# Patient Record
Sex: FEMALE | Race: WHITE | Employment: FULL TIME | URBAN - METROPOLITAN AREA
[De-identification: names, ages, dates, MRNs, and addresses within clinical notes are randomized per-mention and may not be internally consistent; named-entity substitution may affect disease eponyms.]

---

## 2017-02-24 PROBLEM — G35 MS (MULTIPLE SCLEROSIS) (HCC): Status: ACTIVE | Noted: 2017-02-24

## 2018-03-22 ENCOUNTER — HOSPITAL ENCOUNTER (EMERGENCY)
Age: 48
Discharge: HOME | End: 2018-03-22
Payer: COMMERCIAL

## 2018-03-22 VITALS
SYSTOLIC BLOOD PRESSURE: 123 MMHG | HEART RATE: 91 BPM | TEMPERATURE: 98.9 F | RESPIRATION RATE: 20 BRPM | DIASTOLIC BLOOD PRESSURE: 77 MMHG | OXYGEN SATURATION: 94 %

## 2018-03-22 DIAGNOSIS — J40 BRONCHITIS: Primary | ICD-10-CM

## 2018-03-22 PROCEDURE — 99212 OFFICE O/P EST SF 10 MIN: CPT

## 2018-03-22 RX ORDER — AZITHROMYCIN 250 MG/1
TABLET, FILM COATED ORAL
Qty: 6 TABLET | Refills: 0 | Status: SHIPPED | OUTPATIENT
Start: 2018-03-22 | End: 2018-04-01

## 2018-03-22 RX ORDER — BROMPHENIRAMINE MALEATE, PSEUDOEPHEDRINE HYDROCHLORIDE, AND DEXTROMETHORPHAN HYDROBROMIDE 2; 30; 10 MG/5ML; MG/5ML; MG/5ML
5 SYRUP ORAL 4 TIMES DAILY PRN
Qty: 140 ML | Refills: 0 | Status: SHIPPED | OUTPATIENT
Start: 2018-03-22 | End: 2018-03-29

## 2018-03-22 NOTE — ED PROVIDER NOTES
HPI: Della Asencio is a 50 y.o. female with a past medical history of  has a past medical history of Arthritis; Asthma; H/O medication noncompliance; Hyperlipidemia; Lupus; MS (multiple sclerosis) ([facility] and SVT (supraventricular tachycardia) ([facility] presenting with chest congestion and cough. She said she's been coughing up phlegm for about 4 or 5 days. She's been taking over-the-counter medications and it os  not helping   She denies any sinus congestion or nasal congestion feels this is mostly in her chest.  She states she has a history of MS and she has to get on antibiotics right away or if it gets worse for her. She denies any fever or body aches does not have any neck stiffness or headache. She does not have chest pain or shortness of breath.     Review of Systems:   Pertinent positives and negatives are stated within HPI, all other systems reviewed and are negative.          --------------------------------------------- PAST HISTORY ---------------------------------------------  Past Medical History:  has a past medical history of Arthritis; Asthma; H/O medication noncompliance; Hyperlipidemia; Lupus; MS (multiple sclerosis) ([facility] and SVT (supraventricular tachycardia) ([facility] Past Surgical History:  has a past surgical history that includes Appendectomy; Abdomen surgery; Cholecystectomy; Tubal ligation;  section; Anterior cruciate ligament repair; and Dilation and curettage of uterus. Social History:  reports that she quit smoking about 8 years ago. Her smoking use included Cigarettes. She has a 5.00 pack-year smoking history. She has never used smokeless tobacco. She reports that she does not drink alcohol or use drugs. Family History: family history includes Heart Attack in her mother; Heart Disease in her mother; High Blood Pressure in her father and mother. The patients home medications have been reviewed.     Allergies: Lyrica [pregabalin]; Sulfa antibiotics; Darvocet

## 2019-11-22 ENCOUNTER — TELEPHONE (OUTPATIENT)
Dept: ADMINISTRATIVE | Age: 49
End: 2019-11-22

## 2020-02-13 VITALS
HEIGHT: 67 IN | WEIGHT: 190 LBS | HEART RATE: 69 BPM | SYSTOLIC BLOOD PRESSURE: 128 MMHG | DIASTOLIC BLOOD PRESSURE: 74 MMHG | BODY MASS INDEX: 29.82 KG/M2

## 2020-02-13 RX ORDER — LANOLIN ALCOHOL/MO/W.PET/CERES
5000 CREAM (GRAM) TOPICAL DAILY
COMMUNITY
End: 2020-11-03

## 2020-02-13 RX ORDER — UBIDECARENONE 100 MG
100 CAPSULE ORAL DAILY
COMMUNITY
End: 2020-04-07

## 2020-04-07 ENCOUNTER — VIRTUAL VISIT (OUTPATIENT)
Dept: CARDIOLOGY CLINIC | Age: 50
End: 2020-04-07
Payer: COMMERCIAL

## 2020-04-07 ENCOUNTER — HOSPITAL ENCOUNTER (OUTPATIENT)
Age: 50
Discharge: HOME | End: 2020-04-07
Payer: COMMERCIAL

## 2020-04-07 ENCOUNTER — TELEPHONE (OUTPATIENT)
Dept: ADMINISTRATIVE | Age: 50
End: 2020-04-07

## 2020-04-07 ENCOUNTER — TELEPHONE (OUTPATIENT)
Dept: CARDIOLOGY CLINIC | Age: 50
End: 2020-04-07

## 2020-04-07 VITALS
WEIGHT: 165 LBS | DIASTOLIC BLOOD PRESSURE: 74 MMHG | BODY MASS INDEX: 24.44 KG/M2 | SYSTOLIC BLOOD PRESSURE: 150 MMHG | HEIGHT: 69 IN | HEART RATE: 78 BPM

## 2020-04-07 LAB
ALBUMIN SERPL-MCNC: 4.5 G/DL (ref 3.5–5.2)
ALP BLD-CCNC: 110 U/L (ref 35–104)
ALT SERPL-CCNC: 29 U/L (ref 0–32)
ANION GAP SERPL CALCULATED.3IONS-SCNC: 11 MMOL/L (ref 7–16)
AST SERPL-CCNC: 23 U/L (ref 0–31)
BILIRUB SERPL-MCNC: <0.2 MG/DL (ref 0–1.2)
BUN BLDV-MCNC: 11 MG/DL (ref 6–20)
CALCIUM SERPL-MCNC: 9.4 MG/DL (ref 8.6–10.2)
CHLORIDE BLD-SCNC: 100 MMOL/L (ref 98–107)
CO2: 28 MMOL/L (ref 22–29)
CREAT SERPL-MCNC: 0.7 MG/DL (ref 0.5–1)
GFR AFRICAN AMERICAN: >60
GFR NON-AFRICAN AMERICAN: >60 ML/MIN/1.73
GLUCOSE BLD-MCNC: 127 MG/DL (ref 74–99)
HCT VFR BLD CALC: 46.3 % (ref 34–48)
HEMOGLOBIN: 15.3 G/DL (ref 11.5–15.5)
MCH RBC QN AUTO: 29.7 PG (ref 26–35)
MCHC RBC AUTO-ENTMCNC: 33 % (ref 32–34.5)
MCV RBC AUTO: 89.7 FL (ref 80–99.9)
PDW BLD-RTO: 12.8 FL (ref 11.5–15)
PLATELET # BLD: 376 E9/L (ref 130–450)
PMV BLD AUTO: 9 FL (ref 7–12)
POTASSIUM SERPL-SCNC: 4.1 MMOL/L (ref 3.5–5)
PRO-BNP: 23 PG/ML (ref 0–125)
RBC # BLD: 5.16 E12/L (ref 3.5–5.5)
SODIUM BLD-SCNC: 139 MMOL/L (ref 132–146)
TOTAL PROTEIN: 7.5 G/DL (ref 6.4–8.3)
WBC # BLD: 9.5 E9/L (ref 4.5–11.5)

## 2020-04-07 PROCEDURE — 36415 COLL VENOUS BLD VENIPUNCTURE: CPT

## 2020-04-07 PROCEDURE — 85027 COMPLETE CBC AUTOMATED: CPT

## 2020-04-07 PROCEDURE — 99441 PR PHYS/QHP TELEPHONE EVALUATION 5-10 MIN: CPT | Performed by: INTERNAL MEDICINE

## 2020-04-07 PROCEDURE — 80053 COMPREHEN METABOLIC PANEL: CPT

## 2020-04-07 PROCEDURE — 83880 ASSAY OF NATRIURETIC PEPTIDE: CPT

## 2020-04-07 RX ORDER — POTASSIUM CHLORIDE 750 MG/1
10 TABLET, EXTENDED RELEASE ORAL DAILY
Qty: 90 TABLET | Refills: 1 | Status: SHIPPED
Start: 2020-04-07 | End: 2020-09-28

## 2020-04-07 RX ORDER — FUROSEMIDE 20 MG/1
20 TABLET ORAL 2 TIMES DAILY
Qty: 60 TABLET | Refills: 3 | Status: SHIPPED
Start: 2020-04-07 | End: 2020-07-23 | Stop reason: SDUPTHER

## 2020-04-07 RX ORDER — MULTIVIT WITH MINERALS/LUTEIN
1000 TABLET ORAL DAILY
COMMUNITY
End: 2020-09-22

## 2020-04-07 NOTE — TELEPHONE ENCOUNTER
Patient is asking if you can order some labs on her cmp/cbc - just some general labs.   Hasn't had any for awhile and currently does not have a PCP due to insurance change

## 2020-04-07 NOTE — PROGRESS NOTES
Anaphylaxis 12/18/2011    Darvocet [propoxyphene n-acetaminophen]  10/02/2011    Ultram [tramadol hcl]  10/02/2011       Social History     Socioeconomic History    Marital status: Legally      Spouse name: Not on file    Number of children: Not on file    Years of education: Not on file    Highest education level: Not on file   Occupational History    Not on file   Social Needs    Financial resource strain: Not on file    Food insecurity     Worry: Not on file     Inability: Not on file    Transportation needs     Medical: Not on file     Non-medical: Not on file   Tobacco Use    Smoking status: Current Every Day Smoker     Packs/day: 1.00     Years: 10.00     Pack years: 10.00     Types: Cigarettes     Last attempt to quit: 10/2/2009     Years since quitting: 10.5    Smokeless tobacco: Never Used    Tobacco comment: resumed 2015   Substance and Sexual Activity    Alcohol use: No     Comment: no caffeine    Drug use: No    Sexual activity: Yes     Partners: Male   Lifestyle    Physical activity     Days per week: Not on file     Minutes per session: Not on file    Stress: Not on file   Relationships    Social connections     Talks on phone: Not on file     Gets together: Not on file     Attends Denominational service: Not on file     Active member of club or organization: Not on file     Attends meetings of clubs or organizations: Not on file     Relationship status: Not on file    Intimate partner violence     Fear of current or ex partner: Not on file     Emotionally abused: Not on file     Physically abused: Not on file     Forced sexual activity: Not on file   Other Topics Concern    Not on file   Social History Narrative    Not on file       Family History   Problem Relation Age of Onset    Heart Disease Mother     Heart Attack Mother     High Blood Pressure Mother     High Blood Pressure Father        REVIEW OF SYSTEMS:     CONSTITUTIONAL:  negative for  fevers, chills, sweats, + fatigue  HEENT:  negative for  tinnitus, earaches, nasal congestion and epistaxis  RESPIRATORY:  negative for  dry cough, cough with sputum, wheezing and hemoptysis  GASTROINTESTINAL:  negative for nausea, vomiting, diarrhea, constipation, pruritus and jaundice  HEMATOLOGIC/LYMPHATIC:  negative for easy bruising, bleeding, lymphadenopathy and petechiae  ENDOCRINE:  negative for heat intolerance, cold intolerance, tremor, hair loss and diabetic symptoms including neither polyuria nor polydipsia nor blurred vision  MUSCULOSKELETAL:  negative for  myalgias, arthralgias, joint swelling, stiff joints and decreased range of motion  NEUROLOGICAL:  negative for memory problems, speech problems, visual disturbance, dysphagia, weakness and numbness      PHYSICAL EXAM:   Was not performed since a was phone visit      BP (!) 150/74 (Site: Right Upper Arm, Position: Sitting, Cuff Size: Medium Adult)   Pulse 78   Ht 5' 9\" (1.753 m)   Wt 165 lb (74.8 kg)   BMI 24.37 kg/m²     DATA:   I personally reviewed the visit EKG with the following interpretation:  ECHO: Not performed to date  Stress Test: Not performed to date  Angiography: Not performed to date  Cardiology Labs: BMP:    Lab Results   Component Value Date     08/03/2014    K 3.1 08/03/2014    CL 99 08/03/2014    CO2 32 08/03/2014    BUN 17 08/03/2014    CREATININE 0.9 08/03/2014     CMP:    Lab Results   Component Value Date     08/03/2014    K 3.1 08/03/2014    CL 99 08/03/2014    CO2 32 08/03/2014    BUN 17 08/03/2014    CREATININE 0.9 08/03/2014    PROT 7.3 02/17/2014     CBC:    Lab Results   Component Value Date    WBC 11.9 08/03/2014    RBC 5.14 08/03/2014    HGB 14.9 08/03/2014    HCT 44.0 08/03/2014    MCV 85.6 08/03/2014    RDW 13.5 08/03/2014     08/03/2014     PT/INR:  No results found for: PTINR  PT/INR Warfarin:  No components found for: PTPATWAR, PTINRWAR  PTT:  No results found for: APTT  PTT Heparin:  No components found for:

## 2020-04-14 ENCOUNTER — TELEPHONE (OUTPATIENT)
Dept: ADMINISTRATIVE | Age: 50
End: 2020-04-14

## 2020-04-14 NOTE — TELEPHONE ENCOUNTER
Pt calling in to get results of labs done last week, 4/7 by Dr Jose Manuel Choi.   Please review and call at [phone]

## 2020-05-19 ENCOUNTER — HOSPITAL ENCOUNTER (EMERGENCY)
Age: 50
Discharge: HOME | End: 2020-05-20
Attending: EMERGENCY MEDICINE
Payer: COMMERCIAL

## 2020-05-19 PROCEDURE — 99283 EMERGENCY DEPT VISIT LOW MDM: CPT

## 2020-05-19 RX ORDER — BUPRENORPHINE AND NALOXONE 8; 2 MG/1; MG/1
1 FILM, SOLUBLE BUCCAL; SUBLINGUAL 2 TIMES DAILY
COMMUNITY

## 2020-05-20 ENCOUNTER — APPOINTMENT (OUTPATIENT)
Dept: GENERAL RADIOLOGY | Age: 50
End: 2020-05-20
Payer: COMMERCIAL

## 2020-05-20 VITALS
WEIGHT: 180 LBS | SYSTOLIC BLOOD PRESSURE: 131 MMHG | HEIGHT: 69 IN | HEART RATE: 80 BPM | RESPIRATION RATE: 16 BRPM | OXYGEN SATURATION: 98 % | BODY MASS INDEX: 26.66 KG/M2 | TEMPERATURE: 98.2 F | DIASTOLIC BLOOD PRESSURE: 81 MMHG

## 2020-05-20 LAB
ANION GAP SERPL CALCULATED.3IONS-SCNC: 11 MMOL/L (ref 7–16)
BUN BLDV-MCNC: 10 MG/DL (ref 6–20)
CALCIUM SERPL-MCNC: 9.1 MG/DL (ref 8.6–10.2)
CHLORIDE BLD-SCNC: 103 MMOL/L (ref 98–107)
CO2: 28 MMOL/L (ref 22–29)
CREAT SERPL-MCNC: 0.7 MG/DL (ref 0.5–1)
D DIMER: 237 NG/ML DDU
EKG ATRIAL RATE: 93 BPM
EKG P AXIS: 73 DEGREES
EKG P-R INTERVAL: 158 MS
EKG Q-T INTERVAL: 350 MS
EKG QRS DURATION: 78 MS
EKG QTC CALCULATION (BAZETT): 435 MS
EKG R AXIS: 83 DEGREES
EKG T AXIS: 56 DEGREES
EKG VENTRICULAR RATE: 93 BPM
GFR AFRICAN AMERICAN: >60
GFR NON-AFRICAN AMERICAN: >60 ML/MIN/1.73
GLUCOSE BLD-MCNC: 102 MG/DL (ref 74–99)
HCT VFR BLD CALC: 43.5 % (ref 34–48)
HEMOGLOBIN: 14.3 G/DL (ref 11.5–15.5)
MCH RBC QN AUTO: 29.4 PG (ref 26–35)
MCHC RBC AUTO-ENTMCNC: 32.9 % (ref 32–34.5)
MCV RBC AUTO: 89.5 FL (ref 80–99.9)
PDW BLD-RTO: 12.8 FL (ref 11.5–15)
PLATELET # BLD: 346 E9/L (ref 130–450)
PMV BLD AUTO: 9 FL (ref 7–12)
POTASSIUM SERPL-SCNC: 3.9 MMOL/L (ref 3.5–5)
PRO-BNP: 88 PG/ML (ref 0–125)
RBC # BLD: 4.86 E12/L (ref 3.5–5.5)
SODIUM BLD-SCNC: 142 MMOL/L (ref 132–146)
T4 TOTAL: 6 MCG/DL (ref 4.5–11.7)
TROPONIN: <0.01 NG/ML (ref 0–0.03)
TSH SERPL DL<=0.05 MIU/L-ACNC: 1.09 UIU/ML (ref 0.27–4.2)
WBC # BLD: 11 E9/L (ref 4.5–11.5)

## 2020-05-20 PROCEDURE — 80048 BASIC METABOLIC PNL TOTAL CA: CPT

## 2020-05-20 PROCEDURE — 83880 ASSAY OF NATRIURETIC PEPTIDE: CPT

## 2020-05-20 PROCEDURE — 85027 COMPLETE CBC AUTOMATED: CPT

## 2020-05-20 PROCEDURE — 96374 THER/PROPH/DIAG INJ IV PUSH: CPT

## 2020-05-20 PROCEDURE — 84436 ASSAY OF TOTAL THYROXINE: CPT

## 2020-05-20 PROCEDURE — 84484 ASSAY OF TROPONIN QUANT: CPT

## 2020-05-20 PROCEDURE — 6360000002 HC RX W HCPCS: Performed by: EMERGENCY MEDICINE

## 2020-05-20 PROCEDURE — 84443 ASSAY THYROID STIM HORMONE: CPT

## 2020-05-20 PROCEDURE — 71045 X-RAY EXAM CHEST 1 VIEW: CPT

## 2020-05-20 PROCEDURE — 93005 ELECTROCARDIOGRAM TRACING: CPT | Performed by: EMERGENCY MEDICINE

## 2020-05-20 PROCEDURE — 85378 FIBRIN DEGRADE SEMIQUANT: CPT

## 2020-05-20 RX ORDER — FUROSEMIDE 10 MG/ML
40 INJECTION INTRAMUSCULAR; INTRAVENOUS ONCE
Status: COMPLETED | OUTPATIENT
Start: 2020-05-20 | End: 2020-05-20

## 2020-05-20 RX ADMIN — FUROSEMIDE 40 MG: 10 INJECTION, SOLUTION INTRAMUSCULAR; INTRAVENOUS at 01:15

## 2020-05-20 ASSESSMENT — ENCOUNTER SYMPTOMS
WHEEZING: 0
SHORTNESS OF BREATH: 0
ABDOMINAL DISTENTION: 0
DIARRHEA: 0
EYE DISCHARGE: 0
SORE THROAT: 0
BACK PAIN: 0
EYE PAIN: 0
COUGH: 0
NAUSEA: 0
EYE REDNESS: 0
SINUS PRESSURE: 0
VOMITING: 0

## 2020-05-20 NOTE — ED PROVIDER NOTES
°C) (Infrared)   Resp 16   Ht 5' 9\" (1.753 m)   Wt 180 lb (81.6 kg)   LMP 05/04/2020   SpO2 98%   BMI 26.58 kg/m²   Oxygen Saturation Interpretation: Normal      ------------------------------------------ PROGRESS NOTES ------------------------------------------  I have spoken with the patient and discussed todays results, in addition to providing specific details for the plan of care and counseling regarding the diagnosis and prognosis. Their questions are answered at this time and they are agreeable with the plan. I discussed at length with them reasons for immediate return here for re evaluation. They will followup with primary care by calling their office tomorrow. --------------------------------- ADDITIONAL PROVIDER NOTES ---------------------------------  At this time the patient is without objective evidence of an acute process requiring hospitalization or inpatient management. They have remained hemodynamically stable throughout their entire ED visit and are stable for discharge with outpatient follow-up. The plan has been discussed in detail and they are aware of the specific conditions for emergent return, as well as the importance of follow-up. New Prescriptions    No medications on file       Diagnosis:  1. Peripheral edema        Disposition:  Patient's disposition: Discharge to home  Patient's condition is stable.              Evelio Felipe, DO  05/20/20 [address], DO  05/20/20 [id number]

## 2020-06-05 ENCOUNTER — TELEPHONE (OUTPATIENT)
Dept: CARDIOLOGY CLINIC | Age: 50
End: 2020-06-05

## 2020-06-09 ENCOUNTER — OFFICE VISIT (OUTPATIENT)
Dept: ORTHOPEDIC SURGERY | Age: 50
End: 2020-06-09
Payer: COMMERCIAL

## 2020-06-09 VITALS — BODY MASS INDEX: 28.88 KG/M2 | TEMPERATURE: 98 F | WEIGHT: 195 LBS | HEIGHT: 69 IN

## 2020-06-09 PROCEDURE — 4004F PT TOBACCO SCREEN RCVD TLK: CPT | Performed by: ORTHOPAEDIC SURGERY

## 2020-06-09 PROCEDURE — G8419 CALC BMI OUT NRM PARAM NOF/U: HCPCS | Performed by: ORTHOPAEDIC SURGERY

## 2020-06-09 PROCEDURE — 20610 DRAIN/INJ JOINT/BURSA W/O US: CPT | Performed by: ORTHOPAEDIC SURGERY

## 2020-06-09 PROCEDURE — 3017F COLORECTAL CA SCREEN DOC REV: CPT | Performed by: ORTHOPAEDIC SURGERY

## 2020-06-09 PROCEDURE — G8427 DOCREV CUR MEDS BY ELIG CLIN: HCPCS | Performed by: ORTHOPAEDIC SURGERY

## 2020-06-09 PROCEDURE — 99203 OFFICE O/P NEW LOW 30 MIN: CPT | Performed by: ORTHOPAEDIC SURGERY

## 2020-06-09 RX ORDER — TRIAMCINOLONE ACETONIDE 40 MG/ML
40 INJECTION, SUSPENSION INTRA-ARTICULAR; INTRAMUSCULAR ONCE
Status: COMPLETED | OUTPATIENT
Start: 2020-06-09 | End: 2020-06-09

## 2020-06-09 RX ADMIN — TRIAMCINOLONE ACETONIDE 40 MG: 40 INJECTION, SUSPENSION INTRA-ARTICULAR; INTRAMUSCULAR at 16:16

## 2020-06-09 NOTE — PATIENT INSTRUCTIONS
Patient Education        Hip Bursitis: Exercises  Introduction  Here are some examples of exercises for you to try. The exercises may be suggested for a condition or for rehabilitation. Start each exercise slowly. Ease off the exercises if you start to have pain. You will be told when to start these exercises and which ones will work best for you. How to do the exercises  Hip rotator stretch   1. Lie on your back with both knees bent and your feet flat on the floor. 2. Put the ankle of your affected leg on your opposite thigh near your knee. 3. Use your hand to gently push your knee away from your body until you feel a gentle stretch around your hip. 4. Hold the stretch for 15 to 30 seconds. 5. Repeat 2 to 4 times. 6. Repeat steps 1 through 5, but this time use your hand to gently pull your knee toward your opposite shoulder. Iliotibial band stretch   1. Lean sideways against a wall. If you are not steady on your feet, hold on to a chair or counter. 2. Stand on the leg with the affected hip, with that leg close to the wall. Then cross your other leg in front of it. 3. Let your affected hip drop out to the side of your body and against wall. Then lean away from your affected hip until you feel a stretch. 4. Hold the stretch for 15 to 30 seconds. 5. Repeat 2 to 4 times. Straight-leg raises to the outside   1. Lie on your side, with your affected hip on top. 2. Tighten the front thigh muscles of your top leg to keep your knee straight. 3. Keep your hip and your leg straight in line with the rest of your body, and keep your knee pointing forward. Do not drop your hip back. 4. Lift your top leg straight up toward the ceiling, about 12 inches off the floor. Hold for about 6 seconds, then slowly lower your leg. 5. Repeat 8 to 12 times. Clamshell   1. Lie on your side, with your affected hip on top and your head propped on a pillow. Keep your feet and knees together and your knees bent.   2. Raise your top knee, but keep your feet together. Do not let your hips roll back. Your legs should open up like a clamshell. 3. Hold for 6 seconds. 4. Slowly lower your knee back down. Rest for 10 seconds. 5. Repeat 8 to 12 times. Follow-up care is a key part of your treatment and safety. Be sure to make and go to all appointments, and call your doctor if you are having problems. It's also a good idea to know your test results and keep a list of the medicines you take. Where can you learn more? Go to [email or web address] and sign in to your [employer] account. Enter K803 in the [employer] box to learn more about \"Hip Bursitis: Exercises. \"     If you do not have an account, please click on the \"Sign Up Now\" link. Current as of: March 2, 2020               Content Version: 12.5  © 6497-1621 Healthwise, Incorporated. Care instructions adapted under license by [employer]. If you have questions about a medical condition or this instruction, always ask your healthcare professional. [address] any warranty or liability for your use of this information.

## 2020-06-09 NOTE — PROGRESS NOTES
Chief Complaint   Patient presents with    Hip Pain     new patient left hip pain +1 year. Patient states no hx of injury but pain has progressively gotten worse. Russ Chun  Is a 48 y.o.  female who presents today complaining of left hip pain. She states that the pain began 1  year(s) ago. She did not have a history of injury. Patients states pain is located lateral, over greater trochanter and has tenderness over the  Lateral portion of the hip. Hip pain is described as aching, sharp, shooting and stabbing and  is worse with weight bearing along with lateral discomfort. She states the pain occurs in the  nighttime. Patient states hip pain is relieved by rest, heat, ice, medication: Motrin used and not beneficial. Patient does not have a history of back pain. The patient does not use ambulatory aid. The patients occupation is [email or web address]. Past Medical History:   Diagnosis Date    Arthritis     rheumatoid    Asthma     H/O medication noncompliance     Hyperlipidemia     Lupus (HCC)     MS (multiple sclerosis) (HCC)     Multiple sclerosis (HCC)     Nicotine dependence, cigarettes, uncomplicated     Palpitations     SOB (shortness of breath)     [facility]     Systemic lupus erythematosus, unspecified ([facility]      Past Surgical History:   Procedure Laterality Date    ABDOMEN SURGERY      ANTERIOR CRUCIATE LIGAMENT REPAIR      APPENDECTOMY       SECTION      x5    CHOLECYSTECTOMY      DILATION AND CURETTAGE OF UTERUS      TUBAL LIGATION         Current Outpatient Medications:     buprenorphine-naloxone (SUBOXONE) 8-2 MG FILM SL film, Place 1 Film under the tongue daily. , Disp: , Rfl:     furosemide (LASIX) 20 MG tablet, Take 1 tablet by mouth 2 times daily, Disp: 60 tablet, Rfl: 3    potassium chloride (KLOR-CON M) 10 MEQ extended release tablet, Take 1 tablet by mouth daily, Disp: 90 tablet, Rfl: 1    vitamin B-12

## 2020-06-17 ENCOUNTER — TELEPHONE (OUTPATIENT)
Dept: ADMINISTRATIVE | Age: 50
End: 2020-06-17

## 2020-06-25 ENCOUNTER — HOSPITAL ENCOUNTER (OUTPATIENT)
Dept: NON INVASIVE DIAGNOSTICS | Age: 50
Discharge: HOME | End: 2020-06-25
Payer: COMMERCIAL

## 2020-06-25 LAB
LV EF: 65 %
LVEF MODALITY: NORMAL

## 2020-06-25 PROCEDURE — 93306 TTE W/DOPPLER COMPLETE: CPT

## 2020-06-29 ENCOUNTER — TELEPHONE (OUTPATIENT)
Dept: CARDIOLOGY CLINIC | Age: 50
End: 2020-06-29

## 2020-06-29 NOTE — TELEPHONE ENCOUNTER
Patient called in for echo results. Normal results given. She still has swelling, but not as bad as before.   She is due to come in for 3 mo check

## 2020-06-30 ENCOUNTER — OFFICE VISIT (OUTPATIENT)
Dept: ORTHOPEDIC SURGERY | Age: 50
End: 2020-06-30
Payer: COMMERCIAL

## 2020-06-30 VITALS — TEMPERATURE: 98 F | WEIGHT: 178 LBS | BODY MASS INDEX: 26.36 KG/M2 | HEIGHT: 69 IN

## 2020-06-30 PROCEDURE — 3017F COLORECTAL CA SCREEN DOC REV: CPT | Performed by: ORTHOPAEDIC SURGERY

## 2020-06-30 PROCEDURE — G8427 DOCREV CUR MEDS BY ELIG CLIN: HCPCS | Performed by: ORTHOPAEDIC SURGERY

## 2020-06-30 PROCEDURE — 99213 OFFICE O/P EST LOW 20 MIN: CPT | Performed by: ORTHOPAEDIC SURGERY

## 2020-06-30 PROCEDURE — G8419 CALC BMI OUT NRM PARAM NOF/U: HCPCS | Performed by: ORTHOPAEDIC SURGERY

## 2020-06-30 PROCEDURE — 4004F PT TOBACCO SCREEN RCVD TLK: CPT | Performed by: ORTHOPAEDIC SURGERY

## 2020-06-30 NOTE — PROGRESS NOTES
fremitus. Skin:  Upon inspection: the skin appears warm, dry and intact. There is not a previous scar over the affected area. There is not any cellulitis, lymphedema or cutaneous lesions noted in the lower extremities. Upon palpation there is no induration noted. Neurologic:  Motor exam of the lower extremities show ; quadriceps, hamstrings, foot dorsi and plantar flexors intact R.  5/5 and L. 5/5. Deep tendon reflexes are 2/4 at the knees and 2/4 at the ankles with strong extensor hallicus longus motor strength bilaterally. Sensory to both feet is intact to all sensory roots. Cardiovascular: The vascular exam is normal and is well perfused to distal extremities. Distal pulses DP/PT: R. 2+; L. 2+. There is cap refill noted less than two seconds in all digits. There is not edema of the bilateral lower extremities. There is not varicosities noted in the distal extremities. Lymph:  Upon palpation,  there is no lymphadenopathy noted in bilateral lower extremities. Musculoskeletal:  Gait: antalgic;  Examination of the digits and nails reveal no cyanosis or clubbing    Lumbar exam:  On visual inspection, there is not deformity of the spine. full range of motion, no tenderness, palpable spasm or pain on motion. Special tests: Straight Leg Raise positive, Silverio test negative. Hip exam:  Upon visual inspection there is not a deformity noted. Patient complains of tenderness at the  greater trochanter and buttock region. Exam of the left hip shows none leg length discrepancy. Range of motion of the involved/uninvolved 25 degrees internal rotation and 35 degrees external rotation 25 degrees internal rotation and 35 degrees external rotation, the hip joint is stable to testing. Strength of the lower extremity is normal.The patient does not have ipsilateral knee pain, and is described as  none. Hip exam- Gait: normal; Strength: Hip Flexors 5/5; Hip Abductors 5/5; Hip Adduction 5/5. Cigarettes

## 2020-07-23 RX ORDER — FUROSEMIDE 20 MG/1
20 TABLET ORAL 2 TIMES DAILY
Qty: 60 TABLET | Refills: 3 | Status: SHIPPED
Start: 2020-07-23 | End: 2020-09-03 | Stop reason: SDUPTHER

## 2020-07-23 RX ORDER — FUROSEMIDE 20 MG/1
TABLET ORAL
Qty: 60 TABLET | Refills: 3 | Status: SHIPPED
Start: 2020-07-23 | End: 2020-11-03

## 2020-08-31 ENCOUNTER — OFFICE VISIT (OUTPATIENT)
Dept: ORTHOPEDIC SURGERY | Age: 50
End: 2020-08-31
Payer: COMMERCIAL

## 2020-08-31 VITALS — TEMPERATURE: 98 F | WEIGHT: 180 LBS | HEIGHT: 69 IN | BODY MASS INDEX: 26.66 KG/M2

## 2020-08-31 PROCEDURE — 3017F COLORECTAL CA SCREEN DOC REV: CPT | Performed by: ORTHOPAEDIC SURGERY

## 2020-08-31 PROCEDURE — G8427 DOCREV CUR MEDS BY ELIG CLIN: HCPCS | Performed by: ORTHOPAEDIC SURGERY

## 2020-08-31 PROCEDURE — 4004F PT TOBACCO SCREEN RCVD TLK: CPT | Performed by: ORTHOPAEDIC SURGERY

## 2020-08-31 PROCEDURE — 99213 OFFICE O/P EST LOW 20 MIN: CPT | Performed by: ORTHOPAEDIC SURGERY

## 2020-08-31 PROCEDURE — G8419 CALC BMI OUT NRM PARAM NOF/U: HCPCS | Performed by: ORTHOPAEDIC SURGERY

## 2020-08-31 NOTE — PROGRESS NOTES
Chief Complaint   Patient presents with    Hip Pain     Left Hip F/U, had MRI Done         Don Lauren  Is a 48 y.o.  female who presents today complaining of left hip pain. She states that the pain began 1  year(s) ago. She did not have a history of injury. Patients states pain is located lateral, over greater trochanter and has tenderness over the  Lateral portion of the hip. Hip pain is described as aching, sharp, shooting and stabbing and  is worse with weight bearing along with lateral discomfort. She states the pain occurs in the  nighttime. Patient states hip pain is relieved by rest, heat, ice, medication: Motrin used and not beneficial. Patient does not have a history of back pain. The patient does not use ambulatory aid. The patients occupation is [employer]. She is here today for mri results    Past Medical History:   Diagnosis Date    Arthritis     rheumatoid    Asthma     H/O medication noncompliance     Hyperlipidemia     Lupus (HCC)     MS (multiple sclerosis) (HCC)     Multiple sclerosis (HCC)     Nicotine dependence, cigarettes, uncomplicated     Palpitations     SOB (shortness of breath)     SVT (supraventricular tachycardia) (HCC)     Systemic lupus erythematosus, unspecified ([facility]      Past Surgical History:   Procedure Laterality Date    ABDOMEN SURGERY      ANTERIOR CRUCIATE LIGAMENT REPAIR      APPENDECTOMY       SECTION      x5    CHOLECYSTECTOMY      DILATION AND CURETTAGE OF UTERUS      TUBAL LIGATION         Current Outpatient Medications:     furosemide (LASIX) 20 MG tablet, take 1 tablet by mouth twice a day, Disp: 60 tablet, Rfl: 3    furosemide (LASIX) 20 MG tablet, Take 1 tablet by mouth 2 times daily, Disp: 60 tablet, Rfl: 3    buprenorphine-naloxone (SUBOXONE) 8-2 MG FILM SL film, Place 1 Film under the tongue daily. , Disp: , Rfl:     vitamin E 1000 units capsule, Take 1,000 Units by mouth daily, Disp: , Rfl:     potassium chloride (KLOR-CON M) 10 MEQ extended release tablet, Take 1 tablet by mouth daily, Disp: 90 tablet, Rfl: 1    vitamin B-12 (CYANOCOBALAMIN) 1000 MCG tablet, Take 5,000 mcg by mouth daily 1 by mouth every day , Disp: , Rfl:     albuterol sulfate HFA (VENTOLIN HFA) 108 (90 Base) MCG/ACT inhaler, Inhale 2 puffs into the lungs every 6 hours as needed for Wheezing, Disp: 1 Inhaler, Rfl: 1    aspirin 81 MG tablet, Take 81 mg by mouth daily, Disp: , Rfl:     LORazepam (ATIVAN) 1 MG tablet, Take 0.5 mg by mouth daily.  , Disp: , Rfl:     vitamin D (CHOLECALCIFEROL) 1000 UNIT TABS tablet, Take 50,000 Units by mouth once a week., Disp: , Rfl:   Allergies   Allergen Reactions    Lyrica [Pregabalin] Anaphylaxis    Other Shortness Of Breath    Sulfa Antibiotics Anaphylaxis     Hallucinations and rash    Darvocet [Propoxyphene N-Acetaminophen]     Ultram [Tramadol Hcl]      Social History     Socioeconomic History    Marital status:      Spouse name: Not on file    Number of children: Not on file    Years of education: Not on file    Highest education level: Not on file   Occupational History    Not on file   Social Needs    Financial resource strain: Not on file    Food insecurity     Worry: Not on file     Inability: Not on file    Transportation needs     Medical: Not on file     Non-medical: Not on file   Tobacco Use    Smoking status: Current Every Day Smoker     Packs/day: 1.00     Years: 10.00     Pack years: 10.00     Types: Cigarettes     Last attempt to quit: 10/2/2009     Years since quitting: 10.9    Smokeless tobacco: Never Used    Tobacco comment: resumed 2015   Substance and Sexual Activity    Alcohol use: No     Comment: no caffeine    Drug use: No    Sexual activity: Yes     Partners: Male   Lifestyle    Physical activity     Days per week: Not on file     Minutes per session: Not on file    Stress: Not on file   Relationships    Social connections     Talks on phone: Not on file     Gets together: Not on file     Attends Synagogue service: Not on file     Active member of club or organization: Not on file     Attends meetings of clubs or organizations: Not on file     Relationship status: Not on file    Intimate partner violence     Fear of current or ex partner: Not on file     Emotionally abused: Not on file     Physically abused: Not on file     Forced sexual activity: Not on file   Other Topics Concern    Not on file   Social History Narrative    Not on file     Family History   Problem Relation Age of Onset    Heart Disease Mother     Heart Attack Mother     High Blood Pressure Mother     High Blood Pressure Father          REVIEW OF SYSTEMS:     General/Constitution:  (-)weight loss, (-)fever, (-)chills, (-)weakness. Skin: (-) rash,(-) psoriasis,(-) eczema, (-)skin cancer. Musculoskeletal: (-) fractures,  (-) dislocations,(-) collagen vascular disease, (-) fibromyalgia, (-) multiple sclerosis, (-) muscular dystrophy, (-) RSD,(-) joint pain (-)swelling, (-) joint pain,swelling. Neurologic: (-) epilepsy, (-)seizures,(-) brain tumor,(-) TIA, (-)stroke, (-)headaches, (-)Parkinson disease,(-) memory loss, (-) LOC. Cardiovascular: (-) Chest pain, (-) swelling in legs/feet, (-) SOB, (-) cramping in legs/feet with walking. Respiratory: (-) SOB, (-) Coughing, (-) night sweats. GI: (-) nausea, (-) vomiting, (-) diarrhea, (-) blood in stool, (-) gastric ulcer. Psychiatric: (-) Depression, (-) Anxiety, (-) bipolar disease, (-) Alzheimer's Disease  Allergic/Immunologic: (-) allergies latex, (-) allergies metal, (-) skin sensitivity. Hematlogic: (-) anemia, (-) blood transfusion, (-) DVT/PE, (-) Clotting disorders      Subjective:    Constitution:    Temp 98 °F (36.7 °C)   Ht 5' 9\" (1.753 m)   Wt 180 lb (81.6 kg)   BMI 26.58 kg/m²     Psycihatric:  The patient is alert and oriented x 3, appears to be stated age and in no distress.       Respiratory:  Respiratory effort is normal; Strength: Hip Flexors 5/5; Hip Abductors 5/5; Hip Adduction 5/5. Knee exam :  Upon visual inspection there is not deformity noted. She does not have  pain on motion, there is not tenderness over the  medial, lateral, anterior region. Range of motion of R. Knee is 0 to 120, and L. Knee is 0 to 120. there are not any masses, there is not ligamentous instability, there is not  deformity noted. Xrays:  Normal findings      MRI:  L2-L3 and L4-L5 disc bulge,   Radiographic findings reviewed with patient    Impression:  No diagnosis found. Plan:  I had a lengthy discussion with the patient regarding their diagnosis. I explained treatment options including surgical vs non surgical treatment. I reviewed in detail the risks and benefits and outlined the procedure in detail with expected outcomes and possible complications. I also discussed non surgical treatment such as injections (CSI and visco supplementation), physical therapy, topical creams and NSAID's. They have elected for conservative management at this time.   PMR referral

## 2020-09-03 ENCOUNTER — OFFICE VISIT (OUTPATIENT)
Dept: PHYSICAL MEDICINE AND REHAB | Age: 50
End: 2020-09-03
Payer: COMMERCIAL

## 2020-09-03 VITALS
SYSTOLIC BLOOD PRESSURE: 127 MMHG | BODY MASS INDEX: 30.51 KG/M2 | DIASTOLIC BLOOD PRESSURE: 88 MMHG | HEART RATE: 98 BPM | WEIGHT: 206 LBS | HEIGHT: 69 IN

## 2020-09-03 PROCEDURE — G8417 CALC BMI ABV UP PARAM F/U: HCPCS | Performed by: PHYSICAL MEDICINE & REHABILITATION

## 2020-09-03 PROCEDURE — 3017F COLORECTAL CA SCREEN DOC REV: CPT | Performed by: PHYSICAL MEDICINE & REHABILITATION

## 2020-09-03 PROCEDURE — 4004F PT TOBACCO SCREEN RCVD TLK: CPT | Performed by: PHYSICAL MEDICINE & REHABILITATION

## 2020-09-03 PROCEDURE — 99204 OFFICE O/P NEW MOD 45 MIN: CPT | Performed by: PHYSICAL MEDICINE & REHABILITATION

## 2020-09-03 PROCEDURE — G8427 DOCREV CUR MEDS BY ELIG CLIN: HCPCS | Performed by: PHYSICAL MEDICINE & REHABILITATION

## 2020-09-03 NOTE — PROGRESS NOTES
Jacoblawrencelevi Peck, [id number] [facility] Physical Medicine and Rehabilitation  [address] [facility] [address]  Phone: [phone]  Fax: [phone]    PCP: No primary care provider on file. Date of visit: 9/3/20    Chief Complaint   Patient presents with    Back Pain     new patient       Dear Dr. Brianna Finchd you for referring your patient to be seen. As you know,  Roel Samson is a 48 y.o. female with past medical history as below who presents with left low back and hip pain for many years. There was a gradual onset of pain after no known injury. Now, the pain is intermittent and occurs daily. The pain is rated Pain Score:   6, is described as achy, and is located in the left low back with radiation to the left lateral hip. The symptoms have been unchanged since onset. The pain is better with rest.  The pain is worse with standing and walking. There is no associated numbness/tingling. There is no weakness. There is no bowel/bladder changes. The prior workup has included: Xray, MRI L spine    The prior treatment has included:  PT: none    Chiropractic: yes with no relief. Modalities: ice    OTC Tylenol: yes with no relief   NSAIDS: motrin with some relief   Opioids: on suboxone   Membrane stabilizers: none    Muscle relaxers: none    Previous injections: left greater troch bursa with 1 day relief. Previous surgery at this site: none     Allergies   Allergen Reactions    Lyrica [Pregabalin] Anaphylaxis    Other Shortness Of Breath    Sulfa Antibiotics Anaphylaxis     Hallucinations and rash    Darvocet [Propoxyphene N-Acetaminophen]     Ultram [Tramadol Hcl]        Current Outpatient Medications   Medication Sig Dispense Refill    furosemide (LASIX) 20 MG tablet take 1 tablet by mouth twice a day 60 tablet 3    buprenorphine-naloxone (SUBOXONE) 8-2 MG FILM SL film Place 1 Film under the tongue daily.       potassium chloride (KLOR-CON M) 10 MEQ extended release tablet Take 1 tablet by mouth daily 90 tablet 1    vitamin B-12 (CYANOCOBALAMIN) 1000 MCG tablet Take 5,000 mcg by mouth daily 1 by mouth every day       albuterol sulfate HFA (VENTOLIN HFA) 108 (90 Base) MCG/ACT inhaler Inhale 2 puffs into the lungs every 6 hours as needed for Wheezing 1 Inhaler 1    aspirin 81 MG tablet Take 81 mg by mouth daily      LORazepam (ATIVAN) 1 MG tablet Take 0.5 mg by mouth daily.  vitamin D (CHOLECALCIFEROL) 1000 UNIT TABS tablet Take 50,000 Units by mouth once a week.  vitamin E 1000 units capsule Take 1,000 Units by mouth daily       No current facility-administered medications for this visit.       Facility-Administered Medications Ordered in Other Visits   Medication Dose Route Frequency Provider Last Rate Last Dose    sodium chloride flush 0.9 % injection 10 mL  10 mL Intravenous BID Jethro Love MD        heparin flush 100 UNIT/ML injection 500 Units  500 Units Intercatheter PRN Jethro Love MD        sodium chloride flush 0.9 % injection 10 mL  10 mL Intravenous BID Jethro Love MD        heparin flush 100 UNIT/ML injection 500 Units  500 Units Intercatheter PRN Jethro Love MD        sodium chloride flush 0.9 % injection 10 mL  10 mL Intravenous PRN Jethro Love MD           Past Medical History:   Diagnosis Date    Arthritis     rheumatoid    Asthma     H/O medication noncompliance     Hyperlipidemia     [facility] ([facility]     MS (multiple sclerosis) ([facility]     Multiple sclerosis ([facility]     Nicotine dependence, cigarettes, uncomplicated     Palpitations     SOB (shortness of breath)     [facility]     Systemic lupus erythematosus, unspecified ([facility]        Past Surgical History:   Procedure Laterality Date    ABDOMEN SURGERY      ANTERIOR CRUCIATE LIGAMENT REPAIR      APPENDECTOMY       SECTION      x5    CHOLECYSTECTOMY      DILATION AND CURETTAGE OF UTERUS      TUBAL LIGATION Family History   Problem Relation Age of Onset    Heart Disease Mother     Heart Attack Mother     High Blood Pressure Mother     High Blood Pressure Father        Social History     Tobacco Use    Smoking status: Current Every Day Smoker     Packs/day: 1.00     Years: 10.00     Pack years: 10.00     Types: Cigarettes     Last attempt to quit: 10/2/2009     Years since quitting: 10.9    Smokeless tobacco: Never Used    Tobacco comment: resumed 2015   Substance Use Topics    Alcohol use: No     Comment: no caffeine    Drug use: No          Functional Status: The patient is able to ambulate and perform activities of daily living without the use of an assistive device. Occupation: The patient is currently employed as a medical scribe. ROS: For more complete ROS answered by the patient, please see . Constitutional: Denies fevers, chills, night sweats, unintentional weight loss     Skin: Denies rash or skin changes     Eyes: Denies vision changes    Ears/Nose/Throat: Denies nasal congestion or sore throat     Respiratory: Denies SOB or cough     Cardiovascular: Denies CP, palpitations, edema      Gastrointestinal: Denies abdominal pain,  N/V, constipation, or diarrhea    Genitourinary: Denies urinary symptoms    Neurologic: See HPI.     MSK: See HPI. Psychiatric: Denies sleep disturbance, anxiety, depression    Hematologic/Lymphatic/Immunologic: Denies bruising       Physical Exam:   Blood pressure 127/88, pulse 98, height 5' 9\" (1.753 m), weight 206 lb (93.4 kg), not currently breastfeeding. General: well developed and well nourished in no acute distress. Body habitus is obese  HEENT: No rhinorrhea, sneezing, yawning, or lacrimation. No scleral icterus or conjunctival injection. Resp: symmetrical chest expansion, unlabored breathing, respirations unlabored. CV: Heart rate is regular. Peripheral pulses are palpable  Lymph: No visible regional lymphadenopathy.   Skin: No rashes or ecchymosis. Normal turgor. Psych: Mood is calm. Affect is normal.   Ext: No edema noted     MSK:   Back/Hip Exam:   Inspection: Pelvis was asymmetric. Lumbar lordotic curvature was increased. There was no scoliosis. No ecchymoses or erythema. Palpation: Palpatory exam revealed tenderness along left lumbosacral paraspinals, no ttp midline spine, SI joint sulcus, ttp left greater trochanter. There was no paraspinal spasms. There were no trigger points. ROM: ROM decreased   Special/provocative testing:   Supine SLR negative     Neurological Exam:  Strength:   R  L  Hip Flex  5  5  Knee Ext  5  5  Ankle dorsi  5  5  EHL   5 5  Ankle Plantar  5  5    Sensory:  Intact for light touch in all lower extremity dermatomes except decreased LT right leg diffusely from MS. Reflexes:   R  L  Patellar  (2+) (2+)  Ankle Jerk  (2+) (2+)    Gait is normal.      Imaging: (personally reviewed by me 09/03/20)  MRI L Spine     Impression:   Vimal Goetz is a 48 y.o. female    1. Lumbosacral radiculitis    2. Lumbar disc herniation        Plan:   · Home exercises provided. Unable to get to PT due to work schedule. · Patient would benefit from left L4-5 TFESI. Procedure risks, benefits and alternatives were discussed. Patient would like to proceed.  The patient was educated about the diagnosis, prognosis, indications, risks and benefits of treatment. An opportunity to ask questions was given to the patient and questions were answered. The patient agreed to proceed with the recommended treatment as described above.  Follow up after injection      Thank you for the consultation and for allowing me to participate in the care of this patient.         Sincerely,     Jaime Simpson DO, FAAPMR   Board Certified Physical Medicine and Rehabilitation      The patient was counseled at length about the risks of paloma Covid-19 during their perioperative period and any recovery window from their procedure. The patient was made aware that paloma Covid-19  may worsen their prognosis for recovering from their procedure  and lend to a higher morbidity and/or mortality risk. All material risks, benefits, and reasonable alternatives including postponing the procedure were discussed. The patient does wish to proceed with the procedure at this time.

## 2020-09-03 NOTE — PATIENT INSTRUCTIONS
Patient Education        Hip Bursitis: Exercises  Introduction  Here are some examples of exercises for you to try. The exercises may be suggested for a condition or for rehabilitation. Start each exercise slowly. Ease off the exercises if you start to have pain. You will be told when to start these exercises and which ones will work best for you. How to do the exercises  Hip rotator stretch   1. Lie on your back with both knees bent and your feet flat on the floor. 2. Put the ankle of your affected leg on your opposite thigh near your knee. 3. Use your hand to gently push your knee away from your body until you feel a gentle stretch around your hip. 4. Hold the stretch for 15 to 30 seconds. 5. Repeat 2 to 4 times. 6. Repeat steps 1 through 5, but this time use your hand to gently pull your knee toward your opposite shoulder. Iliotibial band stretch   1. Lean sideways against a wall. If you are not steady on your feet, hold on to a chair or counter. 2. Stand on the leg with the affected hip, with that leg close to the wall. Then cross your other leg in front of it. 3. Let your affected hip drop out to the side of your body and against wall. Then lean away from your affected hip until you feel a stretch. 4. Hold the stretch for 15 to 30 seconds. 5. Repeat 2 to 4 times. Straight-leg raises to the outside   1. Lie on your side, with your affected hip on top. 2. Tighten the front thigh muscles of your top leg to keep your knee straight. 3. Keep your hip and your leg straight in line with the rest of your body, and keep your knee pointing forward. Do not drop your hip back. 4. Lift your top leg straight up toward the ceiling, about 12 inches off the floor. Hold for about 6 seconds, then slowly lower your leg. 5. Repeat 8 to 12 times. Clamshell   1. Lie on your side, with your affected hip on top and your head propped on a pillow. Keep your feet and knees together and your knees bent.   2. Raise this exercise slowly. Try to keep your body straight at all times, and do not let one hip drop lower than the other. 6. Start on the floor, on your hands and knees. 7. Tighten your belly muscles. 8. Raise one leg off the floor, and hold it straight out behind you. Be careful not to let your hip drop down, because that will twist your trunk. 9. Hold for about 6 seconds, then lower your leg and switch to the other leg. 10. Repeat 8 to 12 times on each leg. 11. Over time, work up to holding for 10 to 30 seconds each time. 12. If you feel stable and secure with your leg raised, try raising the opposite arm straight out in front of you at the same time. Knee-to-chest exercise   6. Lie on your back with your knees bent and your feet flat on the floor. 7. Bring one knee to your chest, keeping the other foot flat on the floor (or keeping the other leg straight, whichever feels better on your lower back). 8. Keep your lower back pressed to the floor. Hold for at least 15 to 30 seconds. 9. Relax, and lower the knee to the starting position. 10. Repeat with the other leg. Repeat 2 to 4 times with each leg. 11. To get more stretch, put your other leg flat on the floor while pulling your knee to your chest.    Curl-ups   6. Lie on the floor on your back with your knees bent at a 90-degree angle. Your feet should be flat on the floor, about 12 inches from your buttocks. 7. Cross your arms over your chest. If this bothers your neck, try putting your hands behind your neck (not your head), with your elbows spread apart. 8. Slowly tighten your belly muscles and raise your shoulder blades off the floor. 9. Keep your head in line with your body, and do not press your chin to your chest.  10. Hold this position for 1 or 2 seconds, then slowly lower yourself back down to the floor. 11. Repeat 8 to 12 times. Pelvic tilt exercise   1. Lie on your back with your knees bent. 2. \"Brace\" your stomach.  This means to tighten your muscles by pulling in and imagining your belly button moving toward your spine. You should feel like your back is pressing to the floor and your hips and pelvis are rocking back. 3. Hold for about 6 seconds while you breathe smoothly. 4. Repeat 8 to 12 times. Heel dig bridging   1. Lie on your back with both knees bent and your ankles bent so that only your heels are digging into the floor. Your knees should be bent about 90 degrees. 2. Then push your heels into the floor, squeeze your buttocks, and lift your hips off the floor until your shoulders, hips, and knees are all in a straight line. 3. Hold for about 6 seconds as you continue to breathe normally, and then slowly lower your hips back down to the floor and rest for up to 10 seconds. 4. Do 8 to 12 repetitions. Hamstring stretch in doorway   1. Lie on your back in a doorway, with one leg through the open door. 2. Slide your leg up the wall to straighten your knee. You should feel a gentle stretch down the back of your leg. 3. Hold the stretch for at least 15 to 30 seconds. Do not arch your back, point your toes, or bend either knee. Keep one heel touching the floor and the other heel touching the wall. 4. Repeat with your other leg. 5. Do 2 to 4 times for each leg. Hip flexor stretch   1. Kneel on the floor with one knee bent and one leg behind you. Place your forward knee over your foot. Keep your other knee touching the floor. 2. Slowly push your hips forward until you feel a stretch in the upper thigh of your rear leg. 3. Hold the stretch for at least 15 to 30 seconds. Repeat with your other leg. 4. Do 2 to 4 times on each side. Wall sit   1. Stand with your back 10 to 12 inches away from a wall. 2. Lean into the wall until your back is flat against it. 3. Slowly slide down until your knees are slightly bent, pressing your lower back into the wall. 4. Hold for about 6 seconds, then slide back up the wall.   5. Repeat 8

## 2020-09-03 NOTE — H&P
Teresita Hoover, [address] [facility] Physical Medicine and Rehabilitation  [address] [place] [address]. [facility]  Phone: [phone]  Fax: [phone]    PCP: No primary care provider on file. Date of visit: 9/3/20    Chief Complaint   Patient presents with    Back Pain     new patient       Dear Dr. Evens Hendricksrs you for referring your patient to be seen. As you know,  Shelia Horta is a 48 y.o. female with past medical history as below who presents with left low back and hip pain for many years. There was a gradual onset of pain after no known injury. Now, the pain is intermittent and occurs daily. The pain is rated Pain Score:   6, is described as achy, and is located in the left low back with radiation to the left lateral hip. The symptoms have been unchanged since onset. The pain is better with rest.  The pain is worse with standing and walking. There is no associated numbness/tingling. There is no weakness. There is no bowel/bladder changes. The prior workup has included: Xray, MRI L spine    The prior treatment has included:  PT: none    Chiropractic: yes with no relief. Modalities: ice    OTC Tylenol: yes with no relief   NSAIDS: motrin with some relief   Opioids: on suboxone   Membrane stabilizers: none    Muscle relaxers: none    Previous injections: left greater troch bursa with 1 day relief. Previous surgery at this site: none     Allergies   Allergen Reactions    Lyrica [Pregabalin] Anaphylaxis    Other Shortness Of Breath    Sulfa Antibiotics Anaphylaxis     Hallucinations and rash    Darvocet [Propoxyphene N-Acetaminophen]     Ultram [Tramadol Hcl]        Current Outpatient Medications   Medication Sig Dispense Refill    furosemide (LASIX) 20 MG tablet take 1 tablet by mouth twice a day 60 tablet 3    buprenorphine-naloxone (SUBOXONE) 8-2 MG FILM SL film Place 1 Film under the tongue daily.       potassium chloride (KLOR-CON M) 10 MEQ extended release tablet Take 1 tablet by mouth daily 90 tablet 1    vitamin B-12 (CYANOCOBALAMIN) 1000 MCG tablet Take 5,000 mcg by mouth daily 1 by mouth every day       albuterol sulfate HFA (VENTOLIN HFA) 108 (90 Base) MCG/ACT inhaler Inhale 2 puffs into the lungs every 6 hours as needed for Wheezing 1 Inhaler 1    aspirin 81 MG tablet Take 81 mg by mouth daily      LORazepam (ATIVAN) 1 MG tablet Take 0.5 mg by mouth daily.  vitamin D (CHOLECALCIFEROL) 1000 UNIT TABS tablet Take 50,000 Units by mouth once a week.  vitamin E 1000 units capsule Take 1,000 Units by mouth daily       No current facility-administered medications for this visit.       Facility-Administered Medications Ordered in Other Visits   Medication Dose Route Frequency Provider Last Rate Last Dose    sodium chloride flush 0.9 % injection 10 mL  10 mL Intravenous BID Chance Pardo MD        heparin flush 100 UNIT/ML injection 500 Units  500 Units Intercatheter PRN Chance Pardo MD        sodium chloride flush 0.9 % injection 10 mL  10 mL Intravenous BID Chanec Pardo MD        heparin flush 100 UNIT/ML injection 500 Units  500 Units Intercatheter PRN Chance Pardo MD        sodium chloride flush 0.9 % injection 10 mL  10 mL Intravenous PRN Chance Pardo MD           Past Medical History:   Diagnosis Date    Arthritis     rheumatoid    Asthma     H/O medication noncompliance     Hyperlipidemia     [facility]     MS (multiple sclerosis) ([facility]     Multiple sclerosis ([facility]     Nicotine dependence, cigarettes, uncomplicated     Palpitations     SOB (shortness of breath)     [facility]     Systemic lupus erythematosus, unspecified ([facility]        Past Surgical History:   Procedure Laterality Date    ABDOMEN SURGERY      ANTERIOR CRUCIATE LIGAMENT REPAIR      APPENDECTOMY       SECTION      x5    CHOLECYSTECTOMY      DILATION AND CURETTAGE OF UTERUS      TUBAL LIGATION Family History   Problem Relation Age of Onset    Heart Disease Mother     Heart Attack Mother     High Blood Pressure Mother     High Blood Pressure Father        Social History     Tobacco Use    Smoking status: Current Every Day Smoker     Packs/day: 1.00     Years: 10.00     Pack years: 10.00     Types: Cigarettes     Last attempt to quit: 10/2/2009     Years since quitting: 10.9    Smokeless tobacco: Never Used    Tobacco comment: resumed 2015   Substance Use Topics    Alcohol use: No     Comment: no caffeine    Drug use: No          Functional Status: The patient is able to ambulate and perform activities of daily living without the use of an assistive device. Occupation: The patient is currently employed as a medical scribe. ROS: For more complete ROS answered by the patient, please see . Constitutional: Denies fevers, chills, night sweats, unintentional weight loss     Skin: Denies rash or skin changes     Eyes: Denies vision changes    Ears/Nose/Throat: Denies nasal congestion or sore throat     Respiratory: Denies SOB or cough     Cardiovascular: Denies CP, palpitations, edema      Gastrointestinal: Denies abdominal pain,  N/V, constipation, or diarrhea    Genitourinary: Denies urinary symptoms    Neurologic: See HPI.     MSK: See HPI. Psychiatric: Denies sleep disturbance, anxiety, depression    Hematologic/Lymphatic/Immunologic: Denies bruising       Physical Exam:   Blood pressure 127/88, pulse 98, height 5' 9\" (1.753 m), weight 206 lb (93.4 kg), not currently breastfeeding. General: well developed and well nourished in no acute distress. Body habitus is obese  HEENT: No rhinorrhea, sneezing, yawning, or lacrimation. No scleral icterus or conjunctival injection. Resp: symmetrical chest expansion, unlabored breathing, respirations unlabored. CV: Heart rate is regular. Peripheral pulses are palpable  Lymph: No visible regional lymphadenopathy.   Skin: No rashes or ecchymosis. Normal turgor. Psych: Mood is calm. Affect is normal.   Ext: No edema noted     MSK:   Back/Hip Exam:   Inspection: Pelvis was asymmetric. Lumbar lordotic curvature was increased. There was no scoliosis. No ecchymoses or erythema. Palpation: Palpatory exam revealed tenderness along left lumbosacral paraspinals, no ttp midline spine, SI joint sulcus, ttp left greater trochanter. There was no paraspinal spasms. There were no trigger points. ROM: ROM decreased   Special/provocative testing:   Supine SLR negative     Neurological Exam:  Strength:   R  L  Hip Flex  5  5  Knee Ext  5  5  Ankle dorsi  5  5  EHL   5 5  Ankle Plantar  5  5    Sensory:  Intact for light touch in all lower extremity dermatomes except decreased LT right leg diffusely from MS. Reflexes:   R  L  Patellar  (2+) (2+)  Ankle Jerk  (2+) (2+)    Gait is normal.      Imaging: (personally reviewed by me 09/03/20)  MRI L Spine     Impression:   Sadi Bright is a 48 y.o. female    1. Lumbosacral radiculitis    2. Lumbar disc herniation        Plan:   · Home exercises provided. Unable to get to PT due to work schedule. · Patient would benefit from left L4-5 TFESI. Procedure risks, benefits and alternatives were discussed. Patient would like to proceed.  The patient was educated about the diagnosis, prognosis, indications, risks and benefits of treatment. An opportunity to ask questions was given to the patient and questions were answered. The patient agreed to proceed with the recommended treatment as described above.  Follow up after injection      Thank you for the consultation and for allowing me to participate in the care of this patient.         Sincerely,     Simran Yoo DO, FAAPMR   Board Certified Physical Medicine and Rehabilitation      The patient was counseled at length about the risks of paloma Covid-19 during their perioperative period and any recovery window from their procedure. The patient was made aware that paloma Covid-19  may worsen their prognosis for recovering from their procedure  and lend to a higher morbidity and/or mortality risk. All material risks, benefits, and reasonable alternatives including postponing the procedure were discussed. The patient does wish to proceed with the procedure at this time.

## 2020-09-15 ENCOUNTER — TELEPHONE (OUTPATIENT)
Dept: PHYSICAL MEDICINE AND REHAB | Age: 50
End: 2020-09-15

## 2020-09-16 ENCOUNTER — NURSE ONLY (OUTPATIENT)
Dept: PHYSICAL MEDICINE AND REHAB | Age: 50
End: 2020-09-16
Payer: COMMERCIAL

## 2020-09-16 PROCEDURE — 99211 OFF/OP EST MAY X REQ PHY/QHP: CPT | Performed by: PHYSICAL MEDICINE & REHABILITATION

## 2020-09-16 PROCEDURE — 96372 THER/PROPH/DIAG INJ SC/IM: CPT | Performed by: PHYSICAL MEDICINE & REHABILITATION

## 2020-09-16 RX ORDER — KETOROLAC TROMETHAMINE 15 MG/ML
30 INJECTION, SOLUTION INTRAMUSCULAR; INTRAVENOUS ONCE
Status: COMPLETED | OUTPATIENT
Start: 2020-09-16 | End: 2020-09-16

## 2020-09-16 RX ADMIN — KETOROLAC TROMETHAMINE 30 MG: 15 INJECTION, SOLUTION INTRAMUSCULAR; INTRAVENOUS at 09:57

## 2020-09-16 NOTE — PROGRESS NOTES
Per physician's order, Toradol 30mg/mL IM injection administered into left ventrogluteal muscle. Patient tolerated well. No immediate reaction.     SMITA Quach

## 2020-09-18 ENCOUNTER — TELEPHONE (OUTPATIENT)
Dept: PHYSICAL MEDICINE AND REHAB | Age: 50
End: 2020-09-18

## 2020-09-18 NOTE — TELEPHONE ENCOUNTER
Called patient to schedule patient for her TFESI. Left message with callback number and instructed her to call the office to schedule. Will wait for patient to call back.

## 2020-09-22 ENCOUNTER — TELEPHONE (OUTPATIENT)
Dept: PHYSICAL MEDICINE AND REHAB | Age: 50
End: 2020-09-22

## 2020-09-22 ENCOUNTER — PREP FOR PROCEDURE (OUTPATIENT)
Dept: PHYSICAL MEDICINE AND REHAB | Age: 50
End: 2020-09-22

## 2020-09-24 ENCOUNTER — HOSPITAL ENCOUNTER (OUTPATIENT)
Age: 50
Discharge: HOME | End: 2020-09-26
Payer: COMMERCIAL

## 2020-09-24 PROCEDURE — U0003 INFECTIOUS AGENT DETECTION BY NUCLEIC ACID (DNA OR RNA); SEVERE ACUTE RESPIRATORY SYNDROME CORONAVIRUS 2 (SARS-COV-2) (CORONAVIRUS DISEASE [COVID-19]), AMPLIFIED PROBE TECHNIQUE, MAKING USE OF HIGH THROUGHPUT TECHNOLOGIES AS DESCRIBED BY CMS-2020-01-R: HCPCS

## 2020-09-26 LAB
SARS-COV-2: NOT DETECTED
SOURCE: NORMAL

## 2020-09-28 RX ORDER — POTASSIUM CHLORIDE 750 MG/1
TABLET, EXTENDED RELEASE ORAL
Qty: 90 TABLET | Refills: 1 | Status: SHIPPED
Start: 2020-09-28 | End: 2020-11-03

## 2020-09-30 ENCOUNTER — PREP FOR PROCEDURE (OUTPATIENT)
Dept: PHYSICAL MEDICINE AND REHAB | Age: 50
End: 2020-09-30

## 2020-10-01 ENCOUNTER — HOSPITAL ENCOUNTER (OUTPATIENT)
Dept: OPERATING ROOM | Age: 50
Setting detail: OUTPATIENT SURGERY
Discharge: HOME | End: 2020-10-01
Attending: PHYSICAL MEDICINE & REHABILITATION
Payer: COMMERCIAL

## 2020-10-01 ENCOUNTER — HOSPITAL ENCOUNTER (OUTPATIENT)
Age: 50
Setting detail: OUTPATIENT SURGERY
Discharge: HOME | End: 2020-10-01
Attending: PHYSICAL MEDICINE & REHABILITATION | Admitting: PHYSICAL MEDICINE & REHABILITATION
Payer: COMMERCIAL

## 2020-10-01 VITALS
HEIGHT: 69 IN | WEIGHT: 186 LBS | SYSTOLIC BLOOD PRESSURE: 116 MMHG | OXYGEN SATURATION: 97 % | RESPIRATION RATE: 18 BRPM | DIASTOLIC BLOOD PRESSURE: 69 MMHG | TEMPERATURE: 96.3 F | BODY MASS INDEX: 27.55 KG/M2 | HEART RATE: 81 BPM

## 2020-10-01 PROBLEM — M54.16 LUMBAR RADICULITIS: Status: ACTIVE | Noted: 2020-10-01

## 2020-10-01 PROCEDURE — 3209999900 FLUORO FOR SURGICAL PROCEDURES

## 2020-10-01 PROCEDURE — 64483 NJX AA&/STRD TFRM EPI L/S 1: CPT | Performed by: PHYSICAL MEDICINE & REHABILITATION

## 2020-10-01 PROCEDURE — 2709999900 HC NON-CHARGEABLE SUPPLY: Performed by: PHYSICAL MEDICINE & REHABILITATION

## 2020-10-01 PROCEDURE — 7100000010 HC PHASE II RECOVERY - FIRST 15 MIN: Performed by: PHYSICAL MEDICINE & REHABILITATION

## 2020-10-01 PROCEDURE — 2580000003 HC RX 258: Performed by: PHYSICAL MEDICINE & REHABILITATION

## 2020-10-01 PROCEDURE — 2500000003 HC RX 250 WO HCPCS: Performed by: PHYSICAL MEDICINE & REHABILITATION

## 2020-10-01 PROCEDURE — 3600000005 HC SURGERY LEVEL 5 BASE: Performed by: PHYSICAL MEDICINE & REHABILITATION

## 2020-10-01 PROCEDURE — 6360000002 HC RX W HCPCS: Performed by: PHYSICAL MEDICINE & REHABILITATION

## 2020-10-01 PROCEDURE — 6360000004 HC RX CONTRAST MEDICATION: Performed by: PHYSICAL MEDICINE & REHABILITATION

## 2020-10-01 RX ORDER — LIDOCAINE HYDROCHLORIDE 10 MG/ML
INJECTION, SOLUTION EPIDURAL; INFILTRATION; INTRACAUDAL; PERINEURAL PRN
Status: DISCONTINUED | OUTPATIENT
Start: 2020-10-01 | End: 2020-10-01 | Stop reason: ALTCHOICE

## 2020-10-01 ASSESSMENT — PAIN DESCRIPTION - DESCRIPTORS
DESCRIPTORS: BURNING
DESCRIPTORS: ACHING;BURNING

## 2020-10-01 ASSESSMENT — PAIN - FUNCTIONAL ASSESSMENT
PAIN_FUNCTIONAL_ASSESSMENT: 0-10
PAIN_FUNCTIONAL_ASSESSMENT: PREVENTS OR INTERFERES SOME ACTIVE ACTIVITIES AND ADLS

## 2020-10-01 ASSESSMENT — PAIN DESCRIPTION - PAIN TYPE: TYPE: SURGICAL PAIN

## 2020-10-01 ASSESSMENT — PAIN DESCRIPTION - FREQUENCY: FREQUENCY: CONTINUOUS

## 2020-10-01 ASSESSMENT — PAIN SCALES - GENERAL: PAINLEVEL_OUTOF10: 7

## 2020-10-01 ASSESSMENT — PAIN DESCRIPTION - ORIENTATION: ORIENTATION: LOWER

## 2020-10-01 ASSESSMENT — PAIN DESCRIPTION - LOCATION: LOCATION: BACK

## 2020-10-01 NOTE — H&P
Otto Bartholomew, [address] [facility] Physical Medicine and Rehabilitation  [address]  Phone: [phone]  Fax: [phone]     PCP: No primary care provider on file. Date of visit: 9/3/20          Chief Complaint   Patient presents with    Back Pain       new patient         Dear Dr. Emanuel Tello,      Thank you for referring your patient to be seen.     As you know,  Anne Stallworth is a 48 y.o. female with past medical history as below who presents with left low back and hip pain for many years. There was a gradual onset of pain after no known injury. Now, the pain is intermittent and occurs daily. The pain is rated Pain Score:   6, is described as achy, and is located in the left low back with radiation to the left lateral hip. The symptoms have been unchanged since onset. The pain is better with rest.  The pain is worse with standing and walking. There is no associated numbness/tingling. There is no weakness. There is no bowel/bladder changes.      The prior workup has included: Xray, MRI L spine     The prior treatment has included:  PT: none    Chiropractic: yes with no relief. Modalities: ice    OTC Tylenol: yes with no relief   NSAIDS: motrin with some relief   Opioids: on suboxone   Membrane stabilizers: none    Muscle relaxers: none    Previous injections: left greater troch bursa with 1 day relief.    Previous surgery at this site: none            Allergies   Allergen Reactions    Lyrica [Pregabalin] Anaphylaxis    Other Shortness Of Breath    Sulfa Antibiotics Anaphylaxis       Hallucinations and rash    Darvocet [Propoxyphene N-Acetaminophen]      Ultram [Tramadol Hcl]                  Current Outpatient Medications   Medication Sig Dispense Refill    furosemide (LASIX) 20 MG tablet take 1 tablet by mouth twice a day 60 tablet 3    buprenorphine-naloxone (SUBOXONE) 8-2 MG FILM SL film Place 1 Film under the tongue daily.        potassium chloride (KLOR-CON M) 10 MEQ extended release tablet Take 1 tablet by mouth daily 90 tablet 1    vitamin B-12 (CYANOCOBALAMIN) 1000 MCG tablet Take 5,000 mcg by mouth daily 1 by mouth every day         albuterol sulfate HFA (VENTOLIN HFA) 108 (90 Base) MCG/ACT inhaler Inhale 2 puffs into the lungs every 6 hours as needed for Wheezing 1 Inhaler 1    aspirin 81 MG tablet Take 81 mg by mouth daily        LORazepam (ATIVAN) 1 MG tablet Take 0.5 mg by mouth daily.         vitamin D (CHOLECALCIFEROL) 1000 UNIT TABS tablet Take 50,000 Units by mouth once a week.        vitamin E 1000 units capsule Take 1,000 Units by mouth daily          No current facility-administered medications for this visit.                 Facility-Administered Medications Ordered in Other Visits   Medication Dose Route Frequency Provider Last Rate Last Dose    sodium chloride flush 0.9 % injection 10 mL  10 mL Intravenous BID Kristopher Richardson MD        heparin flush 100 UNIT/ML injection 500 Units  500 Units Intercatheter PRN Kristopher Richardson MD        sodium chloride flush 0.9 % injection 10 mL  10 mL Intravenous BID Kristopher Richardson MD        heparin flush 100 UNIT/ML injection 500 Units  500 Units Intercatheter PRN Kristopher Richardson MD        sodium chloride flush 0.9 % injection 10 mL  10 mL Intravenous PRN Kristopher Richardson MD                  Past Medical History:   Diagnosis Date    Arthritis       rheumatoid    Asthma      H/O medication noncompliance      Hyperlipidemia      Lupus (HCC)      MS (multiple sclerosis) (HCC)      Multiple sclerosis (HCC)      Nicotine dependence, cigarettes, uncomplicated      Palpitations      SOB (shortness of breath)      SV[facility])      Systemic lupus erythematosus, unspecified ([facility] [address]                 Past Surgical History:   Procedure Laterality Date    ABDOMEN SURGERY        ANTERIOR CRUCIATE LIGAMENT REPAIR        APPENDECTOMY         SECTION         x5    CHOLECYSTECTOMY        DILATION AND CURETTAGE OF UTERUS        TUBAL LIGATION                   Family History   Problem Relation Age of Onset    Heart Disease Mother      Heart Attack Mother      High Blood Pressure Mother      High Blood Pressure Father           Social History      Tobacco Use    Smoking status: Current Every Day Smoker       Packs/day: 1.00       Years: 10.00       Pack years: 10.00       Types: Cigarettes       Last attempt to quit: 10/2/2009       Years since quitting: 10.9    Smokeless tobacco: Never Used    Tobacco comment: resumed 2015   Substance Use Topics    Alcohol use: No       Comment: no caffeine    Drug use: No            Functional Status: The patient is able to ambulate and perform activities of daily living without the use of an assistive device.       Occupation: The patient is currently employed as a medical scribe. ROS: For more complete ROS answered by the patient, please see . Constitutional: Denies fevers, chills, night sweats, unintentional weight loss     Skin: Denies rash or skin changes     Eyes: Denies vision changes    Ears/Nose/Throat: Denies nasal congestion or sore throat     Respiratory: Denies SOB or cough     Cardiovascular: Denies CP, palpitations, edema      Gastrointestinal: Denies abdominal pain,  N/V, constipation, or diarrhea    Genitourinary: Denies urinary symptoms    Neurologic: See HPI.     MSK: See HPI. Psychiatric: Denies sleep disturbance, anxiety, depression    Hematologic/Lymphatic/Immunologic: Denies bruising         Physical Exam:   Blood pressure 127/88, pulse 98, height 5' 9\" (1.753 m), weight 206 lb (93.4 kg), not currently breastfeeding. General: well developed and well nourished in no acute distress. Body habitus is obese  HEENT: No rhinorrhea, sneezing, yawning, or lacrimation. No scleral icterus or conjunctival injection.   Resp: symmetrical chest expansion, unlabored breathing, respirations unlabored. CV: Heart rate is regular. Peripheral pulses are palpable  Lymph: No visible regional lymphadenopathy. Skin: No rashes or ecchymosis. Normal turgor. Psych: Mood is calm. Affect is normal.   Ext: No edema noted      MSK:   Back/Hip Exam:   Inspection: Pelvis was asymmetric. Lumbar lordotic curvature was increased. There was no scoliosis. No ecchymoses or erythema. Palpation: Palpatory exam revealed tenderness along left lumbosacral paraspinals, no ttp midline spine, SI joint sulcus, ttp left greater trochanter. There was no paraspinal spasms. There were no trigger points. ROM: ROM decreased   Special/provocative testing:   Supine SLR negative      Neurological Exam:  Strength:                     R          L  Hip Flex                       5          5  Knee Ext                     5          5  Ankle dorsi                  5          5  EHL                             5          5  Ankle Plantar               5          5     Sensory:  Intact for light touch in all lower extremity dermatomes except decreased LT right leg diffusely from MS.      Reflexes:                     R          L  Patellar                        (2+)      (2+)  Ankle Jerk                   (2+)      (2+)     Gait is normal.        Imaging: (personally reviewed by me 09/03/20)  MRI L Spine      Impression:   Alicia Lomeli is a 48 y.o. female     1. Lumbosacral radiculitis    2. Lumbar disc herniation          Plan:   · Home exercises provided. Unable to get to PT due to work schedule. · Patient would benefit from left L4-5 TFESI. Procedure risks, benefits and alternatives were discussed. Patient would like to proceed.      · The patient was educated about the diagnosis, prognosis, indications, risks and benefits of treatment. An opportunity to ask questions was given to the patient and questions were answered. The patient agreed to proceed with the recommended treatment as described above.    · Follow up after injection       Thank you for the consultation and for allowing me to participate in the care of this patient.          Sincerely,      Cintia Donohue DO, [facility]   Board Certified Physical Medicine and Rehabilitation        The patient was counseled at length about the risks of paloma Covid-19 during their perioperative period and any recovery window from their procedure.  The patient was made aware that paloma Covid-19  may worsen their prognosis for recovering from their procedure  and lend to a higher morbidity and/or mortality risk.  All material risks, benefits, and reasonable alternatives including postponing the procedure were discussed.  The patient does wish to proceed with the procedure at this time.

## 2020-10-29 NOTE — PROGRESS NOTES
under the tongue 2 times daily.  albuterol sulfate HFA (VENTOLIN HFA) 108 (90 Base) MCG/ACT inhaler Inhale 2 puffs into the lungs every 6 hours as needed for Wheezing 1 Inhaler 1    aspirin 81 MG tablet Take 81 mg by mouth daily      LORazepam (ATIVAN) 1 MG tablet Take 0.5 mg by mouth daily. No current facility-administered medications for this visit.       Facility-Administered Medications Ordered in Other Visits   Medication Dose Route Frequency Provider Last Rate Last Dose    sodium chloride flush 0.9 % injection 10 mL  10 mL Intravenous BID Inessa Elmore MD        heparin flush 100 UNIT/ML injection 500 Units  500 Units Intercatheter PRN Inessa Elmore MD        sodium chloride flush 0.9 % injection 10 mL  10 mL Intravenous BID Inessa Elmore MD        heparin flush 100 UNIT/ML injection 500 Units  500 Units Intercatheter PRN Inessa Elmore MD        sodium chloride flush 0.9 % injection 10 mL  10 mL Intravenous PRN Inessa Elmore MD             Allergies as of 11/03/2020 - Review Complete 11/03/2020   Allergen Reaction Noted    Lyrica [pregabalin] Anaphylaxis 05/04/2017    Other Shortness Of Breath 09/13/2000    Sulfa antibiotics Anaphylaxis 12/18/2011    Darvocet [propoxyphene n-acetaminophen]  10/02/2011    Ultram [tramadol hcl]  10/02/2011       Social History     Socioeconomic History    Marital status:      Spouse name: Not on file    Number of children: Not on file    Years of education: Not on file    Highest education level: Not on file   Occupational History    Not on file   Social Needs    Financial resource strain: Not on file    Food insecurity     Worry: Not on file     Inability: Not on file    Transportation needs     Medical: Not on file     Non-medical: Not on file   Tobacco Use    Smoking status: Current Every Day Smoker     Packs/day: 1.25     Years: 10.00     Pack years: 12.50     Types: Cigarettes     Last attempt to quit: 10/2/2009 Years since quittin.0    Smokeless tobacco: Never Used    Tobacco comment: resumed    Substance and Sexual Activity    Alcohol use: No     Comment: 4-5 coffee per day    Drug use: No    Sexual activity: Yes     Partners: Male   Lifestyle    Physical activity     Days per week: Not on file     Minutes per session: Not on file    Stress: Not on file   Relationships    Social connections     Talks on phone: Not on file     Gets together: Not on file     Attends [employer] service: Not on file     Active member of club or organization: Not on file     Attends meetings of clubs or organizations: Not on file     Relationship status: Not on file    Intimate partner violence     Fear of current or ex partner: Not on file     Emotionally abused: Not on file     Physically abused: Not on file     Forced sexual activity: Not on file   Other Topics Concern    Not on file   Social History Narrative    Not on file       Family History   Problem Relation Age of Onset    Heart Disease Mother     Heart Attack Mother     High Blood Pressure Mother     High Blood Pressure Father        REVIEW OF SYSTEMS:     CONSTITUTIONAL:  negative for  fevers, chills, sweats, + fatigue  HEENT:  negative for  tinnitus, earaches, nasal congestion and epistaxis  RESPIRATORY:  negative for  dry cough, cough with sputum, wheezing and hemoptysis  GASTROINTESTINAL:  negative for nausea, vomiting, diarrhea, constipation, pruritus and jaundice  HEMATOLOGIC/LYMPHATIC:  negative for easy bruising, bleeding, lymphadenopathy and petechiae  ENDOCRINE:  negative for heat intolerance, cold intolerance, tremor, hair loss and diabetic symptoms including neither polyuria nor polydipsia nor blurred vision  MUSCULOSKELETAL:  negative for  myalgias, arthralgias, joint swelling, stiff joints and decreased range of motion  NEUROLOGICAL:  negative for memory problems, speech problems, visual disturbance, dysphagia, weakness and numbness      PHYSICAL EXAM:   Constitutional:  Awake, alert cooperative, no apparent distress, and appears stated age. HEENT:  Moist and pink mucous membranes, normocephalic, without obvious abnormality, atraumatic, normal ears and nose. Neck:  Supple, symmetrical, trachea midline, no JVD, no adenopathy, thyroid symmetric, not enlarged and no tenderness, good carotid upstroke bilaterally, no carotid bruit, skin normal  Lungs: No increased work of breathing, good air exchange, clear to auscultation bilaterally, no crackles or wheezing. Cardiovascular: Normal apical impulse, regular rate and rhythm, normal S1 and S2, no S3 or S4, no murmur, no pedal edema, good carotid upstroke bilaterally, no carotid bruit, no JVD, no abdominal pulsating masses. Abdomen: Soft, nontender, no hepatomegaly, no splenomegaly, bowel sound positive. CHEST:  Expands symmetrically, nontender to palpation. Musculoskeletal:  No clubbing or cyanosis. No redness, warmth, or swelling of the joints. Neurological: Alert, awake, and oriented X3. /64 (Site: Right Upper Arm, Position: Sitting, Cuff Size: Large Adult)   Pulse 86   Ht 5' 9\" (1.753 m)   Wt 215 lb (97.5 kg)   BMI 31.75 kg/m²     DATA:   I personally reviewed the visit EKG with the following interpretation: Sinus rhythm    EKG 5/20/20 Normal sinus rhythm  Normal ECG  No previous ECGs available    ECHO: 6/25/20 Summary   Normal left ventricular chamber size and wall thickness. Normal left ventricular systolic function, LVEF is 65%. Normal diastolic function. Normal left atrial pressure. Left atrium is of normal size. Interatrial septum not well visualized but appears intact. Normal right ventricle size and function. No mitral valve prolapse. There is trace tricuspid regurgitation, RVSP 23mmHg. Normal aortic root size and ascending aorta. No evidence of pericardial effusion. Pericardium appears normal.   No intra cardiac mass or thrombus. Compared to prior echo from 4/2011 - no significant changes noted. Stress Test: Not performed to date  Angiography: Not performed to date  Cardiology Labs: BMP:    Lab Results   Component Value Date     05/20/2020    K 3.9 05/20/2020     05/20/2020    CO2 28 05/20/2020    BUN 10 05/20/2020    CREATININE 0.7 05/20/2020     CMP:    Lab Results   Component Value Date     05/20/2020    K 3.9 05/20/2020     05/20/2020    CO2 28 05/20/2020    BUN 10 05/20/2020    CREATININE 0.7 05/20/2020    PROT 7.5 04/07/2020     CBC:    Lab Results   Component Value Date    WBC 11.0 05/20/2020    RBC 4.86 05/20/2020    HGB 14.3 05/20/2020    HCT 43.5 05/20/2020    MCV 89.5 05/20/2020    RDW 12.8 05/20/2020     05/20/2020     PT/INR:  No results found for: PTINR  PT/INR Warfarin:  No components found for: PTPATWAR, PTINRWAR  PTT:  No results found for: APTT  PTT Heparin:  No components found for: APTTHEP  Magnesium:    Lab Results   Component Value Date    MG 2.2 04/20/2011     TSH:    Lab Results   Component Value Date    TSH 1.090 05/20/2020     TROPONIN:  No components found for: TROP  BNP:  No results found for: BNP  FASTING LIPID PANEL:    Lab Results   Component Value Date    CHOL 190 02/17/2014    HDL 46 02/17/2014    TRIG 163 02/17/2014     No orders to display     I have personally reviewed the laboratory, cardiac diagnostic and radiographic testing as outlined above:      IMPRESSION:  1. Edema: Etiology?,  Echocardiogram is unremarkable, will increase Lasix to 40 mg twice daily, will check basic metabolic panel and BNP  2. SVT: No recurrence  3. History of autoimmune hepatitis     RECOMMENDATIONS:   1. Furosemide 40 mg by mouth twice daily  2. Potassium 20 mEq by mouth daily with Lasix  3. Basic metabolic panel and BNP  4. After edema resolved, patient is to use Lasix as needed for pedal edema  5. Continue the rest of medications  6.   Follow-up visit in 6 months, sooner if symptomatic for any reason    I have reviewed my findings and recommendations with patient    Electronically signed by Reno Lopez MD on 11/3/2020 at 2:49 PM  NOTE: This report was transcribed using voice recognition software.  Every effort was made to ensure accuracy; however, inadvertent computerized transcription errors may be present

## 2020-11-03 ENCOUNTER — OFFICE VISIT (OUTPATIENT)
Dept: CARDIOLOGY CLINIC | Age: 50
End: 2020-11-03
Payer: COMMERCIAL

## 2020-11-03 VITALS
HEIGHT: 69 IN | HEART RATE: 86 BPM | DIASTOLIC BLOOD PRESSURE: 64 MMHG | WEIGHT: 215 LBS | BODY MASS INDEX: 31.84 KG/M2 | SYSTOLIC BLOOD PRESSURE: 120 MMHG

## 2020-11-03 PROCEDURE — 93000 ELECTROCARDIOGRAM COMPLETE: CPT | Performed by: INTERNAL MEDICINE

## 2020-11-03 PROCEDURE — 99214 OFFICE O/P EST MOD 30 MIN: CPT | Performed by: INTERNAL MEDICINE

## 2020-11-03 RX ORDER — FUROSEMIDE 20 MG/1
40 TABLET ORAL 2 TIMES DAILY
Qty: 60 TABLET | Refills: 3 | Status: SHIPPED
Start: 2020-11-03 | End: 2021-01-11 | Stop reason: SDUPTHER

## 2020-11-03 RX ORDER — POTASSIUM CHLORIDE 750 MG/1
20 TABLET, EXTENDED RELEASE ORAL DAILY
Qty: 90 TABLET | Refills: 1 | Status: SHIPPED
Start: 2020-11-03 | End: 2021-02-01

## 2020-11-03 RX ORDER — BUMETANIDE 0.5 MG/1
TABLET ORAL
COMMUNITY
Start: 2020-10-14 | End: 2020-11-03

## 2020-11-04 ENCOUNTER — OFFICE VISIT (OUTPATIENT)
Dept: PHYSICAL MEDICINE AND REHAB | Age: 50
End: 2020-11-04
Payer: COMMERCIAL

## 2020-11-04 VITALS
DIASTOLIC BLOOD PRESSURE: 70 MMHG | HEART RATE: 88 BPM | WEIGHT: 207 LBS | BODY MASS INDEX: 30.66 KG/M2 | HEIGHT: 69 IN | SYSTOLIC BLOOD PRESSURE: 124 MMHG

## 2020-11-04 PROCEDURE — G8417 CALC BMI ABV UP PARAM F/U: HCPCS | Performed by: PHYSICAL MEDICINE & REHABILITATION

## 2020-11-04 PROCEDURE — 99214 OFFICE O/P EST MOD 30 MIN: CPT | Performed by: PHYSICAL MEDICINE & REHABILITATION

## 2020-11-04 PROCEDURE — 3017F COLORECTAL CA SCREEN DOC REV: CPT | Performed by: PHYSICAL MEDICINE & REHABILITATION

## 2020-11-04 PROCEDURE — G8484 FLU IMMUNIZE NO ADMIN: HCPCS | Performed by: PHYSICAL MEDICINE & REHABILITATION

## 2020-11-04 PROCEDURE — 96372 THER/PROPH/DIAG INJ SC/IM: CPT | Performed by: PHYSICAL MEDICINE & REHABILITATION

## 2020-11-04 PROCEDURE — G8427 DOCREV CUR MEDS BY ELIG CLIN: HCPCS | Performed by: PHYSICAL MEDICINE & REHABILITATION

## 2020-11-04 PROCEDURE — 4004F PT TOBACCO SCREEN RCVD TLK: CPT | Performed by: PHYSICAL MEDICINE & REHABILITATION

## 2020-11-04 RX ORDER — KETOROLAC TROMETHAMINE 30 MG/ML
30 INJECTION, SOLUTION INTRAMUSCULAR; INTRAVENOUS ONCE
Status: COMPLETED | OUTPATIENT
Start: 2020-11-04 | End: 2020-11-04

## 2020-11-04 RX ADMIN — KETOROLAC TROMETHAMINE 30 MG: 30 INJECTION, SOLUTION INTRAMUSCULAR; INTRAVENOUS at 11:38

## 2020-11-04 NOTE — PROGRESS NOTES
Alonzoadan Marc, [id number] [facility]  [address] [facility] [address]  Phone: [phone]  Fax: [phone]    PCP: Rema Ring DO  Date of visit: 11/4/20    Chief Complaint   Patient presents with    Back Pain     follow up after epidural    Hip Pain      left hip pain       Interval:   Patient presents today for follow up visit. She underwent left L4-5 TFESI and reports no relief. She has left sided low back pain and hip pain. The pain is rated Pain Score:   6, is described as achy, and is located in the left low back. The pain is better with rest.  The pain is worse with standing and walking. There is no associated numbness/tingling. There is no weakness. There is no bowel/bladder changes. She got an SI belt. The prior workup has included: Xray, MRI L spine    The prior treatment has included:  PT: none- doesn't have time. Chiropractic: yes with no relief. Modalities: ice    OTC Tylenol: yes with no relief   NSAIDS: motrin with some relief   Opioids: on suboxone   Membrane stabilizers: none    Muscle relaxers: none    Previous injections: left greater troch bursa with 1 day relief. Left L4-5 TFESI- no relief   Previous surgery at this site: none     Allergies   Allergen Reactions    Lyrica [Pregabalin] Anaphylaxis    Other Shortness Of Breath    Sulfa Antibiotics Anaphylaxis     Hallucinations and rash    Darvocet [Propoxyphene N-Acetaminophen]     Ultram [Tramadol Hcl]        Current Outpatient Medications   Medication Sig Dispense Refill    furosemide (LASIX) 20 MG tablet Take 2 tablets by mouth 2 times daily 60 tablet 3    potassium chloride (KLOR-CON M) 10 MEQ extended release tablet Take 2 tablets by mouth daily 90 tablet 1    buprenorphine-naloxone (SUBOXONE) 8-2 MG FILM SL film Place 1 Film under the tongue 2 times daily.        albuterol sulfate HFA (VENTOLIN HFA) 108 (90 Base) MCG/ACT inhaler Inhale 2 puffs into the lungs every 6 hours as needed for Wheezing 1 Inhaler 1    aspirin 81 MG tablet Take 81 mg by mouth daily      LORazepam (ATIVAN) 1 MG tablet Take 0.5 mg by mouth daily.         Current Facility-Administered Medications   Medication Dose Route Frequency Provider Last Rate Last Dose    ketorolac (TORADOL) injection 30 mg  30 mg Intramuscular Once Leonard Reed DO         Facility-Administered Medications Ordered in Other Visits   Medication Dose Route Frequency Provider Last Rate Last Dose    sodium chloride flush 0.9 % injection 10 mL  10 mL Intravenous BID Michael Dawn MD        heparin flush 100 UNIT/ML injection 500 Units  500 Units Intercatheter PRN Michael Dawn MD        sodium chloride flush 0.9 % injection 10 mL  10 mL Intravenous BID Michael Dawn MD        heparin flush 100 UNIT/ML injection 500 Units  500 Units Intercatheter PRN Michael Dawn MD        sodium chloride flush 0.9 % injection 10 mL  10 mL Intravenous PRN Michael Dawn MD           Past Medical History:   Diagnosis Date    Arthritis     rheumatoid    Asthma     H/O medication noncompliance     Hyperlipidemia     [facility]up[address] [address]     MS (multiple sclerosis) (HCC)     Multiple sclerosis (HCC)     Nicotine dependence, cigarettes, uncomplicated     Palpitations     SOB (shortness of breath)     SV[facility]     Systemic lupus erythematosus, unspecified ([facility] [address]        Past Surgical History:   Procedure Laterality Date    ABDOMEN SURGERY      ANTERIOR CRUCIATE LIGAMENT REPAIR      APPENDECTOMY       SECTION      x5    CHOLECYSTECTOMY      DILATION AND CURETTAGE OF UTERUS      NERVE BLOCK Left 10/01/2020    NERVE BLOCK Left 10/1/2020    LEFT L4-5 TRANSFORAMINAL EPIDURAL STEROID INJECTION performed by Leonard Reed DO at [facility]         Family History   Problem Relation Age of Onset    Heart Disease Mother     Heart Attack Mother     High Blood Pressure Mother     High Blood Pressure Father        Social History     Tobacco Use    Smoking status: Current Every Day Smoker     Packs/day: 1.25     Years: 10.00     Pack years: 12.50     Types: Cigarettes     Last attempt to quit: 10/2/2009     Years since quittin.0    Smokeless tobacco: Never Used    Tobacco comment: resumed    Substance Use Topics    Alcohol use: No     Comment: 4-5 coffee per day    Drug use: No          Functional Status: The patient is able to ambulate and perform activities of daily living without the use of an assistive device. Occupation: The patient is currently employed as a medical scribe. ROS:    Constitutional: Denies fevers, chills, night sweats, unintentional weight loss     Skin: Denies rash or skin changes     Eyes: Denies vision changes    Ears/Nose/Throat: Denies nasal congestion or sore throat     Respiratory: Denies SOB or cough     Cardiovascular: Denies CP, palpitations, edema      Gastrointestinal: Denies abdominal pain,  N/V, constipation, or diarrhea    Genitourinary: Denies urinary symptoms    Neurologic: See HPI.     MSK: See HPI. Psychiatric: Denies sleep disturbance, anxiety, depression    Hematologic/Lymphatic/Immunologic: Denies bruising       Physical Exam:   Blood pressure 124/70, pulse 88, height 5' 9\" (1.753 m), weight 207 lb (93.9 kg), not currently breastfeeding. General: well developed and well nourished in no acute distress. Body habitus is obese  HEENT: No rhinorrhea, sneezing, yawning, or lacrimation. No scleral icterus or conjunctival injection. Resp: symmetrical chest expansion, unlabored breathing, respirations unlabored. CV: Heart rate is regular. Peripheral pulses are palpable  Lymph: No visible regional lymphadenopathy. Skin: No rashes or ecchymosis. Normal turgor. Psych: Mood is calm.  Affect is normal.   Ext: No edema noted     MSK:   Back/Hip Exam:   Inspection: Pelvis was asymmetric. Lumbar lordotic curvature was increased. There was no scoliosis. No ecchymoses or erythema. Palpation: Palpatory exam revealed tenderness along left lumbosacral paraspinals, no ttp midline spine, ttp left SI joint sulcus, ttp left greater trochanter. There was no paraspinal spasms. There were no trigger points. ROM: ROM decreased   Special/provocative testing:   Supine SLR negative   Positive SABRINA's on left     Neurological Exam:  Strength:   R  L  Hip Flex  5  5  Knee Ext  5  5  Ankle dorsi  5  5  EHL   5 5  Ankle Plantar  5  5    Sensory:  Intact for light touch in all lower extremity dermatomes except decreased LT right leg diffusely from MS. Reflexes:   R  L  Patellar  (2+) (2+)  Ankle Jerk  (2+) (2+)    Gait is normal.      Imaging: (personally reviewed by me 11/04/20)  MRI L Spine     Impression:   Venessa Alvarado is a 48 y.o. female    1. Sacroiliitis ([address]        Plan:   · Encouraged HEP   · Patient would benefit from left SI joint injection. Procedure risks, benefits and alternatives were discussed. Patient would like to proceed. · toradol 30mg IM in office today        The patient was educated about the diagnosis, prognosis, indications, risks and benefits of treatment. An opportunity to ask questions was given to the patient and questions were answered. The patient agreed to proceed with the recommended treatment as described above.  Follow up after injection          Dipika Acevedo DO, FAAPMR   Board Certified Physical Medicine and Rehabilitation      The patient was counseled at length about the risks of paloma Covid-19 during their perioperative period and any recovery window from their procedure. The patient was made aware that paloma Covid-19  may worsen their prognosis for recovering from their procedure  and lend to a higher morbidity and/or mortality risk.   All material risks, benefits, and reasonable alternatives including postponing the procedure were discussed. The patient does wish to proceed with the procedure at this time.

## 2020-11-04 NOTE — H&P
Albertbella Stinson, [address] [facility]  [address] [facility] [address]  Phone: [phone]  Fax: [phone]    PCP: Geno Fountain DO  Date of visit: 11/4/20    Chief Complaint   Patient presents with    Back Pain     follow up after epidural    Hip Pain      left hip pain       Interval:   Patient presents today for follow up visit. She underwent left L4-5 TFESI and reports no relief. She has left sided low back pain and hip pain. The pain is rated Pain Score:   6, is described as achy, and is located in the left low back. The pain is better with rest.  The pain is worse with standing and walking. There is no associated numbness/tingling. There is no weakness. There is no bowel/bladder changes. She got an SI belt. The prior workup has included: Xray, MRI L spine    The prior treatment has included:  PT: none- doesn't have time. Chiropractic: yes with no relief. Modalities: ice    OTC Tylenol: yes with no relief   NSAIDS: motrin with some relief   Opioids: on suboxone   Membrane stabilizers: none    Muscle relaxers: none    Previous injections: left greater troch bursa with 1 day relief. Left L4-5 TFESI- no relief   Previous surgery at this site: none     Allergies   Allergen Reactions    Lyrica [Pregabalin] Anaphylaxis    Other Shortness Of Breath    Sulfa Antibiotics Anaphylaxis     Hallucinations and rash    Darvocet [Propoxyphene N-Acetaminophen]     Ultram [Tramadol Hcl]        Current Outpatient Medications   Medication Sig Dispense Refill    furosemide (LASIX) 20 MG tablet Take 2 tablets by mouth 2 times daily 60 tablet 3    potassium chloride (KLOR-CON M) 10 MEQ extended release tablet Take 2 tablets by mouth daily 90 tablet 1    buprenorphine-naloxone (SUBOXONE) 8-2 MG FILM SL film Place 1 Film under the tongue 2 times daily.        albuterol sulfate HFA (VENTOLIN HFA) 108 (90 Base) MCG/ACT inhaler Inhale 2 puffs into the lungs every 6 hours as needed for Wheezing 1 Inhaler 1    aspirin 81 MG tablet Take 81 mg by mouth daily      LORazepam (ATIVAN) 1 MG tablet Take 0.5 mg by mouth daily.         Current Facility-Administered Medications   Medication Dose Route Frequency Provider Last Rate Last Dose    ketorolac (TORADOL) injection 30 mg  30 mg Intramuscular Once Meron Marc DO         Facility-Administered Medications Ordered in Other Visits   Medication Dose Route Frequency Provider Last Rate Last Dose    sodium chloride flush 0.9 % injection 10 mL  10 mL Intravenous BID Robert Villalta MD        heparin flush 100 UNIT/ML injection 500 Units  500 Units Intercatheter PRN Robert Villalta MD        sodium chloride flush 0.9 % injection 10 mL  10 mL Intravenous BID Robert Villalta MD        heparin flush 100 UNIT/ML injection 500 Units  500 Units Intercatheter PRN Robert Villalta MD        sodium chloride flush 0.9 % injection 10 mL  10 mL Intravenous PRN Robert Villalta MD           Past Medical History:   Diagnosis Date    Arthritis     rheumatoid    Asthma     H/O medication noncompliance     Hyperlipidemia     [facility] [address]     MS (multiple sclerosis) (HCC)     Multiple sclerosis (HCC)     Nicotine dependence, cigarettes, uncomplicated     Palpitations     SOB (shortness of breath)     SV[facility]     Systemic lupus erythematosus, unspecified ([facility] [address]        Past Surgical History:   Procedure Laterality Date    ABDOMEN SURGERY      ANTERIOR CRUCIATE LIGAMENT REPAIR      APPENDECTOMY       SECTION      x5    CHOLECYSTECTOMY      DILATION AND CURETTAGE OF UTERUS      NERVE BLOCK Left 10/01/2020    NERVE BLOCK Left 10/1/2020    LEFT L4-5 TRANSFORAMINAL EPIDURAL STEROID INJECTION performed by Meron Marc DO at [facility]         Family History   Problem Relation Age of Onset    Heart Disease Mother     Heart Attack Mother     High Blood Pressure Mother     High Blood Pressure Father        Social History     Tobacco Use    Smoking status: Current Every Day Smoker     Packs/day: 1.25     Years: 10.00     Pack years: 12.50     Types: Cigarettes     Last attempt to quit: 10/2/2009     Years since quittin.0    Smokeless tobacco: Never Used    Tobacco comment: resumed    Substance Use Topics    Alcohol use: No     Comment: 4-5 coffee per day    Drug use: No          Functional Status: The patient is able to ambulate and perform activities of daily living without the use of an assistive device. Occupation: The patient is currently employed as a medical scribe. ROS:    Constitutional: Denies fevers, chills, night sweats, unintentional weight loss     Skin: Denies rash or skin changes     Eyes: Denies vision changes    Ears/Nose/Throat: Denies nasal congestion or sore throat     Respiratory: Denies SOB or cough     Cardiovascular: Denies CP, palpitations, edema      Gastrointestinal: Denies abdominal pain,  N/V, constipation, or diarrhea    Genitourinary: Denies urinary symptoms    Neurologic: See HPI.     MSK: See HPI. Psychiatric: Denies sleep disturbance, anxiety, depression    Hematologic/Lymphatic/Immunologic: Denies bruising       Physical Exam:   Blood pressure 124/70, pulse 88, height 5' 9\" (1.753 m), weight 207 lb (93.9 kg), not currently breastfeeding. General: well developed and well nourished in no acute distress. Body habitus is obese  HEENT: No rhinorrhea, sneezing, yawning, or lacrimation. No scleral icterus or conjunctival injection. Resp: symmetrical chest expansion, unlabored breathing, respirations unlabored. CV: Heart rate is regular. Peripheral pulses are palpable  Lymph: No visible regional lymphadenopathy. Skin: No rashes or ecchymosis. Normal turgor. Psych: Mood is calm.  Affect is normal.   Ext: No edema noted     MSK:   Back/Hip Exam:   Inspection: Pelvis was asymmetric. Lumbar lordotic curvature was increased. There was no scoliosis. No ecchymoses or erythema. Palpation: Palpatory exam revealed tenderness along left lumbosacral paraspinals, no ttp midline spine, ttp left SI joint sulcus, ttp left greater trochanter. There was no paraspinal spasms. There were no trigger points. ROM: ROM decreased   Special/provocative testing:   Supine SLR negative   Positive SABRINA's on left     Neurological Exam:  Strength:   R  L  Hip Flex  5  5  Knee Ext  5  5  Ankle dorsi  5  5  EHL   5 5  Ankle Plantar  5  5    Sensory:  Intact for light touch in all lower extremity dermatomes except decreased LT right leg diffusely from MS. Reflexes:   R  L  Patellar  (2+) (2+)  Ankle Jerk  (2+) (2+)    Gait is normal.      Imaging: (personally reviewed by me 11/04/20)  MRI L Spine     Impression:   Mary Cazares is a 48 y.o. female    1. Sacroiliitis ([address]        Plan:   · Encouraged HEP   · Patient would benefit from left SI joint injection. Procedure risks, benefits and alternatives were discussed. Patient would like to proceed.  The patient was educated about the diagnosis, prognosis, indications, risks and benefits of treatment. An opportunity to ask questions was given to the patient and questions were answered. The patient agreed to proceed with the recommended treatment as described above.  Follow up after injection          Megan Perez DO, FAAPMR   Board Certified Physical Medicine and Rehabilitation      The patient was counseled at length about the risks of paloma Covid-19 during their perioperative period and any recovery window from their procedure. The patient was made aware that paloma Covid-19  may worsen their prognosis for recovering from their procedure  and lend to a higher morbidity and/or mortality risk. All material risks, benefits, and reasonable alternatives including postponing the procedure were discussed.  The patient does wish to proceed with the procedure at this time.

## 2020-11-06 ENCOUNTER — HOSPITAL ENCOUNTER (OUTPATIENT)
Age: 50
Discharge: HOME | End: 2020-11-06
Payer: COMMERCIAL

## 2020-11-06 LAB
ANION GAP SERPL CALCULATED.3IONS-SCNC: 11 MMOL/L (ref 7–16)
BUN BLDV-MCNC: 7 MG/DL (ref 6–20)
CALCIUM SERPL-MCNC: 9 MG/DL (ref 8.6–10.2)
CHLORIDE BLD-SCNC: 98 MMOL/L (ref 98–107)
CO2: 27 MMOL/L (ref 22–29)
CREAT SERPL-MCNC: 0.6 MG/DL (ref 0.5–1)
GFR AFRICAN AMERICAN: >60
GFR NON-AFRICAN AMERICAN: >60 ML/MIN/1.73
GLUCOSE BLD-MCNC: 100 MG/DL (ref 74–99)
POTASSIUM SERPL-SCNC: 3.9 MMOL/L (ref 3.5–5)
PRO-BNP: 50 PG/ML (ref 0–125)
SODIUM BLD-SCNC: 136 MMOL/L (ref 132–146)

## 2020-11-06 PROCEDURE — 80048 BASIC METABOLIC PNL TOTAL CA: CPT

## 2020-11-06 PROCEDURE — 36415 COLL VENOUS BLD VENIPUNCTURE: CPT

## 2020-11-06 PROCEDURE — 83880 ASSAY OF NATRIURETIC PEPTIDE: CPT

## 2020-11-13 ENCOUNTER — TELEPHONE (OUTPATIENT)
Dept: PHYSICAL MEDICINE AND REHAB | Age: 50
End: 2020-11-13

## 2020-11-24 ENCOUNTER — TELEPHONE (OUTPATIENT)
Dept: PHYSICAL MEDICINE AND REHAB | Age: 50
End: 2020-11-24

## 2020-11-24 NOTE — TELEPHONE ENCOUNTER
Patient called stating that she is in a lot of pain and wanted to know about getting a Toradol injection. She is getting her epidural on 12-3-2020. Her pain level is an 8. Please advise.

## 2020-11-25 NOTE — TELEPHONE ENCOUNTER
Called and left message on patient voicemail that she can come in this morning for Toradol injection. Will wait for return call from patient.

## 2020-11-27 ENCOUNTER — HOSPITAL ENCOUNTER (OUTPATIENT)
Age: 50
Discharge: HOME | End: 2020-11-29
Payer: COMMERCIAL

## 2020-11-27 PROCEDURE — U0003 INFECTIOUS AGENT DETECTION BY NUCLEIC ACID (DNA OR RNA); SEVERE ACUTE RESPIRATORY SYNDROME CORONAVIRUS 2 (SARS-COV-2) (CORONAVIRUS DISEASE [COVID-19]), AMPLIFIED PROBE TECHNIQUE, MAKING USE OF HIGH THROUGHPUT TECHNOLOGIES AS DESCRIBED BY CMS-2020-01-R: HCPCS

## 2020-11-29 LAB
SARS-COV-2: NOT DETECTED
SOURCE: NORMAL

## 2020-12-03 ENCOUNTER — HOSPITAL ENCOUNTER (OUTPATIENT)
Dept: OPERATING ROOM | Age: 50
Setting detail: OUTPATIENT SURGERY
Discharge: HOME | End: 2020-12-03
Attending: PHYSICAL MEDICINE & REHABILITATION
Payer: COMMERCIAL

## 2020-12-03 ENCOUNTER — HOSPITAL ENCOUNTER (OUTPATIENT)
Age: 50
Setting detail: OUTPATIENT SURGERY
Discharge: HOME | End: 2020-12-03
Attending: PHYSICAL MEDICINE & REHABILITATION | Admitting: PHYSICAL MEDICINE & REHABILITATION
Payer: COMMERCIAL

## 2020-12-03 VITALS
RESPIRATION RATE: 16 BRPM | TEMPERATURE: 97 F | HEIGHT: 70 IN | DIASTOLIC BLOOD PRESSURE: 71 MMHG | OXYGEN SATURATION: 98 % | HEART RATE: 83 BPM | BODY MASS INDEX: 27.77 KG/M2 | SYSTOLIC BLOOD PRESSURE: 119 MMHG | WEIGHT: 194 LBS

## 2020-12-03 PROBLEM — M46.1 SACROILIITIS (HCC): Status: ACTIVE | Noted: 2020-12-03

## 2020-12-03 PROCEDURE — 7100000011 HC PHASE II RECOVERY - ADDTL 15 MIN: Performed by: PHYSICAL MEDICINE & REHABILITATION

## 2020-12-03 PROCEDURE — 2709999900 HC NON-CHARGEABLE SUPPLY: Performed by: PHYSICAL MEDICINE & REHABILITATION

## 2020-12-03 PROCEDURE — 2500000003 HC RX 250 WO HCPCS: Performed by: PHYSICAL MEDICINE & REHABILITATION

## 2020-12-03 PROCEDURE — 3209999900 FLUORO FOR SURGICAL PROCEDURES

## 2020-12-03 PROCEDURE — 6360000004 HC RX CONTRAST MEDICATION: Performed by: PHYSICAL MEDICINE & REHABILITATION

## 2020-12-03 PROCEDURE — 6360000002 HC RX W HCPCS: Performed by: PHYSICAL MEDICINE & REHABILITATION

## 2020-12-03 PROCEDURE — 3600000005 HC SURGERY LEVEL 5 BASE: Performed by: PHYSICAL MEDICINE & REHABILITATION

## 2020-12-03 PROCEDURE — 7100000010 HC PHASE II RECOVERY - FIRST 15 MIN: Performed by: PHYSICAL MEDICINE & REHABILITATION

## 2020-12-03 PROCEDURE — 27096 INJECT SACROILIAC JOINT: CPT | Performed by: PHYSICAL MEDICINE & REHABILITATION

## 2020-12-03 RX ORDER — LIDOCAINE HYDROCHLORIDE 10 MG/ML
INJECTION, SOLUTION EPIDURAL; INFILTRATION; INTRACAUDAL; PERINEURAL PRN
Status: DISCONTINUED | OUTPATIENT
Start: 2020-12-03 | End: 2020-12-03 | Stop reason: ALTCHOICE

## 2020-12-03 ASSESSMENT — PAIN DESCRIPTION - DESCRIPTORS
DESCRIPTORS: ACHING
DESCRIPTORS: NAGGING

## 2020-12-03 ASSESSMENT — PAIN DESCRIPTION - ONSET: ONSET: ON-GOING

## 2020-12-03 ASSESSMENT — PAIN DESCRIPTION - PAIN TYPE: TYPE: CHRONIC PAIN

## 2020-12-03 ASSESSMENT — PAIN - FUNCTIONAL ASSESSMENT: PAIN_FUNCTIONAL_ASSESSMENT: 0-10

## 2020-12-03 ASSESSMENT — PAIN DESCRIPTION - FREQUENCY: FREQUENCY: INTERMITTENT

## 2020-12-03 ASSESSMENT — PAIN SCALES - GENERAL: PAINLEVEL_OUTOF10: 2

## 2020-12-03 ASSESSMENT — PAIN DESCRIPTION - PROGRESSION: CLINICAL_PROGRESSION: GRADUALLY IMPROVING

## 2020-12-03 ASSESSMENT — PAIN DESCRIPTION - LOCATION: LOCATION: BACK

## 2020-12-03 NOTE — H&P
[employer], [address] [facility]  [address] [place] [address]. [address] [facility] [address]  Phone: [phone]  Fax: [phone]     PCP: Jennifer Stephenson DO  Date of visit: 11/4/20          Chief Complaint   Patient presents with    Back Pain       follow up after epidural    Hip Pain        left hip pain         Interval:   Patient presents today for follow up visit. She underwent left L4-5 TFESI and reports no relief. She has left sided low back pain and hip pain. The pain is rated Pain Score:   6, is described as achy, and is located in the left low back. The pain is better with rest.  The pain is worse with standing and walking. There is no associated numbness/tingling. There is no weakness. There is no bowel/bladder changes. She got an SI belt.      The prior workup has included: Xray, MRI L spine     The prior treatment has included:  PT: none- doesn't have time. Chiropractic: yes with no relief. Modalities: ice    OTC Tylenol: yes with no relief   NSAIDS: motrin with some relief   Opioids: on suboxone   Membrane stabilizers: none    Muscle relaxers: none    Previous injections: left greater troch bursa with 1 day relief.  Left L4-5 TFESI- no relief   Previous surgery at this site: none            Allergies   Allergen Reactions    Lyrica [Pregabalin] Anaphylaxis    Other Shortness Of Breath    Sulfa Antibiotics Anaphylaxis       Hallucinations and rash    Darvocet [Propoxyphene N-Acetaminophen]      Ultram [Tramadol Hcl]                  Current Outpatient Medications   Medication Sig Dispense Refill    furosemide (LASIX) 20 MG tablet Take 2 tablets by mouth 2 times daily 60 tablet 3    potassium chloride (KLOR-CON M) 10 MEQ extended release tablet Take 2 tablets by mouth daily 90 tablet 1    buprenorphine-naloxone (SUBOXONE) 8-2 MG FILM SL film Place 1 Film under the tongue 2 times daily.         albuterol sulfate HFA (VENTOLIN HFA) 108 (90 Base) MCG/ACT inhaler Inhale 2 puffs into the lungs every 6 hours as needed for Wheezing 1 Inhaler 1    aspirin 81 MG tablet Take 81 mg by mouth daily        LORazepam (ATIVAN) 1 MG tablet Take 0.5 mg by mouth daily.                     Current Facility-Administered Medications   Medication Dose Route Frequency Provider Last Rate Last Dose    ketorolac (TORADOL) injection 30 mg  30 mg Intramuscular Once Charla Patel DO                    Facility-Administered Medications Ordered in Other Visits   Medication Dose Route Frequency Provider Last Rate Last Dose    sodium chloride flush 0.9 % injection 10 mL  10 mL Intravenous BID Amanda Larkin MD        heparin flush 100 UNIT/ML injection 500 Units  500 Units Intercatheter PRN Amanda Larkin MD        sodium chloride flush 0.9 % injection 10 mL  10 mL Intravenous BID Amanda Larkin MD        heparin flush 100 UNIT/ML injection 500 Units  500 Units Intercatheter PRN Amanda Larkin MD        sodium chloride flush 0.9 % injection 10 mL  10 mL Intravenous PRN Amanda Larkin MD                  Past Medical History:   Diagnosis Date    Arthritis       rheumatoid    Asthma      H/O medication noncompliance      Hyperlipidemia      Lupus (HCC)      MS (multiple sclerosis) (HCC)      Multiple sclerosis (HCC)      Nicotine dependence, cigarettes, uncomplicated      Palpitations      SOB (shortness of breath)      [facility]      Systemic lupus erythematosus, unspecified ([facility] [address]                 Past Surgical History:   Procedure Laterality Date    ABDOMEN SURGERY        ANTERIOR CRUCIATE LIGAMENT REPAIR        APPENDECTOMY         SECTION         x5    CHOLECYSTECTOMY        DILATION AND CURETTAGE OF UTERUS        NERVE BLOCK Left 10/01/2020    NERVE BLOCK Left 10/1/2020     LEFT L4-5 TRANSFORAMINAL EPIDURAL STEROID INJECTION performed by Charla Patel DO at [facility] ecchymosis. Normal turgor. Psych: Mood is calm. Affect is normal.   Ext: No edema noted      MSK:   Back/Hip Exam:   Inspection: Pelvis was asymmetric. Lumbar lordotic curvature was increased. There was no scoliosis. No ecchymoses or erythema. Palpation: Palpatory exam revealed tenderness along left lumbosacral paraspinals, no ttp midline spine, ttp left SI joint sulcus, ttp left greater trochanter. There was no paraspinal spasms. There were no trigger points. ROM: ROM decreased   Special/provocative testing:   Supine SLR negative   Positive SABRINA's on left      Neurological Exam:  Strength:                     R          L  Hip Flex                       5          5  Knee Ext                     5          5  Ankle dorsi                  5          5  EHL                             5          5  Ankle Plantar               5          5     Sensory:  Intact for light touch in all lower extremity dermatomes except decreased LT right leg diffusely from MS.      Reflexes:                     R          L  Patellar                        (2+)      (2+)  Ankle Jerk                   (2+)      (2+)     Gait is normal.        Imaging: (personally reviewed by me 11/04/20)  MRI L Spine      Impression:   Tim Yo is a 48 y.o. female     1. Sacroiliitis ([facility] [address]          Plan:   · Encouraged HEP   · Patient would benefit from left SI joint injection. Procedure risks, benefits and alternatives were discussed. Patient would like to proceed.         · The patient was educated about the diagnosis, prognosis, indications, risks and benefits of treatment. An opportunity to ask questions was given to the patient and questions were answered.   The patient agreed to proceed with the recommended treatment as described above.      · Follow up after injection             Jermaine Jara DO, [facility]   Board Certified Physical Medicine and Rehabilitation        The patient was counseled at length about the risks of paloma Covid-19 during their perioperative period and any recovery window from their procedure.  The patient was made aware that paloma Covid-19  may worsen their prognosis for recovering from their procedure  and lend to a higher morbidity and/or mortality risk.  All material risks, benefits, and reasonable alternatives including postponing the procedure were discussed.  The patient does wish to proceed with the procedure at this time.

## 2020-12-03 NOTE — OP NOTE
SACROILIAC JOINT ARTHROGRAM AND STEROID INJECTION     WITH FLUOROSCOPIC GUIDANCE    Patient: Anjum Barajas                                                    MRN: [id number]  : 1970                                             Date of procedure: 12/3/2020    Physician Performing Procedure: Micheline Ramos DO    Clinical Scenario: As per our office notes. Diagnosis: sacroiliitis     Injectate: A total of 2mL, consisting of 1mL of Kenalog (40mg/mL), the remainder comprised of 2% Lidocaine. Levels Treated: Left Sacroiliac Joint    Approach:  Posterior     Improvement after today's procedure: As per nursing record. Comments: none    Pre-procedural evaluation: The patient was examined today just prior to performing the procedure listed above. The patient's heart rate was normal and rhythm was regular, lungs were clear to auscultation bilaterally, peripheral pulses were intact in the lower limbs, strength and sensation were preserved in the lower limbs, and reflexes were symmetric in the lower limbs. Procedure: The patient was prepped and draped in a sterile fashion in the prone position after informed consent was signed and all patient questions were answered including the risks, benefits, alternative treatment options, and prognosis. The risks include but are not limited to infection, allergic reaction, nerve damage, paralysis, epidural hematoma, syncope, headache, respiratory or cardiac arrest, and scar formation. The fluoroscopic C-arm was positioned for optimal visualization of the sacroiliac (SI) joint. The inferior portion of the above SI joint was localized under fluoroscopic visualization and 3cc of 1% Lidocaine without Epinephrine was utilized for soft tissue local anesthesia. A 22 gauge spinal needle was inserted into the fluoroscopically hyperlucent region within the SI joint.   A 0.5 cc. volume of Isovue was injected into the joint and a partial arthrogram flow pattern

## 2021-01-12 ENCOUNTER — OFFICE VISIT (OUTPATIENT)
Dept: ORTHOPEDIC SURGERY | Age: 51
End: 2021-01-12
Payer: COMMERCIAL

## 2021-01-12 VITALS — HEIGHT: 70 IN | BODY MASS INDEX: 27.92 KG/M2 | WEIGHT: 195 LBS

## 2021-01-12 DIAGNOSIS — M48.061 SPINAL STENOSIS OF LUMBAR REGION, UNSPECIFIED WHETHER NEUROGENIC CLAUDICATION PRESENT: Primary | ICD-10-CM

## 2021-01-12 PROCEDURE — G8427 DOCREV CUR MEDS BY ELIG CLIN: HCPCS | Performed by: ORTHOPAEDIC SURGERY

## 2021-01-12 PROCEDURE — G8484 FLU IMMUNIZE NO ADMIN: HCPCS | Performed by: ORTHOPAEDIC SURGERY

## 2021-01-12 PROCEDURE — G8417 CALC BMI ABV UP PARAM F/U: HCPCS | Performed by: ORTHOPAEDIC SURGERY

## 2021-01-12 PROCEDURE — 3017F COLORECTAL CA SCREEN DOC REV: CPT | Performed by: ORTHOPAEDIC SURGERY

## 2021-01-12 PROCEDURE — 4004F PT TOBACCO SCREEN RCVD TLK: CPT | Performed by: ORTHOPAEDIC SURGERY

## 2021-01-12 PROCEDURE — 99213 OFFICE O/P EST LOW 20 MIN: CPT | Performed by: ORTHOPAEDIC SURGERY

## 2021-01-12 RX ORDER — FUROSEMIDE 40 MG/1
40 TABLET ORAL 2 TIMES DAILY
COMMUNITY

## 2021-01-12 RX ORDER — FUROSEMIDE 20 MG/1
40 TABLET ORAL 2 TIMES DAILY
Qty: 120 TABLET | Refills: 5 | Status: SHIPPED
Start: 2021-01-12 | End: 2021-01-12

## 2021-01-12 NOTE — PROGRESS NOTES
Chief Complaint   Patient presents with    Hip Pain     Left hip pain follow up. Lateral hip pain. Patient did have treatments with Dr Rodolfo tariq but they have not helped her pain in the lateral hip region. Gaylyn Blizzard  Is a 48 y.o. female following up from left hip pain. She has had lumbar epidurals with no relief and si joint injections with minimal relief. She also had csi to greater trochanter with 1 day relief. Past Medical History:   Diagnosis Date    Arthritis     rheumatoid    Asthma     H/O medication noncompliance     Hyperlipidemia     Lupus (HCC)     MS (multiple sclerosis) (HCC)     Multiple sclerosis (HCC)     Nicotine dependence, cigarettes, uncomplicated     Palpitations     SOB (shortness of breath)     [facility]     Systemic lupus erythematosus, unspecified ([facility] [address]      Past Surgical History:   Procedure Laterality Date    ABDOMEN SURGERY      ANTERIOR CRUCIATE LIGAMENT REPAIR      APPENDECTOMY       SECTION      x5    CHOLECYSTECTOMY      DILATION AND CURETTAGE OF UTERUS      HC INJECTION PROCEDURE FOR SACROILIAC JOINT Left 12/3/2020    LEFT SACROILIAC JOINT STEROID INJECTION UNDER X-RAY GUIDANCE performed by Ar [facility]brahim DO at [facility] Left 10/01/2020    NERVE BLOCK Left 10/1/2020    LEFT L4-5 TRANSFORAMINAL EPIDURAL STEROID INJECTION performed by Ar Ibrahim DO at [place] [address] Left 2020    LEFT SACROILIAC STEROID INJECTION UNDER XRAY GUIDANCE    TUBAL LIGATION         Current Outpatient Medications:     furosemide (LASIX) 40 MG tablet, Take 40 mg by mouth 2 times daily, Disp: , Rfl:     potassium chloride (KLOR-CON M) 10 MEQ extended release tablet, Take 2 tablets by mouth daily, Disp: 90 tablet, Rfl: 1    buprenorphine-naloxone (SUBOXONE) 8-2 MG FILM SL film, Place 1 Film under the tongue 2 times daily.  , Disp: , Rfl:    albuterol sulfate HFA (VENTOLIN HFA) 108 (90 Base) MCG/ACT inhaler, Inhale 2 puffs into the lungs every 6 hours as needed for Wheezing, Disp: 1 Inhaler, Rfl: 1    aspirin 81 MG tablet, Take 81 mg by mouth daily Held x 5 days, Disp: , Rfl:     LORazepam (ATIVAN) 1 MG tablet, Take 0.5 mg by mouth daily.  , Disp: , Rfl:   Allergies   Allergen Reactions    Lyrica [Pregabalin] Anaphylaxis    Other Shortness Of Breath    Sulfa Antibiotics Anaphylaxis     Hallucinations and rash    Darvocet [Propoxyphene N-Acetaminophen]     Ultram [Tramadol Hcl]      Social History     Socioeconomic History    Marital status:      Spouse name: Not on file    Number of children: Not on file    Years of education: Not on file    Highest education level: Not on file   Occupational History    Not on file   Social Needs    Financial resource strain: Not on file    Food insecurity     Worry: Not on file     Inability: Not on file    Transportation needs     Medical: Not on file     Non-medical: Not on file   Tobacco Use    Smoking status: Current Every Day Smoker     Packs/day: 1.25     Years: 10.00     Pack years: 12.50     Types: Cigarettes     Last attempt to quit: 10/2/2009     Years since quittin.2    Smokeless tobacco: Never Used    Tobacco comment: resumed    Substance and Sexual Activity    Alcohol use: No     Comment: 4-5 coffee per day    Drug use: No    Sexual activity: Yes     Partners: Male   Lifestyle    Physical activity     Days per week: Not on file     Minutes per session: Not on file    Stress: Not on file   Relationships    Social connections     Talks on phone: Not on file     Gets together: Not on file     Attends Holiness service: Not on file     Active member of club or organization: Not on file     Attends meetings of clubs or organizations: Not on file     Relationship status: Not on file    Intimate partner violence     Fear of current or ex partner: Not on file Emotionally abused: Not on file     Physically abused: Not on file     Forced sexual activity: Not on file   Other Topics Concern    Not on file   Social History Narrative    Not on file     Family History   Problem Relation Age of Onset    Heart Disease Mother     Heart Attack Mother     High Blood Pressure Mother     High Blood Pressure Father          REVIEW OF SYSTEMS:     General/Constitution:  (-)weight loss, (-)fever, (-)chills, (-)weakness. Skin: (-) rash,(-) psoriasis,(-) eczema, (-)skin cancer. Musculoskeletal: (-) fractures,  (-) dislocations,(-) collagen vascular disease, (-) fibromyalgia, (-) multiple sclerosis, (-) muscular dystrophy, (-) RSD,(-) joint pain (-)swelling, (-) joint pain,swelling. Neurologic: (-) epilepsy, (-)seizures,(-) brain tumor,(-) TIA, (-)stroke, (-)headaches, (-)Parkinson disease,(-) memory loss, (-) LOC. Cardiovascular: (-) Chest pain, (-) swelling in legs/feet, (-) SOB, (-) cramping in legs/feet with walking. Respiratory: (-) SOB, (-) Coughing, (-) night sweats. GI: (-) nausea, (-) vomiting, (-) diarrhea, (-) blood in stool, (-) gastric ulcer. Psychiatric: (-) Depression, (-) Anxiety, (-) bipolar disease, (-) Alzheimer's Disease  Allergic/Immunologic: (-) allergies latex, (-) allergies metal, (-) skin sensitivity. Hematlogic: (-) anemia, (-) blood transfusion, (-) DVT/PE, (-) Clotting disorders      Subjective:    Constitution:    Ht 5' 9.5\" (1.765 m)   Wt 195 lb (88.5 kg)   LMP 05/04/2020   BMI 28.38 kg/m²     Psycihatric:  The patient is alert and oriented x 3, appears to be stated age and in no distress. Respiratory:  Respiratory effort is not labored. Patient is not gasping. Palpation of the chest reveals no tactile fremitus. Skin:  Upon inspection: the skin appears warm, dry and intact. There is not a previous scar over the affected area. There is not any cellulitis, lymphedema or cutaneous lesions noted in the lower extremities.    Upon 120.  there are not any masses, there is not ligamentous instability, there is not  deformity noted. Xrays:  Normal findings      MRI:  L2-L3 and L4-L5 disc bulge,   Radiographic findings reviewed with patient    Impression:  Encounter Diagnoses   Name Primary?  Spinal stenosis of lumbar region, unspecified whether neurogenic claudication present Yes       Plan:  I had a lengthy discussion with the patient regarding their diagnosis. I explained treatment options including surgical vs non surgical treatment. I reviewed in detail the risks and benefits and outlined the procedure in detail with expected outcomes and possible complications. I also discussed non surgical treatment such as injections (CSI and visco supplementation), physical therapy, topical creams and NSAID's. They have elected for conservative management at this time. I recommend referral to [facility] after completing MRI of lumbar.

## 2021-01-26 ENCOUNTER — TELEPHONE (OUTPATIENT)
Dept: ORTHOPEDIC SURGERY | Age: 51
End: 2021-01-26

## 2021-01-26 NOTE — TELEPHONE ENCOUNTER
Patient called and would like to have MRI lumbar at [facility]. Margretta Dill will be started and sent to [facility].

## 2021-02-01 RX ORDER — POTASSIUM CHLORIDE 750 MG/1
TABLET, EXTENDED RELEASE ORAL
Qty: 90 TABLET | Refills: 3 | Status: SHIPPED
Start: 2021-02-01 | End: 2021-08-13

## 2021-02-26 ENCOUNTER — INITIAL CONSULT (OUTPATIENT)
Dept: NEUROSURGERY | Age: 51
End: 2021-02-26
Payer: COMMERCIAL

## 2021-02-26 VITALS
HEART RATE: 95 BPM | TEMPERATURE: 96.4 F | WEIGHT: 187 LBS | SYSTOLIC BLOOD PRESSURE: 119 MMHG | DIASTOLIC BLOOD PRESSURE: 69 MMHG | BODY MASS INDEX: 27.7 KG/M2 | HEIGHT: 69 IN

## 2021-02-26 DIAGNOSIS — M54.42 CHRONIC MIDLINE LOW BACK PAIN WITH LEFT-SIDED SCIATICA: Primary | ICD-10-CM

## 2021-02-26 DIAGNOSIS — G89.29 CHRONIC MIDLINE LOW BACK PAIN WITH LEFT-SIDED SCIATICA: Primary | ICD-10-CM

## 2021-02-26 PROCEDURE — 99204 OFFICE O/P NEW MOD 45 MIN: CPT | Performed by: NEUROLOGICAL SURGERY

## 2021-02-26 PROCEDURE — 3017F COLORECTAL CA SCREEN DOC REV: CPT | Performed by: NEUROLOGICAL SURGERY

## 2021-02-26 PROCEDURE — 4004F PT TOBACCO SCREEN RCVD TLK: CPT | Performed by: NEUROLOGICAL SURGERY

## 2021-02-26 PROCEDURE — G8417 CALC BMI ABV UP PARAM F/U: HCPCS | Performed by: NEUROLOGICAL SURGERY

## 2021-02-26 PROCEDURE — G8427 DOCREV CUR MEDS BY ELIG CLIN: HCPCS | Performed by: NEUROLOGICAL SURGERY

## 2021-02-26 PROCEDURE — G8484 FLU IMMUNIZE NO ADMIN: HCPCS | Performed by: NEUROLOGICAL SURGERY

## 2021-02-26 ASSESSMENT — ENCOUNTER SYMPTOMS
RESPIRATORY NEGATIVE: 1
ALLERGIC/IMMUNOLOGIC NEGATIVE: 1
EYES NEGATIVE: 1
BACK PAIN: 1
GASTROINTESTINAL NEGATIVE: 1

## 2021-02-26 NOTE — PROGRESS NOTES
Gaylyn Blizzard (:  1970) is a 46 y.o. female,New patient, here for evaluation of the following chief complaint(s):  Back Pain (Lumber. mri [facility] open) and Leg Pain (left)      ASSESSMENT/PLAN:  1. Chronic midline low back pain with left-sided sciatica  46year old lady who presents with back and leg pain. Her MRI does not show stenosis that is significant enough to cause her symptoms. Continue with conservative therapy. No follow-ups on file. SUBJECTIVE/OBJECTIVE:  Back Pain  This is a chronic problem. The current episode started more than 1 year ago. The problem occurs constantly. The problem has been gradually worsening since onset. The pain is present in the lumbar spine. The pain radiates to the left thigh. The pain is at a severity of 7/10. The pain is moderate. The pain is the same all the time. The symptoms are aggravated by position. Stiffness is present in the morning. Associated symptoms include leg pain. She has tried home exercises for the symptoms. The treatment provided mild relief. Leg Pain         Review of Systems   Constitutional: Negative. HENT: Negative. Eyes: Negative. Respiratory: Negative. Cardiovascular: Negative. Gastrointestinal: Negative. Endocrine: Negative. Genitourinary: Negative. Musculoskeletal: Positive for back pain. Skin: Negative. Allergic/Immunologic: Negative. Neurological: Negative. Hematological: Negative. Psychiatric/Behavioral: Negative. Physical Exam  Vitals signs reviewed. Constitutional:       General: She is not in acute distress. Appearance: Normal appearance. She is normal weight. She is not ill-appearing, toxic-appearing or diaphoretic. HENT:      Head: Normocephalic and atraumatic. Nose: Nose normal.   Eyes:      General: No visual field deficit or scleral icterus. Right eye: No discharge. Left eye: No discharge. Extraocular Movements: Extraocular movements intact. Conjunctiva/sclera: Conjunctivae normal.      Pupils: Pupils are equal, round, and reactive to light. Pulmonary:      Effort: Pulmonary effort is normal. No respiratory distress. Abdominal:      General: Abdomen is flat. There is no distension. Musculoskeletal:         General: No swelling, tenderness, deformity or signs of injury. Right lower leg: No edema. Left lower leg: No edema. Skin:     General: Skin is warm and dry. Capillary Refill: Capillary refill takes less than 2 seconds. Coloration: Skin is not jaundiced or pale. Findings: No bruising, erythema, lesion or rash. Neurological:      General: No focal deficit present. Mental Status: She is alert and oriented to person, place, and time. Mental status is at baseline. GCS: GCS eye subscore is 4. GCS verbal subscore is 5. GCS motor subscore is 6. Cranial Nerves: Cranial nerves are intact. No cranial nerve deficit, dysarthria or facial asymmetry. Sensory: Sensation is intact. No sensory deficit. Motor: Motor function is intact. No weakness, tremor, atrophy, abnormal muscle tone, seizure activity or pronator drift. Coordination: Coordination is intact. Romberg sign negative. Coordination normal. Finger-Nose-Finger Test and Heel to [facility] Test normal. Rapid alternating movements normal.      Gait: Gait normal.      Deep Tendon Reflexes: Reflexes normal. Babinski sign absent on the right side. Babinski sign absent on the left side. Reflex Scores:       Tricep reflexes are 2+ on the right side and 2+ on the left side. Bicep reflexes are 2+ on the right side and 2+ on the left side. Brachioradialis reflexes are 2+ on the right side and 2+ on the left side. Patellar reflexes are 2+ on the right side and 2+ on the left side. Achilles reflexes are 2+ on the right side and 2+ on the left side.   Psychiatric:         Mood and Affect: Mood normal.         Behavior: Behavior normal.         Thought Content: Thought content normal.         Judgment: Judgment normal.           On this date 2/26/2021 I have spent 45 minutes reviewing previous notes, test results and face to face with the patient discussing the diagnosis and importance of compliance with the treatment plan as well as documenting on the day of the visit. An electronic signature was used to authenticate this note.     --Christiano Wise MD

## 2021-04-28 ENCOUNTER — TELEPHONE (OUTPATIENT)
Dept: CARDIOLOGY CLINIC | Age: 51
End: 2021-04-28

## 2021-04-28 NOTE — TELEPHONE ENCOUNTER
I do think she should adjust her diuretics, I think she should just try to get more active, support stocking with her

## 2021-04-28 NOTE — TELEPHONE ENCOUNTER
Patient called in c/o bilat feet and leg swelling and some shortness of breath- this is intermittent and taking lasix 80mg daily    She is still seeing Dr Durbin Dates having laser and chemical ablation on her veins.   She works from home and sits 9-10 hours daily without getting up

## 2021-06-15 NOTE — PROGRESS NOTES
morning (before breakfast)      potassium chloride (KLOR-CON M) 10 MEQ extended release tablet take 2 tablets by mouth once daily 90 tablet 3    furosemide (LASIX) 40 MG tablet Take 40 mg by mouth 2 times daily      buprenorphine-naloxone (SUBOXONE) 8-2 MG FILM SL film Place 1 Film under the tongue 2 times daily.  albuterol sulfate HFA (VENTOLIN HFA) 108 (90 Base) MCG/ACT inhaler Inhale 2 puffs into the lungs every 6 hours as needed for Wheezing 1 Inhaler 1    aspirin 81 MG tablet Take 81 mg by mouth daily Held x 5 days      LORazepam (ATIVAN) 1 MG tablet Take 0.5 mg by mouth daily. No current facility-administered medications for this visit.      Facility-Administered Medications Ordered in Other Visits   Medication Dose Route Frequency Provider Last Rate Last Admin    sodium chloride flush 0.9 % injection 10 mL  10 mL Intravenous BID Leonel Britton MD        heparin flush 100 UNIT/ML injection 500 Units  500 Units Intercatheter PRN Leonel Britton MD        sodium chloride flush 0.9 % injection 10 mL  10 mL Intravenous BID Leonel Britton MD        heparin flush 100 UNIT/ML injection 500 Units  500 Units Intercatheter PRN Leonel Britton MD        sodium chloride flush 0.9 % injection 10 mL  10 mL Intravenous PRN Leonel Britton MD             Allergies as of 06/16/2021 - Fully Reviewed 06/16/2021   Allergen Reaction Noted    Lyrica [pregabalin] Anaphylaxis 05/04/2017    Other Shortness Of Breath 09/13/2000    Sulfa antibiotics Anaphylaxis 12/18/2011    Darvocet [propoxyphene n-acetaminophen]  10/02/2011    Ultram [tramadol hcl]  10/02/2011       Social History     Socioeconomic History    Marital status:      Spouse name: Not on file    Number of children: Not on file    Years of education: Not on file    Highest education level: Not on file   Occupational History    Not on file   Tobacco Use    Smoking status: Current Every Day Smoker     Packs/day: 1.25 Years: 10.00     Pack years: 12.50     Types: Cigarettes     Last attempt to quit: 10/2/2009     Years since quittin.7    Smokeless tobacco: Never Used    Tobacco comment: resumed    Vaping Use    Vaping Use: Never used   Substance and Sexual Activity    Alcohol use: No     Comment: 4-5 coffee per day    Drug use: No    Sexual activity: Yes     Partners: Male   Other Topics Concern    Not on file   Social History Narrative    Not on file     Social Determinants of Health     Financial Resource Strain:     Difficulty of Paying Living Expenses:    Food Insecurity:     Worried About Running Out of Food in the Last Year:     Ran Out of Food in the Last Year:    Transportation Needs:     Lack of Transportation (Medical):      Lack of Transportation (Non-Medical):    Physical Activity:     Days of Exercise per Week:     Minutes of Exercise per Session:    Stress:     Feeling of Stress :    Social Connections:     Frequency of Communication with Friends and Family:     Frequency of Social Gatherings with Friends and Family:     Attends Muslim Services:     Active Member of Clubs or Organizations:     Attends Club or Organization Meetings:     Marital Status:    Intimate Partner Violence:     Fear of Current or Ex-Partner:     Emotionally Abused:     Physically Abused:     Sexually Abused:        Family History   Problem Relation Age of Onset    Heart Disease Mother     Heart Attack Mother     High Blood Pressure Mother     High Blood Pressure Father        REVIEW OF SYSTEMS:     CONSTITUTIONAL:  negative for  fevers, chills, sweats, + fatigue  HEENT:  negative for  tinnitus, earaches, nasal congestion and epistaxis  RESPIRATORY:  negative for  dry cough, cough with sputum, wheezing and hemoptysis  GASTROINTESTINAL:  negative for nausea, vomiting, diarrhea, constipation, pruritus and jaundice  HEMATOLOGIC/LYMPHATIC:  negative for easy bruising, bleeding, lymphadenopathy and petechiae  ENDOCRINE:  negative for heat intolerance, cold intolerance, tremor, hair loss and diabetic symptoms including neither polyuria nor polydipsia nor blurred vision  MUSCULOSKELETAL:  negative for  myalgias, arthralgias, joint swelling, stiff joints and decreased range of motion  NEUROLOGICAL:  negative for memory problems, speech problems, visual disturbance, dysphagia, weakness and numbness      PHYSICAL EXAM:   Constitutional:  Awake, alert cooperative, no apparent distress, and appears stated age. HEENT:  Moist and pink mucous membranes, normocephalic, without obvious abnormality, atraumatic, normal ears and nose. Neck:  Supple, symmetrical, trachea midline, no JVD, no adenopathy, thyroid symmetric, not enlarged and no tenderness, good carotid upstroke bilaterally, no carotid bruit, skin normal  Lungs: No increased work of breathing, good air exchange, clear to auscultation bilaterally, no crackles or wheezing. Cardiovascular: Normal apical impulse, regular rate and rhythm, normal S1 and S2, no S3 or S4, no murmur, no pedal edema, good carotid upstroke bilaterally, no carotid bruit, no JVD, no abdominal pulsating masses. Abdomen: Soft, nontender, no hepatomegaly, no splenomegaly, bowel sound positive. CHEST:  Expands symmetrically, nontender to palpation. Musculoskeletal:  No clubbing or cyanosis. No redness, warmth, or swelling of the joints. Neurological: Alert, awake, and oriented X3. /66   Pulse 70   Resp 18   Ht 5' 9\" (1.753 m)   Wt 171 lb (77.6 kg)   LMP 05/04/2020   SpO2 96%   BMI 25.25 kg/m²     DATA:   I personally reviewed the visit EKG with the following interpretation: Sinus rhythm, nonspecific T wave changes    EKG 11/3/20 sinus rhythm    ECHO: 6/25/20 Summary   Normal left ventricular chamber size and wall thickness. Normal left ventricular systolic function, LVEF is 65%. Normal diastolic function. Normal left atrial pressure.    Left atrium is of normal size.   Interatrial septum not well visualized but appears intact. Normal right ventricle size and function. No mitral valve prolapse. There is trace tricuspid regurgitation, RVSP 23mmHg. Normal aortic root size and ascending aorta. No evidence of pericardial effusion. Pericardium appears normal.   No intra cardiac mass or thrombus. Compared to prior echo from 4/2011 - no significant changes noted. Stress Test: Not performed to date  Angiography: Not performed to date  Cardiology Labs: BMP:    Lab Results   Component Value Date     11/06/2020    K 3.9 11/06/2020    CL 98 11/06/2020    CO2 27 11/06/2020    BUN 7 11/06/2020    CREATININE 0.6 11/06/2020     CMP:    Lab Results   Component Value Date     11/06/2020    K 3.9 11/06/2020    CL 98 11/06/2020    CO2 27 11/06/2020    BUN 7 11/06/2020    CREATININE 0.6 11/06/2020    PROT 7.5 04/07/2020     CBC:    Lab Results   Component Value Date    WBC 11.0 05/20/2020    RBC 4.86 05/20/2020    HGB 14.3 05/20/2020    HCT 43.5 05/20/2020    MCV 89.5 05/20/2020    RDW 12.8 05/20/2020     05/20/2020     PT/INR:  No results found for: PTINR  PT/INR Warfarin:  No components found for: PTPATWAR, PTINRWAR  PTT:  No results found for: APTT  PTT Heparin:  No components found for: APTTHEP  Magnesium:    Lab Results   Component Value Date    MG 2.2 04/20/2011     TSH:    Lab Results   Component Value Date    TSH 1.090 05/20/2020     TROPONIN:  No components found for: TROP  BNP:  No results found for: BNP  FASTING LIPID PANEL:    Lab Results   Component Value Date    CHOL 190 02/17/2014    HDL 46 02/17/2014    TRIG 163 02/17/2014     No orders to display     I have personally reviewed the laboratory, cardiac diagnostic and radiographic testing as outlined above:      IMPRESSION:  1. Edema: Etiology?,  Echocardiogram is unremarkable, will continue Lasix  2. SVT: No recurrence  3. History of autoimmune hepatitis   4.   History of SLE    RECOMMENDATIONS:   1. Will continue current treatment  2. Will obtain recent blood work done at Dr. Britta Corenlius office  3. Continue current treatment  4. Follow-up with Dr. Britta Cornelius as scheduled  5. Follow-up visit in 6 months, sooner if symptomatic for any reason    I have reviewed my findings and recommendations with patient    Electronically signed by Cassia Samuel MD on 6/16/2021 at 6:03 PM  NOTE: This report was transcribed using voice recognition software.  Every effort was made to ensure accuracy; however, inadvertent computerized transcription errors may be present

## 2021-06-16 ENCOUNTER — OFFICE VISIT (OUTPATIENT)
Dept: CARDIOLOGY CLINIC | Age: 51
End: 2021-06-16
Payer: COMMERCIAL

## 2021-06-16 VITALS
OXYGEN SATURATION: 96 % | BODY MASS INDEX: 25.33 KG/M2 | WEIGHT: 171 LBS | SYSTOLIC BLOOD PRESSURE: 102 MMHG | RESPIRATION RATE: 18 BRPM | HEIGHT: 69 IN | DIASTOLIC BLOOD PRESSURE: 66 MMHG | HEART RATE: 70 BPM

## 2021-06-16 DIAGNOSIS — I47.1 SVT (SUPRAVENTRICULAR TACHYCARDIA) (HCC): Primary | ICD-10-CM

## 2021-06-16 PROCEDURE — G8417 CALC BMI ABV UP PARAM F/U: HCPCS | Performed by: INTERNAL MEDICINE

## 2021-06-16 PROCEDURE — 3017F COLORECTAL CA SCREEN DOC REV: CPT | Performed by: INTERNAL MEDICINE

## 2021-06-16 PROCEDURE — 93000 ELECTROCARDIOGRAM COMPLETE: CPT | Performed by: INTERNAL MEDICINE

## 2021-06-16 PROCEDURE — 4004F PT TOBACCO SCREEN RCVD TLK: CPT | Performed by: INTERNAL MEDICINE

## 2021-06-16 PROCEDURE — 99213 OFFICE O/P EST LOW 20 MIN: CPT | Performed by: INTERNAL MEDICINE

## 2021-06-16 PROCEDURE — G8427 DOCREV CUR MEDS BY ELIG CLIN: HCPCS | Performed by: INTERNAL MEDICINE

## 2021-08-13 RX ORDER — POTASSIUM CHLORIDE 750 MG/1
TABLET, EXTENDED RELEASE ORAL
Qty: 90 TABLET | Refills: 3 | Status: SHIPPED | OUTPATIENT
Start: 2021-08-13

## 2021-10-13 ENCOUNTER — TELEPHONE (OUTPATIENT)
Dept: ADMINISTRATIVE | Age: 51
End: 2021-10-13

## 2021-10-13 NOTE — TELEPHONE ENCOUNTER
Gina from the [facility] called and asked if you could please fax the last ov note for Li to [phone]?

## 2022-03-07 NOTE — PROGRESS NOTES
[facility] Cardiology Progress Note  Dr. Enrrique Castellanos      Referring Physician: Ambar Palmer DO  CHIEF COMPLAINT:   Chief Complaint   Patient presents with    Tachycardia     6 month, b/l ankle swelling        HISTORY OF PRESENT ILLNESS:   Patient is 46years old female with history of pedal edema, SVT, lupus erythematosus, multiple sclerosis, autoimmune hepatitis, is here for follow-up appointment  Occasional shortness of breath when he overexerts herself, occasional pedal edema, patient denies any chest pain, no lightheadedness, no dizziness, no palpitations, no PND, no orthopnea, no syncope, no presyncopal episodes.     Past Medical History:   Diagnosis Date    Arthritis     rheumatoid    Asthma     H/O medication noncompliance     Hyperlipidemia     Lupus (HCC)     MS (multiple sclerosis) (HCC)     Multiple sclerosis (HCC)     Nicotine dependence, cigarettes, uncomplicated     Palpitations     SOB (shortness of breath)     SVT (supraventricular tachycardia) (HCC)     Systemic lupus erythematosus, unspecified ([facility] [address]          Past Surgical History:   Procedure Laterality Date    ABDOMINAL SURGERY      ANTERIOR CRUCIATE LIGAMENT REPAIR      APPENDECTOMY       SECTION      x5    CHOLECYSTECTOMY      DILATION AND CURETTAGE OF UTERUS      HC INJECTION PROCEDURE FOR SACROILIAC JOINT Left 12/3/2020    LEFT SACROILIAC JOINT STEROID INJECTION UNDER X-RAY GUIDANCE performed by Aaliyah Mcfadden DO at [facility] Left 10/01/2020    NERVE BLOCK Left 10/1/2020    LEFT L4-5 TRANSFORAMINAL EPIDURAL STEROID INJECTION performed by Aaliyah Mcfadden DO at [place] [address] Left 2020    LEFT SACROILIAC STEROID INJECTION UNDER XRAY GUIDANCE    TUBAL LIGATION           Current Outpatient Medications   Medication Sig Dispense Refill    potassium chloride (KLOR-CON M) 10 MEQ extended release tablet take 2 tablets by mouth once daily 90 tablet 3    linaclotide (LINZESS) 145 MCG capsule Take 145 mcg by mouth every morning (before breakfast)      furosemide (LASIX) 40 MG tablet Take 40 mg by mouth 2 times daily      buprenorphine-naloxone (SUBOXONE) 8-2 MG FILM SL film Place 1 Film under the tongue 2 times daily.  albuterol sulfate HFA (VENTOLIN HFA) 108 (90 Base) MCG/ACT inhaler Inhale 2 puffs into the lungs every 6 hours as needed for Wheezing 1 Inhaler 1    aspirin 81 MG tablet Take 81 mg by mouth daily Held x 5 days      LORazepam (ATIVAN) 1 MG tablet Take 0.5 mg by mouth daily. No current facility-administered medications for this visit.      Facility-Administered Medications Ordered in Other Visits   Medication Dose Route Frequency Provider Last Rate Last Admin    sodium chloride flush 0.9 % injection 10 mL  10 mL IntraVENous BID Alisha Rice MD        heparin flush 100 UNIT/ML injection 500 Units  500 Units Intercatheter PRN Ailsha Rice MD        sodium chloride flush 0.9 % injection 10 mL  10 mL IntraVENous BID Alisha Rice MD        heparin flush 100 UNIT/ML injection 500 Units  500 Units Intercatheter PRN Alisha Rice MD        sodium chloride flush 0.9 % injection 10 mL  10 mL IntraVENous PRN Alisha Rice MD             Allergies as of 03/08/2022 - Fully Reviewed 03/08/2022   Allergen Reaction Noted    Lyrica [pregabalin] Anaphylaxis 05/04/2017    Other Shortness Of Breath 09/13/2000    Sulfa antibiotics Anaphylaxis 12/18/2011    Darvocet [propoxyphene n-acetaminophen]  10/02/2011    Ultram [tramadol hcl]  10/02/2011       Social History     Socioeconomic History    Marital status:      Spouse name: Not on file    Number of children: Not on file    Years of education: Not on file    Highest education level: Not on file   Occupational History    Not on file   Tobacco Use    Smoking status: Current Every Day Smoker     Packs/day: 1.25     Years: 10.00     Pack years: 12.50     Types: Cigarettes     Last attempt to quit: 10/2/2009     Years since quittin.7    Smokeless tobacco: Never Used    Tobacco comment: resumed    Vaping Use    Vaping Use: Never used   Substance and Sexual Activity    Alcohol use: No     Comment: 1 coffee per day     Drug use: No    Sexual activity: Yes     Partners: Male   Other Topics Concern    Not on file   Social History Narrative    Not on file     Social Determinants of Health     Financial Resource Strain:     Difficulty of Paying Living Expenses: Not on file   Food Insecurity:     Worried About Running Out of Food in the Last Year: Not on file    Latisha of Food in the Last Year: Not on file   Transportation Needs:     Lack of Transportation (Medical): Not on file    Lack of Transportation (Non-Medical):  Not on file   Physical Activity:     Days of Exercise per Week: Not on file    Minutes of Exercise per Session: Not on file   Stress:     Feeling of Stress : Not on file   Social Connections:     Frequency of Communication with Friends and Family: Not on file    Frequency of Social Gatherings with Friends and Family: Not on file    Attends Restorationist Services: Not on file    Active Member of [address] [place] [address] [employer] or Organizations: Not on file    Attends Club or Organization Meetings: Not on file    Marital Status: Not on file   Intimate Partner Violence:     Fear of Current or Ex-Partner: Not on file    Emotionally Abused: Not on file    Physically Abused: Not on file    Sexually Abused: Not on file   Housing Stability:     Unable to Pay for Housing in the Last Year: Not on file    Number of [place] in the Last Year: Not on file    Unstable Housing in the Last Year: Not on file       Family History   Problem Relation Age of Onset    Heart Disease Mother     Heart Attack Mother     High Blood Pressure Mother     High Blood Pressure Father        REVIEW OF SYSTEMS:     CONSTITUTIONAL:  negative for  fevers, chills, sweats, + fatigue  HEENT:  negative for  tinnitus, earaches, nasal congestion and epistaxis  RESPIRATORY:  negative for  dry cough, cough with sputum, wheezing and hemoptysis  GASTROINTESTINAL:  negative for nausea, vomiting, diarrhea, constipation, pruritus and jaundice  HEMATOLOGIC/LYMPHATIC:  negative for easy bruising, bleeding, lymphadenopathy and petechiae  ENDOCRINE:  negative for heat intolerance, cold intolerance, tremor, hair loss and diabetic symptoms including neither polyuria nor polydipsia nor blurred vision  MUSCULOSKELETAL:  negative for  myalgias, arthralgias, joint swelling, stiff joints and decreased range of motion  NEUROLOGICAL:  negative for memory problems, speech problems, visual disturbance, dysphagia, weakness and numbness      PHYSICAL EXAM:   Constitutional:  Awake, alert cooperative, no apparent distress, and appears stated age. HEENT:  Moist and pink mucous membranes, normocephalic, without obvious abnormality, atraumatic, normal ears and nose. Neck:  Supple, symmetrical, trachea midline, no JVD, no adenopathy, thyroid symmetric, not enlarged and no tenderness, good carotid upstroke bilaterally, no carotid bruit, skin normal  Lungs: No increased work of breathing, good air exchange, clear to auscultation bilaterally, no crackles or wheezing. Cardiovascular: Normal apical impulse, regular rate and rhythm, normal S1 and S2, no S3 or S4, no murmur, no pedal edema, good carotid upstroke bilaterally, no carotid bruit, no JVD, no abdominal pulsating masses. Abdomen: Soft, nontender, no hepatomegaly, no splenomegaly, bowel sound positive. CHEST:  Expands symmetrically, nontender to palpation. Musculoskeletal:  No clubbing or cyanosis. No redness, warmth, or swelling of the joints. Neurological: Alert, awake, and oriented X3.     /76   Pulse 75   Resp 16   Ht 5' 9\" (1.753 m)   Wt 177 lb (80.3 kg)   LMP 05/04/2020   BMI 26.14 kg/m²     DATA:   I personally reviewed the visit EKG with the following interpretation: Sinus rhythm, nonspecific T wave changes in the precordial leads, normal axis    EKG 6/16/21 Sinus rhythm, nonspecific T wave changes      ECHO: 6/25/20 Summary   Normal left ventricular chamber size and wall thickness. Normal left ventricular systolic function, LVEF is 65%. Normal diastolic function. Normal left atrial pressure. Left atrium is of normal size. Interatrial septum not well visualized but appears intact. Normal right ventricle size and function. No mitral valve prolapse. There is trace tricuspid regurgitation, RVSP 23mmHg. Normal aortic root size and ascending aorta. No evidence of pericardial effusion. Pericardium appears normal.   No intra cardiac mass or thrombus. Compared to prior echo from 4/2011 - no significant changes noted.     Stress Test: Not performed to date  Angiography: Not performed to date  Cardiology Labs: BMP:    Lab Results   Component Value Date     11/06/2020    K 3.9 11/06/2020    CL 98 11/06/2020    CO2 27 11/06/2020    BUN 7 11/06/2020    CREATININE 0.6 11/06/2020     CMP:    Lab Results   Component Value Date     11/06/2020    K 3.9 11/06/2020    CL 98 11/06/2020    CO2 27 11/06/2020    BUN 7 11/06/2020    CREATININE 0.6 11/06/2020    PROT 7.5 04/07/2020     CBC:    Lab Results   Component Value Date    WBC 11.0 05/20/2020    RBC 4.86 05/20/2020    HGB 14.3 05/20/2020    HCT 43.5 05/20/2020    MCV 89.5 05/20/2020    RDW 12.8 05/20/2020     05/20/2020     PT/INR:  No results found for: PTINR  PT/INR Warfarin:  No components found for: PTPATWAR, PTINRWAR  PTT:  No results found for: APTT  PTT Heparin:  No components found for: APTTHEP  Magnesium:    Lab Results   Component Value Date    MG 2.2 04/20/2011     TSH:    Lab Results   Component Value Date    TSH 1.090 05/20/2020     TROPONIN:  No components found for: TROP  BNP:  No results found for: BNP  FASTING LIPID PANEL:    Lab Results   Component Value Date    CHOL 190 02/17/2014    HDL 46 02/17/2014    TRIG 163 02/17/2014     No orders to display     I have personally reviewed the laboratory, cardiac diagnostic and radiographic testing as outlined above:      IMPRESSION:  1. Edema: Etiology?,  Echocardiogram is unremarkable, will continue Lasix  2. SVT: No recurrence  3. History of autoimmune hepatitis   4. History of SLE    RECOMMENDATIONS:   1. Will continue current treatment  2. Will obtain recent blood work done at Dr. Beth Anglin office  3. Continue current treatment  4. Follow-up with Dr. Beth Anglin as scheduled  5. Follow-up visit in 6 months, sooner if symptomatic for any reason    I have reviewed my findings and recommendations with patient    Electronically signed by Peace Linares MD on 6/19/2022 at 2:58 PM  NOTE: This report was transcribed using voice recognition software.  Every effort was made to ensure accuracy; however, inadvertent computerized transcription errors may be present

## 2022-03-08 ENCOUNTER — OFFICE VISIT (OUTPATIENT)
Dept: CARDIOLOGY CLINIC | Age: 52
End: 2022-03-08
Payer: COMMERCIAL

## 2022-03-08 VITALS
HEIGHT: 69 IN | BODY MASS INDEX: 26.22 KG/M2 | RESPIRATION RATE: 16 BRPM | SYSTOLIC BLOOD PRESSURE: 112 MMHG | HEART RATE: 75 BPM | DIASTOLIC BLOOD PRESSURE: 76 MMHG | WEIGHT: 177 LBS

## 2022-03-08 DIAGNOSIS — I47.1 SVT (SUPRAVENTRICULAR TACHYCARDIA) (HCC): Primary | ICD-10-CM

## 2022-03-08 PROCEDURE — 3017F COLORECTAL CA SCREEN DOC REV: CPT | Performed by: INTERNAL MEDICINE

## 2022-03-08 PROCEDURE — 4004F PT TOBACCO SCREEN RCVD TLK: CPT | Performed by: INTERNAL MEDICINE

## 2022-03-08 PROCEDURE — G8427 DOCREV CUR MEDS BY ELIG CLIN: HCPCS | Performed by: INTERNAL MEDICINE

## 2022-03-08 PROCEDURE — 93000 ELECTROCARDIOGRAM COMPLETE: CPT | Performed by: INTERNAL MEDICINE

## 2022-03-08 PROCEDURE — G8484 FLU IMMUNIZE NO ADMIN: HCPCS | Performed by: INTERNAL MEDICINE

## 2022-03-08 PROCEDURE — 99214 OFFICE O/P EST MOD 30 MIN: CPT | Performed by: INTERNAL MEDICINE

## 2022-03-08 PROCEDURE — G8417 CALC BMI ABV UP PARAM F/U: HCPCS | Performed by: INTERNAL MEDICINE

## 2022-08-08 DIAGNOSIS — M25.552 LEFT HIP PAIN: Primary | ICD-10-CM

## 2022-09-19 ENCOUNTER — TELEPHONE (OUTPATIENT)
Dept: ADMINISTRATIVE | Age: 52
End: 2022-09-19

## 2022-09-19 NOTE — TELEPHONE ENCOUNTER
Pt calling req, an apt with Dr. Carrie Stephens - c/o not feeling well; weak and tired with elevated hear rate from  at rest.  Last seen in OV by UB on: 6/19/22. Scheduling advise please.

## 2022-09-23 ENCOUNTER — OFFICE VISIT (OUTPATIENT)
Dept: CARDIOLOGY CLINIC | Age: 52
End: 2022-09-23
Payer: COMMERCIAL

## 2022-09-23 VITALS
BODY MASS INDEX: 33.92 KG/M2 | WEIGHT: 229 LBS | RESPIRATION RATE: 20 BRPM | HEIGHT: 69 IN | DIASTOLIC BLOOD PRESSURE: 78 MMHG | HEART RATE: 103 BPM | SYSTOLIC BLOOD PRESSURE: 122 MMHG

## 2022-09-23 DIAGNOSIS — I47.1 SVT (SUPRAVENTRICULAR TACHYCARDIA) (HCC): Primary | ICD-10-CM

## 2022-09-23 PROCEDURE — 99213 OFFICE O/P EST LOW 20 MIN: CPT | Performed by: INTERNAL MEDICINE

## 2022-09-23 PROCEDURE — 93000 ELECTROCARDIOGRAM COMPLETE: CPT | Performed by: INTERNAL MEDICINE

## 2022-09-23 PROCEDURE — G8417 CALC BMI ABV UP PARAM F/U: HCPCS | Performed by: INTERNAL MEDICINE

## 2022-09-23 PROCEDURE — G8427 DOCREV CUR MEDS BY ELIG CLIN: HCPCS | Performed by: INTERNAL MEDICINE

## 2022-09-23 PROCEDURE — 3017F COLORECTAL CA SCREEN DOC REV: CPT | Performed by: INTERNAL MEDICINE

## 2022-09-23 PROCEDURE — 4004F PT TOBACCO SCREEN RCVD TLK: CPT | Performed by: INTERNAL MEDICINE

## 2022-09-23 NOTE — PROGRESS NOTES
3 day Zio XT Monitor was placed per Dr Jami Garay. Serial number [id number]. Instructions were given & patient verbalized understanding.

## 2022-09-23 NOTE — PROGRESS NOTES
[facility] Cardiology Progress Note  Dr. Claudia Reddy      Referring Physician: Treva Avila DO  CHIEF COMPLAINT:   Chief Complaint   Patient presents with    Tachycardia     Pt not feeling well- sob/fatigue/weak       HISTORY OF PRESENT ILLNESS:   Patient is 46years old female with history of pedal edema, SVT, lupus erythematosus, multiple sclerosis, autoimmune hepatitis, is here for follow-up appointment  Occasional shortness of breath when he overexerts herself, occasional pedal edema, patient denies any chest pain, no lightheadedness, no dizziness, no palpitations, no PND, no orthopnea, no syncope, no presyncopal episodes.     Past Medical History:   Diagnosis Date    Arthritis     rheumatoid    Asthma     H/O medication noncompliance     Hyperlipidemia     Lupus (HCC)     MS (multiple sclerosis) (HCC)     Multiple sclerosis (HCC)     Nicotine dependence, cigarettes, uncomplicated     Palpitations     SOB (shortness of breath)     SVT (supraventricular tachycardia) (HCC)     Systemic lupus erythematosus, unspecified ([facility] [address]          Past Surgical History:   Procedure Laterality Date    ABDOMEN SURGERY      ANTERIOR CRUCIATE LIGAMENT REPAIR      APPENDECTOMY       SECTION      x5    CHOLECYSTECTOMY      DILATION AND CURETTAGE OF UTERUS      HC INJECTION PROCEDURE FOR SACROILIAC JOINT Left 12/3/2020    LEFT SACROILIAC JOINT STEROID INJECTION UNDER X-RAY GUIDANCE performed by Sharon Sorto DO at [facility] Left 10/01/2020    NERVE BLOCK Left 10/1/2020    LEFT L4-5 TRANSFORAMINAL EPIDURAL STEROID INJECTION performed by Sharon Sorto DO at [id number] [place] [address] Left 2020    LEFT SACROILIAC STEROID INJECTION UNDER XRAY GUIDANCE    TUBAL LIGATION           Current Outpatient Medications   Medication Sig Dispense Refill    potassium chloride (KLOR-CON M) 10 MEQ extended release tablet take 2 tablets by mouth once daily 90 tablet 3    furosemide (LASIX) 40 MG tablet Take 40 mg by mouth 2 times daily      buprenorphine-naloxone (SUBOXONE) 8-2 MG FILM SL film Place 1 Film under the tongue 2 times daily. albuterol sulfate HFA (VENTOLIN HFA) 108 (90 Base) MCG/ACT inhaler Inhale 2 puffs into the lungs every 6 hours as needed for Wheezing 1 Inhaler 1    aspirin 81 MG tablet Take 81 mg by mouth daily Held x 5 days      LORazepam (ATIVAN) 1 MG tablet Take 0.5 mg by mouth daily. linaclotide (LINZESS) 145 MCG capsule Take 145 mcg by mouth every morning (before breakfast) (Patient not taking: Reported on 9/23/2022)       No current facility-administered medications for this visit.      Facility-Administered Medications Ordered in Other Visits   Medication Dose Route Frequency Provider Last Rate Last Admin    sodium chloride flush 0.9 % injection 10 mL  10 mL IntraVENous BID Curt Santos MD        heparin flush 100 UNIT/ML injection 500 Units  500 Units Intercatheter PRN Curt Santos MD        sodium chloride flush 0.9 % injection 10 mL  10 mL IntraVENous BID Curt Santos MD        heparin flush 100 UNIT/ML injection 500 Units  500 Units Intercatheter PRN Curt Santos MD        sodium chloride flush 0.9 % injection 10 mL  10 mL IntraVENous PRN Curt Santos MD             Allergies as of 09/23/2022 - Fully Reviewed 09/23/2022   Allergen Reaction Noted    Lyrica [pregabalin] Anaphylaxis 05/04/2017    Other Shortness Of Breath 09/13/2000    Sulfa antibiotics Anaphylaxis 12/18/2011    Darvocet [propoxyphene n-acetaminophen]  10/02/2011    Ultram [tramadol hcl]  10/02/2011       Social History     Socioeconomic History    Marital status:      Spouse name: Not on file    Number of children: Not on file    Years of education: Not on file    Highest education level: Not on file   Occupational History    Not on file   Tobacco Use    Smoking status: Every Day     Packs/day: 2.00     Years: 10.00     Pack years: 20.00     Types: Cigarettes     Last attempt to quit: 10/2/2009     Years since quittin.9    Smokeless tobacco: Never    Tobacco comments:     resumed    Vaping Use    Vaping Use: Never used   Substance and Sexual Activity    Alcohol use: No     Comment: 1 coffee per day     Drug use: No    Sexual activity: Yes     Partners: Male   Other Topics Concern    Not on file   Social History Narrative    Not on file     Social Determinants of Health     Financial Resource Strain: Not on file   Food Insecurity: Not on file   Transportation Needs: Not on file   Physical Activity: Not on file   Stress: Not on file   Social Connections: Not on file   Intimate Partner Violence: Not on file   Housing Stability: Not on file       Family History   Problem Relation Age of Onset    Heart Disease Mother     Heart Attack Mother     High Blood Pressure Mother     High Blood Pressure Father        REVIEW OF SYSTEMS:     CONSTITUTIONAL:  negative for  fevers, chills, sweats, + fatigue  HEENT:  negative for  tinnitus, earaches, nasal congestion and epistaxis  RESPIRATORY:  negative for  dry cough, cough with sputum, wheezing and hemoptysis  GASTROINTESTINAL:  negative for nausea, vomiting, diarrhea, constipation, pruritus and jaundice  HEMATOLOGIC/LYMPHATIC:  negative for easy bruising, bleeding, lymphadenopathy and petechiae  ENDOCRINE:  negative for heat intolerance, cold intolerance, tremor, hair loss and diabetic symptoms including neither polyuria nor polydipsia nor blurred vision  MUSCULOSKELETAL:  negative for  myalgias, arthralgias, joint swelling, stiff joints and decreased range of motion  NEUROLOGICAL:  negative for memory problems, speech problems, visual disturbance, dysphagia, weakness and numbness      PHYSICAL EXAM:   Constitutional:  Awake, alert cooperative, no apparent distress, and appears stated age. HEENT:  Moist and pink mucous membranes, normocephalic, without obvious abnormality, atraumatic, normal ears and nose.   Neck: Supple, symmetrical, trachea midline, no JVD, no adenopathy, thyroid symmetric, not enlarged and no tenderness, good carotid upstroke bilaterally, no carotid bruit, skin normal  Lungs: No increased work of breathing, good air exchange, clear to auscultation bilaterally, no crackles or wheezing. Cardiovascular: Normal apical impulse, regular rate and rhythm, normal S1 and S2, no S3 or S4, no murmur, no pedal edema, good carotid upstroke bilaterally, no carotid bruit, no JVD, no abdominal pulsating masses. Abdomen: Soft, nontender, no hepatomegaly, no splenomegaly, bowel sound positive. CHEST:  Expands symmetrically, nontender to palpation. Musculoskeletal:  No clubbing or cyanosis. No redness, warmth, or swelling of the joints. Neurological: Alert, awake, and oriented X3. /78   Pulse (!) 103   Resp 20   Ht 5' 9\" (1.753 m)   Wt 229 lb (103.9 kg)   LMP 05/04/2020   BMI 33.82 kg/m²     DATA:   I personally reviewed the visit EKG with the following interpretation: Sinus tachycardia, normal axis, no significant change when compared to previous    EKG 3/8/2022, sinus rhythm, nonspecific T wave changes in the precordial leads, normal axis    EKG 6/16/21 Sinus rhythm, nonspecific T wave changes      ECHO: 6/25/20 Summary   Normal left ventricular chamber size and wall thickness. Normal left ventricular systolic function, LVEF is 65%. Normal diastolic function. Normal left atrial pressure. Left atrium is of normal size. Interatrial septum not well visualized but appears intact. Normal right ventricle size and function. No mitral valve prolapse. There is trace tricuspid regurgitation, RVSP 23mmHg. Normal aortic root size and ascending aorta. No evidence of pericardial effusion. Pericardium appears normal.   No intra cardiac mass or thrombus. Compared to prior echo from 4/2011 - no significant changes noted.     Stress Test: Not performed to date  Angiography: Not performed to date  Cardiology Labs: BMP:    Lab Results   Component Value Date/Time     11/06/2020 02:30 PM    K 3.9 11/06/2020 02:30 PM    CL 98 11/06/2020 02:30 PM    CO2 27 11/06/2020 02:30 PM    BUN 7 11/06/2020 02:30 PM    CREATININE 0.6 11/06/2020 02:30 PM     CMP:    Lab Results   Component Value Date/Time     11/06/2020 02:30 PM    K 3.9 11/06/2020 02:30 PM    CL 98 11/06/2020 02:30 PM    CO2 27 11/06/2020 02:30 PM    BUN 7 11/06/2020 02:30 PM    CREATININE 0.6 11/06/2020 02:30 PM    PROT 7.5 04/07/2020 03:54 PM     CBC:    Lab Results   Component Value Date/Time    WBC 11.0 05/20/2020 12:10 AM    RBC 4.86 05/20/2020 12:10 AM    HGB 14.3 05/20/2020 12:10 AM    HCT 43.5 05/20/2020 12:10 AM    MCV 89.5 05/20/2020 12:10 AM    RDW 12.8 05/20/2020 12:10 AM     05/20/2020 12:10 AM     PT/INR:  No results found for: PTINR  PT/INR Warfarin:  No components found for: PTPATWAR, PTINRWAR  PTT:  No results found for: APTT  PTT Heparin:  No components found for: APTTHEP  Magnesium:    Lab Results   Component Value Date/Time    MG 2.2 04/20/2011 07:53 AM     TSH:    Lab Results   Component Value Date/Time    TSH 1.090 05/20/2020 12:10 AM     TROPONIN:  No components found for: TROP  BNP:  No results found for: BNP  FASTING LIPID PANEL:    Lab Results   Component Value Date/Time    CHOL 190 02/17/2014 09:45 AM    HDL 46 02/17/2014 09:45 AM    TRIG 163 02/17/2014 09:45 AM     No orders to display     I have personally reviewed the laboratory, cardiac diagnostic and radiographic testing as outlined above:      IMPRESSION:  1. Edema: Etiology?,  Echocardiogram is unremarkable, will continue Lasix  2. SVT: No recurrence  3. Sinus tachycardia:   4. History of autoimmune hepatitis   5. History of SLE    RECOMMENDATIONS:   1. Will continue current treatment  2. Follow-up with Dr. Radha Lovelace as scheduled  3.   Follow-up visit in 1 year, sooner if symptomatic for any reason    I have reviewed my findings and recommendations with patient    Electronically signed by Phoebe Gonzalez MD on 9/23/2022 at 12:07 PM  NOTE: This report was transcribed using voice recognition software.  Every effort was made to ensure accuracy; however, inadvertent computerized transcription errors may be present

## 2022-11-22 NOTE — PROGRESS NOTES
[address]  [address] [place] [address]   Hematology/Oncology  Consult      Patient Name: Carrol Nur  YOB: 1970  PCP: Jackson Frederick DO   Referring Provider: [address] [facility] [address]     Reason for Consultation:   Chief Complaint   Patient presents with    New Patient    Other     Elevated WBC        History of Present Illness: This patient was referred by Dr. Rukhsana Munoz for evaluation of her elevated WBC count.   Her labs were done by [facility]:  10/11/2022  WBC   14.6      ABSOLUTE NEUTROPHILS: 95143     2022    WBC   16.5      ABSOLUTE NEUTROPHILS:  69475    2022  WBC   17.8      ABSOLUTE NEUTROPHILS: 97143          Diagnostic Data:     Past Medical History:   Diagnosis Date    Arthritis     rheumatoid    Asthma     H/O medication noncompliance     Hyperlipidemia     Lupus (HCC)     MS (multiple sclerosis) (HCC)     Multiple sclerosis (HCC)     Nicotine dependence, cigarettes, uncomplicated     Palpitations     SOB (shortness of breath)     SVT (supraventricular tachycardia) ([facility])     Systemic lupus erythematosus, unspecified ([address]        Patient Active Problem List    Diagnosis Date Noted    Sacroiliitis ([address] 2020    Lumbar radiculitis 10/01/2020    MS (multiple sclerosis) ([address] 2017    SVT (supraventricular tachycardia) ([address] 2014        Past Surgical History:   Procedure Laterality Date    ABDOMEN SURGERY      ANTERIOR CRUCIATE LIGAMENT REPAIR      APPENDECTOMY       SECTION      x5    CHOLECYSTECTOMY      DILATION AND CURETTAGE OF UTERUS      HC INJECTION PROCEDURE FOR SACROILIAC JOINT Left 12/3/2020    LEFT SACROILIAC JOINT STEROID INJECTION UNDER X-RAY GUIDANCE performed by Breezy Jules DO at [address] [facility] [address] Left 10/01/2020    NERVE BLOCK Left 10/1/2020    LEFT L4-5 TRANSFORAMINAL EPIDURAL STEROID INJECTION performed by Breezy Jules DO at [address] [place] [address] [facility] SURGICAL HIST[facility] Left 12/03/2020    LEFT SACROILIAC STEROID INJECTION UNDER XRAY GUIDANCE    TUBAL LIGATION         Family History  Family History   Problem Relation Age of Onset    Heart Disease Mother     Heart Attack Mother     High Blood Pressure Mother     High Blood Pressure Father          Social History  TOBACCO:   reports that she has been smoking cigarettes. She has a 20.00 pack-year smoking history. She has never used smokeless tobacco.  ETOH:   reports no history of alcohol use. Home Medications  Prior to Admission medications    Medication Sig Start Date End Date Taking? Authorizing Provider   potassium chloride (KLOR-CON M) 10 MEQ extended release tablet take 2 tablets by mouth once daily 8/13/21   Yaya Craig MD   linaclotide [facility]) 145 MCG capsule Take 145 mcg by mouth every morning (before breakfast)  Patient not taking: Reported on 9/23/2022    Historical Provider, MD   furosemide (LASIX) 40 MG tablet Take 40 mg by mouth 2 times daily    Historical Provider, MD   buprenorphine-naloxone (SUBOXONE) 8-2 MG FILM SL film Place 1 Film under the tongue 2 times daily. Historical Provider, MD   albuterol sulfate HFA (VENTOLIN HFA) 108 (90 Base) MCG/ACT inhaler Inhale 2 puffs into the lungs every 6 hours as needed for Wheezing 7/3/17   Chilango Talley PA-C   aspirin 81 MG tablet Take 81 mg by mouth daily Held x 5 days    Historical Provider, MD   LORazepam (ATIVAN) 1 MG tablet Take 0.5 mg by mouth daily. Historical Provider, MD       Allergies  Allergies   Allergen Reactions    Lyrica [Pregabalin] Anaphylaxis    Other Shortness Of Breath    Sulfa Antibiotics Anaphylaxis     Hallucinations and rash    Darvocet [Propoxyphene N-Acetaminophen]     Ultram [Tramadol Hcl]        Review of Systems:    Constitutional:  No fever chills or rigors. Eyes: No changes in vision, discharge, or pain  ENT: No Headaches, hearing loss or vertigo. No mouth sores or sore throat. No change in taste or smell.   Cardiovascular: No chest discomfort, dyspnea on exertion, palpitations or loss of consciousness. or phlebitis. Respiratory: Has no cough or wheezing, Has no sputum production. Has no hemoptysis, Has no pleuritic pain, . Gastrointestinal: No abdominal pain, appetite loss, blood in stools. No change in bowel habits. No hematemesis   Genitourinary: Patient acknowledges no dysuria, trouble voiding, or hematuria. No nocturia or increased frequency. Musculoskeletal: No gait disturbance, weakness or joint complaints. Integumentary: No rash or pruritis. Neurological: No headache, diplopia, change in muscle strength, numbness or tingling. No change in gait, balance, coordination, mood, affect, memory, mentation, behavior. Psychiatric: No anxiety, or depression. Endocrine: No temperature intolerance. No excessive thirst, fluid intake, or urination. No tremor. Hematologic/Lymphatic: No abnormal bruising or bleeding, blood clots or swollen lymph nodes. Allergic/Immunologic: No nasal congestion or hives. Objective  LMP 05/04/2020   Physical Exam:  Performance Status  General: AAO to person, place, time, in no acute distress, pleasant. Head and neck  PERRLA, EOMI. Sclera non icteric. Oropharynx: Clear. Neck: no JVD,  no adenopathy, no carotid bruit. BREASTS: No lumps or palpable masses. LYMPHATICS:  Heart: Regular rate and regular rhythm, no murmur. Lungs: Clear to auscultation. Abdomen: Soft, non-tender;no masses, no organomegaly. Extremities: No edema,no cyanosis, no clubbing. Neurologic:Cranial nerves grossly intact. No focal motor or sensory deficits. Skin:  No rash.       Recent Laboratory Data-   Lab Results   Component Value Date    WBC 11.0 05/20/2020    HGB 14.3 05/20/2020    HCT 43.5 05/20/2020    MCV 89.5 05/20/2020     05/20/2020    LYMPHOPCT 43 (H) 02/17/2014    RBC 4.86 05/20/2020    MCH 29.4 05/20/2020    MCHC 32.9 05/20/2020    RDW 12.8 05/20/2020    MONOPCT 7 02/17/2014 BASOPCT 1 02/17/2014    NEUTROABS 3.52 02/17/2014    LYMPHSABS 3.09 02/17/2014    MONOSABS 0.48 02/17/2014    EOSABS 0.15 02/17/2014    BASOSABS 0.04 02/17/2014       Lab Results   Component Value Date     11/06/2020    K 3.9 11/06/2020    CL 98 11/06/2020    CO2 27 11/06/2020    BUN 7 11/06/2020    CREATININE 0.6 11/06/2020    GLUCOSE 100 (H) 11/06/2020    CALCIUM 9.0 11/06/2020    PROT 7.5 04/07/2020    LABALBU 4.5 04/07/2020    BILITOT <0.2 04/07/2020    ALKPHOS 110 (H) 04/07/2020    AST 23 04/07/2020    ALT 29 04/07/2020    LABGLOM >60 11/06/2020    GFRAA >60 11/06/2020       Lab Results   Component Value Date    IRON 81 03/15/2011    FERRITIN 22.4 11/01/2010           Radiology-  No results found. ASSESSMENT/PLAN :    Ayah Rainey was seen today for new patient and other. Diagnoses and all orders for this visit:    Other elevated white blood cell (WBC) count            Krishan Cuevas M.D., F.A.C.P.   Electronically signed 11/22/2022 at 10:19 AM

## 2022-11-23 ENCOUNTER — OFFICE VISIT (OUTPATIENT)
Dept: ONCOLOGY | Age: 52
End: 2022-11-23

## 2022-11-23 DIAGNOSIS — D72.828 OTHER ELEVATED WHITE BLOOD CELL (WBC) COUNT: Primary | ICD-10-CM

## 2022-12-05 ENCOUNTER — OFFICE VISIT (OUTPATIENT)
Dept: ONCOLOGY | Age: 52
End: 2022-12-05
Payer: COMMERCIAL

## 2022-12-05 ENCOUNTER — HOSPITAL ENCOUNTER (OUTPATIENT)
Dept: INFUSION THERAPY | Age: 52
Discharge: HOME | End: 2022-12-05

## 2022-12-05 VITALS
SYSTOLIC BLOOD PRESSURE: 163 MMHG | BODY MASS INDEX: 34.66 KG/M2 | HEIGHT: 69 IN | HEART RATE: 95 BPM | TEMPERATURE: 97.7 F | WEIGHT: 234 LBS | DIASTOLIC BLOOD PRESSURE: 94 MMHG | OXYGEN SATURATION: 96 %

## 2022-12-05 DIAGNOSIS — D72.828 OTHER ELEVATED WHITE BLOOD CELL (WBC) COUNT: Primary | ICD-10-CM

## 2022-12-05 DIAGNOSIS — G35 MS (MULTIPLE SCLEROSIS) (HCC): ICD-10-CM

## 2022-12-05 DIAGNOSIS — D72.828 OTHER ELEVATED WHITE BLOOD CELL (WBC) COUNT: ICD-10-CM

## 2022-12-05 LAB
ALBUMIN SERPL-MCNC: 4.2 G/DL (ref 3.5–5.2)
ALP BLD-CCNC: 94 U/L (ref 35–104)
ALT SERPL-CCNC: 51 U/L (ref 0–32)
ANION GAP SERPL CALCULATED.3IONS-SCNC: 13 MMOL/L (ref 7–16)
AST SERPL-CCNC: 22 U/L (ref 0–31)
BASOPHILS ABSOLUTE: 0.08 E9/L (ref 0–0.2)
BASOPHILS RELATIVE PERCENT: 0.4 % (ref 0–2)
BILIRUB SERPL-MCNC: 0.3 MG/DL (ref 0–1.2)
BUN BLDV-MCNC: 21 MG/DL (ref 6–20)
CALCIUM SERPL-MCNC: 9.9 MG/DL (ref 8.6–10.2)
CHLORIDE BLD-SCNC: 98 MMOL/L (ref 98–107)
CO2: 31 MMOL/L (ref 22–29)
CREAT SERPL-MCNC: 0.8 MG/DL (ref 0.5–1)
EOSINOPHILS ABSOLUTE: 0.05 E9/L (ref 0.05–0.5)
EOSINOPHILS RELATIVE PERCENT: 0.3 % (ref 0–6)
GFR SERPL CREATININE-BSD FRML MDRD: >60 ML/MIN/1.73
GLUCOSE BLD-MCNC: 137 MG/DL (ref 74–99)
HCT VFR BLD CALC: 48.1 % (ref 34–48)
HEMOGLOBIN: 15.6 G/DL (ref 11.5–15.5)
IMMATURE GRANULOCYTES #: 0.32 E9/L
IMMATURE GRANULOCYTES %: 1.8 % (ref 0–5)
LYMPHOCYTES ABSOLUTE: 2.15 E9/L (ref 1.5–4)
LYMPHOCYTES RELATIVE PERCENT: 12 % (ref 20–42)
MCH RBC QN AUTO: 29.6 PG (ref 26–35)
MCHC RBC AUTO-ENTMCNC: 32.4 % (ref 32–34.5)
MCV RBC AUTO: 91.3 FL (ref 80–99.9)
MONOCYTES ABSOLUTE: 1.07 E9/L (ref 0.1–0.95)
MONOCYTES RELATIVE PERCENT: 6 % (ref 2–12)
NEUTROPHILS ABSOLUTE: 14.2 E9/L (ref 1.8–7.3)
NEUTROPHILS RELATIVE PERCENT: 79.5 % (ref 43–80)
PDW BLD-RTO: 13.3 FL (ref 11.5–15)
PLATELET # BLD: 260 E9/L (ref 130–450)
PMV BLD AUTO: 9.2 FL (ref 7–12)
POTASSIUM SERPL-SCNC: 3.3 MMOL/L (ref 3.5–5)
RBC # BLD: 5.27 E12/L (ref 3.5–5.5)
SEDIMENTATION RATE, ERYTHROCYTE: 15 MM/HR (ref 0–20)
SODIUM BLD-SCNC: 142 MMOL/L (ref 132–146)
TOTAL PROTEIN: 6.9 G/DL (ref 6.4–8.3)
WBC # BLD: 17.9 E9/L (ref 4.5–11.5)

## 2022-12-05 PROCEDURE — 85025 COMPLETE CBC W/AUTO DIFF WBC: CPT

## 2022-12-05 PROCEDURE — 85651 RBC SED RATE NONAUTOMATED: CPT

## 2022-12-05 PROCEDURE — 36415 COLL VENOUS BLD VENIPUNCTURE: CPT

## 2022-12-05 PROCEDURE — 81270 JAK2 GENE: CPT

## 2022-12-05 PROCEDURE — 99214 OFFICE O/P EST MOD 30 MIN: CPT

## 2022-12-05 PROCEDURE — 80053 COMPREHEN METABOLIC PANEL: CPT

## 2022-12-05 PROCEDURE — 88374 M/PHMTRC ALYS ISHQUANT/SEMIQ: CPT

## 2022-12-05 NOTE — PROGRESS NOTES
Savannah Patiño  1970 46 y.o. Referring Physician:    PCP: Henry Zepeda,     Vitals:    22 0959   BP: (!) 163/94   Pulse: 95   Temp: 97.7 °F (36.5 °C)   SpO2: 96%        Wt Readings from Last 3 Encounters:   22 234 lb (106.1 kg)   22 229 lb (103.9 kg)   22 177 lb (80.3 kg)        Body mass index is 34.56 kg/m². Chief Complaint:   Chief Complaint   Patient presents with    New Patient     Elevated WBC  Hx of MS         Cancer Staging  No matching staging information was found for the patient. Prior Radiation Therapy? NO    Concurrent Chemo/radiation? NO    Prior Chemotherapy? NO    Prior Hormonal Therapy? NO    Head and Neck Cancer? No, patient does NOT have HN cancer. LMP: 2021    Age at first Menses: 15    : 7    Para: 5          Current Outpatient Medications:     potassium chloride (KLOR-CON M) 10 MEQ extended release tablet, take 2 tablets by mouth once daily, Disp: 90 tablet, Rfl: 3    furosemide (LASIX) 40 MG tablet, Take 40 mg by mouth 2 times daily, Disp: , Rfl:     buprenorphine-naloxone (SUBOXONE) 8-2 MG FILM SL film, Place 1 Film under the tongue 2 times daily. , Disp: , Rfl:     albuterol sulfate HFA (VENTOLIN HFA) 108 (90 Base) MCG/ACT inhaler, Inhale 2 puffs into the lungs every 6 hours as needed for Wheezing, Disp: 1 Inhaler, Rfl: 1    LORazepam (ATIVAN) 1 MG tablet, Take 0.5 mg by mouth in the morning and at bedtime. , Disp: , Rfl:     linaclotide (LINZESS) 145 MCG capsule, Take 145 mcg by mouth every morning (before breakfast) (Patient not taking: Reported on 2022), Disp: , Rfl:     aspirin 81 MG tablet, Take 81 mg by mouth daily Held x 5 days (Patient not taking: Reported on 2022), Disp: , Rfl:        Past Medical History:   Diagnosis Date    Arthritis     rheumatoid    Asthma     H/O medication noncompliance     Hyperlipidemia     Lupus ([facility]     MS (multiple sclerosis) (HCC)     Multiple sclerosis (HCC)     Nicotine dependence, cigarettes, uncomplicated     Palpitations     SOB (shortness of breath)     SVT (supraventricular tachycardia) (HCC)     Systemic lupus erythematosus, unspecified ([facility]        Past Surgical History:   Procedure Laterality Date    ABDOMEN SURGERY      ANTERIOR CRUCIATE LIGAMENT REPAIR      APPENDECTOMY       SECTION      x5    CHOLECYSTECTOMY      DILATION AND CURETTAGE OF UTERUS      HC INJECTION PROCEDURE FOR SACROILIAC JOINT Left 12/3/2020    LEFT SACROILIAC JOINT STEROID INJECTION UNDER X-RAY GUIDANCE performed by Madelaine Hollins DO at [address] [facility] [address] Left 10/01/2020    NERVE BLOCK Left 10/1/2020    LEFT L4-5 TRANSFORAMINAL EPIDURAL STEROID INJECTION performed by Madelaine Hollins DO at [address] [facility] [address] Left 2020    LEFT SACROILIAC STEROID INJECTION UNDER XRAY GUIDANCE    TUBAL LIGATION         Family History   Problem Relation Age of Onset    Heart Disease Mother     Heart Attack Mother     High Blood Pressure Mother     High Blood Pressure Father        Social History     Socioeconomic History    Marital status:      Spouse name: Not on file    Number of children: Not on file    Years of education: Not on file    Highest education level: Not on file   Occupational History    Not on file   Tobacco Use    Smoking status: Every Day     Packs/day: 2.00     Years: 10.00     Pack years: 20.00     Types: Cigarettes     Last attempt to quit: 10/2/2009     Years since quittin.1    Smokeless tobacco: Never    Tobacco comments:     resumed    Vaping Use    Vaping Use: Never used   Substance and Sexual Activity    Alcohol use: No     Comment: 1 coffee per day     Drug use: No    Sexual activity: Yes     Partners: Male   Other Topics Concern    Not on file   Social History Narrative    Not on file     Social Determinants of Health     Financial Resource Strain: Not on file   Food Insecurity: Not on file   Transportation Needs: Not on file   Physical Activity: Not on file   Stress: Not on file   Social Connections: Not on file   Intimate Partner Violence: Not on file   Housing Stability: Not on file           Occupation: certified health record reviewer  Retired:  NO          REVIEW OF SYSTEMS:     Pacemaker/Defibulator/ICD:  No    Mediport: No           FALLS RISK SCREENING ASSESSMENT    Instructions:  Assess the patient and [place] the appropriate indicators that are present for fall risk identification. Total the numbers circled and assign a fall risk score from Table 2.  Reassess patient at a minimum every 12 weeks or with status change. Assessment   Date  12/5/2022     1. Mental Ability: confusion/cognitively impaired No - 0       2. Elimination Issues: incontinence, frequency No - 0       3. Ambulatory: use of assistive devices (walker, cane, off-loading devices), attached to equipment (IV pole, oxygen) No - 0     4. Sensory Limitations: dizziness, vertigo, impaired vision Yes - 3       5. Age Less than 65 years - 0       6. Medication: diuretics, strong analgesics, hypnotics, sedatives, antihypertensive agents   Yes - 3   7. Falls:  recent history of falls within the last 3 months (not to include slipping or tripping)   No - 0   TOTAL 6    If score of 4 or greater was education given? Yes       TABLE 2   Risk Score Risk Level Plan of Care   0-3 Little or  No Risk 1. Provide assistance as indicated for ambulation activities  2. Reorient confused/cognitively impaired patient  3. Call-light/bell within patient's reach  4. Chair/bed in low position, stretcher/bed with siderails up except when performing patient care activities  5. Educate patient/family/caregiver on falls prevention  6.  Reassess in 12 weeks or with any noted change in patient condition which places them at a risk for a fall   4-6 Moderate Risk 1. Provide assistance as indicated for ambulation activities  2.   Reorient confused/cognitively impaired patient  3. Call-light/bell within patient's reach  4. Chair/bed in low position, stretcher/bed with siderails up except when performing patient care activities  5. Educate patient/family/caregiver on falls prevention  6. Falls risk precaution (Yellow sticker Level II) placed on patient chart   7 or   Higher High Risk 1. Place patient in easily observable treatment room  2. Patient attended at all times by family member or staff  3. Provide assistance as indicated for ambulation activities  4. Reorient confused/cognitively impaired patient  5. Call-light/bell within patient's reach  6. Chair/bed in low position, stretcher/bed with siderails up except when performing patient care activities  7. Educate patient/family/caregiver on falls prevention  8. Falls risk precaution (Yellow sticker Level III) placed on patient chart           MALNUTRITION RISK SCREENING ASSESSMENT    Instructions:  Assess the patient and enter the appropriate indicators that are present for nutrition risk identification. Total the numbers entered and assign a risk score. Follow the appropriate action for total score listed below. Assessment   Date  12/5/2022     Have you lost weight without trying? 0- No     Have you been eating poorly because of a decreased appetite? 0- No   3. Do you have a diagnosis of head and neck cancer? 0- No                                                                                    TOTAL 0        Score of 0-1: No action  Score 2 or greater:   For Non-Diabetic Patient: Recommend adding Ensure Enlive 2 x daily and provide patient with Ensure wellness bag with coupons  For Diabetic Patient: Recommend adding Glucerna Shake 2 x daily and provide patient with Glucerna Wellness bag with coupons  Route to the dietitian via Seth Aleman RN

## 2022-12-05 NOTE — PROGRESS NOTES
[facility]  Hematology/Oncology  Consult      Patient Name: Mylene Ching  YOB: 1970  PCP: Janae Green DO   Referring Provider: [address] [place] [phone]     Reason for Consultation:   Chief Complaint   Patient presents with    New Patient     Elevated WBC  Hx of MS        History of Present Illness: This is a pleasant unfortunate 27-year-old woman heavy smoker referred for evaluation of leukocytosis. Her past medical history is relevant for MS, lupus, arthritis, chronic back and joint pain, status post recent left hip surgery for DJD who ambulates with the help of crutches. She has also history of depression and irritable bowel syndrome. On her recent labs she has been noted to have chronic neutrophilic leukocytosis with otherwise normal hemoglobin, MCV with borderline thrombocytosis. On her most recent CBC her WBC count was 14.6 with a hemoglobin of 15.3 and platelet count of 929. Her differential shows ANC of 11.4 with slight borderline monocytosis.     Diagnostic Data:     Past Medical History:   Diagnosis Date    Arthritis     rheumatoid    Asthma     H/O medication noncompliance     Hyperlipidemia     Lupus (HCC)     MS (multiple sclerosis) (HCC)     Multiple sclerosis (HCC)     Nicotine dependence, cigarettes, uncomplicated     Palpitations     SOB (shortness of breath)     SVT (supraventricular tachycardia) (HCC)     Systemic lupus erythematosus, unspecified ([address]        Patient Active Problem List    Diagnosis Date Noted    Sacroiliitis ([address] 2020    Lumbar radiculitis 10/01/2020    MS (multiple sclerosis) ([address] 2017    SVT (supraventricular tachycardia) ([address] 2014        Past Surgical History:   Procedure Laterality Date    ABDOMEN SURGERY      ANTERIOR CRUCIATE LIGAMENT REPAIR      APPENDECTOMY       SECTION      x5    CHOLECYSTECTOMY      DILATION AND CURETTAGE OF UTERUS      HC INJECTION PROCEDURE FOR SACROILIAC JOINT Left 12/3/2020    LEFT SACROILIAC JOINT STEROID INJECTION UNDER X-RAY GUIDANCE performed by Vijay Reza DO at [address] [facility]  Left 10/01/2020    NERVE BLOCK Left 10/1/2020    LEFT L4-5 TRANSFORAMINAL EPIDURAL STEROID INJECTION performed by Vijay Reza DO at [address] Left 12/03/2020    LEFT SACROILIAC STEROID INJECTION UNDER XRAY GUIDANCE    TUBAL LIGATION         Family History  Family History   Problem Relation Age of Onset    Heart Disease Mother     Heart Attack Mother     High Blood Pressure Mother     High Blood Pressure Father        Social History    TOBACCO:   reports that she has been smoking cigarettes. She has a 10.00 pack-year smoking history. She has never used smokeless tobacco.  ETOH:   reports no history of alcohol use. Home Medications  Prior to Admission medications    Medication Sig Start Date End Date Taking? Authorizing Provider   potassium chloride (KLOR-CON M) 10 MEQ extended release tablet take 2 tablets by mouth once daily 8/13/21  Yes Salvador Marino MD   furosemide (LASIX) 40 MG tablet Take 40 mg by mouth 2 times daily   Yes Historical Provider, MD   buprenorphine-naloxone (SUBOXONE) 8-2 MG FILM SL film Place 1 Film under the tongue 2 times daily. Yes Historical Provider, MD   albuterol sulfate HFA (VENTOLIN HFA) 108 (90 Base) MCG/ACT inhaler Inhale 2 puffs into the lungs every 6 hours as needed for Wheezing 7/3/17  Yes Chilango Talley PA-C   LORazepam (ATIVAN) 1 MG tablet Take 0.5 mg by mouth in the morning and at bedtime.    Yes Historical Provider, MD   linaclotide Leslye President) 145 MCG capsule Take 145 mcg by mouth every morning (before breakfast)  Patient not taking: Reported on 9/23/2022    Historical Provider, MD   aspirin 81 MG tablet Take 81 mg by mouth daily Held x 5 days  Patient not taking: Reported on 12/5/2022    Historical Provider, MD       Allergies  Allergies   Allergen Reactions    Lyrica [Pregabalin] Anaphylaxis    Other Shortness Of Breath    Sulfa Antibiotics Anaphylaxis     Hallucinations and rash    Darvocet [Propoxyphene N-Acetaminophen]     Ultram [Tramadol Hcl]        Review of Systems: Relevant for what ever mentioned in the history of present illness          Objective  BP (!) 163/94   Pulse 95   Temp 97.7 °F (36.5 °C)   Ht 5' 9\" (1.753 m)   Wt 234 lb (106.1 kg)   LMP 05/04/2020   SpO2 96%   BMI 34.56 kg/m²     Physical Exam:    General: AAO to person, place, time, in no acute distress,   Head and neck : PERRLA, EOMI . Sclera non icteric. Oropharynx : Clear  Neck: no JVD,  no adenopathy, no carotid bruit  Heart: Regular rate and regular rhythm, no murmur  Lungs: Clear to auscultation   Extremities: No edema,no cyanosis, no clubbing. Abdomen: Soft, non-tender;no masses, no organomegaly  Skin:  No rash. Neurologic:Cranial nerves grossly intact. No focal motor or sensory deficits .     Recent Laboratory Data-   Lab Results   Component Value Date    WBC 11.0 05/20/2020    HGB 14.3 05/20/2020    HCT 43.5 05/20/2020    MCV 89.5 05/20/2020     05/20/2020    LYMPHOPCT 43 (H) 02/17/2014    RBC 4.86 05/20/2020    MCH 29.4 05/20/2020    MCHC 32.9 05/20/2020    RDW 12.8 05/20/2020    MONOPCT 7 02/17/2014    BASOPCT 1 02/17/2014    NEUTROABS 3.52 02/17/2014    LYMPHSABS 3.09 02/17/2014    MONOSABS 0.48 02/17/2014    EOSABS 0.15 02/17/2014    BASOSABS 0.04 02/17/2014       Lab Results   Component Value Date     11/06/2020    K 3.9 11/06/2020    CL 98 11/06/2020    CO2 27 11/06/2020    BUN 7 11/06/2020    CREATININE 0.6 11/06/2020    GLUCOSE 100 (H) 11/06/2020    CALCIUM 9.0 11/06/2020    PROT 7.5 04/07/2020    LABALBU 4.5 04/07/2020    BILITOT <0.2 04/07/2020    ALKPHOS 110 (H) 04/07/2020    AST 23 04/07/2020    ALT 29 04/07/2020    LABGLOM >60 11/06/2020    GFRAA >60 11/06/2020       Lab Results   Component Value Date    IRON 81 03/15/2011    FERRITIN 22.4 11/01/2010 Radiology-    No results found. ASSESSMENT/PLAN : 80-year-old woman    Mild leukocytosis mostly neutrophilic leukocytosis with slight monocytosis with borderline thrombocytosis with normal hemoglobin and MCV. This is related to her smoking history with chronic inflammation of the bronchial tree. She has also had multiple steroid injections for her low back pain and hip arthritis. There is no clinical suggestion of myeloproliferative neoplasm such as CML or ET. Her work-up will be completed to include a peripheral blood smear review, repeat CBC, CMP, sed rate, qualitative JAK2 mutation and BCR-ABL by [facility] and this is all expected to be negative. Smoking cessation was reinforced. She was reassured. Tio Zepeda. Devaughn Monroe M.D., F.A.C.P.   Electronically signed 12/5/2022 at 10:29 AM

## 2022-12-05 NOTE — PROGRESS NOTES
[address] [place] [address]  [address]   Hematology/Oncology  Consult      Patient Name: Aristeo Poon  YOB: 1970  PCP: Ginette Gottron, DO   Referring Provider: [address] [facility] [address]     Reason for Consultation:   Chief Complaint   Patient presents with    New Patient     Elevated WBC  Hx of MS        History of Present Illness: This patient was referred by Dr. Alaina Nation for evaluation of her elevated WBC count.   Her labs were done by [facility]:  10/11/2022  WBC   14.6      ABSOLUTE NEUTROPHILS: 90069     9/27/2022    WBC   16.5      ABSOLUTE NEUTROPHILS:  26967    9/21/2022  WBC   17.8      ABSOLUTE NEUTROPHILS: 50432        Labs 7/02/2022   WBC 3.70 - 11.00 k/uL 15.42 High     RBC 3.90 - 5.20 m/uL 5.43 High     Hemoglobin 11.5 - 15.5 g/dL 16.1 High     Hematocrit 36.0 - 46.0 % 49.1 High     MCV 80.0 - 100.0 fL 90.4    MCH 26.0 - 34.0 pg 29.7    MCHC 30.5 - 36.0 g/dL 32.8    RDW-CV 11.5 - 15.0 % 15.4 High     Platelet Count 925 - 400 k/uL 274    MPV 9.0 - 12.7 fL 8.8 Low     NRBC /100 WBC 0.0    Absolute nRBC <0.01 k/uL <0.01    Neut% % 83.0    Abs Neut (Segs + Bands) 1.45 - 7.50 k/uL 12.80 High     Lymph% % 14.0    Abs Lymph (Normal + Reactive) 1.00 - 4.00 k/uL 2.16    Mono% % 2.0    Abs Mono <0.87 k/uL 0.31    Eosin% % 0.0    Abs Eosin <0.46 k/uL 0.00    Baso% % 0.0    Abs Baso <0.11 k/uL 0.00    Myelo % % 1.0    Left Shift  Present    Platelet Estimate  Adequate    Red Cell Morph  Reviewed: see results of individual morphologies    Anisocytosis  Present    Ovalocytes  Few    Diff Type  Manual    Resulting Agency  [facility]   Narrative  Performed by [address] [place] [address] LAB            Diagnostic Data:     Past Medical History:   Diagnosis Date    Arthritis     rheumatoid    Asthma     H/O medication noncompliance     Hyperlipidemia     Lupus ([facility]     MS (multiple sclerosis) ([facility]     Multiple sclerosis ([facility]     Nicotine dependence, cigarettes, uncomplicated     Palpitations     SOB (shortness of breath)     SVT (supraventricular tachycardia) (HCC)     Systemic lupus erythematosus, unspecified ([facility]        Patient Active Problem List    Diagnosis Date Noted    Sacroiliitis ([facility] 2020    Lumbar radiculitis 10/01/2020    MS (multiple sclerosis) ([facility] 2017    SVT (supraventricular tachycardia) ([facility] 2014        Past Surgical History:   Procedure Laterality Date    ABDOMEN SURGERY      ANTERIOR CRUCIATE LIGAMENT REPAIR      APPENDECTOMY       SECTION      x5    CHOLECYSTECTOMY      DILATION AND CURETTAGE OF UTERUS      HC INJECTION PROCEDURE FOR SACROILIAC JOINT Left 12/3/2020    LEFT SACROILIAC JOINT STEROID INJECTION UNDER X-RAY GUIDANCE performed by [facility] Shayla at [facility] Left 10/01/2020    NERVE BLOCK Left 10/1/2020    LEFT L4-5 TRANSFORAMINAL EPIDURAL STEROID INJECTION [facility]    LEFT SACROILIAC STEROID INJECTION UNDER XRAY GUIDANCE    TUBAL LIGATION         Family History  Family History   Problem Relation Age of Onset    Heart Disease Mother     Heart Attack Mother     High Blood Pressure Mother     High Blood Pressure Father          Social History  TOBACCO:   reports that she has been smoking cigarettes. She has a 20.00 pack-year smoking history. She has never used smokeless tobacco.  ETOH:   reports no history of alcohol use. Home Medications  Prior to Admission medications    Medication Sig Start Date End Date Taking?  Authorizing Provider   potassium chloride (KLOR-CON M) 10 MEQ extended release tablet take 2 tablets by mouth once daily 21   Angel Duncan MD   linaclotide [facility]) 145 MCG capsule Take 145 mcg by mouth every morning (before breakfast)  Patient not taking: Reported on 2022    Historical Provider, MD   furosemide (LASIX) 40 MG tablet Take 40 mg by mouth 2 times daily    Historical Provider, MD   buprenorphine-naloxone (SUBOXONE) 8-2 MG FILM SL film Place 1 Film under the tongue 2 times daily. Historical Provider, MD   albuterol sulfate HFA (VENTOLIN HFA) 108 (90 Base) MCG/ACT inhaler Inhale 2 puffs into the lungs every 6 hours as needed for Wheezing 7/3/17   Chilango Talley PA-C   aspirin 81 MG tablet Take 81 mg by mouth daily Held x 5 days    Historical Provider, MD   LORazepam (ATIVAN) 1 MG tablet Take 0.5 mg by mouth daily. Historical Provider, MD       Allergies  Allergies   Allergen Reactions    Lyrica [Pregabalin] Anaphylaxis    Other Shortness Of Breath    Sulfa Antibiotics Anaphylaxis     Hallucinations and rash    Darvocet [Propoxyphene N-Acetaminophen]     Ultram [Tramadol Hcl]        Review of Systems:    Constitutional:  No fever chills or rigors. Eyes: No changes in vision, discharge, or pain  ENT: No Headaches, hearing loss or vertigo. No mouth sores or sore throat. No change in taste or smell. Cardiovascular: No chest discomfort, dyspnea on exertion, palpitations or loss of consciousness. or phlebitis. Respiratory: Has no cough or wheezing, Has no sputum production. Has no hemoptysis, Has no pleuritic pain, . Gastrointestinal: No abdominal pain, appetite loss, blood in stools. No change in bowel habits. No hematemesis   Genitourinary: Patient acknowledges no dysuria, trouble voiding, or hematuria. No nocturia or increased frequency. Musculoskeletal: No gait disturbance, weakness or joint complaints. Integumentary: No rash or pruritis. Neurological: No headache, diplopia, change in muscle strength, numbness or tingling. No change in gait, balance, coordination, mood, affect, memory, mentation, behavior. Psychiatric: No anxiety, or depression. Endocrine: No temperature intolerance. No excessive thirst, fluid intake, or urination. No tremor.    Hematologic/Lymphatic: No abnormal bruising or bleeding, blood clots or swollen lymph nodes. Allergic/Immunologic: No nasal congestion or hives. Objective  LMP 05/04/2020   Physical Exam:  Performance Status  General: AAO to person, place, time, in no acute distress, pleasant. Head and neck  PERRLA, EOMI. Sclera non icteric. Oropharynx: Clear. Neck: no JVD,  no adenopathy, no carotid bruit. BREASTS: No lumps or palpable masses. LYMPHATICS:  Heart: Regular rate and regular rhythm, no murmur. Lungs: Clear to auscultation. Abdomen: Soft, non-tender;no masses, no organomegaly. Extremities: No edema,no cyanosis, no clubbing. Neurologic:Cranial nerves grossly intact. No focal motor or sensory deficits. Skin:  No rash. Recent Laboratory Data-   Lab Results   Component Value Date    WBC 11.0 05/20/2020    HGB 14.3 05/20/2020    HCT 43.5 05/20/2020    MCV 89.5 05/20/2020     05/20/2020    LYMPHOPCT 43 (H) 02/17/2014    RBC 4.86 05/20/2020    MCH 29.4 05/20/2020    MCHC 32.9 05/20/2020    RDW 12.8 05/20/2020    MONOPCT 7 02/17/2014    BASOPCT 1 02/17/2014    NEUTROABS 3.52 02/17/2014    LYMPHSABS 3.09 02/17/2014    MONOSABS 0.48 02/17/2014    EOSABS 0.15 02/17/2014    BASOSABS 0.04 02/17/2014       Lab Results   Component Value Date     11/06/2020    K 3.9 11/06/2020    CL 98 11/06/2020    CO2 27 11/06/2020    BUN 7 11/06/2020    CREATININE 0.6 11/06/2020    GLUCOSE 100 (H) 11/06/2020    CALCIUM 9.0 11/06/2020    PROT 7.5 04/07/2020    LABALBU 4.5 04/07/2020    BILITOT <0.2 04/07/2020    ALKPHOS 110 (H) 04/07/2020    AST 23 04/07/2020    ALT 29 04/07/2020    LABGLOM >60 11/06/2020    GFRAA >60 11/06/2020       Lab Results   Component Value Date    IRON 81 03/15/2011    FERRITIN 22.4 11/01/2010           Radiology-  No results found. ASSESSMENT/PLAN :    Coreen Peñageri was seen today for new patient.     Diagnoses and all orders for this visit:    Other elevated white blood cell (WBC) count    MS (multiple sclerosis) ([facility]          Judy Cheng. Bennett Trinh M.D., F.A.C.P.   Electronically signed 12/5/2022 at 7:06 AM

## 2022-12-06 LAB — PATHOLOGIST REVIEW: NORMAL

## 2022-12-11 LAB
Lab: NORMAL
REPORT: NORMAL
THIS TEST SENT TO: NORMAL

## 2022-12-12 LAB
JAK2 QUAL MUTATION BY PCR: NOT DETECTED
SOURCE: NORMAL

## 2023-01-25 ENCOUNTER — APPOINTMENT (OUTPATIENT)
Dept: GENERAL RADIOLOGY | Age: 53
End: 2023-01-25
Payer: COMMERCIAL

## 2023-01-25 ENCOUNTER — HOSPITAL ENCOUNTER (EMERGENCY)
Age: 53
Discharge: HOME | End: 2023-01-25
Attending: EMERGENCY MEDICINE
Payer: COMMERCIAL

## 2023-01-25 VITALS
TEMPERATURE: 98 F | WEIGHT: 210 LBS | DIASTOLIC BLOOD PRESSURE: 87 MMHG | HEART RATE: 90 BPM | OXYGEN SATURATION: 94 % | HEIGHT: 69 IN | SYSTOLIC BLOOD PRESSURE: 131 MMHG | BODY MASS INDEX: 31.1 KG/M2 | RESPIRATION RATE: 12 BRPM

## 2023-01-25 DIAGNOSIS — R42 LIGHTHEADEDNESS: Primary | ICD-10-CM

## 2023-01-25 DIAGNOSIS — I10 ASYMPTOMATIC HYPERTENSION: ICD-10-CM

## 2023-01-25 LAB
ALBUMIN SERPL-MCNC: 4.1 G/DL (ref 3.5–5.2)
ALP BLD-CCNC: 110 U/L (ref 35–104)
ALT SERPL-CCNC: 41 U/L (ref 0–32)
ANION GAP SERPL CALCULATED.3IONS-SCNC: 14 MMOL/L (ref 7–16)
AST SERPL-CCNC: 19 U/L (ref 0–31)
BASOPHILS ABSOLUTE: 0.09 E9/L (ref 0–0.2)
BASOPHILS RELATIVE PERCENT: 0.5 % (ref 0–2)
BILIRUB SERPL-MCNC: 0.2 MG/DL (ref 0–1.2)
BILIRUBIN DIRECT: <0.2 MG/DL (ref 0–0.3)
BILIRUBIN, INDIRECT: ABNORMAL MG/DL (ref 0–1)
BUN BLDV-MCNC: 14 MG/DL (ref 6–20)
CALCIUM SERPL-MCNC: 9.4 MG/DL (ref 8.6–10.2)
CHLORIDE BLD-SCNC: 99 MMOL/L (ref 98–107)
CO2: 30 MMOL/L (ref 22–29)
CREAT SERPL-MCNC: 0.8 MG/DL (ref 0.5–1)
D DIMER: <200 NG/ML DDU
EKG ATRIAL RATE: 114 BPM
EKG P AXIS: 67 DEGREES
EKG P-R INTERVAL: 146 MS
EKG Q-T INTERVAL: 332 MS
EKG QRS DURATION: 74 MS
EKG QTC CALCULATION (BAZETT): 457 MS
EKG R AXIS: 70 DEGREES
EKG T AXIS: 48 DEGREES
EKG VENTRICULAR RATE: 114 BPM
EOSINOPHILS ABSOLUTE: 0.05 E9/L (ref 0.05–0.5)
EOSINOPHILS RELATIVE PERCENT: 0.3 % (ref 0–6)
GFR SERPL CREATININE-BSD FRML MDRD: >60 ML/MIN/1.73
GLUCOSE BLD-MCNC: 121 MG/DL (ref 74–99)
HCT VFR BLD CALC: 45.3 % (ref 34–48)
HEMOGLOBIN: 14.9 G/DL (ref 11.5–15.5)
IMMATURE GRANULOCYTES #: 0.32 E9/L
IMMATURE GRANULOCYTES %: 1.8 % (ref 0–5)
INFLUENZA A BY PCR: NOT DETECTED
INFLUENZA B BY PCR: NOT DETECTED
LYMPHOCYTES ABSOLUTE: 2.64 E9/L (ref 1.5–4)
LYMPHOCYTES RELATIVE PERCENT: 14.5 % (ref 20–42)
MCH RBC QN AUTO: 30.8 PG (ref 26–35)
MCHC RBC AUTO-ENTMCNC: 32.9 % (ref 32–34.5)
MCV RBC AUTO: 93.6 FL (ref 80–99.9)
MONOCYTES ABSOLUTE: 1.13 E9/L (ref 0.1–0.95)
MONOCYTES RELATIVE PERCENT: 6.2 % (ref 2–12)
NEUTROPHILS ABSOLUTE: 13.95 E9/L (ref 1.8–7.3)
NEUTROPHILS RELATIVE PERCENT: 76.7 % (ref 43–80)
PDW BLD-RTO: 14.8 FL (ref 11.5–15)
PLATELET # BLD: 331 E9/L (ref 130–450)
PMV BLD AUTO: 9.1 FL (ref 7–12)
POTASSIUM SERPL-SCNC: 3.5 MMOL/L (ref 3.5–5)
PRO-BNP: 61 PG/ML (ref 0–125)
RBC # BLD: 4.84 E12/L (ref 3.5–5.5)
SARS-COV-2, NAAT: NOT DETECTED
SODIUM BLD-SCNC: 143 MMOL/L (ref 132–146)
TOTAL PROTEIN: 7.1 G/DL (ref 6.4–8.3)
TROPONIN, HIGH SENSITIVITY: 22 NG/L (ref 0–9)
TROPONIN, HIGH SENSITIVITY: 24 NG/L (ref 0–9)
WBC # BLD: 18.2 E9/L (ref 4.5–11.5)

## 2023-01-25 PROCEDURE — 93005 ELECTROCARDIOGRAM TRACING: CPT | Performed by: STUDENT IN AN ORGANIZED HEALTH CARE EDUCATION/TRAINING PROGRAM

## 2023-01-25 PROCEDURE — 96361 HYDRATE IV INFUSION ADD-ON: CPT

## 2023-01-25 PROCEDURE — 80048 BASIC METABOLIC PNL TOTAL CA: CPT

## 2023-01-25 PROCEDURE — 87635 SARS-COV-2 COVID-19 AMP PRB: CPT

## 2023-01-25 PROCEDURE — 93010 ELECTROCARDIOGRAM REPORT: CPT | Performed by: INTERNAL MEDICINE

## 2023-01-25 PROCEDURE — 85025 COMPLETE CBC W/AUTO DIFF WBC: CPT

## 2023-01-25 PROCEDURE — 84484 ASSAY OF TROPONIN QUANT: CPT

## 2023-01-25 PROCEDURE — 96360 HYDRATION IV INFUSION INIT: CPT

## 2023-01-25 PROCEDURE — 85378 FIBRIN DEGRADE SEMIQUANT: CPT

## 2023-01-25 PROCEDURE — 71045 X-RAY EXAM CHEST 1 VIEW: CPT

## 2023-01-25 PROCEDURE — 99285 EMERGENCY DEPT VISIT HI MDM: CPT

## 2023-01-25 PROCEDURE — 83880 ASSAY OF NATRIURETIC PEPTIDE: CPT

## 2023-01-25 PROCEDURE — 80076 HEPATIC FUNCTION PANEL: CPT

## 2023-01-25 PROCEDURE — 87502 INFLUENZA DNA AMP PROBE: CPT

## 2023-01-25 PROCEDURE — 36415 COLL VENOUS BLD VENIPUNCTURE: CPT

## 2023-01-25 PROCEDURE — 2580000003 HC RX 258: Performed by: STUDENT IN AN ORGANIZED HEALTH CARE EDUCATION/TRAINING PROGRAM

## 2023-01-25 RX ORDER — METOPROLOL SUCCINATE 100 MG/1
TABLET, EXTENDED RELEASE ORAL DAILY
COMMUNITY
End: 2023-01-25 | Stop reason: SINTOL

## 2023-01-25 RX ORDER — AMLODIPINE BESYLATE 5 MG/1
5 TABLET ORAL DAILY
Qty: 30 TABLET | Refills: 0 | Status: SHIPPED | OUTPATIENT
Start: 2023-01-25

## 2023-01-25 RX ORDER — 0.9 % SODIUM CHLORIDE 0.9 %
500 INTRAVENOUS SOLUTION INTRAVENOUS ONCE
Status: COMPLETED | OUTPATIENT
Start: 2023-01-25 | End: 2023-01-25

## 2023-01-25 RX ADMIN — SODIUM CHLORIDE 500 ML: 9 INJECTION, SOLUTION INTRAVENOUS at 04:56

## 2023-01-25 ASSESSMENT — PAIN - FUNCTIONAL ASSESSMENT: PAIN_FUNCTIONAL_ASSESSMENT: 0-10

## 2023-01-25 ASSESSMENT — PAIN SCALES - GENERAL: PAINLEVEL_OUTOF10: 4

## 2023-01-25 ASSESSMENT — PAIN DESCRIPTION - DESCRIPTORS: DESCRIPTORS: ACHING

## 2023-01-25 NOTE — ED PROVIDER NOTES
[address]        Pt Name: Modesta Wright  MRN: [id number]  [place] 1970  Date of evaluation: 2023  Provider: Pennie Mckeon MD  PCP: Aileen Grant DO  Note Started: 2:45 AM EST 23    CHIEF COMPLAINT       Chief Complaint   Patient presents with    Dizziness     With sob at home. Was started on metoprolol last Friday. B/P 159/98 now. HISTORY OF PRESENT ILLNESS: 1 or more Elements        Limitations to history : None    Modesta Wright is a 46 y.o. female who presents to the emergency room for lightheadedness, shortness of breath, high blood pressure. Patient states she was recently started metoprolol, and several years ago she had issues with metoprolol for this exact reason, but her PCP felt that since it would been such a long time ago that this would likely not recur, and she feels as if her symptoms were occurring. She denies any fevers, chills, chest tightness, chest pain, denies any nausea or vomiting, diarrhea, cough, congestion. Denies any dysuria, hematuria    Nursing Notes were all reviewed and agreed with or any disagreements were addressed in the HPI. REVIEW OF EXTERNAL NOTE :       As below    REVIEW OF SYSTEMS :      Positives and Pertinent negatives as per HPI.      SURGICAL HISTORY     Past Surgical History:   Procedure Laterality Date    ABDOMEN SURGERY      ANTERIOR CRUCIATE LIGAMENT REPAIR      APPENDECTOMY       SECTION      x5    CHOLECYSTECTOMY      DILATION AND CURETTAGE OF UTERUS      HC INJECTION PROCEDURE FOR SACROILIAC JOINT Left 12/3/2020    LEFT SACROILIAC JOINT STEROID INJECTION UNDER X-RAY GUIDANCE performed by Tayler Moreno DO at [address] [place] [address] Dr Left 10/01/2020    NERVE BLOCK Left 10/1/2020    LEFT L4-5 TRANSFORAMINAL EPIDURAL STEROID INJECTION performed by Tayler Moreno DO at [facility] HISTORY Left 2020    LEFT SACROILIAC STEROID INJECTION UNDER XRAY GUIDANCE    TUBAL LIGATION         CURRENTMEDICATIONS       Discharge Medication List as of 2023  6:37 AM        CONTINUE these medications which have NOT CHANGED    Details   metoprolol succinate (TOPROL XL) 100 MG extended release tablet Take by mouth dailyHistorical Med      potassium chloride (KLOR-CON M) 10 MEQ extended release tablet take 2 tablets by mouth once daily, Disp-90 tablet, R-3Normal      linaclotide (LINZESS) 145 MCG capsule Take 145 mcg by mouth every morning (before breakfast)Historical Med      furosemide (LASIX) 40 MG tablet Take 40 mg by mouth 2 times dailyHistorical Med      buprenorphine-naloxone (SUBOXONE) 8-2 MG FILM SL film Place 1 Film under the tongue 2 times daily. Historical Med      albuterol sulfate HFA (VENTOLIN HFA) 108 (90 Base) MCG/ACT inhaler Inhale 2 puffs into the lungs every 6 hours as needed for Wheezing, Disp-1 Inhaler, R-1Print      aspirin 81 MG tablet Take 81 mg by mouth daily Held x 5 daysHistorical Med      LORazepam (ATIVAN) 1 MG tablet Take 0.5 mg by mouth in the morning and at bedtime. Historical Med             ALLERGIES     Lyrica [pregabalin], Other, Sulfa antibiotics, Darvocet [propoxyphene n-acetaminophen], and Ultram [tramadol hcl]    FAMILYHISTORY       Family History   Problem Relation Age of Onset    Heart Disease Mother     Heart Attack Mother     High Blood Pressure Mother     High Blood Pressure Father         SOCIAL HISTORY       Social History     Tobacco Use    Smoking status: Every Day     Packs/day: 1.00     Years: 10.00     Pack years: 10.00     Types: Cigarettes     Last attempt to quit: 10/2/2009     Years since quittin.3    Smokeless tobacco: Never    Tobacco comments:     resumed    Vaping Use    Vaping Use: Never used   Substance Use Topics    Alcohol use: No     Comment: 1 coffee per day     Drug use: No       SCREENINGS        Tameka Coma Scale  Eye Opening: Spontaneous  Best Verbal Response: Oriented  Best Motor Response: Obeys commands  Tameka Coma Scale Score: 15                CIWA Assessment  BP: 131/87  Heart Rate: 90           PHYSICAL EXAM  1 or more Elements     ED Triage Vitals   BP Temp Temp src Heart Rate Resp SpO2 Height Weight   01/25/23 0023 01/25/23 0023 -- 01/25/23 0023 01/25/23 0153 01/25/23 0023 01/25/23 0153 01/25/23 0153   (!) 164/107 98 °F (36.7 °C)  (!) 123 20 100 % 5' 9\" (1.753 m) 210 lb (95.3 kg)         Physical Exam  Vitals and nursing note reviewed. Constitutional:       Appearance: Normal appearance. HENT:      Head: Normocephalic and atraumatic. Right Ear: External ear normal.      Left Ear: External ear normal.      Nose: Nose normal.      Mouth/Throat:      Mouth: Mucous membranes are moist.   Eyes:      Extraocular Movements: Extraocular movements intact. Pupils: Pupils are equal, round, and reactive to light. Cardiovascular:      Rate and Rhythm: Regular rhythm. Tachycardia present. Pulses: Normal pulses. Heart sounds: Normal heart sounds. Pulmonary:      Effort: Pulmonary effort is normal.      Breath sounds: Normal breath sounds. Abdominal:      General: Abdomen is flat. Bowel sounds are normal.      Palpations: Abdomen is soft. Musculoskeletal:         General: Normal range of motion. Cervical back: Normal range of motion and neck supple. Skin:     General: Skin is warm and dry. Neurological:      Mental Status: She is alert.                 DIAGNOSTIC RESULTS   LABS:    Labs Reviewed   CBC WITH AUTO DIFFERENTIAL - Abnormal; Notable for the following components:       Result Value    WBC 18.2 (*)     Lymphocytes % 14.5 (*)     Neutrophils Absolute 13.95 (*)     Monocytes Absolute 1.13 (*)     All other components within normal limits   BASIC METABOLIC PANEL - Abnormal; Notable for the following components:    CO2 30 (*)     Glucose 121 (*)     All other components within normal limits HEPATIC FUNCTION PANEL - Abnormal; Notable for the following components:    Alkaline Phosphatase 110 (*)     ALT 41 (*)     All other components within normal limits   TROPONIN - Abnormal; Notable for the following components:    Troponin, High Sensitivity 22 (*)     All other components within normal limits   TROPONIN - Abnormal; Notable for the following components:    Troponin, High Sensitivity 24 (*)     All other components within normal limits   COVID-19, RAPID   RAPID INFLUENZA A/B ANTIGENS   BRAIN NATRIURETIC PEPTIDE   D-DIMER, QUANTITATIVE       As interpreted by me, the above displayed labs are abnormal. All other labs obtained during this visit were within normal range or not returned as of this dictation. RADIOLOGY:   Non-plain film images such as CT, Ultrasound and MRI are read by the radiologist. Plain radiographic images are visualized and preliminarily interpreted by the ED Provider with the findings documented in the ED Course. Interpretation per the Radiologist below, if available at the time of this note:    XR CHEST PORTABLE   Final Result   No acute disease. RECOMMENDATION:   Careful clinical correlation and follow up recommended. No results found. No results found. PROCEDURES   Unless otherwise noted below, none       CRITICAL CARE TIME (.cct)   None    PAST MEDICAL HISTORY/Chronic Conditions Affecting Care      has a past medical history of Arthritis, Asthma, H/O medication noncompliance, Hyperlipidemia, Lupus ([address] MS (multiple sclerosis) ([address] Multiple sclerosis ([address] Nicotine dependence, cigarettes, uncomplicated, Palpitations, SOB (shortness of breath), SVT (supraventricular tachycardia) ([address] and Systemic lupus erythematosus, unspecified ([address]      EMERGENCY DEPARTMENT COURSE    Vitals:    Vitals:    01/25/23 0302 01/25/23 0401 01/25/23 0402 01/25/23 0804   BP: (!) 153/87 (!) 151/91 131/87    Pulse: (!) 104 (!) 118 (!) 120 90   Resp: 17 12 12    Temp: SpO2: 97% 95% 94%    Weight:       Height:           Patient was given the following medications:  Medications   0.9 % sodium chloride bolus (0 mLs IntraVENous Stopped 1/25/23 0630)         Is this patient to be included in the SEP-1 Core Measure due to severe sepsis or septic shock? No Exclusion criteria - the patient is NOT to be included for SEP-1 Core Measure due to: Infection is not suspected          Medical Decision Making/Differential Diagnosis:    CC/HPI Summary, Social Determinants of health, Records Reviewed, DDx, testing done/not done, ED Course, Reassessment, disposition considerations/shared decision making with patient, consults, disposition:        ED Course as of 01/27/23 1834   Wed Jan 25, 2023   0026 Office visit 1/23/2023 for leukocytosis with heme-onc, where is felt her elevated white blood cell count and shortness of breath related to her smoking as well as recent steroid injections for back pain [JG]   0321 EKG: This EKG is signed by emergency department physician. Rate: 114  Rhythm: Sinus  Interpretation: non-specific EKG  Comparison: stable as compared to patient's most recent EKG from 10/5/2022     [JG]   0343 Leukocytosis of the patient's recent baseline, has seen heme-onc about this [JG]   0430 Initial troponin 22, will check a delta, D-dimer less than 200 PE less likely, COVID and flu test negative, BMP shows normal electrolyte functioning. On my interpretation of patient's chest x-ray no visible infiltrates or pneumothorax. [JG]   [id number] Orthostatics are positive with a 20 point drop between lying and standing, will give fluids [JG]   0745 Troponin, High Sensitivity(!): 24  Repeat troponin stable at 24. Will reevaluate vital signs, symptoms. Patient's last vital signs from 4 AM, several hours ago. [RH]   [id number] Per nursing note:    Pt.  Very upset. Initially refused second troponin. Cannot understand  the hold up. Has to be at work at [address] [place] [address].   Will only have blood drawn if she can be discharged by 0800. Refused to sign AMA. Dr. Akil Oden refused to discharge pt. Blood drawn at this time. IV site apparently discontinued. Krista Acuña RN  01/25/23 7977 [RH]   1868 Attempted to reevaluate patient to reassess vital signs and discussed results of labs and imaging and disposition. Patient not currently in room. Will attempt to locate patient. [RH]   0803 Located patient, standing in the back area with her cane. Patient states she feels well, recheck patient's heart rate and it is approximately 90 bpm, no longer tachycardic. Discussed stopping metoprolol, choice of antihypertensives. Patient will call her cardiologist Dr. Nikki Ely, does not want to start a low-dose of a medication with low risk of side effects, discussed multiple different medications, she is agreeable to start low-dose Norvasc pending recommendations of her PCP and cardiologist. [RH]   2291 Patient feels well and is very anxious to leave because she started a new job, discussed that I can write her a work note, she is still concerned about needing to leave soon. Discussed that I will discharge her at this time and all questions were answered. Gave strict return precautions. [RH]   Fri Jan 27, 2023   252 49-year-old female presenting emerged [facility] for dizziness, shortness of breath, was started on metoprolol last Friday and states this is similar symptoms last time she was started on metoprolol. Patient tachycardic and complaining shortness of breath, concern for PE initially, D-dimer is less than 200. Concerning for ACS given the tachycardia and shortness of breath, EKG not consistent with this, stable compared to previous, troponins not safely elevated, delta troponin was 2. She was given fluids as she appeared somewhat dehydrated on exam.  She felt improved after receiving fluids, and her metoprolol had a chance to metabolize.   Patient's medication was changed from metoprolol to Norvasc, instructed her to follow-up with her cardiologist, return precautions given. [JG]      ED Course User Index  [JG] Christine Calvillo MD  [RH] Ryley Anastasio Murders, DO       Medical Decision Making  Amount and/or Complexity of Data Reviewed  Labs: ordered. Decision-making details documented in ED Course. Radiology: ordered and independent interpretation performed. Decision-making details documented in ED Course. ECG/medicine tests: ordered and independent interpretation performed. Decision-making details documented in ED Course. Risk  Prescription drug management. CONSULTS: (Who and What was discussed)  None        I am the Primary Clinician of Record. FINAL IMPRESSION      1. Lightheadedness Improving   2. Asymptomatic hypertension Improving         DISPOSITION/PLAN     DISPOSITION Decision To Discharge 01/25/2023 08:05:11 AM      PATIENT REFERRED TO:  Pebbles Barrientos[address]  [phone]    Call today  for follow up appointment    Reji Serrano MD  [facility]  [address] [address]  [phone]    Call today  for follow up appointment    DISCHARGE MEDICATIONS:  Discharge Medication List as of 1/25/2023  6:37 AM          DISCONTINUED MEDICATIONS:  Discharge Medication List as of 1/25/2023  6:37 AM                 (Please note that portions of this note were completed with a voice recognition program.  Efforts were made to edit the dictations but occasionally words are mis-transcribed.)    Behzad Esposito MD (electronically signed)            Christine Calvillo MD  Resident  01/27/23 3767

## 2023-01-25 NOTE — Clinical Note
Mara Wynne was seen and treated in our emergency department on 1/25/2023. She may return to work on 01/25/2023. If you have any questions or concerns, please don't hesitate to call.       Zeinab Burrell, DO

## 2023-01-25 NOTE — ED NOTES
Pt.  Very upset. Initially refused second troponin. Cannot understand  the hold up. Has to be at work at [address] [place] [address]. Will only have blood drawn if she can be discharged by 0800. Refused to sign AMA. Dr. Lauryn Magdaleno refused to discharge pt. Blood drawn at this time. IV site apparently discontinued.      Anish Mac RN  01/25/23 6147

## 2023-01-25 NOTE — ED NOTES
Pt ripped iv out and was trying to  [facility] out the back door. Explained the benefits of staying and pt refused to sigh AMA forms. Pt talking to charge nurse. Pt decided to stay.  Zayra MACHADO obtaining labs     Chance Yanez RN  01/25/23 6401

## 2023-01-29 ENCOUNTER — HOSPITAL ENCOUNTER (EMERGENCY)
Age: 53
Discharge: HOME | End: 2023-01-29
Attending: EMERGENCY MEDICINE
Payer: COMMERCIAL

## 2023-01-29 ENCOUNTER — APPOINTMENT (OUTPATIENT)
Dept: CT IMAGING | Age: 53
End: 2023-01-29
Payer: COMMERCIAL

## 2023-01-29 VITALS
RESPIRATION RATE: 18 BRPM | HEART RATE: 76 BPM | TEMPERATURE: 98.1 F | DIASTOLIC BLOOD PRESSURE: 70 MMHG | SYSTOLIC BLOOD PRESSURE: 131 MMHG | OXYGEN SATURATION: 93 %

## 2023-01-29 DIAGNOSIS — R51.9 ACUTE NONINTRACTABLE HEADACHE, UNSPECIFIED HEADACHE TYPE: Primary | ICD-10-CM

## 2023-01-29 PROCEDURE — 96361 HYDRATE IV INFUSION ADD-ON: CPT

## 2023-01-29 PROCEDURE — 99284 EMERGENCY DEPT VISIT MOD MDM: CPT

## 2023-01-29 PROCEDURE — 2580000003 HC RX 258: Performed by: STUDENT IN AN ORGANIZED HEALTH CARE EDUCATION/TRAINING PROGRAM

## 2023-01-29 PROCEDURE — 70450 CT HEAD/BRAIN W/O DYE: CPT

## 2023-01-29 PROCEDURE — 96365 THER/PROPH/DIAG IV INF INIT: CPT

## 2023-01-29 PROCEDURE — 6360000002 HC RX W HCPCS: Performed by: STUDENT IN AN ORGANIZED HEALTH CARE EDUCATION/TRAINING PROGRAM

## 2023-01-29 PROCEDURE — 96366 THER/PROPH/DIAG IV INF ADDON: CPT

## 2023-01-29 PROCEDURE — 96375 TX/PRO/DX INJ NEW DRUG ADDON: CPT

## 2023-01-29 RX ORDER — MAGNESIUM SULFATE 1 G/100ML
1000 INJECTION INTRAVENOUS ONCE
Status: COMPLETED | OUTPATIENT
Start: 2023-01-29 | End: 2023-01-29

## 2023-01-29 RX ORDER — DIPHENHYDRAMINE HYDROCHLORIDE 50 MG/ML
25 INJECTION INTRAMUSCULAR; INTRAVENOUS ONCE
Status: COMPLETED | OUTPATIENT
Start: 2023-01-29 | End: 2023-01-29

## 2023-01-29 RX ORDER — KETOROLAC TROMETHAMINE 15 MG/ML
15 INJECTION, SOLUTION INTRAMUSCULAR; INTRAVENOUS ONCE
Status: COMPLETED | OUTPATIENT
Start: 2023-01-29 | End: 2023-01-29

## 2023-01-29 RX ORDER — PROCHLORPERAZINE EDISYLATE 5 MG/ML
10 INJECTION INTRAMUSCULAR; INTRAVENOUS ONCE
Status: COMPLETED | OUTPATIENT
Start: 2023-01-29 | End: 2023-01-29

## 2023-01-29 RX ORDER — PREDNISONE 1 MG/1
5 TABLET ORAL DAILY
COMMUNITY

## 2023-01-29 RX ORDER — 0.9 % SODIUM CHLORIDE 0.9 %
1000 INTRAVENOUS SOLUTION INTRAVENOUS ONCE
Status: COMPLETED | OUTPATIENT
Start: 2023-01-29 | End: 2023-01-29

## 2023-01-29 RX ADMIN — PROCHLORPERAZINE EDISYLATE 10 MG: 5 INJECTION INTRAMUSCULAR; INTRAVENOUS at 10:09

## 2023-01-29 RX ADMIN — SODIUM CHLORIDE 1000 ML: 9 INJECTION, SOLUTION INTRAVENOUS at 10:08

## 2023-01-29 RX ADMIN — MAGNESIUM SULFATE HEPTAHYDRATE 1000 MG: 1 INJECTION, SOLUTION INTRAVENOUS at 10:14

## 2023-01-29 RX ADMIN — KETOROLAC TROMETHAMINE 15 MG: 15 INJECTION, SOLUTION INTRAMUSCULAR; INTRAVENOUS at 11:49

## 2023-01-29 RX ADMIN — DIPHENHYDRAMINE HYDROCHLORIDE 25 MG: 50 INJECTION, SOLUTION INTRAMUSCULAR; INTRAVENOUS at 10:08

## 2023-01-29 ASSESSMENT — PAIN SCALES - GENERAL: PAINLEVEL_OUTOF10: 4

## 2023-01-29 ASSESSMENT — PAIN DESCRIPTION - DESCRIPTORS: DESCRIPTORS: ACHING

## 2023-01-29 ASSESSMENT — PAIN DESCRIPTION - LOCATION: LOCATION: HEAD

## 2023-01-29 NOTE — ED PROVIDER NOTES
Department of Emergency Medicine   ED Provider Note  Admit Date/RoomTime: 1/29/2023  9:24 AM  ED Room: 01/01          History of Present Illness:  1/29/23, Time: 9:30 AM ARLENE Joseph is a 46 y.o. female with history of multiple sclerosis, hypertension, presenting to the ED for headache. Patient states that she does not normally get headaches, but this morning around 7:30 AM, around 2 hours prior to arrival she woke up with a headache. The headache is a pounding/aching sensation in the bilateral frontal region as well as the bilateral occipital region. No nausea or vomiting. Some phonophobia, mild photophobia. No neck stiffness. Pain is constant, minimally improved with Tylenol she took at home. No other medications taken prior to arrival.  Because of the headache, patient checked her blood pressure and it was markedly elevated greater than 091 systolic and she therefore called EMS. She states she currently is tapering down on prednisone, she has been on long-term prednisone for back pain and multiple sclerosis, currently on 5 mg daily prednisone. She has had issues with hypertension as she has been tapering down, currently on low-dose amlodipine. No visual changes, no numbness or weakness or tingling. No lightheadedness or syncope. No chest pain or shortness of breath. No fevers or chills. Review of Systems:     Pertinent positives and negatives are stated within HPI.      --------------------------------------------- PAST HISTORY ---------------------------------------------  Past Medical History:  has a past medical history of Arthritis, Asthma, H/O medication noncompliance, Hyperlipidemia, Lupus ([facility] [address] MS (multiple sclerosis) ([facility] [address] Multiple sclerosis ([facility] [address] Nicotine dependence, cigarettes, uncomplicated, Palpitations, SOB (shortness of breath), SVT (supraventricular tachycardia) ([facility] [address] and Systemic lupus erythematosus, unspecified ([facility] [address]     Past Surgical History:  has a past surgical history that includes Appendectomy; Abdomen surgery; Cholecystectomy; Tubal ligation;  section; Anterior cruciate ligament repair; Dilation and curettage of uterus; Nerve Block (Left, 10/01/2020); Nerve Block (Left, 10/1/2020); other surgical history (Left, 2020); and Injection Procedure For Sacroiliac Joint (Left, 12/3/2020). Social History:  reports that she has been smoking cigarettes. She has a 10.00 pack-year smoking history. She has never used smokeless tobacco. She reports that she does not drink alcohol and does not use drugs. Family History: family history includes Heart Attack in her mother; Heart Disease in her mother; High Blood Pressure in her father and mother. The patients home medications have been reviewed. Allergies: Lyrica [pregabalin], Other, Sulfa antibiotics, Darvocet [propoxyphene n-acetaminophen], and Ultram [tramadol hcl]      ---------------------------------------------------PHYSICAL EXAM--------------------------------------    Constitutional/General: Awake and alert, NAD, no labored breathing  Head: Normocephalic and atraumatic  Eyes: EOMI, pupils 3 mm bilaterally and reactive to light, conjunctiva normal, sclera non icteric  Ears: Normal external ears  Nose: Normal external nose  Mouth: Moist mucous membranes, uvula midline  Neck: Supple, no stridor, no meningeal signs  Respiratory: Lungs clear to auscultation bilaterally, no wheezes, rales, or rhonchi. Not in respiratory distress  Cardiovascular: Mildly tachycardic. Regular rhythm. No murmurs, no gallops, or rubs. 2+ distal pulses. Equal extremity pulses. Chest: No chest wall tenderness or deformity  GI:  Abdomen Soft, Non tender, Non distended. No rebound, guarding, or rigidity. No pulsatile masses. Musculoskeletal: Moves all extremities x 4. Warm and well perfused, no clubbing, cyanosis, or edema. Capillary refill <3 seconds  Integument: skin warm and dry.    Neurologic: GCS 15, no focal deficits, alert and responsive, symmetric strength 5/5 in the major muscle groups of upper and lower extremities bilaterally. Sensation intact to light touch throughout dermatomes of bilateral upper and lower extremities. Psychiatric: Normal Affect    -------------------------------------------------- RESULTS -------------------------------------------------  I have personally reviewed and interpreted all laboratory and imaging results for this patient. Results are listed below. LABS:  No results found for this visit on 01/29/23. RADIOLOGY:  Imaging Interpreted by Radiologist, initial wet read of imaging interpreted by myself  CT HEAD WO CONTRAST   Final Result   1. There is no acute intracranial abnormality. Specifically, there is no   intracranial hemorrhage. 2. Atrophy and periventricular leukomalacia,   . EKG:    See ED Course for EKG documentation        ------------------------- NURSING NOTES AND VITALS REVIEWED ---------------------------   The nursing notes within the ED encounter and vital signs as below have been reviewed by myself. /70   Pulse 76   Temp 98.1 °F (36.7 °C)   Resp 18   LMP 05/04/2020   SpO2 93%   Oxygen Saturation Interpretation: Normal    The patients available past medical records and past encounters were reviewed, see [facility] for details.         ------------------------------ ED COURSE/MEDICAL DECISION MAKING----------------------  Medications   prochlorperazine (COMPAZINE) injection 10 mg (10 mg IntraVENous Given 1/29/23 1009)   diphenhydrAMINE (BENADRYL) injection 25 mg (25 mg IntraVENous Given 1/29/23 1008)   magnesium sulfate 1000 mg in dextrose 5% 100 mL IVPB (0 mg IntraVENous Stopped 1/29/23 1149)   0.9 % sodium chloride bolus (0 mLs IntraVENous Stopped 1/29/23 1149)   ketorolac (TORADOL) injection 15 mg (15 mg IntraVENous Given 1/29/23 1149)            Medical Decision Making:     ED Course as of 01/29/23 1537   Sun Jan 29, 2023   1028 ATTENDING PROVIDER ATTESTATION:     I have personally performed and/or participated in the history, exam, medical decision making, and procedures and agree with all pertinent clinical information unless otherwise noted. I have also reviewed and agree with the past medical, family and social history unless otherwise noted. I have discussed this patient in detail with the resident and provided the instruction and education regarding the evidence-based evaluation and treatment of headache. Any EKG that may have been performed has been personally reviewed by me and I agree with the documentation as noted by the resident. History: Patient woke several hours ago with bilateral headache. Mild photophobia and phonophobia. My findings: Pb Estes is a 46 y.o. female whom is in no distress. Physical exam reveals she is alert and oriented. Heart is regular, mildly tachycardic. Lungs are clinical bilaterally. Abdomen soft nontender peer extremities are intact without edema. Skin is warm and dry. No focal neurologic deficit. My plan: Symptomatic and supportive care. CT. Electronically signed by Tobias Benz DO on 1/29/23 at 10:28 AM EST       [TG]   1114 Patient reevaluated, resting bed comfortably. Patient and  updated on results of imaging. Patient is feeling well, headache is almost completely gone, still has a mild headache. Patient is still receiving IV fluids and magnesium at this time. Head CT negative. Patient is now normotensive and not tachycardic. [RH]      ED Course User Index  [RH] [address] DO  [TG] Tobias Benz DO     19-year-old female presenting to the emergency department for headache and hypertension. Patient started having a headache when she woke up this morning, does not usually get headaches. The headache was present on awakening, no associated neurologic symptoms or visual changes.   Patient with normal neurologic exam.  Patient initially significantly hypertensive, with blood pressure of 179/106, improving after treatment of headache pain to 131/70. Patient initially mildly tachycardic at 107, improved to 76 after treatment of pain. Given new headache from patient's baseline, concern for possible subarachnoid hemorrhage, CT of the head without contrast was obtained without acute intracranial process. Patient was given 10 mg of IV Compazine, 1000 mg of IV magnesium sulfate, 1000 cc of IV normal saline as well as 25 mg of IV Benadryl. After results of head CT, patient did have significant provement in pain, and after confirmation of no intracranial hemorrhage patient was given 15 mg of IV Toradol with resolution of pain. Given patient's reassuring exam, work-up, proved symptoms and vital signs, patient stable for discharge and outpatient follow-up and all questions were answered. Chronic Conditions affecting care:    has a past medical history of Arthritis, Asthma, H/O medication noncompliance, Hyperlipidemia, Lupus ([address] MS (multiple sclerosis) ([address] Multiple sclerosis ([address] Nicotine dependence, cigarettes, uncomplicated, Palpitations, SOB (shortness of breath), SVT (supraventricular tachycardia) ([address] and Systemic lupus erythematosus, unspecified ([address] Diagnoses considered include (but not limited to): Tension headache, migraine headache, hypertensive emergency, acute intracranial hemorrhage, subarachnoid hemorrhage, intracerebral hemorrhage, subdural hematoma    See ED COURSE for additional MDM. Labs & imaging reviewed and interpreted, see RESULTS above. Re-Evaluations:           See ED Course above. This patient's ED course included: a personal history and physicial examination, re-evaluation prior to disposition, multiple bedside re-evaluations, and IV medications    This patient has remained hemodynamically stable and improved during their ED course. Consultations:  See ED Course    Counseling:    The emergency physician has spoken with the patient and spouse/SO and discussed todays results, in addition to providing specific details for the plan of care and counseling regarding the diagnosis and prognosis. Questions are answered at this time and they are agreeable with the plan.       --------------------------------- IMPRESSION AND DISPOSITION ---------------------------------    IMPRESSION  1. Acute nonintractable headache, unspecified headache type        DISPOSITION  Disposition: Discharge to home  Patient condition is stable    NOTE: This report was transcribed using voice recognition software. Every effort was made to ensure accuracy; however, inadvertent computerized transcription errors may be present. Also please note that patient was seen and examined by attending physician. Plan of care and disposition discussed with attending physician and they were immediately available or present for all procedures performed.        -- Sergio Castle D.O. PGY-3     Emergency Medicine      1/29/2023 3:37 PM         [address] DO  Resident  01/29/23 1534

## 2023-01-30 PROCEDURE — 96374 THER/PROPH/DIAG INJ IV PUSH: CPT

## 2023-01-30 PROCEDURE — 99285 EMERGENCY DEPT VISIT HI MDM: CPT

## 2023-01-31 ENCOUNTER — HOSPITAL ENCOUNTER (EMERGENCY)
Age: 53
LOS: 1 days | Discharge: LEFT BEFORE DISCHARGE | End: 2023-01-31
Attending: EMERGENCY MEDICINE | Admitting: INTERNAL MEDICINE
Payer: COMMERCIAL

## 2023-01-31 ENCOUNTER — APPOINTMENT (OUTPATIENT)
Dept: GENERAL RADIOLOGY | Age: 53
End: 2023-01-31
Payer: COMMERCIAL

## 2023-01-31 ENCOUNTER — TELEPHONE (OUTPATIENT)
Dept: CARDIOLOGY CLINIC | Age: 53
End: 2023-01-31

## 2023-01-31 VITALS
SYSTOLIC BLOOD PRESSURE: 150 MMHG | HEART RATE: 99 BPM | WEIGHT: 188 LBS | TEMPERATURE: 98 F | HEIGHT: 69 IN | RESPIRATION RATE: 18 BRPM | BODY MASS INDEX: 27.85 KG/M2 | OXYGEN SATURATION: 100 % | DIASTOLIC BLOOD PRESSURE: 93 MMHG

## 2023-01-31 DIAGNOSIS — R77.8 ELEVATED TROPONIN: ICD-10-CM

## 2023-01-31 DIAGNOSIS — E87.6 HYPOKALEMIA: ICD-10-CM

## 2023-01-31 DIAGNOSIS — R06.09 EXERTIONAL DYSPNEA: Primary | ICD-10-CM

## 2023-01-31 PROBLEM — Z86.79 PERSONAL HISTORY OF SUPRAVENTRICULAR TACHYCARDIA: Status: ACTIVE | Noted: 2023-01-31

## 2023-01-31 PROBLEM — R00.2 HEART PALPITATIONS: Status: ACTIVE | Noted: 2023-01-31

## 2023-01-31 LAB
ALBUMIN SERPL-MCNC: 4 G/DL (ref 3.5–5.2)
ALP BLD-CCNC: 114 U/L (ref 35–104)
ALT SERPL-CCNC: 32 U/L (ref 0–32)
ANION GAP SERPL CALCULATED.3IONS-SCNC: 15 MMOL/L (ref 7–16)
AST SERPL-CCNC: 18 U/L (ref 0–31)
BASOPHILS ABSOLUTE: 0.07 E9/L (ref 0–0.2)
BASOPHILS RELATIVE PERCENT: 0.5 % (ref 0–2)
BILIRUB SERPL-MCNC: 0.2 MG/DL (ref 0–1.2)
BUN BLDV-MCNC: 9 MG/DL (ref 6–20)
CALCIUM SERPL-MCNC: 9.3 MG/DL (ref 8.6–10.2)
CHLORIDE BLD-SCNC: 97 MMOL/L (ref 98–107)
CO2: 27 MMOL/L (ref 22–29)
CREAT SERPL-MCNC: 0.7 MG/DL (ref 0.5–1)
D DIMER: 212 NG/ML DDU
EKG ATRIAL RATE: 112 BPM
EKG P AXIS: 59 DEGREES
EKG P-R INTERVAL: 152 MS
EKG Q-T INTERVAL: 338 MS
EKG QRS DURATION: 72 MS
EKG QTC CALCULATION (BAZETT): 461 MS
EKG R AXIS: 61 DEGREES
EKG T AXIS: 27 DEGREES
EKG VENTRICULAR RATE: 112 BPM
EOSINOPHILS ABSOLUTE: 0.05 E9/L (ref 0.05–0.5)
EOSINOPHILS RELATIVE PERCENT: 0.4 % (ref 0–6)
GFR SERPL CREATININE-BSD FRML MDRD: >60 ML/MIN/1.73
GLUCOSE BLD-MCNC: 130 MG/DL (ref 74–99)
HCT VFR BLD CALC: 40.2 % (ref 34–48)
HEMOGLOBIN: 14 G/DL (ref 11.5–15.5)
IMMATURE GRANULOCYTES #: 0.21 E9/L
IMMATURE GRANULOCYTES %: 1.5 % (ref 0–5)
LYMPHOCYTES ABSOLUTE: 2.82 E9/L (ref 1.5–4)
LYMPHOCYTES RELATIVE PERCENT: 20 % (ref 20–42)
MAGNESIUM: 2 MG/DL (ref 1.6–2.6)
MCH RBC QN AUTO: 31.6 PG (ref 26–35)
MCHC RBC AUTO-ENTMCNC: 34.8 % (ref 32–34.5)
MCV RBC AUTO: 90.7 FL (ref 80–99.9)
MONOCYTES ABSOLUTE: 0.62 E9/L (ref 0.1–0.95)
MONOCYTES RELATIVE PERCENT: 4.4 % (ref 2–12)
NEUTROPHILS ABSOLUTE: 10.36 E9/L (ref 1.8–7.3)
NEUTROPHILS RELATIVE PERCENT: 73.2 % (ref 43–80)
PDW BLD-RTO: 14.2 FL (ref 11.5–15)
PLATELET # BLD: 341 E9/L (ref 130–450)
PMV BLD AUTO: 9.4 FL (ref 7–12)
POTASSIUM REFLEX MAGNESIUM: 3.2 MMOL/L (ref 3.5–5)
PRO-BNP: 77 PG/ML (ref 0–125)
RBC # BLD: 4.43 E12/L (ref 3.5–5.5)
SODIUM BLD-SCNC: 139 MMOL/L (ref 132–146)
TOTAL PROTEIN: 6.9 G/DL (ref 6.4–8.3)
TROPONIN, HIGH SENSITIVITY: 24 NG/L (ref 0–9)
TROPONIN, HIGH SENSITIVITY: 25 NG/L (ref 0–9)
WBC # BLD: 14.1 E9/L (ref 4.5–11.5)

## 2023-01-31 PROCEDURE — 96374 THER/PROPH/DIAG INJ IV PUSH: CPT

## 2023-01-31 PROCEDURE — 2580000003 HC RX 258: Performed by: STUDENT IN AN ORGANIZED HEALTH CARE EDUCATION/TRAINING PROGRAM

## 2023-01-31 PROCEDURE — G0378 HOSPITAL OBSERVATION PER HR: HCPCS

## 2023-01-31 PROCEDURE — 84484 ASSAY OF TROPONIN QUANT: CPT

## 2023-01-31 PROCEDURE — 85378 FIBRIN DEGRADE SEMIQUANT: CPT

## 2023-01-31 PROCEDURE — 83880 ASSAY OF NATRIURETIC PEPTIDE: CPT

## 2023-01-31 PROCEDURE — 85025 COMPLETE CBC W/AUTO DIFF WBC: CPT

## 2023-01-31 PROCEDURE — 96361 HYDRATE IV INFUSION ADD-ON: CPT

## 2023-01-31 PROCEDURE — 80053 COMPREHEN METABOLIC PANEL: CPT

## 2023-01-31 PROCEDURE — 93010 ELECTROCARDIOGRAM REPORT: CPT | Performed by: INTERNAL MEDICINE

## 2023-01-31 PROCEDURE — 83735 ASSAY OF MAGNESIUM: CPT

## 2023-01-31 PROCEDURE — 93005 ELECTROCARDIOGRAM TRACING: CPT | Performed by: STUDENT IN AN ORGANIZED HEALTH CARE EDUCATION/TRAINING PROGRAM

## 2023-01-31 PROCEDURE — 6360000002 HC RX W HCPCS: Performed by: STUDENT IN AN ORGANIZED HEALTH CARE EDUCATION/TRAINING PROGRAM

## 2023-01-31 PROCEDURE — 6370000000 HC RX 637 (ALT 250 FOR IP): Performed by: STUDENT IN AN ORGANIZED HEALTH CARE EDUCATION/TRAINING PROGRAM

## 2023-01-31 PROCEDURE — 71045 X-RAY EXAM CHEST 1 VIEW: CPT

## 2023-01-31 RX ORDER — KETOROLAC TROMETHAMINE 30 MG/ML
15 INJECTION, SOLUTION INTRAMUSCULAR; INTRAVENOUS ONCE
Status: COMPLETED | OUTPATIENT
Start: 2023-01-31 | End: 2023-01-31

## 2023-01-31 RX ORDER — ONDANSETRON 2 MG/ML
4 INJECTION INTRAMUSCULAR; INTRAVENOUS EVERY 6 HOURS PRN
Status: CANCELLED | OUTPATIENT
Start: 2023-01-31

## 2023-01-31 RX ORDER — SODIUM CHLORIDE 0.9 % (FLUSH) 0.9 %
5-40 SYRINGE (ML) INJECTION PRN
Status: CANCELLED | OUTPATIENT
Start: 2023-01-31

## 2023-01-31 RX ORDER — SODIUM CHLORIDE 0.9 % (FLUSH) 0.9 %
5-40 SYRINGE (ML) INJECTION EVERY 12 HOURS SCHEDULED
Status: CANCELLED | OUTPATIENT
Start: 2023-01-31

## 2023-01-31 RX ORDER — POLYETHYLENE GLYCOL 3350 17 G/17G
17 POWDER, FOR SOLUTION ORAL DAILY PRN
Status: CANCELLED | OUTPATIENT
Start: 2023-01-31

## 2023-01-31 RX ORDER — AMLODIPINE BESYLATE 5 MG/1
5 TABLET ORAL DAILY
Status: CANCELLED | OUTPATIENT
Start: 2023-01-31

## 2023-01-31 RX ORDER — PREDNISONE 1 MG/1
5 TABLET ORAL DAILY
Status: CANCELLED | OUTPATIENT
Start: 2023-01-31

## 2023-01-31 RX ORDER — ENOXAPARIN SODIUM 100 MG/ML
40 INJECTION SUBCUTANEOUS DAILY
Status: CANCELLED | OUTPATIENT
Start: 2023-01-31

## 2023-01-31 RX ORDER — 0.9 % SODIUM CHLORIDE 0.9 %
500 INTRAVENOUS SOLUTION INTRAVENOUS ONCE
Status: COMPLETED | OUTPATIENT
Start: 2023-01-31 | End: 2023-01-31

## 2023-01-31 RX ORDER — 0.9 % SODIUM CHLORIDE 0.9 %
1000 INTRAVENOUS SOLUTION INTRAVENOUS ONCE
Status: COMPLETED | OUTPATIENT
Start: 2023-01-31 | End: 2023-01-31

## 2023-01-31 RX ORDER — ACETAMINOPHEN 650 MG/1
650 SUPPOSITORY RECTAL EVERY 6 HOURS PRN
Status: CANCELLED | OUTPATIENT
Start: 2023-01-31

## 2023-01-31 RX ORDER — PROMETHAZINE HYDROCHLORIDE 12.5 MG/1
12.5 TABLET ORAL EVERY 6 HOURS PRN
Status: CANCELLED | OUTPATIENT
Start: 2023-01-31

## 2023-01-31 RX ORDER — ACETAMINOPHEN 325 MG/1
650 TABLET ORAL EVERY 6 HOURS PRN
Status: CANCELLED | OUTPATIENT
Start: 2023-01-31

## 2023-01-31 RX ORDER — SODIUM CHLORIDE 9 MG/ML
25 INJECTION, SOLUTION INTRAVENOUS PRN
Status: CANCELLED | OUTPATIENT
Start: 2023-01-31

## 2023-01-31 RX ORDER — POTASSIUM CHLORIDE 20 MEQ/1
40 TABLET, EXTENDED RELEASE ORAL ONCE
Status: COMPLETED | OUTPATIENT
Start: 2023-01-31 | End: 2023-01-31

## 2023-01-31 RX ADMIN — SODIUM CHLORIDE 1000 ML: 9 INJECTION, SOLUTION INTRAVENOUS at 00:25

## 2023-01-31 RX ADMIN — KETOROLAC TROMETHAMINE 15 MG: 30 INJECTION, SOLUTION INTRAMUSCULAR at 04:26

## 2023-01-31 RX ADMIN — POTASSIUM CHLORIDE 40 MEQ: 1500 TABLET, EXTENDED RELEASE ORAL at 02:16

## 2023-01-31 RX ADMIN — SODIUM CHLORIDE 500 ML: 9 INJECTION, SOLUTION INTRAVENOUS at 02:16

## 2023-01-31 ASSESSMENT — PAIN DESCRIPTION - LOCATION: LOCATION: HEAD

## 2023-01-31 ASSESSMENT — PAIN DESCRIPTION - DESCRIPTORS: DESCRIPTORS: ACHING

## 2023-01-31 ASSESSMENT — PAIN SCALES - GENERAL: PAINLEVEL_OUTOF10: 8

## 2023-01-31 NOTE — ED NOTES
PIC notified patient will not be coming to floor      Nazia Gregory, [facility]  01/31/23 3299

## 2023-01-31 NOTE — Clinical Note
Discharge Plan[de-identified] Other/[facility])   Telemetry/Cardiac Monitoring Required?: Yes

## 2023-01-31 NOTE — TELEPHONE ENCOUNTER
Patient called went to the ER  1/31/2023 at 5:18am.     Blood pressure was 176/112. Heart Rate was 127. Had a headache because of the high blood pressure. Stated her hands were swelling up and heavy sweating. In the nurses note:  Seems like they want to admit her but she didn't want to stay.      Has a follow up appointment 2/17/2023    Please advise

## 2023-01-31 NOTE — ED NOTES
Patient has now decided, after going back and forth with this RN and Dr's, to Sign out AMA.      Court CARTER Lopez  01/31/23 9211

## 2023-01-31 NOTE — ED PROVIDER NOTES
[address]        Pt Name: Ramona Bennett  MRN: [id number]  [place] 1970  Date of evaluation: 1/30/2023  Provider: Ary Torres DO  PCP: Kirk Goltz, DO  Note Started: 12:35 AM EST 1/31/23    CHIEF COMPLAINT       Chief Complaint   Patient presents with    Palpitations    Shortness of Breath     Pt states \"my heart is racing. \" Pt c/o SOB and headache. Pt denies CP. Pt has been on prednisone since March 2022 with current dose of 10mg down from 20mg. Pt states her dosage was change 1/20. HISTORY OF PRESENT ILLNESS: 1 or more Elements   History From: Patient    Limitations to history : None    Ramona Bennett is a 46 y.o. current smoking female with past medical history of multiple sclerosis, lupus, asthma, hyperlipidemia, obesity who presents to the emergency department for evaluation of palpitations and shortness of breath. Patient states that her symptoms have been ongoing for 2 weeks. She denies any inciting factors for symptoms. He endorses gradual onset with progressive worsening. Patient states that she has been seen at [facility] multiple times for the same issues but has had no clear diagnosis for what is going on. She is on a prednisone taper currently due to her hip pain and was noting that she was worried about \"adrenal crisis\". Patient states that her shortness of breath is worse with exertion. Tonight, patient went downstairs to cook some soup and when she walked up the stairs her symptoms started acutely which is why she came to the ED for further evaluation. Patient states that her vitals have been abnormal as well with tachycardia and hypertension. Patient is unsure of what has been causing the symptoms. She has not fallen or syncopized as a result of her symptoms. Despite her shortness of breath, she is denying any associated chest pain.   Patient does state that she has intermittent headaches as well.  She otherwise denies other symptoms including nausea, vomiting, fever, chills, diaphoresis, cough, congestion, sore throat, myalgias, numbness, tingling, weakness, appetite change, unintentional weight loss, abdominal pain, urinary symptoms, constipation or diarrhea.  On initial assessment, patient is well-appearing in no acute distress.  No signs of acute respiratory distress noted.  Patient denying any recent sick contacts or illnesses.  No PE risk factors noted including hx of VTE, recent surgery/immobilization, leg swelling, hemoptysis, syncope, or hormonal medication use.    Nursing Notes were all reviewed and agreed with or any disagreements were addressed in the HPI.    ROS:   Pertinent positives and negatives are stated within HPI, all other systems reviewed and are negative.    --------------------------------------------- PAST HISTORY ---------------------------------------------  Past Medical History:  has a past medical history of Arthritis, Asthma, H/O medication noncompliance, Hyperlipidemia, Lupus (HCC), MS (multiple sclerosis) (HCC), Multiple sclerosis (HCC), Nicotine dependence, cigarettes, uncomplicated, Palpitations, SOB (shortness of breath), SVT (supraventricular tachycardia) (HCC), and Systemic lupus erythematosus, unspecified (HCC).    Past Surgical History:  has a past surgical history that includes Appendectomy; Abdomen surgery; Cholecystectomy; Tubal ligation;  section; Anterior cruciate ligament repair; Dilation and curettage of uterus; Nerve Block (Left, 10/01/2020); Nerve Block (Left, 10/1/2020); other surgical history (Left, 2020); and Injection Procedure For Sacroiliac Joint (Left, 12/3/2020).    Social History:  reports that she has been smoking cigarettes. She has a 10.00 pack-year smoking history. She has never used smokeless tobacco. She reports that she does not drink alcohol and does not use drugs.    Family History: family history  includes Heart Attack in her mother; Heart Disease in her mother; High Blood Pressure in her father and mother. The patients home medications have been reviewed. Allergies: Lyrica [pregabalin], Other, Sulfa antibiotics, Darvocet [propoxyphene n-acetaminophen], Ultram [tramadol hcl], and Metoprolol    ---------------------------------------------------PHYSICAL EXAM--------------------------------------    Constitutional/General: Alert and oriented x3, well appearing, non toxic in NAD. Obese  Head: Normocephalic and atraumatic  Mouth: Oropharynx clear, handling secretions, no trismus  Neck: Supple, full ROM,  Pulmonary: Lungs clear to auscultation bilaterally, no wheezes, rales, or rhonchi. Not in respiratory distress  Cardiovascular: Tachycardic rate. Regular rhythm. No murmurs  Chest: no chest wall tenderness  Abdomen: Soft. Non tender. Non distended. No rebound, guarding, or rigidity. No pulsatile masses appreciated. Musculoskeletal: Moves all extremities x 4. Warm and well perfused, no clubbing, cyanosis, or edema. Capillary refill <3 seconds  Skin: warm and dry. No rashes. Neurologic: GCS 15, no gross focal neurologic deficits  Psych: Normal Affect    -------------------------------------------------- RESULTS -------------------------------------------------  I have personally reviewed all laboratory and imaging results for this patient. Results are listed below.      LABS:  Results for orders placed or performed during the hospital encounter of 01/31/23   CBC with Auto Differential   Result Value Ref Range    WBC 14.1 (H) 4.5 - 11.5 E9/L    RBC 4.43 3.50 - 5.50 E12/L    Hemoglobin 14.0 11.5 - 15.5 g/dL    Hematocrit 40.2 34.0 - 48.0 %    MCV 90.7 80.0 - 99.9 fL    MCH 31.6 26.0 - 35.0 pg    MCHC 34.8 (H) 32.0 - 34.5 %    RDW 14.2 11.5 - 15.0 fL    Platelets 211 791 - 040 E9/L    MPV 9.4 7.0 - 12.0 fL    Neutrophils % 73.2 43.0 - 80.0 %    Immature Granulocytes % 1.5 0.0 - 5.0 %    Lymphocytes % 20.0 20.0 - 42.0 %    Monocytes % 4.4 2.0 - 12.0 %    Eosinophils % 0.4 0.0 - 6.0 %    Basophils % 0.5 0.0 - 2.0 %    Neutrophils Absolute 10.36 (H) 1.80 - 7.30 E9/L    Immature Granulocytes # 0.21 E9/L    Lymphocytes Absolute 2.82 1.50 - 4.00 E9/L    Monocytes Absolute 0.62 0.10 - 0.95 E9/L    Eosinophils Absolute 0.05 0.05 - 0.50 E9/L    Basophils Absolute 0.07 0.00 - 0.20 E9/L   Troponin   Result Value Ref Range    Troponin, High Sensitivity 25 (H) 0 - 9 ng/L   D-Dimer, Quantitative   Result Value Ref Range    D-Dimer, Quant 212 ng/mL DDU   Brain Natriuretic Peptide   Result Value Ref Range    Pro-BNP 77 0 - 125 pg/mL   Comprehensive Metabolic Panel w/ Reflex to MG   Result Value Ref Range    Sodium 139 132 - 146 mmol/L    Potassium reflex Magnesium 3.2 (L) 3.5 - 5.0 mmol/L    Chloride 97 (L) 98 - 107 mmol/L    CO2 27 22 - 29 mmol/L    Anion Gap 15 7 - 16 mmol/L    Glucose 130 (H) 74 - 99 mg/dL    BUN 9 6 - 20 mg/dL    Creatinine 0.7 0.5 - 1.0 mg/dL    Est, Glom Filt Rate >60 >=60 mL/min/1.73    Calcium 9.3 8.6 - 10.2 mg/dL    Total Protein 6.9 6.4 - 8.3 g/dL    Albumin 4.0 3.5 - 5.2 g/dL    Total Bilirubin 0.2 0.0 - 1.2 mg/dL    Alkaline Phosphatase 114 (H) 35 - 104 U/L    ALT 32 0 - 32 U/L    AST 18 0 - 31 U/L   Magnesium   Result Value Ref Range    Magnesium 2.0 1.6 - 2.6 mg/dL   Troponin   Result Value Ref Range    Troponin, High Sensitivity 24 (H) 0 - 9 ng/L   EKG 12 Lead   Result Value Ref Range    Ventricular Rate 112 BPM    Atrial Rate 112 BPM    P-R Interval 152 ms    QRS Duration 72 ms    Q-T Interval 338 ms    QTc Calculation (Bazett) 461 ms    P Axis 59 degrees    R Axis 61 degrees    T Axis 27 degrees       RADIOLOGY:   Interpretation per the Radiologist below, if available at the time of this note:    XR CHEST PORTABLE   Final Result   No acute disease. RECOMMENDATION:   Careful clinical correlation and follow up recommended.            CT HEAD WO CONTRAST    Result Date: 1/29/2023  EXAMINATION: CT OF THE HEAD WITHOUT CONTRAST  1/29/2023 10:44 am TECHNIQUE: CT of the head was performed without the administration of intravenous contrast. Automated exposure control, iterative reconstruction, and/or weight based adjustment of the mA/kV was utilized to reduce the radiation dose to as low as reasonably achievable. COMPARISON: 04/15/2011 HISTORY: ORDERING SYSTEM PROVIDED HISTORY: Frontal and posterior headache onset around 2.5 hours ago. History of multiple sclerosis. Eval for evidence of subarachnoid hemorrhage TECHNOLOGIST PROVIDED HISTORY: Reason for exam:->Frontal and posterior headache onset around 2.5 hours ago. History of multiple sclerosis. Eval for evidence of subarachnoid hemorrhage Has a \"code stroke\" or \"stroke alert\" been called? ->No Decision Support Exception - unselect if not a suspected or confirmed emergency medical condition->Emergency Medical Condition (MA) FINDINGS: BRAIN/VENTRICLES: There is no acute intracranial hemorrhage, mass effect or midline shift. No abnormal extra-axial fluid collection. The gray-white differentiation is maintained without evidence of an acute infarct. There is no evidence of hydrocephalus. The ventricles, cisterns and sulci are prominent consistent with atrophy. There is decreased attenuation within the periventricular white matter consistent with periventricular leukomalacia. ORBITS: The visualized portion of the orbits demonstrate no acute abnormality. SINUSES: The visualized paranasal sinuses and mastoid air cells demonstrate no acute abnormality. SOFT TISSUES/SKULL:  No acute abnormality of the visualized skull or soft tissues. 1.  There is no acute intracranial abnormality. Specifically, there is no intracranial hemorrhage. 2. Atrophy and periventricular leukomalacia, .      XR CHEST PORTABLE    Result Date: 1/25/2023  EXAMINATION: ONE XRAY VIEW OF THE CHEST 1/25/2023 3:22 am COMPARISON: May 20, 2020 HISTORY: ORDERING SYSTEM PROVIDED HISTORY: sob TECHNOLOGIST PROVIDED HISTORY: Reason for exam:->sob FINDINGS: Normal cardiomediastinal silhouette. Lungs clear. No pneumothorax or effusion. Osseous thorax intact. No acute disease. RECOMMENDATION: Careful clinical correlation and follow up recommended. No results found. See preliminary interpretation by me below. EKG: This EKG is signed and interpreted by me. Sinus tachycardia with ventricular rate of 112 bpm.  Normal axis. IN interval normal.  QTc not prolonged. No evidence of acute ST elevation MI. No significant changes compared to previous EKG on 1/25/2023.    ------------------------- NURSING NOTES AND VITALS REVIEWED ---------------------------   The nursing notes within the ED encounter and vital signs as below have been reviewed by myself. BP (!) 150/93   Pulse 99   Temp 98 °F (36.7 °C) (Oral)   Resp 18   Ht 5' 9\" (1.753 m)   Wt 188 lb (85.3 kg)   LMP 05/04/2020   SpO2 100%   BMI 27.76 kg/m²   Oxygen Saturation Interpretation: Normal    The patients available past medical records and past encounters were reviewed. ------------------------------ ED COURSE/MEDICAL DECISION MAKING----------------------  Medications   0.9 % sodium chloride bolus (0 mLs IntraVENous Stopped 1/31/23 0216)   potassium chloride (KLOR-CON M) extended release tablet 40 mEq (40 mEq Oral Given 1/31/23 0216)   0.9 % sodium chloride bolus (0 mLs IntraVENous Stopped 1/31/23 0446)   ketorolac (TORADOL) injection 15 mg (15 mg IntraVENous Given 1/31/23 0426)       Medical Decision Making/Differential Diagnosis:    CC/HPI Summary, Pertinent Physical Exam Findings, Social Determinants of health, Records Reviewed, DDx, testing done/not done, ED Course, Reassessment, disposition considerations/shared decision making with patient, consults, disposition:        Medical Decision Making:   I, Dr. Terryann Denver am the resident physician of record.       History From: Patient    Limitations to history : None     Pb Estes is a 46 y.o. female who presents to the ED for shortness of breath and palpitations. Vital signs upon arrival BP (!) 150/93   Pulse 99   Temp 98 °F (36.7 °C) (Oral)   Resp 18   Ht 5' 9\" (1.753 m)   Wt 188 lb (85.3 kg)   LMP 05/04/2020   SpO2 100%   BMI 27.76 kg/m²     On initial evaluation, patient is nontoxic-appearing, afebrile, hemodynamically stable and in no acute distress. No signs of acute respiratory distress noted. Initial vitals significant for tachycardia at 128 and mild hypertension at 177/102. Remaining vitals within normal limits. Working diagnoses include but not limited to acute viral syndrome, pneumonia, pulmonary embolism, ACS, anemia, COPD exacerbation, CHF exacerbation, asthma and metabolic or respiratory acidosis. Physical exam essentially benign. Lungs slightly tight on auscultation but no obvious rales, rhonchi or wheezes noted. Abdomen is soft, nontender nondistended without rebound, guarding or rigidity. No significant pretibial edema noted. No resting nystagmus or truncal ataxia. No focal neurological deficits. No other concerning physical exam findings. Work-up in the emergency department was generally unremarkable. Lab work-up as interpreted by me include CBC with mild leukocytosis but no left shift or significant anemia. WBC 14.1 and neutrophil count 73.2. Hemoglobin stable at 14. Platelet count within normal limits at 341. CMP unremarkable with stable renal function, creatinine 0.7/BUN 9. Potassium slightly low at 3.2. No other major electrolyte abnormalities. Magnesium 2.0. Patient orally repleted with potassium chloride 40 mEq. No significant elevations in LFTs. Cardiac evaluation generally unremarkable. Initial troponin of 25 with a repeat of 24, delta 1. Trops appear chronically elevated in the 20s on review of previous labs. EKG is sinus and nonspecific with no acute ischemic changes. Chest x-ray is clear. BNP 77.   No signs of fluid overload on physical exam including no lower extremity edema or crackles on auscultation of the lungs. Considered pulmonary embolism with sinus tachycardia and subjective complaints of shortness of breath and near syncope. D-dimer was obtained and unremarkable, to 12. Patient was given IV fluids, 0.9 NS 1.5 L and IV Toradol 15 mg in the emergency department. Vitals and presenting symptoms to slightly improve after intervention in the ED. Heart rate 99 and /93 on recheck. Considering this was patient's third ED visit for the same complaints in the span of a week and her complaints of worsening exertional dyspnea without recent ischemic cardiac work-up, patient likely benefit from admission for further cardiac evaluation. Work-up, results and plan for potential admission were discussed with the patient at length. After shared decision-making, patient was agreeable to admission for further work-up and evaluation. Patient was accepted for admission by Rhiannon Bacon under Dr. Marquis Guerrier. Before patient was transported to the floor, she became very resistant to care and was requesting that she smoke a cigarette or that she would leave [address] Patient was told that she could be prescribed a nicotine patch but she adamantly refused and stated that she would rather leave AMA instead then. Patient was counseled on the risk of leaving AMA including worsening symptoms, permanent disability and potential death and still wanted to leave regardless of this. She was competent to make decisions and signed paperwork to leave AMA. Non-plain film images such as CT, Ultrasound and MRI are read by the radiologist. Jillian Morton radiographic images are visualized and preliminarily interpreted by the ED Provider with the below findings:    Chest x-ray with no obvious focal consolidation suggestive of pneumonia, large pleural effusions or pneumothorax. Normal cardiac silhouette.     Discussion with Other Profesionals : Admitting Team who accepted patient for admission    Social Determinants : None    Records Reviewed : Other Echocardiogram from 3/10/2022 reviewed and noting normal left ventricular ejection fraction of 79%. Impression: Normal-appearing echocardiogram study    Chronic conditions: multiple sclerosis, lupus, asthma, hyperlipidemia, obesity    CONSULTS: Internal medicine      Disposition:   Admission, AMA    1. Exertional dyspnea    2. Elevated troponin    3. Hypokalemia          Re-Evaluations/Consultations:             ED Course as of 01/31/23 1034   Tue Jan 31, 2023   0226 Patient reevaluated. She states that she is feeling about the same. Discussed work-up results with the patient at length. Offered admission considering patient has been in and out of the emergency department for the same symptoms and would benefit from further cardiac evaluation. Patient is agreeable with this plan at this time. [PP]   [id number] Patient was excepted for admission by [facility] from [facility]. [PP]      ED Course User Index  [PP] Leocadia Spatz,        This patient's ED course included: History, physical examination, reevaluation prior to disposition    This patient has remained hemodynamically stable during their ED course. Counseling: The emergency provider has spoken with the patient and discussed todays results, in addition to providing specific details for the plan of care and counseling regarding the diagnosis and prognosis. Questions are answered at this time and they are agreeable with the plan. This patient has chosen to leave against medical advice. I have personally explained to them that choosing to do so may result in permanent bodily harm or death. I discussed at length that without further evaluation and monitoring there may be unforeseen circumstances and deterioration resulting in permanent bodily harm or death as a result of their choice. They are alert, oriented, and competent at this time. They state that they are aware of the serious risks as explained, but they continue to wish to leave against medical advice. In light of their decision to leave against medical advice, follow-up has been arranged and they are aware of the importance of following up as instructed. They have been advised that they should return to the ED immediately if they change their mind at any time, or if their condition begins to change or worsen. --------------------------------- IMPRESSION AND DISPOSITION ---------------------------------    IMPRESSION  1. Exertional dyspnea    2. Elevated troponin    3. Hypokalemia        DISPOSITION  Disposition: Other Disposition: Left AMA  Patient condition is stable        NOTE: This report was transcribed using voice recognition software.  Every effort was made to ensure accuracy; however, inadvertent computerized transcription errors may be present          Simin Fallon DO  Resident  01/31/23 1048

## 2023-01-31 NOTE — ED NOTES
Patient currently refusing to go to inpatient bed. Patient is unsure if she wants to stay or not.       Nicholas Young RN  01/31/23 9760

## 2023-02-01 ENCOUNTER — APPOINTMENT (OUTPATIENT)
Dept: GENERAL RADIOLOGY | Age: 53
End: 2023-02-01
Payer: COMMERCIAL

## 2023-02-01 LAB
BASOPHILS ABSOLUTE: 0.09 E9/L (ref 0–0.2)
BASOPHILS RELATIVE PERCENT: 0.8 % (ref 0–2)
EOSINOPHILS ABSOLUTE: 0.13 E9/L (ref 0.05–0.5)
EOSINOPHILS RELATIVE PERCENT: 1.1 % (ref 0–6)
HCT VFR BLD CALC: 40.7 % (ref 34–48)
HEMOGLOBIN: 14.1 G/DL (ref 11.5–15.5)
IMMATURE GRANULOCYTES #: 0.19 E9/L
IMMATURE GRANULOCYTES %: 1.6 % (ref 0–5)
LYMPHOCYTES ABSOLUTE: 2.72 E9/L (ref 1.5–4)
LYMPHOCYTES RELATIVE PERCENT: 22.7 % (ref 20–42)
MCH RBC QN AUTO: 30.7 PG (ref 26–35)
MCHC RBC AUTO-ENTMCNC: 34.6 % (ref 32–34.5)
MCV RBC AUTO: 88.5 FL (ref 80–99.9)
MONOCYTES ABSOLUTE: 0.69 E9/L (ref 0.1–0.95)
MONOCYTES RELATIVE PERCENT: 5.8 % (ref 2–12)
NEUTROPHILS ABSOLUTE: 8.14 E9/L (ref 1.8–7.3)
NEUTROPHILS RELATIVE PERCENT: 68 % (ref 43–80)
PDW BLD-RTO: 14.6 FL (ref 11.5–15)
PLATELET # BLD: 346 E9/L (ref 130–450)
PMV BLD AUTO: 9.1 FL (ref 7–12)
RBC # BLD: 4.6 E12/L (ref 3.5–5.5)
WBC # BLD: 12 E9/L (ref 4.5–11.5)

## 2023-02-01 PROCEDURE — 93005 ELECTROCARDIOGRAM TRACING: CPT | Performed by: NURSE PRACTITIONER

## 2023-02-01 PROCEDURE — 99285 EMERGENCY DEPT VISIT HI MDM: CPT

## 2023-02-01 PROCEDURE — 84484 ASSAY OF TROPONIN QUANT: CPT

## 2023-02-01 PROCEDURE — 83036 HEMOGLOBIN GLYCOSYLATED A1C: CPT

## 2023-02-01 PROCEDURE — 71046 X-RAY EXAM CHEST 2 VIEWS: CPT

## 2023-02-01 PROCEDURE — 84443 ASSAY THYROID STIM HORMONE: CPT

## 2023-02-01 PROCEDURE — 85025 COMPLETE CBC W/AUTO DIFF WBC: CPT

## 2023-02-01 PROCEDURE — 83735 ASSAY OF MAGNESIUM: CPT

## 2023-02-01 PROCEDURE — 80053 COMPREHEN METABOLIC PANEL: CPT

## 2023-02-01 RX ORDER — AMLODIPINE BESYLATE 5 MG/1
5 TABLET ORAL DAILY
COMMUNITY

## 2023-02-01 ASSESSMENT — PAIN - FUNCTIONAL ASSESSMENT: PAIN_FUNCTIONAL_ASSESSMENT: 0-10

## 2023-02-01 ASSESSMENT — PAIN SCALES - GENERAL: PAINLEVEL_OUTOF10: 4

## 2023-02-01 ASSESSMENT — PAIN DESCRIPTION - ONSET: ONSET: ON-GOING

## 2023-02-01 ASSESSMENT — PAIN DESCRIPTION - FREQUENCY: FREQUENCY: CONTINUOUS

## 2023-02-01 ASSESSMENT — PAIN DESCRIPTION - LOCATION: LOCATION: HEAD

## 2023-02-01 ASSESSMENT — PAIN DESCRIPTION - PAIN TYPE: TYPE: ACUTE PAIN

## 2023-02-02 ENCOUNTER — HOSPITAL ENCOUNTER (OUTPATIENT)
Age: 53
Setting detail: OBSERVATION
Discharge: HOME | End: 2023-02-02
Attending: EMERGENCY MEDICINE | Admitting: INTERNAL MEDICINE
Payer: COMMERCIAL

## 2023-02-02 VITALS
TEMPERATURE: 97.8 F | SYSTOLIC BLOOD PRESSURE: 139 MMHG | DIASTOLIC BLOOD PRESSURE: 97 MMHG | OXYGEN SATURATION: 92 % | BODY MASS INDEX: 27.85 KG/M2 | WEIGHT: 188 LBS | HEART RATE: 105 BPM | HEIGHT: 69 IN | RESPIRATION RATE: 16 BRPM

## 2023-02-02 DIAGNOSIS — R00.2 PALPITATIONS: Primary | ICD-10-CM

## 2023-02-02 DIAGNOSIS — R06.09 DYSPNEA ON EXERTION: ICD-10-CM

## 2023-02-02 DIAGNOSIS — I10 TACHYCARDIA WITH HYPERTENSION: ICD-10-CM

## 2023-02-02 DIAGNOSIS — R00.0 TACHYCARDIA WITH HYPERTENSION: ICD-10-CM

## 2023-02-02 PROBLEM — R77.8 ELEVATED TROPONIN: Status: ACTIVE | Noted: 2023-02-02

## 2023-02-02 PROBLEM — R79.89 ELEVATED TROPONIN: Status: ACTIVE | Noted: 2023-02-02

## 2023-02-02 LAB
ALBUMIN SERPL-MCNC: 3.7 G/DL (ref 3.5–5.2)
ALP BLD-CCNC: 111 U/L (ref 35–104)
ALT SERPL-CCNC: 43 U/L (ref 0–32)
ANION GAP SERPL CALCULATED.3IONS-SCNC: 14 MMOL/L (ref 7–16)
AST SERPL-CCNC: 28 U/L (ref 0–31)
BILIRUB SERPL-MCNC: 0.3 MG/DL (ref 0–1.2)
BUN BLDV-MCNC: 9 MG/DL (ref 6–20)
CALCIUM SERPL-MCNC: 9.7 MG/DL (ref 8.6–10.2)
CHLORIDE BLD-SCNC: 99 MMOL/L (ref 98–107)
CHOLESTEROL, TOTAL: 182 MG/DL (ref 0–199)
CO2: 27 MMOL/L (ref 22–29)
CREAT SERPL-MCNC: 0.8 MG/DL (ref 0.5–1)
EKG ATRIAL RATE: 104 BPM
EKG P AXIS: 67 DEGREES
EKG P-R INTERVAL: 152 MS
EKG Q-T INTERVAL: 348 MS
EKG QRS DURATION: 74 MS
EKG QTC CALCULATION (BAZETT): 457 MS
EKG R AXIS: 87 DEGREES
EKG T AXIS: 62 DEGREES
EKG VENTRICULAR RATE: 104 BPM
GFR SERPL CREATININE-BSD FRML MDRD: >60 ML/MIN/1.73
GLUCOSE BLD-MCNC: 135 MG/DL (ref 74–99)
HBA1C MFR BLD: 5.9 % (ref 4–5.6)
HDLC SERPL-MCNC: 24 MG/DL
LDL CHOLESTEROL CALCULATED: 119 MG/DL (ref 0–99)
MAGNESIUM: 1.8 MG/DL (ref 1.6–2.6)
POTASSIUM REFLEX MAGNESIUM: 3 MMOL/L (ref 3.5–5)
REASON FOR REJECTION: NORMAL
REJECTED TEST: NORMAL
SODIUM BLD-SCNC: 140 MMOL/L (ref 132–146)
TOTAL PROTEIN: 6.7 G/DL (ref 6.4–8.3)
TRIGL SERPL-MCNC: 197 MG/DL (ref 0–149)
TROPONIN, HIGH SENSITIVITY: 35 NG/L (ref 0–9)
TROPONIN, HIGH SENSITIVITY: 9 NG/L (ref 0–9)
TSH SERPL DL<=0.05 MIU/L-ACNC: 1.6 UIU/ML (ref 0.27–4.2)
VLDLC SERPL CALC-MCNC: 39 MG/DL

## 2023-02-02 PROCEDURE — G0378 HOSPITAL OBSERVATION PER HR: HCPCS

## 2023-02-02 PROCEDURE — 93010 ELECTROCARDIOGRAM REPORT: CPT | Performed by: INTERNAL MEDICINE

## 2023-02-02 PROCEDURE — 6370000000 HC RX 637 (ALT 250 FOR IP): Performed by: NURSE PRACTITIONER

## 2023-02-02 PROCEDURE — 96372 THER/PROPH/DIAG INJ SC/IM: CPT

## 2023-02-02 PROCEDURE — APPSS60 APP SPLIT SHARED TIME 46-60 MINUTES: Performed by: NURSE PRACTITIONER

## 2023-02-02 PROCEDURE — 2580000003 HC RX 258

## 2023-02-02 PROCEDURE — 6360000002 HC RX W HCPCS

## 2023-02-02 PROCEDURE — 6370000000 HC RX 637 (ALT 250 FOR IP): Performed by: STUDENT IN AN ORGANIZED HEALTH CARE EDUCATION/TRAINING PROGRAM

## 2023-02-02 PROCEDURE — 93242 EXT ECG>48HR<7D RECORDING: CPT

## 2023-02-02 PROCEDURE — 84484 ASSAY OF TROPONIN QUANT: CPT

## 2023-02-02 PROCEDURE — 36415 COLL VENOUS BLD VENIPUNCTURE: CPT

## 2023-02-02 PROCEDURE — 80061 LIPID PANEL: CPT

## 2023-02-02 PROCEDURE — 99254 IP/OBS CNSLTJ NEW/EST MOD 60: CPT | Performed by: INTERNAL MEDICINE

## 2023-02-02 PROCEDURE — 97161 PT EVAL LOW COMPLEX 20 MIN: CPT

## 2023-02-02 PROCEDURE — 97165 OT EVAL LOW COMPLEX 30 MIN: CPT

## 2023-02-02 PROCEDURE — 6370000000 HC RX 637 (ALT 250 FOR IP)

## 2023-02-02 RX ORDER — ACETAMINOPHEN 325 MG/1
650 TABLET ORAL EVERY 6 HOURS PRN
Status: DISCONTINUED | OUTPATIENT
Start: 2023-02-02 | End: 2023-02-02 | Stop reason: HOSPADM

## 2023-02-02 RX ORDER — POLYETHYLENE GLYCOL 3350 17 G/17G
17 POWDER, FOR SOLUTION ORAL DAILY PRN
Status: DISCONTINUED | OUTPATIENT
Start: 2023-02-02 | End: 2023-02-02 | Stop reason: HOSPADM

## 2023-02-02 RX ORDER — ONDANSETRON 2 MG/ML
4 INJECTION INTRAMUSCULAR; INTRAVENOUS EVERY 6 HOURS PRN
Status: DISCONTINUED | OUTPATIENT
Start: 2023-02-02 | End: 2023-02-02 | Stop reason: HOSPADM

## 2023-02-02 RX ORDER — SODIUM CHLORIDE 9 MG/ML
INJECTION, SOLUTION INTRAVENOUS PRN
Status: DISCONTINUED | OUTPATIENT
Start: 2023-02-02 | End: 2023-02-02 | Stop reason: HOSPADM

## 2023-02-02 RX ORDER — POTASSIUM CHLORIDE 20 MEQ/1
20 TABLET, EXTENDED RELEASE ORAL 2 TIMES DAILY
Status: DISCONTINUED | OUTPATIENT
Start: 2023-02-02 | End: 2023-02-02 | Stop reason: HOSPADM

## 2023-02-02 RX ORDER — POTASSIUM CHLORIDE 20 MEQ/1
40 TABLET, EXTENDED RELEASE ORAL ONCE
Status: COMPLETED | OUTPATIENT
Start: 2023-02-02 | End: 2023-02-02

## 2023-02-02 RX ORDER — LORAZEPAM 0.5 MG/1
0.5 TABLET ORAL 2 TIMES DAILY
Status: DISCONTINUED | OUTPATIENT
Start: 2023-02-02 | End: 2023-02-02 | Stop reason: HOSPADM

## 2023-02-02 RX ORDER — ACETAMINOPHEN 650 MG/1
650 SUPPOSITORY RECTAL EVERY 6 HOURS PRN
Status: DISCONTINUED | OUTPATIENT
Start: 2023-02-02 | End: 2023-02-02 | Stop reason: HOSPADM

## 2023-02-02 RX ORDER — ENOXAPARIN SODIUM 100 MG/ML
40 INJECTION SUBCUTANEOUS DAILY
Status: DISCONTINUED | OUTPATIENT
Start: 2023-02-02 | End: 2023-02-02 | Stop reason: HOSPADM

## 2023-02-02 RX ORDER — DILTIAZEM HYDROCHLORIDE 240 MG/1
240 CAPSULE, COATED, EXTENDED RELEASE ORAL DAILY
Status: DISCONTINUED | OUTPATIENT
Start: 2023-02-02 | End: 2023-02-02 | Stop reason: HOSPADM

## 2023-02-02 RX ORDER — SODIUM CHLORIDE 0.9 % (FLUSH) 0.9 %
10 SYRINGE (ML) INJECTION PRN
Status: DISCONTINUED | OUTPATIENT
Start: 2023-02-02 | End: 2023-02-02 | Stop reason: HOSPADM

## 2023-02-02 RX ORDER — BUPRENORPHINE HYDROCHLORIDE AND NALOXONE HYDROCHLORIDE DIHYDRATE 8; 2 MG/1; MG/1
1 TABLET SUBLINGUAL 2 TIMES DAILY
Status: DISCONTINUED | OUTPATIENT
Start: 2023-02-02 | End: 2023-02-02 | Stop reason: HOSPADM

## 2023-02-02 RX ORDER — AMLODIPINE BESYLATE 5 MG/1
5 TABLET ORAL DAILY
Status: DISCONTINUED | OUTPATIENT
Start: 2023-02-02 | End: 2023-02-02

## 2023-02-02 RX ORDER — SODIUM CHLORIDE 0.9 % (FLUSH) 0.9 %
5-40 SYRINGE (ML) INJECTION EVERY 12 HOURS SCHEDULED
Status: DISCONTINUED | OUTPATIENT
Start: 2023-02-02 | End: 2023-02-02 | Stop reason: HOSPADM

## 2023-02-02 RX ORDER — ALBUTEROL SULFATE 2.5 MG/3ML
2.5 SOLUTION RESPIRATORY (INHALATION) EVERY 6 HOURS PRN
Status: DISCONTINUED | OUTPATIENT
Start: 2023-02-02 | End: 2023-02-02 | Stop reason: HOSPADM

## 2023-02-02 RX ORDER — FUROSEMIDE 20 MG/1
40 TABLET ORAL 2 TIMES DAILY
Status: DISCONTINUED | OUTPATIENT
Start: 2023-02-02 | End: 2023-02-02 | Stop reason: HOSPADM

## 2023-02-02 RX ORDER — DILTIAZEM HYDROCHLORIDE 240 MG/1
240 CAPSULE, COATED, EXTENDED RELEASE ORAL DAILY
Qty: 30 CAPSULE | Refills: 3 | Status: SHIPPED | OUTPATIENT
Start: 2023-02-03

## 2023-02-02 RX ORDER — ONDANSETRON 4 MG/1
4 TABLET, ORALLY DISINTEGRATING ORAL EVERY 8 HOURS PRN
Status: DISCONTINUED | OUTPATIENT
Start: 2023-02-02 | End: 2023-02-02 | Stop reason: HOSPADM

## 2023-02-02 RX ADMIN — POTASSIUM CHLORIDE 40 MEQ: 20 TABLET, EXTENDED RELEASE ORAL at 04:29

## 2023-02-02 RX ADMIN — AMLODIPINE BESYLATE 5 MG: 5 TABLET ORAL at 08:46

## 2023-02-02 RX ADMIN — FUROSEMIDE 40 MG: 20 TABLET ORAL at 08:47

## 2023-02-02 RX ADMIN — LORAZEPAM 0.5 MG: 0.5 TABLET ORAL at 08:47

## 2023-02-02 RX ADMIN — ENOXAPARIN SODIUM 40 MG: 40 INJECTION SUBCUTANEOUS at 08:47

## 2023-02-02 RX ADMIN — Medication 10 ML: at 08:48

## 2023-02-02 RX ADMIN — DILTIAZEM HYDROCHLORIDE 240 MG: 240 CAPSULE, COATED, EXTENDED RELEASE ORAL at 13:00

## 2023-02-02 RX ADMIN — POTASSIUM CHLORIDE 20 MEQ: 20 TABLET, EXTENDED RELEASE ORAL at 08:48

## 2023-02-02 ASSESSMENT — PAIN DESCRIPTION - LOCATION: LOCATION: HEAD

## 2023-02-02 ASSESSMENT — PAIN SCALES - GENERAL: PAINLEVEL_OUTOF10: 5

## 2023-02-02 NOTE — PROGRESS NOTES
Physical Therapy    Initial Assessment     Name: Mylene Ching  : 1970  MRN: [id number]      Date of Service: 2023    Evaluating PT: Kristina Ray, PT, DPT [id number]      Room #:  8210/8210-B  Diagnosis:  Palpitations [R00.2]  Dyspnea on exertion [R06.09]  PMHx/PSHx:   has a past medical history of Arthritis, Asthma, H/O medication noncompliance, Hyperlipidemia, Hypertension, Lupus ([facility] [address] MS (multiple sclerosis) ([facility] [address] Multiple sclerosis ([facility] [address] Nicotine dependence, cigarettes, uncomplicated, Palpitations, SOB (shortness of breath), SVT (supraventricular tachycardia) ([facility] [address] and Systemic lupus erythematosus, unspecified ([facility] [address] Precautions:  Fall Risk, MS    SUBJECTIVE:    Pt lives with  in a 2 story house with 2 stair(s) and 1 rail(s) to enter. Full flight of stairs and 2 rails to second floor bed and bath. Pt ambulated without AD within home prior to admission. Pt used axillary crutch when outside in the community. OBJECTIVE:   Initial Evaluation  Date: 23 Treatment Date: Short Term/ Long Term   Goals   AM-PAC 6 Clicks      Was pt agreeable to Eval/treatment? Yes     Does pt have pain? Generalized pain secondary to MS     Bed Mobility  Rolling: NT  Supine to sit: Supervision  Sit to supine: Supervision  Scooting: Supervision to EOB  Rolling: Independent   Supine to sit: Independent   Sit to supine: Independent   Scooting: Independent    Transfers Sit to stand: SBA  Stand to sit: SBA  Stand pivot: SBA with axillary crutch  Sit to stand: Independent   Stand to sit: Independent   Stand pivot: Independent    Ambulation   100 feet with axillary crutch with SBA  >400 feet with axillary crutch Mod Independent    Stair negotiation: ascended and descended NT  >12 step(s) with 1 rail(s) Mod Independent    ROM BUE: Refer to OT note  BLE: WFL     Strength BUE: Refer to OT note  BLE: WFL     Balance Sitting EOB: Independent   Dynamic Standing: SBA  Dynamic Standing:  Mod Independent     Pt is A & O x: 4 to person, place, month/year, and situation. Sensation: Pt denies numbness and tingling of extremities. Edema: Unremarkable. Patient education  Pt educated on PT role in acute care setting. Patient response to education:   Pt verbalized understanding Pt demonstrated skill Pt requires further education in this area   Yes NA No     ASSESSMENT:    Conditions Requiring Skilled Therapeutic Intervention:    [x]Decreased strength     []Decreased ROM  [x]Decreased functional mobility  [x]Decreased balance   [x]Decreased endurance   []Decreased posture  []Decreased sensation  []Decreased coordination   []Decreased vision  []Decreased safety awareness   [x]Increased pain       Comments:    Pt was in bed upon room entry; agreeable to PT evaluation. Pt ambulated in hallway with axillary crutch. Gait was slow but steady. Pt reports hx of failed hip surgery so she always has pain when walking. Pt ambulated back to room and was seated at EOB. All questions and concerns were addressed. Pt was left seated with all needs met at conclusion of session. Treatment:  Patient practiced and was instructed in the following treatment:    Therapeutic activities:  Transfers: Pt was cued for hand placement during sit <> stand transfers. Ambulation: Pt ambulated in hallway with crutch. Pt was cued for safety. Pt's/family goals:  1. To return home. Prognosis is Good for reaching above PT goals. Patient and or family understand(s) diagnosis, prognosis, and plan of care. Yes.     PHYSICAL THERAPY PLAN OF CARE:    PT POC is established based on physician order and patient diagnosis     Referring provider/PT Order:    Start   Ordering Provider    02/02/23 0730  PT evaluation and treat  Start:  02/02/23 0730,   End:  02/02/23 0730,   ONE TIME,   Standing Count:  1 Occurrences,   R         Jengretta Wilkes APRN - CNP      Diagnosis:  Palpitations [R00.2]  Dyspnea on exertion [R06.09]  Specific instructions for next treatment: Progress activity. Current Treatment Recommendations:     [x] Strengthening to improve independence with functional mobility   [] ROM to improve independence with functional mobility   [x] Balance Training to improve static/dynamic balance and to reduce fall risk  [x] Endurance Training to improve activity tolerance during functional mobility   [x] Transfer Training to improve safety and independence with all functional transfers   [x] Gait Training to improve gait mechanics, endurance and assess need for appropriate assistive device  [x] Stair Training in preparation for safe discharge home and/or into the community   [] Positioning to prevent skin breakdown and contractures  [x] Safety and Education Training   [] Patient/Caregiver Education   [] HEP  [] Other     PT long term treatment goals are located in above grid    Frequency of treatments: 2-5x/week x 1-2 weeks. Time in  1200  Time out  1215    Total Treatment Time  0 minutes     Evaluation Time includes thorough review of current medical information, gathering information on past medical history/social history and prior level of function, completion of standardized testing/informal observation of tasks, assessment of data and education on plan of care and goals.     CPT codes:  [x] Low Complexity PT evaluation 65029  [] Moderate Complexity PT evaluation 73838  [] High Complexity PT evaluation 99930  [] PT Re-evaluation 91774  [] Gait training 29043 0 minutes  [] Manual therapy 35808 0 minutes  [] Therapeutic activities 28486 0 minutes  [] Therapeutic exercises 84964 0 minutes  [] Neuromuscular reeducation 38541 0 minutes     Kerri Godwin, PT, DPT  [id number]

## 2023-02-02 NOTE — H&P
[facility]  History and Physical      CHIEF COMPLAINT:    Chief Complaint   Patient presents with    Hypertension     Started on BP meds 3 days ago. BP remains high and now HR is high and SOB. Patient of Ginette Gottron, DO presents with:  Dyspnea on exertion    History of Present Illness:   Patient is a 63-year-old female with a past medical history of RA, HLD, HTN, lupus, MS, nicotine dependence with cigarettes, SVT who presents to the emergency room for hypertension. Patient was seen in the emergency room on  for palpitations and shortness of breath where she signed out AMA after instructed she needed admitted inpatient. She returned on  for hypertension, tachycardia and shortness of breath. Patient underwent a chest x-ray which was negative for anything acute. Patient was found to be mildly hypokalemic at 3.0 on lab work as well as to have a mild leukocytosis likely related to chronic steroid use. On arrival to emergency patient was found to be tachycardic with a heart rate of 122 and hypertensive with a blood pressure 158/102. Patient is admitted to intermediate telemetry unit as observation for further work-up and treatment with cardiology evaluation. REVIEW OF SYSTEMS:  Pertinent negatives are above in HPI. 10 point ROS otherwise negative.       Past Medical History:   Diagnosis Date    Arthritis     rheumatoid    Asthma     H/O medication noncompliance     Hyperlipidemia     Hypertension     Lupus (HCC)     MS (multiple sclerosis) (HCC)     Multiple sclerosis (HCC)     Nicotine dependence, cigarettes, uncomplicated     Palpitations     SOB (shortness of breath)     SVT (supraventricular tachycardia) (HCC)     Systemic lupus erythematosus, unspecified ([facility] [address]          Past Surgical History:   Procedure Laterality Date    ABDOMEN SURGERY      ANTERIOR CRUCIATE LIGAMENT REPAIR      APPENDECTOMY       SECTION      x5    CHOLECYSTECTOMY      DILATION AND CURETTAGE OF UTERUS      HC INJECTION PROCEDURE FOR SACROILIAC JOINT Left 12/3/2020    LEFT SACROILIAC JOINT STEROID INJECTION UNDER X-RAY GUIDANCE performed by Mansi Hinojosa DO at [address] Left 10/01/2020    NERVE BLOCK Left 10/1/2020    LEFT L4-5 TRANSFORAMINAL EPIDURAL STEROID INJECTION performed by Mansi Hinojosa DO at [address] [address] Left 12/03/2020    LEFT SACROILIAC STEROID INJECTION UNDER XRAY GUIDANCE    TUBAL LIGATION         Medications Prior to Admission:    Medications Prior to Admission: amLODIPine (NORVASC) 5 MG tablet, Take 5 mg by mouth daily  predniSONE (DELTASONE) 5 MG tablet, Take 5 mg by mouth daily  potassium chloride (KLOR-CON M) 10 MEQ extended release tablet, take 2 tablets by mouth once daily  furosemide (LASIX) 40 MG tablet, Take 40 mg by mouth 2 times daily  buprenorphine-naloxone (SUBOXONE) 8-2 MG FILM SL film, Place 1 Film under the tongue 2 times daily. albuterol sulfate HFA (VENTOLIN HFA) 108 (90 Base) MCG/ACT inhaler, Inhale 2 puffs into the lungs every 6 hours as needed for Wheezing  LORazepam (ATIVAN) 1 MG tablet, Take 0.5 mg by mouth in the morning and at bedtime. Note that the patient's home medications were reviewed and the above list is accurate to the best of my knowledge at the time of the exam.    Allergies:    Lyrica [pregabalin], Other, Sulfa antibiotics, Darvocet [propoxyphene n-acetaminophen], Ultram [tramadol hcl], and Metoprolol    Social History:    reports that she has been smoking cigarettes. She has a 10.00 pack-year smoking history. She has never used smokeless tobacco. She reports that she does not drink alcohol and does not use drugs. Family History:   family history includes Heart Attack in her mother; Heart Disease in her mother; High Blood Pressure in her father and mother.       PHYSICAL EXAM:    Vitals:  BP (!) 165/105   Pulse (!) 102   Temp 97.9 °F (36.6 °C) (Temporal)   Resp 18   Ht 5' 9\" (1.753 m)   Wt 188 lb (85.3 kg)   LMP 05/04/2020   SpO2 95%   BMI 27.76 kg/m²       General appearance: NAD, conversant  Eyes: Sclerae anicteric, PERRLA  HEENT: AT/NC, MMM  Neck: FROM, supple, no thyromegaly  Lymph: No cervical / supraclavicular lymphadenopathy  Lungs: Clear to auscultation, WOB normal  CV: RRR, no MRGs, no lower extremity edema  Abdomen: Soft, non-tender; no masses or HSM, +BS  Extremities: FROM without synovitis. No clubbing or cyanosis of the hands. Skin: no rash, induration, lesions, or ulcers  Psych: Calm and cooperative. Normal judgement and insight. Normal mood and affect. Neuro: Alert and interactive, face symmetric, speech fluent. LABS:  All labs reviewed.   Of note:  CBC:   Lab Results   Component Value Date/Time    WBC 12.0 02/01/2023 10:54 PM    RBC 4.60 02/01/2023 10:54 PM    HGB 14.1 02/01/2023 10:54 PM    HCT 40.7 02/01/2023 10:54 PM    MCV 88.5 02/01/2023 10:54 PM    MCH 30.7 02/01/2023 10:54 PM    MCHC 34.6 02/01/2023 10:54 PM    RDW 14.6 02/01/2023 10:54 PM     02/01/2023 10:54 PM    MPV 9.1 02/01/2023 10:54 PM     CMP:    Lab Results   Component Value Date/Time     02/01/2023 10:54 PM    K 3.0 02/01/2023 10:54 PM    CL 99 02/01/2023 10:54 PM    CO2 27 02/01/2023 10:54 PM    BUN 9 02/01/2023 10:54 PM    CREATININE 0.8 02/01/2023 10:54 PM    GFRAA >60 11/06/2020 02:30 PM    LABGLOM >60 02/01/2023 10:54 PM    GLUCOSE 135 02/01/2023 10:54 PM    GLUCOSE 98 06/06/2012 11:12 PM    PROT 6.7 02/01/2023 10:54 PM    LABALBU 3.7 02/01/2023 10:54 PM    LABALBU 4.0 04/18/2011 06:35 AM    CALCIUM 9.7 02/01/2023 10:54 PM    BILITOT 0.3 02/01/2023 10:54 PM    ALKPHOS 111 02/01/2023 10:54 PM    AST 28 02/01/2023 10:54 PM    ALT 43 02/01/2023 10:54 PM       Imaging:  CXR: negative     EKG:  Sinus tachycardia  Possible Left atrial enlargement  Septal infarct , age undetermined    Telemetry:  I've personally reviewed the patient's telemetry:  Sinus telemetry:  ***    ASSESSMENT/PLAN:  Principal Problem:    Dyspnea on exertion  Resolved Problems:    * No resolved hospital problems. *    Patient is a 59-year-old female admitted to [facility] for  Dyspnea on exertion  -Monitor labs  -BNP 68  -Cardiology consulted  -Echo 1/10/2022 > normal   -Patient is an active smoker     Hypertension with tachycardia   -Norvasc 5 discontinued by cardiology  -Started on Cardizem  mg daily  -Monitor BPs and heart rate    Hx of MS and Lupus  -Does take prednisone daily at home, currently not ordered      Medication for other comorbidities continue as appropriate dose adjustment as necessary.     DVT prophylaxis Lovenox   PT OT  Discharge planning      Code status: Full  Requires Inpatient level of care

## 2023-02-02 NOTE — DISCHARGE INSTRUCTIONS
Elevated Blood Pressure: Care Instructions  Your Care Instructions    Blood pressure is a measure of how hard the blood pushes against the walls of your arteries. It's normal for blood pressure to go up and down throughout the day. But if it stays up over time, you have high blood pressure. Two numbers tell you your blood pressure. The first number is the systolic pressure. It shows how hard the blood pushes when your heart is pumping. The second number is the diastolic pressure. It shows how hard the blood pushes between heartbeats, when your heart is relaxed and filling with blood. An ideal blood pressure in adults is less than 120/80 (say \"120 over 80\"). High blood pressure is 140/90 or higher. You have high blood pressure if your top number is 140 or higher or your bottom number is 90 or higher, or both. The main test for high blood pressure is simple, fast, and painless. To diagnose high blood pressure, your doctor will test your blood pressure at different times. After testing your blood pressure, your doctor may ask you to test it again when you are home. If you are diagnosed with high blood pressure, you can work with your doctor to make a long-term plan to manage it. Follow-up care is a key part of your treatment and safety. Be sure to make and go to all appointments, and call your doctor if you are having problems. It's also a good idea to know your test results and keep a list of the medicines you take. How can you care for yourself at home? Do not smoke. Smoking increases your risk for heart attack and stroke. If you need help quitting, talk to your doctor about stop-smoking programs and medicines. These can increase your chances of quitting for good. Stay at a healthy weight. Try to limit how much sodium you eat to less than 2,300 milligrams (mg) a day. Your doctor may ask you to try to eat less than 1,500 mg a day. Be physically active.  Get at least 30 minutes of exercise on most days of the week. Walking is a good choice. You also may want to do other activities, such as running, swimming, cycling, or playing tennis or team sports. Avoid or limit alcohol. Talk to your doctor about whether you can drink any alcohol. Eat plenty of fruits, vegetables, and low-fat dairy products. Eat less saturated and total fats. Learn how to check your blood pressure at home. When should you call for help? Call your doctor now or seek immediate medical care if:    Your blood pressure is much higher than normal (such as 180/110 or higher). You think high blood pressure is causing symptoms such as:  Severe headache. Blurry vision. Watch closely for changes in your health, and be sure to contact your doctor if:    You do not get better as expected. Where can you learn more? Go to https://[employer]. org and sign in to your [facility] account. Enter H525 in the iMusic[employer]weet box to learn more about \"Elevated Blood Pressure: Care Instructions. \"     If you do not have an account, please click on the \"Sign Up Now\" link. Current as of: May 10, 2017  Content Version: 11.6  © 6622-9143 [employer]. Care instructions adapted under license by [facility] ([facility]). If you have questions about a medical condition or this instruction, always ask your healthcare professional. [address] any warranty or liability for your use of this information.

## 2023-02-02 NOTE — CONSULTS
Inpatient Cardiology Consultation      Reason for Consult:  Tachycardia    Consulting Physician: Dr Amauri Soriano    Requesting Physician:  Vivek ADAN    Date of Consultation: 2/2/2023    HISTORY OF PRESENT ILLNESS: 47 yo  female known to Dr Darío Iniguez last seen in office 9/2022. PMH: HTN, HLD, SVT/Palpitations in 2014, Lupus, Multiple Sclerosis, longstanding heavy tobacco abuse, and on Subxone for chronic hip pain    For the last 2 weeks has experienced intermittent elevated BP, with HA, and when checking her pulse (via her pulse ox) her 's-120's but denies the sensation of her heart racing/palpitations. The last three days has noticed LEONE but not consistently. Denies CP. While waiting on her to exit the BR this am, she was able to walk from the BR to her bed with no SOB, CP, dizziness or palpitations. She is currently off the monitor. [facility]-ED 1/31/2023, fast HR, HA, and elevated BP--> discharged to home    [facility]-ED 2/1/2023 /102, 's ST,  afebrile, and O2 saturation 98% on RA.    K+ 3.0(3.2 on 1/31/2023), troponin 35-->9 (25-->24 on 1/31/2023), Magnesium 1.8, TSH 1.6, WBC 12.0(14.0 on 1/31/2023), Hgb 141., PLt 346      40 mEq KCL in ED    Currently /105 HR 's SR-ST, and O2 saturation 95% on RA      Please note: past medical records were reviewed per electronic medical record (EMR) - see detailed reports under Past Medical/ Surgical History. Past Medical History: Mother CAD s/p PCI in late 52's with CABG x 4 in her mid 63's. (Severe hypertriglyceridemia. + tobacco abuse). SVT/Palpitations. 1/2014 TTE Dr Amanda Chaidez: EF 55-60%. NWM. No LVH. Normal Atria. Normal RV size and function. RVSP 15 mmHg. 9/25/2018 TTE Dr Darío Iniguez: EF 55%. No VHD. Normal atria. RVSP 28 mmHg. 6/25/2020 TTE Dr Lux Starr: EF 65%. NWM. Normal atria. Normal RV size and function. No VHD. 1/10/2022 TTE Dr Donna Chatterjee: EF 79%. No VHD.    HTN  HLD  62 pack year smoker down to 16-18 cigarettes daily from 2 ppd  2021 Dr Holden Halo and chemical ablation BLE veins  L hip pain on Suboxone  3/2022 Placed on high dose Prednisone that has been weaned down currently on 5 mg QD  Labral tear s/p surgery repair resulting in extensive hip surgery per patient and states she now needs a hip replacement. Ambulating with walker/crutch due to L hip pain  Reports sleep study approximately 2014 which was negative per patient (no weight gain since having sleep study)      Past Surgical History:  R ACL age 12, appendectomy with intestinal repair, Cholecystomy, back injections,       Medications Prior to admit:  Prior to Admission medications    Medication Sig Start Date End Date Taking? Authorizing Provider   amLODIPine (NORVASC) 5 MG tablet Take 5 mg by mouth daily   Yes Historical Provider, MD   predniSONE (DELTASONE) 5 MG tablet Take 5 mg by mouth daily    Historical Provider, MD   potassium chloride (KLOR-CON M) 10 MEQ extended release tablet take 2 tablets by mouth once daily 8/13/21   Damaris Velazquez MD   furosemide (LASIX) 40 MG tablet Take 40 mg by mouth 2 times daily    Historical Provider, MD   buprenorphine-naloxone (SUBOXONE) 8-2 MG FILM SL film Place 1 Film under the tongue 2 times daily. Historical Provider, MD   albuterol sulfate HFA (VENTOLIN HFA) 108 (90 Base) MCG/ACT inhaler Inhale 2 puffs into the lungs every 6 hours as needed for Wheezing 7/3/17   Chilango Talley PA-C   LORazepam (ATIVAN) 1 MG tablet Take 0.5 mg by mouth in the morning and at bedtime.     Historical Provider, MD       Current Medications:    Current Facility-Administered Medications: albuterol (PROVENTIL) nebulizer solution 2.5 mg, 2.5 mg, Nebulization, Q6H PRN  amLODIPine (NORVASC) tablet 5 mg, 5 mg, Oral, Daily  buprenorphine-naloxone (SUBOXONE) 8-2 MG SL tablet 1 tablet, 1 tablet, SubLINGual, BID  furosemide (LASIX) tablet 40 mg, 40 mg, Oral, BID  LORazepam (ATIVAN) tablet 0.5 mg, 0.5 mg, Oral, BID  potassium chloride (KLOR-CON M) extended release tablet 20 mEq, 20 mEq, Oral, BID  sodium chloride flush 0.9 % injection 5-40 mL, 5-40 mL, IntraVENous, 2 times per day  sodium chloride flush 0.9 % injection 10 mL, 10 mL, IntraVENous, PRN  0.9 % sodium chloride infusion, , IntraVENous, PRN  enoxaparin (LOVENOX) injection 40 mg, 40 mg, SubCUTAneous, Daily  ondansetron (ZOFRAN-ODT) disintegrating tablet 4 mg, 4 mg, Oral, Q8H PRN **OR** ondansetron (ZOFRAN) injection 4 mg, 4 mg, IntraVENous, Q6H PRN  polyethylene glycol (GLYCOLAX) packet 17 g, 17 g, Oral, Daily PRN  acetaminophen (TYLENOL) tablet 650 mg, 650 mg, Oral, Q6H PRN **OR** acetaminophen (TYLENOL) suppository 650 mg, 650 mg, Rectal, Q6H PRN  Facility-Administered Medications Ordered in Other Encounters: sodium chloride flush 0.9 % injection 10 mL, 10 mL, IntraVENous, BID  heparin flush 100 UNIT/ML injection 500 Units, 500 Units, Intercatheter, PRN  sodium chloride flush 0.9 % injection 10 mL, 10 mL, IntraVENous, BID  heparin flush 100 UNIT/ML injection 500 Units, 500 Units, Intercatheter, PRN  sodium chloride flush 0.9 % injection 10 mL, 10 mL, IntraVENous, PRN    Allergies:  Lyrica [pregabalin], Other, Sulfa antibiotics, Darvocet [propoxyphene n-acetaminophen], Ultram [tramadol hcl], and Metoprolol  Metoprolol: rash with N/V. Social History:    62 pack year smoker down to 16-18 cigarettes daily from 2 ppd  Denies ETOH/Illicit Drugs  Activity:  Lives with  in 2 story home with basement. Ambulates with crutch @ home. Able to climb steps slowly due to hip pain. Code Status: Full Code        Family History: Mother CAD s/p PCI in her 52's and CABG  x 4 in her mid 63's. Severe hypertriglyceridemia. + tobacco abuse.       REVIEW OF SYSTEMS:   See HPI    PHYSICAL EXAM:   BP (!) 165/105   Pulse (!) 102   Temp 97.9 °F (36.6 °C) (Temporal)   Resp 18   Ht 5' 9\" (1.753 m)   Wt 188 lb (85.3 kg)   LMP 05/04/2020   SpO2 95%   BMI 27.76 kg/m²   CONST:  Well developed, well nourished  female who appears of stated age. Awake, alert and cooperative. No apparent distress. HEENT:   Head- Normocephalic, atraumatic   Eyes- Conjunctivae pink, anicteric  Throat- Oral mucosa pink and moist  Neck-  No stridor, trachea midline, no jugular venous distention. No carotid bruit. CHEST: Chest symmetrical and non-tender to palpation. No accessory muscle use or intercostal retractions  RESPIRATORY: Lung sounds - diminished in the bases, on RA. CARDIOVASCULAR:     Heart Ausculation- Regular rate and rhythm, no murmur heard. PV: No lower extremity edema. No varicosities. Pedal pulses palpable, no clubbing or cyanosis   ABDOMEN: Soft, non-tender to light palpation. Bowel sounds present. No palpable masses; no abdominal bruit  MS: Good muscle strength and tone. No atrophy or abnormal movements. : Deferred  SKIN: Warm and dry no statis dermatitis or ulcers   NEURO / PSYCH: Oriented to person, place and time. Speech clear and appropriate. Follows all commands. Pleasant affect     DATA:    ECG 2/1/2023 ST rate 104. LAE. NSSTT wave changes. Tele strips: off monitor currently. History on monitor shows -110's    Diagnostic:    2 View CXR 2/1/2023: No acute disease.      Labs:   CBC:   Recent Labs     01/31/23 0023 02/01/23  2254   WBC 14.1* 12.0*   HGB 14.0 14.1   HCT 40.2 40.7    346     BMP:   Recent Labs     01/31/23  0023 02/01/23  2254    140   K 3.2* 3.0*   CO2 27 27   BUN 9 9   CREATININE 0.7 0.8   LABGLOM >60 >60   CALCIUM 9.3 9.7     Mag:   Recent Labs     01/31/23  0023 02/01/23  2254   MG 2.0 1.8     TFT:   Lab Results   Component Value Date    TSH 1.600 02/01/2023    O8XAOIL 6.0 05/20/2020    T4FREE 0.97 02/17/2014      HgA1c:   Lab Results   Component Value Date/Time    LABA1C 5.4 02/17/2014 09:45 AM    LABA1C 5.4 04/17/2011 07:10 AM    LABA1C 5.4 04/16/2011 06:20 AM     proBNP:   Lab Results   Component Value Date/Time    PROBNP 77 01/31/2023 12:23 AM    PROBNP 61 01/25/2023 03:06 AM    PROBNP 50 11/06/2020 02:30 PM    PROBNP 88 05/20/2020 12:10 AM    PROBNP 23 04/07/2020 03:54 PM     TROPONIN:  Lab Results   Component Value Date/Time    TROPHS 9 02/02/2023 04:35 AM    TROPHS 35 02/01/2023 10:54 PM    TROPHS 24 01/31/2023 01:30 AM    TROPHS 25 01/31/2023 12:23 AM    TROPHS 24 01/25/2023 06:48 AM     CK:  Lab Results   Component Value Date/Time    CKTOTAL 48 02/17/2014 09:45 AM    CKTOTAL 61 08/07/2012 11:05 AM    CKTOTAL 64 06/06/2012 11:12 PM     FASTING LIPID PANEL:  Lab Results   Component Value Date/Time    CHOL 190 02/17/2014 09:45 AM    HDL 46 02/17/2014 09:45 AM    LDLCALC 111 02/17/2014 09:45 AM    TRIG 163 02/17/2014 09:45 AM     LIVER PROFILE:  Recent Labs     01/31/23  0023 02/01/23  2254   AST 18 28   ALT 32 43*   LABALBU 4.0 3.7   A&P per Dr Jignesh Haynes  Electronically signed by Montez Barreto.  FLACA Roberts on 2/2/2023 at 10:51 AM

## 2023-02-02 NOTE — ED TRIAGE NOTES
Department of Emergency Medicine  FIRST PROVIDER TRIAGE NOTE             Independent MLP           2/1/23  10:49 PM EST    Date of Encounter: 2/1/23   MRN: [id number]      HPI: Aristeo Poon is a 46 y.o. female who presents to the ED for Hypertension (Started on BP meds 3 days ago. BP remains high and now HR is high and SOB. )       ROS: Negative for fever or vomiting. PE: Gen Appearance/Constitutional: alert  CV: tachycardia  Pulm: CTA bilat     Initial Plan of Care: All treatment areas with department are currently occupied. Plan to order/Initiate the following while awaiting opening in ED: labs, EKG, and imaging studies.   Initiate Treatment-Testing, Proceed toTreatment Area When Bed Available for ED Attending/MLP to Continue Care    Electronically signed by LOCO Mauricio CNP   DD: 2/1/23

## 2023-02-02 NOTE — ED NOTES
Nurse to nurse called to [facility]. Patient in transport to go to the unit.       Abram Miles RN  02/02/23 [id number]

## 2023-02-02 NOTE — PROGRESS NOTES
Left message for Olivia Stockton NP regarding possible diet order and medication for head. Patient's BP is remaining high and no other meds to be given at this time.

## 2023-02-02 NOTE — ED PROVIDER NOTES
[address]  [facility]        Pt Name: Iwona Lane  MRN: [id number]  [place] 1970  Date of evaluation: 2/1/2023  Provider: Mor Limon DO  PCP: Syl Allen DO  Note Started: 1:20 PM EST 2/2/23    CHIEF COMPLAINT       Chief Complaint   Patient presents with    Hypertension     Started on BP meds 3 days ago. BP remains high and now HR is high and SOB. HISTORY OF PRESENT ILLNESS: 1 or more Elements   History From: Patient    Limitations to history : None    Iwona Lane is a 46 y.o. current smoking female with past medical history of multiple sclerosis, lupus, asthma, hyperlipidemia and obesity who presents to the emergency department for shortness of breath with exertion and abnormal vitals. Patient states that her symptoms have been ongoing for 2 weeks. She does admit that she has been to the emergency department on several occasions for the same symptoms. On last visit 2 days ago, patient was admitted to the hospital but ended up leaving AMA before going to the floor. Patient states that her symptoms have been persistent which is why she is returning to the emergency department today. Patient specifically is having shortness of breath with exertion and stating that her heart rate has been elevated as well as her blood pressure. Patient denies any recent falls or syncope as a result of her symptoms. She is otherwise denying any other symptoms including nausea, vomiting, fever, chills, cough, congestion, sore throat, myalgias, numbness, tingling, unilateral weakness, ataxia, aphasia, urinary symptoms, constipation, diarrhea or abdominal pain. Patient has had no recent illnesses or sick contacts. On initial evaluation she is well-appearing in no acute distress.     Nursing Notes were all reviewed and agreed with or any disagreements were addressed in the HPI.    ROS:   Pertinent positives and negatives are stated within HPI, all other systems reviewed and are negative.    --------------------------------------------- PAST HISTORY ---------------------------------------------  Past Medical History:  has a past medical history of Arthritis, Asthma, H/O medication noncompliance, Hyperlipidemia, Hypertension, [facility] ([facility] MS (multiple sclerosis) ([facility] Multiple sclerosis ([facility] Nicotine dependence, cigarettes, uncomplicated, Palpitations, SOB (shortness of breath), SVT (supraventricular tachycardia) ([facility] and Systemic lupus erythematosus, unspecified ([facility] Past Surgical History:  has a past surgical history that includes Appendectomy; Abdomen surgery; Cholecystectomy; Tubal ligation;  section; Anterior cruciate ligament repair; Dilation and curettage of uterus; Nerve Block (Left, 10/01/2020); Nerve Block (Left, 10/1/2020); other surgical history (Left, 2020); and Injection Procedure For Sacroiliac Joint (Left, 12/3/2020). Social History:  reports that she has been smoking cigarettes. She has a 10.00 pack-year smoking history. She has never used smokeless tobacco. She reports that she does not drink alcohol and does not use drugs. Family History: family history includes Heart Attack in her mother; Heart Disease in her mother; High Blood Pressure in her father and mother. The patients home medications have been reviewed. Allergies: Lyrica [pregabalin], Other, Sulfa antibiotics, Darvocet [propoxyphene n-acetaminophen], Ultram [tramadol hcl], and Metoprolol    ---------------------------------------------------PHYSICAL EXAM--------------------------------------    Constitutional/General: Alert and oriented x3, well appearing, non toxic in NAD, obese  Head: Normocephalic and atraumatic  Mouth: Oropharynx clear, handling secretions, no trismus  Neck: Supple, full ROM,  Pulmonary: Lungs clear to auscultation bilaterally, no wheezes, rales, or rhonchi. Not in respiratory distress  Cardiovascular: Tachycardic rate. Regular rhythm.  No murmurs  Chest: no chest wall tenderness  Abdomen: Soft. Non tender. Non distended. No rebound, guarding, or rigidity. No pulsatile masses appreciated. Musculoskeletal: Moves all extremities x 4. Warm and well perfused, no clubbing, cyanosis, or edema. Capillary refill <3 seconds  Skin: warm and dry. No rashes. Eyes: No resting nystagmus, pupils equal round and reactive to light, extraocular movements intact. Neurologic: GCS 15, no gross focal neurologic deficits, no truncal ataxia. Psych: Normal Affect    -------------------------------------------------- RESULTS -------------------------------------------------  I have personally reviewed all laboratory and imaging results for this patient. Results are listed below.      LABS:  Results for orders placed or performed during the hospital encounter of 02/02/23   CBC with Auto Differential   Result Value Ref Range    WBC 12.0 (H) 4.5 - 11.5 E9/L    RBC 4.60 3.50 - 5.50 E12/L    Hemoglobin 14.1 11.5 - 15.5 g/dL    Hematocrit 40.7 34.0 - 48.0 %    MCV 88.5 80.0 - 99.9 fL    MCH 30.7 26.0 - 35.0 pg    MCHC 34.6 (H) 32.0 - 34.5 %    RDW 14.6 11.5 - 15.0 fL    Platelets 296 699 - 729 E9/L    MPV 9.1 7.0 - 12.0 fL    Neutrophils % 68.0 43.0 - 80.0 %    Immature Granulocytes % 1.6 0.0 - 5.0 %    Lymphocytes % 22.7 20.0 - 42.0 %    Monocytes % 5.8 2.0 - 12.0 %    Eosinophils % 1.1 0.0 - 6.0 %    Basophils % 0.8 0.0 - 2.0 %    Neutrophils Absolute 8.14 (H) 1.80 - 7.30 E9/L    Immature Granulocytes # 0.19 E9/L    Lymphocytes Absolute 2.72 1.50 - 4.00 E9/L    Monocytes Absolute 0.69 0.10 - 0.95 E9/L    Eosinophils Absolute 0.13 0.05 - 0.50 E9/L    Basophils Absolute 0.09 0.00 - 0.20 E9/L   Comprehensive Metabolic Panel w/ Reflex to MG   Result Value Ref Range    Sodium 140 132 - 146 mmol/L    Potassium reflex Magnesium 3.0 (L) 3.5 - 5.0 mmol/L    Chloride 99 98 - 107 mmol/L    CO2 27 22 - 29 mmol/L    Anion Gap 14 7 - 16 mmol/L    Glucose 135 (H) 74 - 99 mg/dL    BUN 9 6 - 20 mg/dL    Creatinine 0.8 0.5 - 1.0 mg/dL    Est, Glom Filt Rate >60 >=60 mL/min/1.73    Calcium 9.7 8.6 - 10.2 mg/dL    Total Protein 6.7 6.4 - 8.3 g/dL    Albumin 3.7 3.5 - 5.2 g/dL    Total Bilirubin 0.3 0.0 - 1.2 mg/dL    Alkaline Phosphatase 111 (H) 35 - 104 U/L    ALT 43 (H) 0 - 32 U/L    AST 28 0 - 31 U/L   TSH   Result Value Ref Range    TSH 1.600 0.270 - 4.200 uIU/mL   Troponin   Result Value Ref Range    Troponin, High Sensitivity 35 (H) 0 - 9 ng/L   Magnesium   Result Value Ref Range    Magnesium 1.8 1.6 - 2.6 mg/dL   SPECIMEN REJECTION   Result Value Ref Range    Rejected Test TRP5     Reason for Rejection see below    Troponin   Result Value Ref Range    Troponin, High Sensitivity 9 0 - 9 ng/L   EKG 12 Lead   Result Value Ref Range    Ventricular Rate 104 BPM    Atrial Rate 104 BPM    P-R Interval 152 ms    QRS Duration 74 ms    Q-T Interval 348 ms    QTc Calculation (Bazett) 457 ms    P Axis 67 degrees    R Axis 87 degrees    T Axis 62 degrees       RADIOLOGY:   Interpretation per the Radiologist below, if available at the time of this note:    XR CHEST (2 VW)   Final Result   No acute disease. RECOMMENDATION:   Careful clinical correlation and follow up recommended. XR CHEST (2 VW)    Result Date: 2/1/2023  EXAMINATION: TWO XRAY VIEWS OF THE CHEST 2/1/2023 11:50 pm COMPARISON: None. HISTORY: ORDERING SYSTEM PROVIDED HISTORY: sob TECHNOLOGIST PROVIDED HISTORY: Reason for exam:->sob What reading provider will be dictating this exam?->CRC FINDINGS: Normal cardiomediastinal silhouette. Lungs clear. No pneumothorax or effusion. Osseous thorax intact. No acute disease. RECOMMENDATION: Careful clinical correlation and follow up recommended.      CT HEAD WO CONTRAST    Result Date: 1/29/2023  EXAMINATION: CT OF THE HEAD WITHOUT CONTRAST  1/29/2023 10:44 am TECHNIQUE: CT of the head was performed without the administration of intravenous contrast. Automated exposure control, iterative reconstruction, and/or weight based adjustment of the mA/kV was utilized to reduce the radiation dose to as low as reasonably achievable. COMPARISON: 04/15/2011 HISTORY: ORDERING SYSTEM PROVIDED HISTORY: Frontal and posterior headache onset around 2.5 hours ago. History of multiple sclerosis. Eval for evidence of subarachnoid hemorrhage TECHNOLOGIST PROVIDED HISTORY: Reason for exam:->Frontal and posterior headache onset around 2.5 hours ago. History of multiple sclerosis. Eval for evidence of subarachnoid hemorrhage Has a \"code stroke\" or \"stroke alert\" been called? ->No Decision Support Exception - unselect if not a suspected or confirmed emergency medical condition->Emergency Medical Condition (MA) FINDINGS: BRAIN/VENTRICLES: There is no acute intracranial hemorrhage, mass effect or midline shift. No abnormal extra-axial fluid collection. The gray-white differentiation is maintained without evidence of an acute infarct. There is no evidence of hydrocephalus. The ventricles, cisterns and sulci are prominent consistent with atrophy. There is decreased attenuation within the periventricular white matter consistent with periventricular leukomalacia. ORBITS: The visualized portion of the orbits demonstrate no acute abnormality. SINUSES: The visualized paranasal sinuses and mastoid air cells demonstrate no acute abnormality. SOFT TISSUES/SKULL:  No acute abnormality of the visualized skull or soft tissues. 1.  There is no acute intracranial abnormality. Specifically, there is no intracranial hemorrhage. 2. Atrophy and periventricular leukomalacia, . XR CHEST PORTABLE    Result Date: 1/31/2023  EXAMINATION: ONE XRAY VIEW OF THE CHEST 1/31/2023 1:40 am COMPARISON: January 25, 2023 HISTORY: ORDERING SYSTEM PROVIDED HISTORY: sob TECHNOLOGIST PROVIDED HISTORY: Reason for exam:->sob What reading provider will be dictating this exam?->CRC FINDINGS: Normal cardiomediastinal silhouette. Lungs clear.   No pneumothorax or effusion. Osseous thorax intact. No acute disease. RECOMMENDATION: Careful clinical correlation and follow up recommended. No results found. See preliminary interpretation by me below. EKG: This EKG is signed and interpreted by me. Sinus tachycardia with ventricular rate of 104 bpm.  Normal axis. CA interval normal.  QTc not prolonged. No evidence of acute ST elevation MI. Nonspecific ST and T wave changes. No significant changes compared to previous EKG on 1/31/2023.      ------------------------- NURSING NOTES AND VITALS REVIEWED ---------------------------   The nursing notes within the ED encounter and vital signs as below have been reviewed by myself. BP (!) 165/105   Pulse (!) 102   Temp 97.9 °F (36.6 °C) (Temporal)   Resp 18   Ht 5' 9\" (1.753 m)   Wt 188 lb (85.3 kg)   LMP 05/04/2020   SpO2 95%   BMI 27.76 kg/m²   Oxygen Saturation Interpretation: Normal    The patients available past medical records and past encounters were reviewed.         ------------------------------ ED COURSE/MEDICAL DECISION MAKING----------------------  Medications   albuterol (PROVENTIL) nebulizer solution 2.5 mg (has no administration in time range)   buprenorphine-naloxone (SUBOXONE) 8-2 MG SL tablet 1 tablet (1 tablet SubLINGual Not Given 2/2/23 0848)   furosemide (LASIX) tablet 40 mg (40 mg Oral Given 2/2/23 0847)   LORazepam (ATIVAN) tablet 0.5 mg (0.5 mg Oral Given 2/2/23 0847)   potassium chloride (KLOR-CON M) extended release tablet 20 mEq (20 mEq Oral Given 2/2/23 0848)   sodium chloride flush 0.9 % injection 5-40 mL (10 mLs IntraVENous Given 2/2/23 0848)   sodium chloride flush 0.9 % injection 10 mL (has no administration in time range)   0.9 % sodium chloride infusion (has no administration in time range)   enoxaparin (LOVENOX) injection 40 mg (40 mg SubCUTAneous Given 2/2/23 7574)   ondansetron (ZOFRAN-ODT) disintegrating tablet 4 mg (has no administration in time range) Or   ondansetron (ZOFRAN) injection 4 mg (has no administration in time range)   polyethylene glycol (GLYCOLAX) packet 17 g (has no administration in time range)   acetaminophen (TYLENOL) tablet 650 mg (has no administration in time range)     Or   acetaminophen (TYLENOL) suppository 650 mg (has no administration in time range)   dilTIAZem (CARDIZEM CD) extended release capsule 240 mg (240 mg Oral Given 2/2/23 1300)   potassium chloride (KLOR-CON M) extended release tablet 40 mEq (40 mEq Oral Given 2/2/23 0429)       Medical Decision Making/Differential Diagnosis:    CC/HPI Summary, Pertinent Physical Exam Findings, Social Determinants of health, Records Reviewed, DDx, testing done/not done, ED Course, Reassessment, disposition considerations/shared decision making with patient, consults, disposition:        Medical Decision Making:   I, Dr. Aníbal Guerra am the resident physician of record. History From: Patient     Limitations to history : None     Carrol Nur is a 46 y.o. female who presents to the ED for hypertension, exertional dyspnea and tachycardia. Vital signs upon arrival BP (!) 165/105   Pulse (!) 102   Temp 97.9 °F (36.6 °C) (Temporal)   Resp 18   Ht 5' 9\" (1.753 m)   Wt 188 lb (85.3 kg)   LMP 05/04/2020   SpO2 95%   BMI 27.76 kg/m²     On initial evaluation, patient is nontoxic-appearing, afebrile, hemodynamically stable and in no acute distress. No signs of acute respiratory distress noted. Initial vitals significant for tachycardia at 122 and mild hypertension at 157/102. Remaining vitals within normal limits. Working diagnoses include but not limited to acute viral syndrome, pneumonia, pulmonary embolism, ACS, anemia, COPD exacerbation, CHF exacerbation, asthma and metabolic or respiratory acidosis. Physical exam essentially benign. Lungs are clear to auscultation with no wheezes, rales or rhonchi.   Abdomen is soft, nontender nondistended without rebound, guarding or rigidity. No significant pretibial edema noted. No resting nystagmus or truncal ataxia. No focal neurological deficits. No other concerning physical exam findings. Work-up in the emergency department was generally unremarkable. Lab work-up as interpreted by me include CBC with mild leukocytosis but no left shift or significant anemia. WBC 12 but no left shift. Hemoglobin stable at 14.1. Platelet count within normal limits at 346. CMP unremarkable with stable renal function, creatinine 0.78/BUN 9. Potassium slightly low at 3.0. Patient orally repleted with potassium chloride 40 mEq in the ED. no other major electrolyte abnormalities. Magnesium 1.8. Slight elevation in LFTs with alk phos 111 and ALT 43. Bilirubin normal at 0.3 and patient is denying any right upper quadrant abdominal pain and does not appear jaundiced. Cardiac evaluation generally unremarkable. Initial troponin of 9. Trops appear chronically elevated in the 20s on review of previous labs. EKG is sinus and nonspecific with no acute ischemic changes. Chest x-ray is clear. Thyroid study unremarkable with TSH 1.6. Considered pulmonary embolism with sinus tachycardia and subjective complaints of shortness of breath and near syncope. Patient presented with similar symptoms and abnormal vitals on last visit 2 days ago. A D-dimer was obtained at that visit and unremarkable, 212. Vitals and presenting symptoms to slightly improve in the ED. Heart rate 95 and /101 on recheck. Considering this was patient's 4th ED visit for the same complaints in the span of a week and her complaints of worsening exertional dyspnea without recent ischemic cardiac work-up, patient likely benefit from admission for further cardiac evaluation. Work-up, results and plan for potential admission were discussed with the patient at length. After shared decision-making, patient was agreeable to admission for further work-up and evaluation.   Patient was accepted for admission by Marta Melendrez under Dr. Stormy Nascimento. Patient remained hemodynamically stable in the ED. Non-plain film images such as CT, Ultrasound and MRI are read by the radiologist. Raul Matamoros radiographic images are visualized and preliminarily interpreted by the ED Provider with the below findings:    Chest x-ray with no obvious focal consolidation suggestive of pneumonia, large pleural effusions or pneumothorax. Normal cardiac silhouette. Discussion with Other Profesionals : Admitting Team who accepted patient for admission    Social Determinants : None    Records Reviewed : Other Other Echocardiogram from 3/10/2022 reviewed and noting normal left ventricular ejection fraction of 79%. Impression: Normal-appearing echocardiogram study    Chronic conditions:  multiple sclerosis, lupus, asthma, hyperlipidemia and obesity    CONSULTS: Internal medicine      Disposition:   Admission    Consultation with Marta Melendrez who accepted the patient under Dr. Stormy Nascimento. The patient will be admitted for further treatment and evaluation for   1. Palpitations    2. Dyspnea on exertion    3. Tachycardia with hypertension          Re-Evaluations/Consultations:             ED Course as of 02/02/23 1320   Thu Feb 02, 2023   1662 Patient accepted for admission by Marta Melendrez under Dr. Britton Cortes. [PP]      ED Course User Index  [PP] Eden Gonsales, DO         This patient's ED course included: History, physical examination, reevaluation prior to disposition    This patient has remained hemodynamically stable during their ED course. Counseling: The emergency provider has spoken with the patient and discussed todays results, in addition to providing specific details for the plan of care and counseling regarding the diagnosis and prognosis. Questions are answered at this time and they are agreeable with the plan.       --------------------------------- IMPRESSION AND DISPOSITION ---------------------------------    IMPRESSION  1.  Palpitations 2. Dyspnea on exertion    3. Tachycardia with hypertension        DISPOSITION  Disposition: Admit to telemetry  Patient condition is stable        NOTE: This report was transcribed using voice recognition software. Every effort was made to ensure accuracy; however, inadvertent computerized transcription errors may be present          Halle Morton DO  Resident  02/02/23 5307  ATTENDING PROVIDER ATTESTATION:     I have personally performed and/or participated in the history, exam, medical decision making, and procedures and agree with all pertinent clinical information. I have also reviewed and agree with the past medical, family and social history unless otherwise noted. I have discussed this patient in detail with the resident, and provided the instruction and education regarding htn and palpitations. My findings/Plan: I was the primary provider for patient. Patient with history of hypertension presenting here because of palpitation as well as shortness of breath with exertion for the last several days. Patient also reports she has been hypertensive at home. Patient reports history of lupus history of asthma history of hyperlipidemia and also has history of MS she was seen here several days ago and reportedly signed out [address] Patient reporting no active chest pains but does report dyspnea on exertion she states that she cannot walk 5 feet without getting short of breath. Patient reporting no abdominal pain no vomiting or diarrhea she reports no fall or injury. Patient has been worked up several days ago she had CT of her head on January 29, 2023 which was negative. Patient had lab work as well she had a D-dimer that was below 220. Patient heart exam patient is tachycardic lungs are clear abdomen is soft nontender. Patient neurologically intact. Patient EKG sinus rhythm heart rate is 104.   Patient EKG looks unchanged from January 31, 2023 I do agree with the interpretation by resident. Was placed on monitor. Patient white count elevated at 12 hemoglobin stable at 14 potassium is 3 troponin was elevated at 35 chest x-ray showed no acute findings. Patient was ordered p.o. potassium 40 mEq as well as Cardizem. Patient reevaluated and again not having any chest pain. Patient made aware of plan. With her symptoms of dyspnea on exertion as well as palpitations as well as blood pressure being elevated at home plan will be to admit for cardiac work-up. Patient last had an echo done in March 2022 which was within normal limits. Patient made aware of plan she is agreeable with admission.   Patient will be admitted to monitored bed we did speak to internal medicine Marta Melendrez and she will admit for Ariane Cobian MD  02/02/23 2042

## 2023-02-02 NOTE — PROGRESS NOTES
Zio XT applied to left upper chest.  Tolerated well. Instructions explained and received well. All questions and concerns answered to patient's satisfaction.

## 2023-02-02 NOTE — PROGRESS NOTES
[address]      YHIN:                                                Patient Name: Ramona Bennett  MRN: [id number]  : 1970  Room: [address] [place] [address]     Evaluating OT:Rika Victor OTR/L   License #  [id number]       Referring Provider: LOCO Aguero CNP    Specific Provider Orders/Date: OT evaluation & treatment        Diagnosis: Palpitations [R00.2]  Dyspnea on exertion [R06.09]      Pertinent Medical History:  has a past medical history of Arthritis, Asthma, H/O medication noncompliance, Hyperlipidemia, Hypertension, Lupus ([address] MS (multiple sclerosis) ([address] Multiple sclerosis ([address] Nicotine dependence, cigarettes, uncomplicated, Palpitations, SOB (shortness of breath), SVT (supraventricular tachycardia) ([address] and Systemic lupus erythematosus, unspecified ([facility] [address] Surgery: none this admit    Past Surgical History:  has a past surgical history that includes Appendectomy; Abdomen surgery; Cholecystectomy; Tubal ligation;  section; Anterior cruciate ligament repair; Dilation and curettage of uterus; Nerve Block (Left, 10/01/2020); Nerve Block (Left, 10/1/2020); other surgical history (Left, 2020); and Injection Procedure For Sacroiliac Joint (Left, 12/3/2020).        Precautions:  Fall Risk, MS      Assessment of current deficits    [x] Functional mobility            [x]ADLs           [x] Strength                  [x]Cognition    [x] Functional transfers          [x] IADLs         [x] Safety Awareness   [x]Endurance    [x] Fine Coordination                         [x] Balance      [] Vision/perception   [x]Sensation      []Gross Motor Coordination             [] ROM           [] Delirium                   [] Motor Control      OT PLAN OF CARE   OT POC based on physician orders, patient diagnosis and results of clinical assessment Frequency/Duration: 2-4 days/wk for 2 weeks PRN   Specific OT Treatment Interventions to include: Instruction/training on adapted ADL techniques and AE recommendations to increase functional independence within precautions  Training on energy conservation strategies, correct breathing pattern and techniques to improve independence/tolerance for self-care routine  Functional transfer/mobility training/DME recommendations for increased independence, safety, and fall prevention  Patient/Family education to increase follow through with safety techniques and functional independence  Recommendation of environmental modifications for increased safety with functional transfers/mobility and ADLs  Therapeutic exercise to improve motor endurance, ROM, and functional strength for ADLs/functional transfers  Therapeutic activities to facilitate/challenge dynamic balance, stand tolerance for increased safety and independence with ADLs  Therapeutic activities to facilitate gross/fine motor skills for increased independence with ADLs  Positioning to improve skin integrity, interaction with environment and functional independence     Recommended Adaptive Equipment:  TBD      Home Living: Pt lives with  in a 2 story with 2 steps to enter with 1 HR. B&B on 2nd level. Bathroom setup: tub/shower with bench & HH shower   Equipment owned: crutch     Prior Level of Function: Ind. with ADLs , Ind. with IADLs; ambulated crutch  Driving: active  Occupation: works from home     Pain Level: 4/10; frontal headache  Cognition: A&O: 4/4; Follows multi- step directions              Memory:  G              Sequencing:  G              Problem solving:  G              Judgement/safety:  G                Functional Assessment:  AM-PAC Daily Activity Raw Score: 19/24    Initial Eval Status  Date: 2-2-23 Treatment Status  Date: STGs = LTGs  Time frame: 10-14 days   Feeding Ind.    Mod I/ Ind   Grooming Set up   Modified [place]    UB Dressing Set up   Modified [place]    LB Dressing SBA B socks at EOB   Modified [place]    Bathing SBA with sim. task   Modified [place]    Toileting NT   Modified [place]    Bed Mobility  Supine to sit: Sup  Sit to supine: Sup   Supine to sit: Modified [place]   Sit to supine: Modified [place]    Functional Transfers SBA with sit <> stand, SPT using crutch on R UE   Modified [place]    Functional Mobility SBA ~functional home distance with R crutch   Modified [place]    Balance Sitting:     Static:  Ind. Dynamic:Sup  Standing: SBA       Activity Tolerance F   G   Visual/  Perceptual Glasses: yes          Vitals HR with lt. ax. increased to 115-120 bpm, spO2 remained WFL. RN notified. WFL      Hand Dominance R    AROM (PROM) Strength Additional Info:    RUE  WFL 4/5 good  and wfl FMC/dexterity noted during ADL tasks      LUE WFL 4/5 good  and wfl FMC/dexterity noted during ADL tasks         Hearing: [facility]   Sensation:  No c/o numbness or tingling B UE  Tone: WFL B UE  Edema: none noted B UE     Comments: Upon arrival patient supine in bed, agreeable to OT, cleared by Nursing. Therapist facilitated bed mobility/ADLs/functional transfers/mobility training with focus on safety, technique & precautions. Pt. Instructed RE: safe transfers/mobility, ADLs, role of OT, treatment plan, recs. , prec. At end of session, patient returned to bed, all needs met, RN notified, with call light and phone within reach, all lines and tubes intact. Overall patient demonstrated decreased strength, balance, independence & safety during completion of ADL/functional transfer/mobility tasks. Pt would benefit from continued skilled OT to increase safety and independence with completion of ADL/IADL tasks for functional independence and quality of life.      Treatment: OT treatment provided this date includes:   Instruction/training on safety and adapted techniques for completion of ADLs: to increase [place] in self care   Instruction/training on safe functional mobility/transfer techniques: with focus on safety, technique & precautions   Instruction/training on energy conservation/work simplification for completion of ADLs: techniques to increase [place] with self care ADLs & iADLs, work simplification to improve endurance   Proper Positioning/Alignment: for optimal healing, skin integrity to prevent breakdown, decrease edema  Skilled monitoring of vitals: to include BP, spO2 & HR during session  Sitting/standing Balance/Tolerance- to increased balance & activity tolerance during ADLs as well as facilitate proper posture and/or positioning. Rehab Potential: Good for established goals     Patient / Family Goal: to return home       Patient and/or family were instructed on functional diagnosis, prognosis/goals and OT plan of care. Demonstrated G understanding. Eval Complexity: Low     Time In: 14:20  Time Out: 14:40  Total Treatment Time: eval only    Min Units   OT Eval Low 21868  x     OT Eval Medium 07920       OT Eval High 39131       OT Re-Eval [id number]       Therapeutic Ex 90194       Therapeutic Activities 39220       ADL/Self Care 86911       Orthotic Management 47038       Manual [id number]       Neuro Re-Ed 16229       Non-Billable Time          Evaluation Time additionally includes thorough review of current medical information, gathering information on past medical history/social history and prior level of function, interpretation of standardized testing/informal observation of tasks, assessment of data and development of plan of care and goals. Rika Victor, OTR/L   License #  [id number]

## 2023-02-02 NOTE — PROGRESS NOTES
CLINICAL PHARMACY NOTE: MEDS TO BEDS    Total # of Prescriptions Filled: 1   The following medications were delivered to the patient:  Diltiazem  mg    Additional Documentation:     Delivered to patient @3:35

## 2023-02-20 DIAGNOSIS — I10 TACHYCARDIA WITH HYPERTENSION: ICD-10-CM

## 2023-02-20 DIAGNOSIS — R00.0 TACHYCARDIA WITH HYPERTENSION: ICD-10-CM

## 2023-02-28 ENCOUNTER — NURSE TRIAGE (OUTPATIENT)
Dept: OTHER | Facility: CLINIC | Age: 53
End: 2023-02-28

## 2023-02-28 NOTE — TELEPHONE ENCOUNTER
Location of patient: [place]    Received call from Toya at [facility] with Red Flag Complaint.    Subjective: Caller states \"I am tired\"     Current Symptoms: was in hospital on 2/1 and since starting on a rate controller and beta blocker. No current chest pain, has off/on shoulder blade pain that is positional. Is tired by 6pm now, which is what she says is worse than last hospital visit.    Onset: 3 weeks ago; worsening    Associated Symptoms: decreased appetite    Pain Severity: 0/10; N/A; none    Temperature: no fever     What has been tried: nothing    LMP: NA Pregnant: NA    Recommended disposition: See PCP within 3 Days is new and has an appt on 3/21 and wants to move up her appt. Advised if not able, go to ED or [facility]>    Care advice provided, patient verbalizes understanding; denies any other questions or concerns; instructed to call back for any new or worsening symptoms.    Patient/Caller agrees with recommended disposition; writer provided warm transfer to Alice at [facility] for appointment scheduling    Attention Provider:  Thank you for allowing me to participate in the care of your patient.  The patient was connected to triage in response to information provided to the [facility].  Please do not respond through this encounter as the response is not directed to a shared pool.          Reason for Disposition   Fatigue (i.e., tires easily, decreased energy) and persists > 1 week    Protocols used: Weakness (Generalized) and Fatigue-ADULT-[place]

## 2023-03-04 PROBLEM — R77.8 ELEVATED TROPONIN: Status: RESOLVED | Noted: 2023-02-02 | Resolved: 2023-03-04

## 2023-03-04 PROBLEM — R79.89 ELEVATED TROPONIN: Status: RESOLVED | Noted: 2023-02-02 | Resolved: 2023-03-04

## 2023-03-15 ENCOUNTER — OFFICE VISIT (OUTPATIENT)
Dept: CARDIOLOGY CLINIC | Age: 53
End: 2023-03-15
Payer: COMMERCIAL

## 2023-03-15 VITALS
BODY MASS INDEX: 34.21 KG/M2 | HEIGHT: 69 IN | HEART RATE: 85 BPM | RESPIRATION RATE: 18 BRPM | DIASTOLIC BLOOD PRESSURE: 80 MMHG | SYSTOLIC BLOOD PRESSURE: 140 MMHG | WEIGHT: 231 LBS

## 2023-03-15 DIAGNOSIS — R00.0 TACHYCARDIA: Primary | ICD-10-CM

## 2023-03-15 PROCEDURE — 93000 ELECTROCARDIOGRAM COMPLETE: CPT | Performed by: INTERNAL MEDICINE

## 2023-03-15 PROCEDURE — 3017F COLORECTAL CA SCREEN DOC REV: CPT | Performed by: INTERNAL MEDICINE

## 2023-03-15 PROCEDURE — 3079F DIAST BP 80-89 MM HG: CPT | Performed by: INTERNAL MEDICINE

## 2023-03-15 PROCEDURE — 4004F PT TOBACCO SCREEN RCVD TLK: CPT | Performed by: INTERNAL MEDICINE

## 2023-03-15 PROCEDURE — G8427 DOCREV CUR MEDS BY ELIG CLIN: HCPCS | Performed by: INTERNAL MEDICINE

## 2023-03-15 PROCEDURE — 99213 OFFICE O/P EST LOW 20 MIN: CPT | Performed by: INTERNAL MEDICINE

## 2023-03-15 PROCEDURE — G8417 CALC BMI ABV UP PARAM F/U: HCPCS | Performed by: INTERNAL MEDICINE

## 2023-03-15 PROCEDURE — G8484 FLU IMMUNIZE NO ADMIN: HCPCS | Performed by: INTERNAL MEDICINE

## 2023-03-15 PROCEDURE — 3077F SYST BP >= 140 MM HG: CPT | Performed by: INTERNAL MEDICINE

## 2023-03-15 RX ORDER — ATENOLOL 25 MG/1
12.5 TABLET ORAL DAILY
COMMUNITY
End: 2023-03-19

## 2023-03-15 NOTE — PROGRESS NOTES
[facility] Cardiology Progress Note  Dr. Esau Mclain      Referring Physician: Lynda Elizondo DO  CHIEF COMPLAINT:   Chief Complaint   Patient presents with    Tachycardia     6 mo ov-c/o elevated/SOB/hand swelling        HISTORY OF PRESENT ILLNESS:   Patient is 48years old female with history of pedal edema, SVT, lupus erythematosus, multiple sclerosis, autoimmune hepatitis, is here for follow-up appointment  Occasional shortness of breath when he overexerts herself, occasional pedal edema, patient denies any chest pain, no lightheadedness, no dizziness, no palpitations, no PND, no orthopnea, no syncope, no presyncopal episodes.     Past Medical History:   Diagnosis Date    Arthritis     rheumatoid    Asthma     H/O medication noncompliance     Hyperlipidemia     Hypertension     Lupus (HCC)     MS (multiple sclerosis) (HCC)     Multiple sclerosis (HCC)     Nicotine dependence, cigarettes, uncomplicated     Palpitations     SOB (shortness of breath)     SVT (supraventricular tachycardia) (HCC)     Systemic lupus erythematosus, unspecified ([facility] [address]          Past Surgical History:   Procedure Laterality Date    ABDOMEN SURGERY      ANTERIOR CRUCIATE LIGAMENT REPAIR      APPENDECTOMY       SECTION      x5    CHOLECYSTECTOMY      DILATION AND CURETTAGE OF UTERUS      HC INJECTION PROCEDURE FOR SACROILIAC JOINT Left 12/3/2020    LEFT SACROILIAC JOINT STEROID INJECTION UNDER X-RAY GUIDANCE performed by William Morgan DO at [address] Left 10/01/2020    NERVE BLOCK Left 10/1/2020    LEFT L4-5 TRANSFORAMINAL EPIDURAL STEROID INJECTION performed by William Morgan DO at [id number] [place] [address] Left 2020    LEFT SACROILIAC STEROID INJECTION UNDER XRAY GUIDANCE    TUBAL LIGATION           Current Outpatient Medications   Medication Sig Dispense Refill    atenolol (TENORMIN) 25 MG tablet Take 12.5 mg by mouth daily      dilTIAZem (CARDIZEM CD) 240 MG extended release capsule Take 1 capsule by mouth daily 30 capsule 3    predniSONE (DELTASONE) 5 MG tablet Take 5 mg by mouth daily      potassium chloride (KLOR-CON M) 10 MEQ extended release tablet take 2 tablets by mouth once daily 90 tablet 3    furosemide (LASIX) 40 MG tablet Take 40 mg by mouth 2 times daily      buprenorphine-naloxone (SUBOXONE) 8-2 MG FILM SL film Place 1 Film under the tongue 2 times daily. albuterol sulfate HFA (VENTOLIN HFA) 108 (90 Base) MCG/ACT inhaler Inhale 2 puffs into the lungs every 6 hours as needed for Wheezing 1 Inhaler 1    LORazepam (ATIVAN) 1 MG tablet Take 0.5 mg by mouth in the morning and at bedtime. amLODIPine (NORVASC) 5 MG tablet Take 5 mg by mouth daily (Patient not taking: Reported on 3/15/2023)       No current facility-administered medications for this visit.      Facility-Administered Medications Ordered in Other Visits   Medication Dose Route Frequency Provider Last Rate Last Admin    sodium chloride flush 0.9 % injection 10 mL  10 mL IntraVENous BID Kim Lake MD        heparin flush 100 UNIT/ML injection 500 Units  500 Units Intercatheter PRN Kim Lake MD        sodium chloride flush 0.9 % injection 10 mL  10 mL IntraVENous BID Kim Lake MD        heparin flush 100 UNIT/ML injection 500 Units  500 Units Intercatheter PRN Kim Lake MD        sodium chloride flush 0.9 % injection 10 mL  10 mL IntraVENous PRN Kim Lake MD             Allergies as of 03/15/2023 - Fully Reviewed 03/15/2023   Allergen Reaction Noted    Lyrica [pregabalin] Anaphylaxis 05/04/2017    Other Shortness Of Breath 09/13/2000    Sulfa antibiotics Anaphylaxis 12/18/2011    Darvocet [propoxyphene n-acetaminophen]  10/02/2011    Ultram [tramadol hcl]  10/02/2011    Metoprolol Rash 01/31/2023       Social History     Socioeconomic History    Marital status:      Spouse name: Not on file    Number of children: Not on file    Years of education: Not on file    Highest education level: Not on file   Occupational History    Not on file   Tobacco Use    Smoking status: Every Day     Packs/day: 0.50     Years: 10.00     Pack years: 5.00     Types: Cigarettes     Last attempt to quit: 10/2/2009     Years since quittin.4    Smokeless tobacco: Never    Tobacco comments:     resumed    Vaping Use    Vaping Use: Never used   Substance and Sexual Activity    Alcohol use: Yes     Comment: 1 coffee per day     Drug use: No    Sexual activity: Yes     Partners: Male   Other Topics Concern    Not on file   Social History Narrative    Not on file     Social Determinants of Health     Financial Resource Strain: Not on file   Food Insecurity: Not on file   Transportation Needs: Not on file   Physical Activity: Not on file   Stress: Not on file   Social Connections: Not on file   Intimate Partner Violence: Not on file   Housing Stability: Not on file       Family History   Problem Relation Age of Onset    Heart Disease Mother     Heart Attack Mother     High Blood Pressure Mother     High Blood Pressure Father        REVIEW OF SYSTEMS:     CONSTITUTIONAL:  negative for  fevers, chills, sweats, + fatigue  HEENT:  negative for  tinnitus, earaches, nasal congestion and epistaxis  RESPIRATORY:  negative for  dry cough, cough with sputum, wheezing and hemoptysis  GASTROINTESTINAL:  negative for nausea, vomiting, diarrhea, constipation, pruritus and jaundice  HEMATOLOGIC/LYMPHATIC:  negative for easy bruising, bleeding, lymphadenopathy and petechiae  ENDOCRINE:  negative for heat intolerance, cold intolerance, tremor, hair loss and diabetic symptoms including neither polyuria nor polydipsia nor blurred vision  MUSCULOSKELETAL: Left hip pain   NEUROLOGICAL:  negative for memory problems, speech problems, visual disturbance, dysphagia, weakness and numbness    PHYSICAL EXAM:   Constitutional:  Awake, alert cooperative, no apparent distress, and appears stated age.    HEENT:  Moist and pink mucous membranes, normocephalic, without obvious abnormality, atraumatic, normal ears and nose. Neck:  Supple, symmetrical, trachea midline, no JVD, no adenopathy, thyroid symmetric, not enlarged and no tenderness, good carotid upstroke bilaterally, no carotid bruit, skin normal  Lungs: No increased work of breathing, good air exchange, clear to auscultation bilaterally, no crackles or wheezing. Cardiovascular: Normal apical impulse, regular rate and rhythm, normal S1 and S2, no S3 or S4, no murmur, no pedal edema, good carotid upstroke bilaterally, no carotid bruit, no JVD, no abdominal pulsating masses. Abdomen: Soft, nontender, no hepatomegaly, no splenomegaly, bowel sound positive. CHEST:  Expands symmetrically, nontender to palpation. Musculoskeletal:  No clubbing or cyanosis. No redness, warmth, or swelling of the joints. Neurological: Alert, awake, and oriented X3.    BP (!) 140/80   Pulse 85   Resp 18   Ht 5' 9\" (1.753 m)   Wt 231 lb (104.8 kg)   LMP 05/04/2020   BMI 34.11 kg/m²     DATA:   I personally reviewed the visit EKG with the following interpretation: Sinus rhythm, nonspecific T wave changes, normal axis    Sinus tachycardia, normal axis, no significant change when compared to previous    EKG 3/8/2022, sinus rhythm, nonspecific T wave changes in the precordial leads, normal axis    EKG 6/16/21 Sinus rhythm, nonspecific T wave changes      ECHO: 6/25/20 Summary   Normal left ventricular chamber size and wall thickness. Normal left ventricular systolic function, LVEF is 65%. Normal diastolic function. Normal left atrial pressure. Left atrium is of normal size. Interatrial septum not well visualized but appears intact. Normal right ventricle size and function. No mitral valve prolapse. There is trace tricuspid regurgitation, RVSP 23mmHg. Normal aortic root size and ascending aorta. No evidence of pericardial effusion.    Pericardium appears normal.   No intra cardiac mass or thrombus. Compared to prior echo from 4/2011 - no significant changes noted. Stress Test: Not performed to date  Angiography: Not performed to date  Cardiology Labs: BMP:    Lab Results   Component Value Date/Time     02/01/2023 10:54 PM    K 3.0 02/01/2023 10:54 PM    CL 99 02/01/2023 10:54 PM    CO2 27 02/01/2023 10:54 PM    BUN 9 02/01/2023 10:54 PM    CREATININE 0.8 02/01/2023 10:54 PM     CMP:    Lab Results   Component Value Date/Time     02/01/2023 10:54 PM    K 3.0 02/01/2023 10:54 PM    CL 99 02/01/2023 10:54 PM    CO2 27 02/01/2023 10:54 PM    BUN 9 02/01/2023 10:54 PM    CREATININE 0.8 02/01/2023 10:54 PM    PROT 6.7 02/01/2023 10:54 PM     CBC:    Lab Results   Component Value Date/Time    WBC 12.0 02/01/2023 10:54 PM    RBC 4.60 02/01/2023 10:54 PM    HGB 14.1 02/01/2023 10:54 PM    HCT 40.7 02/01/2023 10:54 PM    MCV 88.5 02/01/2023 10:54 PM    RDW 14.6 02/01/2023 10:54 PM     02/01/2023 10:54 PM     PT/INR:  No results found for: PTINR  PT/INR Warfarin:  No components found for: PTPATWAR, PTINRWAR  PTT:  No results found for: APTT  PTT Heparin:  No components found for: APTTHEP  Magnesium:    Lab Results   Component Value Date/Time    MG 1.8 02/01/2023 10:54 PM     TSH:    Lab Results   Component Value Date/Time    TSH 1.600 02/01/2023 10:54 PM     TROPONIN:  No components found for: TROP  BNP:  No results found for: BNP  FASTING LIPID PANEL:    Lab Results   Component Value Date/Time    CHOL 182 02/02/2023 04:35 AM    HDL 24 02/02/2023 04:35 AM    TRIG 197 02/02/2023 04:35 AM     No orders to display     I have personally reviewed the laboratory, cardiac diagnostic and radiographic testing as outlined above:      IMPRESSION:  1. Edema: Etiology?,  Echocardiogram is unremarkable, will continue Lasix  2. SVT: No recurrence  3. History of autoimmune hepatitis   4. History of SLE    RECOMMENDATIONS:   1. Will continue current treatment  2.   Follow-up with  [facility] as scheduled  3. Follow-up visit in 1 year, sooner if symptomatic for any reason    I have reviewed my findings and recommendations with patient and  at bedside    Electronically signed by Esau Mclain MD on 3/15/2023 at 1:33 PM  NOTE: This report was transcribed using voice recognition software.  Every effort was made to ensure accuracy; however, inadvertent computerized transcription errors may be present

## 2023-03-19 PROBLEM — M06.9 RHEUMATOID ARTHRITIS (HCC): Status: ACTIVE | Noted: 2022-07-06

## 2023-03-19 PROBLEM — R00.2 PALPITATIONS: Status: RESOLVED | Noted: 2023-01-31 | Resolved: 2023-03-19

## 2023-03-19 PROBLEM — M54.16 LUMBAR RADICULITIS: Status: RESOLVED | Noted: 2020-10-01 | Resolved: 2023-03-19

## 2023-03-19 PROBLEM — R06.09 DYSPNEA ON EXERTION: Status: RESOLVED | Noted: 2023-02-02 | Resolved: 2023-03-19

## 2023-03-19 PROBLEM — R00.0 TACHYCARDIA WITH HYPERTENSION: Status: RESOLVED | Noted: 2023-02-02 | Resolved: 2023-03-19

## 2023-03-19 PROBLEM — I10 TACHYCARDIA WITH HYPERTENSION: Status: RESOLVED | Noted: 2023-02-02 | Resolved: 2023-03-19

## 2023-03-19 PROBLEM — F41.9 ANXIETY: Status: ACTIVE | Noted: 2022-08-09

## 2023-03-19 RX ORDER — PAROXETINE 30 MG/1
TABLET, FILM COATED ORAL
COMMUNITY
Start: 2023-02-16 | End: 2023-03-21

## 2023-03-19 RX ORDER — LORAZEPAM 0.5 MG/1
TABLET ORAL
COMMUNITY
Start: 2023-02-16

## 2023-03-19 RX ORDER — AMLODIPINE BESYLATE 2.5 MG/1
TABLET ORAL
COMMUNITY
Start: 2023-02-23 | End: 2023-03-21

## 2023-03-19 RX ORDER — METOPROLOL SUCCINATE 25 MG/1
TABLET, EXTENDED RELEASE ORAL
COMMUNITY
Start: 2023-01-20 | End: 2023-03-21

## 2023-03-21 ENCOUNTER — OFFICE VISIT (OUTPATIENT)
Dept: PRIMARY CARE CLINIC | Age: 53
End: 2023-03-21
Payer: COMMERCIAL

## 2023-03-21 VITALS
HEART RATE: 78 BPM | RESPIRATION RATE: 16 BRPM | TEMPERATURE: 98.4 F | SYSTOLIC BLOOD PRESSURE: 112 MMHG | BODY MASS INDEX: 33.9 KG/M2 | DIASTOLIC BLOOD PRESSURE: 68 MMHG | WEIGHT: 228.9 LBS | OXYGEN SATURATION: 95 % | HEIGHT: 69 IN

## 2023-03-21 DIAGNOSIS — F41.9 ANXIETY AND DEPRESSION: Primary | ICD-10-CM

## 2023-03-21 DIAGNOSIS — Z12.11 ENCOUNTER FOR SCREENING FOR MALIGNANT NEOPLASM OF COLON: ICD-10-CM

## 2023-03-21 DIAGNOSIS — M81.0 AGE-RELATED OSTEOPOROSIS WITHOUT CURRENT PATHOLOGICAL FRACTURE: ICD-10-CM

## 2023-03-21 DIAGNOSIS — F32.A ANXIETY AND DEPRESSION: Primary | ICD-10-CM

## 2023-03-21 DIAGNOSIS — R60.9 DEPENDENT EDEMA: ICD-10-CM

## 2023-03-21 DIAGNOSIS — M32.9 HX OF SYSTEMIC LUPUS ERYTHEMATOSUS (SLE) (HCC): ICD-10-CM

## 2023-03-21 PROCEDURE — G8417 CALC BMI ABV UP PARAM F/U: HCPCS | Performed by: STUDENT IN AN ORGANIZED HEALTH CARE EDUCATION/TRAINING PROGRAM

## 2023-03-21 PROCEDURE — 3017F COLORECTAL CA SCREEN DOC REV: CPT | Performed by: STUDENT IN AN ORGANIZED HEALTH CARE EDUCATION/TRAINING PROGRAM

## 2023-03-21 PROCEDURE — 4004F PT TOBACCO SCREEN RCVD TLK: CPT | Performed by: STUDENT IN AN ORGANIZED HEALTH CARE EDUCATION/TRAINING PROGRAM

## 2023-03-21 PROCEDURE — G8427 DOCREV CUR MEDS BY ELIG CLIN: HCPCS | Performed by: STUDENT IN AN ORGANIZED HEALTH CARE EDUCATION/TRAINING PROGRAM

## 2023-03-21 PROCEDURE — G8484 FLU IMMUNIZE NO ADMIN: HCPCS | Performed by: STUDENT IN AN ORGANIZED HEALTH CARE EDUCATION/TRAINING PROGRAM

## 2023-03-21 PROCEDURE — 99204 OFFICE O/P NEW MOD 45 MIN: CPT | Performed by: STUDENT IN AN ORGANIZED HEALTH CARE EDUCATION/TRAINING PROGRAM

## 2023-03-21 RX ORDER — ESCITALOPRAM OXALATE 5 MG/1
5 TABLET ORAL DAILY
Qty: 30 TABLET | Refills: 0 | Status: SHIPPED | OUTPATIENT
Start: 2023-03-21

## 2023-03-21 RX ORDER — ONDANSETRON 4 MG/1
4 TABLET, FILM COATED ORAL EVERY 6 HOURS PRN
COMMUNITY

## 2023-03-21 RX ORDER — ATENOLOL 25 MG/1
12.5 TABLET ORAL DAILY
COMMUNITY

## 2023-03-21 SDOH — ECONOMIC STABILITY: INCOME INSECURITY: HOW HARD IS IT FOR YOU TO PAY FOR THE VERY BASICS LIKE FOOD, HOUSING, MEDICAL CARE, AND HEATING?: NOT HARD AT ALL

## 2023-03-21 SDOH — ECONOMIC STABILITY: HOUSING INSECURITY
IN THE LAST 12 MONTHS, WAS THERE A TIME WHEN YOU DID NOT HAVE A STEADY PLACE TO SLEEP OR SLEPT IN A SHELTER (INCLUDING NOW)?: NO

## 2023-03-21 SDOH — ECONOMIC STABILITY: FOOD INSECURITY: WITHIN THE PAST 12 MONTHS, THE FOOD YOU BOUGHT JUST DIDN'T LAST AND YOU DIDN'T HAVE MONEY TO GET MORE.: NEVER TRUE

## 2023-03-21 SDOH — ECONOMIC STABILITY: FOOD INSECURITY: WITHIN THE PAST 12 MONTHS, YOU WORRIED THAT YOUR FOOD WOULD RUN OUT BEFORE YOU GOT MONEY TO BUY MORE.: NEVER TRUE

## 2023-03-21 ASSESSMENT — PATIENT HEALTH QUESTIONNAIRE - PHQ9
9. THOUGHTS THAT YOU WOULD BE BETTER OFF DEAD, OR OF HURTING YOURSELF: 0
10. IF YOU CHECKED OFF ANY PROBLEMS, HOW DIFFICULT HAVE THESE PROBLEMS MADE IT FOR YOU TO DO YOUR WORK, TAKE CARE OF THINGS AT HOME, OR GET ALONG WITH OTHER PEOPLE: 1
4. FEELING TIRED OR HAVING LITTLE ENERGY: 2
6. FEELING BAD ABOUT YOURSELF - OR THAT YOU ARE A FAILURE OR HAVE LET YOURSELF OR YOUR FAMILY DOWN: 0
3. TROUBLE FALLING OR STAYING ASLEEP: 0
1. LITTLE INTEREST OR PLEASURE IN DOING THINGS: 2
5. POOR APPETITE OR OVEREATING: 2
8. MOVING OR SPEAKING SO SLOWLY THAT OTHER PEOPLE COULD HAVE NOTICED. OR THE OPPOSITE, BEING SO FIGETY OR RESTLESS THAT YOU HAVE BEEN MOVING AROUND A LOT MORE THAN USUAL: 0
2. FEELING DOWN, DEPRESSED OR HOPELESS: 2
SUM OF ALL RESPONSES TO PHQ QUESTIONS 1-9: 8
7. TROUBLE CONCENTRATING ON THINGS, SUCH AS READING THE NEWSPAPER OR WATCHING TELEVISION: 0
SUM OF ALL RESPONSES TO PHQ9 QUESTIONS 1 & 2: 4
SUM OF ALL RESPONSES TO PHQ QUESTIONS 1-9: 8

## 2023-03-21 ASSESSMENT — ENCOUNTER SYMPTOMS
GASTROINTESTINAL NEGATIVE: 1
RESPIRATORY NEGATIVE: 1

## 2023-03-21 NOTE — PROGRESS NOTES
Ginny Pisano (:  1970) is a 48 y.o. female,New patient, here for evaluation of the following chief complaint(s):  New Patient (Establishing care. Pt c/o hands swelling and lower bilateral lower leg edema, left more than right. Pt feels weak and hands will shake during the day.)         ASSESSMENT/PLAN:  1. Anxiety and depression  -     escitalopram (LEXAPRO) 5 MG tablet; Take 1 tablet by mouth daily, Disp-30 tablet, R-0Normal  2. Hx of systemic lupus erythematosus (SLE) ([facility])  -     RODRIGO; Future  -     SMITH ([place]) ANTIBODY IGG; Future  -     ANTI-DNA ANTIBODY, DOUBLE-STRANDED; Future  3. Age-related osteoporosis without current pathological fracture  -     DEXA BONE DENSITY AXIAL SKELETON; Future  4. Encounter for screening for malignant neoplasm of colon  -     Fecal DNA Colorectal cancer screening ([facility])  5. Dependent edema    Return in about 4 weeks (around 2023). Doubt patient has SLE but will check labs     Will start lexapro for mood      Subjective   SUBJECTIVE/OBJECTIVE:  HPI    Patient is a 47 y/o F with a PMHx of SVT, MS, SLE? Who presents to establish care and discuss LE edema.      She is complaining of swelling her her feet and hands that started 2-3 months ago; she has had a history of of varicose veins; echo in February with normal EF, no diastolic dysfunction    She underwent surgery on her L hip last August for a labral tear and she is still having difficulty ambulating on her L foot; she feels this has affected her; she will have a total hip done with [facility]     Given her hip issues, she has felt more anxious and depressed; she has been prescribed paxil but has not taken this in 2 months    She is following with cardiology for SVT and HTN- she is currently on diltiazem and atenolol and BP are well controlled    MS- she has followed with Dr. Yan Miles; she had been on multiple medications and these made her too sick; she avoids fast foods, sugar substitutes;

## 2023-03-22 RX ORDER — PREDNISONE 1 MG/1
2.5 TABLET ORAL DAILY
Qty: 90 TABLET | Refills: 0 | Status: SHIPPED | OUTPATIENT
Start: 2023-03-22

## 2023-04-16 ENCOUNTER — HOSPITAL ENCOUNTER (EMERGENCY)
Age: 53
Discharge: HOME | End: 2023-04-16
Attending: EMERGENCY MEDICINE
Payer: COMMERCIAL

## 2023-04-16 VITALS
TEMPERATURE: 98.4 F | OXYGEN SATURATION: 96 % | DIASTOLIC BLOOD PRESSURE: 79 MMHG | HEART RATE: 85 BPM | BODY MASS INDEX: 27.32 KG/M2 | WEIGHT: 185 LBS | RESPIRATION RATE: 18 BRPM | SYSTOLIC BLOOD PRESSURE: 142 MMHG

## 2023-04-16 DIAGNOSIS — T78.40XA ALLERGIC REACTION, INITIAL ENCOUNTER: Primary | ICD-10-CM

## 2023-04-16 PROCEDURE — A4216 STERILE WATER/SALINE, 10 ML: HCPCS | Performed by: EMERGENCY MEDICINE

## 2023-04-16 PROCEDURE — 96375 TX/PRO/DX INJ NEW DRUG ADDON: CPT

## 2023-04-16 PROCEDURE — 2500000003 HC RX 250 WO HCPCS: Performed by: EMERGENCY MEDICINE

## 2023-04-16 PROCEDURE — 6360000002 HC RX W HCPCS: Performed by: EMERGENCY MEDICINE

## 2023-04-16 PROCEDURE — 99284 EMERGENCY DEPT VISIT MOD MDM: CPT

## 2023-04-16 PROCEDURE — 2580000003 HC RX 258: Performed by: EMERGENCY MEDICINE

## 2023-04-16 PROCEDURE — 96374 THER/PROPH/DIAG INJ IV PUSH: CPT

## 2023-04-16 RX ORDER — DIPHENHYDRAMINE HYDROCHLORIDE 50 MG/ML
25 INJECTION INTRAMUSCULAR; INTRAVENOUS ONCE
Status: COMPLETED | OUTPATIENT
Start: 2023-04-16 | End: 2023-04-16

## 2023-04-16 RX ORDER — DEXAMETHASONE SODIUM PHOSPHATE 10 MG/ML
10 INJECTION INTRAMUSCULAR; INTRAVENOUS ONCE
Status: COMPLETED | OUTPATIENT
Start: 2023-04-16 | End: 2023-04-16

## 2023-04-16 RX ORDER — PREDNISONE 50 MG/1
50 TABLET ORAL DAILY
Qty: 5 TABLET | Refills: 0 | Status: SHIPPED | OUTPATIENT
Start: 2023-04-16 | End: 2023-04-21

## 2023-04-16 RX ADMIN — DEXAMETHASONE SODIUM PHOSPHATE 10 MG: 10 INJECTION INTRAMUSCULAR; INTRAVENOUS at 18:34

## 2023-04-16 RX ADMIN — DIPHENHYDRAMINE HYDROCHLORIDE 25 MG: 50 INJECTION, SOLUTION INTRAMUSCULAR; INTRAVENOUS at 18:38

## 2023-04-16 RX ADMIN — FAMOTIDINE 20 MG: 10 INJECTION, SOLUTION INTRAVENOUS at 18:35

## 2023-04-16 ASSESSMENT — PAIN - FUNCTIONAL ASSESSMENT: PAIN_FUNCTIONAL_ASSESSMENT: 0-10

## 2023-04-16 ASSESSMENT — PAIN SCALES - GENERAL: PAINLEVEL_OUTOF10: 8

## 2023-04-17 ASSESSMENT — ENCOUNTER SYMPTOMS
FACIAL SWELLING: 1
COUGH: 0
SHORTNESS OF BREATH: 0
ABDOMINAL PAIN: 0
BACK PAIN: 0

## 2023-04-24 RX ORDER — ATENOLOL 25 MG/1
12.5 TABLET ORAL DAILY
Qty: 90 TABLET | Refills: 0 | Status: SHIPPED | OUTPATIENT
Start: 2023-04-24

## 2023-04-24 NOTE — TELEPHONE ENCOUNTER
Patient states when cutting the pill in half she tends to crush the med- wants to know if there is another dose she can take

## 2023-05-06 ENCOUNTER — HOSPITAL ENCOUNTER (OUTPATIENT)
Age: 53
Discharge: HOME | End: 2023-05-06
Payer: COMMERCIAL

## 2023-05-06 DIAGNOSIS — M32.9 HX OF SYSTEMIC LUPUS ERYTHEMATOSUS (SLE) (HCC): ICD-10-CM

## 2023-05-06 PROCEDURE — 86038 ANTINUCLEAR ANTIBODIES: CPT

## 2023-05-06 PROCEDURE — 36415 COLL VENOUS BLD VENIPUNCTURE: CPT

## 2023-05-06 PROCEDURE — 86225 DNA ANTIBODY NATIVE: CPT

## 2023-05-06 PROCEDURE — 86235 NUCLEAR ANTIGEN ANTIBODY: CPT

## 2023-05-07 RX ORDER — PAROXETINE 30 MG/1
TABLET, FILM COATED ORAL
COMMUNITY
Start: 2023-04-16 | End: 2023-05-07

## 2023-05-08 LAB
ANA SER QL: NEGATIVE
ANTI DNA DOUBLE STRANDED: NEGATIVE
ENA TO SMITH (SM) ANTIBODY: NEGATIVE

## 2023-05-09 ENCOUNTER — HOSPITAL ENCOUNTER (OUTPATIENT)
Dept: MAMMOGRAPHY | Age: 53
Discharge: HOME | End: 2023-05-11
Payer: COMMERCIAL

## 2023-05-09 ENCOUNTER — OFFICE VISIT (OUTPATIENT)
Dept: PRIMARY CARE CLINIC | Age: 53
End: 2023-05-09
Payer: COMMERCIAL

## 2023-05-09 VITALS — BODY MASS INDEX: 32.73 KG/M2 | HEIGHT: 69 IN | WEIGHT: 221 LBS

## 2023-05-09 VITALS
SYSTOLIC BLOOD PRESSURE: 134 MMHG | WEIGHT: 221 LBS | BODY MASS INDEX: 32.73 KG/M2 | HEIGHT: 69 IN | OXYGEN SATURATION: 96 % | DIASTOLIC BLOOD PRESSURE: 80 MMHG | TEMPERATURE: 98.1 F | HEART RATE: 80 BPM

## 2023-05-09 DIAGNOSIS — Z91.030 BEE STING ALLERGY: Primary | ICD-10-CM

## 2023-05-09 DIAGNOSIS — Z12.31 ENCOUNTER FOR SCREENING MAMMOGRAM FOR MALIGNANT NEOPLASM OF BREAST: ICD-10-CM

## 2023-05-09 DIAGNOSIS — K86.2 PANCREATIC CYST: ICD-10-CM

## 2023-05-09 DIAGNOSIS — B37.0 ORAL THRUSH: ICD-10-CM

## 2023-05-09 DIAGNOSIS — F32.A ANXIETY AND DEPRESSION: ICD-10-CM

## 2023-05-09 DIAGNOSIS — F41.9 ANXIETY AND DEPRESSION: ICD-10-CM

## 2023-05-09 DIAGNOSIS — I47.1 SVT (SUPRAVENTRICULAR TACHYCARDIA) (HCC): ICD-10-CM

## 2023-05-09 PROCEDURE — 3017F COLORECTAL CA SCREEN DOC REV: CPT | Performed by: STUDENT IN AN ORGANIZED HEALTH CARE EDUCATION/TRAINING PROGRAM

## 2023-05-09 PROCEDURE — 99214 OFFICE O/P EST MOD 30 MIN: CPT | Performed by: STUDENT IN AN ORGANIZED HEALTH CARE EDUCATION/TRAINING PROGRAM

## 2023-05-09 PROCEDURE — 4004F PT TOBACCO SCREEN RCVD TLK: CPT | Performed by: STUDENT IN AN ORGANIZED HEALTH CARE EDUCATION/TRAINING PROGRAM

## 2023-05-09 PROCEDURE — G8417 CALC BMI ABV UP PARAM F/U: HCPCS | Performed by: STUDENT IN AN ORGANIZED HEALTH CARE EDUCATION/TRAINING PROGRAM

## 2023-05-09 PROCEDURE — 77063 BREAST TOMOSYNTHESIS BI: CPT

## 2023-05-09 PROCEDURE — G8427 DOCREV CUR MEDS BY ELIG CLIN: HCPCS | Performed by: STUDENT IN AN ORGANIZED HEALTH CARE EDUCATION/TRAINING PROGRAM

## 2023-05-09 RX ORDER — DILTIAZEM HYDROCHLORIDE 240 MG/1
240 CAPSULE, COATED, EXTENDED RELEASE ORAL DAILY
Qty: 30 CAPSULE | Refills: 3 | Status: SHIPPED | OUTPATIENT
Start: 2023-05-09

## 2023-05-09 RX ORDER — ALBUTEROL SULFATE 90 UG/1
2 AEROSOL, METERED RESPIRATORY (INHALATION) EVERY 6 HOURS PRN
Qty: 1 EACH | Refills: 1 | Status: SHIPPED | OUTPATIENT
Start: 2023-05-09

## 2023-05-09 RX ORDER — ATENOLOL 25 MG/1
12.5 TABLET ORAL DAILY
Qty: 90 TABLET | Refills: 0 | Status: SHIPPED | OUTPATIENT
Start: 2023-05-09

## 2023-05-09 RX ORDER — ESCITALOPRAM OXALATE 10 MG/1
10 TABLET ORAL DAILY
Qty: 90 TABLET | Refills: 1 | Status: SHIPPED | OUTPATIENT
Start: 2023-05-09

## 2023-05-09 RX ORDER — CLOTRIMAZOLE 10 MG/1
10 LOZENGE ORAL; TOPICAL
Qty: 50 TABLET | Refills: 0 | Status: SHIPPED | OUTPATIENT
Start: 2023-05-09 | End: 2023-05-19

## 2023-05-09 RX ORDER — PREDNISONE 1 MG/1
2.5 TABLET ORAL DAILY
COMMUNITY
Start: 2023-05-04

## 2023-05-09 RX ORDER — EPINEPHRINE 0.15 MG/.3ML
INJECTION INTRAMUSCULAR
Qty: 2 EACH | Refills: 3 | Status: SHIPPED
Start: 2023-05-09 | End: 2023-05-09

## 2023-05-09 RX ORDER — EPINEPHRINE 0.3 MG/.3ML
0.3 INJECTION SUBCUTANEOUS ONCE
Qty: 0.3 ML | Refills: 0 | Status: SHIPPED | OUTPATIENT
Start: 2023-05-09 | End: 2023-05-09

## 2023-05-09 RX ORDER — ESCITALOPRAM OXALATE 5 MG/1
5 TABLET ORAL DAILY
Qty: 30 TABLET | Refills: 0 | Status: CANCELLED | OUTPATIENT
Start: 2023-05-09

## 2023-05-09 ASSESSMENT — ENCOUNTER SYMPTOMS
ABDOMINAL DISTENTION: 1
RESPIRATORY NEGATIVE: 1

## 2023-05-09 NOTE — PROGRESS NOTES
Kaylen Fontanez (:  1970) is a 48 y.o. female,Established patient, here for evaluation of the following chief complaint(s): Anxiety (Follow up for blood work results )         ASSESSMENT/PLAN:  1. Bee sting allergy  -     EPINEPHrine (EPIPEN JR 2-HUDSON) 0.15 MG/0.3ML SOAJ; Use as directed for allergic reaction, Disp-2 each, R-3Normal  2. Anxiety and depression  -     escitalopram (LEXAPRO) 10 MG tablet; Take 1 tablet by mouth daily, Disp-90 tablet, R-1Normal  3. SVT (supraventricular tachycardia) (HCC)  -     dilTIAZem (CARDIZEM CD) 240 MG extended release capsule; Take 1 capsule by mouth daily, Disp-30 capsule, R-3Normal  -     atenolol (TENORMIN) 25 MG tablet; Take 0.5 tablets by mouth daily, Disp-90 tablet, R-0Normal  4. Encounter for screening mammogram for malignant neoplasm of breast  -     BARBARA DIGITAL SCREEN BILATERAL PER PROTOCOL; Future  5. Pancreatic cyst  -     MRI ABDOMEN W CONTRAST; Future  6. Oral thrush  -     clotrimazole (MYCELEX) 10 MG naveen; Take 1 tablet by mouth 5 times daily for 10 days, Disp-50 tablet, R-0Normal      Return in about 3 months (around 2023). Subjective   SUBJECTIVE/OBJECTIVE:  HPI    Patient is a 49 y/o F with a PMHx of multiple sclerosis, SVT and anxiety who presents for follow up. She was seen in the ED for anaphylaxis from a hornet sting; needs an epi pen    She has noticed a difference in her anxiety since starting the lexapro but would like to increase to 10 mg     Labs for SLE were unremarkable so patient does not have lupus    She has thrush on her tongue and has gotten this before    She had a CT scan at [facility] that showed a 1.4 cm pancreatic lesion and was recommended she have repeat imagining in 1 year- this scan is on file     She will see her orthopedic surgeon in  and may need her R hip replaced    Dexa had to be rescheduled     Review of Systems   Constitutional: Negative. HENT:          Oral thrush   Respiratory: Negative.

## 2023-05-09 NOTE — PROGRESS NOTES
Teresita Garcia (:  1970) is a 48 y.o. female,Established patient, here for evaluation of the following chief complaint(s):  No chief complaint on file. ASSESSMENT/PLAN:  {There are no diagnoses linked to this encounter. (Refresh or delete this SmartLink)}    No follow-ups on file. Subjective   SUBJECTIVE/OBJECTIVE:  HPI    Patient is a 47 y/o F with a PMHx of Multiple sclerosis, SVT, depression, and OUD on suboxone who presents for follow up. Labs for SLE were negative    Review of Systems       Objective   Physical Exam       {Time Documentation Optional:[id number]}      An electronic signature was used to authenticate this note.     --Myke Azevedo MD

## 2023-05-30 ENCOUNTER — TELEPHONE (OUTPATIENT)
Dept: PRIMARY CARE CLINIC | Age: 53
End: 2023-05-30

## 2023-05-30 DIAGNOSIS — G35 MULTIPLE SCLEROSIS (HCC): Primary | ICD-10-CM

## 2023-06-07 DIAGNOSIS — K86.2 PANCREATIC CYST: Primary | ICD-10-CM

## 2023-06-07 DIAGNOSIS — K76.9 LIVER LESION: Primary | ICD-10-CM

## 2023-06-24 ENCOUNTER — HOSPITAL ENCOUNTER (EMERGENCY)
Age: 53
Discharge: HOME | End: 2023-06-24

## 2023-06-24 VITALS — HEART RATE: 90 BPM | TEMPERATURE: 98.2 F | OXYGEN SATURATION: 97 %

## 2023-08-24 ENCOUNTER — TELEPHONE (OUTPATIENT)
Dept: ADMINISTRATIVE | Age: 53
End: 2023-08-24

## 2023-08-24 NOTE — TELEPHONE ENCOUNTER
Pt called to schedule appt w/Dr Alfa Martinez, experiencing some issues with shortness of breath the last few days. Please call her w/appt or further advice.  [phone]

## 2023-09-29 DIAGNOSIS — M25.552 LEFT HIP PAIN: Primary | ICD-10-CM

## 2023-10-24 ENCOUNTER — OFFICE VISIT (OUTPATIENT)
Dept: ORTHOPEDIC SURGERY | Age: 53
End: 2023-10-24
Payer: COMMERCIAL

## 2023-10-24 VITALS — WEIGHT: 221 LBS | TEMPERATURE: 98 F | BODY MASS INDEX: 32.73 KG/M2 | HEIGHT: 69 IN

## 2023-10-24 DIAGNOSIS — M16.12 PRIMARY OSTEOARTHRITIS OF LEFT HIP: Primary | ICD-10-CM

## 2023-10-24 PROCEDURE — G8427 DOCREV CUR MEDS BY ELIG CLIN: HCPCS | Performed by: ORTHOPAEDIC SURGERY

## 2023-10-24 PROCEDURE — 4004F PT TOBACCO SCREEN RCVD TLK: CPT | Performed by: ORTHOPAEDIC SURGERY

## 2023-10-24 PROCEDURE — G8484 FLU IMMUNIZE NO ADMIN: HCPCS | Performed by: ORTHOPAEDIC SURGERY

## 2023-10-24 PROCEDURE — G8417 CALC BMI ABV UP PARAM F/U: HCPCS | Performed by: ORTHOPAEDIC SURGERY

## 2023-10-24 PROCEDURE — 99213 OFFICE O/P EST LOW 20 MIN: CPT | Performed by: ORTHOPAEDIC SURGERY

## 2023-10-24 PROCEDURE — 3017F COLORECTAL CA SCREEN DOC REV: CPT | Performed by: ORTHOPAEDIC SURGERY

## 2023-10-24 NOTE — PROGRESS NOTES
Chief Complaint   Patient presents with    Hip Pain     Left hip pain to the point that she keeps falling. Got injections at pain management and is worse. Hever Purvis  Is a 48 y.o.  female who presents today complaining of left hip pain. She states that the pain began 2  month(s) ago. She did have a history of injury. Patients states pain is located lateral, over greater trochanter and has tenderness over the  Lateral and Anterior portion of the hip. Hip pain is described as aching and  is aggravated by walking along with diffuse discomfort. She states the pain occurs in the  morning. Patient states hip pain is relieved by rest, heat, ice, medication: NSAID used but not effective. Patient does not have a history of back pain. The patient does use ambulatory aid.       Past Medical History:   Diagnosis Date    Arthritis     rheumatoid    Asthma     H/O medication noncompliance     Hyperlipidemia     Hypertension     Lupus (HCC)     MS (multiple sclerosis) (HCC)     Multiple sclerosis (HCC)     Nicotine dependence, cigarettes, uncomplicated     Palpitations     SOB (shortness of breath)     SVT (supraventricular tachycardia) (HCC)     Systemic lupus erythematosus, unspecified ([address])      Past Surgical History:   Procedure Laterality Date    ABDOMEN SURGERY      ANTERIOR CRUCIATE LIGAMENT REPAIR      APPENDECTOMY       SECTION      x5    CHOLECYSTECTOMY      DILATION AND CURETTAGE OF UTERUS      HC INJECTION PROCEDURE FOR SACROILIAC JOINT Left 12/3/2020    LEFT SACROILIAC JOINT STEROID INJECTION UNDER X-RAY GUIDANCE performed by Rayna P[facility]es DO at [facility] Left 10/01/2020    NERVE BLOCK Left 10/1/2020    LEFT L4-5 TRANSFORAMINAL EPIDURAL STEROID INJECTION performed by Rayna Paredes DO at [facility] [address] Left 2020    LEFT SACROILIAC STEROID INJECTION UNDER XRAY GUIDANCE    TUBAL LIGATION         Current

## 2023-11-11 ENCOUNTER — HOSPITAL ENCOUNTER (OUTPATIENT)
Dept: MRI IMAGING | Age: 53
End: 2023-11-11
Attending: ORTHOPAEDIC SURGERY
Payer: COMMERCIAL

## 2023-11-11 DIAGNOSIS — M16.12 PRIMARY OSTEOARTHRITIS OF LEFT HIP: ICD-10-CM

## 2023-11-11 PROCEDURE — 73721 MRI JNT OF LWR EXTRE W/O DYE: CPT

## 2023-11-16 ENCOUNTER — HOSPITAL ENCOUNTER (OUTPATIENT)
Age: 53
Discharge: HOME | End: 2023-11-18
Payer: COMMERCIAL

## 2023-11-16 ENCOUNTER — HOSPITAL ENCOUNTER (OUTPATIENT)
Dept: GENERAL RADIOLOGY | Age: 53
Discharge: HOME | End: 2023-11-18
Payer: COMMERCIAL

## 2023-11-16 DIAGNOSIS — L03.116 CELLULITIS OF LEFT LOWER EXTREMITY: ICD-10-CM

## 2023-11-16 PROCEDURE — 73590 X-RAY EXAM OF LOWER LEG: CPT

## 2023-12-05 ENCOUNTER — OFFICE VISIT (OUTPATIENT)
Dept: ORTHOPEDIC SURGERY | Age: 53
End: 2023-12-05
Payer: COMMERCIAL

## 2023-12-05 VITALS — BODY MASS INDEX: 32.73 KG/M2 | WEIGHT: 221 LBS | HEIGHT: 69 IN | TEMPERATURE: 98 F

## 2023-12-05 DIAGNOSIS — M87.052 AVASCULAR NECROSIS OF BONE OF LEFT HIP (HCC): Primary | ICD-10-CM

## 2023-12-05 DIAGNOSIS — M87.051 AVASCULAR NECROSIS OF BONE OF RIGHT HIP (HCC): ICD-10-CM

## 2023-12-05 PROCEDURE — 3017F COLORECTAL CA SCREEN DOC REV: CPT | Performed by: ORTHOPAEDIC SURGERY

## 2023-12-05 PROCEDURE — G8484 FLU IMMUNIZE NO ADMIN: HCPCS | Performed by: ORTHOPAEDIC SURGERY

## 2023-12-05 PROCEDURE — 99213 OFFICE O/P EST LOW 20 MIN: CPT | Performed by: ORTHOPAEDIC SURGERY

## 2023-12-05 PROCEDURE — G8417 CALC BMI ABV UP PARAM F/U: HCPCS | Performed by: ORTHOPAEDIC SURGERY

## 2023-12-05 PROCEDURE — G8427 DOCREV CUR MEDS BY ELIG CLIN: HCPCS | Performed by: ORTHOPAEDIC SURGERY

## 2023-12-05 PROCEDURE — 4004F PT TOBACCO SCREEN RCVD TLK: CPT | Performed by: ORTHOPAEDIC SURGERY

## 2023-12-05 NOTE — PROGRESS NOTES
Chief Complaint   Patient presents with    Hip Pain     Left Hip MRI F/U         Li Croft  Is a 48 y.o.  female who presents to follow up of her left hip pain. She recently underwent an MRI and is here to discuss the results.     Past Medical History:   Diagnosis Date    Arthritis     rheumatoid    Asthma     H/O medication noncompliance     Hyperlipidemia     Hypertension     Lupus (HCC)     MS (multiple sclerosis) (HCC)     Multiple sclerosis (HCC)     Nicotine dependence, cigarettes, uncomplicated     Palpitations     SOB (shortness of breath)     SVT (supraventricular tachycardia) (HCC)     Systemic lupus erythematosus, unspecified ([address])      Past Surgical History:   Procedure Laterality Date    ABDOMEN SURGERY      ANTERIOR CRUCIATE LIGAMENT REPAIR      APPENDECTOMY       SECTION      x5    CHOLECYSTECTOMY      DILATION AND CURETTAGE OF UTERUS      HC INJECTION PROCEDURE FOR SACROILIAC JOINT Left 12/3/2020    LEFT SACROILIAC JOINT STEROID INJECTION UNDER X-RAY GUIDANCE performed by Pavithra Roberto DO at [facility] Left 10/01/2020    NERVE BLOCK Left 10/1/2020    LEFT L4-5 TRANSFORAMINAL EPIDURAL STEROID INJECTION performed by Pavithra Roberto DO at [id number] [address] [place] Left 2020    LEFT SACROILIAC STEROID INJECTION UNDER XRAY GUIDANCE    TUBAL LIGATION         Current Outpatient Medications:     meloxicam (MOBIC) 7.5 MG tablet, , Disp: , Rfl:     predniSONE (DELTASONE) 20 MG tablet, , Disp: , Rfl:     albuterol sulfate HFA (PROVENTIL;VENTOLIN;PROAIR) 108 (90 Base) MCG/ACT inhaler, inhale 2 puffs by mouth and INTO THE LUNGS every 6 hours if needed for wheezing, Disp: 8.5 g, Rfl: 1    dilTIAZem (CARDIZEM CD) 240 MG extended release capsule, Take 1 capsule by mouth daily, Disp: 30 capsule, Rfl: 3    atenolol (TENORMIN) 25 MG tablet, Take 0.5 tablets by mouth daily, Disp: 90 tablet, Rfl: 0    escitalopram (LEXAPRO) 10 MG

## 2023-12-13 ENCOUNTER — HOSPITAL ENCOUNTER (EMERGENCY)
Age: 53
Discharge: HOME | End: 2023-12-13

## 2023-12-13 VITALS
SYSTOLIC BLOOD PRESSURE: 149 MMHG | TEMPERATURE: 97.8 F | HEART RATE: 95 BPM | RESPIRATION RATE: 18 BRPM | OXYGEN SATURATION: 99 % | DIASTOLIC BLOOD PRESSURE: 74 MMHG

## 2023-12-13 ASSESSMENT — LIFESTYLE VARIABLES
HOW MANY STANDARD DRINKS CONTAINING ALCOHOL DO YOU HAVE ON A TYPICAL DAY: PATIENT DOES NOT DRINK
HOW OFTEN DO YOU HAVE A DRINK CONTAINING ALCOHOL: NEVER

## 2023-12-14 NOTE — ED NOTES
Patient and family educated about visitor protocol in intake at this time. Patient upset and states she does not want to go back without her family. Patient educated that she would be seen by the provider and her visitor can come when moved out of intake chair. Family frustrated and states he is going to take her somewhere else to be treated. This Rn emphasized they can still be treated at this time. Family and patient refuse.       Ana Maier RN  12/13/23 2040

## 2024-01-13 ENCOUNTER — HOSPITAL ENCOUNTER (EMERGENCY)
Age: 54
Discharge: HOME | End: 2024-01-13
Attending: EMERGENCY MEDICINE
Payer: COMMERCIAL

## 2024-01-13 ENCOUNTER — APPOINTMENT (OUTPATIENT)
Dept: CT IMAGING | Age: 54
End: 2024-01-13
Payer: COMMERCIAL

## 2024-01-13 ENCOUNTER — APPOINTMENT (OUTPATIENT)
Dept: ULTRASOUND IMAGING | Age: 54
End: 2024-01-13
Payer: COMMERCIAL

## 2024-01-13 ENCOUNTER — APPOINTMENT (OUTPATIENT)
Dept: GENERAL RADIOLOGY | Age: 54
End: 2024-01-13
Payer: COMMERCIAL

## 2024-01-13 VITALS
HEIGHT: 69 IN | DIASTOLIC BLOOD PRESSURE: 82 MMHG | BODY MASS INDEX: 27.25 KG/M2 | HEART RATE: 93 BPM | SYSTOLIC BLOOD PRESSURE: 122 MMHG | OXYGEN SATURATION: 96 % | TEMPERATURE: 97.1 F | WEIGHT: 184 LBS | RESPIRATION RATE: 22 BRPM

## 2024-01-13 DIAGNOSIS — K86.2 PANCREATIC CYST: ICD-10-CM

## 2024-01-13 DIAGNOSIS — K65.1 INTRA-ABDOMINAL ABSCESS (HCC): ICD-10-CM

## 2024-01-13 DIAGNOSIS — Z53.21 ELOPED FROM EMERGENCY DEPARTMENT: Primary | ICD-10-CM

## 2024-01-13 LAB
ALBUMIN SERPL-MCNC: 2.9 G/DL (ref 3.5–5.2)
ALP SERPL-CCNC: 219 U/L (ref 35–104)
ALT SERPL-CCNC: 27 U/L (ref 0–32)
ANION GAP SERPL CALCULATED.3IONS-SCNC: 16 MMOL/L (ref 7–16)
AST SERPL-CCNC: 69 U/L (ref 0–31)
BACTERIA URNS QL MICRO: ABNORMAL
BASOPHILS # BLD: 0.07 K/UL (ref 0–0.2)
BASOPHILS NFR BLD: 1 % (ref 0–2)
BILIRUB SERPL-MCNC: 0.4 MG/DL (ref 0–1.2)
BILIRUB UR QL STRIP: NEGATIVE
BNP SERPL-MCNC: 186 PG/ML (ref 0–450)
BUN SERPL-MCNC: 3 MG/DL (ref 6–20)
CALCIUM SERPL-MCNC: 9.7 MG/DL (ref 8.6–10.2)
CHLORIDE SERPL-SCNC: 96 MMOL/L (ref 98–107)
CLARITY UR: ABNORMAL
CO2 SERPL-SCNC: 26 MMOL/L (ref 22–29)
COLOR UR: YELLOW
CREAT SERPL-MCNC: 0.7 MG/DL (ref 0.5–1)
CRYSTALS URNS MICRO: ABNORMAL /HPF
EKG ATRIAL RATE: 88 BPM
EKG P AXIS: 32 DEGREES
EKG P-R INTERVAL: 168 MS
EKG Q-T INTERVAL: 386 MS
EKG QRS DURATION: 66 MS
EKG QTC CALCULATION (BAZETT): 467 MS
EKG R AXIS: 50 DEGREES
EKG T AXIS: 29 DEGREES
EKG VENTRICULAR RATE: 88 BPM
EOSINOPHIL # BLD: 0.1 K/UL (ref 0.05–0.5)
EOSINOPHILS RELATIVE PERCENT: 1 % (ref 0–6)
EPI CELLS #/AREA URNS HPF: ABNORMAL /HPF
ERYTHROCYTE [DISTWIDTH] IN BLOOD BY AUTOMATED COUNT: 15.1 % (ref 11.5–15)
GFR SERPL CREATININE-BSD FRML MDRD: >60 ML/MIN/1.73M2
GLUCOSE SERPL-MCNC: 163 MG/DL (ref 74–99)
GLUCOSE UR STRIP-MCNC: NEGATIVE MG/DL
HCT VFR BLD AUTO: 39.1 % (ref 34–48)
HGB BLD-MCNC: 12.5 G/DL (ref 11.5–15.5)
HGB UR QL STRIP.AUTO: NEGATIVE
IMM GRANULOCYTES # BLD AUTO: 0.12 K/UL (ref 0–0.58)
IMM GRANULOCYTES NFR BLD: 1 % (ref 0–5)
INFLUENZA A BY PCR: NOT DETECTED
INFLUENZA B BY PCR: NOT DETECTED
KETONES UR STRIP-MCNC: NEGATIVE MG/DL
LACTATE BLDV-SCNC: 2.1 MMOL/L (ref 0.5–2.2)
LEUKOCYTE ESTERASE UR QL STRIP: ABNORMAL
LIPASE SERPL-CCNC: 50 U/L (ref 13–60)
LYMPHOCYTES NFR BLD: 1.41 K/UL (ref 1.5–4)
LYMPHOCYTES RELATIVE PERCENT: 13 % (ref 20–42)
MAGNESIUM SERPL-MCNC: 1.4 MG/DL (ref 1.6–2.6)
MCH RBC QN AUTO: 28.1 PG (ref 26–35)
MCHC RBC AUTO-ENTMCNC: 32 G/DL (ref 32–34.5)
MCV RBC AUTO: 87.9 FL (ref 80–99.9)
MONOCYTES NFR BLD: 0.35 K/UL (ref 0.1–0.95)
MONOCYTES NFR BLD: 3 % (ref 2–12)
NEUTROPHILS NFR BLD: 81 % (ref 43–80)
NEUTS SEG NFR BLD: 8.95 K/UL (ref 1.8–7.3)
NITRITE UR QL STRIP: NEGATIVE
PH UR STRIP: 7 [PH] (ref 5–9)
PLATELET # BLD AUTO: 481 K/UL (ref 130–450)
PMV BLD AUTO: 8.6 FL (ref 7–12)
POTASSIUM SERPL-SCNC: 3.3 MMOL/L (ref 3.5–5)
PROCALCITONIN SERPL-MCNC: 0.11 NG/ML (ref 0–0.08)
PROT SERPL-MCNC: 6.5 G/DL (ref 6.4–8.3)
PROT UR STRIP-MCNC: NEGATIVE MG/DL
RBC # BLD AUTO: 4.45 M/UL (ref 3.5–5.5)
RBC #/AREA URNS HPF: ABNORMAL /HPF
SARS-COV-2 RDRP RESP QL NAA+PROBE: NOT DETECTED
SODIUM SERPL-SCNC: 138 MMOL/L (ref 132–146)
SP GR UR STRIP: 1.01 (ref 1–1.03)
SPECIMEN DESCRIPTION: NORMAL
T4 FREE SERPL-MCNC: 1.1 NG/DL (ref 0.9–1.7)
TROPONIN I SERPL HS-MCNC: 51 NG/L (ref 0–9)
TROPONIN I SERPL HS-MCNC: 60 NG/L (ref 0–9)
TSH SERPL DL<=0.05 MIU/L-ACNC: 0.58 UIU/ML (ref 0.27–4.2)
UROBILINOGEN UR STRIP-ACNC: 0.2 EU/DL (ref 0–1)
WBC #/AREA URNS HPF: ABNORMAL /HPF
WBC OTHER # BLD: 11 K/UL (ref 4.5–11.5)

## 2024-01-13 PROCEDURE — 96376 TX/PRO/DX INJ SAME DRUG ADON: CPT

## 2024-01-13 PROCEDURE — 81001 URINALYSIS AUTO W/SCOPE: CPT

## 2024-01-13 PROCEDURE — 93005 ELECTROCARDIOGRAM TRACING: CPT | Performed by: STUDENT IN AN ORGANIZED HEALTH CARE EDUCATION/TRAINING PROGRAM

## 2024-01-13 PROCEDURE — 71275 CT ANGIOGRAPHY CHEST: CPT

## 2024-01-13 PROCEDURE — 87502 INFLUENZA DNA AMP PROBE: CPT

## 2024-01-13 PROCEDURE — 80053 COMPREHEN METABOLIC PANEL: CPT

## 2024-01-13 PROCEDURE — 85025 COMPLETE CBC W/AUTO DIFF WBC: CPT

## 2024-01-13 PROCEDURE — 87635 SARS-COV-2 COVID-19 AMP PRB: CPT

## 2024-01-13 PROCEDURE — 84443 ASSAY THYROID STIM HORMONE: CPT

## 2024-01-13 PROCEDURE — 2580000003 HC RX 258

## 2024-01-13 PROCEDURE — 6360000002 HC RX W HCPCS: Performed by: EMERGENCY MEDICINE

## 2024-01-13 PROCEDURE — 99285 EMERGENCY DEPT VISIT HI MDM: CPT

## 2024-01-13 PROCEDURE — 71045 X-RAY EXAM CHEST 1 VIEW: CPT

## 2024-01-13 PROCEDURE — 96365 THER/PROPH/DIAG IV INF INIT: CPT

## 2024-01-13 PROCEDURE — 84145 PROCALCITONIN (PCT): CPT

## 2024-01-13 PROCEDURE — 83735 ASSAY OF MAGNESIUM: CPT

## 2024-01-13 PROCEDURE — 84439 ASSAY OF FREE THYROXINE: CPT

## 2024-01-13 PROCEDURE — 74177 CT ABD & PELVIS W/CONTRAST: CPT

## 2024-01-13 PROCEDURE — 6360000004 HC RX CONTRAST MEDICATION: Performed by: RADIOLOGY

## 2024-01-13 PROCEDURE — 96375 TX/PRO/DX INJ NEW DRUG ADDON: CPT

## 2024-01-13 PROCEDURE — 6360000002 HC RX W HCPCS

## 2024-01-13 PROCEDURE — 84484 ASSAY OF TROPONIN QUANT: CPT

## 2024-01-13 PROCEDURE — 83880 ASSAY OF NATRIURETIC PEPTIDE: CPT

## 2024-01-13 PROCEDURE — 93970 EXTREMITY STUDY: CPT

## 2024-01-13 PROCEDURE — 83605 ASSAY OF LACTIC ACID: CPT

## 2024-01-13 PROCEDURE — 83690 ASSAY OF LIPASE: CPT

## 2024-01-13 RX ORDER — METRONIDAZOLE 500 MG/100ML
500 INJECTION, SOLUTION INTRAVENOUS ONCE
Status: COMPLETED | OUTPATIENT
Start: 2024-01-13 | End: 2024-01-13

## 2024-01-13 RX ORDER — FENTANYL CITRATE 0.05 MG/ML
50 INJECTION, SOLUTION INTRAMUSCULAR; INTRAVENOUS ONCE
Status: COMPLETED | OUTPATIENT
Start: 2024-01-13 | End: 2024-01-13

## 2024-01-13 RX ORDER — ONDANSETRON 2 MG/ML
4 INJECTION INTRAMUSCULAR; INTRAVENOUS ONCE
Status: COMPLETED | OUTPATIENT
Start: 2024-01-13 | End: 2024-01-13

## 2024-01-13 RX ADMIN — FENTANYL CITRATE 50 MCG: 0.05 INJECTION, SOLUTION INTRAMUSCULAR; INTRAVENOUS at 06:58

## 2024-01-13 RX ADMIN — CEFTRIAXONE SODIUM 2000 MG: 2 INJECTION, POWDER, FOR SOLUTION INTRAMUSCULAR; INTRAVENOUS at 10:36

## 2024-01-13 RX ADMIN — METRONIDAZOLE 500 MG: 500 INJECTION, SOLUTION INTRAVENOUS at 10:39

## 2024-01-13 RX ADMIN — ONDANSETRON 4 MG: 2 INJECTION INTRAMUSCULAR; INTRAVENOUS at 06:58

## 2024-01-13 RX ADMIN — FENTANYL CITRATE 50 MCG: 0.05 INJECTION, SOLUTION INTRAMUSCULAR; INTRAVENOUS at 12:04

## 2024-01-13 RX ADMIN — FENTANYL CITRATE 50 MCG: 0.05 INJECTION, SOLUTION INTRAMUSCULAR; INTRAVENOUS at 08:39

## 2024-01-13 RX ADMIN — IOPAMIDOL 75 ML: 755 INJECTION, SOLUTION INTRAVENOUS at 09:03

## 2024-01-13 ASSESSMENT — PAIN SCALES - GENERAL
PAINLEVEL_OUTOF10: 9
PAINLEVEL_OUTOF10: 9
PAINLEVEL_OUTOF10: 10
PAINLEVEL_OUTOF10: 7

## 2024-01-13 ASSESSMENT — PAIN DESCRIPTION - LOCATION
LOCATION: LEG;ABDOMEN
LOCATION: LEG
LOCATION: LEG;ABDOMEN

## 2024-01-13 ASSESSMENT — PAIN - FUNCTIONAL ASSESSMENT: PAIN_FUNCTIONAL_ASSESSMENT: 0-10

## 2024-01-13 ASSESSMENT — PAIN DESCRIPTION - ORIENTATION
ORIENTATION: RIGHT;LEFT

## 2024-01-13 NOTE — ED NOTES
Pt stated she would like to leave AMA. Dr Quach and Dr Mantilla aware. Dr Mantilla at the bedside to speak with pt.

## 2024-01-13 NOTE — ED PROVIDER NOTES
(multiple sclerosis) (HCC), Multiple sclerosis (HCC), Nicotine dependence, cigarettes, uncomplicated, Palpitations, SOB (shortness of breath), SVT (supraventricular tachycardia), and Systemic lupus erythematosus, unspecified (HCC).     Surgical History:  Past Surgical History:   Procedure Laterality Date    ABDOMEN SURGERY      ANTERIOR CRUCIATE LIGAMENT REPAIR      APPENDECTOMY       SECTION      x5    CHOLECYSTECTOMY      DILATION AND CURETTAGE OF UTERUS      HC INJECTION PROCEDURE FOR SACROILIAC JOINT Left 12/3/2020    LEFT SACROILIAC JOINT STEROID INJECTION UNDER X-RAY GUIDANCE performed by Fauzia Crowell DO at [facility] OR    NERVE BLOCK Left 10/01/2020    NERVE BLOCK Left 10/1/2020    LEFT L4-5 TRANSFORAMINAL EPIDURAL STEROID INJECTION performed by Fauzia Crowell DO at [facility] OR    OTHER SURGICAL HISTORY Left 2020    LEFT SACROILIAC STEROID INJECTION UNDER XRAY GUIDANCE    TUBAL LIGATION         Social History:  Social History     Tobacco Use    Smoking status: Every Day     Current packs/day: 0.00     Average packs/day: 0.5 packs/day for 10.0 years (5.0 ttl pk-yrs)     Types: Cigarettes     Start date: 10/2/1999     Last attempt to quit: 10/2/2009     Years since quittin.2    Smokeless tobacco: Never    Tobacco comments:     resumed    Vaping Use    Vaping Use: Never used   Substance Use Topics    Alcohol use: Not Currently     Comment: 1 coffee per day     Drug use: No       Family History:  Family History   Problem Relation Age of Onset    Heart Disease Mother     Heart Attack Mother     High Blood Pressure Mother     Cancer Father         lung    High Blood Pressure Father     Cancer Brother         kidney    Cancer Maternal Grandmother     Breast Cancer Paternal Grandmother        Allergies:  Lyrica [pregabalin], Other, Sulfa antibiotics, Darvocet [propoxyphene n-acetaminophen], Ultram [tramadol hcl], and Metoprolol    The patient’s past medical history     WBC, UA 0 TO 5 0 TO 5 /HPF    RBC, UA 0 TO 2 0 TO 2 /HPF    Crystals, UA FEW CALCIUM OXALATE (A) None /HPF    Epithelial Cells UA 51  /HPF    Bacteria, UA 3+ (A) None   TSH   Result Value Ref Range    TSH 0.58 0.27 - 4.20 uIU/mL   T4, Free   Result Value Ref Range    T4 Free 1.1 0.9 - 1.7 ng/dL   Brain Natriuretic Peptide   Result Value Ref Range    Pro- 0 - 450 pg/mL   Magnesium   Result Value Ref Range    Magnesium 1.4 (L) 1.6 - 2.6 mg/dL   Troponin   Result Value Ref Range    Troponin, High Sensitivity 51 (H) 0 - 9 ng/L   EKG 12 Lead   Result Value Ref Range    Ventricular Rate 88 BPM    Atrial Rate 88 BPM    P-R Interval 168 ms    QRS Duration 66 ms    Q-T Interval 386 ms    QTc Calculation (Bazett) 467 ms    P Axis 32 degrees    R Axis 50 degrees    T Axis 29 degrees         Radiology:   Non-plain film images such as CT, Ultrasound and MRI are read by the radiologist. Plain radiographic images are visualized and preliminarily interpreted by the ED Provider in the MDM section.    Interpretation per the Radiologist below, if available at the time of this note:    Vascular duplex lower extremity venous bilateral   Final Result   No evidence of DVT in either lower extremity.         CTA PULMONARY W CONTRAST   Final Result   1. No evidence of pulmonary embolism or other acute cardiopulmonary process.   2. Located in the lower mid abdomen is a peripherally enhancing fluid density   or collection of fluid 3.7 x 3.1 x 4.5 cm which contains at least a few gas   locules seen in the superior portion. This is adjacent to the urinary bladder   dome however appears potentially separate less likely diverticulum and more   favored to represent a intraperitoneal abscess potential in the setting of   recent surgical exploration with adjacent mesenteric edema.  Follow-up to   ensure stability or resolution with interventional consultation considered   for aspiration or drainage.   3. 12 mm cystic lesion in the

## 2024-01-13 NOTE — ED NOTES
This RN entered room and pt was no where to be found. Gown and tele leads found in bed sheets. PD paged due to pt having IV access.

## 2024-01-15 LAB
EKG ATRIAL RATE: 88 BPM
EKG P AXIS: 32 DEGREES
EKG P-R INTERVAL: 168 MS
EKG Q-T INTERVAL: 386 MS
EKG QRS DURATION: 66 MS
EKG QTC CALCULATION (BAZETT): 467 MS
EKG R AXIS: 50 DEGREES
EKG T AXIS: 29 DEGREES
EKG VENTRICULAR RATE: 88 BPM

## 2024-01-15 PROCEDURE — 93010 ELECTROCARDIOGRAM REPORT: CPT | Performed by: INTERNAL MEDICINE

## 2024-02-27 ENCOUNTER — HOSPITAL ENCOUNTER (EMERGENCY)
Age: 54
Discharge: HOME | End: 2024-02-27
Payer: COMMERCIAL

## 2024-02-27 VITALS
SYSTOLIC BLOOD PRESSURE: 135 MMHG | WEIGHT: 184 LBS | RESPIRATION RATE: 20 BRPM | TEMPERATURE: 98.7 F | BODY MASS INDEX: 27.17 KG/M2 | OXYGEN SATURATION: 100 % | DIASTOLIC BLOOD PRESSURE: 80 MMHG | HEART RATE: 65 BPM

## 2024-02-27 DIAGNOSIS — N30.01 ACUTE CYSTITIS WITH HEMATURIA: Primary | ICD-10-CM

## 2024-02-27 LAB
BACTERIA URNS QL MICRO: ABNORMAL
BILIRUB UR QL STRIP: ABNORMAL
CLARITY UR: ABNORMAL
COLOR UR: ABNORMAL
GLUCOSE UR STRIP-MCNC: 250 MG/DL
HGB UR QL STRIP.AUTO: NEGATIVE
KETONES UR STRIP-MCNC: ABNORMAL MG/DL
LEUKOCYTE ESTERASE UR QL STRIP: ABNORMAL
NITRITE UR QL STRIP: POSITIVE
PH UR STRIP: 5.5 [PH] (ref 5–9)
PROT UR STRIP-MCNC: >=300 MG/DL
RBC #/AREA URNS HPF: ABNORMAL /HPF
SP GR UR STRIP: 1.01 (ref 1–1.03)
UROBILINOGEN UR STRIP-ACNC: 4 EU/DL (ref 0–1)
WBC #/AREA URNS HPF: ABNORMAL /HPF

## 2024-02-27 PROCEDURE — 87088 URINE BACTERIA CULTURE: CPT

## 2024-02-27 PROCEDURE — 87086 URINE CULTURE/COLONY COUNT: CPT

## 2024-02-27 PROCEDURE — 99211 OFF/OP EST MAY X REQ PHY/QHP: CPT

## 2024-02-27 PROCEDURE — 81001 URINALYSIS AUTO W/SCOPE: CPT

## 2024-02-27 RX ORDER — ONDANSETRON 4 MG/1
4 TABLET, ORALLY DISINTEGRATING ORAL 3 TIMES DAILY PRN
Qty: 15 TABLET | Refills: 0 | Status: SHIPPED | OUTPATIENT
Start: 2024-02-27

## 2024-02-27 RX ORDER — CEFDINIR 300 MG/1
300 CAPSULE ORAL 2 TIMES DAILY
Qty: 14 CAPSULE | Refills: 0 | Status: SHIPPED | OUTPATIENT
Start: 2024-02-27 | End: 2024-03-05

## 2024-02-27 ASSESSMENT — PAIN - FUNCTIONAL ASSESSMENT: PAIN_FUNCTIONAL_ASSESSMENT: 0-10

## 2024-02-27 ASSESSMENT — PAIN DESCRIPTION - DESCRIPTORS: DESCRIPTORS: ACHING;DISCOMFORT

## 2024-02-27 ASSESSMENT — PAIN DESCRIPTION - ORIENTATION: ORIENTATION: LOWER

## 2024-02-27 ASSESSMENT — PAIN SCALES - GENERAL: PAINLEVEL_OUTOF10: 6

## 2024-02-27 ASSESSMENT — PAIN DESCRIPTION - LOCATION: LOCATION: BACK

## 2024-02-27 NOTE — ED PROVIDER NOTES
[facility] URGENT CARE  EMERGENCY DEPARTMENT ENCOUNTER        NAME: Li Rojas  :  1970  MRN:  [id number]  Date of evaluation: 2024  Provider: Chilango Talley PA-C  PCP: Dm Heller DO  Note Started : 2:00 PM EST 24    Chief Complaint: Urinary Tract Infection (Urine brown and foul smelling) and Back Pain (lower)      This is a 54-year-old female that presents to urgent care complaining of some urinary symptoms for the past couple days.  She does have a history of MS.  She denies any fevers or chills she has a little bit of nausea.  Patient states some discomfort with urination and some decreased urination and some urgency.  On first contact patient she appears to be in no acute distress.        Review of Systems  Pertinent positives and negatives are stated within HPI, all other systems reviewed and are negative.     Allergies: Lyrica [pregabalin], Other, Sulfa antibiotics, Darvocet [propoxyphene n-acetaminophen], Ultram [tramadol hcl], and Metoprolol     --------------------------------------------- PAST HISTORY ---------------------------------------------  Past Medical History:  has a past medical history of Arthritis, Asthma, H/O medication noncompliance, Hyperlipidemia, Hypertension, Lupus (HCC), MS (multiple sclerosis) (HCC), Multiple sclerosis (HCC), Nicotine dependence, cigarettes, uncomplicated, Palpitations, SOB (shortness of breath), SVT (supraventricular tachycardia), and Systemic lupus erythematosus, unspecified (HCC).    Past Surgical History:  has a past surgical history that includes Appendectomy; Abdomen surgery; Cholecystectomy; Tubal ligation;  section; Anterior cruciate ligament repair; Dilation and curettage of uterus; Nerve Block (Left, 10/01/2020); Nerve Block (Left, 10/1/2020); other surgical history (Left, 2020); and Injection Procedure For Sacroiliac Joint (Left, 12/3/2020).    Social History:  reports that she has been smoking

## 2024-03-01 LAB
MICROORGANISM SPEC CULT: ABNORMAL
SPECIMEN DESCRIPTION: ABNORMAL

## 2024-03-18 ENCOUNTER — HOSPITAL ENCOUNTER (EMERGENCY)
Age: 54
Discharge: HOME | End: 2024-03-18
Attending: EMERGENCY MEDICINE
Payer: COMMERCIAL

## 2024-03-18 VITALS
OXYGEN SATURATION: 97 % | HEART RATE: 67 BPM | RESPIRATION RATE: 14 BRPM | DIASTOLIC BLOOD PRESSURE: 99 MMHG | SYSTOLIC BLOOD PRESSURE: 172 MMHG | TEMPERATURE: 97.6 F

## 2024-03-18 DIAGNOSIS — S33.5XXA LUMBAR SPRAIN, INITIAL ENCOUNTER: Primary | ICD-10-CM

## 2024-03-18 LAB
AMORPH SED URNS QL MICRO: PRESENT
BACTERIA URNS QL MICRO: ABNORMAL
BILIRUB UR QL STRIP: NEGATIVE
CLARITY UR: ABNORMAL
COLOR UR: YELLOW
EPI CELLS #/AREA URNS HPF: ABNORMAL /HPF
GLUCOSE UR STRIP-MCNC: NEGATIVE MG/DL
HGB UR QL STRIP.AUTO: NEGATIVE
KETONES UR STRIP-MCNC: NEGATIVE MG/DL
LEUKOCYTE ESTERASE UR QL STRIP: NEGATIVE
NITRITE UR QL STRIP: NEGATIVE
PH UR STRIP: 7.5 [PH] (ref 5–9)
PROT UR STRIP-MCNC: 30 MG/DL
RBC #/AREA URNS HPF: ABNORMAL /HPF
SP GR UR STRIP: 1.02 (ref 1–1.03)
UROBILINOGEN UR STRIP-ACNC: 0.2 EU/DL (ref 0–1)
WBC #/AREA URNS HPF: ABNORMAL /HPF

## 2024-03-18 PROCEDURE — 99283 EMERGENCY DEPT VISIT LOW MDM: CPT

## 2024-03-18 PROCEDURE — 81001 URINALYSIS AUTO W/SCOPE: CPT

## 2024-03-18 RX ORDER — IBUPROFEN 600 MG/1
600 TABLET ORAL EVERY 8 HOURS PRN
Qty: 30 TABLET | Refills: 0 | Status: SHIPPED | OUTPATIENT
Start: 2024-03-18 | End: 2024-03-28

## 2024-03-18 RX ORDER — CYCLOBENZAPRINE HCL 10 MG
10 TABLET ORAL 3 TIMES DAILY PRN
Qty: 15 TABLET | Refills: 0 | Status: SHIPPED | OUTPATIENT
Start: 2024-03-18 | End: 2024-03-23

## 2024-03-18 ASSESSMENT — ENCOUNTER SYMPTOMS
EYE DISCHARGE: 0
ABDOMINAL DISTENTION: 0
EYE REDNESS: 0
COUGH: 0
BACK PAIN: 1
SHORTNESS OF BREATH: 0
DIARRHEA: 0
VOMITING: 0
NAUSEA: 0
EYE PAIN: 0
WHEEZING: 0

## 2024-03-18 ASSESSMENT — PAIN SCALES - GENERAL: PAINLEVEL_OUTOF10: 8

## 2024-03-18 ASSESSMENT — PAIN - FUNCTIONAL ASSESSMENT: PAIN_FUNCTIONAL_ASSESSMENT: 0-10

## 2024-03-18 ASSESSMENT — PAIN DESCRIPTION - LOCATION: LOCATION: BACK

## 2024-03-18 ASSESSMENT — PAIN DESCRIPTION - DESCRIPTORS: DESCRIPTORS: ACHING;SHARP

## 2024-03-18 NOTE — ED PROVIDER NOTES
Back:    Skin:     General: Skin is warm and dry.   Neurological:      Mental Status: She is alert and oriented to person, place, and time.      Cranial Nerves: No cranial nerve deficit.      Coordination: Coordination normal.          Procedures     [facility]          --------------------------------------------- PAST HISTORY ---------------------------------------------  Past Medical History:  has a past medical history of Arthritis, Asthma, H/O medication noncompliance, Hyperlipidemia, Hypertension, Lupus (HCC), MS (multiple sclerosis) (HCC), Multiple sclerosis (HCC), Nicotine dependence, cigarettes, uncomplicated, Palpitations, SOB (shortness of breath), SVT (supraventricular tachycardia), and Systemic lupus erythematosus, unspecified (HCC).    Past Surgical History:  has a past surgical history that includes Appendectomy; Abdomen surgery; Cholecystectomy; Tubal ligation;  section; Anterior cruciate ligament repair; Dilation and curettage of uterus; Nerve Block (Left, 10/01/2020); Nerve Block (Left, 10/1/2020); other surgical history (Left, 2020); and Injection Procedure For Sacroiliac Joint (Left, 12/3/2020).    Social History:  reports that she has been smoking cigarettes. She started smoking about 24 years ago. She has a 5.0 pack-year smoking history. She has never used smokeless tobacco. She reports that she does not currently use alcohol. She reports that she does not use drugs.    Family History: family history includes Breast Cancer in her paternal grandmother; Cancer in her brother, father, and maternal grandmother; Heart Attack in her mother; Heart Disease in her mother; High Blood Pressure in her father and mother.     The patient’s home medications have been reviewed.    Allergies: Lyrica [pregabalin], Other, Sulfa antibiotics, Darvocet [propoxyphene n-acetaminophen], Ultram [tramadol hcl], and Metoprolol    -------------------------------------------------- RESULTS

## 2024-05-06 ENCOUNTER — OFFICE VISIT (OUTPATIENT)
Dept: ORTHOPEDIC SURGERY | Age: 54
End: 2024-05-06
Payer: COMMERCIAL

## 2024-05-06 VITALS — TEMPERATURE: 98 F | HEIGHT: 69 IN | WEIGHT: 184 LBS | BODY MASS INDEX: 27.25 KG/M2

## 2024-05-06 DIAGNOSIS — M87.052 AVASCULAR NECROSIS OF BONE OF LEFT HIP (HCC): Primary | ICD-10-CM

## 2024-05-06 PROCEDURE — G8417 CALC BMI ABV UP PARAM F/U: HCPCS | Performed by: ORTHOPAEDIC SURGERY

## 2024-05-06 PROCEDURE — 99213 OFFICE O/P EST LOW 20 MIN: CPT | Performed by: ORTHOPAEDIC SURGERY

## 2024-05-06 PROCEDURE — 4004F PT TOBACCO SCREEN RCVD TLK: CPT | Performed by: ORTHOPAEDIC SURGERY

## 2024-05-06 PROCEDURE — G8427 DOCREV CUR MEDS BY ELIG CLIN: HCPCS | Performed by: ORTHOPAEDIC SURGERY

## 2024-05-06 PROCEDURE — 3017F COLORECTAL CA SCREEN DOC REV: CPT | Performed by: ORTHOPAEDIC SURGERY

## 2024-05-06 NOTE — PROGRESS NOTES
Chief Complaint   Patient presents with    Hip Pain     Left hip pain wants to discuss surgery.         Li Rojas  Is a 54 y.o.   female who presents to follow up of her left hip pain. She has failed all attempts at conservative treatment.    Past Medical History:   Diagnosis Date    Arthritis     rheumatoid    Asthma     H/O medication noncompliance     Hyperlipidemia     Hypertension     Lupus (HCC)     MS (multiple sclerosis) (HCC)     Multiple sclerosis (HCC)     Nicotine dependence, cigarettes, uncomplicated     Palpitations     SOB (shortness of breath)     SVT (supraventricular tachycardia)     Systemic lupus erythematosus, unspecified (HCC)      Past Surgical History:   Procedure Laterality Date    ABDOMEN SURGERY      ANTERIOR CRUCIATE LIGAMENT REPAIR      APPENDECTOMY       SECTION      x5    CHOLECYSTECTOMY      DILATION AND CURETTAGE OF UTERUS      HC INJECTION PROCEDURE FOR SACROILIAC JOINT Left 12/3/2020    LEFT SACROILIAC JOINT STEROID INJECTION UNDER X-RAY GUIDANCE performed by Fauzia Crowell DO at [facility] OR    NERVE BLOCK Left 10/01/2020    NERVE BLOCK Left 10/1/2020    LEFT L4-5 TRANSFORAMINAL EPIDURAL STEROID INJECTION performed by Fauzia Crowell DO at [facility] OR    OTHER SURGICAL HISTORY Left 2020    LEFT SACROILIAC STEROID INJECTION UNDER XRAY GUIDANCE    TUBAL LIGATION         Current Outpatient Medications:     ondansetron (ZOFRAN-ODT) 4 MG disintegrating tablet, Take 1 tablet by mouth 3 times daily as needed for Nausea or Vomiting, Disp: 15 tablet, Rfl: 0    meloxicam (MOBIC) 7.5 MG tablet, , Disp: , Rfl:     predniSONE (DELTASONE) 20 MG tablet, , Disp: , Rfl:     albuterol sulfate HFA (PROVENTIL;VENTOLIN;PROAIR) 108 (90 Base) MCG/ACT inhaler, inhale 2 puffs by mouth and INTO THE LUNGS every 6 hours if needed for wheezing, Disp: 8.5 g, Rfl: 1    dilTIAZem (CARDIZEM CD) 240 MG extended release capsule, Take 1 capsule by mouth daily,

## 2024-06-14 ENCOUNTER — TELEPHONE (OUTPATIENT)
Dept: ORTHOPEDIC SURGERY | Age: 54
End: 2024-06-14

## 2024-06-14 ENCOUNTER — PREP FOR PROCEDURE (OUTPATIENT)
Dept: ORTHOPEDIC SURGERY | Age: 54
End: 2024-06-14

## 2024-06-14 PROBLEM — M16.12 OSTEOARTHRITIS OF LEFT HIP: Status: ACTIVE | Noted: 2024-06-14

## 2024-06-14 NOTE — TELEPHONE ENCOUNTER
Prior Authorization Form:      DEMOGRAPHICS:                     Patient Name:  Jyoti Luong  Patient :  1970            Insurance:  Payor: [facility] / Plan: [facility] MEDICAID / Product Type: *No Product type* /   Insurance ID Number:    Payer/Plan Subscr  Sex Relation Sub. Ins. ID Effective Group Num   1. [facility] JYOTI LUONG 1970 Female Self [id number] [address] [facility] [address]         DIAGNOSIS & PROCEDURE:                       Procedure/Operation: LEFT HIP TOTAL HIP ARTHROPLASTY           CPT Code: 36551    Diagnosis:  LEFT HIP DJD    ICD10 Code: M16.12    Location:  [facility]    Surgeon:  MAGGI OAKLEY    SCHEDULING INFORMATION:                          Date: 24    Time: 7AM              Anesthesia:  General                                                       Status:  Outpatient        Special Comments:  EDITH PASCUAL       Electronically signed by Bonny Dooley ATC on 2024 at 11:57 AM

## 2024-06-17 DIAGNOSIS — M87.052 AVASCULAR NECROSIS OF BONE OF LEFT HIP (HCC): Primary | ICD-10-CM

## 2024-07-01 ENCOUNTER — TELEPHONE (OUTPATIENT)
Dept: ORTHOPEDIC SURGERY | Age: 54
End: 2024-07-01

## 2025-01-01 ENCOUNTER — APPOINTMENT (OUTPATIENT)
Age: 55
DRG: 720 | End: 2025-01-01
Payer: COMMERCIAL

## 2025-01-01 ENCOUNTER — APPOINTMENT (OUTPATIENT)
Dept: GENERAL RADIOLOGY | Age: 55
DRG: 720 | End: 2025-01-01
Payer: COMMERCIAL

## 2025-01-01 ENCOUNTER — APPOINTMENT (OUTPATIENT)
Dept: CT IMAGING | Age: 55
DRG: 720 | End: 2025-01-01
Payer: COMMERCIAL

## 2025-01-01 ENCOUNTER — HOSPITAL ENCOUNTER (INPATIENT)
Age: 55
LOS: 3 days | Discharge: HOSPICE | DRG: 720 | End: 2025-08-06
Attending: EMERGENCY MEDICINE | Admitting: STUDENT IN AN ORGANIZED HEALTH CARE EDUCATION/TRAINING PROGRAM
Payer: COMMERCIAL

## 2025-01-01 VITALS
SYSTOLIC BLOOD PRESSURE: 90 MMHG | HEIGHT: 67 IN | DIASTOLIC BLOOD PRESSURE: 65 MMHG | OXYGEN SATURATION: 97 % | HEART RATE: 113 BPM | BODY MASS INDEX: 28.44 KG/M2 | RESPIRATION RATE: 18 BRPM | TEMPERATURE: 100 F | WEIGHT: 181.22 LBS

## 2025-01-01 DIAGNOSIS — K85.90 ACUTE PANCREATITIS, UNSPECIFIED COMPLICATION STATUS, UNSPECIFIED PANCREATITIS TYPE: ICD-10-CM

## 2025-01-01 DIAGNOSIS — R79.89 ELEVATED TROPONIN: ICD-10-CM

## 2025-01-01 DIAGNOSIS — R78.81 BACTEREMIA: ICD-10-CM

## 2025-01-01 DIAGNOSIS — I21.4 NSTEMI (NON-ST ELEVATED MYOCARDIAL INFARCTION) (HCC): ICD-10-CM

## 2025-01-01 DIAGNOSIS — J96.01 ACUTE RESPIRATORY FAILURE WITH HYPOXIA (HCC): Primary | ICD-10-CM

## 2025-01-01 DIAGNOSIS — S22.000A COMPRESSION FRACTURE OF BODY OF THORACIC VERTEBRA (HCC): ICD-10-CM

## 2025-01-01 DIAGNOSIS — S12.690A COMPRESSION FRACTURE OF C7 VERTEBRA, INITIAL ENCOUNTER (HCC): ICD-10-CM

## 2025-01-01 LAB
ALBUMIN SERPL-MCNC: 2.1 G/DL (ref 3.5–5.2)
ALBUMIN SERPL-MCNC: 2.5 G/DL (ref 3.5–5.2)
ALBUMIN SERPL-MCNC: 2.9 G/DL (ref 3.5–5.2)
ALBUMIN SERPL-MCNC: 2.9 G/DL (ref 3.5–5.2)
ALP SERPL-CCNC: 349 U/L (ref 35–104)
ALP SERPL-CCNC: 385 U/L (ref 35–104)
ALP SERPL-CCNC: 440 U/L (ref 35–104)
ALP SERPL-CCNC: 451 U/L (ref 35–104)
ALT SERPL-CCNC: 16 U/L (ref 0–35)
ALT SERPL-CCNC: 18 U/L (ref 0–35)
ALT SERPL-CCNC: 21 U/L (ref 0–35)
ALT SERPL-CCNC: 22 U/L (ref 0–35)
ANION GAP SERPL CALCULATED.3IONS-SCNC: 13 MMOL/L (ref 7–16)
ANION GAP SERPL CALCULATED.3IONS-SCNC: 14 MMOL/L (ref 7–16)
ANION GAP SERPL CALCULATED.3IONS-SCNC: 16 MMOL/L (ref 7–16)
ANION GAP SERPL CALCULATED.3IONS-SCNC: 16 MMOL/L (ref 7–16)
AST SERPL-CCNC: 30 U/L (ref 0–35)
AST SERPL-CCNC: 31 U/L (ref 0–35)
AST SERPL-CCNC: 31 U/L (ref 0–35)
AST SERPL-CCNC: 38 U/L (ref 0–35)
B.E.: 10.2 MMOL/L (ref -3–3)
B.E.: 10.7 MMOL/L (ref -3–3)
B.E.: 12.3 MMOL/L (ref -3–3)
B.E.: 6.4 MMOL/L (ref -3–3)
BACTERIA URNS QL MICRO: ABNORMAL
BASOPHILS # BLD: 0.23 K/UL (ref 0–0.2)
BASOPHILS NFR BLD: 2 % (ref 0–2)
BILIRUB DIRECT SERPL-MCNC: 0.1 MG/DL (ref 0–0.2)
BILIRUB DIRECT SERPL-MCNC: 0.1 MG/DL (ref 0–0.2)
BILIRUB DIRECT SERPL-MCNC: 0.2 MG/DL (ref 0–0.2)
BILIRUB INDIRECT SERPL-MCNC: 0.1 MG/DL (ref 0–1)
BILIRUB INDIRECT SERPL-MCNC: 0.2 MG/DL (ref 0–1)
BILIRUB INDIRECT SERPL-MCNC: 0.2 MG/DL (ref 0–1)
BILIRUB SERPL-MCNC: 0.2 MG/DL (ref 0–1.2)
BILIRUB SERPL-MCNC: 0.4 MG/DL (ref 0–1.2)
BILIRUB SERPL-MCNC: 0.4 MG/DL (ref 0–1.2)
BILIRUB SERPL-MCNC: <0.2 MG/DL (ref 0–1.2)
BILIRUB UR QL STRIP: NEGATIVE
BNP SERPL-MCNC: 422 PG/ML (ref 0–125)
BUN SERPL-MCNC: 12 MG/DL (ref 6–20)
BUN SERPL-MCNC: 14 MG/DL (ref 6–20)
BUN SERPL-MCNC: 15 MG/DL (ref 6–20)
BUN SERPL-MCNC: 30 MG/DL (ref 6–20)
CALCIUM SERPL-MCNC: 9.3 MG/DL (ref 8.6–10)
CALCIUM SERPL-MCNC: 9.5 MG/DL (ref 8.6–10)
CALCIUM SERPL-MCNC: 9.5 MG/DL (ref 8.6–10)
CALCIUM SERPL-MCNC: 9.7 MG/DL (ref 8.6–10)
CANCER AG19-9 SERPL IA-ACNC: 610 U/ML (ref 0–35)
CEA SERPL-MCNC: 7.2 NG/ML (ref 0–5.2)
CHLORIDE SERPL-SCNC: 93 MMOL/L (ref 98–107)
CHLORIDE SERPL-SCNC: 95 MMOL/L (ref 98–107)
CHLORIDE SERPL-SCNC: 98 MMOL/L (ref 98–107)
CHLORIDE SERPL-SCNC: 98 MMOL/L (ref 98–107)
CHROMOGRANIN A: 181 NG/ML (ref 0–187)
CLARITY UR: CLEAR
CO2 SERPL-SCNC: 29 MMOL/L (ref 22–29)
CO2 SERPL-SCNC: 33 MMOL/L (ref 22–29)
COHB: 0.3 % (ref 0–1.5)
COHB: 0.4 % (ref 0–1.5)
COHB: 0.6 % (ref 0–1.5)
COHB: 0.9 % (ref 0–1.5)
COLOR UR: YELLOW
CREAT SERPL-MCNC: 0.3 MG/DL (ref 0.5–1)
CREAT SERPL-MCNC: 0.4 MG/DL (ref 0.5–1)
CRITICAL: ABNORMAL
DATE ANALYZED: ABNORMAL
DATE OF COLLECTION: ABNORMAL
ECHO AO SINUS VALSALVA DIAM: 3.2 CM
ECHO AO SINUS VALSALVA INDEX: 1.58 CM/M2
ECHO BSA: 2.07 M2
ECHO EST RA PRESSURE: 8 MMHG
ECHO LV EF PHYSICIAN: 65 %
EKG ATRIAL RATE: 94 BPM
EKG P AXIS: 54 DEGREES
EKG P-R INTERVAL: 156 MS
EKG Q-T INTERVAL: 356 MS
EKG QRS DURATION: 74 MS
EKG QTC CALCULATION (BAZETT): 445 MS
EKG R AXIS: 61 DEGREES
EKG T AXIS: 73 DEGREES
EKG VENTRICULAR RATE: 94 BPM
EOSINOPHIL # BLD: 0.12 K/UL (ref 0.05–0.5)
EOSINOPHILS RELATIVE PERCENT: 1 % (ref 0–6)
EPI CELLS #/AREA URNS HPF: ABNORMAL /HPF
ERYTHROCYTE [DISTWIDTH] IN BLOOD BY AUTOMATED COUNT: 19.6 % (ref 11.5–15)
ERYTHROCYTE [DISTWIDTH] IN BLOOD BY AUTOMATED COUNT: 19.6 % (ref 11.5–15)
ERYTHROCYTE [DISTWIDTH] IN BLOOD BY AUTOMATED COUNT: 19.8 % (ref 11.5–15)
ERYTHROCYTE [DISTWIDTH] IN BLOOD BY AUTOMATED COUNT: 19.9 % (ref 11.5–15)
FIO2: 100 %
FIO2: 100 %
FIO2: 35 %
FIO2: 45 %
GFR, ESTIMATED: >90 ML/MIN/1.73M2
GLUCOSE BLD-MCNC: 131 MG/DL (ref 74–99)
GLUCOSE BLD-MCNC: 138 MG/DL (ref 74–99)
GLUCOSE BLD-MCNC: 157 MG/DL (ref 74–99)
GLUCOSE BLD-MCNC: 162 MG/DL (ref 74–99)
GLUCOSE BLD-MCNC: 179 MG/DL (ref 74–99)
GLUCOSE SERPL-MCNC: 119 MG/DL (ref 74–99)
GLUCOSE SERPL-MCNC: 140 MG/DL (ref 74–99)
GLUCOSE SERPL-MCNC: 152 MG/DL (ref 74–99)
GLUCOSE SERPL-MCNC: 156 MG/DL (ref 74–99)
GLUCOSE UR STRIP-MCNC: NEGATIVE MG/DL
HCO3: 30.7 MMOL/L (ref 22–26)
HCO3: 33.6 MMOL/L (ref 22–26)
HCO3: 35 MMOL/L (ref 22–26)
HCO3: 38.1 MMOL/L (ref 22–26)
HCT VFR BLD AUTO: 28.3 % (ref 34–48)
HCT VFR BLD AUTO: 31.7 % (ref 34–48)
HCT VFR BLD AUTO: 34.5 % (ref 34–48)
HCT VFR BLD AUTO: 36 % (ref 34–48)
HGB BLD-MCNC: 10.7 G/DL (ref 11.5–15.5)
HGB BLD-MCNC: 11.3 G/DL (ref 11.5–15.5)
HGB BLD-MCNC: 8.7 G/DL (ref 11.5–15.5)
HGB BLD-MCNC: 9.6 G/DL (ref 11.5–15.5)
HGB UR QL STRIP.AUTO: ABNORMAL
HHB: 0.3 % (ref 0–5)
HHB: 0.8 % (ref 0–5)
HHB: 2.1 % (ref 0–5)
HHB: 2.6 % (ref 0–5)
KETONES UR STRIP-MCNC: NEGATIVE MG/DL
LAB: ABNORMAL
LACTATE BLDV-SCNC: 1.6 MMOL/L (ref 0.5–2.2)
LACTATE BLDV-SCNC: 2.3 MMOL/L (ref 0.5–2.2)
LACTATE BLDV-SCNC: 2.5 MMOL/L (ref 0.5–2.2)
LACTATE BLDV-SCNC: 3.7 MMOL/L (ref 0.5–2.2)
LEUKOCYTE ESTERASE UR QL STRIP: ABNORMAL
LIPASE SERPL-CCNC: 455 U/L (ref 13–60)
LYMPHOCYTES NFR BLD: 1.39 K/UL (ref 1.5–4)
LYMPHOCYTES RELATIVE PERCENT: 10 % (ref 20–42)
Lab: 145
Lab: 355
Lab: 448
Lab: 455
MAGNESIUM SERPL-MCNC: 1.7 MG/DL (ref 1.6–2.6)
MAGNESIUM SERPL-MCNC: 1.9 MG/DL (ref 1.6–2.6)
MAGNESIUM SERPL-MCNC: 2.5 MG/DL (ref 1.6–2.6)
MCH RBC QN AUTO: 28.3 PG (ref 26–35)
MCH RBC QN AUTO: 28.4 PG (ref 26–35)
MCHC RBC AUTO-ENTMCNC: 30.3 G/DL (ref 32–34.5)
MCHC RBC AUTO-ENTMCNC: 30.7 G/DL (ref 32–34.5)
MCHC RBC AUTO-ENTMCNC: 31 G/DL (ref 32–34.5)
MCHC RBC AUTO-ENTMCNC: 31.4 G/DL (ref 32–34.5)
MCV RBC AUTO: 90.5 FL (ref 80–99.9)
MCV RBC AUTO: 91.5 FL (ref 80–99.9)
MCV RBC AUTO: 92.5 FL (ref 80–99.9)
MCV RBC AUTO: 93.5 FL (ref 80–99.9)
METAMYELOCYTES ABSOLUTE COUNT: 0.35 K/UL (ref 0–0.12)
METAMYELOCYTES: 3 % (ref 0–1)
METHB: 0.3 % (ref 0–1.5)
MICROORGANISM SPEC CULT: ABNORMAL
MICROORGANISM SPEC CULT: NO GROWTH
MICROORGANISM SPEC CULT: NORMAL
MICROORGANISM/AGENT SPEC: ABNORMAL
MODE: AC
MONOCYTES NFR BLD: 0.93 K/UL (ref 0.1–0.95)
MONOCYTES NFR BLD: 7 % (ref 2–12)
MYELOCYTES ABSOLUTE COUNT: 0.58 K/UL
MYELOCYTES: 4 %
NEUTROPHILS NFR BLD: 73 % (ref 43–80)
NEUTS SEG NFR BLD: 9.71 K/UL (ref 1.8–7.3)
NITRITE UR QL STRIP: NEGATIVE
NUCLEATED RED BLOOD CELLS: 2 PER 100 WBC
O2 CONTENT: 13.4 ML/DL
O2 CONTENT: 15.5 ML/DL
O2 CONTENT: 15.8 ML/DL
O2 CONTENT: 18.3 ML/DL
O2 SATURATION: 97.4 % (ref 92–98.5)
O2 SATURATION: 97.9 % (ref 92–98.5)
O2 SATURATION: 99.2 % (ref 92–98.5)
O2 SATURATION: 99.7 % (ref 92–98.5)
O2HB: 96.7 % (ref 94–97)
O2HB: 97 % (ref 94–97)
O2HB: 98 % (ref 94–97)
O2HB: 99.1 % (ref 94–97)
OPERATOR ID: 5133
OPERATOR ID: 5133
OPERATOR ID: ABNORMAL
OPERATOR ID: ABNORMAL
PATIENT TEMP: 37 C
PCO2: 40.3 MMHG (ref 35–45)
PCO2: 42.8 MMHG (ref 35–45)
PCO2: 44.4 MMHG (ref 35–45)
PCO2: 56.8 MMHG (ref 35–45)
PEEP/CPAP: 10 CMH2O
PEEP/CPAP: 10 CMH2O
PEEP/CPAP: 8 CMH2O
PEEP/CPAP: 8 CMH2O
PFO2: 1.04 MMHG/%
PFO2: 2.23 MMHG/%
PFO2: 3.21 MMHG/%
PFO2: 4.36 MMHG/%
PH BLOOD GAS: 7.45 (ref 7.35–7.45)
PH BLOOD GAS: 7.47 (ref 7.35–7.45)
PH BLOOD GAS: 7.51 (ref 7.35–7.45)
PH BLOOD GAS: 7.54 (ref 7.35–7.45)
PH UR STRIP: 7.5 [PH] (ref 5–8)
PHOSPHATE SERPL-MCNC: 2.7 MG/DL (ref 2.5–4.5)
PHOSPHATE SERPL-MCNC: 4 MG/DL (ref 2.5–4.5)
PHOSPHATE SERPL-MCNC: 4.9 MG/DL (ref 2.5–4.5)
PLATELET # BLD AUTO: 587 K/UL (ref 130–450)
PLATELET # BLD AUTO: 600 K/UL (ref 130–450)
PLATELET # BLD AUTO: 607 K/UL (ref 130–450)
PLATELET # BLD AUTO: 676 K/UL (ref 130–450)
PMV BLD AUTO: 9.6 FL (ref 7–12)
PMV BLD AUTO: 9.6 FL (ref 7–12)
PMV BLD AUTO: 9.7 FL (ref 7–12)
PMV BLD AUTO: 9.7 FL (ref 7–12)
PO2: 100.4 MMHG (ref 75–100)
PO2: 103.8 MMHG (ref 75–100)
PO2: 152.7 MMHG (ref 75–100)
PO2: 320.8 MMHG (ref 75–100)
POTASSIUM SERPL-SCNC: 3.4 MMOL/L (ref 3.5–5.1)
POTASSIUM SERPL-SCNC: 3.5 MMOL/L (ref 3.5–5.1)
POTASSIUM SERPL-SCNC: 4 MMOL/L (ref 3.5–5.1)
POTASSIUM SERPL-SCNC: 4 MMOL/L (ref 3.5–5.1)
POTASSIUM SERPL-SCNC: 4.1 MMOL/L (ref 3.5–5.1)
POTASSIUM SERPL-SCNC: 4.8 MMOL/L (ref 3.5–5.1)
PROCALCITONIN SERPL-MCNC: 1.75 NG/ML (ref 0–0.08)
PROT SERPL-MCNC: 5.2 G/DL (ref 6.4–8.3)
PROT SERPL-MCNC: 5.9 G/DL (ref 6.4–8.3)
PROT SERPL-MCNC: 6 G/DL (ref 6.4–8.3)
PROT SERPL-MCNC: 6.4 G/DL (ref 6.4–8.3)
PROT UR STRIP-MCNC: NEGATIVE MG/DL
RBC # BLD AUTO: 3.06 M/UL (ref 3.5–5.5)
RBC # BLD AUTO: 3.39 M/UL (ref 3.5–5.5)
RBC # BLD AUTO: 3.77 M/UL (ref 3.5–5.5)
RBC # BLD AUTO: 3.98 M/UL (ref 3.5–5.5)
RBC # BLD: ABNORMAL 10*6/UL
RBC #/AREA URNS HPF: ABNORMAL /HPF
RI(T): 0.33
RI(T): 1.05
RI(T): 1.71
RI(T): 5.44
RR MECHANICAL: 18 B/MIN
RR MECHANICAL: 18 B/MIN
RR MECHANICAL: 20 B/MIN
RR MECHANICAL: 20 B/MIN
SERVICE CMNT-IMP: NORMAL
SODIUM SERPL-SCNC: 140 MMOL/L (ref 136–145)
SODIUM SERPL-SCNC: 142 MMOL/L (ref 136–145)
SOURCE, BLOOD GAS: ABNORMAL
SP GR UR STRIP: 1.01 (ref 1–1.03)
SPECIMEN DESCRIPTION: ABNORMAL
SPECIMEN DESCRIPTION: NORMAL
SPECIMEN DESCRIPTION: NORMAL
THB: 10.5 G/DL (ref 11.5–15.5)
THB: 11.5 G/DL (ref 11.5–15.5)
THB: 13.3 G/DL (ref 11.5–15.5)
THB: 9.5 G/DL (ref 11.5–15.5)
TIME ANALYZED: 151
TIME ANALYZED: 406
TIME ANALYZED: 451
TIME ANALYZED: 506
TRIGL SERPL-MCNC: 326 MG/DL
TROPONIN I SERPL HS-MCNC: 369 NG/L (ref 0–14)
TROPONIN I SERPL HS-MCNC: 394 NG/L (ref 0–14)
TROPONIN I SERPL HS-MCNC: 445 NG/L (ref 0–14)
UROBILINOGEN UR STRIP-ACNC: 0.2 EU/DL (ref 0–1)
VT MECHANICAL: 370 ML
VT MECHANICAL: 400 ML
WBC #/AREA URNS HPF: ABNORMAL /HPF
WBC OTHER # BLD: 13.3 K/UL (ref 4.5–11.5)
WBC OTHER # BLD: 18.2 K/UL (ref 4.5–11.5)
WBC OTHER # BLD: 20.5 K/UL (ref 4.5–11.5)
WBC OTHER # BLD: 22.8 K/UL (ref 4.5–11.5)

## 2025-01-01 PROCEDURE — 82378 CARCINOEMBRYONIC ANTIGEN: CPT

## 2025-01-01 PROCEDURE — 6370000000 HC RX 637 (ALT 250 FOR IP)

## 2025-01-01 PROCEDURE — 82248 BILIRUBIN DIRECT: CPT

## 2025-01-01 PROCEDURE — 84100 ASSAY OF PHOSPHORUS: CPT

## 2025-01-01 PROCEDURE — 85025 COMPLETE CBC W/AUTO DIFF WBC: CPT

## 2025-01-01 PROCEDURE — 99222 1ST HOSP IP/OBS MODERATE 55: CPT | Performed by: NURSE PRACTITIONER

## 2025-01-01 PROCEDURE — 82962 GLUCOSE BLOOD TEST: CPT

## 2025-01-01 PROCEDURE — 87071 CULTURE AEROBIC QUANT OTHER: CPT

## 2025-01-01 PROCEDURE — 6370000000 HC RX 637 (ALT 250 FOR IP): Performed by: STUDENT IN AN ORGANIZED HEALTH CARE EDUCATION/TRAINING PROGRAM

## 2025-01-01 PROCEDURE — 6360000002 HC RX W HCPCS: Performed by: INTERNAL MEDICINE

## 2025-01-01 PROCEDURE — 81001 URINALYSIS AUTO W/SCOPE: CPT

## 2025-01-01 PROCEDURE — 94640 AIRWAY INHALATION TREATMENT: CPT

## 2025-01-01 PROCEDURE — 94002 VENT MGMT INPAT INIT DAY: CPT

## 2025-01-01 PROCEDURE — 6360000002 HC RX W HCPCS

## 2025-01-01 PROCEDURE — 6360000002 HC RX W HCPCS: Performed by: SPECIALIST

## 2025-01-01 PROCEDURE — 6370000000 HC RX 637 (ALT 250 FOR IP): Performed by: SPECIALIST

## 2025-01-01 PROCEDURE — 84478 ASSAY OF TRIGLYCERIDES: CPT

## 2025-01-01 PROCEDURE — APPSS45 APP SPLIT SHARED TIME 31-45 MINUTES: Performed by: CLINICAL NURSE SPECIALIST

## 2025-01-01 PROCEDURE — APPSS60 APP SPLIT SHARED TIME 46-60 MINUTES

## 2025-01-01 PROCEDURE — 2580000003 HC RX 258

## 2025-01-01 PROCEDURE — 83690 ASSAY OF LIPASE: CPT

## 2025-01-01 PROCEDURE — 51702 INSERT TEMP BLADDER CATH: CPT

## 2025-01-01 PROCEDURE — 87040 BLOOD CULTURE FOR BACTERIA: CPT

## 2025-01-01 PROCEDURE — 84132 ASSAY OF SERUM POTASSIUM: CPT

## 2025-01-01 PROCEDURE — 80053 COMPREHEN METABOLIC PANEL: CPT

## 2025-01-01 PROCEDURE — 74018 RADEX ABDOMEN 1 VIEW: CPT

## 2025-01-01 PROCEDURE — 82805 BLOOD GASES W/O2 SATURATION: CPT

## 2025-01-01 PROCEDURE — 84484 ASSAY OF TROPONIN QUANT: CPT

## 2025-01-01 PROCEDURE — 6370000000 HC RX 637 (ALT 250 FOR IP): Performed by: INTERNAL MEDICINE

## 2025-01-01 PROCEDURE — 2580000003 HC RX 258: Performed by: SPECIALIST

## 2025-01-01 PROCEDURE — 71045 X-RAY EXAM CHEST 1 VIEW: CPT

## 2025-01-01 PROCEDURE — 99222 1ST HOSP IP/OBS MODERATE 55: CPT | Performed by: STUDENT IN AN ORGANIZED HEALTH CARE EDUCATION/TRAINING PROGRAM

## 2025-01-01 PROCEDURE — 94003 VENT MGMT INPAT SUBQ DAY: CPT

## 2025-01-01 PROCEDURE — 85027 COMPLETE CBC AUTOMATED: CPT

## 2025-01-01 PROCEDURE — 83605 ASSAY OF LACTIC ACID: CPT

## 2025-01-01 PROCEDURE — 87077 CULTURE AEROBIC IDENTIFY: CPT

## 2025-01-01 PROCEDURE — 86316 IMMUNOASSAY TUMOR OTHER: CPT

## 2025-01-01 PROCEDURE — 2000000000 HC ICU R&B

## 2025-01-01 PROCEDURE — 93312 ECHO TRANSESOPHAGEAL: CPT

## 2025-01-01 PROCEDURE — 2500000003 HC RX 250 WO HCPCS: Performed by: INTERNAL MEDICINE

## 2025-01-01 PROCEDURE — 86301 IMMUNOASSAY TUMOR CA 19-9: CPT

## 2025-01-01 PROCEDURE — 87070 CULTURE OTHR SPECIMN AEROBIC: CPT

## 2025-01-01 PROCEDURE — 83735 ASSAY OF MAGNESIUM: CPT

## 2025-01-01 PROCEDURE — 2500000003 HC RX 250 WO HCPCS: Performed by: SPECIALIST

## 2025-01-01 PROCEDURE — 99223 1ST HOSP IP/OBS HIGH 75: CPT | Performed by: INTERNAL MEDICINE

## 2025-01-01 PROCEDURE — 94668 MNPJ CHEST WALL SBSQ: CPT

## 2025-01-01 PROCEDURE — 31500 INSERT EMERGENCY AIRWAY: CPT

## 2025-01-01 PROCEDURE — 96375 TX/PRO/DX INJ NEW DRUG ADDON: CPT

## 2025-01-01 PROCEDURE — 36600 WITHDRAWAL OF ARTERIAL BLOOD: CPT

## 2025-01-01 PROCEDURE — 87205 SMEAR GRAM STAIN: CPT

## 2025-01-01 PROCEDURE — 96376 TX/PRO/DX INJ SAME DRUG ADON: CPT

## 2025-01-01 PROCEDURE — 6370000000 HC RX 637 (ALT 250 FOR IP): Performed by: NURSE PRACTITIONER

## 2025-01-01 PROCEDURE — 84145 PROCALCITONIN (PCT): CPT

## 2025-01-01 PROCEDURE — 93010 ELECTROCARDIOGRAM REPORT: CPT | Performed by: INTERNAL MEDICINE

## 2025-01-01 PROCEDURE — 71275 CT ANGIOGRAPHY CHEST: CPT

## 2025-01-01 PROCEDURE — 74177 CT ABD & PELVIS W/CONTRAST: CPT

## 2025-01-01 PROCEDURE — 93005 ELECTROCARDIOGRAM TRACING: CPT

## 2025-01-01 PROCEDURE — 99285 EMERGENCY DEPT VISIT HI MDM: CPT

## 2025-01-01 PROCEDURE — 72125 CT NECK SPINE W/O DYE: CPT

## 2025-01-01 PROCEDURE — 36415 COLL VENOUS BLD VENIPUNCTURE: CPT

## 2025-01-01 PROCEDURE — 5A1945Z RESPIRATORY VENTILATION, 24-96 CONSECUTIVE HOURS: ICD-10-PCS | Performed by: STUDENT IN AN ORGANIZED HEALTH CARE EDUCATION/TRAINING PROGRAM

## 2025-01-01 PROCEDURE — 83880 ASSAY OF NATRIURETIC PEPTIDE: CPT

## 2025-01-01 PROCEDURE — 99238 HOSP IP/OBS DSCHRG MGMT 30/<: CPT | Performed by: STUDENT IN AN ORGANIZED HEALTH CARE EDUCATION/TRAINING PROGRAM

## 2025-01-01 PROCEDURE — 96365 THER/PROPH/DIAG IV INF INIT: CPT

## 2025-01-01 PROCEDURE — 6360000002 HC RX W HCPCS: Performed by: NURSE PRACTITIONER

## 2025-01-01 PROCEDURE — 93325 DOPPLER ECHO COLOR FLOW MAPG: CPT | Performed by: INTERNAL MEDICINE

## 2025-01-01 PROCEDURE — 94667 MNPJ CHEST WALL 1ST: CPT

## 2025-01-01 PROCEDURE — 93320 DOPPLER ECHO COMPLETE: CPT | Performed by: INTERNAL MEDICINE

## 2025-01-01 PROCEDURE — 87081 CULTURE SCREEN ONLY: CPT

## 2025-01-01 PROCEDURE — 99223 1ST HOSP IP/OBS HIGH 75: CPT | Performed by: STUDENT IN AN ORGANIZED HEALTH CARE EDUCATION/TRAINING PROGRAM

## 2025-01-01 PROCEDURE — 96366 THER/PROPH/DIAG IV INF ADDON: CPT

## 2025-01-01 PROCEDURE — 6360000004 HC RX CONTRAST MEDICATION: Performed by: RADIOLOGY

## 2025-01-01 PROCEDURE — APPSS60 APP SPLIT SHARED TIME 46-60 MINUTES: Performed by: CLINICAL NURSE SPECIALIST

## 2025-01-01 PROCEDURE — 93312 ECHO TRANSESOPHAGEAL: CPT | Performed by: INTERNAL MEDICINE

## 2025-01-01 PROCEDURE — 99232 SBSQ HOSP IP/OBS MODERATE 35: CPT | Performed by: STUDENT IN AN ORGANIZED HEALTH CARE EDUCATION/TRAINING PROGRAM

## 2025-01-01 PROCEDURE — 86063 ANTISTREPTOLYSIN O SCREEN: CPT

## 2025-01-01 PROCEDURE — 99232 SBSQ HOSP IP/OBS MODERATE 35: CPT | Performed by: NURSE PRACTITIONER

## 2025-01-01 RX ORDER — DOCUSATE SODIUM 50 MG/5ML
100 LIQUID ORAL 2 TIMES DAILY
Status: DISCONTINUED | OUTPATIENT
Start: 2025-01-01 | End: 2025-01-01 | Stop reason: HOSPADM

## 2025-01-01 RX ORDER — ACETAMINOPHEN 160 MG/5ML
650 LIQUID ORAL EVERY 6 HOURS PRN
Status: DISCONTINUED | OUTPATIENT
Start: 2025-01-01 | End: 2025-01-01 | Stop reason: HOSPADM

## 2025-01-01 RX ORDER — POTASSIUM CHLORIDE 7.45 MG/ML
10 INJECTION INTRAVENOUS
Status: COMPLETED | OUTPATIENT
Start: 2025-01-01 | End: 2025-01-01

## 2025-01-01 RX ORDER — ENOXAPARIN SODIUM 100 MG/ML
40 INJECTION SUBCUTANEOUS DAILY
Status: DISCONTINUED | OUTPATIENT
Start: 2025-01-01 | End: 2025-01-01 | Stop reason: HOSPADM

## 2025-01-01 RX ORDER — METHYLPHENIDATE HYDROCHLORIDE 5 MG/1
5 TABLET ORAL 2 TIMES DAILY
Status: ON HOLD | COMMUNITY
End: 2025-01-01

## 2025-01-01 RX ORDER — ASPIRIN 81 MG/1
81 TABLET, CHEWABLE ORAL DAILY
Status: DISCONTINUED | OUTPATIENT
Start: 2025-01-01 | End: 2025-01-01 | Stop reason: HOSPADM

## 2025-01-01 RX ORDER — FENTANYL CITRATE-0.9 % NACL/PF 10 MCG/ML
25-200 PLASTIC BAG, INJECTION (ML) INTRAVENOUS CONTINUOUS
Refills: 0 | Status: DISCONTINUED | OUTPATIENT
Start: 2025-01-01 | End: 2025-01-01 | Stop reason: HOSPADM

## 2025-01-01 RX ORDER — ACETAMINOPHEN 650 MG/1
650 SUPPOSITORY RECTAL EVERY 4 HOURS PRN
Status: DISCONTINUED | OUTPATIENT
Start: 2025-01-01 | End: 2025-01-01

## 2025-01-01 RX ORDER — FENTANYL CITRATE 50 UG/ML
100 INJECTION, SOLUTION INTRAMUSCULAR; INTRAVENOUS ONCE
Status: DISCONTINUED | OUTPATIENT
Start: 2025-01-01 | End: 2025-01-01

## 2025-01-01 RX ORDER — ACETYLCYSTEINE 100 MG/ML
4 SOLUTION ORAL; RESPIRATORY (INHALATION)
Status: DISCONTINUED | OUTPATIENT
Start: 2025-01-01 | End: 2025-01-01

## 2025-01-01 RX ORDER — SODIUM CHLORIDE 0.9 % (FLUSH) 0.9 %
5-40 SYRINGE (ML) INJECTION 2 TIMES DAILY
Status: DISCONTINUED | OUTPATIENT
Start: 2025-01-01 | End: 2025-01-01 | Stop reason: HOSPADM

## 2025-01-01 RX ORDER — DILTIAZEM HYDROCHLORIDE 30 MG/1
60 TABLET, FILM COATED ORAL EVERY 12 HOURS SCHEDULED
Status: DISCONTINUED | OUTPATIENT
Start: 2025-01-01 | End: 2025-01-01

## 2025-01-01 RX ORDER — IPRATROPIUM BROMIDE AND ALBUTEROL SULFATE 2.5; .5 MG/3ML; MG/3ML
1 SOLUTION RESPIRATORY (INHALATION)
Status: DISCONTINUED | OUTPATIENT
Start: 2025-01-01 | End: 2025-01-01

## 2025-01-01 RX ORDER — AMMONIUM LACTATE 12 G/100G
LOTION TOPICAL PRN
Status: DISCONTINUED | OUTPATIENT
Start: 2025-01-01 | End: 2025-01-01 | Stop reason: HOSPADM

## 2025-01-01 RX ORDER — LANSOPRAZOLE 30 MG/1
30 TABLET, ORALLY DISINTEGRATING, DELAYED RELEASE ORAL
Status: DISCONTINUED | OUTPATIENT
Start: 2025-01-01 | End: 2025-01-01 | Stop reason: HOSPADM

## 2025-01-01 RX ORDER — SODIUM CHLORIDE, SODIUM LACTATE, POTASSIUM CHLORIDE, AND CALCIUM CHLORIDE .6; .31; .03; .02 G/100ML; G/100ML; G/100ML; G/100ML
1000 INJECTION, SOLUTION INTRAVENOUS ONCE
Status: COMPLETED | OUTPATIENT
Start: 2025-01-01 | End: 2025-01-01

## 2025-01-01 RX ORDER — ARFORMOTEROL TARTRATE 15 UG/2ML
15 SOLUTION RESPIRATORY (INHALATION)
Status: DISCONTINUED | OUTPATIENT
Start: 2025-01-01 | End: 2025-01-01

## 2025-01-01 RX ORDER — ASPIRIN 81 MG/1
324 TABLET, CHEWABLE ORAL ONCE
Status: COMPLETED | OUTPATIENT
Start: 2025-01-01 | End: 2025-01-01

## 2025-01-01 RX ORDER — MIDAZOLAM HYDROCHLORIDE 1 MG/ML
1-10 INJECTION, SOLUTION INTRAVENOUS CONTINUOUS
Status: DISCONTINUED | OUTPATIENT
Start: 2025-01-01 | End: 2025-01-01 | Stop reason: HOSPADM

## 2025-01-01 RX ORDER — BUDESONIDE 0.5 MG/2ML
500 INHALANT ORAL
Status: DISCONTINUED | OUTPATIENT
Start: 2025-01-01 | End: 2025-01-01

## 2025-01-01 RX ORDER — ACETAMINOPHEN 160 MG/5ML
650 SUSPENSION ORAL EVERY 6 HOURS PRN
Status: DISCONTINUED | OUTPATIENT
Start: 2025-01-01 | End: 2025-01-01

## 2025-01-01 RX ORDER — ACETYLCYSTEINE 100 MG/ML
4 SOLUTION ORAL; RESPIRATORY (INHALATION) EVERY 6 HOURS
Status: DISCONTINUED | OUTPATIENT
Start: 2025-01-01 | End: 2025-01-01

## 2025-01-01 RX ORDER — DILTIAZEM HYDROCHLORIDE 5 MG/ML
20 INJECTION INTRAVENOUS ONCE
Status: COMPLETED | OUTPATIENT
Start: 2025-01-01 | End: 2025-01-01

## 2025-01-01 RX ORDER — ALBUTEROL SULFATE 0.83 MG/ML
2.5 SOLUTION RESPIRATORY (INHALATION) EVERY 6 HOURS PRN
Status: DISCONTINUED | OUTPATIENT
Start: 2025-01-01 | End: 2025-01-01 | Stop reason: HOSPADM

## 2025-01-01 RX ORDER — MAGNESIUM SULFATE IN WATER 40 MG/ML
4000 INJECTION, SOLUTION INTRAVENOUS ONCE
Status: COMPLETED | OUTPATIENT
Start: 2025-01-01 | End: 2025-01-01

## 2025-01-01 RX ORDER — METOPROLOL TARTRATE 25 MG/1
12.5 TABLET, FILM COATED ORAL 2 TIMES DAILY
Status: DISCONTINUED | OUTPATIENT
Start: 2025-01-01 | End: 2025-01-01

## 2025-01-01 RX ORDER — IOPAMIDOL 755 MG/ML
75 INJECTION, SOLUTION INTRAVASCULAR
Status: COMPLETED | OUTPATIENT
Start: 2025-01-01 | End: 2025-01-01

## 2025-01-01 RX ORDER — POTASSIUM CHLORIDE 29.8 MG/ML
40 INJECTION INTRAVENOUS ONCE
Status: DISCONTINUED | OUTPATIENT
Start: 2025-01-01 | End: 2025-01-01

## 2025-01-01 RX ORDER — ACETAMINOPHEN 650 MG/1
650 SUPPOSITORY RECTAL EVERY 4 HOURS PRN
Status: DISCONTINUED | OUTPATIENT
Start: 2025-01-01 | End: 2025-01-01 | Stop reason: HOSPADM

## 2025-01-01 RX ORDER — CHLORHEXIDINE GLUCONATE ORAL RINSE 1.2 MG/ML
15 SOLUTION DENTAL 2 TIMES DAILY
Status: DISCONTINUED | OUTPATIENT
Start: 2025-01-01 | End: 2025-01-01 | Stop reason: HOSPADM

## 2025-01-01 RX ADMIN — LANSOPRAZOLE 30 MG: 30 TABLET, ORALLY DISINTEGRATING, DELAYED RELEASE ORAL at 09:18

## 2025-01-01 RX ADMIN — SENNOSIDES 10 ML: 8.8 LIQUID ORAL at 08:04

## 2025-01-01 RX ADMIN — ALBUTEROL SULFATE 2.5 MG: 2.5 SOLUTION RESPIRATORY (INHALATION) at 08:01

## 2025-01-01 RX ADMIN — SENNOSIDES 10 ML: 8.8 LIQUID ORAL at 20:09

## 2025-01-01 RX ADMIN — ACETYLCYSTEINE 400 MG: 100 INHALANT RESPIRATORY (INHALATION) at 16:11

## 2025-01-01 RX ADMIN — BUDESONIDE 500 MCG: 0.5 SUSPENSION RESPIRATORY (INHALATION) at 19:52

## 2025-01-01 RX ADMIN — ACETYLCYSTEINE 400 MG: 100 INHALANT RESPIRATORY (INHALATION) at 09:39

## 2025-01-01 RX ADMIN — ACETYLCYSTEINE 400 MG: 100 INHALANT RESPIRATORY (INHALATION) at 03:54

## 2025-01-01 RX ADMIN — ENOXAPARIN SODIUM 40 MG: 100 INJECTION SUBCUTANEOUS at 08:03

## 2025-01-01 RX ADMIN — BUDESONIDE 500 MCG: 0.5 SUSPENSION RESPIRATORY (INHALATION) at 07:43

## 2025-01-01 RX ADMIN — Medication: at 20:28

## 2025-01-01 RX ADMIN — Medication 50 MCG/HR: at 02:28

## 2025-01-01 RX ADMIN — SODIUM CHLORIDE, SODIUM LACTATE, POTASSIUM CHLORIDE, AND CALCIUM CHLORIDE 1000 ML: .6; .31; .03; .02 INJECTION, SOLUTION INTRAVENOUS at 11:49

## 2025-01-01 RX ADMIN — IOPAMIDOL 75 ML: 755 INJECTION, SOLUTION INTRAVENOUS at 08:15

## 2025-01-01 RX ADMIN — Medication 150 MCG/HR: at 11:23

## 2025-01-01 RX ADMIN — CHLORHEXIDINE GLUCONATE 15 ML: 1.2 RINSE ORAL at 08:04

## 2025-01-01 RX ADMIN — SENNOSIDES 10 ML: 8.8 LIQUID ORAL at 20:08

## 2025-01-01 RX ADMIN — DOCUSATE SODIUM LIQUID 100 MG: 100 LIQUID ORAL at 09:12

## 2025-01-01 RX ADMIN — Medication 4 MG/HR: at 02:53

## 2025-01-01 RX ADMIN — ASPIRIN 81 MG: 81 TABLET, CHEWABLE ORAL at 11:27

## 2025-01-01 RX ADMIN — CHLORHEXIDINE GLUCONATE 15 ML: 1.2 RINSE ORAL at 09:12

## 2025-01-01 RX ADMIN — MICONAZOLE NITRATE: 2 OINTMENT TOPICAL at 20:23

## 2025-01-01 RX ADMIN — Medication 2 MG/HR: at 02:28

## 2025-01-01 RX ADMIN — DOCUSATE SODIUM LIQUID 100 MG: 100 LIQUID ORAL at 11:28

## 2025-01-01 RX ADMIN — ASPIRIN 324 MG: 81 TABLET, CHEWABLE ORAL at 12:39

## 2025-01-01 RX ADMIN — ARFORMOTEROL TARTRATE 15 MCG: 15 SOLUTION RESPIRATORY (INHALATION) at 08:01

## 2025-01-01 RX ADMIN — Medication 150 MCG/HR: at 18:07

## 2025-01-01 RX ADMIN — Medication 125 MCG/HR: at 10:55

## 2025-01-01 RX ADMIN — ARFORMOTEROL TARTRATE 15 MCG: 15 SOLUTION RESPIRATORY (INHALATION) at 19:52

## 2025-01-01 RX ADMIN — ACETAMINOPHEN 650 MG: 650 SUPPOSITORY RECTAL at 03:32

## 2025-01-01 RX ADMIN — ARFORMOTEROL TARTRATE 15 MCG: 15 SOLUTION RESPIRATORY (INHALATION) at 19:28

## 2025-01-01 RX ADMIN — MICONAZOLE NITRATE: 2 OINTMENT TOPICAL at 08:03

## 2025-01-01 RX ADMIN — SODIUM CHLORIDE, PRESERVATIVE FREE 10 ML: 5 INJECTION INTRAVENOUS at 09:12

## 2025-01-01 RX ADMIN — Medication 125 MCG/HR: at 08:00

## 2025-01-01 RX ADMIN — CHLORHEXIDINE GLUCONATE 15 ML: 1.2 RINSE ORAL at 20:01

## 2025-01-01 RX ADMIN — Medication 125 MCG/HR: at 04:33

## 2025-01-01 RX ADMIN — Medication 125 MCG/HR: at 12:42

## 2025-01-01 RX ADMIN — MAGNESIUM SULFATE HEPTAHYDRATE 4000 MG: 40 INJECTION, SOLUTION INTRAVENOUS at 09:37

## 2025-01-01 RX ADMIN — CHLORHEXIDINE GLUCONATE 15 ML: 1.2 RINSE ORAL at 20:09

## 2025-01-01 RX ADMIN — ALBUTEROL SULFATE 2.5 MG: 2.5 SOLUTION RESPIRATORY (INHALATION) at 09:39

## 2025-01-01 RX ADMIN — POTASSIUM BICARBONATE 40 MEQ: 782 TABLET, EFFERVESCENT ORAL at 09:18

## 2025-01-01 RX ADMIN — BUDESONIDE 500 MCG: 0.5 SUSPENSION RESPIRATORY (INHALATION) at 19:28

## 2025-01-01 RX ADMIN — ACETYLCYSTEINE 400 MG: 100 INHALANT RESPIRATORY (INHALATION) at 02:58

## 2025-01-01 RX ADMIN — SODIUM CHLORIDE, PRESERVATIVE FREE 10 ML: 5 INJECTION INTRAVENOUS at 08:03

## 2025-01-01 RX ADMIN — METOPROLOL TARTRATE 12.5 MG: 25 TABLET, FILM COATED ORAL at 09:17

## 2025-01-01 RX ADMIN — ASPIRIN 81 MG: 81 TABLET, CHEWABLE ORAL at 09:12

## 2025-01-01 RX ADMIN — POTASSIUM CHLORIDE 10 MEQ: 7.46 INJECTION, SOLUTION INTRAVENOUS at 06:35

## 2025-01-01 RX ADMIN — SODIUM CHLORIDE, PRESERVATIVE FREE 10 ML: 5 INJECTION INTRAVENOUS at 20:07

## 2025-01-01 RX ADMIN — POTASSIUM BICARBONATE 40 MEQ: 782 TABLET, EFFERVESCENT ORAL at 05:58

## 2025-01-01 RX ADMIN — MEROPENEM 1000 MG: 1 INJECTION INTRAVENOUS at 12:18

## 2025-01-01 RX ADMIN — LANSOPRAZOLE 30 MG: 30 TABLET, ORALLY DISINTEGRATING, DELAYED RELEASE ORAL at 05:25

## 2025-01-01 RX ADMIN — Medication 150 MCG/HR: at 14:51

## 2025-01-01 RX ADMIN — ALBUTEROL SULFATE 2.5 MG: 2.5 SOLUTION RESPIRATORY (INHALATION) at 19:52

## 2025-01-01 RX ADMIN — DILTIAZEM HYDROCHLORIDE 20 MG: 5 INJECTION, SOLUTION INTRAVENOUS at 02:28

## 2025-01-01 RX ADMIN — Medication 4 MG/HR: at 04:35

## 2025-01-01 RX ADMIN — DAPTOMYCIN 750 MG: 500 INJECTION, POWDER, LYOPHILIZED, FOR SOLUTION INTRAVENOUS at 14:53

## 2025-01-01 RX ADMIN — ACETAMINOPHEN 650 MG: 650 SUPPOSITORY RECTAL at 20:40

## 2025-01-01 RX ADMIN — Medication 150 MCG/HR: at 00:55

## 2025-01-01 RX ADMIN — MEROPENEM 1000 MG: 1 INJECTION INTRAVENOUS at 04:09

## 2025-01-01 RX ADMIN — DAPTOMYCIN 750 MG: 500 INJECTION, POWDER, LYOPHILIZED, FOR SOLUTION INTRAVENOUS at 12:24

## 2025-01-01 RX ADMIN — SENNOSIDES 10 ML: 8.8 LIQUID ORAL at 09:11

## 2025-01-01 RX ADMIN — ALBUTEROL SULFATE 2.5 MG: 2.5 SOLUTION RESPIRATORY (INHALATION) at 16:10

## 2025-01-01 RX ADMIN — ALBUTEROL SULFATE 2.5 MG: 2.5 SOLUTION RESPIRATORY (INHALATION) at 03:54

## 2025-01-01 RX ADMIN — ENOXAPARIN SODIUM 40 MG: 100 INJECTION SUBCUTANEOUS at 11:27

## 2025-01-01 RX ADMIN — ENOXAPARIN SODIUM 40 MG: 100 INJECTION SUBCUTANEOUS at 09:12

## 2025-01-01 RX ADMIN — BUDESONIDE 500 MCG: 0.5 SUSPENSION RESPIRATORY (INHALATION) at 08:01

## 2025-01-01 RX ADMIN — IOPAMIDOL 75 ML: 755 INJECTION, SOLUTION INTRAVENOUS at 04:07

## 2025-01-01 RX ADMIN — SODIUM CHLORIDE, SODIUM LACTATE, POTASSIUM CHLORIDE, AND CALCIUM CHLORIDE 1000 ML: .6; .31; .03; .02 INJECTION, SOLUTION INTRAVENOUS at 16:07

## 2025-01-01 RX ADMIN — SENNOSIDES 10 ML: 8.8 LIQUID ORAL at 11:28

## 2025-01-01 RX ADMIN — Medication 125 MCG/HR: at 19:59

## 2025-01-01 RX ADMIN — DOCUSATE SODIUM LIQUID 100 MG: 100 LIQUID ORAL at 20:09

## 2025-01-01 RX ADMIN — ACETYLCYSTEINE 400 MG: 100 INHALANT RESPIRATORY (INHALATION) at 19:52

## 2025-01-01 RX ADMIN — Medication 125 MCG/HR: at 18:32

## 2025-01-01 RX ADMIN — DORNASE ALFA 2.5 MG: 1 SOLUTION RESPIRATORY (INHALATION) at 03:03

## 2025-01-01 RX ADMIN — MEROPENEM 1000 MG: 1 INJECTION INTRAVENOUS at 20:04

## 2025-01-01 RX ADMIN — Medication 3 MG/HR: at 15:42

## 2025-01-01 RX ADMIN — ARFORMOTEROL TARTRATE 15 MCG: 15 SOLUTION RESPIRATORY (INHALATION) at 07:43

## 2025-01-01 RX ADMIN — ACETYLCYSTEINE 400 MG: 100 INHALANT RESPIRATORY (INHALATION) at 08:01

## 2025-01-01 RX ADMIN — MEROPENEM 1000 MG: 1 INJECTION INTRAVENOUS at 03:43

## 2025-01-01 RX ADMIN — Medication: at 20:39

## 2025-01-01 RX ADMIN — MEROPENEM 1000 MG: 1 INJECTION INTRAVENOUS at 11:34

## 2025-01-01 RX ADMIN — DOCUSATE SODIUM LIQUID 100 MG: 100 LIQUID ORAL at 20:11

## 2025-01-01 RX ADMIN — PANTOPRAZOLE SODIUM 40 MG: 40 INJECTION, POWDER, FOR SOLUTION INTRAVENOUS at 11:27

## 2025-01-01 RX ADMIN — METOPROLOL TARTRATE 12.5 MG: 25 TABLET, FILM COATED ORAL at 21:03

## 2025-01-01 RX ADMIN — POTASSIUM CHLORIDE 10 MEQ: 7.46 INJECTION, SOLUTION INTRAVENOUS at 08:29

## 2025-01-01 RX ADMIN — SODIUM CHLORIDE, PRESERVATIVE FREE 10 ML: 5 INJECTION INTRAVENOUS at 20:22

## 2025-01-01 RX ADMIN — MEROPENEM 1000 MG: 1 INJECTION INTRAVENOUS at 20:06

## 2025-01-01 RX ADMIN — ASPIRIN 81 MG: 81 TABLET, CHEWABLE ORAL at 08:03

## 2025-01-01 RX ADMIN — DOCUSATE SODIUM LIQUID 100 MG: 100 LIQUID ORAL at 08:04

## 2025-01-01 RX ADMIN — IPRATROPIUM BROMIDE AND ALBUTEROL SULFATE 1 DOSE: .5; 2.5 SOLUTION RESPIRATORY (INHALATION) at 19:28

## 2025-01-01 RX ADMIN — CHLORHEXIDINE GLUCONATE 15 ML: 1.2 RINSE ORAL at 08:30

## 2025-01-01 ASSESSMENT — PULMONARY FUNCTION TESTS
PIF_VALUE: 25
PIF_VALUE: 25
PIF_VALUE: 26
PIF_VALUE: 34
PIF_VALUE: 26
PIF_VALUE: 26
PIF_VALUE: 29
PIF_VALUE: 26
PIF_VALUE: 28
PIF_VALUE: 27
PIF_VALUE: 27
PIF_VALUE: 25
PIF_VALUE: 28
PIF_VALUE: 28
PIF_VALUE: 24
PIF_VALUE: 25
PIF_VALUE: 22
PIF_VALUE: 24
PIF_VALUE: 33
PIF_VALUE: 26
PIF_VALUE: 24
PIF_VALUE: 25
PIF_VALUE: 25
PIF_VALUE: 23
PIF_VALUE: 26
PIF_VALUE: 31
PIF_VALUE: 28
PIF_VALUE: 26
PIF_VALUE: 26
PIF_VALUE: 28
PIF_VALUE: 26
PIF_VALUE: 25
PIF_VALUE: 33
PIF_VALUE: 24
PIF_VALUE: 22
PIF_VALUE: 24
PIF_VALUE: 28
PIF_VALUE: 38
PIF_VALUE: 27
PIF_VALUE: 27
PIF_VALUE: 24
PIF_VALUE: 23
PIF_VALUE: 36
PIF_VALUE: 32
PIF_VALUE: 24
PIF_VALUE: 25
PIF_VALUE: 27
PIF_VALUE: 40
PIF_VALUE: 23
PIF_VALUE: 25
PIF_VALUE: 26
PIF_VALUE: 39
PIF_VALUE: 23
PIF_VALUE: 24
PIF_VALUE: 29
PIF_VALUE: 27
PIF_VALUE: 24
PIF_VALUE: 25
PIF_VALUE: 26
PIF_VALUE: 25
PIF_VALUE: 23
PIF_VALUE: 25
PIF_VALUE: 26
PIF_VALUE: 37
PIF_VALUE: 42
PIF_VALUE: 43
PIF_VALUE: 36
PIF_VALUE: 24
PIF_VALUE: 33
PIF_VALUE: 27
PIF_VALUE: 35
PIF_VALUE: 40
PIF_VALUE: 24
PIF_VALUE: 24
PIF_VALUE: 29
PIF_VALUE: 24
PIF_VALUE: 26
PIF_VALUE: 34
PIF_VALUE: 23
PIF_VALUE: 27
PIF_VALUE: 24
PIF_VALUE: 28
PIF_VALUE: 29
PIF_VALUE: 24
PIF_VALUE: 23
PIF_VALUE: 27
PIF_VALUE: 23
PIF_VALUE: 27
PIF_VALUE: 24
PIF_VALUE: 25
PIF_VALUE: 25
PIF_VALUE: 28
PIF_VALUE: 36
PIF_VALUE: 29
PIF_VALUE: 28
PIF_VALUE: 31
PIF_VALUE: 25
PIF_VALUE: 29
PIF_VALUE: 23
PIF_VALUE: 24
PIF_VALUE: 24
PIF_VALUE: 25
PIF_VALUE: 24
PIF_VALUE: 28
PIF_VALUE: 34
PIF_VALUE: 30
PIF_VALUE: 31
PIF_VALUE: 29
PIF_VALUE: 26
PIF_VALUE: 26
PIF_VALUE: 38
PIF_VALUE: 26
PIF_VALUE: 30
PIF_VALUE: 33
PIF_VALUE: 26
PIF_VALUE: 27
PIF_VALUE: 27
PIF_VALUE: 26
PIF_VALUE: 27
PIF_VALUE: 34
PIF_VALUE: 25
PIF_VALUE: 28
PIF_VALUE: 35

## 2025-01-01 ASSESSMENT — PAIN SCALES - GENERAL
PAINLEVEL_OUTOF10: 0
PAINLEVEL_OUTOF10: 1
PAINLEVEL_OUTOF10: 0

## 2025-01-01 ASSESSMENT — ENCOUNTER SYMPTOMS
GASTROINTESTINAL NEGATIVE: 1
SHORTNESS OF BREATH: 1
EYES NEGATIVE: 1
ALLERGIC/IMMUNOLOGIC NEGATIVE: 1

## 2025-03-26 ENCOUNTER — TELEPHONE (OUTPATIENT)
Dept: FAMILY MEDICINE CLINIC | Age: 55
End: 2025-03-26

## 2025-03-26 NOTE — TELEPHONE ENCOUNTER
----- Message from Mali JUÁREZ sent at 3/26/2025  3:33 PM EDT -----  Regarding: ECC Appointment Request  ECC Appointment Request    Patient needs appointment for ECC Appointment Type: New to Provider.    Patient Requested Dates(s): any day for the month of April 2025  Patient Requested Time:afternoon   Provider Name:Vinny Arroyo MD          Reason for Appointment Request: New Patient - Available appointments did not meet patient need/ patient want to see Vinny Arroyo MD as a new patient to establish .  --------------------------------------------------------------------------------------------------------------------------    Relationship to Patient: Self     Call Back Information: OK to leave message on voicemail  Preferred Call Back Number: Phone [phone] (home)

## 2025-03-26 NOTE — TELEPHONE ENCOUNTER
I returned patient's call and informed her that Dr. Arroyo is not accepting new patients.  I informed her of Dr. Nice, who is scheduling into November.  Patient declined.  I offered to direct her call back to the [facility], but patient declined.

## 2025-04-13 ENCOUNTER — APPOINTMENT (OUTPATIENT)
Dept: GENERAL RADIOLOGY | Age: 55
DRG: 872 | End: 2025-04-13
Payer: COMMERCIAL

## 2025-04-13 ENCOUNTER — HOSPITAL ENCOUNTER (INPATIENT)
Age: 55
LOS: 12 days | Discharge: HOME HEALTH | DRG: 872 | End: 2025-04-25
Attending: EMERGENCY MEDICINE | Admitting: STUDENT IN AN ORGANIZED HEALTH CARE EDUCATION/TRAINING PROGRAM
Payer: COMMERCIAL

## 2025-04-13 ENCOUNTER — APPOINTMENT (OUTPATIENT)
Dept: CT IMAGING | Age: 55
DRG: 872 | End: 2025-04-13
Attending: STUDENT IN AN ORGANIZED HEALTH CARE EDUCATION/TRAINING PROGRAM
Payer: COMMERCIAL

## 2025-04-13 DIAGNOSIS — L02.419 CELLULITIS AND ABSCESS OF LEG: ICD-10-CM

## 2025-04-13 DIAGNOSIS — R06.02 SHORTNESS OF BREATH: ICD-10-CM

## 2025-04-13 DIAGNOSIS — R60.0 LEG EDEMA: Primary | ICD-10-CM

## 2025-04-13 DIAGNOSIS — I47.10 SVT (SUPRAVENTRICULAR TACHYCARDIA): ICD-10-CM

## 2025-04-13 DIAGNOSIS — E87.70 HYPERVOLEMIA, UNSPECIFIED HYPERVOLEMIA TYPE: ICD-10-CM

## 2025-04-13 DIAGNOSIS — L03.119 CELLULITIS AND ABSCESS OF LEG: ICD-10-CM

## 2025-04-13 DIAGNOSIS — D72.829 LEUKOCYTOSIS, UNSPECIFIED TYPE: ICD-10-CM

## 2025-04-13 PROBLEM — L03.116 BILATERAL LOWER LEG CELLULITIS: Status: ACTIVE | Noted: 2025-04-13

## 2025-04-13 PROBLEM — L03.115 BILATERAL LOWER LEG CELLULITIS: Status: ACTIVE | Noted: 2025-04-13

## 2025-04-13 LAB
ALBUMIN SERPL-MCNC: 4 G/DL (ref 3.5–5.2)
ALP SERPL-CCNC: 305 U/L (ref 35–104)
ALT SERPL-CCNC: 117 U/L (ref 0–32)
ANION GAP SERPL CALCULATED.3IONS-SCNC: 18 MMOL/L (ref 7–16)
ASO AB SERPL-ACNC: 22 IU/ML (ref 0–200)
AST SERPL-CCNC: 56 U/L (ref 0–31)
B PARAP IS1001 DNA NPH QL NAA+NON-PROBE: NOT DETECTED
B PERT DNA SPEC QL NAA+PROBE: NOT DETECTED
B-OH-BUTYR SERPL-MCNC: 0.21 MMOL/L (ref 0.02–0.27)
BACTERIA URNS QL MICRO: ABNORMAL
BASOPHILS # BLD: 0 K/UL (ref 0–0.2)
BASOPHILS NFR BLD: 0 % (ref 0–2)
BILIRUB SERPL-MCNC: 0.4 MG/DL (ref 0–1.2)
BILIRUB UR QL STRIP: NEGATIVE
BNP SERPL-MCNC: 729 PG/ML (ref 0–125)
BUN SERPL-MCNC: 37 MG/DL (ref 6–20)
C PNEUM DNA NPH QL NAA+NON-PROBE: NOT DETECTED
CALCIUM SERPL-MCNC: 11.3 MG/DL (ref 8.6–10.2)
CHLORIDE SERPL-SCNC: 97 MMOL/L (ref 98–107)
CLARITY UR: CLEAR
CO2 SERPL-SCNC: 21 MMOL/L (ref 22–29)
COLOR UR: YELLOW
CREAT SERPL-MCNC: 0.8 MG/DL (ref 0.5–1)
CRP SERPL HS-MCNC: 310 MG/L (ref 0–5)
EOSINOPHIL # BLD: 0 K/UL (ref 0.05–0.5)
EOSINOPHILS RELATIVE PERCENT: 0 % (ref 0–6)
ERYTHROCYTE [DISTWIDTH] IN BLOOD BY AUTOMATED COUNT: 15.8 % (ref 11.5–15)
ERYTHROCYTE [SEDIMENTATION RATE] IN BLOOD BY WESTERGREN METHOD: 92 MM/HR (ref 0–20)
FLUAV RNA NPH QL NAA+NON-PROBE: NOT DETECTED
FLUBV RNA NPH QL NAA+NON-PROBE: NOT DETECTED
GFR, ESTIMATED: 88 ML/MIN/1.73M2
GLUCOSE SERPL-MCNC: 256 MG/DL (ref 74–99)
GLUCOSE UR STRIP-MCNC: NEGATIVE MG/DL
HADV DNA NPH QL NAA+NON-PROBE: NOT DETECTED
HCOV 229E RNA NPH QL NAA+NON-PROBE: NOT DETECTED
HCOV HKU1 RNA NPH QL NAA+NON-PROBE: NOT DETECTED
HCOV NL63 RNA NPH QL NAA+NON-PROBE: NOT DETECTED
HCOV OC43 RNA NPH QL NAA+NON-PROBE: NOT DETECTED
HCT VFR BLD AUTO: 49.7 % (ref 34–48)
HGB BLD-MCNC: 16.3 G/DL (ref 11.5–15.5)
HGB UR QL STRIP.AUTO: ABNORMAL
HMPV RNA NPH QL NAA+NON-PROBE: NOT DETECTED
HPIV1 RNA NPH QL NAA+NON-PROBE: NOT DETECTED
HPIV2 RNA NPH QL NAA+NON-PROBE: NOT DETECTED
HPIV3 RNA NPH QL NAA+NON-PROBE: NOT DETECTED
HPIV4 RNA NPH QL NAA+NON-PROBE: NOT DETECTED
KETONES UR STRIP-MCNC: NEGATIVE MG/DL
LACTATE BLDV-SCNC: 2.1 MMOL/L (ref 0.5–2.2)
LEUKOCYTE ESTERASE UR QL STRIP: ABNORMAL
LYMPHOCYTES NFR BLD: 0.97 K/UL (ref 1.5–4)
LYMPHOCYTES RELATIVE PERCENT: 5 % (ref 20–42)
M PNEUMO DNA NPH QL NAA+NON-PROBE: NOT DETECTED
MCH RBC QN AUTO: 30.9 PG (ref 26–35)
MCHC RBC AUTO-ENTMCNC: 32.8 G/DL (ref 32–34.5)
MCV RBC AUTO: 94.3 FL (ref 80–99.9)
METAMYELOCYTES ABSOLUTE COUNT: 0.16 K/UL (ref 0–0.12)
METAMYELOCYTES: 1 % (ref 0–1)
MONOCYTES NFR BLD: 0.81 K/UL (ref 0.1–0.95)
MONOCYTES NFR BLD: 4 % (ref 2–12)
MYELOCYTES ABSOLUTE COUNT: 0.16 K/UL
MYELOCYTES: 1 %
NEUTROPHILS NFR BLD: 89 % (ref 43–80)
NEUTS SEG NFR BLD: 16.91 K/UL (ref 1.8–7.3)
NITRITE UR QL STRIP: NEGATIVE
PH UR STRIP: 6 [PH] (ref 5–8)
PH VENOUS: 7.37 (ref 7.35–7.45)
PLATELET # BLD AUTO: 359 K/UL (ref 130–450)
PMV BLD AUTO: 10 FL (ref 7–12)
POTASSIUM SERPL-SCNC: 4.8 MMOL/L (ref 3.5–5)
PROT SERPL-MCNC: 7.9 G/DL (ref 6.4–8.3)
PROT UR STRIP-MCNC: ABNORMAL MG/DL
RBC # BLD AUTO: 5.27 M/UL (ref 3.5–5.5)
RBC # BLD: ABNORMAL 10*6/UL
RBC # BLD: ABNORMAL 10*6/UL
RBC #/AREA URNS HPF: ABNORMAL /HPF
RSV RNA NPH QL NAA+NON-PROBE: NOT DETECTED
RV+EV RNA NPH QL NAA+NON-PROBE: NOT DETECTED
SARS-COV-2 RNA NPH QL NAA+NON-PROBE: NOT DETECTED
SODIUM SERPL-SCNC: 136 MMOL/L (ref 132–146)
SP GR UR STRIP: 1.02 (ref 1–1.03)
SPECIMEN DESCRIPTION: NORMAL
TROPONIN I SERPL HS-MCNC: 30 NG/L (ref 0–9)
TROPONIN I SERPL HS-MCNC: 33 NG/L (ref 0–9)
UROBILINOGEN UR STRIP-ACNC: 0.2 EU/DL (ref 0–1)
WBC #/AREA URNS HPF: ABNORMAL /HPF
WBC OTHER # BLD: 19 K/UL (ref 4.5–11.5)

## 2025-04-13 PROCEDURE — 87150 DNA/RNA AMPLIFIED PROBE: CPT

## 2025-04-13 PROCEDURE — 2500000003 HC RX 250 WO HCPCS: Performed by: STUDENT IN AN ORGANIZED HEALTH CARE EDUCATION/TRAINING PROGRAM

## 2025-04-13 PROCEDURE — 96376 TX/PRO/DX INJ SAME DRUG ADON: CPT

## 2025-04-13 PROCEDURE — 81001 URINALYSIS AUTO W/SCOPE: CPT

## 2025-04-13 PROCEDURE — 96375 TX/PRO/DX INJ NEW DRUG ADDON: CPT

## 2025-04-13 PROCEDURE — 87077 CULTURE AEROBIC IDENTIFY: CPT

## 2025-04-13 PROCEDURE — 86063 ANTISTREPTOLYSIN O SCREEN: CPT

## 2025-04-13 PROCEDURE — 96372 THER/PROPH/DIAG INJ SC/IM: CPT

## 2025-04-13 PROCEDURE — 87086 URINE CULTURE/COLONY COUNT: CPT

## 2025-04-13 PROCEDURE — 80053 COMPREHEN METABOLIC PANEL: CPT

## 2025-04-13 PROCEDURE — 84484 ASSAY OF TROPONIN QUANT: CPT

## 2025-04-13 PROCEDURE — 6360000004 HC RX CONTRAST MEDICATION: Performed by: RADIOLOGY

## 2025-04-13 PROCEDURE — 0202U NFCT DS 22 TRGT SARS-COV-2: CPT

## 2025-04-13 PROCEDURE — 82800 BLOOD PH: CPT

## 2025-04-13 PROCEDURE — 6360000002 HC RX W HCPCS: Performed by: STUDENT IN AN ORGANIZED HEALTH CARE EDUCATION/TRAINING PROGRAM

## 2025-04-13 PROCEDURE — 82010 KETONE BODYS QUAN: CPT

## 2025-04-13 PROCEDURE — 86140 C-REACTIVE PROTEIN: CPT

## 2025-04-13 PROCEDURE — 85652 RBC SED RATE AUTOMATED: CPT

## 2025-04-13 PROCEDURE — 85025 COMPLETE CBC W/AUTO DIFF WBC: CPT

## 2025-04-13 PROCEDURE — 87185 SC STD ENZYME DETCJ PER NZM: CPT

## 2025-04-13 PROCEDURE — 71045 X-RAY EXAM CHEST 1 VIEW: CPT

## 2025-04-13 PROCEDURE — 93005 ELECTROCARDIOGRAM TRACING: CPT

## 2025-04-13 PROCEDURE — 6370000000 HC RX 637 (ALT 250 FOR IP): Performed by: STUDENT IN AN ORGANIZED HEALTH CARE EDUCATION/TRAINING PROGRAM

## 2025-04-13 PROCEDURE — 87040 BLOOD CULTURE FOR BACTERIA: CPT

## 2025-04-13 PROCEDURE — 51701 INSERT BLADDER CATHETER: CPT

## 2025-04-13 PROCEDURE — 83880 ASSAY OF NATRIURETIC PEPTIDE: CPT

## 2025-04-13 PROCEDURE — 36415 COLL VENOUS BLD VENIPUNCTURE: CPT

## 2025-04-13 PROCEDURE — 99285 EMERGENCY DEPT VISIT HI MDM: CPT

## 2025-04-13 PROCEDURE — 2140000000 HC CCU INTERMEDIATE R&B

## 2025-04-13 PROCEDURE — 83605 ASSAY OF LACTIC ACID: CPT

## 2025-04-13 PROCEDURE — 96374 THER/PROPH/DIAG INJ IV PUSH: CPT

## 2025-04-13 PROCEDURE — 2580000003 HC RX 258: Performed by: STUDENT IN AN ORGANIZED HEALTH CARE EDUCATION/TRAINING PROGRAM

## 2025-04-13 PROCEDURE — 6360000002 HC RX W HCPCS

## 2025-04-13 RX ORDER — POTASSIUM CHLORIDE 1500 MG/1
40 TABLET, EXTENDED RELEASE ORAL PRN
Status: DISCONTINUED | OUTPATIENT
Start: 2025-04-13 | End: 2025-04-25 | Stop reason: HOSPADM

## 2025-04-13 RX ORDER — SODIUM CHLORIDE 9 MG/ML
INJECTION, SOLUTION INTRAVENOUS PRN
Status: DISCONTINUED | OUTPATIENT
Start: 2025-04-13 | End: 2025-04-25 | Stop reason: HOSPADM

## 2025-04-13 RX ORDER — ONDANSETRON 2 MG/ML
4 INJECTION INTRAMUSCULAR; INTRAVENOUS EVERY 6 HOURS PRN
Status: DISCONTINUED | OUTPATIENT
Start: 2025-04-13 | End: 2025-04-25 | Stop reason: HOSPADM

## 2025-04-13 RX ORDER — ACETAMINOPHEN 325 MG/1
650 TABLET ORAL EVERY 6 HOURS PRN
Status: DISCONTINUED | OUTPATIENT
Start: 2025-04-13 | End: 2025-04-25 | Stop reason: HOSPADM

## 2025-04-13 RX ORDER — ENOXAPARIN SODIUM 100 MG/ML
40 INJECTION SUBCUTANEOUS DAILY
Status: DISCONTINUED | OUTPATIENT
Start: 2025-04-13 | End: 2025-04-25 | Stop reason: HOSPADM

## 2025-04-13 RX ORDER — ONDANSETRON 4 MG/1
4 TABLET, ORALLY DISINTEGRATING ORAL EVERY 8 HOURS PRN
Status: DISCONTINUED | OUTPATIENT
Start: 2025-04-13 | End: 2025-04-25 | Stop reason: HOSPADM

## 2025-04-13 RX ORDER — PREDNISONE 5 MG/1
15 TABLET ORAL DAILY
Status: DISCONTINUED | OUTPATIENT
Start: 2025-04-13 | End: 2025-04-25 | Stop reason: HOSPADM

## 2025-04-13 RX ORDER — FUROSEMIDE 10 MG/ML
40 INJECTION INTRAMUSCULAR; INTRAVENOUS ONCE
Status: COMPLETED | OUTPATIENT
Start: 2025-04-13 | End: 2025-04-13

## 2025-04-13 RX ORDER — SODIUM CHLORIDE 0.9 % (FLUSH) 0.9 %
10 SYRINGE (ML) INJECTION EVERY 12 HOURS SCHEDULED
Status: DISCONTINUED | OUTPATIENT
Start: 2025-04-13 | End: 2025-04-25 | Stop reason: HOSPADM

## 2025-04-13 RX ORDER — ESCITALOPRAM OXALATE 10 MG/1
10 TABLET ORAL DAILY
Status: DISCONTINUED | OUTPATIENT
Start: 2025-04-13 | End: 2025-04-13

## 2025-04-13 RX ORDER — ATENOLOL 25 MG/1
25 TABLET ORAL 2 TIMES DAILY
Status: DISCONTINUED | OUTPATIENT
Start: 2025-04-13 | End: 2025-04-25 | Stop reason: HOSPADM

## 2025-04-13 RX ORDER — TRAMADOL HYDROCHLORIDE 50 MG/1
50 TABLET ORAL EVERY 6 HOURS PRN
Status: DISCONTINUED | OUTPATIENT
Start: 2025-04-13 | End: 2025-04-25 | Stop reason: HOSPADM

## 2025-04-13 RX ORDER — KETOROLAC TROMETHAMINE 30 MG/ML
15 INJECTION, SOLUTION INTRAMUSCULAR; INTRAVENOUS EVERY 6 HOURS PRN
Status: DISPENSED | OUTPATIENT
Start: 2025-04-13 | End: 2025-04-15

## 2025-04-13 RX ORDER — FUROSEMIDE 10 MG/ML
40 INJECTION INTRAMUSCULAR; INTRAVENOUS DAILY
Status: DISCONTINUED | OUTPATIENT
Start: 2025-04-13 | End: 2025-04-25 | Stop reason: HOSPADM

## 2025-04-13 RX ORDER — POTASSIUM CHLORIDE 7.45 MG/ML
10 INJECTION INTRAVENOUS PRN
Status: DISCONTINUED | OUTPATIENT
Start: 2025-04-13 | End: 2025-04-25 | Stop reason: HOSPADM

## 2025-04-13 RX ORDER — SODIUM CHLORIDE 0.9 % (FLUSH) 0.9 %
10 SYRINGE (ML) INJECTION PRN
Status: DISCONTINUED | OUTPATIENT
Start: 2025-04-13 | End: 2025-04-25 | Stop reason: HOSPADM

## 2025-04-13 RX ORDER — BUPRENORPHINE HYDROCHLORIDE AND NALOXONE HYDROCHLORIDE DIHYDRATE 8; 2 MG/1; MG/1
1 TABLET SUBLINGUAL 2 TIMES DAILY
Status: DISCONTINUED | OUTPATIENT
Start: 2025-04-13 | End: 2025-04-14

## 2025-04-13 RX ORDER — SENNOSIDES A AND B 8.6 MG/1
1 TABLET, FILM COATED ORAL DAILY PRN
Status: DISCONTINUED | OUTPATIENT
Start: 2025-04-13 | End: 2025-04-25 | Stop reason: HOSPADM

## 2025-04-13 RX ORDER — IOPAMIDOL 755 MG/ML
75 INJECTION, SOLUTION INTRAVASCULAR
Status: DISCONTINUED | OUTPATIENT
Start: 2025-04-13 | End: 2025-04-13

## 2025-04-13 RX ORDER — ACETAMINOPHEN 650 MG/1
650 SUPPOSITORY RECTAL EVERY 6 HOURS PRN
Status: DISCONTINUED | OUTPATIENT
Start: 2025-04-13 | End: 2025-04-25 | Stop reason: HOSPADM

## 2025-04-13 RX ADMIN — FUROSEMIDE 40 MG: 10 INJECTION, SOLUTION INTRAMUSCULAR; INTRAVENOUS at 12:05

## 2025-04-13 RX ADMIN — WATER 2000 MG: 1 INJECTION INTRAMUSCULAR; INTRAVENOUS; SUBCUTANEOUS at 19:38

## 2025-04-13 RX ADMIN — SODIUM CHLORIDE, PRESERVATIVE FREE 10 ML: 5 INJECTION INTRAVENOUS at 12:15

## 2025-04-13 RX ADMIN — WATER 2000 MG: 1 INJECTION INTRAMUSCULAR; INTRAVENOUS; SUBCUTANEOUS at 12:08

## 2025-04-13 RX ADMIN — VANCOMYCIN HYDROCHLORIDE 1000 MG: 1 INJECTION, POWDER, LYOPHILIZED, FOR SOLUTION INTRAVENOUS at 12:22

## 2025-04-13 RX ADMIN — SODIUM CHLORIDE, PRESERVATIVE FREE 10 ML: 5 INJECTION INTRAVENOUS at 19:38

## 2025-04-13 RX ADMIN — PREDNISONE 15 MG: 5 TABLET ORAL at 18:04

## 2025-04-13 RX ADMIN — HYDROMORPHONE HYDROCHLORIDE 0.5 MG: 1 INJECTION, SOLUTION INTRAMUSCULAR; INTRAVENOUS; SUBCUTANEOUS at 15:45

## 2025-04-13 RX ADMIN — FUROSEMIDE 40 MG: 10 INJECTION, SOLUTION INTRAMUSCULAR; INTRAVENOUS at 08:10

## 2025-04-13 RX ADMIN — ATENOLOL 25 MG: 25 TABLET ORAL at 19:38

## 2025-04-13 RX ADMIN — ENOXAPARIN SODIUM 40 MG: 100 INJECTION SUBCUTANEOUS at 12:12

## 2025-04-13 ASSESSMENT — PAIN DESCRIPTION - LOCATION: LOCATION: LEG

## 2025-04-13 ASSESSMENT — PAIN DESCRIPTION - DESCRIPTORS: DESCRIPTORS: ACHING;DISCOMFORT;DULL

## 2025-04-13 ASSESSMENT — PAIN DESCRIPTION - ORIENTATION: ORIENTATION: RIGHT;LEFT

## 2025-04-13 ASSESSMENT — LIFESTYLE VARIABLES
HOW OFTEN DO YOU HAVE A DRINK CONTAINING ALCOHOL: NEVER
HOW MANY STANDARD DRINKS CONTAINING ALCOHOL DO YOU HAVE ON A TYPICAL DAY: PATIENT DOES NOT DRINK
HOW OFTEN DO YOU HAVE A DRINK CONTAINING ALCOHOL: NEVER

## 2025-04-13 ASSESSMENT — PAIN SCALES - GENERAL: PAINLEVEL_OUTOF10: 7

## 2025-04-13 ASSESSMENT — PAIN - FUNCTIONAL ASSESSMENT: PAIN_FUNCTIONAL_ASSESSMENT: 0-10

## 2025-04-13 NOTE — PROGRESS NOTES
Patient refused exam due to pain and not able to lay flat. Please notify CT if patient becomes agreeable to scans.

## 2025-04-13 NOTE — H&P
Internal Medicine History & Physical     Name: Li Rojas  : 1970  Chief Complaint: Leg Swelling (Leg pain and swelling bilateral legs getting worse. )  Primary Care Physician: Melvin Cardoso MD  Admission date: 2025  Date of service: 2025     History of Present Illness  iL is a 55 y.o. year old female who presented with a chief complaint of BL LE swelling for 14 months is what they tell me. She is present with her mother at bedside Megan. Not currently short of breath. Denies fevers chills or sweats. The LLE looks cellulitic there is a blister that has popped on the leg it is red warm and tender. She has long standing MS but is not on any long term drugs other than prednisone she tells me. She is somnolent in bed her mother tries to answer a lot of the questions for her. Patient is on room air breathing comfortably.     Past Medical History:   Diagnosis Date    Arthritis     rheumatoid    Asthma     H/O medication noncompliance     Hyperlipidemia     Hypertension     Lupus     MS (multiple sclerosis) (HCC)     Multiple sclerosis (HCC)     Nicotine dependence, cigarettes, uncomplicated     Palpitations     SOB (shortness of breath)     SVT (supraventricular tachycardia)     Systemic lupus erythematosus, unspecified        Past Surgical History:   Procedure Laterality Date    ABDOMEN SURGERY      ANTERIOR CRUCIATE LIGAMENT REPAIR      APPENDECTOMY       SECTION      x5    CHOLECYSTECTOMY      DILATION AND CURETTAGE OF UTERUS      HC INJECTION PROCEDURE FOR SACROILIAC JOINT Left 12/3/2020    LEFT SACROILIAC JOINT STEROID INJECTION UNDER X-RAY GUIDANCE performed by Fauzia Crowell DO at [facility] OR    NERVE BLOCK Left 10/01/2020    NERVE BLOCK Left 10/1/2020    LEFT L4-5 TRANSFORAMINAL EPIDURAL STEROID INJECTION performed by Fauzia Crowell DO at [facility] OR    OTHER SURGICAL HISTORY Left 2020    LEFT SACROILIAC STEROID INJECTION UNDER XRAY GUIDANCE  Pending)   CT THORACIC SPINE WO CONTRAST    (Results Pending)   CT LUMBAR SPINE WO CONTRAST    (Results Pending)   CT ABDOMEN PELVIS W IV CONTRAST Additional Contrast? None    (Results Pending)       Assessment  Active Hospital Problems    Diagnosis     Bilateral lower leg cellulitis [L03.116, L03.115]        Patient Active Problem List    Diagnosis Date Noted    Personal history of supraventricular tachycardia 01/31/2023     Priority: Medium    Anxiety 08/09/2022     Priority: Medium     Last Assessment & Plan:   Formatting of this note might be different from the original.  Assessment: managed with Ativan      Rheumatoid arthritis ([facility]) 07/06/2022     Priority: Medium     Last Assessment & Plan:   Formatting of this note might be different from the original.  Assessment: .    Last Assessment & Plan:   Formatting of this note might be different from the original.  Assessment: managed with Suboxone per patient      Bilateral lower leg cellulitis 04/13/2025    Osteoarthritis of left hip 06/14/2024    Sacroiliitis 12/03/2020    MS (multiple sclerosis) ([facility]) 02/24/2017     This Diagnosis was added to the Problem List based on transcribed orders from Dr. Deangelo MONAE (supraventricular tachycardia) 02/03/2014       Plan  BL LE edema   Compression   ECHO  Diuresis   Watch renal function     LLE Cellulitis   ID consultation     Acute on chronic back pain   CT c spine t spine L spine     History of intra abdominal abscess   Patient and her mother state this is an ongoing issue that there is still an abscess in her abdomen that [facility] was going to do surgery on back in December apparently she was lost to follow up   CT abdomen pelvis w IV contrast     Suboxone use   Chronic use for her back     MS on chronic prednisone 15 mg daily     Tobacco abuse   1/2 PPD  Discussed cessation   Declines NRT     RA     Discharge plan: TBD pending clinical improvement     Nicolás Duncan MD  Internal medicine

## 2025-04-13 NOTE — ED NOTES
ED TO INPATIENT SBAR HANDOFF    Patient Name: Li Rojas   Preferred Name: Li  : 1970  55 y.o.   Code Status Order: Full Code  Telemetry Order:   C-SSRS: Risk of Suicide: No Risk  Sitter    Restraints:       Situation  Chief Complaint   Patient presents with    Leg Swelling     Leg pain and swelling bilateral legs getting worse.      Brief Description of Patient's Condition: stable  Mental Status: oriented, alert, coherent, and logical    Background  Allergies:   Allergies   Allergen Reactions    Lyrica [Pregabalin] Anaphylaxis    Other Shortness Of Breath    Sulfa Antibiotics Anaphylaxis     Hallucinations and rash    Darvocet [Propoxyphene N-Acetaminophen]     Ultram [Tramadol Hcl]     Metoprolol Rash       Assessment  Vitals/MEWS: MEWS Score: 1  Level of Consciousness: Alert (0)   Vitals:    25 1004 25 1102 25 1205 25 1300   BP: 124/78 129/81 (!) 156/89 125/85   Pulse:    80   Resp:       Temp:    98.5 °F (36.9 °C)   TempSrc:       SpO2: 93% 92%  92%   Weight:       Height:         Cardiac Rhythm: Cardiac Rhythm: Normal sinus rhythm  Deterioration Index (DI): Deterioration Index: 24.73  Deterioration Index (DI) Interventions Performed:    O2 Flow Rate:    O2 Device: O2 Device: None (Room air)    IV Access  Peripheral IV Access        Active Central Lines:                          Active Wounds:    Active Barbosa's:      Recommendation  Patient Belongings:    Additional Comments:   If any further questions, please call Sending RN at [phone]  Opportunity for questions and clarification were provided to RN    Li Rojas should be admitted to [facility].    Electronically signed by: Electronically signed by Ludy Hines RN on 2025 at 2:31 PM

## 2025-04-13 NOTE — CONSULTS
Department of Internal Medicine  Infectious Diseases   Consult Note      Reason for Consult:  Cellulitis       Requesting Physician:  Dr Duncan         HISTORY OF PRESENT ILLNESS:     This is a 55 yrs old female with hx of RA (?) MS , HTN presented to the ER with bilateral leg swelling, redness , pain worsening for past 2 days - she denied fever or chills, denied any injury to the leg .  WBC was 19 K   Ca 11.3  She was given vanco and ancef     Past Medical History:      Past Medical History:   Diagnosis Date    Arthritis     rheumatoid    Asthma     H/O medication noncompliance     Hyperlipidemia     Hypertension     Lupus     MS (multiple sclerosis) (HCC)     Multiple sclerosis (HCC)     Nicotine dependence, cigarettes, uncomplicated     Palpitations     SOB (shortness of breath)     SVT (supraventricular tachycardia)     Systemic lupus erythematosus, unspecified          Past Surgical History:      Past Surgical History:   Procedure Laterality Date    ABDOMEN SURGERY      ANTERIOR CRUCIATE LIGAMENT REPAIR      APPENDECTOMY       SECTION      x5    CHOLECYSTECTOMY      DILATION AND CURETTAGE OF UTERUS      HC INJECTION PROCEDURE FOR SACROILIAC JOINT Left 12/3/2020    LEFT SACROILIAC JOINT STEROID INJECTION UNDER X-RAY GUIDANCE performed by Fauzia Crowell DO at [facility] OR    NERVE BLOCK Left 10/01/2020    NERVE BLOCK Left 10/1/2020    LEFT L4-5 TRANSFORAMINAL EPIDURAL STEROID INJECTION performed by Fauzia Crowell DO at [facility] OR    OTHER SURGICAL HISTORY Left 2020    LEFT SACROILIAC STEROID INJECTION UNDER XRAY GUIDANCE    TUBAL LIGATION           Current Medications:      Current Facility-Administered Medications   Medication Dose Route Frequency Provider Last Rate Last Admin    sodium chloride flush 0.9 % injection 10 mL  10 mL IntraVENous 2 times per day Nicolás Duncan MD   10 mL at 25 1215    sodium chloride flush 0.9 % injection 10 mL  10 mL IntraVENous PRN  AM    WBCUA 0-1 06/25/2011 10:00 PM    RBCUA 0 TO 2 04/13/2025 07:56 AM    RBCUA NONE 09/19/2013 07:00 AM    BACTERIA TRACE 04/13/2025 07:56 AM    CLARITYU CLOUDY 08/18/2017 01:45 PM    LEUKOCYTESUR SMALL 04/13/2025 07:56 AM    UROBILINOGEN 0.2 04/13/2025 07:56 AM    BILIRUBINUR NEGATIVE 04/13/2025 07:56 AM    BLOODU Negative 08/18/2017 01:45 PM    GLUCOSEU NEGATIVE 04/13/2025 07:56 AM    GLUCOSEU NEGATIVE 06/25/2011 10:00 PM    AMORPHOUS PRESENT 03/18/2024 12:55 PM       ABG:  No results found for: \"GZI6CQF\", \"BEART\", \"P2IYVTPP\", \"PHART\", \"THGBART\", \"JGB3JPL\", \"PO2ART\", \"LPA9IGF\"    MICROBIOLOGY:    Blood culture - neg to date       Radiology :    Chest X ray    Borderline cardiomegaly with low lung volumes.  No focal parenchymal  opacification present.             IMPRESSION:     Cellulitis legs   Leukocytosis         RECOMMENDATIONS:      Ancef 2 grams IV q 8 hrs, diuretics  ASO titer, leg elevation     Thank you Dr Duncan for the consult

## 2025-04-13 NOTE — DISCHARGE INSTRUCTIONS
Review of most recent available cardiac results:     Last cardiac stress test:   No results found for this or any previous visit.        Last cardiac catheterization:   No results found for this or any previous visit.      Last cardiac echocardiogram:   No results found for this or any previous visit.

## 2025-04-13 NOTE — PLAN OF CARE
Problem: Discharge Planning  Goal: Discharge to home or other facility with appropriate resources  Outcome: Progressing  Flowsheets (Taken 4/13/2025 4926)  Discharge to home or other facility with appropriate resources:   Identify barriers to discharge with patient and caregiver   Arrange for needed discharge resources and transportation as appropriate   Identify discharge learning needs (meds, wound care, etc)     Problem: Pain  Goal: Verbalizes/displays adequate comfort level or baseline comfort level  Outcome: Progressing     Problem: Skin/Tissue Integrity  Goal: Skin integrity remains intact  Description: 1.  Monitor for areas of redness and/or skin breakdown2.  Assess vascular access sites hourly3.  Every 4-6 hours minimum:  Change oxygen saturation probe site4.  Every 4-6 hours:  If on nasal continuous positive airway pressure, respiratory therapy assess nares and determine need for appliance change or resting period  Outcome: Progressing     Problem: ABCDS Injury Assessment  Goal: Absence of physical injury  Outcome: Progressing     Problem: Safety - Adult  Goal: Free from fall injury  Outcome: Progressing

## 2025-04-13 NOTE — ED PROVIDER NOTES
[facility]  EMERGENCY DEPARTMENT ENCOUNTER        Pt Name: Li Rojas  MRN: [id number]  Birthdate 1970  Date of evaluation: 4/13/2025  Provider: Jose A Mantilla II, DO  PCP: Melvin Cardoso MD  Note Started: 6:45 AM EDT 4/13/25    CHIEF COMPLAINT       Chief Complaint   Patient presents with    Leg Swelling     Leg pain and swelling bilateral legs getting worse.        HISTORY OF PRESENT ILLNESS: 1 or more Elements            Li Rojas is a 55 y.o. female history of CHF, MS, lupus, presents ER for complaint of worsening lower leg edema and diffuse lower leg pain bilaterally.  Patient reports mild shortness of breath otherwise no chest pain or heaviness.  She does state that she takes p.o. Lasix and has been compliant with her medications.  Patient denies any fevers or chills, no cough or congestion, no nausea, vomiting, diarrhea, dysuria.  She noticed that the leg swelling has been worsening and now weeping.    Nursing Notes were all reviewed and agreed with or any disagreements were addressed in the HPI.      REVIEW OF EXTERNAL NOTE :       Records reviewed for medical hx, surgical, hx, and medication lists      Chart Review/External Note Review    Last Echo reviewed by Me:  Lab Results   Component Value Date    LVEF 65 06/25/2020             Controlled Substance Monitoring:    Acute and Chronic Pain Monitoring:   RX Monitoring Attestation Periodic Controlled Substance Monitoring   2/2/2018   8:10 AM The Prescription Monitoring Report for this patient was reviewed today. Possible medication side effects, risk of tolerance and/or dependence, and alternative treatments discussed.;Potential drug abuse or diversion identified, see note documentation.;Random urine drug screen sent today.;Obtaining appropriate analgesic effect of treatment.;No signs of potential drug abuse or diversion identified.           REVIEW OF SYSTEMS :      Positives and Pertinent negatives as per HPI.     SURGICAL

## 2025-04-14 ENCOUNTER — APPOINTMENT (OUTPATIENT)
Age: 55
DRG: 872 | End: 2025-04-14
Attending: STUDENT IN AN ORGANIZED HEALTH CARE EDUCATION/TRAINING PROGRAM
Payer: COMMERCIAL

## 2025-04-14 LAB
ALBUMIN SERPL-MCNC: 3.2 G/DL (ref 3.5–5.2)
ALP SERPL-CCNC: 248 U/L (ref 35–104)
ALT SERPL-CCNC: 60 U/L (ref 0–32)
ANION GAP SERPL CALCULATED.3IONS-SCNC: 17 MMOL/L (ref 7–16)
AST SERPL-CCNC: 32 U/L (ref 0–31)
BASOPHILS # BLD: 0 K/UL (ref 0–0.2)
BASOPHILS NFR BLD: 0 % (ref 0–2)
BILIRUB SERPL-MCNC: 0.4 MG/DL (ref 0–1.2)
BUN SERPL-MCNC: 33 MG/DL (ref 6–20)
CALCIUM SERPL-MCNC: 9.8 MG/DL (ref 8.6–10.2)
CHLORIDE SERPL-SCNC: 98 MMOL/L (ref 98–107)
CO2 SERPL-SCNC: 23 MMOL/L (ref 22–29)
CREAT SERPL-MCNC: 0.6 MG/DL (ref 0.5–1)
ECHO AV CUSP MM: 1.8 CM
ECHO BSA: 2.02 M2
ECHO EST RA PRESSURE: 3 MMHG
ECHO LA VOL A-L A2C: 50 ML (ref 22–52)
ECHO LA VOL A-L A4C: 61 ML (ref 22–52)
ECHO LA VOL MOD A2C: 47 ML (ref 22–52)
ECHO LA VOL MOD A4C: 54 ML (ref 22–52)
ECHO LA VOLUME AREA LENGTH: 60 ML
ECHO LA VOLUME INDEX A-L A2C: 25 ML/M2 (ref 16–34)
ECHO LA VOLUME INDEX A-L A4C: 30 ML/M2 (ref 16–34)
ECHO LA VOLUME INDEX AREA LENGTH: 30 ML/M2 (ref 16–34)
ECHO LA VOLUME INDEX MOD A2C: 23 ML/M2 (ref 16–34)
ECHO LA VOLUME INDEX MOD A4C: 27 ML/M2 (ref 16–34)
ECHO LV EF PHYSICIAN: 60 %
ECHO LV FRACTIONAL SHORTENING: 34 % (ref 28–44)
ECHO LV INTERNAL DIMENSION DIASTOLE INDEX: 1.89 CM/M2
ECHO LV INTERNAL DIMENSION DIASTOLIC: 3.8 CM (ref 3.9–5.3)
ECHO LV INTERNAL DIMENSION SYSTOLIC INDEX: 1.24 CM/M2
ECHO LV INTERNAL DIMENSION SYSTOLIC: 2.5 CM
ECHO LV IVSD: 1.2 CM (ref 0.6–0.9)
ECHO LV IVSS: 1.9 CM
ECHO LV MASS 2D: 143.8 G (ref 67–162)
ECHO LV MASS INDEX 2D: 71.5 G/M2 (ref 43–95)
ECHO LV POSTERIOR WALL DIASTOLIC: 1.1 CM (ref 0.6–0.9)
ECHO LV POSTERIOR WALL SYSTOLIC: 1.6 CM
ECHO LV RELATIVE WALL THICKNESS RATIO: 0.58
ECHO LVOT AREA: 3.5 CM2
ECHO LVOT DIAM: 2.1 CM
ECHO MV "A" WAVE DURATION: 133.8 MSEC
ECHO MV A VELOCITY: 0.71 M/S
ECHO MV E DECELERATION TIME (DT): 198.1 MS
ECHO MV E VELOCITY: 0.79 M/S
ECHO MV E/A RATIO: 1.11
ECHO PVEIN A DURATION: 133.8 MS
ECHO PVEIN A VELOCITY: 0.3 M/S
ECHO PVEIN PEAK D VELOCITY: 0.4 M/S
ECHO PVEIN PEAK S VELOCITY: 0.5 M/S
ECHO PVEIN S/D RATIO: 1.3
ECHO RV INTERNAL DIMENSION: 2.9 CM
EKG ATRIAL RATE: 88 BPM
EKG P AXIS: 15 DEGREES
EKG P-R INTERVAL: 156 MS
EKG Q-T INTERVAL: 350 MS
EKG QRS DURATION: 60 MS
EKG QTC CALCULATION (BAZETT): 423 MS
EKG R AXIS: 30 DEGREES
EKG T AXIS: -55 DEGREES
EKG VENTRICULAR RATE: 88 BPM
EOSINOPHIL # BLD: 0 K/UL (ref 0.05–0.5)
EOSINOPHILS RELATIVE PERCENT: 0 % (ref 0–6)
ERYTHROCYTE [DISTWIDTH] IN BLOOD BY AUTOMATED COUNT: 15.5 % (ref 11.5–15)
GFR, ESTIMATED: >90 ML/MIN/1.73M2
GLUCOSE SERPL-MCNC: 200 MG/DL (ref 74–99)
HCT VFR BLD AUTO: 40.7 % (ref 34–48)
HGB BLD-MCNC: 13.7 G/DL (ref 11.5–15.5)
LYMPHOCYTES NFR BLD: 1.4 K/UL (ref 1.5–4)
LYMPHOCYTES RELATIVE PERCENT: 10 % (ref 20–42)
MAGNESIUM SERPL-MCNC: 2.1 MG/DL (ref 1.6–2.6)
MCH RBC QN AUTO: 31.7 PG (ref 26–35)
MCHC RBC AUTO-ENTMCNC: 33.7 G/DL (ref 32–34.5)
MCV RBC AUTO: 94.2 FL (ref 80–99.9)
MONOCYTES NFR BLD: 0.51 K/UL (ref 0.1–0.95)
MONOCYTES NFR BLD: 4 % (ref 2–12)
MYELOCYTES ABSOLUTE COUNT: 0.38 K/UL
MYELOCYTES: 3 %
NEUTROPHILS NFR BLD: 84 % (ref 43–80)
NEUTS SEG NFR BLD: 12.31 K/UL (ref 1.8–7.3)
PLATELET # BLD AUTO: 333 K/UL (ref 130–450)
PMV BLD AUTO: 10.1 FL (ref 7–12)
POTASSIUM SERPL-SCNC: 3.5 MMOL/L (ref 3.5–5)
PROT SERPL-MCNC: 6.4 G/DL (ref 6.4–8.3)
RBC # BLD AUTO: 4.32 M/UL (ref 3.5–5.5)
RBC # BLD: ABNORMAL 10*6/UL
RBC # BLD: ABNORMAL 10*6/UL
SODIUM SERPL-SCNC: 138 MMOL/L (ref 132–146)
WBC OTHER # BLD: 14.6 K/UL (ref 4.5–11.5)

## 2025-04-14 PROCEDURE — 6370000000 HC RX 637 (ALT 250 FOR IP): Performed by: STUDENT IN AN ORGANIZED HEALTH CARE EDUCATION/TRAINING PROGRAM

## 2025-04-14 PROCEDURE — 36415 COLL VENOUS BLD VENIPUNCTURE: CPT

## 2025-04-14 PROCEDURE — 2500000003 HC RX 250 WO HCPCS: Performed by: STUDENT IN AN ORGANIZED HEALTH CARE EDUCATION/TRAINING PROGRAM

## 2025-04-14 PROCEDURE — 93308 TTE F-UP OR LMTD: CPT | Performed by: INTERNAL MEDICINE

## 2025-04-14 PROCEDURE — 93308 TTE F-UP OR LMTD: CPT

## 2025-04-14 PROCEDURE — 83735 ASSAY OF MAGNESIUM: CPT

## 2025-04-14 PROCEDURE — 93010 ELECTROCARDIOGRAM REPORT: CPT | Performed by: INTERNAL MEDICINE

## 2025-04-14 PROCEDURE — 6360000002 HC RX W HCPCS: Performed by: STUDENT IN AN ORGANIZED HEALTH CARE EDUCATION/TRAINING PROGRAM

## 2025-04-14 PROCEDURE — 85025 COMPLETE CBC W/AUTO DIFF WBC: CPT

## 2025-04-14 PROCEDURE — 80053 COMPREHEN METABOLIC PANEL: CPT

## 2025-04-14 PROCEDURE — 2140000000 HC CCU INTERMEDIATE R&B

## 2025-04-14 RX ORDER — BUPRENORPHINE HYDROCHLORIDE AND NALOXONE HYDROCHLORIDE DIHYDRATE 2; .5 MG/1; MG/1
1 TABLET SUBLINGUAL 2 TIMES DAILY
Status: DISCONTINUED | OUTPATIENT
Start: 2025-04-14 | End: 2025-04-14

## 2025-04-14 RX ORDER — BUPRENORPHINE AND NALOXONE 8; 2 MG/1; MG/1
1 FILM, SOLUBLE BUCCAL; SUBLINGUAL 2 TIMES DAILY
Status: DISCONTINUED | OUTPATIENT
Start: 2025-04-14 | End: 2025-04-25 | Stop reason: HOSPADM

## 2025-04-14 RX ADMIN — KETOROLAC TROMETHAMINE 15 MG: 30 INJECTION, SOLUTION INTRAMUSCULAR at 23:33

## 2025-04-14 RX ADMIN — SODIUM CHLORIDE, PRESERVATIVE FREE 10 ML: 5 INJECTION INTRAVENOUS at 21:21

## 2025-04-14 RX ADMIN — SODIUM CHLORIDE, PRESERVATIVE FREE 10 ML: 5 INJECTION INTRAVENOUS at 10:25

## 2025-04-14 RX ADMIN — ATENOLOL 25 MG: 25 TABLET ORAL at 21:20

## 2025-04-14 RX ADMIN — TRAMADOL HYDROCHLORIDE 50 MG: 50 TABLET, COATED ORAL at 04:48

## 2025-04-14 RX ADMIN — FUROSEMIDE 40 MG: 10 INJECTION, SOLUTION INTRAMUSCULAR; INTRAVENOUS at 10:25

## 2025-04-14 RX ADMIN — WATER 2000 MG: 1 INJECTION INTRAMUSCULAR; INTRAVENOUS; SUBCUTANEOUS at 21:20

## 2025-04-14 RX ADMIN — ENOXAPARIN SODIUM 40 MG: 100 INJECTION SUBCUTANEOUS at 10:25

## 2025-04-14 RX ADMIN — WATER 2000 MG: 1 INJECTION INTRAMUSCULAR; INTRAVENOUS; SUBCUTANEOUS at 04:44

## 2025-04-14 RX ADMIN — PREDNISONE 15 MG: 5 TABLET ORAL at 10:24

## 2025-04-14 RX ADMIN — BUPRENORPHINE AND NALOXONE 1 FILM: 8; 2 FILM, SOLUBLE BUCCAL; SUBLINGUAL at 12:27

## 2025-04-14 RX ADMIN — BUPRENORPHINE AND NALOXONE 1 FILM: 8; 2 FILM, SOLUBLE BUCCAL; SUBLINGUAL at 21:20

## 2025-04-14 RX ADMIN — WATER 2000 MG: 1 INJECTION INTRAMUSCULAR; INTRAVENOUS; SUBCUTANEOUS at 12:27

## 2025-04-14 RX ADMIN — ATENOLOL 25 MG: 25 TABLET ORAL at 10:24

## 2025-04-14 ASSESSMENT — PAIN DESCRIPTION - ONSET: ONSET: ON-GOING

## 2025-04-14 ASSESSMENT — PAIN SCALES - GENERAL
PAINLEVEL_OUTOF10: 7
PAINLEVEL_OUTOF10: 0
PAINLEVEL_OUTOF10: 8
PAINLEVEL_OUTOF10: 7

## 2025-04-14 ASSESSMENT — PAIN DESCRIPTION - ORIENTATION
ORIENTATION: MID
ORIENTATION: MID
ORIENTATION: RIGHT;LEFT

## 2025-04-14 ASSESSMENT — PAIN - FUNCTIONAL ASSESSMENT
PAIN_FUNCTIONAL_ASSESSMENT: PREVENTS OR INTERFERES SOME ACTIVE ACTIVITIES AND ADLS
PAIN_FUNCTIONAL_ASSESSMENT: PREVENTS OR INTERFERES SOME ACTIVE ACTIVITIES AND ADLS

## 2025-04-14 ASSESSMENT — PAIN DESCRIPTION - DESCRIPTORS
DESCRIPTORS: ACHING
DESCRIPTORS: ACHING;DISCOMFORT;CRAMPING
DESCRIPTORS: ACHING;CRAMPING;DISCOMFORT

## 2025-04-14 ASSESSMENT — PAIN DESCRIPTION - PAIN TYPE: TYPE: CHRONIC PAIN

## 2025-04-14 ASSESSMENT — PAIN DESCRIPTION - LOCATION
LOCATION: BACK

## 2025-04-14 ASSESSMENT — PAIN DESCRIPTION - FREQUENCY: FREQUENCY: CONTINUOUS

## 2025-04-14 NOTE — PROGRESS NOTES
Pt provided home supply of suboxone films that she wishes to use inpt. This RN and Nadya Viveros RN counter supply; 49 individual packets of SL films noted. Message sent to Dr. Wallace via [employer] to request order for pt home supply.

## 2025-04-14 NOTE — PLAN OF CARE
Problem: Discharge Planning  Goal: Discharge to home or other facility with appropriate resources  Outcome: Progressing  Flowsheets (Taken 4/14/2025 0758)  Discharge to home or other facility with appropriate resources: Identify barriers to discharge with patient and caregiver     Problem: Pain  Goal: Verbalizes/displays adequate comfort level or baseline comfort level  Outcome: Progressing  Flowsheets (Taken 4/14/2025 0758)  Verbalizes/displays adequate comfort level or baseline comfort level: Encourage patient to monitor pain and request assistance     Problem: Skin/Tissue Integrity  Goal: Skin integrity remains intact  Description: 1.  Monitor for areas of redness and/or skin breakdown2.  Assess vascular access sites hourly3.  Every 4-6 hours minimum:  Change oxygen saturation probe site4.  Every 4-6 hours:  If on nasal continuous positive airway pressure, respiratory therapy assess nares and determine need for appliance change or resting period  Outcome: Progressing  Flowsheets (Taken 4/14/2025 0758)  Skin Integrity Remains Intact: Monitor for areas of redness and/or skin breakdown     Problem: ABCDS Injury Assessment  Goal: Absence of physical injury  Outcome: Progressing     Problem: Safety - Adult  Goal: Free from fall injury  Outcome: Progressing

## 2025-04-14 NOTE — PROGRESS NOTES
Internal Medicine Progress Note    Patient's name: Li Rojas  : 1970  Chief complaints (on day of admission): Leg Swelling (Leg pain and swelling bilateral legs getting worse. )  Admission date: 2025  Date of service: 2025   Room: [address]  Primary care physician: Melvin Cardoso MD  Reason for visit: Follow-up for leg swelling    Subjective  Li was seen and examined at bedside   Still with concerns of bilateral LE celllulitis, on abx  Otherwise no other concerns    Review of Systems  There are no new complaints of chest pain, shortness of breath, abdominal pain, nausea, vomiting, diarrhea, constipation unless otherwise mentioned above.     Hospital Medications  Current Facility-Administered Medications   Medication Dose Route Frequency Provider Last Rate Last Admin    sodium chloride flush 0.9 % injection 10 mL  10 mL IntraVENous 2 times per day Nicolás Duncan MD   10 mL at 25 1938    sodium chloride flush 0.9 % injection 10 mL  10 mL IntraVENous PRN Nicolás Duncan MD        0.9 % sodium chloride infusion   IntraVENous PRN Nicolás Duncan MD        potassium chloride (KLOR-CON M) extended release tablet 40 mEq  40 mEq Oral PRN Nicolás Duncan MD        Or    potassium bicarb-citric acid (EFFER-K) effervescent tablet 40 mEq  40 mEq Oral PRN Nicolás Duncan MD        Or    potassium chloride 10 mEq/100 mL IVPB (Peripheral Line)  10 mEq IntraVENous PRN Nicolás Duncan MD        enoxaparin (LOVENOX) injection 40 mg  40 mg SubCUTAneous Daily Nicolás Duncan MD   40 mg at 25 1212    ondansetron (ZOFRAN-ODT) disintegrating tablet 4 mg  4 mg Oral Q8H PRN Nicolás Duncan MD        Or    ondansetron (ZOFRAN) injection 4 mg  4 mg IntraVENous Q6H PRN Nicolás Duncan MD        senna (SENOKOT) tablet 8.6 mg  1 tablet Oral Daily PRN Nicolás Duncan MD        acetaminophen (TYLENOL) tablet 650 mg  650 mg Oral Q6H PRN Nicolás Duncan MD        Or    acetaminophen (TYLENOL) suppository 650 mg

## 2025-04-14 NOTE — CARE COORDINATION
Patient presented to the ED from home due to worsening bilateral leg swelling, pain and shortness of breath; admitted for bilateral lower leg cellulitis and shortness of breath. Met with patient at bedside for transition of care planning. Patient reports she lives with her mother in a 1st floor apartment, ambulates only short distances with a walker, able to dress herself, requires assist with bathing and laundry; has a wheelchair. Uses [facility] ([place]) and PCP is Dr. Melvin Cardoso. Patient plans to return home, is interested in home care and has no preference on agency. Patient's mother to transport home when discharged. Referral made to [facility]; pending acceptance. Patient on IV Ancef Q8, daily IV Lasix, echo pending and patient unable to lay flat for CT scans; ID following.    Case Management Assessment  Initial Evaluation    Date/Time of Evaluation: 4/14/2025 10:31 AM  Assessment Completed by: TRAM Juan    If patient is discharged prior to next notation, then this note serves as note for discharge by case management.    Patient Name: Li Rojas                   YOB: 1970  Diagnosis: Shortness of breath [R06.02]  Leg edema [R60.0]  Bilateral lower leg cellulitis [L03.116, L03.115]                   Date / Time: 4/13/2025  6:28 AM    Patient Admission Status: Inpatient   Readmission Risk (Low < 19, Mod (19-27), High > 27): Readmission Risk Score: 13.7    Current PCP: Melvin Cardoso MD  PCP verified by ? Yes    Chart Reviewed: Yes      History Provided by: Patient  Patient Orientation: Alert and Oriented    Patient Cognition: Alert    Hospitalization in the last 30 days (Readmission):  No    If yes, Readmission Assessment in  Navigator will be completed.    Advance Directives:      Code Status: Full Code   Patient's Primary Decision Maker is: Legal Next of Kin      Discharge Planning:    Patient lives with: Family Members Type of Home: [place]  [facility]

## 2025-04-14 NOTE — PROGRESS NOTES
Physical Therapy    Date: 2025       Patient Name: Li Rojas  : 1970      MRN: [id number]    Physical therapy order received and chart reviewed. PT evaluation held this date; await imaging of Cervical, Thoracic, and Lumbar spine.   Will follow up as appropriate.     Macey Leyva PT, DPT  [id number]

## 2025-04-14 NOTE — PROGRESS NOTES
Department of Internal Medicine  Infectious Diseases  Progress  Note      C/C :  Cellulitis of legs     Pt denies fever or chills  Reports leg swelling and pain  Afebrile       Current Facility-Administered Medications   Medication Dose Route Frequency Provider Last Rate Last Admin    buprenorphine-naloxone (SUBOXONE) 8-2 MG SL film 1 Film (Patient Supplied)  1 Film SubLINGual BID Robles Wallace MD   1 Film at 04/14/25 1227    sodium chloride flush 0.9 % injection 10 mL  10 mL IntraVENous 2 times per day Nicolás Duncan MD   10 mL at 04/14/25 1025    sodium chloride flush 0.9 % injection 10 mL  10 mL IntraVENous PRN Nicolás Duncan MD        0.9 % sodium chloride infusion   IntraVENous PRN Nicolás Duncan MD        potassium chloride (KLOR-CON M) extended release tablet 40 mEq  40 mEq Oral PRN Nicolás Duncan MD        Or    potassium bicarb-citric acid (EFFER-K) effervescent tablet 40 mEq  40 mEq Oral PRN Nicolás Duncan MD        Or    potassium chloride 10 mEq/100 mL IVPB (Peripheral Line)  10 mEq IntraVENous PRN Nicolás Duncan MD        enoxaparin (LOVENOX) injection 40 mg  40 mg SubCUTAneous Daily Nicolás Duncan MD   40 mg at 04/14/25 1025    ondansetron (ZOFRAN-ODT) disintegrating tablet 4 mg  4 mg Oral Q8H PRN Nicolás Duncan MD        Or    ondansetron (ZOFRAN) injection 4 mg  4 mg IntraVENous Q6H PRN Nicolás Duncan MD        senna (SENOKOT) tablet 8.6 mg  1 tablet Oral Daily PRN Nicolás Duncan MD        acetaminophen (TYLENOL) tablet 650 mg  650 mg Oral Q6H PRN Nicolás Duncan MD        Or    acetaminophen (TYLENOL) suppository 650 mg  650 mg Rectal Q6H PRN Nicolás Duncan MD        atenolol (TENORMIN) tablet 25 mg  25 mg Oral BID Nicolás Duncan MD   25 mg at 04/14/25 1024    predniSONE (DELTASONE) tablet 15 mg  15 mg Oral Daily Nicolás Duncan MD   15 mg at 04/14/25 1024    furosemide (LASIX) injection 40 mg  40 mg IntraVENous Daily Nicolás Duncan MD   40 mg at 04/14/25 1025    ceFAZolin (ANCEF) 2,000 mg in sterile

## 2025-04-14 NOTE — FLOWSHEET NOTE
4 Eyes Skin Assessment     NAME:  Li Rojas  YOB: 1970  MEDICAL RECORD NUMBER:  [id number]    The patient is being assessed for  Shift HandoffDID NOT ADMIT PATIENT    I agree that at least one RN has performed a thorough Head to Toe Skin Assessment on the patient. ALL assessment sites listed below have been assessed.      Areas assessed by both nurses:    Head, Face, Ears, Shoulders, Back, Chest, Arms, Elbows, Hands, Sacrum. Buttock, Coccyx, Ischium, Legs. Feet and Heels, and Under Medical Devices         Does the Patient have a Wound? Yes wound(s) were present on assessment. LDA wound assessment was Initiated and completed by RN       Jad Prevention initiated by RN: Yes  Wound Care Orders initiated by RN: Yes    Pressure Injury (Stage 3,4, Unstageable, DTI, NWPT, and Complex wounds) if present, place Wound referral order by RN under : Yes    New Ostomies, if present place, Ostomy referral order under : No     Nurse 1 eSignature: Electronically signed by Jordyn Ornelas RN on 4/13/25 at 9:26 PM EDT    **SHARE this note so that the co-signing nurse can place an eSignature**    Nurse 2 eSignature: Electronically signed by Maxime Morley RN on 4/14/25 at 4:52 AM EDT

## 2025-04-14 NOTE — PLAN OF CARE
Problem: Pain  Goal: Verbalizes/displays adequate comfort level or baseline comfort level  4/13/2025 1704 by Joan Chacko RN  Outcome: Progressing     Problem: Skin/Tissue Integrity  Goal: Skin integrity remains intact  Description: 1.  Monitor for areas of redness and/or skin breakdown2.  Assess vascular access sites hourly3.  Every 4-6 hours minimum:  Change oxygen saturation probe site4.  Every 4-6 hours:  If on nasal continuous positive airway pressure, respiratory therapy assess nares and determine need for appliance change or resting period  4/14/2025 0055 by Jordyn Ornelas RN  Outcome: Progressing  4/13/2025 1704 by Joan Chacko RN  Outcome: Progressing     Problem: ABCDS Injury Assessment  Goal: Absence of physical injury  4/14/2025 0055 by Jordyn Ornelas RN  Outcome: Progressing  4/13/2025 1704 by Joan Chacko RN  Outcome: Progressing     Problem: Safety - Adult  Goal: Free from fall injury  4/14/2025 0055 by Jordyn Ornelas RN  Outcome: Progressing  4/13/2025 1704 by Joan Chacko RN  Outcome: Progressing

## 2025-04-15 LAB
ALBUMIN SERPL-MCNC: 3.3 G/DL (ref 3.5–5.2)
ALP SERPL-CCNC: 258 U/L (ref 35–104)
ALT SERPL-CCNC: 43 U/L (ref 0–32)
ANION GAP SERPL CALCULATED.3IONS-SCNC: 16 MMOL/L (ref 7–16)
AST SERPL-CCNC: 37 U/L (ref 0–31)
BASOPHILS # BLD: 0 K/UL (ref 0–0.2)
BASOPHILS NFR BLD: 0 % (ref 0–2)
BILIRUB SERPL-MCNC: 0.3 MG/DL (ref 0–1.2)
BUN SERPL-MCNC: 32 MG/DL (ref 6–20)
CALCIUM SERPL-MCNC: 9.5 MG/DL (ref 8.6–10.2)
CHLORIDE SERPL-SCNC: 96 MMOL/L (ref 98–107)
CO2 SERPL-SCNC: 25 MMOL/L (ref 22–29)
CREAT SERPL-MCNC: 0.6 MG/DL (ref 0.5–1)
EOSINOPHIL # BLD: 0 K/UL (ref 0.05–0.5)
EOSINOPHILS RELATIVE PERCENT: 0 % (ref 0–6)
ERYTHROCYTE [DISTWIDTH] IN BLOOD BY AUTOMATED COUNT: 15.4 % (ref 11.5–15)
GFR, ESTIMATED: >90 ML/MIN/1.73M2
GLUCOSE SERPL-MCNC: 203 MG/DL (ref 74–99)
HCT VFR BLD AUTO: 37.3 % (ref 34–48)
HGB BLD-MCNC: 12.3 G/DL (ref 11.5–15.5)
LYMPHOCYTES NFR BLD: 0.89 K/UL (ref 1.5–4)
LYMPHOCYTES RELATIVE PERCENT: 6 % (ref 20–42)
MAGNESIUM SERPL-MCNC: 2 MG/DL (ref 1.6–2.6)
MCH RBC QN AUTO: 31 PG (ref 26–35)
MCHC RBC AUTO-ENTMCNC: 33 G/DL (ref 32–34.5)
MCV RBC AUTO: 94 FL (ref 80–99.9)
METAMYELOCYTES ABSOLUTE COUNT: 0.51 K/UL (ref 0–0.12)
METAMYELOCYTES: 4 % (ref 0–1)
MICROORGANISM SPEC CULT: ABNORMAL
MONOCYTES NFR BLD: 0.77 K/UL (ref 0.1–0.95)
MONOCYTES NFR BLD: 5 % (ref 2–12)
NEUTROPHILS NFR BLD: 85 % (ref 43–80)
NEUTS SEG NFR BLD: 12.63 K/UL (ref 1.8–7.3)
PLATELET # BLD AUTO: 303 K/UL (ref 130–450)
PMV BLD AUTO: 10.1 FL (ref 7–12)
POTASSIUM SERPL-SCNC: 3.3 MMOL/L (ref 3.5–5)
PROT SERPL-MCNC: 6.3 G/DL (ref 6.4–8.3)
RBC # BLD AUTO: 3.97 M/UL (ref 3.5–5.5)
RBC # BLD: ABNORMAL 10*6/UL
RBC # BLD: ABNORMAL 10*6/UL
SERVICE CMNT-IMP: ABNORMAL
SODIUM SERPL-SCNC: 137 MMOL/L (ref 132–146)
SPECIMEN DESCRIPTION: ABNORMAL
WBC OTHER # BLD: 14.8 K/UL (ref 4.5–11.5)

## 2025-04-15 PROCEDURE — 83735 ASSAY OF MAGNESIUM: CPT

## 2025-04-15 PROCEDURE — 6370000000 HC RX 637 (ALT 250 FOR IP): Performed by: NURSE PRACTITIONER

## 2025-04-15 PROCEDURE — 80053 COMPREHEN METABOLIC PANEL: CPT

## 2025-04-15 PROCEDURE — 2140000000 HC CCU INTERMEDIATE R&B

## 2025-04-15 PROCEDURE — 85025 COMPLETE CBC W/AUTO DIFF WBC: CPT

## 2025-04-15 PROCEDURE — 2500000003 HC RX 250 WO HCPCS: Performed by: STUDENT IN AN ORGANIZED HEALTH CARE EDUCATION/TRAINING PROGRAM

## 2025-04-15 PROCEDURE — 97161 PT EVAL LOW COMPLEX 20 MIN: CPT

## 2025-04-15 PROCEDURE — 6370000000 HC RX 637 (ALT 250 FOR IP): Performed by: STUDENT IN AN ORGANIZED HEALTH CARE EDUCATION/TRAINING PROGRAM

## 2025-04-15 PROCEDURE — 6360000002 HC RX W HCPCS: Performed by: STUDENT IN AN ORGANIZED HEALTH CARE EDUCATION/TRAINING PROGRAM

## 2025-04-15 PROCEDURE — 97530 THERAPEUTIC ACTIVITIES: CPT

## 2025-04-15 PROCEDURE — 97166 OT EVAL MOD COMPLEX 45 MIN: CPT

## 2025-04-15 PROCEDURE — 36415 COLL VENOUS BLD VENIPUNCTURE: CPT

## 2025-04-15 PROCEDURE — 97535 SELF CARE MNGMENT TRAINING: CPT

## 2025-04-15 RX ORDER — POTASSIUM CHLORIDE 1500 MG/1
40 TABLET, EXTENDED RELEASE ORAL ONCE
Status: COMPLETED | OUTPATIENT
Start: 2025-04-15 | End: 2025-04-15

## 2025-04-15 RX ORDER — DILTIAZEM HYDROCHLORIDE 240 MG/1
240 CAPSULE, COATED, EXTENDED RELEASE ORAL DAILY
Status: DISCONTINUED | OUTPATIENT
Start: 2025-04-15 | End: 2025-04-25 | Stop reason: HOSPADM

## 2025-04-15 RX ORDER — LORAZEPAM 0.5 MG/1
0.5 TABLET ORAL 2 TIMES DAILY
Status: DISCONTINUED | OUTPATIENT
Start: 2025-04-15 | End: 2025-04-25 | Stop reason: HOSPADM

## 2025-04-15 RX ADMIN — WATER 2000 MG: 1 INJECTION INTRAMUSCULAR; INTRAVENOUS; SUBCUTANEOUS at 21:02

## 2025-04-15 RX ADMIN — WATER 2000 MG: 1 INJECTION INTRAMUSCULAR; INTRAVENOUS; SUBCUTANEOUS at 04:33

## 2025-04-15 RX ADMIN — POTASSIUM CHLORIDE 40 MEQ: 1500 TABLET, EXTENDED RELEASE ORAL at 11:22

## 2025-04-15 RX ADMIN — SODIUM CHLORIDE, PRESERVATIVE FREE 10 ML: 5 INJECTION INTRAVENOUS at 11:30

## 2025-04-15 RX ADMIN — WATER 2000 MG: 1 INJECTION INTRAMUSCULAR; INTRAVENOUS; SUBCUTANEOUS at 11:30

## 2025-04-15 RX ADMIN — SODIUM CHLORIDE, PRESERVATIVE FREE 10 ML: 5 INJECTION INTRAVENOUS at 11:41

## 2025-04-15 RX ADMIN — DILTIAZEM HYDROCHLORIDE 240 MG: 240 CAPSULE, EXTENDED RELEASE ORAL at 16:28

## 2025-04-15 RX ADMIN — ATENOLOL 25 MG: 25 TABLET ORAL at 11:24

## 2025-04-15 RX ADMIN — BUPRENORPHINE AND NALOXONE 1 FILM: 8; 2 FILM, SOLUBLE BUCCAL; SUBLINGUAL at 11:26

## 2025-04-15 RX ADMIN — SODIUM CHLORIDE, PRESERVATIVE FREE 10 ML: 5 INJECTION INTRAVENOUS at 21:02

## 2025-04-15 RX ADMIN — LORAZEPAM 0.5 MG: 0.5 TABLET ORAL at 11:22

## 2025-04-15 RX ADMIN — ENOXAPARIN SODIUM 40 MG: 100 INJECTION SUBCUTANEOUS at 11:28

## 2025-04-15 RX ADMIN — TRAMADOL HYDROCHLORIDE 50 MG: 50 TABLET, COATED ORAL at 05:00

## 2025-04-15 RX ADMIN — ATENOLOL 25 MG: 25 TABLET ORAL at 21:02

## 2025-04-15 RX ADMIN — BUPRENORPHINE AND NALOXONE 1 FILM: 8; 2 FILM, SOLUBLE BUCCAL; SUBLINGUAL at 21:02

## 2025-04-15 RX ADMIN — LORAZEPAM 0.5 MG: 0.5 TABLET ORAL at 21:02

## 2025-04-15 RX ADMIN — FUROSEMIDE 40 MG: 10 INJECTION, SOLUTION INTRAMUSCULAR; INTRAVENOUS at 11:30

## 2025-04-15 ASSESSMENT — PAIN SCALES - GENERAL
PAINLEVEL_OUTOF10: 6
PAINLEVEL_OUTOF10: 5
PAINLEVEL_OUTOF10: 7
PAINLEVEL_OUTOF10: 6
PAINLEVEL_OUTOF10: 7
PAINLEVEL_OUTOF10: 7
PAINLEVEL_OUTOF10: 5
PAINLEVEL_OUTOF10: 6

## 2025-04-15 ASSESSMENT — PAIN DESCRIPTION - DESCRIPTORS
DESCRIPTORS: DISCOMFORT;STABBING;SORE
DESCRIPTORS: ACHING;CRAMPING;DISCOMFORT
DESCRIPTORS: DISCOMFORT;ACHING;CRAMPING

## 2025-04-15 ASSESSMENT — PAIN SCALES - WONG BAKER
WONGBAKER_NUMERICALRESPONSE: NO HURT
WONGBAKER_NUMERICALRESPONSE: NO HURT

## 2025-04-15 ASSESSMENT — PAIN DESCRIPTION - LOCATION
LOCATION: BACK

## 2025-04-15 ASSESSMENT — PAIN DESCRIPTION - ORIENTATION
ORIENTATION: MID
ORIENTATION: MID

## 2025-04-15 ASSESSMENT — PAIN DESCRIPTION - FREQUENCY: FREQUENCY: CONTINUOUS

## 2025-04-15 ASSESSMENT — PAIN DESCRIPTION - PAIN TYPE: TYPE: CHRONIC PAIN

## 2025-04-15 ASSESSMENT — PAIN DESCRIPTION - ONSET: ONSET: ON-GOING

## 2025-04-15 NOTE — PROGRESS NOTES
Messaged Myra Joyner via [employer] about patient not having anything on for anxiety and she normally takes ativan 0.5 mg twice a day at home. She is complaining of a lot of anxiety this morning. Awaiting response/ new orders.    Bonita Nguyen, RN

## 2025-04-15 NOTE — PLAN OF CARE
Problem: Discharge Planning  Goal: Discharge to home or other facility with appropriate resources  Outcome: Progressing     Problem: Pain  Goal: Verbalizes/displays adequate comfort level or baseline comfort level  Outcome: Progressing     Problem: Skin/Tissue Integrity  Goal: Skin integrity remains intact  Description: 1.  Monitor for areas of redness and/or skin breakdown2.  Assess vascular access sites hourly3.  Every 4-6 hours minimum:  Change oxygen saturation probe site4.  Every 4-6 hours:  If on nasal continuous positive airway pressure, respiratory therapy assess nares and determine need for appliance change or resting period  Outcome: Progressing  Flowsheets (Taken 4/15/2025 1822)  Skin Integrity Remains Intact: Monitor for areas of redness and/or skin breakdown     Problem: ABCDS Injury Assessment  Goal: Absence of physical injury  Outcome: Progressing     Problem: Safety - Adult  Goal: Free from fall injury  Outcome: Progressing

## 2025-04-15 NOTE — PLAN OF CARE
Problem: Discharge Planning  Goal: Discharge to home or other facility with appropriate resources  4/14/2025 2240 by Bonita Nguyen RN  Outcome: Progressing  Flowsheets (Taken 4/14/2025 2120)  Discharge to home or other facility with appropriate resources: Identify barriers to discharge with patient and caregiver  4/14/2025 1605 by Katherin Kebede RN  Outcome: Progressing  Flowsheets (Taken 4/14/2025 0758)  Discharge to home or other facility with appropriate resources: Identify barriers to discharge with patient and caregiver     Problem: Pain  Goal: Verbalizes/displays adequate comfort level or baseline comfort level  4/14/2025 2240 by Bonita Nguyen RN  Outcome: Progressing  4/14/2025 1605 by Katherin Kebede RN  Outcome: Progressing  Flowsheets (Taken 4/14/2025 0758)  Verbalizes/displays adequate comfort level or baseline comfort level: Encourage patient to monitor pain and request assistance     Problem: Skin/Tissue Integrity  Goal: Skin integrity remains intact  Description: 1.  Monitor for areas of redness and/or skin breakdown2.  Assess vascular access sites hourly3.  Every 4-6 hours minimum:  Change oxygen saturation probe site4.  Every 4-6 hours:  If on nasal continuous positive airway pressure, respiratory therapy assess nares and determine need for appliance change or resting period  4/14/2025 2240 by Bonita Nguyen RN  Outcome: Progressing  Flowsheets (Taken 4/14/2025 2120)  Skin Integrity Remains Intact: Monitor for areas of redness and/or skin breakdown  4/14/2025 1605 by Katherin Kebede RN  Outcome: Progressing  Flowsheets (Taken 4/14/2025 0758)  Skin Integrity Remains Intact: Monitor for areas of redness and/or skin breakdown     Problem: ABCDS Injury Assessment  Goal: Absence of physical injury  4/14/2025 2240 by Bonita Nguyen RN  Outcome: Progressing  4/14/2025 1605 by Katherin Kebede RN  Outcome: Progressing     Problem: Safety - Adult  Goal: Free from

## 2025-04-15 NOTE — PROGRESS NOTES
Internal Medicine Progress Note    Patient's name: Li Rojas  : 1970  Chief complaints (on day of admission): Leg Swelling (Leg pain and swelling bilateral legs getting worse. )  Admission date: 2025  Date of service: 4/15/2025   Room: [address]  Primary care physician: Melvin Cardoso MD  Reason for visit: Follow-up for leg swelling    Subjective  Li was seen and examined at bedside   Still with concerns of bilateral LE celllulitis, on abx  Otherwise no other concerns    Review of Systems  There are no new complaints of chest pain, shortness of breath, abdominal pain, nausea, vomiting, diarrhea, constipation unless otherwise mentioned above.     Hospital Medications  Current Facility-Administered Medications   Medication Dose Route Frequency Provider Last Rate Last Admin    LORazepam (ATIVAN) tablet 0.5 mg  0.5 mg Oral BID Myra Joyner, APRN - CNP   0.5 mg at 04/15/25 1122    dilTIAZem (CARDIZEM CD) extended release capsule 240 mg  240 mg Oral Daily Robles Wallace MD        buprenorphine-naloxone (SUBOXONE) 8-2 MG SL film 1 Film (Patient Supplied)  1 Film SubLINGual BID Robles Wallace MD   1 Film at 04/15/25 1126    sodium chloride flush 0.9 % injection 10 mL  10 mL IntraVENous 2 times per day Nicolás Duncan MD   10 mL at 04/15/25 1130    sodium chloride flush 0.9 % injection 10 mL  10 mL IntraVENous PRN Nicolás Duncan MD   10 mL at 04/15/25 1141    0.9 % sodium chloride infusion   IntraVENous PRN Nicolás Duncan MD        potassium chloride (KLOR-CON M) extended release tablet 40 mEq  40 mEq Oral PRN Nicolás Duncan MD        Or    potassium bicarb-citric acid (EFFER-K) effervescent tablet 40 mEq  40 mEq Oral PRN Nicolás Duncan MD        Or    potassium chloride 10 mEq/100 mL IVPB (Peripheral Line)  10 mEq IntraVENous PRN Nicolás Duncan MD        enoxaparin (LOVENOX) injection 40 mg  40 mg SubCUTAneous Daily Nicolás Duncan MD   40 mg at 04/15/25 1128    ondansetron (ZOFRAN-ODT)  Diagnosis     Bilateral lower leg cellulitis [L03.116, L03.115]          Plan  BL LE edema   Compression   ECHO  Diuresis   Watch renal function     LLE Cellulitis   ID consultation, Ancef  ASO titer    Acute on chronic back pain   CT c spine t spine L spine     History of intra abdominal abscess   Patient and her mother state this is an ongoing issue that there is still an abscess in her abdomen that [facility] was going to do surgery on back in December apparently she was lost to follow up   CT abdomen pelvis w IV contrast     Suboxone use   Chronic use for her back     MS on chronic prednisone 15 mg daily     Tobacco abuse   1/2 PPD  Discussed cessation   Declines NRT     RA     Discharge plan: TBD pending clinical improvement     Electronically signed by Robles Wallace MD on 4/15/2025 at 4:22 PM    I can be reached through [employer].

## 2025-04-15 NOTE — PROGRESS NOTES
Physical Therapy  Initial Assessment     Name: Li Rojas  : 1970  MRN: [id number]      Date of Service: 4/15/2025    Evaluating PT: Nicholas Burns, PT, DPT [id number]      Room #:  6402/6402-B  Diagnosis:  Shortness of breath [R06.02]  Leg edema [R60.0]  Bilateral lower leg cellulitis [L03.116, L03.115]  PMHx/PSHx:   has a past medical history of Arthritis, Asthma, H/O medication noncompliance, Hyperlipidemia, Hypertension, Lupus, MS (multiple sclerosis) (HCC), Multiple sclerosis (HCC), Nicotine dependence, cigarettes, uncomplicated, Palpitations, SOB (shortness of breath), SVT (supraventricular tachycardia), and Systemic lupus erythematosus, unspecified.  Procedure/Surgery:  None this admission  Precautions:  Fall risk, anxious, PureWick  Equipment Needs:  TBD    SUBJECTIVE:    Pt lives with her mom in a first floor apartment. Pt used manual WC for mobility primarily. Pt uses FWW to ambulate short distances within the home.     OBJECTIVE:   Initial Evaluation  Date: 4/15/25 Treatment Date: Short Term/ Long Term   Goals   AM-PAC 6 Clicks 15/24     Was pt agreeable to Eval/treatment? Yes     Does pt have pain? 7/10 low back pain     Bed Mobility  Rolling: NT  Supine to sit: SBA  Sit to supine: NT  Scooting: SBA  Rolling: Independent   Supine to sit: Independent   Sit to supine: Independent   Scooting: Independent    Transfers Sit to stand:   Min A from bed  Mod A from toilet  Stand to sit:   Min A from bed  Mod A from toilet  Stand pivot: Min A with FWW  Sit to stand: Independent   Stand to sit: Independent   Stand pivot: Modified independent with AAD   Ambulation   10'x2 feet with FWW with Min A  >75 feet with AAD modified independent   Stair negotiation: ascended and descended NT  NA   ROM BUE: Refer to OT note  BLE: WFL     Strength BUE: Refer to OT note  BLE: WFL     Balance Sitting EOB: SBA  Dynamic Standing: Min A with FWW  Sitting EOB: Independent   Dynamic Standing: Modified independent with AAD

## 2025-04-15 NOTE — PROGRESS NOTES
OCCUPATIONAL THERAPY INITIAL EVALUATION    [facility] [address] [place], [place]       Date:4/15/2025                                                  Patient Name: Li Rojas  MRN: [id number]  : 1970  Room: [facility]    Evaluating OT: Alberto Rocha OTR/L [id number]    Referring Provider: Nicolás Duncan MD      Specific Provider Orders/Date: OT evaluation and treatment 25 1115    Diagnosis:  Shortness of breath [R06.02]  Leg edema [R60.0]  Bilateral lower leg cellulitis [L03.116, L03.115]      Pertinent Medical History:  has a past medical history of Arthritis, Asthma, H/O medication noncompliance, Hyperlipidemia, Hypertension, Lupus, MS (multiple sclerosis) (HCC), Multiple sclerosis (HCC), Nicotine dependence, cigarettes, uncomplicated, Palpitations, SOB (shortness of breath), SVT (supraventricular tachycardia), and Systemic lupus erythematosus, unspecified.   Past Surgical History:   Procedure Laterality Date    ABDOMEN SURGERY      ANTERIOR CRUCIATE LIGAMENT REPAIR      APPENDECTOMY       SECTION      x5    CHOLECYSTECTOMY      DILATION AND CURETTAGE OF UTERUS      HC INJECTION PROCEDURE FOR SACROILIAC JOINT Left 12/3/2020    LEFT SACROILIAC JOINT STEROID INJECTION UNDER X-RAY GUIDANCE performed by Fauzia Crowell DO at [facility] OR    NERVE BLOCK Left 10/01/2020    NERVE BLOCK Left 10/1/2020    LEFT L4-5 TRANSFORAMINAL EPIDURAL STEROID INJECTION performed by Fauzia Crowell DO at [facility] OR    OTHER SURGICAL HISTORY Left 2020    LEFT SACROILIAC STEROID INJECTION UNDER XRAY GUIDANCE    TUBAL LIGATION         Pt presented to the hospital on  with BLE edema   Pt with hx of spinal fractures and acute rehab in    Activity order: up with assist     Orders received, chart reviewed, eval complete.     Precautions:  Fall Risk, spinal neutrality, incontinent     Assessment of current  deficits   [x] Functional mobility   [x]ADLs  [x] Strength               []Cognition   [x] Functional transfers   [x] IADLs         [x] Safety Awareness   [x]Endurance   [] Fine Motor Coordination [x] Balance [] Vision/perception   []Sensation    [] Gross Motor Coordination [] ROM  [] Delirium                  [] Motor Control     OT PLAN OF CARE   OT POC based on physician orders, patient diagnosis and results of clinical assessment    Frequency/Duration 1-3 days/wk for 2 weeks PRN   Specific OT Treatment to include:   * Instruction/training on adapted ADL techniques and AE recommendations to increase functional independence within precautions       * Training on energy conservation strategies, correct breathing pattern and techniques to improve independence/tolerance for self-care routine  * Functional transfer/mobility training/DME recommendations for increased independence, safety, and fall prevention  * Patient/Family education to increase follow through with safety techniques and functional independence  * Recommendation of environmental modifications for increased safety with functional transfers/mobility and ADLs  * Therapeutic exercise to improve motor endurance, ROM, and functional strength for ADLs/functional transfers  * Therapeutic activities to facilitate/challenge dynamic balance, stand tolerance for increased safety and independence with ADLs  * Therapeutic activities to facilitate gross/fine motor skills for increased independence with ADLs  * Positioning to improve skin integrity, interaction with environment and functional independence  * Neuro-muscular re-education: facilitation of righting/equilibrium reactions, midline orientation, scapular stability/mobility, normalization of muscle tone, and facilitation of volitional active controled movement    Recommended Adaptive Equipment: TBD  sock aid provided 4/15     Home Living:  Pt lives with family - mother    in a 1 level apt on the 1 floor with

## 2025-04-15 NOTE — CONSULTS
Department of Internal Medicine  Infectious Diseases  Progress  Note      C/C :  Cellulitis of legs     Pt denies fever or chills  Reports leg swelling and pain  Afebrile       Current Facility-Administered Medications   Medication Dose Route Frequency Provider Last Rate Last Admin    LORazepam (ATIVAN) tablet 0.5 mg  0.5 mg Oral BID Myra Joyner APRN - CNP   0.5 mg at 04/15/25 1122    dilTIAZem (CARDIZEM CD) extended release capsule 240 mg  240 mg Oral Daily Robles Wallace MD        buprenorphine-naloxone (SUBOXONE) 8-2 MG SL film 1 Film (Patient Supplied)  1 Film SubLINGual BID Robles Wallace MD   1 Film at 04/15/25 1126    sodium chloride flush 0.9 % injection 10 mL  10 mL IntraVENous 2 times per day Nicolás Duncan MD   10 mL at 04/15/25 1130    sodium chloride flush 0.9 % injection 10 mL  10 mL IntraVENous PRN Nicolás Duncan MD   10 mL at 04/15/25 1141    0.9 % sodium chloride infusion   IntraVENous PRN Nicolás Duncan MD        potassium chloride (KLOR-CON M) extended release tablet 40 mEq  40 mEq Oral PRN Nicolás Duncan MD        Or    potassium bicarb-citric acid (EFFER-K) effervescent tablet 40 mEq  40 mEq Oral PRN Nicolás Duncan MD        Or    potassium chloride 10 mEq/100 mL IVPB (Peripheral Line)  10 mEq IntraVENous PRN Nicolás Duncan MD        enoxaparin (LOVENOX) injection 40 mg  40 mg SubCUTAneous Daily Nicolás Duncan MD   40 mg at 04/15/25 1128    ondansetron (ZOFRAN-ODT) disintegrating tablet 4 mg  4 mg Oral Q8H PRN Nicolás Duncan MD        Or    ondansetron (ZOFRAN) injection 4 mg  4 mg IntraVENous Q6H PRN Nicolás Duncan MD        senna (SENOKOT) tablet 8.6 mg  1 tablet Oral Daily PRN Nicolás Duncan MD        acetaminophen (TYLENOL) tablet 650 mg  650 mg Oral Q6H PRN Nicolás Duncan MD        Or    acetaminophen (TYLENOL) suppository 650 mg  650 mg Rectal Q6H PRN Nicolás Duncan MD        atenolol (TENORMIN) tablet 25 mg  25 mg Oral BID Nicolás Duncan MD   25 mg at 04/15/25 1124    predniSONE    Leukocytosis   GPC bacteremia   Aerococcus bacteriuria         RECOMMENDATIONS:      Ancef 2 grams IV q 8 hrs, diuretics  Await final blood cx

## 2025-04-16 LAB
ALBUMIN SERPL-MCNC: 3.3 G/DL (ref 3.5–5.2)
ALP SERPL-CCNC: 289 U/L (ref 35–104)
ALT SERPL-CCNC: 51 U/L (ref 0–35)
ANION GAP SERPL CALCULATED.3IONS-SCNC: 15 MMOL/L (ref 7–16)
AST SERPL-CCNC: 50 U/L (ref 0–35)
BASOPHILS # BLD: 0 K/UL (ref 0–0.2)
BASOPHILS NFR BLD: 0 % (ref 0–2)
BILIRUB SERPL-MCNC: <0.2 MG/DL (ref 0–1.2)
BUN SERPL-MCNC: 25 MG/DL (ref 6–20)
CALCIUM SERPL-MCNC: 9.9 MG/DL (ref 8.6–10)
CHLORIDE SERPL-SCNC: 95 MMOL/L (ref 98–107)
CO2 SERPL-SCNC: 27 MMOL/L (ref 22–29)
CREAT SERPL-MCNC: 0.6 MG/DL (ref 0.5–1)
EOSINOPHIL # BLD: 0.1 K/UL (ref 0.05–0.5)
EOSINOPHILS RELATIVE PERCENT: 1 % (ref 0–6)
ERYTHROCYTE [DISTWIDTH] IN BLOOD BY AUTOMATED COUNT: 15.6 % (ref 11.5–15)
GFR, ESTIMATED: >90 ML/MIN/1.73M2
GLUCOSE SERPL-MCNC: 220 MG/DL (ref 74–99)
HCT VFR BLD AUTO: 40.2 % (ref 34–48)
HGB BLD-MCNC: 13.1 G/DL (ref 11.5–15.5)
LYMPHOCYTES NFR BLD: 1.12 K/UL (ref 1.5–4)
LYMPHOCYTES RELATIVE PERCENT: 10 % (ref 20–42)
MCH RBC QN AUTO: 30.7 PG (ref 26–35)
MCHC RBC AUTO-ENTMCNC: 32.6 G/DL (ref 32–34.5)
MCV RBC AUTO: 94.1 FL (ref 80–99.9)
METAMYELOCYTES ABSOLUTE COUNT: 0.41 K/UL (ref 0–0.12)
METAMYELOCYTES: 4 % (ref 0–1)
MONOCYTES NFR BLD: 0.41 K/UL (ref 0.1–0.95)
MONOCYTES NFR BLD: 4 % (ref 2–12)
MYELOCYTES ABSOLUTE COUNT: 0.1 K/UL
MYELOCYTES: 1 %
NEUTROPHILS NFR BLD: 82 % (ref 43–80)
NEUTS SEG NFR BLD: 9.46 K/UL (ref 1.8–7.3)
PLATELET # BLD AUTO: 311 K/UL (ref 130–450)
PMV BLD AUTO: 9.6 FL (ref 7–12)
POTASSIUM SERPL-SCNC: 3.7 MMOL/L (ref 3.4–4.5)
PROT SERPL-MCNC: 6.4 G/DL (ref 6.4–8.3)
RBC # BLD AUTO: 4.27 M/UL (ref 3.5–5.5)
RBC # BLD: ABNORMAL 10*6/UL
RBC # BLD: ABNORMAL 10*6/UL
SODIUM SERPL-SCNC: 137 MMOL/L (ref 136–145)
WBC OTHER # BLD: 11.6 K/UL (ref 4.5–11.5)

## 2025-04-16 PROCEDURE — 6370000000 HC RX 637 (ALT 250 FOR IP): Performed by: STUDENT IN AN ORGANIZED HEALTH CARE EDUCATION/TRAINING PROGRAM

## 2025-04-16 PROCEDURE — 2700000000 HC OXYGEN THERAPY PER DAY

## 2025-04-16 PROCEDURE — 36415 COLL VENOUS BLD VENIPUNCTURE: CPT

## 2025-04-16 PROCEDURE — 6360000002 HC RX W HCPCS: Performed by: STUDENT IN AN ORGANIZED HEALTH CARE EDUCATION/TRAINING PROGRAM

## 2025-04-16 PROCEDURE — 2140000000 HC CCU INTERMEDIATE R&B

## 2025-04-16 PROCEDURE — 2500000003 HC RX 250 WO HCPCS: Performed by: STUDENT IN AN ORGANIZED HEALTH CARE EDUCATION/TRAINING PROGRAM

## 2025-04-16 PROCEDURE — 6370000000 HC RX 637 (ALT 250 FOR IP): Performed by: NURSE PRACTITIONER

## 2025-04-16 PROCEDURE — 85025 COMPLETE CBC W/AUTO DIFF WBC: CPT

## 2025-04-16 PROCEDURE — 80053 COMPREHEN METABOLIC PANEL: CPT

## 2025-04-16 RX ADMIN — ENOXAPARIN SODIUM 40 MG: 100 INJECTION SUBCUTANEOUS at 11:41

## 2025-04-16 RX ADMIN — BUPRENORPHINE AND NALOXONE 1 FILM: 8; 2 FILM, SOLUBLE BUCCAL; SUBLINGUAL at 21:41

## 2025-04-16 RX ADMIN — WATER 2000 MG: 1 INJECTION INTRAMUSCULAR; INTRAVENOUS; SUBCUTANEOUS at 11:41

## 2025-04-16 RX ADMIN — DILTIAZEM HYDROCHLORIDE 240 MG: 240 CAPSULE, EXTENDED RELEASE ORAL at 11:42

## 2025-04-16 RX ADMIN — WATER 2000 MG: 1 INJECTION INTRAMUSCULAR; INTRAVENOUS; SUBCUTANEOUS at 04:51

## 2025-04-16 RX ADMIN — SODIUM CHLORIDE, PRESERVATIVE FREE 10 ML: 5 INJECTION INTRAVENOUS at 11:42

## 2025-04-16 RX ADMIN — SODIUM CHLORIDE, PRESERVATIVE FREE 10 ML: 5 INJECTION INTRAVENOUS at 21:41

## 2025-04-16 RX ADMIN — BUPRENORPHINE AND NALOXONE 1 FILM: 8; 2 FILM, SOLUBLE BUCCAL; SUBLINGUAL at 11:42

## 2025-04-16 RX ADMIN — PREDNISONE 15 MG: 5 TABLET ORAL at 11:42

## 2025-04-16 RX ADMIN — ATENOLOL 25 MG: 25 TABLET ORAL at 11:42

## 2025-04-16 RX ADMIN — FUROSEMIDE 40 MG: 10 INJECTION, SOLUTION INTRAMUSCULAR; INTRAVENOUS at 11:41

## 2025-04-16 RX ADMIN — ATENOLOL 25 MG: 25 TABLET ORAL at 21:41

## 2025-04-16 RX ADMIN — LORAZEPAM 0.5 MG: 0.5 TABLET ORAL at 11:42

## 2025-04-16 RX ADMIN — WATER 2000 MG: 1 INJECTION INTRAMUSCULAR; INTRAVENOUS; SUBCUTANEOUS at 21:41

## 2025-04-16 RX ADMIN — LORAZEPAM 0.5 MG: 0.5 TABLET ORAL at 21:41

## 2025-04-16 ASSESSMENT — PAIN DESCRIPTION - ONSET: ONSET: ON-GOING

## 2025-04-16 ASSESSMENT — PAIN SCALES - GENERAL
PAINLEVEL_OUTOF10: 0
PAINLEVEL_OUTOF10: 6
PAINLEVEL_OUTOF10: 5

## 2025-04-16 ASSESSMENT — PAIN DESCRIPTION - PAIN TYPE: TYPE: CHRONIC PAIN

## 2025-04-16 ASSESSMENT — PAIN DESCRIPTION - FREQUENCY: FREQUENCY: CONTINUOUS

## 2025-04-16 ASSESSMENT — PAIN DESCRIPTION - DESCRIPTORS: DESCRIPTORS: DISCOMFORT;ACHING;CRAMPING

## 2025-04-16 ASSESSMENT — PAIN - FUNCTIONAL ASSESSMENT: PAIN_FUNCTIONAL_ASSESSMENT: PREVENTS OR INTERFERES SOME ACTIVE ACTIVITIES AND ADLS

## 2025-04-16 ASSESSMENT — PAIN DESCRIPTION - LOCATION: LOCATION: BACK

## 2025-04-16 ASSESSMENT — PAIN DESCRIPTION - ORIENTATION: ORIENTATION: MID

## 2025-04-16 NOTE — PLAN OF CARE
Problem: Discharge Planning  Goal: Discharge to home or other facility with appropriate resources  Outcome: Progressing  Flowsheets (Taken 4/16/2025 0739)  Discharge to home or other facility with appropriate resources: Identify barriers to discharge with patient and caregiver     Problem: Pain  Goal: Verbalizes/displays adequate comfort level or baseline comfort level  Outcome: Progressing  Flowsheets (Taken 4/16/2025 0739)  Verbalizes/displays adequate comfort level or baseline comfort level: Encourage patient to monitor pain and request assistance     Problem: Skin/Tissue Integrity  Goal: Skin integrity remains intact  Description: 1.  Monitor for areas of redness and/or skin breakdown2.  Assess vascular access sites hourly3.  Every 4-6 hours minimum:  Change oxygen saturation probe site4.  Every 4-6 hours:  If on nasal continuous positive airway pressure, respiratory therapy assess nares and determine need for appliance change or resting period  Outcome: Progressing  Flowsheets (Taken 4/16/2025 0739)  Skin Integrity Remains Intact: Monitor for areas of redness and/or skin breakdown     Problem: ABCDS Injury Assessment  Goal: Absence of physical injury  Outcome: Progressing     Problem: Safety - Adult  Goal: Free from fall injury  Outcome: Progressing     Problem: Neurosensory - Adult  Goal: Achieves maximal functionality and self care  Outcome: Progressing  Flowsheets (Taken 4/16/2025 0739)  Achieves maximal functionality and self care: Monitor swallowing and airway patency with patient fatigue and changes in neurological status     Problem: Respiratory - Adult  Goal: Achieves optimal ventilation and oxygenation  Outcome: Progressing  Flowsheets (Taken 4/16/2025 0739)  Achieves optimal ventilation and oxygenation: Assess for changes in respiratory status     Problem: Cardiovascular - Adult  Goal: Absence of cardiac dysrhythmias or at baseline  Outcome: Progressing  Flowsheets (Taken 4/16/2025 0739)  Absence of

## 2025-04-16 NOTE — PROGRESS NOTES
Internal Medicine Progress Note    Patient's name: Li Rojas  : 1970  Chief complaints (on day of admission): Leg Swelling (Leg pain and swelling bilateral legs getting worse. )  Admission date: 2025  Date of service: 2025   Room: [address]  Primary care physician: Melvin Cardoso MD  Reason for visit: Follow-up for leg swelling    Subjective  Li was seen and examined at bedside   Still with concerns of bilateral LE celllulitis, on abx improving  Otherwise no other concerns    Review of Systems  There are no new complaints of chest pain, shortness of breath, abdominal pain, nausea, vomiting, diarrhea, constipation unless otherwise mentioned above.     Hospital Medications  Current Facility-Administered Medications   Medication Dose Route Frequency Provider Last Rate Last Admin    LORazepam (ATIVAN) tablet 0.5 mg  0.5 mg Oral BID Myra Joyner APRN - CNP   0.5 mg at 25 1142    dilTIAZem (CARDIZEM CD) extended release capsule 240 mg  240 mg Oral Daily Robles Wallace MD   240 mg at 25 1142    buprenorphine-naloxone (SUBOXONE) 8-2 MG SL film 1 Film (Patient Supplied)  1 Film SubLINGual BID Robles Wallace MD   1 Film at 25 1142    sodium chloride flush 0.9 % injection 10 mL  10 mL IntraVENous 2 times per day Nicolás Duncan MD   10 mL at 25 1142    sodium chloride flush 0.9 % injection 10 mL  10 mL IntraVENous PRN Nicolás Duncan MD   10 mL at 04/15/25 1141    0.9 % sodium chloride infusion   IntraVENous PRN Nicolás Duncan MD        potassium chloride (KLOR-CON M) extended release tablet 40 mEq  40 mEq Oral PRN Nicolás Duncan MD        Or    potassium bicarb-citric acid (EFFER-K) effervescent tablet 40 mEq  40 mEq Oral PRN Nicolás Duncan MD        Or    potassium chloride 10 mEq/100 mL IVPB (Peripheral Line)  10 mEq IntraVENous PRN Nicolás Duncan MD        enoxaparin (LOVENOX) injection 40 mg  40 mg SubCUTAneous Daily Nicolás Duncan MD   40 mg at 25 1141

## 2025-04-16 NOTE — CARE COORDINATION
Patient on 2L NC, IV Ancef Q8, daily IV lasix, unable to lay flat for CT scans; ID following. Patient remains independent in the room, plans to return home, [facility] has accepted, orders placed and patient's mother transporting home when discharged.    Electronically signed by TRAM Juan on 4/16/2025 at 2:52 PM

## 2025-04-16 NOTE — FLOWSHEET NOTE
Came into room to take patients vitals and her O2 was 87% so I put her on 2L O2. She recovered to 97%.    04/15/25 2047   Oxygen Therapy   SpO2 97 %   Pulse Oximetry Type Continuous   Pulse Oximeter Device Mode Continuous   Pulse Oximeter Device Location Right;Finger   O2 Device Nasal cannula   O2 Flow Rate (L/min) 2 L/min     Bonita Nguyen, RN

## 2025-04-16 NOTE — PROGRESS NOTES
The  visited the patient. The patient requested that the  come back to visit as she was sleeping. There was no family available during the visit. The  will continue to follow as needed. .d

## 2025-04-16 NOTE — PROGRESS NOTES
Spiritual Health History and Assessment/Progress Note  [facility]    (P) Follow-up, Spiritual/Emotional Needs,  ,  ,      Name: Li Rojas MRN: [id number]    Age: 55 y.o.     Sex: female   Language: English   Islam: [employer]   Bilateral lower leg cellulitis     Date: 4/16/2025                           Spiritual Assessment began in [facility]        Referral/Consult From: (P) Rounding   Encounter Overview/Reason: (P) Follow-up, Spiritual/Emotional Needs  Service Provided For: (P) Patient    [place], Belief, Meaning:   Patient identifies as spiritual, is connected with a meaghan tradition or spiritual practice, and has beliefs or practices that help with coping during difficult times  Family/Friends No family/friends present      Importance and Influence:  Patient has spiritual/personal beliefs that influence decisions regarding their health  Family/Friends have spiritual/personal beliefs that influence decisions regarding the patient's health and No family/friends present    Community:  Patient is connected with a spiritual community and feels well-supported. Support system includes: Parent/s, Children, [place] Community, and Extended family  Family/Friends No family/friends present    Assessment and Plan of Care:     Patient Interventions include: Facilitated expression of thoughts and feelings, Explored spiritual coping/struggle/distress, Engaged in theological reflection, and Affirmed coping skills/support systems  Family/Friends Interventions include: No family/friends present    Patient Plan of Care: Other: The  will follow as needed.  Family/Friends Plan of Care: Other: The  will follow as needed.    Electronically signed by Chaplain Carl on 4/16/2025 at 1:49 PM

## 2025-04-16 NOTE — PLAN OF CARE
Problem: Discharge Planning  Goal: Discharge to home or other facility with appropriate resources  4/15/2025 2118 by Bonita Nguyen RN  Outcome: Progressing  4/15/2025 1826 by Xin Perez RN  Outcome: Progressing     Problem: Pain  Goal: Verbalizes/displays adequate comfort level or baseline comfort level  4/15/2025 2118 by Bonita Nguyen RN  Outcome: Progressing  4/15/2025 1826 by Xin Perez RN  Outcome: Progressing     Problem: Skin/Tissue Integrity  Goal: Skin integrity remains intact  Description: 1.  Monitor for areas of redness and/or skin breakdown2.  Assess vascular access sites hourly3.  Every 4-6 hours minimum:  Change oxygen saturation probe site4.  Every 4-6 hours:  If on nasal continuous positive airway pressure, respiratory therapy assess nares and determine need for appliance change or resting period  4/15/2025 2118 by Bonita Nguyen RN  Outcome: Progressing  Flowsheets (Taken 4/15/2025 2045)  Skin Integrity Remains Intact: Monitor for areas of redness and/or skin breakdown  4/15/2025 1826 by Xin Perez RN  Outcome: Progressing  Flowsheets (Taken 4/15/2025 1822)  Skin Integrity Remains Intact: Monitor for areas of redness and/or skin breakdown     Problem: ABCDS Injury Assessment  Goal: Absence of physical injury  4/15/2025 2118 by Bonita Nguyen RN  Outcome: Progressing  4/15/2025 1826 by Xin Perez RN  Outcome: Progressing     Problem: Safety - Adult  Goal: Free from fall injury  4/15/2025 2118 by Bonita Nguyen RN  Outcome: Progressing  4/15/2025 1826 by Xin Perez RN  Outcome: Progressing

## 2025-04-16 NOTE — PROGRESS NOTES
Spoke with RN to follow up on CT orders from 4/13.  Pt is still not able to lay flat for scans.  Please call CT @ [phone] when she is able to do scans.

## 2025-04-16 NOTE — PROGRESS NOTES
Function Panel:    Lab Results   Component Value Date/Time    ALKPHOS 289 04/16/2025 05:48 AM    ALT 51 04/16/2025 05:48 AM    AST 50 04/16/2025 05:48 AM    BILITOT <0.2 04/16/2025 05:48 AM    BILIDIR <0.2 01/25/2023 03:06 AM    IBILI see below 01/25/2023 03:06 AM       PT/INR:    Lab Results   Component Value Date/Time    PROTIME 13.1 04/16/2011 06:20 AM    INR 1.2 04/16/2011 06:20 AM       TSH:    Lab Results   Component Value Date/Time    TSH 0.58 01/13/2024 05:08 AM       U/A:    Lab Results   Component Value Date/Time    COLORU Yellow 04/13/2025 07:56 AM    PHUR 6.0 04/13/2025 07:56 AM    PHUR 7.5 03/18/2024 12:55 PM    WBCUA 0 TO 5 04/13/2025 07:56 AM    WBCUA 0-1 06/25/2011 10:00 PM    RBCUA 0 TO 2 04/13/2025 07:56 AM    RBCUA NONE 09/19/2013 07:00 AM    BACTERIA TRACE 04/13/2025 07:56 AM    CLARITYU CLOUDY 08/18/2017 01:45 PM    LEUKOCYTESUR SMALL 04/13/2025 07:56 AM    UROBILINOGEN 0.2 04/13/2025 07:56 AM    BILIRUBINUR NEGATIVE 04/13/2025 07:56 AM    BLOODU Negative 08/18/2017 01:45 PM    GLUCOSEU NEGATIVE 04/13/2025 07:56 AM    GLUCOSEU NEGATIVE 06/25/2011 10:00 PM    AMORPHOUS PRESENT 03/18/2024 12:55 PM       ABG:  No results found for: \"DZC9OWT\", \"BEART\", \"I1RLWAIK\", \"PHART\", \"THGBART\", \"DTI1CRO\", \"PO2ART\", \"RWP1YDD\"    MICROBIOLOGY:          Specimen Description .CLEAN CATCH URINE     Special Requests Site: Urine    Culture AEROCOCCUS URINAE 10 to 50,000 CFU/ML Susceptibility testing not routinely done. No further work up. Abnormal    Culture, Blood 1 [[id number]]    Collected: 04/13/25 1145    Updated: 04/15/25 0857    Order Status: Completed    Specimen Source: Blood     Specimen Description .BLOOD    Special Requests         [employer] test comment AEROBIC BLD BOTTLE    Direct Exam DIRECT GRAM STAIN FROM BOTTLE: GRAM POSITIVE COCCI IN CHAINS         ASO  22    Radiology :    Chest X ray    Borderline cardiomegaly with low lung volumes.  No focal parenchymal  opacification present.         Echo -

## 2025-04-17 LAB
ACB COMPLEX DNA BLD POS QL NAA+NON-PROBE: NOT DETECTED
ANION GAP SERPL CALCULATED.3IONS-SCNC: 15 MMOL/L (ref 7–16)
B FRAGILIS DNA BLD POS QL NAA+NON-PROBE: NOT DETECTED
BASOPHILS # BLD: 0 K/UL (ref 0–0.2)
BASOPHILS NFR BLD: 0 % (ref 0–2)
BIOFIRE TEST COMMENT: ABNORMAL
BUN SERPL-MCNC: 19 MG/DL (ref 6–20)
C ALBICANS DNA BLD POS QL NAA+NON-PROBE: NOT DETECTED
C AURIS DNA BLD POS QL NAA+NON-PROBE: NOT DETECTED
C GATTII+NEOFOR DNA BLD POS QL NAA+N-PRB: NOT DETECTED
C GLABRATA DNA BLD POS QL NAA+NON-PROBE: NOT DETECTED
C KRUSEI DNA BLD POS QL NAA+NON-PROBE: NOT DETECTED
C PARAP DNA BLD POS QL NAA+NON-PROBE: NOT DETECTED
C TROPICLS DNA BLD POS QL NAA+NON-PROBE: NOT DETECTED
CALCIUM SERPL-MCNC: 9.9 MG/DL (ref 8.6–10)
CHLORIDE SERPL-SCNC: 95 MMOL/L (ref 98–107)
CO2 SERPL-SCNC: 28 MMOL/L (ref 22–29)
CREAT SERPL-MCNC: 0.5 MG/DL (ref 0.5–1)
E CLOAC COMP DNA BLD POS NAA+NON-PROBE: NOT DETECTED
E COLI DNA BLD POS QL NAA+NON-PROBE: NOT DETECTED
E FAECALIS DNA BLD POS QL NAA+NON-PROBE: NOT DETECTED
E FAECIUM DNA BLD POS QL NAA+NON-PROBE: NOT DETECTED
ENTEROBACTERALES DNA BLD POS NAA+N-PRB: NOT DETECTED
EOSINOPHIL # BLD: 0 K/UL (ref 0.05–0.5)
EOSINOPHILS RELATIVE PERCENT: 0 % (ref 0–6)
ERYTHROCYTE [DISTWIDTH] IN BLOOD BY AUTOMATED COUNT: 15.6 % (ref 11.5–15)
GFR, ESTIMATED: >90 ML/MIN/1.73M2
GLUCOSE SERPL-MCNC: 220 MG/DL (ref 74–99)
GP B STREP DNA BLD POS QL NAA+NON-PROBE: NOT DETECTED
HAEM INFLU DNA BLD POS QL NAA+NON-PROBE: NOT DETECTED
HCT VFR BLD AUTO: 41.8 % (ref 34–48)
HGB BLD-MCNC: 13.6 G/DL (ref 11.5–15.5)
K OXYTOCA DNA BLD POS QL NAA+NON-PROBE: NOT DETECTED
KLEBSIELLA SP DNA BLD POS QL NAA+NON-PRB: NOT DETECTED
KLEBSIELLA SP DNA BLD POS QL NAA+NON-PRB: NOT DETECTED
L MONOCYTOG DNA BLD POS QL NAA+NON-PROBE: NOT DETECTED
LYMPHOCYTES NFR BLD: 1.67 K/UL (ref 1.5–4)
LYMPHOCYTES RELATIVE PERCENT: 11 % (ref 20–42)
MCH RBC QN AUTO: 30.7 PG (ref 26–35)
MCHC RBC AUTO-ENTMCNC: 32.5 G/DL (ref 32–34.5)
MCV RBC AUTO: 94.4 FL (ref 80–99.9)
MICROORGANISM SPEC CULT: ABNORMAL
MICROORGANISM/AGENT SPEC: ABNORMAL
MONOCYTES NFR BLD: 0.7 K/UL (ref 0.1–0.95)
MONOCYTES NFR BLD: 4 % (ref 2–12)
MYELOCYTES ABSOLUTE COUNT: 0.14 K/UL
MYELOCYTES: 1 %
N MEN DNA BLD POS QL NAA+NON-PROBE: NOT DETECTED
NEUTROPHILS NFR BLD: 83 % (ref 43–80)
NEUTS SEG NFR BLD: 13.25 K/UL (ref 1.8–7.3)
P AERUGINOSA DNA BLD POS NAA+NON-PROBE: NOT DETECTED
PLATELET # BLD AUTO: 344 K/UL (ref 130–450)
PMV BLD AUTO: 9.5 FL (ref 7–12)
POTASSIUM SERPL-SCNC: 3.1 MMOL/L (ref 3.5–5.1)
PROMYELOCYTES ABSOLUTE COUNT: 0.14 K/UL
PROMYELOCYTES: 1 %
PROTEUS SP DNA BLD POS QL NAA+NON-PROBE: NOT DETECTED
RBC # BLD AUTO: 4.43 M/UL (ref 3.5–5.5)
RBC # BLD: ABNORMAL 10*6/UL
RBC # BLD: ABNORMAL 10*6/UL
S AUREUS DNA BLD POS QL NAA+NON-PROBE: NOT DETECTED
S AUREUS+CONS DNA BLD POS NAA+NON-PROBE: NOT DETECTED
S EPIDERMIDIS DNA BLD POS QL NAA+NON-PRB: NOT DETECTED
S LUGDUNENSIS DNA BLD POS QL NAA+NON-PRB: NOT DETECTED
S MALTOPHILIA DNA BLD POS QL NAA+NON-PRB: NOT DETECTED
S MARCESCENS DNA BLD POS NAA+NON-PROBE: NOT DETECTED
S PNEUM DNA BLD POS QL NAA+NON-PROBE: NOT DETECTED
S PYO DNA BLD POS QL NAA+NON-PROBE: NOT DETECTED
SALMONELLA DNA BLD POS QL NAA+NON-PROBE: NOT DETECTED
SERVICE CMNT-IMP: ABNORMAL
SODIUM SERPL-SCNC: 138 MMOL/L (ref 136–145)
SPECIMEN DESCRIPTION: ABNORMAL
STREPTOCOCCUS DNA BLD POS NAA+NON-PROBE: NOT DETECTED
WBC OTHER # BLD: 15.9 K/UL (ref 4.5–11.5)

## 2025-04-17 PROCEDURE — 6370000000 HC RX 637 (ALT 250 FOR IP): Performed by: STUDENT IN AN ORGANIZED HEALTH CARE EDUCATION/TRAINING PROGRAM

## 2025-04-17 PROCEDURE — 2580000003 HC RX 258: Performed by: INTERNAL MEDICINE

## 2025-04-17 PROCEDURE — 6360000002 HC RX W HCPCS: Performed by: INTERNAL MEDICINE

## 2025-04-17 PROCEDURE — 2700000000 HC OXYGEN THERAPY PER DAY

## 2025-04-17 PROCEDURE — 2500000003 HC RX 250 WO HCPCS: Performed by: STUDENT IN AN ORGANIZED HEALTH CARE EDUCATION/TRAINING PROGRAM

## 2025-04-17 PROCEDURE — 6370000000 HC RX 637 (ALT 250 FOR IP): Performed by: NURSE PRACTITIONER

## 2025-04-17 PROCEDURE — 85025 COMPLETE CBC W/AUTO DIFF WBC: CPT

## 2025-04-17 PROCEDURE — 80048 BASIC METABOLIC PNL TOTAL CA: CPT

## 2025-04-17 PROCEDURE — 2140000000 HC CCU INTERMEDIATE R&B

## 2025-04-17 PROCEDURE — 36415 COLL VENOUS BLD VENIPUNCTURE: CPT

## 2025-04-17 PROCEDURE — 6360000002 HC RX W HCPCS: Performed by: STUDENT IN AN ORGANIZED HEALTH CARE EDUCATION/TRAINING PROGRAM

## 2025-04-17 RX ORDER — POTASSIUM CHLORIDE 1500 MG/1
40 TABLET, EXTENDED RELEASE ORAL ONCE
Status: COMPLETED | OUTPATIENT
Start: 2025-04-17 | End: 2025-04-17

## 2025-04-17 RX ORDER — LORAZEPAM 2 MG/ML
1 INJECTION INTRAMUSCULAR
Status: COMPLETED | OUTPATIENT
Start: 2025-04-17 | End: 2025-04-18

## 2025-04-17 RX ADMIN — TRAMADOL HYDROCHLORIDE 50 MG: 50 TABLET, COATED ORAL at 09:28

## 2025-04-17 RX ADMIN — TIZANIDINE 4 MG: 4 TABLET ORAL at 12:25

## 2025-04-17 RX ADMIN — WATER 2000 MG: 1 INJECTION INTRAMUSCULAR; INTRAVENOUS; SUBCUTANEOUS at 04:55

## 2025-04-17 RX ADMIN — ATENOLOL 25 MG: 25 TABLET ORAL at 09:27

## 2025-04-17 RX ADMIN — POTASSIUM CHLORIDE 40 MEQ: 1500 TABLET, EXTENDED RELEASE ORAL at 15:46

## 2025-04-17 RX ADMIN — BUPRENORPHINE AND NALOXONE 1 FILM: 8; 2 FILM, SOLUBLE BUCCAL; SUBLINGUAL at 09:33

## 2025-04-17 RX ADMIN — ENOXAPARIN SODIUM 40 MG: 100 INJECTION SUBCUTANEOUS at 09:27

## 2025-04-17 RX ADMIN — ATENOLOL 25 MG: 25 TABLET ORAL at 20:14

## 2025-04-17 RX ADMIN — SODIUM CHLORIDE, PRESERVATIVE FREE 10 ML: 5 INJECTION INTRAVENOUS at 09:30

## 2025-04-17 RX ADMIN — LORAZEPAM 0.5 MG: 0.5 TABLET ORAL at 09:28

## 2025-04-17 RX ADMIN — FUROSEMIDE 40 MG: 10 INJECTION, SOLUTION INTRAMUSCULAR; INTRAVENOUS at 09:30

## 2025-04-17 RX ADMIN — SODIUM CHLORIDE 1000 MG: 9 INJECTION INTRAMUSCULAR; INTRAVENOUS; SUBCUTANEOUS at 12:27

## 2025-04-17 RX ADMIN — BUPRENORPHINE AND NALOXONE 1 FILM: 8; 2 FILM, SOLUBLE BUCCAL; SUBLINGUAL at 20:14

## 2025-04-17 RX ADMIN — DILTIAZEM HYDROCHLORIDE 240 MG: 240 CAPSULE, EXTENDED RELEASE ORAL at 09:28

## 2025-04-17 RX ADMIN — SODIUM CHLORIDE, PRESERVATIVE FREE 10 ML: 5 INJECTION INTRAVENOUS at 21:00

## 2025-04-17 RX ADMIN — PREDNISONE 15 MG: 5 TABLET ORAL at 09:28

## 2025-04-17 ASSESSMENT — PAIN DESCRIPTION - DESCRIPTORS: DESCRIPTORS: STABBING;DISCOMFORT;TENDER

## 2025-04-17 ASSESSMENT — PAIN DESCRIPTION - ORIENTATION: ORIENTATION: LOWER

## 2025-04-17 ASSESSMENT — PAIN SCALES - WONG BAKER: WONGBAKER_NUMERICALRESPONSE: NO HURT

## 2025-04-17 ASSESSMENT — PAIN DESCRIPTION - LOCATION: LOCATION: BACK

## 2025-04-17 ASSESSMENT — PAIN SCALES - GENERAL
PAINLEVEL_OUTOF10: 0
PAINLEVEL_OUTOF10: 7

## 2025-04-17 ASSESSMENT — PAIN - FUNCTIONAL ASSESSMENT: PAIN_FUNCTIONAL_ASSESSMENT: ACTIVITIES ARE NOT PREVENTED

## 2025-04-17 NOTE — PROGRESS NOTES
Internal Medicine Progress Note    Patient's name: Li Rojas  : 1970  Chief complaints (on day of admission): Leg Swelling (Leg pain and swelling bilateral legs getting worse. )  Admission date: 2025  Date of service: 2025   Room: [address]  Primary care physician: Melvin Cardoso MD  Reason for visit: Follow-up for leg swelling    Subjective  Li was seen and examined at bedside   Still with concerns of bilateral LE celllulitis, on abx improving  States continued back pain.  Patient has CT cervical, lumbar, thoracic spine ordered as well as CT abdomen/pelvis with IV contrast.  Patient has concerns that she is not able to lay flat during the procedure.  Patient requesting sedation for procedure.  Will have as needed medication ordered for procedure as well as start trial of Zanaflex.  Patient in agreement with plan    Review of Systems  There are no new complaints of chest pain, shortness of breath, abdominal pain, nausea, vomiting, diarrhea, constipation unless otherwise mentioned above.     Hospital Medications  Current Facility-Administered Medications   Medication Dose Route Frequency Provider Last Rate Last Admin    tiZANidine (ZANAFLEX) tablet 4 mg  4 mg Oral Q6H PRN Robles Wallace MD   4 mg at 25 1225    ertapenem (INVanz) 1,000 mg in sodium chloride (PF) 0.9 % 10 mL IV syringe  1,000 mg IntraVENous Q24H Chris Elliott MD   1,000 mg at 25 1227    LORazepam (ATIVAN) injection 1 mg  1 mg IntraVENous Once PRN Robles Wallace MD        [Held by provider] LORazepam (ATIVAN) tablet 0.5 mg  0.5 mg Oral BID Myra Joyner APRN - CNP   0.5 mg at 25 0928    dilTIAZem (CARDIZEM CD) extended release capsule 240 mg  240 mg Oral Daily Robles Wallace MD   240 mg at 25 0928    buprenorphine-naloxone (SUBOXONE) 8-2 MG SL film 1 Film (Patient Supplied)  1 Film SubLINGual BID Robles Wallace MD   1 Film at 25 0933    sodium chloride flush 0.9 % injection 10 mL  10 mL

## 2025-04-17 NOTE — CARE COORDINATION
Patient on 2L NC, IV ancef stopped, started on IV Invanz Q24, CT of the abdomen/pelvis ordered, awaiting final blood cultures; ID following. Patient remains independent in the room, plans to return home, [facility] has accepted, orders placed and patient's mother transporting home when discharged. Spoke with patient at bedside to confirm if she has home O2. Patient now states she was on oxygen a long time ago, has not worn oxygen for a while and states the [employer] never picked it up. Call made to [place] at [facility] to confirm oxygen orders, no answer; left message regarding the call.    Electronically signed by TRAM Juan on 4/17/2025 at 2:47 PM

## 2025-04-17 NOTE — PROGRESS NOTES
Per Dr. Wallace pt is agreeable to Cts ordered.  Zanaflex added to be administered prior to testing.    1230: transport to room to take pt to CT  Pt refusing testing. Stating she was told by provider she could be \"put out\" for testing.  Dr. Wallace and Dr. Elliott (ordering providers) notified of refusal.  Per Dr. Wallace pt cannot be sedated for testing.   Ativan 1mg added on for testing.    1610 pt asking to go to CT tomorrow with newly added ativan to premedicate.   CT notified. - AM nurse to call CT. Information passed along in shift report by this RN.  Dr. Wallace notified.    Katherin Kebede RN

## 2025-04-17 NOTE — PROGRESS NOTES
Department of Internal Medicine  Infectious Diseases  Progress  Note      C/C :  Cellulitis of legs     Pt denies fever or chills  Reports leg swelling and pain- improving   Afebrile       Current Facility-Administered Medications   Medication Dose Route Frequency Provider Last Rate Last Admin    tiZANidine (ZANAFLEX) tablet 4 mg  4 mg Oral Q6H PRN Robles Wallace MD        LORazepam (ATIVAN) tablet 0.5 mg  0.5 mg Oral BID Myra Joyner APRN - CNP   0.5 mg at 04/17/25 0928    dilTIAZem (CARDIZEM CD) extended release capsule 240 mg  240 mg Oral Daily Robles Wallace MD   240 mg at 04/17/25 0928    buprenorphine-naloxone (SUBOXONE) 8-2 MG SL film 1 Film (Patient Supplied)  1 Film SubLINGual BID Robles Wallace MD   1 Film at 04/17/25 0933    sodium chloride flush 0.9 % injection 10 mL  10 mL IntraVENous 2 times per day Nicolás Duncan MD   10 mL at 04/17/25 0930    sodium chloride flush 0.9 % injection 10 mL  10 mL IntraVENous PRN Nicolás Duncan MD   10 mL at 04/15/25 1141    0.9 % sodium chloride infusion   IntraVENous PRN Nicolás Duncan MD        potassium chloride (KLOR-CON M) extended release tablet 40 mEq  40 mEq Oral PRN Nicolás Duncan MD        Or    potassium bicarb-citric acid (EFFER-K) effervescent tablet 40 mEq  40 mEq Oral PRN Nicolás Duncan MD        Or    potassium chloride 10 mEq/100 mL IVPB (Peripheral Line)  10 mEq IntraVENous PRN Nicolás Duncan MD        enoxaparin (LOVENOX) injection 40 mg  40 mg SubCUTAneous Daily Nicolás Duncan MD   40 mg at 04/17/25 0927    ondansetron (ZOFRAN-ODT) disintegrating tablet 4 mg  4 mg Oral Q8H PRN Nicolás Duncan MD        Or    ondansetron (ZOFRAN) injection 4 mg  4 mg IntraVENous Q6H PRN Nicolás Duncan MD        senna (SENOKOT) tablet 8.6 mg  1 tablet Oral Daily PRN Nicolás Duncan MD        acetaminophen (TYLENOL) tablet 650 mg  650 mg Oral Q6H PRN Nicolás Duncan MD        Or    acetaminophen (TYLENOL) suppository 650 mg  650 mg Rectal Q6H PRN Nicolás Duncan MD         05:48 AM    AST 50 04/16/2025 05:48 AM    ALT 51 04/16/2025 05:48 AM         Hepatic Function Panel:    Lab Results   Component Value Date/Time    ALKPHOS 289 04/16/2025 05:48 AM    ALT 51 04/16/2025 05:48 AM    AST 50 04/16/2025 05:48 AM    BILITOT <0.2 04/16/2025 05:48 AM    BILIDIR <0.2 01/25/2023 03:06 AM    IBILI see below 01/25/2023 03:06 AM       PT/INR:    Lab Results   Component Value Date/Time    PROTIME 13.1 04/16/2011 06:20 AM    INR 1.2 04/16/2011 06:20 AM       TSH:    Lab Results   Component Value Date/Time    TSH 0.58 01/13/2024 05:08 AM       U/A:    Lab Results   Component Value Date/Time    COLORU Yellow 04/13/2025 07:56 AM    PHUR 6.0 04/13/2025 07:56 AM    PHUR 7.5 03/18/2024 12:55 PM    WBCUA 0 TO 5 04/13/2025 07:56 AM    WBCUA 0-1 06/25/2011 10:00 PM    RBCUA 0 TO 2 04/13/2025 07:56 AM    RBCUA NONE 09/19/2013 07:00 AM    BACTERIA TRACE 04/13/2025 07:56 AM    CLARITYU CLOUDY 08/18/2017 01:45 PM    LEUKOCYTESUR SMALL 04/13/2025 07:56 AM    UROBILINOGEN 0.2 04/13/2025 07:56 AM    BILIRUBINUR NEGATIVE 04/13/2025 07:56 AM    BLOODU Negative 08/18/2017 01:45 PM    GLUCOSEU NEGATIVE 04/13/2025 07:56 AM    GLUCOSEU NEGATIVE 06/25/2011 10:00 PM    AMORPHOUS PRESENT 03/18/2024 12:55 PM       ABG:  No results found for: \"WYJ7OLC\", \"BEART\", \"M2YODDWK\", \"PHART\", \"THGBART\", \"UXF4YEV\", \"PO2ART\", \"MQG9MSV\"    MICROBIOLOGY:          Specimen Description .CLEAN CATCH URINE     Special Requests Site: Urine    Culture AEROCOCCUS URINAE 10 to 50,000 CFU/ML Susceptibility testing not routinely done. No further work up. Abnormal      Blood cx -      Specimen Description .BLOOD     Special Requests         [employer] test comment AEROBIC BLD BOTTLE    Direct Exam DIRECT GRAM STAIN FROM BOTTLE: GRAM POSITIVE COCCI IN CHAINS    Comment: Direct Gram Stain from bottle result called to and read back by: ADELE RAIRN ON  4/15/25 AT 0801       Culture ANAEROBIC GRAM POSITIVE COCCI BETA LACTAMASE         ASO

## 2025-04-17 NOTE — PLAN OF CARE
Problem: Discharge Planning  Goal: Discharge to home or other facility with appropriate resources  4/17/2025 1102 by Katherin Kebede RN  Outcome: Progressing  Flowsheets (Taken 4/17/2025 0800)  Discharge to home or other facility with appropriate resources: Identify barriers to discharge with patient and caregiver  4/16/2025 2151 by Bonita Nguyen RN  Outcome: Progressing     Problem: Pain  Goal: Verbalizes/displays adequate comfort level or baseline comfort level  4/17/2025 1102 by Katherin Kebede RN  Outcome: Progressing  Flowsheets (Taken 4/17/2025 0800)  Verbalizes/displays adequate comfort level or baseline comfort level: Encourage patient to monitor pain and request assistance  4/16/2025 2151 by Bonita Nguyen RN  Outcome: Progressing     Problem: Skin/Tissue Integrity  Goal: Skin integrity remains intact  Description: 1.  Monitor for areas of redness and/or skin breakdown2.  Assess vascular access sites hourly3.  Every 4-6 hours minimum:  Change oxygen saturation probe site4.  Every 4-6 hours:  If on nasal continuous positive airway pressure, respiratory therapy assess nares and determine need for appliance change or resting period  4/17/2025 1102 by Katherin Kebede RN  Outcome: Progressing  Flowsheets (Taken 4/17/2025 0800)  Skin Integrity Remains Intact: Monitor for areas of redness and/or skin breakdown  4/16/2025 2151 by Bonita Nguyen RN  Outcome: Progressing  Flowsheets (Taken 4/16/2025 2141)  Skin Integrity Remains Intact: Monitor for areas of redness and/or skin breakdown     Problem: ABCDS Injury Assessment  Goal: Absence of physical injury  4/17/2025 1102 by Katherin Kebede RN  Outcome: Progressing  4/16/2025 2151 by Bonita Nguyen RN  Outcome: Progressing     Problem: Safety - Adult  Goal: Free from fall injury  4/17/2025 1102 by Katherin Kebede RN  Outcome: Progressing  4/16/2025 2151 by Bonita Nguyen RN  Outcome: Progressing     Problem:  Neurosensory - Adult  Goal: Achieves maximal functionality and self care  4/17/2025 1102 by Katherin Kebede RN  Outcome: Progressing  Flowsheets (Taken 4/17/2025 0800)  Achieves maximal functionality and self care: Monitor swallowing and airway patency with patient fatigue and changes in neurological status  4/16/2025 2151 by Bonita Nguyen RN  Outcome: Progressing     Problem: Respiratory - Adult  Goal: Achieves optimal ventilation and oxygenation  4/17/2025 1102 by Katherin Kebede RN  Outcome: Progressing  Flowsheets (Taken 4/17/2025 0800)  Achieves optimal ventilation and oxygenation: Assess for changes in respiratory status  4/16/2025 2151 by Bonita Nguyen RN  Outcome: Progressing     Problem: Cardiovascular - Adult  Goal: Absence of cardiac dysrhythmias or at baseline  4/17/2025 1102 by Katherin Kebede RN  Outcome: Progressing  Flowsheets (Taken 4/17/2025 0800)  Absence of cardiac dysrhythmias or at baseline: Monitor cardiac rate and rhythm  4/16/2025 2151 by Bonita Nguyen RN  Outcome: Progressing  Flowsheets (Taken 4/16/2025 2141)  Absence of cardiac dysrhythmias or at baseline: Monitor cardiac rate and rhythm     Problem: Skin/Tissue Integrity - Adult  Goal: Incisions, wounds, or drain sites healing without S/S of infection  4/17/2025 1102 by Katherin Kebede RN  Outcome: Progressing  Flowsheets (Taken 4/17/2025 0800)  Incisions, Wounds, or Drain Sites Healing Without Sign and Symptoms of Infection: ADMISSION and DAILY: Assess and document risk factors for pressure ulcer development  4/16/2025 2151 by Bonita Nguyen RN  Outcome: Progressing  Flowsheets (Taken 4/16/2025 2141)  Incisions, Wounds, or Drain Sites Healing Without Sign and Symptoms of Infection: ADMISSION and DAILY: Assess and document risk factors for pressure ulcer development     Problem: Musculoskeletal - Adult  Goal: Return mobility to safest level of function  4/17/2025 1102 by Katherin Kebede

## 2025-04-17 NOTE — PROGRESS NOTES
Internal Medicine Progress Note    Patient's name: Li Rojas  : 1970  Chief complaints (on day of admission): Leg Swelling (Leg pain and swelling bilateral legs getting worse. )  Admission date: 2025  Date of service: 2025   Room: [address]  Primary care physician: Melvin Cardoso MD  Reason for visit: Follow-up for leg swelling    Subjective  Li was seen and examined at bedside   Still with concerns of bilateral LE celllulitis, on abx improving  States continued back pain.  Patient has CT cervical, lumbar, thoracic spine ordered as well as CT abdomen/pelvis with IV contrast.  Patient has concerns that she is not able to lay flat during the procedure.  Patient requesting sedation for procedure.  Will have as needed medication ordered for procedure as well as start trial of Zanaflex.  Patient in agreement with plan    Review of Systems  There are no new complaints of chest pain, shortness of breath, abdominal pain, nausea, vomiting, diarrhea, constipation unless otherwise mentioned above.     Hospital Medications  Current Facility-Administered Medications   Medication Dose Route Frequency Provider Last Rate Last Admin    tiZANidine (ZANAFLEX) tablet 4 mg  4 mg Oral Q6H PRN Robles Wallace MD   4 mg at 25 1225    ertapenem (INVanz) 1,000 mg in sodium chloride (PF) 0.9 % 10 mL IV syringe  1,000 mg IntraVENous Q24H Chris Elliott MD   1,000 mg at 25 1227    LORazepam (ATIVAN) injection 1 mg  1 mg IntraVENous Once PRN Robles Wallace MD        [Held by provider] LORazepam (ATIVAN) tablet 0.5 mg  0.5 mg Oral BID Myra Joyner APRN - CNP   0.5 mg at 25 0928    dilTIAZem (CARDIZEM CD) extended release capsule 240 mg  240 mg Oral Daily Robles Wallace MD   240 mg at 25 0928    buprenorphine-naloxone (SUBOXONE) 8-2 MG SL film 1 Film (Patient Supplied)  1 Film SubLINGual BID Robles Wallace MD   1 Film at 25 0933    sodium chloride flush 0.9 % injection 10 mL  10 mL  Anahy Bright MD        heparin flush 100 UNIT/ML injection 500 Units  500 Units Intercatheter PRN Anahy Bright MD        sodium chloride flush 0.9 % injection 10 mL  10 mL IntraVENous PRN Anahy Bright MD           PRN Medications  tiZANidine, LORazepam, sodium chloride flush, sodium chloride, potassium chloride **OR** potassium alternative oral replacement **OR** potassium chloride, ondansetron **OR** ondansetron, senna, acetaminophen **OR** acetaminophen, traMADol    Objective  Most Recent Recorded Vitals  BP (!) 126/94   Pulse 87   Temp 97.7 °F (36.5 °C) (Oral)   Resp 17   Ht 1.753 m (5' 9\")   Wt 98.1 kg (216 lb 4.3 oz)   LMP 05/04/2020   SpO2 94%   BMI 31.94 kg/m²   I/O last 3 completed shifts:  In: 240 [P.O.:240]  Out: 1000 [Urine:1000]  I/O this shift:  In: 220 [P.O.:220]  Out: -     Physical Exam:  General: AAO to person/place/time/purpose, NAD, no labored breathing  Eyes: conjunctivae/corneas clear, sclera non icteric  Skin: color/texture/turgor normal, no rashes or lesions  Lungs: CTAB, no retractions/use of accessory muscles, no vocal fremitus, no rhonchi, no crackle, no rales  Heart: regular rate, regular rhythm, no murmur  Abdomen: soft, NT, bowel sounds normal  Extremities: bilateral LE swelling, erythema. LLE dressing in place. Swelling improving  Neurologic: cranial nerves 2-12 grossly intact, no slurred speech    Most Recent Labs  Lab Results   Component Value Date    WBC 15.9 (H) 04/17/2025    HGB 13.6 04/17/2025    HCT 41.8 04/17/2025     04/17/2025     04/17/2025    K 3.1 (L) 04/17/2025    CL 95 (L) 04/17/2025    CREATININE 0.5 04/17/2025    BUN 19 04/17/2025    CO2 28 04/17/2025    GLUCOSE 220 (H) 04/17/2025    ALT 51 (H) 04/16/2025    AST 50 (H) 04/16/2025    INR 1.2 04/16/2011    TSH 0.58 01/13/2024    LABA1C 5.9 (H) 02/01/2023       XR CHEST PORTABLE   Final Result   Borderline cardiomegaly with low lung volumes.  No focal parenchymal   opacification

## 2025-04-17 NOTE — PLAN OF CARE
Problem: Discharge Planning  Goal: Discharge to home or other facility with appropriate resources  4/16/2025 2151 by Bonita Nguyen RN  Outcome: Progressing  4/16/2025 1824 by Katherin Kebede RN  Outcome: Progressing  Flowsheets (Taken 4/16/2025 0739)  Discharge to home or other facility with appropriate resources: Identify barriers to discharge with patient and caregiver     Problem: Pain  Goal: Verbalizes/displays adequate comfort level or baseline comfort level  4/16/2025 2151 by Bonita Nguyen RN  Outcome: Progressing  4/16/2025 1824 by Katherin Kebede RN  Outcome: Progressing  Flowsheets (Taken 4/16/2025 0739)  Verbalizes/displays adequate comfort level or baseline comfort level: Encourage patient to monitor pain and request assistance     Problem: Skin/Tissue Integrity  Goal: Skin integrity remains intact  Description: 1.  Monitor for areas of redness and/or skin breakdown2.  Assess vascular access sites hourly3.  Every 4-6 hours minimum:  Change oxygen saturation probe site4.  Every 4-6 hours:  If on nasal continuous positive airway pressure, respiratory therapy assess nares and determine need for appliance change or resting period  4/16/2025 2151 by Bonita Nguyen RN  Outcome: Progressing  Flowsheets (Taken 4/16/2025 2141)  Skin Integrity Remains Intact: Monitor for areas of redness and/or skin breakdown  4/16/2025 1824 by Katherin Kebede RN  Outcome: Progressing  Flowsheets (Taken 4/16/2025 0739)  Skin Integrity Remains Intact: Monitor for areas of redness and/or skin breakdown     Problem: ABCDS Injury Assessment  Goal: Absence of physical injury  4/16/2025 2151 by Bonita Nguyen RN  Outcome: Progressing  4/16/2025 1824 by Katherin Kebede RN  Outcome: Progressing     Problem: Safety - Adult  Goal: Free from fall injury  4/16/2025 2151 by Bonita Nguyen RN  Outcome: Progressing  4/16/2025 1824 by Katherin Kebede RN  Outcome: Progressing     Problem:

## 2025-04-18 ENCOUNTER — APPOINTMENT (OUTPATIENT)
Dept: CT IMAGING | Age: 55
DRG: 872 | End: 2025-04-18
Payer: COMMERCIAL

## 2025-04-18 PROBLEM — M54.6 ACUTE BILATERAL THORACIC BACK PAIN: Status: ACTIVE | Noted: 2025-04-18

## 2025-04-18 LAB
ANION GAP SERPL CALCULATED.3IONS-SCNC: 16 MMOL/L (ref 7–16)
BASOPHILS # BLD: 0 K/UL (ref 0–0.2)
BASOPHILS NFR BLD: 0 % (ref 0–2)
BUN SERPL-MCNC: 18 MG/DL (ref 6–20)
CALCIUM SERPL-MCNC: 9.9 MG/DL (ref 8.6–10)
CHLORIDE SERPL-SCNC: 95 MMOL/L (ref 98–107)
CO2 SERPL-SCNC: 27 MMOL/L (ref 22–29)
CREAT SERPL-MCNC: 0.5 MG/DL (ref 0.5–1)
EOSINOPHIL # BLD: 0 K/UL (ref 0.05–0.5)
EOSINOPHILS RELATIVE PERCENT: 0 % (ref 0–6)
ERYTHROCYTE [DISTWIDTH] IN BLOOD BY AUTOMATED COUNT: 15.6 % (ref 11.5–15)
GFR, ESTIMATED: >90 ML/MIN/1.73M2
GLUCOSE SERPL-MCNC: 107 MG/DL (ref 74–99)
HCT VFR BLD AUTO: 40.1 % (ref 34–48)
HGB BLD-MCNC: 12.7 G/DL (ref 11.5–15.5)
LYMPHOCYTES NFR BLD: 2.41 K/UL (ref 1.5–4)
LYMPHOCYTES RELATIVE PERCENT: 18 % (ref 20–42)
MCH RBC QN AUTO: 30.7 PG (ref 26–35)
MCHC RBC AUTO-ENTMCNC: 31.7 G/DL (ref 32–34.5)
MCV RBC AUTO: 96.9 FL (ref 80–99.9)
METAMYELOCYTES ABSOLUTE COUNT: 0.27 K/UL (ref 0–0.12)
METAMYELOCYTES: 2 % (ref 0–1)
MICROORGANISM SPEC CULT: NORMAL
MONOCYTES NFR BLD: 1.21 K/UL (ref 0.1–0.95)
MONOCYTES NFR BLD: 9 % (ref 2–12)
NEUTROPHILS NFR BLD: 71 % (ref 43–80)
NEUTS SEG NFR BLD: 9.51 K/UL (ref 1.8–7.3)
PLATELET # BLD AUTO: 333 K/UL (ref 130–450)
PMV BLD AUTO: 9.6 FL (ref 7–12)
POTASSIUM SERPL-SCNC: 3.9 MMOL/L (ref 3.4–4.5)
RBC # BLD AUTO: 4.14 M/UL (ref 3.5–5.5)
RBC # BLD: ABNORMAL 10*6/UL
RBC # BLD: ABNORMAL 10*6/UL
SERVICE CMNT-IMP: NORMAL
SODIUM SERPL-SCNC: 138 MMOL/L (ref 136–145)
SPECIMEN DESCRIPTION: NORMAL
WBC OTHER # BLD: 13.4 K/UL (ref 4.5–11.5)

## 2025-04-18 PROCEDURE — 2500000003 HC RX 250 WO HCPCS: Performed by: STUDENT IN AN ORGANIZED HEALTH CARE EDUCATION/TRAINING PROGRAM

## 2025-04-18 PROCEDURE — 6360000002 HC RX W HCPCS: Performed by: INTERNAL MEDICINE

## 2025-04-18 PROCEDURE — 99222 1ST HOSP IP/OBS MODERATE 55: CPT | Performed by: NEUROLOGICAL SURGERY

## 2025-04-18 PROCEDURE — 6360000002 HC RX W HCPCS: Performed by: STUDENT IN AN ORGANIZED HEALTH CARE EDUCATION/TRAINING PROGRAM

## 2025-04-18 PROCEDURE — 85025 COMPLETE CBC W/AUTO DIFF WBC: CPT

## 2025-04-18 PROCEDURE — 6370000000 HC RX 637 (ALT 250 FOR IP): Performed by: STUDENT IN AN ORGANIZED HEALTH CARE EDUCATION/TRAINING PROGRAM

## 2025-04-18 PROCEDURE — 2700000000 HC OXYGEN THERAPY PER DAY

## 2025-04-18 PROCEDURE — 2580000003 HC RX 258: Performed by: INTERNAL MEDICINE

## 2025-04-18 PROCEDURE — 2140000000 HC CCU INTERMEDIATE R&B

## 2025-04-18 PROCEDURE — 36415 COLL VENOUS BLD VENIPUNCTURE: CPT

## 2025-04-18 PROCEDURE — 6370000000 HC RX 637 (ALT 250 FOR IP): Performed by: NURSE PRACTITIONER

## 2025-04-18 PROCEDURE — 80048 BASIC METABOLIC PNL TOTAL CA: CPT

## 2025-04-18 RX ORDER — IOPAMIDOL 755 MG/ML
75 INJECTION, SOLUTION INTRAVASCULAR
Status: DISCONTINUED | OUTPATIENT
Start: 2025-04-18 | End: 2025-04-25 | Stop reason: HOSPADM

## 2025-04-18 RX ORDER — KETOROLAC TROMETHAMINE 30 MG/ML
30 INJECTION, SOLUTION INTRAMUSCULAR; INTRAVENOUS
Status: ACTIVE | OUTPATIENT
Start: 2025-04-18 | End: 2025-04-19

## 2025-04-18 RX ORDER — LORAZEPAM 2 MG/ML
2 INJECTION INTRAMUSCULAR
Status: DISCONTINUED | OUTPATIENT
Start: 2025-04-18 | End: 2025-04-25 | Stop reason: HOSPADM

## 2025-04-18 RX ORDER — SODIUM CHLORIDE 0.9 % (FLUSH) 0.9 %
10 SYRINGE (ML) INJECTION
Status: DISCONTINUED | OUTPATIENT
Start: 2025-04-18 | End: 2025-04-25 | Stop reason: HOSPADM

## 2025-04-18 RX ADMIN — DILTIAZEM HYDROCHLORIDE 240 MG: 240 CAPSULE, EXTENDED RELEASE ORAL at 08:07

## 2025-04-18 RX ADMIN — BUPRENORPHINE AND NALOXONE 1 FILM: 8; 2 FILM, SOLUBLE BUCCAL; SUBLINGUAL at 10:43

## 2025-04-18 RX ADMIN — LORAZEPAM 1 MG: 2 INJECTION INTRAMUSCULAR; INTRAVENOUS at 08:08

## 2025-04-18 RX ADMIN — FUROSEMIDE 40 MG: 10 INJECTION, SOLUTION INTRAMUSCULAR; INTRAVENOUS at 08:08

## 2025-04-18 RX ADMIN — LORAZEPAM 0.5 MG: 0.5 TABLET ORAL at 20:23

## 2025-04-18 RX ADMIN — SODIUM CHLORIDE, PRESERVATIVE FREE 10 ML: 5 INJECTION INTRAVENOUS at 20:28

## 2025-04-18 RX ADMIN — SODIUM CHLORIDE, PRESERVATIVE FREE 10 ML: 5 INJECTION INTRAVENOUS at 08:38

## 2025-04-18 RX ADMIN — ATENOLOL 25 MG: 25 TABLET ORAL at 20:23

## 2025-04-18 RX ADMIN — PREDNISONE 15 MG: 5 TABLET ORAL at 08:08

## 2025-04-18 RX ADMIN — BUPRENORPHINE AND NALOXONE 1 FILM: 8; 2 FILM, SOLUBLE BUCCAL; SUBLINGUAL at 20:23

## 2025-04-18 RX ADMIN — SODIUM CHLORIDE 1000 MG: 9 INJECTION INTRAMUSCULAR; INTRAVENOUS; SUBCUTANEOUS at 13:12

## 2025-04-18 RX ADMIN — ATENOLOL 25 MG: 25 TABLET ORAL at 10:44

## 2025-04-18 RX ADMIN — ENOXAPARIN SODIUM 40 MG: 100 INJECTION SUBCUTANEOUS at 08:07

## 2025-04-18 ASSESSMENT — PAIN SCALES - GENERAL: PAINLEVEL_OUTOF10: 0

## 2025-04-18 NOTE — PROGRESS NOTES
Comprehensive Nutrition Assessment    Type and Reason for Visit:  Initial, LOS    Nutrition Recommendations/Plan:   Continue current diet   Start ONS to promote oral intake and aid in wound healing  Will monitor     Malnutrition Assessment:  Malnutrition Status:  Insufficient data (04/18/25 1255)    Context:  Acute Illness     Findings of the 6 clinical characteristics of malnutrition:  Energy Intake:  Mild decrease in energy intake  Weight Loss:  Unable to assess (MINE d/t wt flux per EMR likely r/t fluid shifts/CHF hx)     Body Fat Loss:  Unable to assess     Muscle Mass Loss:  Unable to assess    Fluid Accumulation:  No fluid accumulation     Strength:  Not Performed    Nutrition Assessment:    pt adm d/t leg swelling/cellulitis; PMhx of Lupus HTN, MS,RA,CHF,Intra-abd abscess; pt w/ poor oral intake this adm, will start ONS to promote oral intake and aid in wound healing ; will monitor.    Nutrition Related Findings:    A&Ox4; edentulous; active BS; +1/weeping edema; -I/O Wound Type: Multiple, Open Wounds, Wound Consult Pending (blisters noted)       Current Nutrition Intake & Therapies:    Average Meal Intake: 26-50%  Average Supplements Intake: None Ordered (ONS ordered as snacks will not be delivered via kitchen/diet office - this RD will modify)  [unfilled]    Anthropometric Measures:  Height: 175.3 cm (5' 9.02\")  Ideal Body Weight (IBW): 145 lbs (66 kg)    Admission Body Weight: 84.9 kg (187 lb 2.7 oz) (4/14-BS)  Current Body Weight: 84.9 kg (187 lb 2.7 oz) (4/14-BS/dry wt, CBW appears elevated likely r/t fluid shifts), 129.1 % IBW.    Current BMI (kg/m2): 27.6  Usual Body Weight:  (MINE d/t wt flux per EMR wt hx likely r/t fluid shifts/CHF hx)        Weight Adjustment For: No Adjustment                 BMI Categories: Overweight (BMI 25.0-29.9)    Estimated Daily Nutrient Needs:  Energy Requirements Based On: Formula  Weight Used for Energy Requirements: Admission  Energy (kcal/day): MSJ x 1.2

## 2025-04-18 NOTE — CARE COORDINATION
Patient on 2L NC, IV Invanz Q24, refused CT scan, awaiting final blood cultures; ID following. Received call from Cody at [facility], he confirmed patient has oxygen with them; orders are for 2L continuous and 5L concentrator. Patient remains independent in the room, plans to return home, [facility] care has accepted, orders placed and patient's mother transporting home when discharged.     Electronically signed by TRAM Juan on 4/18/2025 at 9:39 AM

## 2025-04-18 NOTE — PROGRESS NOTES
Internal Medicine Progress Note    Patient's name: Li Rojas  : 1970  Chief complaints (on day of admission): Leg Swelling (Leg pain and swelling bilateral legs getting worse. )  Admission date: 2025  Date of service: 2025   Room: [address]  Primary care physician: Melvin Cardoso MD  Reason for visit: Follow-up for leg swelling    Subjective  Li was seen and examined at bedside   Still with concerns of bilateral LE celllulitis, on abx improving  Attempted to go down to CT scan today but patient states continues to much pain and anxiety and unable to obtain imaging despite as needed medications on board  Had lengthy discussion but patient for needs of CT imaging and patient in understanding and in agreement and wishes for it to be done but she states she is in too much pain.  Discussed with patient that we will plan to administer as needed pain control right before imaging to hopefully help her with any discomfort we will get the imaging done.  All questions addressed    Review of Systems  There are no new complaints of chest pain, shortness of breath, abdominal pain, nausea, vomiting, diarrhea, constipation unless otherwise mentioned above.     Hospital Medications  Current Facility-Administered Medications   Medication Dose Route Frequency Provider Last Rate Last Admin    sodium chloride flush 0.9 % injection 10 mL  10 mL IntraVENous Once PRN Amber Garcia MD        iopamidol (ISOVUE-370) 76 % injection 75 mL  75 mL IntraVENous ONCE PRN Amber Garcia MD        ketorolac (TORADOL) injection 30 mg  30 mg IntraVENous Once PRN Robles Wallace MD        LORazepam (ATIVAN) injection 2 mg  2 mg IntraVENous Once PRN Robles Wallace MD        tiZANidine (ZANAFLEX) tablet 4 mg  4 mg Oral Q6H PRN Robles Wallace MD   4 mg at 25 1225    ertapenem (INVanz) 1,000 mg in sodium chloride (PF) 0.9 % 10 mL IV syringe  1,000 mg IntraVENous Q24H Limbu, Chris MONACO MD   1,000 mg at 25

## 2025-04-18 NOTE — PROGRESS NOTES
Department of Internal Medicine  Infectious Diseases  Progress  Note      C/C :  Cellulitis of legs , bacteremia     Pt denies fever or chills  Reports leg swelling and pain- improving   Denies abdomen pain or dysuria   Afebrile       Current Facility-Administered Medications   Medication Dose Route Frequency Provider Last Rate Last Admin    sodium chloride flush 0.9 % injection 10 mL  10 mL IntraVENous Once PRN Amber Garcia MD        iopamidol (ISOVUE-370) 76 % injection 75 mL  75 mL IntraVENous ONCE PRN Amber Garcia MD        ketorolac (TORADOL) injection 30 mg  30 mg IntraVENous Once PRN Robles Wallace MD        LORazepam (ATIVAN) injection 2 mg  2 mg IntraVENous Once PRN Robles Wallace MD        tiZANidine (ZANAFLEX) tablet 4 mg  4 mg Oral Q6H PRN Robles Wallace MD   4 mg at 04/17/25 1225    ertapenem (INVanz) 1,000 mg in sodium chloride (PF) 0.9 % 10 mL IV syringe  1,000 mg IntraVENous Q24H Chris Elliott MD   1,000 mg at 04/18/25 1312    LORazepam (ATIVAN) tablet 0.5 mg  0.5 mg Oral BID Myra Joyner APRN - CNP   0.5 mg at 04/17/25 0928    dilTIAZem (CARDIZEM CD) extended release capsule 240 mg  240 mg Oral Daily Robles Wallace MD   240 mg at 04/18/25 0807    buprenorphine-naloxone (SUBOXONE) 8-2 MG SL film 1 Film (Patient Supplied)  1 Film SubLINGual BID Robles Wallace MD   1 Film at 04/18/25 1043    sodium chloride flush 0.9 % injection 10 mL  10 mL IntraVENous 2 times per day Nicolás Duncan MD   10 mL at 04/18/25 0838    sodium chloride flush 0.9 % injection 10 mL  10 mL IntraVENous PRN Nicolás Duncan MD   10 mL at 04/15/25 1141    0.9 % sodium chloride infusion   IntraVENous PRN Nicloás Duncan MD        potassium chloride (KLOR-CON M) extended release tablet 40 mEq  40 mEq Oral PRN Nicolás Duncan MD        Or    potassium bicarb-citric acid (EFFER-K) effervescent tablet 40 mEq  40 mEq Oral PRN Nicolás Duncan MD        Or    potassium chloride 10 mEq/100 mL IVPB (Peripheral Line)  10 mEq

## 2025-04-18 NOTE — PROGRESS NOTES
Patient refusing CT again. Patient states she is unable to breathe once on CT table. Multiple pillows used to prop patient up as much as possible. Patient sent back to room at this time.

## 2025-04-19 LAB
ANION GAP SERPL CALCULATED.3IONS-SCNC: 13 MMOL/L (ref 7–16)
BASOPHILS # BLD: 0.25 K/UL (ref 0–0.2)
BASOPHILS NFR BLD: 2 % (ref 0–2)
BUN SERPL-MCNC: 15 MG/DL (ref 6–20)
CALCIUM SERPL-MCNC: 9.5 MG/DL (ref 8.6–10)
CHLORIDE SERPL-SCNC: 96 MMOL/L (ref 98–107)
CO2 SERPL-SCNC: 30 MMOL/L (ref 22–29)
CREAT SERPL-MCNC: 0.5 MG/DL (ref 0.5–1)
EOSINOPHIL # BLD: 0 K/UL (ref 0.05–0.5)
EOSINOPHILS RELATIVE PERCENT: 0 % (ref 0–6)
ERYTHROCYTE [DISTWIDTH] IN BLOOD BY AUTOMATED COUNT: 15.9 % (ref 11.5–15)
GFR, ESTIMATED: >90 ML/MIN/1.73M2
GLUCOSE SERPL-MCNC: 83 MG/DL (ref 74–99)
HCT VFR BLD AUTO: 37.3 % (ref 34–48)
HGB BLD-MCNC: 12 G/DL (ref 11.5–15.5)
LYMPHOCYTES NFR BLD: 2.12 K/UL (ref 1.5–4)
LYMPHOCYTES RELATIVE PERCENT: 15 % (ref 20–42)
MCH RBC QN AUTO: 30.9 PG (ref 26–35)
MCHC RBC AUTO-ENTMCNC: 32.2 G/DL (ref 32–34.5)
MCV RBC AUTO: 96.1 FL (ref 80–99.9)
METAMYELOCYTES ABSOLUTE COUNT: 0.12 K/UL (ref 0–0.12)
METAMYELOCYTES: 1 % (ref 0–1)
MONOCYTES NFR BLD: 0.37 K/UL (ref 0.1–0.95)
MONOCYTES NFR BLD: 3 % (ref 2–12)
MYELOCYTES ABSOLUTE COUNT: 0.37 K/UL
MYELOCYTES: 3 %
NEUTROPHILS NFR BLD: 76 % (ref 43–80)
NEUTS SEG NFR BLD: 10.84 K/UL (ref 1.8–7.3)
PLATELET # BLD AUTO: 331 K/UL (ref 130–450)
PMV BLD AUTO: 9.6 FL (ref 7–12)
POTASSIUM SERPL-SCNC: 3.2 MMOL/L (ref 3.4–4.5)
PROMYELOCYTES ABSOLUTE COUNT: 0.12 K/UL
PROMYELOCYTES: 1 %
RBC # BLD AUTO: 3.88 M/UL (ref 3.5–5.5)
RBC # BLD: ABNORMAL 10*6/UL
SODIUM SERPL-SCNC: 139 MMOL/L (ref 136–145)
WBC OTHER # BLD: 14.2 K/UL (ref 4.5–11.5)

## 2025-04-19 PROCEDURE — 2500000003 HC RX 250 WO HCPCS: Performed by: STUDENT IN AN ORGANIZED HEALTH CARE EDUCATION/TRAINING PROGRAM

## 2025-04-19 PROCEDURE — 2580000003 HC RX 258: Performed by: INTERNAL MEDICINE

## 2025-04-19 PROCEDURE — 6370000000 HC RX 637 (ALT 250 FOR IP): Performed by: NURSE PRACTITIONER

## 2025-04-19 PROCEDURE — 2700000000 HC OXYGEN THERAPY PER DAY

## 2025-04-19 PROCEDURE — 6360000002 HC RX W HCPCS: Performed by: STUDENT IN AN ORGANIZED HEALTH CARE EDUCATION/TRAINING PROGRAM

## 2025-04-19 PROCEDURE — 6360000002 HC RX W HCPCS: Performed by: INTERNAL MEDICINE

## 2025-04-19 PROCEDURE — 85025 COMPLETE CBC W/AUTO DIFF WBC: CPT

## 2025-04-19 PROCEDURE — 1200000000 HC SEMI PRIVATE

## 2025-04-19 PROCEDURE — 6370000000 HC RX 637 (ALT 250 FOR IP): Performed by: STUDENT IN AN ORGANIZED HEALTH CARE EDUCATION/TRAINING PROGRAM

## 2025-04-19 PROCEDURE — 36415 COLL VENOUS BLD VENIPUNCTURE: CPT

## 2025-04-19 PROCEDURE — 80048 BASIC METABOLIC PNL TOTAL CA: CPT

## 2025-04-19 RX ORDER — POTASSIUM CHLORIDE 1500 MG/1
40 TABLET, EXTENDED RELEASE ORAL ONCE
Status: COMPLETED | OUTPATIENT
Start: 2025-04-19 | End: 2025-04-19

## 2025-04-19 RX ADMIN — FUROSEMIDE 40 MG: 10 INJECTION, SOLUTION INTRAMUSCULAR; INTRAVENOUS at 09:40

## 2025-04-19 RX ADMIN — SODIUM CHLORIDE, PRESERVATIVE FREE 10 ML: 5 INJECTION INTRAVENOUS at 21:24

## 2025-04-19 RX ADMIN — PREDNISONE 15 MG: 5 TABLET ORAL at 09:39

## 2025-04-19 RX ADMIN — TRAMADOL HYDROCHLORIDE 50 MG: 50 TABLET, COATED ORAL at 21:33

## 2025-04-19 RX ADMIN — ATENOLOL 25 MG: 25 TABLET ORAL at 09:40

## 2025-04-19 RX ADMIN — ATENOLOL 25 MG: 25 TABLET ORAL at 21:22

## 2025-04-19 RX ADMIN — BUPRENORPHINE AND NALOXONE 1 FILM: 8; 2 FILM, SOLUBLE BUCCAL; SUBLINGUAL at 09:39

## 2025-04-19 RX ADMIN — LORAZEPAM 0.5 MG: 0.5 TABLET ORAL at 09:39

## 2025-04-19 RX ADMIN — SODIUM CHLORIDE, PRESERVATIVE FREE 10 ML: 5 INJECTION INTRAVENOUS at 09:40

## 2025-04-19 RX ADMIN — POTASSIUM CHLORIDE 40 MEQ: 1500 TABLET, EXTENDED RELEASE ORAL at 09:39

## 2025-04-19 RX ADMIN — DILTIAZEM HYDROCHLORIDE 240 MG: 240 CAPSULE, EXTENDED RELEASE ORAL at 09:39

## 2025-04-19 RX ADMIN — SODIUM CHLORIDE, PRESERVATIVE FREE 10 ML: 5 INJECTION INTRAVENOUS at 21:38

## 2025-04-19 RX ADMIN — ENOXAPARIN SODIUM 40 MG: 100 INJECTION SUBCUTANEOUS at 09:39

## 2025-04-19 RX ADMIN — LORAZEPAM 0.5 MG: 0.5 TABLET ORAL at 21:22

## 2025-04-19 RX ADMIN — BUPRENORPHINE AND NALOXONE 1 FILM: 8; 2 FILM, SOLUBLE BUCCAL; SUBLINGUAL at 21:36

## 2025-04-19 RX ADMIN — SODIUM CHLORIDE 1000 MG: 9 INJECTION INTRAMUSCULAR; INTRAVENOUS; SUBCUTANEOUS at 12:58

## 2025-04-19 ASSESSMENT — PAIN DESCRIPTION - DESCRIPTORS: DESCRIPTORS: STABBING;THROBBING;DISCOMFORT

## 2025-04-19 ASSESSMENT — PAIN DESCRIPTION - LOCATION: LOCATION: BACK

## 2025-04-19 ASSESSMENT — PAIN DESCRIPTION - ORIENTATION: ORIENTATION: MID

## 2025-04-19 ASSESSMENT — PAIN SCALES - GENERAL: PAINLEVEL_OUTOF10: 7

## 2025-04-19 NOTE — PROGRESS NOTES
Patient downgraded to med surg without tele. Tele monitor removed, cleaned and placed in correct spot in drawer.    Xin Crowe RN

## 2025-04-19 NOTE — PLAN OF CARE
Problem: Discharge Planning  Goal: Discharge to home or other facility with appropriate resources  Outcome: Progressing  Flowsheets (Taken 4/18/2025 1945)  Discharge to home or other facility with appropriate resources: Identify barriers to discharge with patient and caregiver     Problem: Pain  Goal: Verbalizes/displays adequate comfort level or baseline comfort level  Outcome: Progressing     Problem: Skin/Tissue Integrity  Goal: Skin integrity remains intact  Description: 1.  Monitor for areas of redness and/or skin breakdown2.  Assess vascular access sites hourly3.  Every 4-6 hours minimum:  Change oxygen saturation probe site4.  Every 4-6 hours:  If on nasal continuous positive airway pressure, respiratory therapy assess nares and determine need for appliance change or resting period  Outcome: Progressing  Flowsheets (Taken 4/18/2025 1945)  Skin Integrity Remains Intact: Monitor for areas of redness and/or skin breakdown     Problem: ABCDS Injury Assessment  Goal: Absence of physical injury  Outcome: Progressing     Problem: Safety - Adult  Goal: Free from fall injury  Outcome: Progressing     Problem: Neurosensory - Adult  Goal: Achieves maximal functionality and self care  Outcome: Progressing  Flowsheets (Taken 4/18/2025 1945)  Achieves maximal functionality and self care: Monitor swallowing and airway patency with patient fatigue and changes in neurological status     Problem: Respiratory - Adult  Goal: Achieves optimal ventilation and oxygenation  Outcome: Progressing  Flowsheets (Taken 4/18/2025 1945)  Achieves optimal ventilation and oxygenation: Assess for changes in respiratory status     Problem: Cardiovascular - Adult  Goal: Absence of cardiac dysrhythmias or at baseline  Outcome: Progressing  Flowsheets (Taken 4/18/2025 1945)  Absence of cardiac dysrhythmias or at baseline: Monitor cardiac rate and rhythm     Problem: Skin/Tissue Integrity - Adult  Goal: Incisions, wounds, or drain sites healing

## 2025-04-19 NOTE — PROGRESS NOTES
Internal Medicine Progress Note    Patient's name: Li Rojas  : 1970  Chief complaints (on day of admission): Leg Swelling (Leg pain and swelling bilateral legs getting worse. )  Admission date: 2025  Date of service: 2025   Room: [address]  Primary care physician: Melvin Cardoso MD  Reason for visit: Follow-up for leg swelling    Subjective  Li was seen and examined at bedside   Patient still unable to lie flat for imaging  Discussed with patient on importance of need to lie flat, she states she is understands but states that she is in too much pain despite as needed pain control prior to imaging    Review of Systems  There are no new complaints of chest pain, shortness of breath, abdominal pain, nausea, vomiting, diarrhea, constipation unless otherwise mentioned above.     Hospital Medications  Current Facility-Administered Medications   Medication Dose Route Frequency Provider Last Rate Last Admin    sodium chloride flush 0.9 % injection 10 mL  10 mL IntraVENous Once PRN Amber Garcia MD        iopamidol (ISOVUE-370) 76 % injection 75 mL  75 mL IntraVENous ONCE PRN Amber Garcia MD        ketorolac (TORADOL) injection 30 mg  30 mg IntraVENous Once PRN Robles Wallace MD        LORazepam (ATIVAN) injection 2 mg  2 mg IntraVENous Once PRN Robles Wallace MD        tiZANidine (ZANAFLEX) tablet 4 mg  4 mg Oral Q6H PRN Robles Wallace MD   4 mg at 25 1225    ertapenem (INVanz) 1,000 mg in sodium chloride (PF) 0.9 % 10 mL IV syringe  1,000 mg IntraVENous Q24H Chris Elliott MD   1,000 mg at 25 1258    LORazepam (ATIVAN) tablet 0.5 mg  0.5 mg Oral BID Myra Joyner APRN - CNP   0.5 mg at 25 0939    dilTIAZem (CARDIZEM CD) extended release capsule 240 mg  240 mg Oral Daily Robles Wallace MD   240 mg at 25 0951    buprenorphine-naloxone (SUBOXONE) 8-2 MG SL film 1 Film (Patient Supplied)  1 Film SubLINGual BID Robles Wallace MD   1 Film at 25 0974  MD Madison on 4/19/2025 at 2:09 PM    I can be reached through [employer].

## 2025-04-19 NOTE — PROGRESS NOTES
[facility]  [facility]  Progress Note    SUBJECTIVE:  Chief Complaint   Patient presents with    Leg Swelling     Leg pain and swelling bilateral legs getting worse.      C/C :  Cellulitis of legs , bacteremia   Patient resting in bed. Denies fever or chills. No nausea, vomiting, rash or diarrhea.  Complains of ongoing back pain in the mid back.      Review of systems:  As stated above in the chief complaint, otherwise negative.    Medications:  Scheduled Meds:   ertapenem (INVanz) IV  1,000 mg IntraVENous Q24H    LORazepam  0.5 mg Oral BID    dilTIAZem  240 mg Oral Daily    buprenorphine-naloxone  1 Film SubLINGual BID    sodium chloride flush  10 mL IntraVENous 2 times per day    enoxaparin  40 mg SubCUTAneous Daily    atenolol  25 mg Oral BID    predniSONE  15 mg Oral Daily    furosemide  40 mg IntraVENous Daily     Continuous Infusions:   sodium chloride       PRN Meds:sodium chloride flush, iopamidol, ketorolac, LORazepam, tiZANidine, sodium chloride flush, sodium chloride, potassium chloride **OR** potassium alternative oral replacement **OR** potassium chloride, ondansetron **OR** ondansetron, senna, acetaminophen **OR** acetaminophen, traMADol    OBJECTIVE:  /87   Pulse 84   Temp 98.2 °F (36.8 °C) (Temporal)   Resp 20   Ht 1.753 m (5' 9.02\")   Wt 97.3 kg (214 lb 8.1 oz)   LMP 2020   SpO2 95%   BMI 31.66 kg/m²   Temp  Av.8 °F (36.6 °C)  Min: 97.4 °F (36.3 °C)  Max: 98.6 °F (37 °C)  Constitutional: No acute distress  Skin: Warm and dry. No rashes were noted.   Neuro:Drowsy and oriented  HEENT: Round and reactive pupils.  Moist mucous membranes.  No ulcerations or thrush.  Chest: .Respirations unlabored. Breath sounds clear.   Cardiovascular:  RRR  Abdomen: Soft.  Distended.  bowel sounds present.   Extremities: Lower extremities wrapped    Lines: PIV      Laboratory and Tests:  Lab Results   Component Value Date    WBC 14.2 (H) 2025    WBC 13.4 (H)

## 2025-04-19 NOTE — PROGRESS NOTES
4 Eyes Skin Assessment     NAME:  Li Rojas  YOB: 1970  MEDICAL RECORD NUMBER:  [id number]    The patient is being assessed for  Transfer to New Unit    I agree that at least one RN has performed a thorough Head to Toe Skin Assessment on the patient. ALL assessment sites listed below have been assessed.      Areas assessed by both nurses:    Head, Face, Ears, Shoulders, Back, Chest, Arms, Elbows, Hands, Sacrum. Buttock, Coccyx, Ischium, Legs. Feet and Heels, and Under Medical Devices   Right shin wound  Right inner thigh wound  Left inner thigh wound  Left calf wound  Left ankle wound  Abdominal and breast folds excoriated      Does the Patient have a Wound? Yes wound(s) were present on assessment. LDA wound assessment was Initiated and completed by CARTER Street Prevention initiated by RN: No  Wound Care Orders initiated by RN: No    Pressure Injury (Stage 3,4, Unstageable, DTI, NWPT, and Complex wounds) if present, place Wound referral order by RN under : No    New Ostomies, if present place, Ostomy referral order under : No     Nurse 1 eSignature: Electronically signed by Erika Morales RN on 4/19/25 at 6:10 PM EDT    **SHARE this note so that the co-signing nurse can place an eSignature**    Nurse 2 eSignature: Electronically signed by Dari Villagran RN on 4/19/25 at 6:17 PM EDT

## 2025-04-19 NOTE — PROGRESS NOTES
Per pt's nurse, pt is unable to lay flat for nuclear bone scan (15-20 mins). Will attempt when pt's condition improves.

## 2025-04-19 NOTE — CONSULTS
[facility]              [address] [place] [address]                              CONSULTATION      PATIENT NAME: JYOTI LUONG            : 1970  MED REC NO: [id number]                        ROOM: [id number]  ACCOUNT NO: [id number]                       ADMIT DATE: 2025  PROVIDER: Miller Doe MD    NEUROSURGERY CONSULT    CONSULT DATE: 2025      REASON FOR CONSULT:  Thoracic back pain.    HISTORY OF PRESENT ILLNESS:  The patient is a 55-year-old lady who presented to the hospital with complaints of cellulitis in her legs.  She also complained of thoracic back pain.  She states that she has had this thoracic back pain since early last year.  She did have a kyphoplasty late last year with some improvement in her pain, but she has persistent thoracic back pain that she rates as about a 6 to 7 out of 10.  It is made worse with activity and better at rest.  It does not radiate into her legs.  She denies any new numbness, tingling, or weakness or loss of control of bowel or bladder function.    PAST MEDICAL HISTORY:  Positive for arthritis, asthma, hyperlipidemia, hypertension, lupus, multiple sclerosis, SVT.    PAST SURGICAL HISTORY:  Positive for abdominal surgery, ACL repair, appendectomy, , cholecystectomy, D and C, kyphoplasty.    FAMILY HISTORY:  Positive for cancer in her father.    SOCIAL HISTORY:  Positive for tobacco use.    ALLERGIES:  INCLUDE LYRICA, SULFA, DARVOCET, ULTRAM, METOPROLOL.      HOME MEDICATIONS:  Include Tenormin and Cardizem.    REVIEW OF SYSTEMS:  HEENT:  Negative for headache, double vision, or blurred vision.  CARDIOVASCULAR:  Negative for chest pain, but positive for irregular heart rate.  RESPIRATORY:  Positive for shortness of breath.  GASTROINTESTINAL:  Negative for nausea, vomiting, diarrhea, or constipation.  GENITOURINARY:  Negative for hematuria or dysuria.  HEMATOLOGIC:  Positive for easy

## 2025-04-19 NOTE — PLAN OF CARE
Problem: Discharge Planning  Goal: Discharge to home or other facility with appropriate resources  Outcome: Progressing     Problem: Pain  Goal: Verbalizes/displays adequate comfort level or baseline comfort level  Outcome: Progressing     Problem: Skin/Tissue Integrity  Goal: Skin integrity remains intact  Description: 1.  Monitor for areas of redness and/or skin breakdown2.  Assess vascular access sites hourly3.  Every 4-6 hours minimum:  Change oxygen saturation probe site4.  Every 4-6 hours:  If on nasal continuous positive airway pressure, respiratory therapy assess nares and determine need for appliance change or resting period  Outcome: Progressing     Problem: ABCDS Injury Assessment  Goal: Absence of physical injury  Outcome: Progressing     Problem: Safety - Adult  Goal: Free from fall injury  Outcome: Progressing     Problem: Neurosensory - Adult  Goal: Achieves maximal functionality and self care  Outcome: Progressing     Problem: Respiratory - Adult  Goal: Achieves optimal ventilation and oxygenation  Outcome: Progressing     Problem: Cardiovascular - Adult  Goal: Absence of cardiac dysrhythmias or at baseline  Outcome: Progressing     Problem: Skin/Tissue Integrity - Adult  Goal: Incisions, wounds, or drain sites healing without S/S of infection  Outcome: Progressing     Problem: Musculoskeletal - Adult  Goal: Return mobility to safest level of function  Outcome: Progressing  Goal: Return ADL status to a safe level of function  Outcome: Progressing     Problem: Gastrointestinal - Adult  Goal: Minimal or absence of nausea and vomiting  Outcome: Progressing  Goal: Maintains or returns to baseline bowel function  Outcome: Progressing     Problem: Genitourinary - Adult  Goal: Absence of urinary retention  Outcome: Progressing     Problem: Infection - Adult  Goal: Absence of infection at discharge  Outcome: Progressing     Problem: Nutrition Deficit:  Goal: Optimize nutritional status  Outcome:  Progressing

## 2025-04-20 ENCOUNTER — APPOINTMENT (OUTPATIENT)
Dept: GENERAL RADIOLOGY | Age: 55
DRG: 872 | End: 2025-04-20
Payer: COMMERCIAL

## 2025-04-20 LAB
ANION GAP SERPL CALCULATED.3IONS-SCNC: 12 MMOL/L (ref 7–16)
BASOPHILS # BLD: 0 K/UL (ref 0–0.2)
BASOPHILS NFR BLD: 0 % (ref 0–2)
BUN SERPL-MCNC: 14 MG/DL (ref 6–20)
CALCIUM SERPL-MCNC: 9.8 MG/DL (ref 8.6–10)
CHLORIDE SERPL-SCNC: 95 MMOL/L (ref 98–107)
CO2 SERPL-SCNC: 30 MMOL/L (ref 22–29)
CREAT SERPL-MCNC: 0.5 MG/DL (ref 0.5–1)
EOSINOPHIL # BLD: 0.14 K/UL (ref 0.05–0.5)
EOSINOPHILS RELATIVE PERCENT: 1 % (ref 0–6)
ERYTHROCYTE [DISTWIDTH] IN BLOOD BY AUTOMATED COUNT: 15.7 % (ref 11.5–15)
GFR, ESTIMATED: >90 ML/MIN/1.73M2
GLUCOSE SERPL-MCNC: 108 MG/DL (ref 74–99)
HCT VFR BLD AUTO: 38.7 % (ref 34–48)
HGB BLD-MCNC: 12.4 G/DL (ref 11.5–15.5)
LYMPHOCYTES NFR BLD: 2.59 K/UL (ref 1.5–4)
LYMPHOCYTES RELATIVE PERCENT: 16 % (ref 20–42)
MCH RBC QN AUTO: 30.8 PG (ref 26–35)
MCHC RBC AUTO-ENTMCNC: 32 G/DL (ref 32–34.5)
MCV RBC AUTO: 96.3 FL (ref 80–99.9)
METAMYELOCYTES ABSOLUTE COUNT: 0.29 K/UL (ref 0–0.12)
METAMYELOCYTES: 2 % (ref 0–1)
MONOCYTES NFR BLD: 0.72 K/UL (ref 0.1–0.95)
MONOCYTES NFR BLD: 4 % (ref 2–12)
MYELOCYTES ABSOLUTE COUNT: 0.72 K/UL
MYELOCYTES: 4 %
NEUTROPHILS NFR BLD: 73 % (ref 43–80)
NEUTS SEG NFR BLD: 11.94 K/UL (ref 1.8–7.3)
PLATELET # BLD AUTO: 345 K/UL (ref 130–450)
PMV BLD AUTO: 9.3 FL (ref 7–12)
POTASSIUM SERPL-SCNC: 3.9 MMOL/L (ref 3.4–4.5)
RBC # BLD AUTO: 4.02 M/UL (ref 3.5–5.5)
RBC # BLD: ABNORMAL 10*6/UL
RBC # BLD: ABNORMAL 10*6/UL
SODIUM SERPL-SCNC: 138 MMOL/L (ref 136–145)
WBC OTHER # BLD: 16.4 K/UL (ref 4.5–11.5)

## 2025-04-20 PROCEDURE — 6370000000 HC RX 637 (ALT 250 FOR IP): Performed by: STUDENT IN AN ORGANIZED HEALTH CARE EDUCATION/TRAINING PROGRAM

## 2025-04-20 PROCEDURE — 2700000000 HC OXYGEN THERAPY PER DAY

## 2025-04-20 PROCEDURE — 2580000003 HC RX 258: Performed by: INTERNAL MEDICINE

## 2025-04-20 PROCEDURE — 85025 COMPLETE CBC W/AUTO DIFF WBC: CPT

## 2025-04-20 PROCEDURE — 36415 COLL VENOUS BLD VENIPUNCTURE: CPT

## 2025-04-20 PROCEDURE — 74018 RADEX ABDOMEN 1 VIEW: CPT

## 2025-04-20 PROCEDURE — 6360000002 HC RX W HCPCS: Performed by: INTERNAL MEDICINE

## 2025-04-20 PROCEDURE — 2500000003 HC RX 250 WO HCPCS: Performed by: STUDENT IN AN ORGANIZED HEALTH CARE EDUCATION/TRAINING PROGRAM

## 2025-04-20 PROCEDURE — 1200000000 HC SEMI PRIVATE

## 2025-04-20 PROCEDURE — 6370000000 HC RX 637 (ALT 250 FOR IP): Performed by: NURSE PRACTITIONER

## 2025-04-20 PROCEDURE — 80048 BASIC METABOLIC PNL TOTAL CA: CPT

## 2025-04-20 PROCEDURE — 6360000002 HC RX W HCPCS: Performed by: STUDENT IN AN ORGANIZED HEALTH CARE EDUCATION/TRAINING PROGRAM

## 2025-04-20 RX ADMIN — LORAZEPAM 0.5 MG: 0.5 TABLET ORAL at 20:41

## 2025-04-20 RX ADMIN — SODIUM CHLORIDE 1000 MG: 9 INJECTION INTRAMUSCULAR; INTRAVENOUS; SUBCUTANEOUS at 11:31

## 2025-04-20 RX ADMIN — SODIUM CHLORIDE, PRESERVATIVE FREE 10 ML: 5 INJECTION INTRAVENOUS at 20:41

## 2025-04-20 RX ADMIN — FUROSEMIDE 40 MG: 10 INJECTION, SOLUTION INTRAMUSCULAR; INTRAVENOUS at 07:52

## 2025-04-20 RX ADMIN — ENOXAPARIN SODIUM 40 MG: 100 INJECTION SUBCUTANEOUS at 07:55

## 2025-04-20 RX ADMIN — BUPRENORPHINE AND NALOXONE 1 FILM: 8; 2 FILM, SOLUBLE BUCCAL; SUBLINGUAL at 21:55

## 2025-04-20 RX ADMIN — PREDNISONE 15 MG: 5 TABLET ORAL at 07:57

## 2025-04-20 RX ADMIN — ATENOLOL 25 MG: 25 TABLET ORAL at 07:56

## 2025-04-20 RX ADMIN — ATENOLOL 25 MG: 25 TABLET ORAL at 20:41

## 2025-04-20 RX ADMIN — SODIUM CHLORIDE, PRESERVATIVE FREE 10 ML: 5 INJECTION INTRAVENOUS at 07:58

## 2025-04-20 RX ADMIN — SODIUM CHLORIDE, PRESERVATIVE FREE 10 ML: 5 INJECTION INTRAVENOUS at 11:35

## 2025-04-20 RX ADMIN — TRAMADOL HYDROCHLORIDE 50 MG: 50 TABLET, COATED ORAL at 20:03

## 2025-04-20 RX ADMIN — DILTIAZEM HYDROCHLORIDE 240 MG: 240 CAPSULE, EXTENDED RELEASE ORAL at 07:57

## 2025-04-20 RX ADMIN — BUPRENORPHINE AND NALOXONE 1 FILM: 8; 2 FILM, SOLUBLE BUCCAL; SUBLINGUAL at 07:51

## 2025-04-20 RX ADMIN — LORAZEPAM 0.5 MG: 0.5 TABLET ORAL at 07:56

## 2025-04-20 ASSESSMENT — PAIN SCALES - WONG BAKER
WONGBAKER_NUMERICALRESPONSE: NO HURT

## 2025-04-20 ASSESSMENT — PAIN DESCRIPTION - LOCATION
LOCATION: BACK

## 2025-04-20 ASSESSMENT — PAIN - FUNCTIONAL ASSESSMENT
PAIN_FUNCTIONAL_ASSESSMENT: ACTIVITIES ARE NOT PREVENTED
PAIN_FUNCTIONAL_ASSESSMENT: ACTIVITIES ARE NOT PREVENTED

## 2025-04-20 ASSESSMENT — PAIN DESCRIPTION - FREQUENCY
FREQUENCY: CONTINUOUS
FREQUENCY: CONTINUOUS

## 2025-04-20 ASSESSMENT — PAIN DESCRIPTION - ORIENTATION
ORIENTATION: LOWER

## 2025-04-20 ASSESSMENT — PAIN DESCRIPTION - PAIN TYPE
TYPE: CHRONIC PAIN
TYPE: ACUTE PAIN

## 2025-04-20 ASSESSMENT — PAIN DESCRIPTION - DESCRIPTORS
DESCRIPTORS: ACHING;DISCOMFORT;DULL
DESCRIPTORS: ACHING;THROBBING;TEARING
DESCRIPTORS: ACHING;DISCOMFORT;HEAVINESS;THROBBING

## 2025-04-20 ASSESSMENT — PAIN DESCRIPTION - ONSET
ONSET: ON-GOING
ONSET: ON-GOING

## 2025-04-20 ASSESSMENT — PAIN SCALES - GENERAL
PAINLEVEL_OUTOF10: 6
PAINLEVEL_OUTOF10: 3
PAINLEVEL_OUTOF10: 3
PAINLEVEL_OUTOF10: 8

## 2025-04-20 NOTE — PROGRESS NOTES
Internal Medicine Progress Note    Patient's name: Li Rojas  : 1970  Chief complaints (on day of admission): Leg Swelling (Leg pain and swelling bilateral legs getting worse. )  Admission date: 2025  Date of service: 2025   Room: [address]  Primary care physician: Melvin Cardoso MD  Reason for visit: Follow-up for leg swelling    Subjective  Li was seen and examined at bedside   Patient still unable to lie flat for imaging  Discussed with patient on importance of need to lie flat, she states she is understands but states that she is in too much pain despite as needed pain control prior to imaging  Discussed with patient that if she cannot sit for imaging then there is not much more we can do for her while inpatient.  She states she understands    Review of Systems  There are no new complaints of chest pain, shortness of breath, abdominal pain, nausea, vomiting, diarrhea, constipation unless otherwise mentioned above.     Hospital Medications  Current Facility-Administered Medications   Medication Dose Route Frequency Provider Last Rate Last Admin    sodium chloride flush 0.9 % injection 10 mL  10 mL IntraVENous Once PRN Amber Garcia MD        iopamidol (ISOVUE-370) 76 % injection 75 mL  75 mL IntraVENous ONCE PRN Amber Garcia MD        LORazepam (ATIVAN) injection 2 mg  2 mg IntraVENous Once PRN Robles Wallace MD        tiZANidine (ZANAFLEX) tablet 4 mg  4 mg Oral Q6H PRN Robles Wallace MD   4 mg at 25 1225    ertapenem (INVanz) 1,000 mg in sodium chloride (PF) 0.9 % 10 mL IV syringe  1,000 mg IntraVENous Q24H Chris Elliott MD   1,000 mg at 25 1258    LORazepam (ATIVAN) tablet 0.5 mg  0.5 mg Oral BID Myra Joyner APRN - CNP   0.5 mg at 25 0756    dilTIAZem (CARDIZEM CD) extended release capsule 240 mg  240 mg Oral Daily Robles Wallace MD   240 mg at 25 0757    buprenorphine-naloxone (SUBOXONE) 8-2 MG SL film 1 Film (Patient Supplied)  1  Intercatheter PRN Anahy Bright MD        sodium chloride flush 0.9 % injection 10 mL  10 mL IntraVENous BID Anahy Bright MD        heparin flush 100 UNIT/ML injection 500 Units  500 Units Intercatheter PRN Anahy Bright MD        sodium chloride flush 0.9 % injection 10 mL  10 mL IntraVENous PRN Anahy Bright MD           PRN Medications  sodium chloride flush, iopamidol, LORazepam, tiZANidine, sodium chloride flush, sodium chloride, potassium chloride **OR** potassium alternative oral replacement **OR** potassium chloride, ondansetron **OR** ondansetron, senna, acetaminophen **OR** acetaminophen, traMADol    Objective  Most Recent Recorded Vitals  /75   Pulse 79   Temp 97.4 °F (36.3 °C) (Temporal)   Resp 16   Ht 1.753 m (5' 9.02\")   Wt 97.3 kg (214 lb 8.1 oz)   LMP 05/04/2020   SpO2 92%   BMI 31.66 kg/m²   I/O last 3 completed shifts:  In: 260 [P.O.:240; I.V.:20]  Out: 500 [Urine:500]  No intake/output data recorded.    Physical Exam:  General: AAO to person/place/time/purpose, NAD, no labored breathing  Eyes: conjunctivae/corneas clear, sclera non icteric  Skin: color/texture/turgor normal, no rashes or lesions  Lungs: CTAB, no retractions/use of accessory muscles, no vocal fremitus, no rhonchi, no crackle, no rales  Heart: regular rate, regular rhythm, no murmur  Abdomen: soft, NT, bowel sounds normal  Extremities: bilateral LE swelling, erythema. LLE dressing in place.  Swelling and erythema improved on RLE  Neurologic: cranial nerves 2-12 grossly intact, no slurred speech    Most Recent Labs  Lab Results   Component Value Date    WBC 16.4 (H) 04/20/2025    HGB 12.4 04/20/2025    HCT 38.7 04/20/2025     04/20/2025     04/20/2025    K 3.9 04/20/2025    CL 95 (L) 04/20/2025    CREATININE 0.5 04/20/2025    BUN 14 04/20/2025    CO2 30 (H) 04/20/2025    GLUCOSE 108 (H) 04/20/2025    ALT 51 (H) 04/16/2025    AST 50 (H) 04/16/2025    INR 1.2 04/16/2011    TSH 0.58

## 2025-04-20 NOTE — PLAN OF CARE
Problem: Discharge Planning  Goal: Discharge to home or other facility with appropriate resources  4/20/2025 1152 by Erika Morales RN  Outcome: Progressing  4/20/2025 0241 by Megan Zheng RN  Outcome: Progressing     Problem: Pain  Goal: Verbalizes/displays adequate comfort level or baseline comfort level  4/20/2025 1152 by Erika Morales RN  Outcome: Progressing  4/20/2025 0241 by Megan Zheng RN  Outcome: Progressing     Problem: Skin/Tissue Integrity  Goal: Skin integrity remains intact  Description: 1.  Monitor for areas of redness and/or skin breakdown2.  Assess vascular access sites hourly3.  Every 4-6 hours minimum:  Change oxygen saturation probe site4.  Every 4-6 hours:  If on nasal continuous positive airway pressure, respiratory therapy assess nares and determine need for appliance change or resting period  4/20/2025 1152 by Erika Morales RN  Outcome: Progressing  4/20/2025 0241 by Megan Zheng RN  Outcome: Progressing     Problem: ABCDS Injury Assessment  Goal: Absence of physical injury  4/20/2025 1152 by Erika Morales RN  Outcome: Progressing  4/20/2025 0241 by Megan Zheng RN  Outcome: Progressing     Problem: Safety - Adult  Goal: Free from fall injury  4/20/2025 1152 by Erika Morales RN  Outcome: Progressing  4/20/2025 0241 by Megan Zheng RN  Outcome: Progressing     Problem: Neurosensory - Adult  Goal: Achieves maximal functionality and self care  4/20/2025 1152 by Erika Morales RN  Outcome: Progressing  4/20/2025 0241 by Megan Zheng RN  Outcome: Progressing     Problem: Respiratory - Adult  Goal: Achieves optimal ventilation and oxygenation  4/20/2025 1152 by Erika Morales RN  Outcome: Progressing  4/20/2025 0241 by eMgan Zheng RN  Outcome: Progressing     Problem: Cardiovascular - Adult  Goal: Absence of cardiac dysrhythmias or at baseline  4/20/2025 1152 by Erika Morales RN  Outcome:

## 2025-04-21 ENCOUNTER — APPOINTMENT (OUTPATIENT)
Dept: GENERAL RADIOLOGY | Age: 55
DRG: 872 | End: 2025-04-21
Payer: COMMERCIAL

## 2025-04-21 LAB
ANION GAP SERPL CALCULATED.3IONS-SCNC: 13 MMOL/L (ref 7–16)
BASOPHILS # BLD: 0.17 K/UL (ref 0–0.2)
BASOPHILS NFR BLD: 1 % (ref 0–2)
BUN SERPL-MCNC: 15 MG/DL (ref 6–20)
CALCIUM SERPL-MCNC: 9.5 MG/DL (ref 8.6–10)
CHLORIDE SERPL-SCNC: 94 MMOL/L (ref 98–107)
CO2 SERPL-SCNC: 32 MMOL/L (ref 22–29)
CREAT SERPL-MCNC: 0.5 MG/DL (ref 0.5–1)
CRP SERPL HS-MCNC: 22.3 MG/L (ref 0–5)
EOSINOPHIL # BLD: 0 K/UL (ref 0.05–0.5)
EOSINOPHILS RELATIVE PERCENT: 0 % (ref 0–6)
ERYTHROCYTE [DISTWIDTH] IN BLOOD BY AUTOMATED COUNT: 15.6 % (ref 11.5–15)
ERYTHROCYTE [SEDIMENTATION RATE] IN BLOOD BY WESTERGREN METHOD: 77 MM/HR (ref 0–20)
GFR, ESTIMATED: >90 ML/MIN/1.73M2
GLUCOSE SERPL-MCNC: 133 MG/DL (ref 74–99)
HCT VFR BLD AUTO: 38.4 % (ref 34–48)
HGB BLD-MCNC: 12.3 G/DL (ref 11.5–15.5)
LYMPHOCYTES NFR BLD: 1.17 K/UL (ref 1.5–4)
LYMPHOCYTES RELATIVE PERCENT: 7 % (ref 20–42)
MCH RBC QN AUTO: 30.7 PG (ref 26–35)
MCHC RBC AUTO-ENTMCNC: 32 G/DL (ref 32–34.5)
MCV RBC AUTO: 95.8 FL (ref 80–99.9)
MONOCYTES NFR BLD: 0.84 K/UL (ref 0.1–0.95)
MONOCYTES NFR BLD: 5 % (ref 2–12)
MYELOCYTES ABSOLUTE COUNT: 0.17 K/UL
MYELOCYTES: 1 %
NEUTROPHILS NFR BLD: 86 % (ref 43–80)
NEUTS SEG NFR BLD: 14.36 K/UL (ref 1.8–7.3)
PLATELET # BLD AUTO: 336 K/UL (ref 130–450)
PMV BLD AUTO: 9.3 FL (ref 7–12)
POTASSIUM SERPL-SCNC: 3.8 MMOL/L (ref 3.4–4.5)
PROCALCITONIN SERPL-MCNC: 0.08 NG/ML (ref 0–0.08)
RBC # BLD AUTO: 4.01 M/UL (ref 3.5–5.5)
RBC # BLD: ABNORMAL 10*6/UL
SODIUM SERPL-SCNC: 138 MMOL/L (ref 136–145)
WBC OTHER # BLD: 16.7 K/UL (ref 4.5–11.5)

## 2025-04-21 PROCEDURE — 6370000000 HC RX 637 (ALT 250 FOR IP): Performed by: STUDENT IN AN ORGANIZED HEALTH CARE EDUCATION/TRAINING PROGRAM

## 2025-04-21 PROCEDURE — 36415 COLL VENOUS BLD VENIPUNCTURE: CPT

## 2025-04-21 PROCEDURE — 85025 COMPLETE CBC W/AUTO DIFF WBC: CPT

## 2025-04-21 PROCEDURE — 2500000003 HC RX 250 WO HCPCS: Performed by: STUDENT IN AN ORGANIZED HEALTH CARE EDUCATION/TRAINING PROGRAM

## 2025-04-21 PROCEDURE — 6370000000 HC RX 637 (ALT 250 FOR IP): Performed by: NURSE PRACTITIONER

## 2025-04-21 PROCEDURE — 80048 BASIC METABOLIC PNL TOTAL CA: CPT

## 2025-04-21 PROCEDURE — 1200000000 HC SEMI PRIVATE

## 2025-04-21 PROCEDURE — 2700000000 HC OXYGEN THERAPY PER DAY

## 2025-04-21 PROCEDURE — 6360000002 HC RX W HCPCS: Performed by: INTERNAL MEDICINE

## 2025-04-21 PROCEDURE — 84145 PROCALCITONIN (PCT): CPT

## 2025-04-21 PROCEDURE — 2580000003 HC RX 258: Performed by: INTERNAL MEDICINE

## 2025-04-21 PROCEDURE — 85652 RBC SED RATE AUTOMATED: CPT

## 2025-04-21 PROCEDURE — 6360000002 HC RX W HCPCS: Performed by: STUDENT IN AN ORGANIZED HEALTH CARE EDUCATION/TRAINING PROGRAM

## 2025-04-21 PROCEDURE — 86140 C-REACTIVE PROTEIN: CPT

## 2025-04-21 PROCEDURE — 72081 X-RAY EXAM ENTIRE SPI 1 VW: CPT

## 2025-04-21 RX ADMIN — FUROSEMIDE 40 MG: 10 INJECTION, SOLUTION INTRAMUSCULAR; INTRAVENOUS at 08:53

## 2025-04-21 RX ADMIN — ATENOLOL 25 MG: 25 TABLET ORAL at 20:42

## 2025-04-21 RX ADMIN — SODIUM CHLORIDE, PRESERVATIVE FREE 10 ML: 5 INJECTION INTRAVENOUS at 08:53

## 2025-04-21 RX ADMIN — LORAZEPAM 0.5 MG: 0.5 TABLET ORAL at 08:53

## 2025-04-21 RX ADMIN — LORAZEPAM 0.5 MG: 0.5 TABLET ORAL at 20:42

## 2025-04-21 RX ADMIN — BUPRENORPHINE AND NALOXONE 1 FILM: 8; 2 FILM, SOLUBLE BUCCAL; SUBLINGUAL at 20:43

## 2025-04-21 RX ADMIN — TRAMADOL HYDROCHLORIDE 50 MG: 50 TABLET, COATED ORAL at 20:41

## 2025-04-21 RX ADMIN — PREDNISONE 15 MG: 5 TABLET ORAL at 08:54

## 2025-04-21 RX ADMIN — DILTIAZEM HYDROCHLORIDE 240 MG: 240 CAPSULE, EXTENDED RELEASE ORAL at 08:54

## 2025-04-21 RX ADMIN — SODIUM CHLORIDE, PRESERVATIVE FREE 10 ML: 5 INJECTION INTRAVENOUS at 20:42

## 2025-04-21 RX ADMIN — ENOXAPARIN SODIUM 40 MG: 100 INJECTION SUBCUTANEOUS at 08:52

## 2025-04-21 RX ADMIN — BUPRENORPHINE AND NALOXONE 1 FILM: 8; 2 FILM, SOLUBLE BUCCAL; SUBLINGUAL at 08:52

## 2025-04-21 RX ADMIN — SODIUM CHLORIDE 1000 MG: 9 INJECTION INTRAMUSCULAR; INTRAVENOUS; SUBCUTANEOUS at 11:40

## 2025-04-21 RX ADMIN — ATENOLOL 25 MG: 25 TABLET ORAL at 08:54

## 2025-04-21 ASSESSMENT — PAIN DESCRIPTION - PAIN TYPE
TYPE: CHRONIC PAIN

## 2025-04-21 ASSESSMENT — PAIN DESCRIPTION - FREQUENCY
FREQUENCY: CONTINUOUS

## 2025-04-21 ASSESSMENT — PAIN SCALES - GENERAL
PAINLEVEL_OUTOF10: 6
PAINLEVEL_OUTOF10: 8
PAINLEVEL_OUTOF10: 8
PAINLEVEL_OUTOF10: 6
PAINLEVEL_OUTOF10: 7

## 2025-04-21 ASSESSMENT — PAIN DESCRIPTION - ONSET
ONSET: ON-GOING

## 2025-04-21 ASSESSMENT — PAIN DESCRIPTION - DESCRIPTORS
DESCRIPTORS: ACHING;THROBBING;TEARING
DESCRIPTORS: ACHING;BURNING;DISCOMFORT
DESCRIPTORS: ACHING;THROBBING;TEARING
DESCRIPTORS: ACHING;THROBBING
DESCRIPTORS: ACHING;THROBBING

## 2025-04-21 ASSESSMENT — PAIN DESCRIPTION - LOCATION
LOCATION: BACK

## 2025-04-21 ASSESSMENT — PAIN DESCRIPTION - ORIENTATION
ORIENTATION: MID
ORIENTATION: LOWER;MID
ORIENTATION: MID;LOWER

## 2025-04-21 ASSESSMENT — PAIN SCALES - WONG BAKER
WONGBAKER_NUMERICALRESPONSE: HURTS A LITTLE BIT

## 2025-04-21 NOTE — CARE COORDINATION
4/21/2025social work transition of care planning  Sw followed up with pt to discuss transition of care planning. Pt is reluctantly agreeable to snf. Pt referred sw to call her mother. Sw left list with pt. Sw spoke with pt's mother requesting jh choices. Sw will follow.  Electronically signed by CATALINO Momin on 4/21/2025 at 3:47 PM

## 2025-04-21 NOTE — PROGRESS NOTES
Department of Internal Medicine  Infectious Diseases  Progress  Note      C/C :  Cellulitis of legs , bacteremia     Pt denies fever or chills  Reports leg swelling and pain- improving   Denies abdomen pain   Afebrile       Current Facility-Administered Medications   Medication Dose Route Frequency Provider Last Rate Last Admin    sodium chloride flush 0.9 % injection 10 mL  10 mL IntraVENous Once PRN Amber Garcia MD        iopamidol (ISOVUE-370) 76 % injection 75 mL  75 mL IntraVENous ONCE PRN Amber Garcia MD        LORazepam (ATIVAN) injection 2 mg  2 mg IntraVENous Once PRN Robles Wallace MD        tiZANidine (ZANAFLEX) tablet 4 mg  4 mg Oral Q6H PRN Robles Wallace MD   4 mg at 04/17/25 1225    ertapenem (INVanz) 1,000 mg in sodium chloride (PF) 0.9 % 10 mL IV syringe  1,000 mg IntraVENous Q24H Chris Elliott MD   1,000 mg at 04/21/25 1140    LORazepam (ATIVAN) tablet 0.5 mg  0.5 mg Oral BID Myra Joyner APRN - CNP   0.5 mg at 04/21/25 0853    dilTIAZem (CARDIZEM CD) extended release capsule 240 mg  240 mg Oral Daily Robles Wallace MD   240 mg at 04/21/25 0854    buprenorphine-naloxone (SUBOXONE) 8-2 MG SL film 1 Film (Patient Supplied)  1 Film SubLINGual BID Robles Wallace MD   1 Film at 04/21/25 0852    sodium chloride flush 0.9 % injection 10 mL  10 mL IntraVENous 2 times per day Nicolás Duncan MD   10 mL at 04/21/25 0853    sodium chloride flush 0.9 % injection 10 mL  10 mL IntraVENous PRN Nicolás Duncan MD   10 mL at 04/20/25 1135    0.9 % sodium chloride infusion   IntraVENous PRN Nicolás Duncan MD        potassium chloride (KLOR-CON M) extended release tablet 40 mEq  40 mEq Oral PRN Nicolás Duncan MD        Or    potassium bicarb-citric acid (EFFER-K) effervescent tablet 40 mEq  40 mEq Oral PRN Nicolás Duncan MD        Or    potassium chloride 10 mEq/100 mL IVPB (Peripheral Line)  10 mEq IntraVENous PRN Nicolás Duncan MD        enoxaparin (LOVENOX) injection 40 mg  40 mg SubCUTAneous Daily

## 2025-04-21 NOTE — PROGRESS NOTES
Internal Medicine Progress Note    Patient's name: Li Rojas  : 1970  Chief complaints (on day of admission): Leg Swelling (Leg pain and swelling bilateral legs getting worse. )  Admission date: 2025  Date of service: 2025   Room: [address]  Primary care physician: Melvin Cardoso MD  Reason for visit: Follow-up for leg swelling    Subjective  Li was seen and examined at bedside     Not comfortable   Offered to stop the Suboxone and try IV opiate pain control on a very limited basis in order to obtain the CT scans of her spine and abdomen that have been ordered since the  but she does not want to do this. Spoke with Dr Doe without the bone scan will be unable to tell the age of these compression fractures. Explained this to the patient as well to ensure she understands we are pretty limited without these CT scans at this time she expresses understanding of this.     Review of Systems  There are no new complaints of chest pain, shortness of breath, abdominal pain, nausea, vomiting, diarrhea, constipation unless otherwise mentioned above.     Hospital Medications  Current Facility-Administered Medications   Medication Dose Route Frequency Provider Last Rate Last Admin    sodium chloride flush 0.9 % injection 10 mL  10 mL IntraVENous Once PRN Amber Garcia MD        iopamidol (ISOVUE-370) 76 % injection 75 mL  75 mL IntraVENous ONCE PRN Amber Garcia MD        LORazepam (ATIVAN) injection 2 mg  2 mg IntraVENous Once PRN Robles Wallace MD        tiZANidine (ZANAFLEX) tablet 4 mg  4 mg Oral Q6H PRN Robles Wallace MD   4 mg at 25 1225    ertapenem (INVanz) 1,000 mg in sodium chloride (PF) 0.9 % 10 mL IV syringe  1,000 mg IntraVENous Q24H Chris Elliott MD   1,000 mg at 25 1131    LORazepam (ATIVAN) tablet 0.5 mg  0.5 mg Oral BID Myra Joyner APRN - CNP   0.5 mg at 25    dilTIAZem (CARDIZEM CD) extended release capsule 240 mg  240 mg Oral

## 2025-04-21 NOTE — PLAN OF CARE
Problem: Discharge Planning  Goal: Discharge to home or other facility with appropriate resources  4/20/2025 2355 by Brandon Friedman RN  Outcome: Progressing  Flowsheets (Taken 4/20/2025 2030)  Discharge to home or other facility with appropriate resources: Identify barriers to discharge with patient and caregiver  4/20/2025 1152 by Erika Morales RN  Outcome: Progressing     Problem: Pain  Goal: Verbalizes/displays adequate comfort level or baseline comfort level  4/20/2025 2355 by Brandon Friedman RN  Outcome: Progressing  Flowsheets (Taken 4/20/2025 2030)  Verbalizes/displays adequate comfort level or baseline comfort level: Encourage patient to monitor pain and request assistance  4/20/2025 1152 by Erika Morales RN  Outcome: Progressing     Problem: Skin/Tissue Integrity  Goal: Skin integrity remains intact  Description: 1.  Monitor for areas of redness and/or skin breakdown2.  Assess vascular access sites hourly3.  Every 4-6 hours minimum:  Change oxygen saturation probe site4.  Every 4-6 hours:  If on nasal continuous positive airway pressure, respiratory therapy assess nares and determine need for appliance change or resting period  4/20/2025 2355 by Brandon Friedman RN  Outcome: Progressing  Flowsheets  Taken 4/20/2025 2030 by Brandon Friemdan RN  Skin Integrity Remains Intact: Monitor for areas of redness and/or skin breakdown  Taken 4/20/2025 1710 by Pauly Farmer RN  Skin Integrity Remains Intact: Monitor for areas of redness and/or skin breakdown  4/20/2025 1152 by Erika Morales RN  Outcome: Progressing     Problem: ABCDS Injury Assessment  Goal: Absence of physical injury  4/20/2025 2355 by Brandon Friedman RN  Outcome: Progressing  Flowsheets (Taken 4/20/2025 2030)  Absence of Physical Injury: Implement safety measures based on patient assessment  4/20/2025 1152 by Erika Morales RN  Outcome: Progressing     Problem: Safety - Adult  Goal: Free from fall injury  4/20/2025 2355 by Elder  CARTER Taylor  Outcome: Progressing  Flowsheets (Taken 4/20/2025 2030)  Absence of infection at discharge: Assess and monitor for signs and symptoms of infection  4/20/2025 1152 by Erika Morales RN  Outcome: Progressing     Problem: Nutrition Deficit:  Goal: Optimize nutritional status  4/20/2025 2355 by Brandon Friedman, RN  Outcome: Progressing  4/20/2025 1152 by Erika Morales RN  Outcome: Progressing

## 2025-04-21 NOTE — PROGRESS NOTES
Patient refuses to turn or reposition off back. Importance of turning explained. Buttocks with blisters.

## 2025-04-22 LAB
ANION GAP SERPL CALCULATED.3IONS-SCNC: 12 MMOL/L (ref 7–16)
BASOPHILS # BLD: 0 K/UL (ref 0–0.2)
BASOPHILS NFR BLD: 0 % (ref 0–2)
BUN SERPL-MCNC: 15 MG/DL (ref 6–20)
CALCIUM SERPL-MCNC: 9.5 MG/DL (ref 8.6–10)
CHLORIDE SERPL-SCNC: 94 MMOL/L (ref 98–107)
CO2 SERPL-SCNC: 34 MMOL/L (ref 22–29)
CREAT SERPL-MCNC: 0.5 MG/DL (ref 0.5–1)
EOSINOPHIL # BLD: 0 K/UL (ref 0.05–0.5)
EOSINOPHILS RELATIVE PERCENT: 0 % (ref 0–6)
ERYTHROCYTE [DISTWIDTH] IN BLOOD BY AUTOMATED COUNT: 15.9 % (ref 11.5–15)
GFR, ESTIMATED: >90 ML/MIN/1.73M2
GLUCOSE SERPL-MCNC: 80 MG/DL (ref 74–99)
HCT VFR BLD AUTO: 38.7 % (ref 34–48)
HGB BLD-MCNC: 12.4 G/DL (ref 11.5–15.5)
LYMPHOCYTES NFR BLD: 1.45 K/UL (ref 1.5–4)
LYMPHOCYTES RELATIVE PERCENT: 10 % (ref 20–42)
MCH RBC QN AUTO: 31.1 PG (ref 26–35)
MCHC RBC AUTO-ENTMCNC: 32 G/DL (ref 32–34.5)
MCV RBC AUTO: 97 FL (ref 80–99.9)
METAMYELOCYTES ABSOLUTE COUNT: 0.36 K/UL (ref 0–0.12)
METAMYELOCYTES: 3 % (ref 0–1)
MONOCYTES NFR BLD: 0.36 K/UL (ref 0.1–0.95)
MONOCYTES NFR BLD: 3 % (ref 2–12)
MYELOCYTES ABSOLUTE COUNT: 0.12 K/UL
MYELOCYTES: 1 %
NEUTROPHILS NFR BLD: 83 % (ref 43–80)
NEUTS SEG NFR BLD: 11.48 K/UL (ref 1.8–7.3)
NUCLEATED RED BLOOD CELLS: 1 PER 100 WBC
PLATELET # BLD AUTO: 329 K/UL (ref 130–450)
PMV BLD AUTO: 9.6 FL (ref 7–12)
POTASSIUM SERPL-SCNC: 3.5 MMOL/L (ref 3.4–4.5)
PROMYELOCYTES ABSOLUTE COUNT: 0.12 K/UL
PROMYELOCYTES: 1 %
RBC # BLD AUTO: 3.99 M/UL (ref 3.5–5.5)
RBC # BLD: ABNORMAL 10*6/UL
SODIUM SERPL-SCNC: 139 MMOL/L (ref 136–145)
WBC OTHER # BLD: 13.9 K/UL (ref 4.5–11.5)

## 2025-04-22 PROCEDURE — 2500000003 HC RX 250 WO HCPCS: Performed by: STUDENT IN AN ORGANIZED HEALTH CARE EDUCATION/TRAINING PROGRAM

## 2025-04-22 PROCEDURE — 6370000000 HC RX 637 (ALT 250 FOR IP): Performed by: INTERNAL MEDICINE

## 2025-04-22 PROCEDURE — 6360000002 HC RX W HCPCS: Performed by: STUDENT IN AN ORGANIZED HEALTH CARE EDUCATION/TRAINING PROGRAM

## 2025-04-22 PROCEDURE — 36415 COLL VENOUS BLD VENIPUNCTURE: CPT

## 2025-04-22 PROCEDURE — 85025 COMPLETE CBC W/AUTO DIFF WBC: CPT

## 2025-04-22 PROCEDURE — 1200000000 HC SEMI PRIVATE

## 2025-04-22 PROCEDURE — 2700000000 HC OXYGEN THERAPY PER DAY

## 2025-04-22 PROCEDURE — 97530 THERAPEUTIC ACTIVITIES: CPT

## 2025-04-22 PROCEDURE — 2580000003 HC RX 258: Performed by: INTERNAL MEDICINE

## 2025-04-22 PROCEDURE — 6370000000 HC RX 637 (ALT 250 FOR IP): Performed by: NURSE PRACTITIONER

## 2025-04-22 PROCEDURE — 80048 BASIC METABOLIC PNL TOTAL CA: CPT

## 2025-04-22 PROCEDURE — 6370000000 HC RX 637 (ALT 250 FOR IP): Performed by: STUDENT IN AN ORGANIZED HEALTH CARE EDUCATION/TRAINING PROGRAM

## 2025-04-22 PROCEDURE — 6360000002 HC RX W HCPCS: Performed by: INTERNAL MEDICINE

## 2025-04-22 RX ORDER — ATENOLOL 25 MG/1
25 TABLET ORAL 2 TIMES DAILY
DISCHARGE
Start: 2025-04-22

## 2025-04-22 RX ADMIN — TIZANIDINE 4 MG: 4 TABLET ORAL at 08:25

## 2025-04-22 RX ADMIN — TIZANIDINE 4 MG: 4 TABLET ORAL at 14:11

## 2025-04-22 RX ADMIN — ENOXAPARIN SODIUM 40 MG: 100 INJECTION SUBCUTANEOUS at 08:28

## 2025-04-22 RX ADMIN — AMOXICILLIN AND CLAVULANATE POTASSIUM 1 TABLET: 875; 125 TABLET, FILM COATED ORAL at 21:40

## 2025-04-22 RX ADMIN — LORAZEPAM 0.5 MG: 0.5 TABLET ORAL at 21:40

## 2025-04-22 RX ADMIN — PREDNISONE 15 MG: 5 TABLET ORAL at 08:26

## 2025-04-22 RX ADMIN — TRAMADOL HYDROCHLORIDE 50 MG: 50 TABLET, COATED ORAL at 08:25

## 2025-04-22 RX ADMIN — SODIUM CHLORIDE 1000 MG: 9 INJECTION INTRAMUSCULAR; INTRAVENOUS; SUBCUTANEOUS at 14:10

## 2025-04-22 RX ADMIN — LORAZEPAM 0.5 MG: 0.5 TABLET ORAL at 08:25

## 2025-04-22 RX ADMIN — ATENOLOL 25 MG: 25 TABLET ORAL at 08:25

## 2025-04-22 RX ADMIN — ATENOLOL 25 MG: 25 TABLET ORAL at 21:40

## 2025-04-22 RX ADMIN — DILTIAZEM HYDROCHLORIDE 240 MG: 240 CAPSULE, EXTENDED RELEASE ORAL at 08:27

## 2025-04-22 RX ADMIN — BUPRENORPHINE AND NALOXONE 1 FILM: 8; 2 FILM, SOLUBLE BUCCAL; SUBLINGUAL at 21:52

## 2025-04-22 RX ADMIN — TIZANIDINE 4 MG: 4 TABLET ORAL at 21:40

## 2025-04-22 RX ADMIN — BUPRENORPHINE AND NALOXONE 1 FILM: 8; 2 FILM, SOLUBLE BUCCAL; SUBLINGUAL at 08:32

## 2025-04-22 RX ADMIN — SODIUM CHLORIDE, PRESERVATIVE FREE 10 ML: 5 INJECTION INTRAVENOUS at 21:52

## 2025-04-22 RX ADMIN — FUROSEMIDE 40 MG: 10 INJECTION, SOLUTION INTRAMUSCULAR; INTRAVENOUS at 08:27

## 2025-04-22 RX ADMIN — TRAMADOL HYDROCHLORIDE 50 MG: 50 TABLET, COATED ORAL at 14:11

## 2025-04-22 ASSESSMENT — PAIN SCALES - WONG BAKER
WONGBAKER_NUMERICALRESPONSE: NO HURT
WONGBAKER_NUMERICALRESPONSE: NO HURT

## 2025-04-22 ASSESSMENT — PAIN SCALES - GENERAL
PAINLEVEL_OUTOF10: 9
PAINLEVEL_OUTOF10: 8
PAINLEVEL_OUTOF10: 0

## 2025-04-22 ASSESSMENT — PAIN DESCRIPTION - ORIENTATION
ORIENTATION: LOWER
ORIENTATION: LOWER;MID
ORIENTATION: LOWER;MID

## 2025-04-22 ASSESSMENT — PAIN - FUNCTIONAL ASSESSMENT
PAIN_FUNCTIONAL_ASSESSMENT: PREVENTS OR INTERFERES SOME ACTIVE ACTIVITIES AND ADLS

## 2025-04-22 ASSESSMENT — PAIN DESCRIPTION - LOCATION
LOCATION: BACK

## 2025-04-22 ASSESSMENT — PAIN DESCRIPTION - DESCRIPTORS
DESCRIPTORS: SHARP;SHOOTING;SORE
DESCRIPTORS: SHARP;SHOOTING;SORE
DESCRIPTORS: CRAMPING;DISCOMFORT;ACHING

## 2025-04-22 ASSESSMENT — PAIN DESCRIPTION - PAIN TYPE: TYPE: CHRONIC PAIN

## 2025-04-22 ASSESSMENT — PAIN DESCRIPTION - FREQUENCY: FREQUENCY: CONTINUOUS

## 2025-04-22 ASSESSMENT — PAIN DESCRIPTION - ONSET: ONSET: ON-GOING

## 2025-04-22 NOTE — CARE COORDINATION
4/22/2025social work transition of care planning  Sw received jh choice:1) [facility]-does not do suboxone, 2)[facility], [place] -does not do suboxone, and 4) [place]-referrals made. Sw will follow.  Electronically signed by CATALINO Momin on 4/22/2025 at 11:06 AM

## 2025-04-22 NOTE — PROGRESS NOTES
Physical Therapy  Treatment Note    Name: Li Rojas  : 1970  MRN: [id number]      Date of Service: 2025    Evaluating PT: Nicholas Burns, PT, DPT [id number]      Room #:  5415/5415-B  Diagnosis:  Shortness of breath [R06.02]  Leg edema [R60.0]  Bilateral lower leg cellulitis [L03.116, L03.115]  PMHx/PSHx:   has a past medical history of Arthritis, Asthma, H/O medication noncompliance, Hyperlipidemia, Hypertension, Lupus, MS (multiple sclerosis) (HCC), Multiple sclerosis (HCC), Nicotine dependence, cigarettes, uncomplicated, Palpitations, SOB (shortness of breath), SVT (supraventricular tachycardia), and Systemic lupus erythematosus, unspecified.  Procedure/Surgery:  None this admission  Precautions:  Fall risk,  PureWick, O2  Equipment Needs:  TBD    SUBJECTIVE:    Pt lives with her mom in a first floor apartment. Pt used manual WC for mobility primarily. Pt uses FWW to ambulate short distances within the home.     OBJECTIVE:   Initial Evaluation  Date: 4/15/25 Treatment Date: 25 Short Term/ Long Term   Goals   AM-PAC 6 Clicks 15/24 13/24    Was pt agreeable to Eval/treatment? Yes yes    Does pt have pain? 7/10 low back pain Moderate low back pain    Bed Mobility  Rolling: NT  Supine to sit: SBA  Sit to supine: NT  Scooting: SBA Rolling: NT  Supine to sit: ModA  Sit to supine: ModA  Scooting: SBA Rolling: Independent   Supine to sit: Independent   Sit to supine: Independent   Scooting: Independent    Transfers Sit to stand:   Min A from bed  Mod A from toilet  Stand to sit:   Min A from bed  Mod A from toilet  Stand pivot: Min A with FWW Sit to stand:   Robert  Stand to sit:   Robert  Stand pivot: Robert WW Sit to stand: Independent   Stand to sit: Independent   Stand pivot: Modified independent with AAD   Ambulation   10'x2 feet with FWW with Min A 8', 8' Robert WW  Chair pickup due to weakness and pain >75 feet with AAD modified independent   Stair negotiation: ascended and descended NT  NA   ROM  evaluation 56678  [] Moderate Complexity PT evaluation 79940  [] High Complexity PT evaluation 66307  [] PT Re-evaluation 24891  [] Gait training 70039 0 minutes  [] Manual therapy 35742 0 minutes  [x] Therapeutic activities 86183 25 minutes  [] Therapeutic exercises 23934 0 minutes  [] Neuromuscular reeducation 74571 0 minutes     Joseph De Leon, PT, DPT  [id number]

## 2025-04-22 NOTE — PROGRESS NOTES
Occupational Therapy  OT BEDSIDE TREATMENT NOTE   [facility]  [address], [place]        Date:2025  Patient Name: Li Rojas  MRN: [id number]  : 1970  Room: 5415/5415-     Per OT Eval:    Evaluating OT: Alberto Rocha OTR/L [id number]     Referring Provider: Nicolás Duncan MD                                   Specific Provider Orders/Date: OT evaluation and treatment 25 1115     Diagnosis:  Shortness of breath [R06.02]  Leg edema [R60.0]  Bilateral lower leg cellulitis [L03.116, L03.115]       Pertinent Medical History:  has a past medical history of Arthritis, Asthma, H/O medication noncompliance, Hyperlipidemia, Hypertension, Lupus, MS (multiple sclerosis) (HCC), Multiple sclerosis (HCC), Nicotine dependence, cigarettes, uncomplicated, Palpitations, SOB (shortness of breath), SVT (supraventricular tachycardia), and Systemic lupus erythematosus, unspecified.   Past Surgical History         Past Surgical History:   Procedure Laterality Date    ABDOMEN SURGERY        ANTERIOR CRUCIATE LIGAMENT REPAIR        APPENDECTOMY         SECTION         x5    CHOLECYSTECTOMY        DILATION AND CURETTAGE OF UTERUS        HC INJECTION PROCEDURE FOR SACROILIAC JOINT Left 12/3/2020     LEFT SACROILIAC JOINT STEROID INJECTION UNDER X-RAY GUIDANCE performed by Fauzia Crowell DO at [facility] [place] OR    NERVE BLOCK Left 10/01/2020    NERVE BLOCK Left 10/1/2020     LEFT L4-5 TRANSFORAMINAL EPIDURAL STEROID INJECTION performed by Fauzia Crowell DO at [facility] [place] OR    OTHER SURGICAL HISTORY Left 2020     LEFT SACROILIAC STEROID INJECTION UNDER XRAY GUIDANCE    TUBAL LIGATION                Pt presented to the hospital on  with BLE edema   Pt with hx of spinal fractures and acute rehab in    Activity order: up with assist      Orders received, chart reviewed, eval complete.      Precautions:  Fall  being on 2L O2, maintaining above 90% with movement and at rest, however cueing on deep breathing and energy conservation techniques. At end of session, pt seated upright in bed, all lines and tubes intact, call light within reach.     Pt has made fair progress towards set goals.   Continue with current plan of care      Treatment Time In: 10:26am           Treatment Time Out: 10:40am                Treatment Charges: Mins Units   Ther Ex  85017     Manual Therapy 94200     Thera Activities 80416 14 1   ADL/Home Mgt 97431     Neuro Re-ed 79983     Group Therapy      Orthotic manage/training  49302     Non-Billable Time     Total Timed Treatment 14 1        Sarita Sewell YODER/L [id number]

## 2025-04-22 NOTE — DISCHARGE INSTR - COC
Continuity of Care Form    Patient Name: Li Rojas   :  1970  MRN:  [id number]    Admit date:  2025  Discharge date:  25    Code Status Order: Full Code   Advance Directives:     Admitting Physician:  Nicolás Duncan MD  PCP: Melvin Cardoso MD    Discharging Nurse: ***  Discharging Hospital Unit/Room#: 5415/5415-B  Discharging Unit Phone Number: [phone]    Emergency Contact:   Extended Emergency Contact Information  Primary Emergency Contact: gaston calderon  Mobile Phone: [phone]  Relation: Parent   needed? No    Past Surgical History:  Past Surgical History:   Procedure Laterality Date    ABDOMEN SURGERY      ANTERIOR CRUCIATE LIGAMENT REPAIR      APPENDECTOMY       SECTION      x5    CHOLECYSTECTOMY      DILATION AND CURETTAGE OF UTERUS      HC INJECTION PROCEDURE FOR SACROILIAC JOINT Left 12/3/2020    LEFT SACROILIAC JOINT STEROID INJECTION UNDER X-RAY GUIDANCE performed by Fauzia Crowell DO at [facility] OR    NERVE BLOCK Left 10/01/2020    NERVE BLOCK Left 10/1/2020    LEFT L4-5 TRANSFORAMINAL EPIDURAL STEROID INJECTION performed by Fauzia Crowell DO at [facility] OR    OTHER SURGICAL HISTORY Left 2020    LEFT SACROILIAC STEROID INJECTION UNDER XRAY GUIDANCE    TUBAL LIGATION         Immunization History:   Immunization History   Administered Date(s) Administered    COVID-19, PFIZER PURPLE top, DILUTE for use, (age 12 y+), 30mcg/0.3mL 2021, 2021       Active Problems:  Patient Active Problem List   Diagnosis Code    SVT (supraventricular tachycardia) I47.10    MS (multiple sclerosis) ([facility]) G35    Sacroiliitis M46.1    Personal history of supraventricular tachycardia Z86.79    Anxiety F41.9    Rheumatoid arthritis ([facility]) M06.9    Osteoarthritis of left hip M16.12    Bilateral lower leg cellulitis L03.116, L03.115    Acute bilateral thoracic back pain M54.6       Isolation/Infection:   Isolation            No Isolation  Modified Barium Swallow with Video (Video Swallowing Test): done on ***/***    Treatments at the Time of Hospital Discharge:   Respiratory Treatments: ***  Oxygen Therapy:  is not on home oxygen therapy.  Ventilator:    - No ventilator support    Rehab Therapies: Physical Therapy and Occupational Therapy  Weight Bearing Status/Restrictions: No weight bearing restrictions  Other Medical Equipment (for information only, NOT a DME order):  hospital bed  Other Treatments: ***    Patient's personal belongings (please select all that are sent with patient):  glasses    RN SIGNATURE:  Electronically signed by Meaghan Mendenhall RN on 4/25/25 at 10:21 AM EDT    CASE MANAGEMENT/SOCIAL WORK SECTION    Inpatient Status Date: ***    Readmission Risk Assessment Score:  [facility] RISK OF UNPLANNED READMISSION 2.0             12.3 Total Score        Discharging to Facility/ Agency   Name: [facility]  Address:  Phone:  Fax:    Dialysis Facility (if applicable)   Name:  Address:  Dialysis Schedule:  Phone:  Fax:    / signature: Electronically signed by CATALINO Momin on 4/23/25 at 8:31 AM EDT    PHYSICIAN SECTION    Prognosis: Fair    Condition at Discharge: Stable    Rehab Potential (if transferring to Rehab): Fair    Recommended Labs or Other Treatments After Discharge: cbc cmp    Physician Certification: I certify the above information and transfer of Li Rojas  is necessary for the continuing treatment of the diagnosis listed and that she requires Skilled Nursing Facility for less 30 days.     Update Admission H&P: No change in H&P    PHYSICIAN SIGNATURE:  Electronically signed by Nicolás Duncan MD on 4/22/25 at 9:42 AM EDT

## 2025-04-22 NOTE — PROGRESS NOTES
Internal Medicine Progress Note    Patient's name: Li Rojas  : 1970  Chief complaints (on day of admission): Leg Swelling (Leg pain and swelling bilateral legs getting worse. )  Admission date: 2025  Date of service: 2025   Room: [address]  Primary care physician: Melvin Cardoso MD  Reason for visit: Follow-up for leg swelling    Subjective  Li was seen and examined at bedside       Doing ok   Emotional about her pain and her chronic suboxone use   Mother at bedside   Plan is for PT OT GEORGE and then attempt CT scans as an op, will need to follow up with pain management as well       Not comfortable   Offered to stop the Suboxone and try IV opiate pain control on a very limited basis in order to obtain the CT scans of her spine and abdomen that have been ordered since the  but she does not want to do this. Spoke with Dr Doe without the bone scan will be unable to tell the age of these compression fractures. Explained this to the patient as well to ensure she understands we are pretty limited without these CT scans at this time she expresses understanding of this.     Review of Systems  There are no new complaints of chest pain, shortness of breath, abdominal pain, nausea, vomiting, diarrhea, constipation unless otherwise mentioned above.     Hospital Medications  Current Facility-Administered Medications   Medication Dose Route Frequency Provider Last Rate Last Admin    sodium chloride flush 0.9 % injection 10 mL  10 mL IntraVENous Once PRN Amber Garcia MD        iopamidol (ISOVUE-370) 76 % injection 75 mL  75 mL IntraVENous ONCE PRN Amber Garcia MD        LORazepam (ATIVAN) injection 2 mg  2 mg IntraVENous Once PRN Robles Wallace MD        tiZANidine (ZANAFLEX) tablet 4 mg  4 mg Oral Q6H PRN Robles Wallace MD   4 mg at 25 1411    ertapenem (INVanz) 1,000 mg in sodium chloride (PF) 0.9 % 10 mL IV syringe  1,000 mg IntraVENous Q24H Chris Elliott MD

## 2025-04-22 NOTE — PROGRESS NOTES
Physician Progress Note      PATIENT:               JYOTI LUONG  CSN #:                  [id number]  :                       1970  ADMIT DATE:       2025 6:28 AM  DISCH DATE:  RESPONDING  PROVIDER #:        Nicolás Duncan MD          QUERY TEXT:    Cellulitis is documented in the medical record in the H&P and subsequent   progress notes.  Please clarify any associated diagnoses:    The clinical indicators include:  -ED Leg edema, Cellulitis and abscess of leg  -H&P presented with a chief complaint of BL LE swelling for 14 months is what   they tell me. The LLE looks cellulitic there is a blister that has popped on   the leg it is red warm and tender  -ID Consult cellulitis legs, leukocytosis Ancef 2 grams IV q 8 hrs  -ID progress note  GPC ( in chain )  bacteremia, Aerococcus bacteriuria-   stop ancef, IV Invanz  -VS Findings  98.5, 96, 17, 178/113,  97.3, 97, 22, 133/79  -Lab Findings  WBC 19.0  14.6  .0 Lactic 2.1  Options provided:  -- Sepsis, present on admission  -- Sepsis, present on admission, now resolved  -- Cellulitis without sepsis  -- Other - I will add my own diagnosis  -- Disagree - Not applicable / Not valid  -- Disagree - Clinically unable to determine / Unknown  -- Refer to Clinical Documentation Reviewer    PROVIDER RESPONSE TEXT:    This patient has sepsis which was present on admission.    Query created by: Jennifer Mcgregor on 2025 10:09 AM      Electronically signed by:  Nicolás Duncan MD 2025 6:27 PM

## 2025-04-22 NOTE — PROGRESS NOTES
Department of Internal Medicine  Infectious Diseases  Progress  Note      C/C :  Cellulitis of legs , bacteremia     Pt denies fever or chills  Reports leg swelling and pain- improving   Denies abdomen pain   Afebrile       Current Facility-Administered Medications   Medication Dose Route Frequency Provider Last Rate Last Admin    sodium chloride flush 0.9 % injection 10 mL  10 mL IntraVENous Once PRN Amber Garcia MD        iopamidol (ISOVUE-370) 76 % injection 75 mL  75 mL IntraVENous ONCE PRN Amber Garcia MD        LORazepam (ATIVAN) injection 2 mg  2 mg IntraVENous Once PRN Robles Wallace MD        tiZANidine (ZANAFLEX) tablet 4 mg  4 mg Oral Q6H PRN Robles Wallace MD   4 mg at 04/22/25 1411    ertapenem (INVanz) 1,000 mg in sodium chloride (PF) 0.9 % 10 mL IV syringe  1,000 mg IntraVENous Q24H Chris Elliott MD   1,000 mg at 04/22/25 1410    LORazepam (ATIVAN) tablet 0.5 mg  0.5 mg Oral BID Myra Joyner APRN - CNP   0.5 mg at 04/22/25 0825    dilTIAZem (CARDIZEM CD) extended release capsule 240 mg  240 mg Oral Daily Robles Wallace MD   240 mg at 04/22/25 0827    buprenorphine-naloxone (SUBOXONE) 8-2 MG SL film 1 Film (Patient Supplied)  1 Film SubLINGual BID Robles Wallace MD   1 Film at 04/22/25 0832    sodium chloride flush 0.9 % injection 10 mL  10 mL IntraVENous 2 times per day Nicolás Duncan MD   10 mL at 04/21/25 2042    sodium chloride flush 0.9 % injection 10 mL  10 mL IntraVENous PRN Nicolás Duncan MD   10 mL at 04/20/25 1135    0.9 % sodium chloride infusion   IntraVENous PRN Nicolás Duncan MD        potassium chloride (KLOR-CON M) extended release tablet 40 mEq  40 mEq Oral PRN Nicolás Duncan MD        Or    potassium bicarb-citric acid (EFFER-K) effervescent tablet 40 mEq  40 mEq Oral PRN Nicolsá Duncan MD        Or    potassium chloride 10 mEq/100 mL IVPB (Peripheral Line)  10 mEq IntraVENous PRN Nicolás Duncan MD        enoxaparin (LOVENOX) injection 40 mg  40 mg SubCUTAneous Daily

## 2025-04-23 LAB
ANION GAP SERPL CALCULATED.3IONS-SCNC: 12 MMOL/L (ref 7–16)
BASOPHILS # BLD: 0.11 K/UL (ref 0–0.2)
BASOPHILS NFR BLD: 1 % (ref 0–2)
BUN SERPL-MCNC: 15 MG/DL (ref 6–20)
CALCIUM SERPL-MCNC: 9.5 MG/DL (ref 8.6–10)
CHLORIDE SERPL-SCNC: 94 MMOL/L (ref 98–107)
CO2 SERPL-SCNC: 32 MMOL/L (ref 22–29)
CREAT SERPL-MCNC: 0.5 MG/DL (ref 0.5–1)
EOSINOPHIL # BLD: 0.11 K/UL (ref 0.05–0.5)
EOSINOPHILS RELATIVE PERCENT: 1 % (ref 0–6)
ERYTHROCYTE [DISTWIDTH] IN BLOOD BY AUTOMATED COUNT: 15.9 % (ref 11.5–15)
GFR, ESTIMATED: >90 ML/MIN/1.73M2
GLUCOSE SERPL-MCNC: 86 MG/DL (ref 74–99)
HCT VFR BLD AUTO: 38.4 % (ref 34–48)
HGB BLD-MCNC: 12.1 G/DL (ref 11.5–15.5)
LYMPHOCYTES NFR BLD: 0.55 K/UL (ref 1.5–4)
LYMPHOCYTES RELATIVE PERCENT: 5 % (ref 20–42)
MCH RBC QN AUTO: 30.9 PG (ref 26–35)
MCHC RBC AUTO-ENTMCNC: 31.5 G/DL (ref 32–34.5)
MCV RBC AUTO: 98.2 FL (ref 80–99.9)
METAMYELOCYTES ABSOLUTE COUNT: 0.22 K/UL (ref 0–0.12)
METAMYELOCYTES: 2 % (ref 0–1)
MONOCYTES NFR BLD: 0.11 K/UL (ref 0.1–0.95)
MONOCYTES NFR BLD: 1 % (ref 2–12)
MYELOCYTES ABSOLUTE COUNT: 0.33 K/UL
MYELOCYTES: 3 %
NEUTROPHILS NFR BLD: 88 % (ref 43–80)
NEUTS SEG NFR BLD: 10.47 K/UL (ref 1.8–7.3)
PLATELET # BLD AUTO: 315 K/UL (ref 130–450)
PMV BLD AUTO: 9.3 FL (ref 7–12)
POTASSIUM SERPL-SCNC: 3.5 MMOL/L (ref 3.4–4.5)
RBC # BLD AUTO: 3.91 M/UL (ref 3.5–5.5)
RBC # BLD: ABNORMAL 10*6/UL
RBC # BLD: ABNORMAL 10*6/UL
SODIUM SERPL-SCNC: 139 MMOL/L (ref 136–145)
WBC OTHER # BLD: 11.9 K/UL (ref 4.5–11.5)

## 2025-04-23 PROCEDURE — 6360000002 HC RX W HCPCS: Performed by: STUDENT IN AN ORGANIZED HEALTH CARE EDUCATION/TRAINING PROGRAM

## 2025-04-23 PROCEDURE — 6370000000 HC RX 637 (ALT 250 FOR IP): Performed by: STUDENT IN AN ORGANIZED HEALTH CARE EDUCATION/TRAINING PROGRAM

## 2025-04-23 PROCEDURE — 2700000000 HC OXYGEN THERAPY PER DAY

## 2025-04-23 PROCEDURE — 6370000000 HC RX 637 (ALT 250 FOR IP): Performed by: NURSE PRACTITIONER

## 2025-04-23 PROCEDURE — 2500000003 HC RX 250 WO HCPCS: Performed by: STUDENT IN AN ORGANIZED HEALTH CARE EDUCATION/TRAINING PROGRAM

## 2025-04-23 PROCEDURE — 1200000000 HC SEMI PRIVATE

## 2025-04-23 PROCEDURE — 80048 BASIC METABOLIC PNL TOTAL CA: CPT

## 2025-04-23 PROCEDURE — 6370000000 HC RX 637 (ALT 250 FOR IP): Performed by: INTERNAL MEDICINE

## 2025-04-23 PROCEDURE — 85025 COMPLETE CBC W/AUTO DIFF WBC: CPT

## 2025-04-23 PROCEDURE — 36415 COLL VENOUS BLD VENIPUNCTURE: CPT

## 2025-04-23 RX ADMIN — SODIUM CHLORIDE, PRESERVATIVE FREE 10 ML: 5 INJECTION INTRAVENOUS at 08:45

## 2025-04-23 RX ADMIN — ATENOLOL 25 MG: 25 TABLET ORAL at 21:49

## 2025-04-23 RX ADMIN — LORAZEPAM 0.5 MG: 0.5 TABLET ORAL at 08:43

## 2025-04-23 RX ADMIN — PREDNISONE 15 MG: 5 TABLET ORAL at 08:45

## 2025-04-23 RX ADMIN — BUPRENORPHINE AND NALOXONE 1 FILM: 8; 2 FILM, SOLUBLE BUCCAL; SUBLINGUAL at 08:58

## 2025-04-23 RX ADMIN — ENOXAPARIN SODIUM 40 MG: 100 INJECTION SUBCUTANEOUS at 08:43

## 2025-04-23 RX ADMIN — ATENOLOL 25 MG: 25 TABLET ORAL at 08:44

## 2025-04-23 RX ADMIN — BUPRENORPHINE AND NALOXONE 1 FILM: 8; 2 FILM, SOLUBLE BUCCAL; SUBLINGUAL at 21:50

## 2025-04-23 RX ADMIN — AMOXICILLIN AND CLAVULANATE POTASSIUM 1 TABLET: 875; 125 TABLET, FILM COATED ORAL at 21:49

## 2025-04-23 RX ADMIN — AMOXICILLIN AND CLAVULANATE POTASSIUM 1 TABLET: 875; 125 TABLET, FILM COATED ORAL at 08:44

## 2025-04-23 RX ADMIN — DILTIAZEM HYDROCHLORIDE 240 MG: 240 CAPSULE, EXTENDED RELEASE ORAL at 08:44

## 2025-04-23 RX ADMIN — FUROSEMIDE 40 MG: 10 INJECTION, SOLUTION INTRAMUSCULAR; INTRAVENOUS at 08:44

## 2025-04-23 RX ADMIN — SODIUM CHLORIDE, PRESERVATIVE FREE 10 ML: 5 INJECTION INTRAVENOUS at 21:51

## 2025-04-23 RX ADMIN — LORAZEPAM 0.5 MG: 0.5 TABLET ORAL at 21:49

## 2025-04-23 ASSESSMENT — PAIN SCALES - GENERAL
PAINLEVEL_OUTOF10: 0
PAINLEVEL_OUTOF10: 0

## 2025-04-23 ASSESSMENT — PAIN DESCRIPTION - PAIN TYPE: TYPE: CHRONIC PAIN

## 2025-04-23 ASSESSMENT — PAIN DESCRIPTION - FREQUENCY: FREQUENCY: CONTINUOUS

## 2025-04-23 ASSESSMENT — PAIN DESCRIPTION - DESCRIPTORS: DESCRIPTORS: ACHING

## 2025-04-23 ASSESSMENT — PAIN DESCRIPTION - ORIENTATION: ORIENTATION: LOWER;MID

## 2025-04-23 ASSESSMENT — PAIN SCALES - WONG BAKER: WONGBAKER_NUMERICALRESPONSE: NO HURT

## 2025-04-23 ASSESSMENT — PAIN - FUNCTIONAL ASSESSMENT: PAIN_FUNCTIONAL_ASSESSMENT: PREVENTS OR INTERFERES SOME ACTIVE ACTIVITIES AND ADLS

## 2025-04-23 ASSESSMENT — PAIN DESCRIPTION - LOCATION: LOCATION: BACK

## 2025-04-23 NOTE — PLAN OF CARE
Problem: Discharge Planning  Goal: Discharge to home or other facility with appropriate resources  Outcome: Progressing     Problem: Pain  Goal: Verbalizes/displays adequate comfort level or baseline comfort level  Outcome: Progressing  Flowsheets (Taken 4/22/2025 1915)  Verbalizes/displays adequate comfort level or baseline comfort level: Encourage patient to monitor pain and request assistance     Problem: Skin/Tissue Integrity  Goal: Skin integrity remains intact  Description: 1.  Monitor for areas of redness and/or skin breakdown2.  Assess vascular access sites hourly3.  Every 4-6 hours minimum:  Change oxygen saturation probe site4.  Every 4-6 hours:  If on nasal continuous positive airway pressure, respiratory therapy assess nares and determine need for appliance change or resting period  Outcome: Progressing  Flowsheets (Taken 4/22/2025 1915)  Skin Integrity Remains Intact: Monitor for areas of redness and/or skin breakdown     Problem: ABCDS Injury Assessment  Goal: Absence of physical injury  Outcome: Progressing     Problem: Safety - Adult  Goal: Free from fall injury  Outcome: Progressing     Problem: Respiratory - Adult  Goal: Achieves optimal ventilation and oxygenation  Outcome: Progressing     Problem: Cardiovascular - Adult  Goal: Absence of cardiac dysrhythmias or at baseline  Outcome: Progressing     Problem: Skin/Tissue Integrity - Adult  Goal: Incisions, wounds, or drain sites healing without S/S of infection  Outcome: Progressing  Flowsheets (Taken 4/22/2025 1915)  Incisions, Wounds, or Drain Sites Healing Without Sign and Symptoms of Infection: ADMISSION and DAILY: Assess and document risk factors for pressure ulcer development     Problem: Musculoskeletal - Adult  Goal: Return mobility to safest level of function  Outcome: Progressing  Goal: Return ADL status to a safe level of function  Outcome: Progressing     Problem: Gastrointestinal - Adult  Goal: Minimal or absence of nausea and

## 2025-04-23 NOTE — PROGRESS NOTES
Internal Medicine Progress Note    Patient's name: Li Rojas  : 1970  Chief complaints (on day of admission): Leg Swelling (Leg pain and swelling bilateral legs getting worse. )  Admission date: 2025  Date of service: 2025   Room: [address]  Primary care physician: Melvin Cardoso MD  Reason for visit: Follow-up for leg swelling    Subjective  Li was seen and examined at bedside        Doing ok   Emotional stable   No new issues other than continued back pain   DC planning       Doing ok   Emotional about her pain and her chronic suboxone use   Mother at bedside   Plan is for PT OT GEORGE and then attempt CT scans as an op, will need to follow up with pain management as well       Not comfortable   Offered to stop the Suboxone and try IV opiate pain control on a very limited basis in order to obtain the CT scans of her spine and abdomen that have been ordered since the  but she does not want to do this. Spoke with Dr Doe without the bone scan will be unable to tell the age of these compression fractures. Explained this to the patient as well to ensure she understands we are pretty limited without these CT scans at this time she expresses understanding of this.     Review of Systems  There are no new complaints of chest pain, shortness of breath, abdominal pain, nausea, vomiting, diarrhea, constipation unless otherwise mentioned above.     Hospital Medications  Current Facility-Administered Medications   Medication Dose Route Frequency Provider Last Rate Last Admin    amoxicillin-clavulanate (AUGMENTIN) 875-125 MG per tablet 1 tablet  1 tablet Oral 2 times per day Chris Elliott MD   1 tablet at 25 0822    sodium chloride flush 0.9 % injection 10 mL  10 mL IntraVENous Once PRN Amber Garcia MD        iopamidol (ISOVUE-370) 76 % injection 75 mL  75 mL IntraVENous ONCE PRN Amber Garcia MD        LORazepam (ATIVAN) injection 2 mg  2 mg IntraVENous  cellulitis [L03.116, L03.115]          Plan  BL LE edema   Compression   ECHO WNL    LLE Cellulitis   ID on board     Bacteremia   ID on board     Acute on chronic back pain   CT c spine t spine L spine ordered since 4/13   Patient been refusing due to pain this has been attempted to be treated multiple times   Neurosurgery consulted  NM bone scan ordered per NSG    History of intra abdominal abscess   Patient and her mother state this is an ongoing issue that there is still an abscess in her abdomen that [facility] was going to do surgery on back in December apparently she was lost to follow up   CT abdomen pelvis w IV contrast has been ordered since 4/13/25    Suboxone use   Chronic use for her back   Obtain records from pain mgmt     MS on chronic prednisone 15 mg daily     Tobacco abuse   1/2 PPD  Discussed cessation   Declines NRT     RA     Discharge plan: placement for PT OT and op fu with her pain management doctors     Electronically signed by Nicolás Duncan MD on 4/23/2025 at 3:50 PM    I can be reached through [employer].

## 2025-04-23 NOTE — CARE COORDINATION
4/23/2025social work transition of care planning  Chele notified that pt accepted at [place]. Sw requested precert be started today. Sw completed 7000 and envelope. Sw updated pt at bedside and pt's mother via phone call.  Electronically signed by CATALINO Momin on 4/23/2025 at 9:00 AM    Addendum: Chele spoke with pt and mom. Both adamantly refuse [facility] stating its too far. [facility] can accept,pt's mother ok with that as it is closer. [facility] has accepted and will initiate auth.Chele updated 7000 and envelope.  Electronically signed by CATALINO Momin on 4/23/2025 at 9:30 AM

## 2025-04-23 NOTE — PROGRESS NOTES
Department of Internal Medicine  Infectious Diseases  Progress  Note      C/C :  Cellulitis of legs ,anaerobic  bacteremia     Pt denies fever or chills  Reports leg swelling and pain- improving   Denies abdomen pain   Afebrile       Current Facility-Administered Medications   Medication Dose Route Frequency Provider Last Rate Last Admin    amoxicillin-clavulanate (AUGMENTIN) 875-125 MG per tablet 1 tablet  1 tablet Oral 2 times per day Chris Elliott MD   1 tablet at 04/23/25 0844    sodium chloride flush 0.9 % injection 10 mL  10 mL IntraVENous Once PRN Amber Garcia MD        iopamidol (ISOVUE-370) 76 % injection 75 mL  75 mL IntraVENous ONCE PRN Amber Garcia MD        LORazepam (ATIVAN) injection 2 mg  2 mg IntraVENous Once PRN Robles Wallace MD        tiZANidine (ZANAFLEX) tablet 4 mg  4 mg Oral Q6H PRN Robles Wallace MD   4 mg at 04/22/25 2140    LORazepam (ATIVAN) tablet 0.5 mg  0.5 mg Oral BID Myra Joyner APRN - CNP   0.5 mg at 04/23/25 0843    dilTIAZem (CARDIZEM CD) extended release capsule 240 mg  240 mg Oral Daily Robles Wallace MD   240 mg at 04/23/25 0844    buprenorphine-naloxone (SUBOXONE) 8-2 MG SL film 1 Film (Patient Supplied)  1 Film SubLINGual BID Robles Wallace MD   1 Film at 04/23/25 0858    sodium chloride flush 0.9 % injection 10 mL  10 mL IntraVENous 2 times per day Nicolás Duncan MD   10 mL at 04/23/25 0845    sodium chloride flush 0.9 % injection 10 mL  10 mL IntraVENous PRN Nicolás Duncan MD   10 mL at 04/20/25 1135    0.9 % sodium chloride infusion   IntraVENous PRN Nicolás Duncan MD        potassium chloride (KLOR-CON M) extended release tablet 40 mEq  40 mEq Oral PRN Nicolás Duncan MD        Or    potassium bicarb-citric acid (EFFER-K) effervescent tablet 40 mEq  40 mEq Oral PRN Nicolás Duncan MD        Or    potassium chloride 10 mEq/100 mL IVPB (Peripheral Line)  10 mEq IntraVENous PRN Nicolás Duncan MD        enoxaparin (LOVENOX) injection 40 mg  40 mg SubCUTAneous

## 2025-04-24 LAB
ANION GAP SERPL CALCULATED.3IONS-SCNC: 13 MMOL/L (ref 7–16)
BASOPHILS # BLD: 0.12 K/UL (ref 0–0.2)
BASOPHILS NFR BLD: 1 % (ref 0–2)
BUN SERPL-MCNC: 19 MG/DL (ref 6–20)
CALCIUM SERPL-MCNC: 9.6 MG/DL (ref 8.6–10)
CHLORIDE SERPL-SCNC: 93 MMOL/L (ref 98–107)
CO2 SERPL-SCNC: 31 MMOL/L (ref 22–29)
CREAT SERPL-MCNC: 0.5 MG/DL (ref 0.5–1)
EOSINOPHIL # BLD: 0.03 K/UL (ref 0.05–0.5)
EOSINOPHILS RELATIVE PERCENT: 0 % (ref 0–6)
ERYTHROCYTE [DISTWIDTH] IN BLOOD BY AUTOMATED COUNT: 15.8 % (ref 11.5–15)
GFR, ESTIMATED: >90 ML/MIN/1.73M2
GLUCOSE BLD-MCNC: 169 MG/DL (ref 74–99)
GLUCOSE BLD-MCNC: 189 MG/DL (ref 74–99)
GLUCOSE BLD-MCNC: 211 MG/DL (ref 74–99)
GLUCOSE SERPL-MCNC: 113 MG/DL (ref 74–99)
HCT VFR BLD AUTO: 36.4 % (ref 34–48)
HGB BLD-MCNC: 11.7 G/DL (ref 11.5–15.5)
IMM GRANULOCYTES # BLD AUTO: 0.7 K/UL (ref 0–0.58)
IMM GRANULOCYTES NFR BLD: 5 % (ref 0–5)
LYMPHOCYTES NFR BLD: 2.07 K/UL (ref 1.5–4)
LYMPHOCYTES RELATIVE PERCENT: 16 % (ref 20–42)
MCH RBC QN AUTO: 31 PG (ref 26–35)
MCHC RBC AUTO-ENTMCNC: 32.1 G/DL (ref 32–34.5)
MCV RBC AUTO: 96.3 FL (ref 80–99.9)
MONOCYTES NFR BLD: 0.77 K/UL (ref 0.1–0.95)
MONOCYTES NFR BLD: 6 % (ref 2–12)
NEUTROPHILS NFR BLD: 72 % (ref 43–80)
NEUTS SEG NFR BLD: 9.31 K/UL (ref 1.8–7.3)
PLATELET # BLD AUTO: 359 K/UL (ref 130–450)
PMV BLD AUTO: 9.3 FL (ref 7–12)
POTASSIUM SERPL-SCNC: 3.6 MMOL/L (ref 3.5–5.1)
RBC # BLD AUTO: 3.78 M/UL (ref 3.5–5.5)
RBC # BLD: ABNORMAL 10*6/UL
SODIUM SERPL-SCNC: 137 MMOL/L (ref 136–145)
WBC OTHER # BLD: 13 K/UL (ref 4.5–11.5)

## 2025-04-24 PROCEDURE — 36415 COLL VENOUS BLD VENIPUNCTURE: CPT

## 2025-04-24 PROCEDURE — 80048 BASIC METABOLIC PNL TOTAL CA: CPT

## 2025-04-24 PROCEDURE — 6370000000 HC RX 637 (ALT 250 FOR IP): Performed by: INTERNAL MEDICINE

## 2025-04-24 PROCEDURE — 82962 GLUCOSE BLOOD TEST: CPT

## 2025-04-24 PROCEDURE — 6370000000 HC RX 637 (ALT 250 FOR IP): Performed by: STUDENT IN AN ORGANIZED HEALTH CARE EDUCATION/TRAINING PROGRAM

## 2025-04-24 PROCEDURE — 1200000000 HC SEMI PRIVATE

## 2025-04-24 PROCEDURE — 6360000002 HC RX W HCPCS: Performed by: STUDENT IN AN ORGANIZED HEALTH CARE EDUCATION/TRAINING PROGRAM

## 2025-04-24 PROCEDURE — 6370000000 HC RX 637 (ALT 250 FOR IP): Performed by: NURSE PRACTITIONER

## 2025-04-24 PROCEDURE — 2500000003 HC RX 250 WO HCPCS: Performed by: STUDENT IN AN ORGANIZED HEALTH CARE EDUCATION/TRAINING PROGRAM

## 2025-04-24 PROCEDURE — 85025 COMPLETE CBC W/AUTO DIFF WBC: CPT

## 2025-04-24 RX ORDER — INSULIN LISPRO 100 [IU]/ML
0-8 INJECTION, SOLUTION INTRAVENOUS; SUBCUTANEOUS
Status: DISCONTINUED | OUTPATIENT
Start: 2025-04-24 | End: 2025-04-25 | Stop reason: HOSPADM

## 2025-04-24 RX ORDER — DEXTROSE MONOHYDRATE 100 MG/ML
INJECTION, SOLUTION INTRAVENOUS CONTINUOUS PRN
Status: DISCONTINUED | OUTPATIENT
Start: 2025-04-24 | End: 2025-04-25 | Stop reason: HOSPADM

## 2025-04-24 RX ORDER — GLUCAGON 1 MG/ML
1 KIT INJECTION PRN
Status: DISCONTINUED | OUTPATIENT
Start: 2025-04-24 | End: 2025-04-25 | Stop reason: HOSPADM

## 2025-04-24 RX ADMIN — BUPRENORPHINE AND NALOXONE 1 FILM: 8; 2 FILM, SOLUBLE BUCCAL; SUBLINGUAL at 08:13

## 2025-04-24 RX ADMIN — ENOXAPARIN SODIUM 40 MG: 100 INJECTION SUBCUTANEOUS at 08:13

## 2025-04-24 RX ADMIN — DILTIAZEM HYDROCHLORIDE 240 MG: 240 CAPSULE, EXTENDED RELEASE ORAL at 08:13

## 2025-04-24 RX ADMIN — ATENOLOL 25 MG: 25 TABLET ORAL at 08:13

## 2025-04-24 RX ADMIN — BUPRENORPHINE AND NALOXONE 1 FILM: 8; 2 FILM, SOLUBLE BUCCAL; SUBLINGUAL at 20:39

## 2025-04-24 RX ADMIN — PREDNISONE 15 MG: 5 TABLET ORAL at 08:13

## 2025-04-24 RX ADMIN — INSULIN LISPRO 2 UNITS: 100 INJECTION, SOLUTION INTRAVENOUS; SUBCUTANEOUS at 16:23

## 2025-04-24 RX ADMIN — INSULIN LISPRO 2 UNITS: 100 INJECTION, SOLUTION INTRAVENOUS; SUBCUTANEOUS at 11:16

## 2025-04-24 RX ADMIN — LORAZEPAM 0.5 MG: 0.5 TABLET ORAL at 20:34

## 2025-04-24 RX ADMIN — FUROSEMIDE 40 MG: 10 INJECTION, SOLUTION INTRAMUSCULAR; INTRAVENOUS at 08:12

## 2025-04-24 RX ADMIN — SODIUM CHLORIDE, PRESERVATIVE FREE 10 ML: 5 INJECTION INTRAVENOUS at 20:35

## 2025-04-24 RX ADMIN — AMOXICILLIN AND CLAVULANATE POTASSIUM 1 TABLET: 875; 125 TABLET, FILM COATED ORAL at 08:12

## 2025-04-24 RX ADMIN — ATENOLOL 25 MG: 25 TABLET ORAL at 20:34

## 2025-04-24 RX ADMIN — SODIUM CHLORIDE, PRESERVATIVE FREE 10 ML: 5 INJECTION INTRAVENOUS at 08:14

## 2025-04-24 RX ADMIN — LORAZEPAM 0.5 MG: 0.5 TABLET ORAL at 08:12

## 2025-04-24 RX ADMIN — AMOXICILLIN AND CLAVULANATE POTASSIUM 1 TABLET: 875; 125 TABLET, FILM COATED ORAL at 20:34

## 2025-04-24 ASSESSMENT — PAIN SCALES - GENERAL: PAINLEVEL_OUTOF10: 0

## 2025-04-24 NOTE — FLOWSHEET NOTE
Inpatient Wound Care(prevalence skin assessment) 5415b    Admit Date: 4/13/2025  6:28 AM    Reason for consult:  buttocks    Findings:     04/24/25 1430   Wound 04/24/25 Buttocks Right   Date First Assessed/Time First Assessed: 04/24/25 1430   Present on Original Admission: Yes  Location: Buttocks  Wound Location Orientation: Right   Wound Image    Dressing/Treatment Protective barrier   Wound Length (cm) 5 cm   Wound Width (cm) 3 cm   Wound Depth (cm) 0.1 cm   Wound Surface Area (cm^2) 15 cm^2   Wound Volume (cm^3) 1.5 cm^3   Wound Assessment   (ruptured blister)   Drainage Amount Scant (moist but unmeasurable)   Drainage Description Serosanguinous   Odor None   Mallory-wound Assessment Maceration  (dark discolored edges)   Wound Thickness Description not for Pressure Injury Partial thickness   Wound 04/24/25 Buttocks Left   Date First Assessed/Time First Assessed: 04/24/25 1430   Present on Original Admission: Yes  Location: Buttocks  Wound Location Orientation: Left   Wound Image    Dressing/Treatment Protective barrier   Wound Length (cm) 7 cm   Wound Width (cm) 3 cm   Wound Depth (cm) 0.1 cm   Wound Surface Area (cm^2) 21 cm^2   Wound Volume (cm^3) 2.1 cm^3   Wound Assessment   (ruptured blister)   Drainage Amount Scant (moist but unmeasurable)   Drainage Description Serosanguinous   Odor None   Mallory-wound Assessment Maceration       **Informed Consent**    The patient has given verbal consent to have photos taken of buttocks and inserted into their chart as part of their permanent medical record for purposes of documentation, treatment management and/or medical review.   All Images taken on 4/24/25 of patient name: Li Rojas were transmitted and stored on secured Epic  Site located within Media Folder Tab by a registered [employer] Mobile Application Device.      Impression:  partial thickness wounds- ruptured blisters- friction & shear    Plan:  Zinc protective barriers  Will need continued  preventative care  Dietary consult    Keara Beckman RN 4/24/2025 6:04 PM

## 2025-04-24 NOTE — PROGRESS NOTES
Internal Medicine Progress Note    Patient's name: Li Rojas  : 1970  Chief complaints (on day of admission): Leg Swelling (Leg pain and swelling bilateral legs getting worse. )  Admission date: 2025  Date of service: 2025   Room: [address]  Primary care physician: Melvin Cardoso MD  Reason for visit: Follow-up for leg swelling    Subjective  Li was seen and examined at bedside       Stable   No new issues   DC planning still on going        Doing ok   Emotional stable   No new issues other than continued back pain   DC planning       Doing ok   Emotional about her pain and her chronic suboxone use   Mother at bedside   Plan is for PT OT GEORGE and then attempt CT scans as an op, will need to follow up with pain management as well       Not comfortable   Offered to stop the Suboxone and try IV opiate pain control on a very limited basis in order to obtain the CT scans of her spine and abdomen that have been ordered since the  but she does not want to do this. Spoke with Dr Doe without the bone scan will be unable to tell the age of these compression fractures. Explained this to the patient as well to ensure she understands we are pretty limited without these CT scans at this time she expresses understanding of this.     Review of Systems  There are no new complaints of chest pain, shortness of breath, abdominal pain, nausea, vomiting, diarrhea, constipation unless otherwise mentioned above.     Hospital Medications  Current Facility-Administered Medications   Medication Dose Route Frequency Provider Last Rate Last Admin    insulin lispro (HUMALOG,ADMELOG) injection vial 0-8 Units  0-8 Units SubCUTAneous 4x Daily AC & HS Nicolás Duncan MD   2 Units at 25 1116    glucose chewable tablet 16 g  4 tablet Oral PRN Nicolás Duncan MD        dextrose bolus 10% 125 mL  125 mL IntraVENous PRN Nicolás Duncan MD        Or    dextrose bolus 10% 250 mL  250 mL  kg/m²   I/O last 3 completed shifts:  In: 2230 [P.O.:2200; I.V.:30]  Out: -   I/O this shift:  In: 10 [I.V.:10]  Out: -     Physical Exam:  General: AAO to person/place/time/purpose, NAD, no labored breathing  Eyes: conjunctivae/corneas clear, sclera non icteric  Skin: color/texture/turgor normal, no rashes or lesions  Lungs: CTAB, no retractions/use of accessory muscles, no vocal fremitus, no rhonchi, no crackle, no rales  Heart: regular rate, regular rhythm, no murmur  Abdomen: soft, NT, bowel sounds normal  Extremities: bilateral LE swelling, erythema. LLE dressing in place.  Swelling and erythema improved on RLE  Neurologic: cranial nerves 2-12 grossly intact, no slurred speech    Most Recent Labs  Lab Results   Component Value Date    WBC 13.0 (H) 04/24/2025    HGB 11.7 04/24/2025    HCT 36.4 04/24/2025     04/24/2025     04/24/2025    K 3.6 04/24/2025    CL 93 (L) 04/24/2025    CREATININE 0.5 04/24/2025    BUN 19 04/24/2025    CO2 31 (H) 04/24/2025    GLUCOSE 113 (H) 04/24/2025    ALT 51 (H) 04/16/2025    AST 50 (H) 04/16/2025    INR 1.2 04/16/2011    TSH 0.58 01/13/2024    LABA1C 5.9 (H) 02/01/2023       XR SPINE ENTIRE 1 VW   Final Result   1. Kyphoplasty of L3.   2. Anterior wedging and compression of T7, T10, T11, T12, and L1 with chronic   in appearance however new when compared to a prior CTA dated 01/13/2024..   3. Osteopenia.         XR ABDOMEN (KUB) (SINGLE AP VIEW)   Final Result   1. No sign of bowel obstruction or ileus.   2. Relatively little fecal material in the colon, with nothing seen to   suggest constipation.   3. New mild linear atelectasis at the lung bases.         CT ABDOMEN PELVIS W WO CONTRAST Additional Contrast? None         XR CHEST PORTABLE   Final Result   Borderline cardiomegaly with low lung volumes.  No focal parenchymal   opacification present.         CT CERVICAL SPINE WO CONTRAST    (Results Pending)   CT THORACIC SPINE WO CONTRAST    (Results Pending)   CT

## 2025-04-24 NOTE — PROGRESS NOTES
Comprehensive Nutrition Assessment    Type and Reason for Visit:  Reassess    Nutrition Recommendations/Plan:   Continue current diet regimen  Will continue to monitor     Malnutrition Assessment:  Malnutrition Status:  Insufficient data (04/18/25 1255)    Context:  Acute Illness     Findings of the 6 clinical characteristics of malnutrition:  Energy Intake:  Mild decrease in energy intake  Weight Loss:  Unable to assess (MINE d/t wt flux per EMR likely r/t fluid shifts/CHF hx)     Body Fat Loss:  Unable to assess     Muscle Mass Loss:  Unable to assess    Fluid Accumulation:  No fluid accumulation     Strength:  Not Performed    Nutrition Assessment:    pt adm d/t leg swelling/cellulitis; PMhx of Lupus HTN, MS,RA,CHF,Intra-abd abscess; sepsis noted this adm; intake of meals appears improved since last RD assessment, continue current diet regimen; will continue to monitor.    Nutrition Related Findings:    A&Ox4; edentulous; active BS; +1/+2/trace edema; -I/O Wound Type: Multiple, Open Wounds, Wound Consult Pending (blisters noted)       Current Nutrition Intake & Therapies:    Average Meal Intake: % (most)  Average Supplements Intake: %  ADULT DIET; Regular; Low Fat/Low Chol/High Fiber/2 gm Na  ADULT ORAL NUTRITION SUPPLEMENT; Breakfast, Lunch; Low Calorie/High Protein Oral Supplement    Anthropometric Measures:  Height: 175.3 cm (5' 9.02\")  Ideal Body Weight (IBW): 145 lbs (66 kg)    Admission Body Weight: 84.9 kg (187 lb 2.7 oz) (4/14-BS)  Current Body Weight: 84.9 kg (187 lb 2.7 oz) (4/14-BS/dry wt, CBW appears elevated likely r/t fluid shifts), 129.1 % IBW.    Current BMI (kg/m2): 27.6  Usual Body Weight:  (MINE d/t wt flux per EMR wt hx likely r/t fluid shifts/CHF hx)        Weight Adjustment For: No Adjustment                 BMI Categories: Overweight (BMI 25.0-29.9)    Estimated Daily Nutrient Needs:  Energy Requirements Based On: Formula  Weight Used for Energy Requirements: Admission  Energy

## 2025-04-24 NOTE — PLAN OF CARE
Problem: Genitourinary - Adult  Goal: Absence of urinary retention  Outcome: Not Progressing     Problem: Genitourinary - Adult  Goal: Absence of urinary retention  Outcome: Not Progressing     Problem: Skin/Tissue Integrity  Goal: Skin integrity remains intact  Description: 1.  Monitor for areas of redness and/or skin breakdown2.  Assess vascular access sites hourly3.  Every 4-6 hours minimum:  Change oxygen saturation probe site4.  Every 4-6 hours:  If on nasal continuous positive airway pressure, respiratory therapy assess nares and determine need for appliance change or resting period  Outcome: Progressing  Flowsheets (Taken 4/23/2025 1920)  Skin Integrity Remains Intact: Monitor for areas of redness and/or skin breakdown     Problem: Neurosensory - Adult  Goal: Achieves maximal functionality and self care  Outcome: Progressing     Problem: Respiratory - Adult  Goal: Achieves optimal ventilation and oxygenation  Outcome: Progressing     Problem: Musculoskeletal - Adult  Goal: Return mobility to safest level of function  Outcome: Progressing  Goal: Return ADL status to a safe level of function  Outcome: Progressing     Problem: Infection - Adult  Goal: Absence of infection at discharge  Outcome: Progressing     Problem: Nutrition Deficit:  Goal: Optimize nutritional status  Outcome: Progressing     Problem: Discharge Planning  Goal: Discharge to home or other facility with appropriate resources  Outcome: Adequate for Discharge     Problem: Pain  Goal: Verbalizes/displays adequate comfort level or baseline comfort level  Outcome: Adequate for Discharge     Problem: ABCDS Injury Assessment  Goal: Absence of physical injury  Outcome: Adequate for Discharge     Problem: Safety - Adult  Goal: Free from fall injury  Outcome: Adequate for Discharge     Problem: Cardiovascular - Adult  Goal: Absence of cardiac dysrhythmias or at baseline  Outcome: Adequate for Discharge     Problem: Skin/Tissue Integrity - Adult  Goal:  Incisions, wounds, or drain sites healing without S/S of infection  Outcome: Adequate for Discharge  Flowsheets (Taken 4/23/2025 1920)  Incisions, Wounds, or Drain Sites Healing Without Sign and Symptoms of Infection: TWICE DAILY: Assess and document skin integrity     Problem: Gastrointestinal - Adult  Goal: Minimal or absence of nausea and vomiting  Outcome: Adequate for Discharge  Goal: Maintains or returns to baseline bowel function  Outcome: Adequate for Discharge

## 2025-04-24 NOTE — CARE COORDINATION
4/24/2025social work transition of care planning  Pt plan is to [facility],once auth obtained. Hens paperwork completed. Auth pending.  Electronically signed by CATALINO Momin on 4/24/2025 at 9:14 AM    Addendum:Sw set up will call alli with pas [phone].  Electronically signed by CATALINO Momin on 4/24/2025 at 1:53 PM

## 2025-04-24 NOTE — PROGRESS NOTES
Department of Internal Medicine  Infectious Diseases  Progress  Note      C/C :  Cellulitis of legs ,anaerobic  bacteremia     Pt denies fever or chills  Reports leg swelling and pain- improving   Denies abdomen pain   Afebrile       Current Facility-Administered Medications   Medication Dose Route Frequency Provider Last Rate Last Admin    amoxicillin-clavulanate (AUGMENTIN) 875-125 MG per tablet 1 tablet  1 tablet Oral 2 times per day Chris Elliott MD   1 tablet at 04/24/25 0812    sodium chloride flush 0.9 % injection 10 mL  10 mL IntraVENous Once PRN Amber Garcia MD        iopamidol (ISOVUE-370) 76 % injection 75 mL  75 mL IntraVENous ONCE PRN Amber Garcia MD        LORazepam (ATIVAN) injection 2 mg  2 mg IntraVENous Once PRN Robles Wallace MD        tiZANidine (ZANAFLEX) tablet 4 mg  4 mg Oral Q6H PRN Robles Wallace MD   4 mg at 04/22/25 2140    LORazepam (ATIVAN) tablet 0.5 mg  0.5 mg Oral BID Myra Joyner APRN - CNP   0.5 mg at 04/24/25 0812    dilTIAZem (CARDIZEM CD) extended release capsule 240 mg  240 mg Oral Daily Robles Wallace MD   240 mg at 04/24/25 0813    buprenorphine-naloxone (SUBOXONE) 8-2 MG SL film 1 Film (Patient Supplied)  1 Film SubLINGual BID Robles Wallace MD   1 Film at 04/24/25 0813    sodium chloride flush 0.9 % injection 10 mL  10 mL IntraVENous 2 times per day Nicolás Duncan MD   10 mL at 04/24/25 0814    sodium chloride flush 0.9 % injection 10 mL  10 mL IntraVENous PRN Nicolás Duncan MD   10 mL at 04/20/25 1135    0.9 % sodium chloride infusion   IntraVENous PRN Nicolás Duncan MD        potassium chloride (KLOR-CON M) extended release tablet 40 mEq  40 mEq Oral PRN Nicolás Duncan MD        Or    potassium bicarb-citric acid (EFFER-K) effervescent tablet 40 mEq  40 mEq Oral PRN Nicolás Duncan MD        Or    potassium chloride 10 mEq/100 mL IVPB (Peripheral Line)  10 mEq IntraVENous PRN Nicolás Duncan MD        enoxaparin (LOVENOX) injection 40 mg  40 mg SubCUTAneous  04:37 AM    K 3.0 02/01/2023 10:54 PM    CL 93 04/24/2025 04:37 AM    CO2 31 04/24/2025 04:37 AM    BUN 19 04/24/2025 04:37 AM    CREATININE 0.5 04/24/2025 04:37 AM    GFRAA >60 11/06/2020 02:30 PM    LABGLOM >90 04/24/2025 04:37 AM    LABGLOM >60 01/13/2024 05:08 AM    GLUCOSE 113 04/24/2025 04:37 AM    GLUCOSE 98 06/06/2012 11:12 PM    CALCIUM 9.6 04/24/2025 04:37 AM    BILITOT <0.2 04/16/2025 05:48 AM    ALKPHOS 289 04/16/2025 05:48 AM    AST 50 04/16/2025 05:48 AM    ALT 51 04/16/2025 05:48 AM         Hepatic Function Panel:    Lab Results   Component Value Date/Time    ALKPHOS 289 04/16/2025 05:48 AM    ALT 51 04/16/2025 05:48 AM    AST 50 04/16/2025 05:48 AM    BILITOT <0.2 04/16/2025 05:48 AM    BILIDIR <0.2 01/25/2023 03:06 AM    IBILI see below 01/25/2023 03:06 AM       PT/INR:    Lab Results   Component Value Date/Time    PROTIME 13.1 04/16/2011 06:20 AM    INR 1.2 04/16/2011 06:20 AM       TSH:    Lab Results   Component Value Date/Time    TSH 0.58 01/13/2024 05:08 AM       U/A:    Lab Results   Component Value Date/Time    COLORU Yellow 04/13/2025 07:56 AM    PHUR 6.0 04/13/2025 07:56 AM    PHUR 7.5 03/18/2024 12:55 PM    WBCUA 0 TO 5 04/13/2025 07:56 AM    WBCUA 0-1 06/25/2011 10:00 PM    RBCUA 0 TO 2 04/13/2025 07:56 AM    RBCUA NONE 09/19/2013 07:00 AM    BACTERIA TRACE 04/13/2025 07:56 AM    CLARITYU CLOUDY 08/18/2017 01:45 PM    LEUKOCYTESUR SMALL 04/13/2025 07:56 AM    UROBILINOGEN 0.2 04/13/2025 07:56 AM    BILIRUBINUR NEGATIVE 04/13/2025 07:56 AM    BLOODU Negative 08/18/2017 01:45 PM    GLUCOSEU NEGATIVE 04/13/2025 07:56 AM    GLUCOSEU NEGATIVE 06/25/2011 10:00 PM    AMORPHOUS PRESENT 03/18/2024 12:55 PM       ABG:  No results found for: \"FKX0JRY\", \"BEART\", \"S5BMTTVY\", \"PHART\", \"THGBART\", \"CIR6QGZ\", \"PO2ART\", \"ZFD0ZLA\"    MICROBIOLOGY:          Specimen Description .CLEAN CATCH URINE     Special Requests Site: Urine    Culture AEROCOCCUS URINAE 10 to 50,000 CFU/ML Susceptibility testing not

## 2025-04-25 VITALS
HEART RATE: 70 BPM | BODY MASS INDEX: 31.77 KG/M2 | TEMPERATURE: 97.1 F | OXYGEN SATURATION: 93 % | DIASTOLIC BLOOD PRESSURE: 62 MMHG | RESPIRATION RATE: 19 BRPM | WEIGHT: 214.51 LBS | SYSTOLIC BLOOD PRESSURE: 107 MMHG | HEIGHT: 69 IN

## 2025-04-25 LAB
ANION GAP SERPL CALCULATED.3IONS-SCNC: 11 MMOL/L (ref 7–16)
BASOPHILS # BLD: 0 K/UL (ref 0–0.2)
BASOPHILS NFR BLD: 0 % (ref 0–2)
BUN SERPL-MCNC: 18 MG/DL (ref 6–20)
CALCIUM SERPL-MCNC: 9.5 MG/DL (ref 8.6–10)
CHLORIDE SERPL-SCNC: 95 MMOL/L (ref 98–107)
CO2 SERPL-SCNC: 34 MMOL/L (ref 22–29)
CREAT SERPL-MCNC: 0.5 MG/DL (ref 0.5–1)
EOSINOPHIL # BLD: 0 K/UL (ref 0.05–0.5)
EOSINOPHILS RELATIVE PERCENT: 0 % (ref 0–6)
ERYTHROCYTE [DISTWIDTH] IN BLOOD BY AUTOMATED COUNT: 15.6 % (ref 11.5–15)
GFR, ESTIMATED: >90 ML/MIN/1.73M2
GLUCOSE BLD-MCNC: 114 MG/DL (ref 74–99)
GLUCOSE BLD-MCNC: 187 MG/DL (ref 74–99)
GLUCOSE BLD-MCNC: 191 MG/DL (ref 74–99)
GLUCOSE SERPL-MCNC: 110 MG/DL (ref 74–99)
HBA1C MFR BLD: 7 % (ref 4–5.6)
HCT VFR BLD AUTO: 34.7 % (ref 34–48)
HGB BLD-MCNC: 11.2 G/DL (ref 11.5–15.5)
LYMPHOCYTES NFR BLD: 2.46 K/UL (ref 1.5–4)
LYMPHOCYTES RELATIVE PERCENT: 21 % (ref 20–42)
MCH RBC QN AUTO: 30.7 PG (ref 26–35)
MCHC RBC AUTO-ENTMCNC: 32.3 G/DL (ref 32–34.5)
MCV RBC AUTO: 95.1 FL (ref 80–99.9)
MONOCYTES NFR BLD: 0.41 K/UL (ref 0.1–0.95)
MONOCYTES NFR BLD: 4 % (ref 2–12)
MYELOCYTES ABSOLUTE COUNT: 0.41 K/UL
MYELOCYTES: 4 %
NEUTROPHILS NFR BLD: 72 % (ref 43–80)
NEUTS SEG NFR BLD: 8.42 K/UL (ref 1.8–7.3)
NUCLEATED RED BLOOD CELLS: 1 PER 100 WBC
PLATELET # BLD AUTO: 340 K/UL (ref 130–450)
PMV BLD AUTO: 9.3 FL (ref 7–12)
POTASSIUM SERPL-SCNC: 3.2 MMOL/L (ref 3.5–5.1)
RBC # BLD AUTO: 3.65 M/UL (ref 3.5–5.5)
RBC # BLD: ABNORMAL 10*6/UL
SODIUM SERPL-SCNC: 139 MMOL/L (ref 136–145)
WBC OTHER # BLD: 11.7 K/UL (ref 4.5–11.5)

## 2025-04-25 PROCEDURE — 83036 HEMOGLOBIN GLYCOSYLATED A1C: CPT

## 2025-04-25 PROCEDURE — 80048 BASIC METABOLIC PNL TOTAL CA: CPT

## 2025-04-25 PROCEDURE — 6370000000 HC RX 637 (ALT 250 FOR IP): Performed by: STUDENT IN AN ORGANIZED HEALTH CARE EDUCATION/TRAINING PROGRAM

## 2025-04-25 PROCEDURE — 82962 GLUCOSE BLOOD TEST: CPT

## 2025-04-25 PROCEDURE — 6360000002 HC RX W HCPCS: Performed by: STUDENT IN AN ORGANIZED HEALTH CARE EDUCATION/TRAINING PROGRAM

## 2025-04-25 PROCEDURE — 2500000003 HC RX 250 WO HCPCS: Performed by: STUDENT IN AN ORGANIZED HEALTH CARE EDUCATION/TRAINING PROGRAM

## 2025-04-25 PROCEDURE — 6370000000 HC RX 637 (ALT 250 FOR IP): Performed by: INTERNAL MEDICINE

## 2025-04-25 PROCEDURE — 97535 SELF CARE MNGMENT TRAINING: CPT

## 2025-04-25 PROCEDURE — 36415 COLL VENOUS BLD VENIPUNCTURE: CPT

## 2025-04-25 PROCEDURE — 97530 THERAPEUTIC ACTIVITIES: CPT

## 2025-04-25 PROCEDURE — 85025 COMPLETE CBC W/AUTO DIFF WBC: CPT

## 2025-04-25 PROCEDURE — 6370000000 HC RX 637 (ALT 250 FOR IP): Performed by: NURSE PRACTITIONER

## 2025-04-25 RX ADMIN — FUROSEMIDE 40 MG: 10 INJECTION, SOLUTION INTRAMUSCULAR; INTRAVENOUS at 09:01

## 2025-04-25 RX ADMIN — INSULIN LISPRO 2 UNITS: 100 INJECTION, SOLUTION INTRAVENOUS; SUBCUTANEOUS at 16:27

## 2025-04-25 RX ADMIN — ENOXAPARIN SODIUM 40 MG: 100 INJECTION SUBCUTANEOUS at 09:00

## 2025-04-25 RX ADMIN — DILTIAZEM HYDROCHLORIDE 240 MG: 240 CAPSULE, EXTENDED RELEASE ORAL at 09:02

## 2025-04-25 RX ADMIN — INSULIN LISPRO 2 UNITS: 100 INJECTION, SOLUTION INTRAVENOUS; SUBCUTANEOUS at 11:34

## 2025-04-25 RX ADMIN — PREDNISONE 15 MG: 5 TABLET ORAL at 09:01

## 2025-04-25 RX ADMIN — SODIUM CHLORIDE, PRESERVATIVE FREE 10 ML: 5 INJECTION INTRAVENOUS at 09:03

## 2025-04-25 RX ADMIN — LORAZEPAM 0.5 MG: 0.5 TABLET ORAL at 09:01

## 2025-04-25 RX ADMIN — ATENOLOL 25 MG: 25 TABLET ORAL at 09:02

## 2025-04-25 RX ADMIN — AMOXICILLIN AND CLAVULANATE POTASSIUM 1 TABLET: 875; 125 TABLET, FILM COATED ORAL at 09:01

## 2025-04-25 RX ADMIN — BUPRENORPHINE AND NALOXONE 1 FILM: 8; 2 FILM, SOLUBLE BUCCAL; SUBLINGUAL at 09:01

## 2025-04-25 NOTE — PROGRESS NOTES
Physical Therapy  Treatment Note    Name: Li Rojas  : 1970  MRN: [id number]      Date of Service: 2025    Evaluating PT: Nicholas Burns, PT, DPT [id number]      Room #:  5415/5415-B  Diagnosis:  Shortness of breath [R06.02]  Leg edema [R60.0]  Bilateral lower leg cellulitis [L03.116, L03.115]  PMHx/PSHx:   has a past medical history of Arthritis, Asthma, H/O medication noncompliance, Hyperlipidemia, Hypertension, Lupus, MS (multiple sclerosis) (HCC), Multiple sclerosis (HCC), Nicotine dependence, cigarettes, uncomplicated, Palpitations, SOB (shortness of breath), SVT (supraventricular tachycardia), and Systemic lupus erythematosus, unspecified (HCC).  Procedure/Surgery:  None this admission  Precautions:  Fall risk, No brace per Dr. Doe  Equipment Needs:    22\" manual w/c with elevating leg rests, full length arm rests, roho cushion.    SUBJECTIVE:    Pt lives with her mom in a first floor apartment. Pt used manual WC for mobility primarily. Pt uses FWW to ambulate short distances within the home.     OBJECTIVE:   Initial Evaluation  Date: 4/15/25 Treatment Date: 25 Short Term/ Long Term   Goals   AM-PAC 6 Clicks 15/24 16/24    Was pt agreeable to Eval/treatment? Yes yes    Does pt have pain? 7/10 low back pain Moderate low back pain    Bed Mobility  Rolling: NT  Supine to sit: SBA  Sit to supine: NT  Scooting: SBA Rolling: SBA  Supine to sit: SBA  Sit to supine: Robert  Scooting: SBA Rolling: Independent   Supine to sit: Independent   Sit to supine: Independent   Scooting: Independent    Transfers Sit to stand:   Min A from bed  Mod A from toilet  Stand to sit:   Min A from bed  Mod A from toilet  Stand pivot: Min A with FWW Sit to stand:   SBA  Stand to sit:   SBA  Stand pivot: SBA WW Sit to stand: Independent   Stand to sit: Independent   Stand pivot: Modified independent with AAD   Ambulation   10'x2 feet with FWW with Min A 15, 10 feet SBA WW >75 feet with AAD modified independent

## 2025-04-25 NOTE — CARE COORDINATION
4/25/2025social work transition of care planning  Sw notified that auth obtained for [facility]. Sw set up pas ambulette for 11 am. Rn and facility rep notified. Sw attempted to call pt's mother,vm full. Rn to notify pt at bedside.  Electronically signed by CATALINO Momin on 4/25/2025 at 8:37 AM    Addendum:Sw notified by rn that pt is stating that she is going home and her mother is picking her up today. Sw spoke with pt's mother,she is not picking pt up and agrees pt needs to go to snf. Sw will update pt and rn.  Electronically signed by CATALINO Momin on 4/25/2025 at 9:36 AM    Addendum:Sw followed up with pt, she is adamant that her mother or brother will pick her up to take her home later today. Sw placed pt on will call with pas ambulette. [facility] soc Saturday 4/26.  Electronically signed by CATALINO Momin on 4/25/2025 at 11:02 AM    Addendum: DME delivered to pt's room.  Electronically signed by CATALINO Momin on 4/25/2025 at 3:49 PM

## 2025-04-25 NOTE — PROGRESS NOTES
Occupational Therapy  OT BEDSIDE TREATMENT NOTE   [facility]  [address], [place]        Date:2025  Patient Name: Li Rojas  MRN: [id number]  : 1970  Room: 5415/5415-     Per OT Eval:    Evaluating OT: Alberto Rocha OTR/L [id number]     Referring Provider: Nicolás Duncan MD                                   Specific Provider Orders/Date: OT evaluation and treatment 25 1115     Diagnosis:  Shortness of breath [R06.02]  Leg edema [R60.0]  Bilateral lower leg cellulitis [L03.116, L03.115]       Pertinent Medical History:  has a past medical history of Arthritis, Asthma, H/O medication noncompliance, Hyperlipidemia, Hypertension, Lupus, MS (multiple sclerosis) (HCC), Multiple sclerosis (HCC), Nicotine dependence, cigarettes, uncomplicated, Palpitations, SOB (shortness of breath), SVT (supraventricular tachycardia), and Systemic lupus erythematosus, unspecified.   Past Surgical History         Past Surgical History:   Procedure Laterality Date    ABDOMEN SURGERY        ANTERIOR CRUCIATE LIGAMENT REPAIR        APPENDECTOMY         SECTION         x5    CHOLECYSTECTOMY        DILATION AND CURETTAGE OF UTERUS        HC INJECTION PROCEDURE FOR SACROILIAC JOINT Left 12/3/2020     LEFT SACROILIAC JOINT STEROID INJECTION UNDER X-RAY GUIDANCE performed by Fauzia Crowell DO at [facility]    NERVE BLOCK Left 10/01/2020    NERVE BLOCK Left 10/1/2020     LEFT L4-5 TRANSFORAMINAL EPIDURAL STEROID INJECTION performed by Fauzia Crowell DO at [facility]    OTHER SURGICAL HISTORY Left 2020     LEFT SACROILIAC STEROID INJECTION UNDER XRAY GUIDANCE    TUBAL LIGATION                Pt presented to the hospital on  with BLE edema   Pt with hx of spinal fractures and acute rehab in    Activity order: up with assist      Orders received, chart reviewed, eval complete.      Precautions:  Fall

## 2025-04-25 NOTE — PROGRESS NOTES
Physician Progress Note      PATIENT:               JYOTI LUONG  CSN #:                  [id number]  :                       1970  ADMIT DATE:       2025 6:28 AM  DISCH DATE:  RESPONDING  PROVIDER #:        Nicolás Duncan MD          QUERY TEXT:    Congestive Heart Failure is documented in the medical record in the ED note.    Please document the type and acuity:    The clinical indicators include:  -ED  female history of CHF, She does state that she takes p.o. Lasix and has   been compliant with her medications.  -Echo  Normal left ventricular systolic function. EF by visual   approximation is > 60%  -CXR  Borderline cardiomegaly with low lung volumes.  -BNP  729  -IV Lasix 40mg daily  Options provided:  -- Acute on Chronic Systolic CHF/HFrEF  -- Acute on Chronic Systolic and Diastolic CHF  -- Acute Systolic CHF/HFrEF  -- Acute Systolic and Diastolic CHF  -- Chronic Systolic CHF/HFrEF  -- Chronic Diastolic CHF/HFpEF  -- Chronic Systolic and Diastolic CHF  -- Acute on Chronic Diastolic CHF/HFpEF  -- Acute Diastolic CHF/HFpEF  -- Other - I will add my own diagnosis  -- Disagree - Not applicable / Not valid  -- Disagree - Clinically unable to determine / Unknown  -- Refer to Clinical Documentation Reviewer    PROVIDER RESPONSE TEXT:    This patient has chronic diastolic CHF/HFpEF.    Query created by: Jennifer Mcgregor on 2025 8:35 AM      Electronically signed by:  Nicolás Duncan MD 2025 8:46 AM

## 2025-04-25 NOTE — PROGRESS NOTES
Department of Internal Medicine  Infectious Diseases  Progress  Note      C/C :  Cellulitis of legs ,anaerobic  bacteremia     Pt denies fever or chills  Reports leg swelling and pain- improving   Denies abdomen pain   Afebrile       Current Facility-Administered Medications   Medication Dose Route Frequency Provider Last Rate Last Admin    insulin lispro (HUMALOG,ADMELOG) injection vial 0-8 Units  0-8 Units SubCUTAneous 4x Daily AC & HS Nicolás Duncan MD   2 Units at 04/24/25 1623    glucose chewable tablet 16 g  4 tablet Oral PRN Nicolás Duncan MD        dextrose bolus 10% 125 mL  125 mL IntraVENous PRN Nicolás Duncan MD        Or    dextrose bolus 10% 250 mL  250 mL IntraVENous PRN Nicolás Duncan MD        glucagon injection 1 mg  1 mg SubCUTAneous PRN Nicolás Duncan MD        dextrose 10 % infusion   IntraVENous Continuous PRN Nicolás Duncan MD        amoxicillin-clavulanate (AUGMENTIN) 875-125 MG per tablet 1 tablet  1 tablet Oral 2 times per day Chris Elliott MD   1 tablet at 04/25/25 0901    sodium chloride flush 0.9 % injection 10 mL  10 mL IntraVENous Once PRN Amber Garcia MD        iopamidol (ISOVUE-370) 76 % injection 75 mL  75 mL IntraVENous ONCE PRN Amber Garcia MD        LORazepam (ATIVAN) injection 2 mg  2 mg IntraVENous Once PRN Robles Wallace MD        tiZANidine (ZANAFLEX) tablet 4 mg  4 mg Oral Q6H PRN Robles Wallace MD   4 mg at 04/22/25 2140    LORazepam (ATIVAN) tablet 0.5 mg  0.5 mg Oral BID Myra Joyner APRN - CNP   0.5 mg at 04/25/25 0901    dilTIAZem (CARDIZEM CD) extended release capsule 240 mg  240 mg Oral Daily Robles Wallace MD   240 mg at 04/25/25 0902    buprenorphine-naloxone (SUBOXONE) 8-2 MG SL film 1 Film (Patient Supplied)  1 Film SubLINGual BID Robles Wallace MD   1 Film at 04/25/25 0901    sodium chloride flush 0.9 % injection 10 mL  10 mL IntraVENous 2 times per day Nicolás Duncan MD   10 mL at 04/25/25 0903    sodium chloride flush 0.9 % injection 10 mL  10

## 2025-04-25 NOTE — CARE COORDINATION
A wheelchair is necessary because it is the least level of equipment needed for patient to be mobile in the home to accomplish ADL's. Mobility limitations can not be sufficiently resolved with a cane or walker.    Electronically signed by Nicolás Duncan MD on 4/25/2025 at 1:59 PM    I can be reached through [employer].

## 2025-04-25 NOTE — PROGRESS NOTES
Patient given her home meds(SUBOXONE 8- 2 MG FILM), after the count was confirmed and cosigned by two Rns.PT is Discharged home with [facility]

## 2025-04-25 NOTE — DISCHARGE SUMMARY
Internal Medicine Discharge Summary    NAME: Li Rojas :  1970  MRN:  [id number] PCP:Melvin Cardoso MD    ADMITTED: 2025   DISCHARGED: No discharge date for patient encounter.    ADMITTING PHYSICIAN: Nicolás Duncan MD    PCP: Melvin Cardoso MD    CONSULTANT(S):   IP CONSULT TO HOSPITALIST  IP CONSULT TO INFECTIOUS DISEASES  IP CONSULT TO HOME CARE NEEDS  IP CONSULT TO PHARMACY  IP CONSULT TO NEUROSURGERY     ADMITTING DIAGNOSIS:   Shortness of breath [R06.02]  Leg edema [R60.0]  Bilateral lower leg cellulitis [L03.116, L03.115]     Please see H&P for further details    DISCHARGE DIAGNOSES:   Active Hospital Problems    Diagnosis     Acute bilateral thoracic back pain [M54.6]     Bilateral lower leg cellulitis [L03.116, L03.115]        BRIEF HISTORY OF PRESENT ILLNESS: Li Rojas is a 55 y.o. female patient of Melvin Cardoso MD who  has a past medical history of Arthritis, Asthma, H/O medication noncompliance, Hyperlipidemia, Hypertension, Lupus, MS (multiple sclerosis) (HCC), Multiple sclerosis (HCC), Nicotine dependence, cigarettes, uncomplicated, Palpitations, SOB (shortness of breath), SVT (supraventricular tachycardia), and Systemic lupus erythematosus, unspecified (HCC). who originally had concerns including Leg Swelling (Leg pain and swelling bilateral legs getting worse. ). at presentation on 2025, and was found to have Shortness of breath [R06.02]  Leg edema [R60.0]  Bilateral lower leg cellulitis [L03.116, L03.115] after workup.    Please see H&P for further details.    HOSPITAL COURSE:   The patient presented to the hospital with the chief complaint of Leg Swelling (Leg pain and swelling bilateral legs getting worse. )  . The patient was admitted to the hospital.     BL LE edema   Compression   ECHO WNL     LLE Cellulitis   ID on board      Bacteremia   ID on board      Acute on chronic back pain   CT c spine t spine L spine ordered since    Patient been refusing due to

## 2025-04-25 NOTE — PLAN OF CARE
Problem: Discharge Planning  Goal: Discharge to home or other facility with appropriate resources  4/24/2025 2113 by Mahnaz Nichole RN  Outcome: Progressing     Problem: Pain  Goal: Verbalizes/displays adequate comfort level or baseline comfort level  4/24/2025 2113 by Mahnaz Nichole RN  Outcome: Progressing     Problem: Skin/Tissue Integrity  Goal: Skin integrity remains intact  Description: 1.  Monitor for areas of redness and/or skin breakdown2.  Assess vascular access sites hourly3.  Every 4-6 hours minimum:  Change oxygen saturation probe site4.  Every 4-6 hours:  If on nasal continuous positive airway pressure, respiratory therapy assess nares and determine need for appliance change or resting period  4/24/2025 2113 by Mahnaz Nichole RN  Outcome: Progressing     Problem: ABCDS Injury Assessment  Goal: Absence of physical injury  4/24/2025 2113 by Mahnaz Nichole RN  Outcome: Progressing     Problem: Safety - Adult  Goal: Free from fall injury  4/24/2025 2113 by Mahnaz Nichole RN  Outcome: Progressing

## 2025-05-19 ENCOUNTER — APPOINTMENT (OUTPATIENT)
Dept: GENERAL RADIOLOGY | Age: 55
DRG: 720 | End: 2025-05-19
Payer: COMMERCIAL

## 2025-05-19 ENCOUNTER — HOSPITAL ENCOUNTER (INPATIENT)
Age: 55
LOS: 9 days | Discharge: NURSING, REHAB OR LONG-TERM CARE | DRG: 720 | End: 2025-05-29
Attending: STUDENT IN AN ORGANIZED HEALTH CARE EDUCATION/TRAINING PROGRAM | Admitting: INTERNAL MEDICINE
Payer: COMMERCIAL

## 2025-05-19 DIAGNOSIS — L03.119 CELLULITIS OF LOWER EXTREMITY, UNSPECIFIED LATERALITY: Primary | ICD-10-CM

## 2025-05-19 PROBLEM — R60.9 EDEMA: Status: ACTIVE | Noted: 2025-05-19

## 2025-05-19 LAB
ALBUMIN SERPL-MCNC: 4.1 G/DL (ref 3.5–5.2)
ALP SERPL-CCNC: 429 U/L (ref 35–104)
ALT SERPL-CCNC: 203 U/L (ref 0–32)
ANION GAP SERPL CALCULATED.3IONS-SCNC: 15 MMOL/L (ref 7–16)
AST SERPL-CCNC: 75 U/L (ref 0–31)
BASOPHILS # BLD: 0 K/UL (ref 0–0.2)
BASOPHILS NFR BLD: 0 % (ref 0–2)
BILIRUB SERPL-MCNC: 0.4 MG/DL (ref 0–1.2)
BUN SERPL-MCNC: 39 MG/DL (ref 6–20)
CALCIUM SERPL-MCNC: 10.6 MG/DL (ref 8.6–10.2)
CHLORIDE SERPL-SCNC: 92 MMOL/L (ref 98–107)
CO2 SERPL-SCNC: 24 MMOL/L (ref 22–29)
CREAT SERPL-MCNC: 0.5 MG/DL (ref 0.5–1)
CRP SERPL HS-MCNC: 37.7 MG/L (ref 0–5)
EOSINOPHIL # BLD: 0 K/UL (ref 0.05–0.5)
EOSINOPHILS RELATIVE PERCENT: 0 % (ref 0–6)
ERYTHROCYTE [DISTWIDTH] IN BLOOD BY AUTOMATED COUNT: 15.8 % (ref 11.5–15)
ERYTHROCYTE [SEDIMENTATION RATE] IN BLOOD BY WESTERGREN METHOD: 45 MM/HR (ref 0–20)
GFR, ESTIMATED: >90 ML/MIN/1.73M2
GLUCOSE SERPL-MCNC: 355 MG/DL (ref 74–99)
HCT VFR BLD AUTO: 47.7 % (ref 34–48)
HGB BLD-MCNC: 15.5 G/DL (ref 11.5–15.5)
LYMPHOCYTES NFR BLD: 0.31 K/UL (ref 1.5–4)
LYMPHOCYTES RELATIVE PERCENT: 2 % (ref 20–42)
MCH RBC QN AUTO: 30.6 PG (ref 26–35)
MCHC RBC AUTO-ENTMCNC: 32.5 G/DL (ref 32–34.5)
MCV RBC AUTO: 94.1 FL (ref 80–99.9)
METAMYELOCYTES ABSOLUTE COUNT: 0.47 K/UL (ref 0–0.12)
METAMYELOCYTES: 3 % (ref 0–1)
MONOCYTES NFR BLD: 0.62 K/UL (ref 0.1–0.95)
MONOCYTES NFR BLD: 4 % (ref 2–12)
MYELOCYTES ABSOLUTE COUNT: 0.78 K/UL
MYELOCYTES: 4 %
NEUTROPHILS NFR BLD: 88 % (ref 43–80)
NEUTS SEG NFR BLD: 15.61 K/UL (ref 1.8–7.3)
NUCLEATED RED BLOOD CELLS: 1 PER 100 WBC
PLATELET # BLD AUTO: 325 K/UL (ref 130–450)
PMV BLD AUTO: 10.7 FL (ref 7–12)
POTASSIUM SERPL-SCNC: 5.2 MMOL/L (ref 3.5–5)
PROT SERPL-MCNC: 7.5 G/DL (ref 6.4–8.3)
RBC # BLD AUTO: 5.07 M/UL (ref 3.5–5.5)
RBC # BLD: ABNORMAL 10*6/UL
SODIUM SERPL-SCNC: 131 MMOL/L (ref 132–146)
WBC OTHER # BLD: 17.8 K/UL (ref 4.5–11.5)

## 2025-05-19 PROCEDURE — 86140 C-REACTIVE PROTEIN: CPT

## 2025-05-19 PROCEDURE — 96375 TX/PRO/DX INJ NEW DRUG ADDON: CPT

## 2025-05-19 PROCEDURE — 87040 BLOOD CULTURE FOR BACTERIA: CPT

## 2025-05-19 PROCEDURE — 6360000002 HC RX W HCPCS: Performed by: STUDENT IN AN ORGANIZED HEALTH CARE EDUCATION/TRAINING PROGRAM

## 2025-05-19 PROCEDURE — 85652 RBC SED RATE AUTOMATED: CPT

## 2025-05-19 PROCEDURE — G0378 HOSPITAL OBSERVATION PER HR: HCPCS

## 2025-05-19 PROCEDURE — 96365 THER/PROPH/DIAG IV INF INIT: CPT

## 2025-05-19 PROCEDURE — 87185 SC STD ENZYME DETCJ PER NZM: CPT

## 2025-05-19 PROCEDURE — 99285 EMERGENCY DEPT VISIT HI MDM: CPT

## 2025-05-19 PROCEDURE — 80053 COMPREHEN METABOLIC PANEL: CPT

## 2025-05-19 PROCEDURE — 2580000003 HC RX 258: Performed by: STUDENT IN AN ORGANIZED HEALTH CARE EDUCATION/TRAINING PROGRAM

## 2025-05-19 PROCEDURE — 87150 DNA/RNA AMPLIFIED PROBE: CPT

## 2025-05-19 PROCEDURE — 85025 COMPLETE CBC W/AUTO DIFF WBC: CPT

## 2025-05-19 PROCEDURE — 73590 X-RAY EXAM OF LOWER LEG: CPT

## 2025-05-19 PROCEDURE — 99222 1ST HOSP IP/OBS MODERATE 55: CPT | Performed by: INTERNAL MEDICINE

## 2025-05-19 PROCEDURE — 87077 CULTURE AEROBIC IDENTIFY: CPT

## 2025-05-19 RX ORDER — ACETAMINOPHEN 650 MG/1
650 SUPPOSITORY RECTAL EVERY 6 HOURS PRN
Status: DISCONTINUED | OUTPATIENT
Start: 2025-05-19 | End: 2025-05-29 | Stop reason: HOSPADM

## 2025-05-19 RX ORDER — ACETAMINOPHEN 325 MG/1
650 TABLET ORAL EVERY 6 HOURS PRN
Status: DISCONTINUED | OUTPATIENT
Start: 2025-05-19 | End: 2025-05-29 | Stop reason: HOSPADM

## 2025-05-19 RX ORDER — SODIUM CHLORIDE 0.9 % (FLUSH) 0.9 %
10 SYRINGE (ML) INJECTION PRN
Status: DISCONTINUED | OUTPATIENT
Start: 2025-05-19 | End: 2025-05-29 | Stop reason: HOSPADM

## 2025-05-19 RX ORDER — ENOXAPARIN SODIUM 100 MG/ML
40 INJECTION SUBCUTANEOUS DAILY
Status: DISCONTINUED | OUTPATIENT
Start: 2025-05-19 | End: 2025-05-29 | Stop reason: HOSPADM

## 2025-05-19 RX ORDER — PROMETHAZINE HYDROCHLORIDE 25 MG/1
12.5 TABLET ORAL EVERY 6 HOURS PRN
Status: DISCONTINUED | OUTPATIENT
Start: 2025-05-19 | End: 2025-05-29 | Stop reason: HOSPADM

## 2025-05-19 RX ORDER — SODIUM CHLORIDE 0.9 % (FLUSH) 0.9 %
10 SYRINGE (ML) INJECTION EVERY 12 HOURS SCHEDULED
Status: DISCONTINUED | OUTPATIENT
Start: 2025-05-19 | End: 2025-05-29 | Stop reason: HOSPADM

## 2025-05-19 RX ORDER — POLYETHYLENE GLYCOL 3350 17 G/17G
17 POWDER, FOR SOLUTION ORAL DAILY PRN
Status: DISCONTINUED | OUTPATIENT
Start: 2025-05-19 | End: 2025-05-29 | Stop reason: HOSPADM

## 2025-05-19 RX ORDER — KETOROLAC TROMETHAMINE 30 MG/ML
15 INJECTION, SOLUTION INTRAMUSCULAR; INTRAVENOUS ONCE
Status: COMPLETED | OUTPATIENT
Start: 2025-05-19 | End: 2025-05-19

## 2025-05-19 RX ORDER — MORPHINE SULFATE 2 MG/ML
2 INJECTION, SOLUTION INTRAMUSCULAR; INTRAVENOUS ONCE
Refills: 0 | Status: DISCONTINUED | OUTPATIENT
Start: 2025-05-19 | End: 2025-05-19

## 2025-05-19 RX ORDER — SODIUM CHLORIDE 9 MG/ML
INJECTION, SOLUTION INTRAVENOUS PRN
Status: DISCONTINUED | OUTPATIENT
Start: 2025-05-19 | End: 2025-05-29 | Stop reason: HOSPADM

## 2025-05-19 RX ORDER — ONDANSETRON 2 MG/ML
4 INJECTION INTRAMUSCULAR; INTRAVENOUS EVERY 6 HOURS PRN
Status: DISCONTINUED | OUTPATIENT
Start: 2025-05-19 | End: 2025-05-29 | Stop reason: HOSPADM

## 2025-05-19 RX ADMIN — PIPERACILLIN AND TAZOBACTAM 4500 MG: 4; .5 INJECTION, POWDER, LYOPHILIZED, FOR SOLUTION INTRAVENOUS at 18:12

## 2025-05-19 RX ADMIN — KETOROLAC TROMETHAMINE 15 MG: 30 INJECTION, SOLUTION INTRAMUSCULAR at 17:03

## 2025-05-19 ASSESSMENT — LIFESTYLE VARIABLES
HOW OFTEN DO YOU HAVE A DRINK CONTAINING ALCOHOL: NEVER
HOW MANY STANDARD DRINKS CONTAINING ALCOHOL DO YOU HAVE ON A TYPICAL DAY: PATIENT DOES NOT DRINK

## 2025-05-19 NOTE — ED PROVIDER NOTES
heart regular rate and rhythm, lungs auscultation bilaterally, abdomen soft nontender no rebound or guarding.  There is 2+ bilateral lower extremity edema with moderate warmth and erythema there is some mild purulent drainage from wounds there is no significant crepitus.  Differential diagnose includes venous stasis dermatitis, cellulitis, abscess, necrotizing soft tissue infection.  Laboratory work obtained CBC demonstrate leukocytosis 17.8, CMP demonstrated chronically elevated LFTs, sed rate 45 CRP 37.7 blood cultures obtained and pending left tib-fib and right tib-fib x-rays obtained demonstrated soft tissue edema no free air.  Patient was recently admitted to [facility] I did review records she was positive for anaerobic bacteremia.  Restart patient on Vanco and Zosyn.  Patient also given 15 mg of IV Toradol for pain.  Decision made to admit patient Case discussed with hospitalist agreed to admit patient.  Plan of care discussed with patient clued admission all questions answered patient agreement plan of care admitted to the hospital stable condition.  FINAL IMPRESSION      1. Cellulitis of lower extremity, unspecified laterality          DISPOSITION/PLAN     DISPOSITION Admitted 05/19/2025 06:40:55 PM    PATIENT REFERRED TO:  No follow-up provider specified.    DISCHARGE MEDICATIONS:  New Prescriptions    No medications on file       DISCONTINUED MEDICATIONS:  Discontinued Medications    No medications on file            (Please note that portions of this note were completed with a voice recognition program.  Efforts were made to edit the dictations but occasionally words are mis-transcribed.)    Pablo Flaherty DO (electronically signed)       Pablo Flaherty DO  05/21/25 9987

## 2025-05-20 PROBLEM — E11.9 TYPE 2 DIABETES MELLITUS WITHOUT COMPLICATION, WITHOUT LONG-TERM CURRENT USE OF INSULIN (HCC): Status: ACTIVE | Noted: 2025-05-20

## 2025-05-20 PROBLEM — L03.90 CELLULITIS: Status: ACTIVE | Noted: 2025-05-20

## 2025-05-20 LAB
BASOPHILS # BLD: 0 K/UL (ref 0–0.2)
BASOPHILS NFR BLD: 0 % (ref 0–2)
BNP SERPL-MCNC: 298 PG/ML (ref 0–450)
EOSINOPHIL # BLD: 0.13 K/UL (ref 0.05–0.5)
EOSINOPHILS RELATIVE PERCENT: 1 % (ref 0–6)
ERYTHROCYTE [DISTWIDTH] IN BLOOD BY AUTOMATED COUNT: 15.7 % (ref 11.5–15)
GLUCOSE BLD-MCNC: 156 MG/DL (ref 74–99)
GLUCOSE BLD-MCNC: 200 MG/DL (ref 74–99)
GLUCOSE BLD-MCNC: 205 MG/DL (ref 74–99)
GLUCOSE BLD-MCNC: 238 MG/DL (ref 74–99)
GLUCOSE BLD-MCNC: 267 MG/DL (ref 74–99)
HCT VFR BLD AUTO: 43.2 % (ref 34–48)
HGB BLD-MCNC: 13.9 G/DL (ref 11.5–15.5)
LYMPHOCYTES NFR BLD: 1.18 K/UL (ref 1.5–4)
LYMPHOCYTES RELATIVE PERCENT: 8 % (ref 20–42)
MCH RBC QN AUTO: 30.4 PG (ref 26–35)
MCHC RBC AUTO-ENTMCNC: 32.2 G/DL (ref 32–34.5)
MCV RBC AUTO: 94.5 FL (ref 80–99.9)
METAMYELOCYTES ABSOLUTE COUNT: 0.13 K/UL (ref 0–0.12)
METAMYELOCYTES: 1 % (ref 0–1)
MONOCYTES NFR BLD: 0.65 K/UL (ref 0.1–0.95)
MONOCYTES NFR BLD: 4 % (ref 2–12)
MYELOCYTES ABSOLUTE COUNT: 0.78 K/UL
MYELOCYTES: 5 %
NEUTROPHILS NFR BLD: 81 % (ref 43–80)
NEUTS SEG NFR BLD: 12.42 K/UL (ref 1.8–7.3)
PLATELET # BLD AUTO: 281 K/UL (ref 130–450)
PMV BLD AUTO: 10.3 FL (ref 7–12)
RBC # BLD AUTO: 4.57 M/UL (ref 3.5–5.5)
RBC # BLD: NORMAL 10*6/UL
WBC OTHER # BLD: 15.3 K/UL (ref 4.5–11.5)

## 2025-05-20 PROCEDURE — 1200000000 HC SEMI PRIVATE

## 2025-05-20 PROCEDURE — 6370000000 HC RX 637 (ALT 250 FOR IP): Performed by: INTERNAL MEDICINE

## 2025-05-20 PROCEDURE — 94640 AIRWAY INHALATION TREATMENT: CPT

## 2025-05-20 PROCEDURE — G0378 HOSPITAL OBSERVATION PER HR: HCPCS

## 2025-05-20 PROCEDURE — 82962 GLUCOSE BLOOD TEST: CPT

## 2025-05-20 PROCEDURE — 6360000002 HC RX W HCPCS: Performed by: INTERNAL MEDICINE

## 2025-05-20 PROCEDURE — 2500000003 HC RX 250 WO HCPCS: Performed by: INTERNAL MEDICINE

## 2025-05-20 PROCEDURE — 2580000003 HC RX 258: Performed by: INTERNAL MEDICINE

## 2025-05-20 PROCEDURE — 6360000002 HC RX W HCPCS: Performed by: STUDENT IN AN ORGANIZED HEALTH CARE EDUCATION/TRAINING PROGRAM

## 2025-05-20 PROCEDURE — 2580000003 HC RX 258: Performed by: STUDENT IN AN ORGANIZED HEALTH CARE EDUCATION/TRAINING PROGRAM

## 2025-05-20 PROCEDURE — 36415 COLL VENOUS BLD VENIPUNCTURE: CPT

## 2025-05-20 PROCEDURE — 83880 ASSAY OF NATRIURETIC PEPTIDE: CPT

## 2025-05-20 PROCEDURE — 85025 COMPLETE CBC W/AUTO DIFF WBC: CPT

## 2025-05-20 PROCEDURE — 99232 SBSQ HOSP IP/OBS MODERATE 35: CPT | Performed by: INTERNAL MEDICINE

## 2025-05-20 RX ORDER — BISACODYL 5 MG/1
5 TABLET, DELAYED RELEASE ORAL 2 TIMES DAILY
Status: DISCONTINUED | OUTPATIENT
Start: 2025-05-20 | End: 2025-05-29 | Stop reason: HOSPADM

## 2025-05-20 RX ORDER — ATENOLOL 25 MG/1
25 TABLET ORAL 2 TIMES DAILY
Status: DISCONTINUED | OUTPATIENT
Start: 2025-05-20 | End: 2025-05-29 | Stop reason: HOSPADM

## 2025-05-20 RX ORDER — TRAMADOL HYDROCHLORIDE 50 MG/1
50 TABLET ORAL EVERY 6 HOURS PRN
Status: DISCONTINUED | OUTPATIENT
Start: 2025-05-20 | End: 2025-05-29 | Stop reason: HOSPADM

## 2025-05-20 RX ORDER — PANTOPRAZOLE SODIUM 40 MG/1
40 TABLET, DELAYED RELEASE ORAL
Status: DISCONTINUED | OUTPATIENT
Start: 2025-05-21 | End: 2025-05-29 | Stop reason: HOSPADM

## 2025-05-20 RX ORDER — DILTIAZEM HYDROCHLORIDE 240 MG/1
240 CAPSULE, COATED, EXTENDED RELEASE ORAL DAILY
Status: DISCONTINUED | OUTPATIENT
Start: 2025-05-20 | End: 2025-05-29 | Stop reason: HOSPADM

## 2025-05-20 RX ORDER — FUROSEMIDE 40 MG/1
40 TABLET ORAL
Status: DISCONTINUED | OUTPATIENT
Start: 2025-05-20 | End: 2025-05-29 | Stop reason: HOSPADM

## 2025-05-20 RX ORDER — BUPRENORPHINE AND NALOXONE 8; 2 MG/1; MG/1
1 FILM, SOLUBLE BUCCAL; SUBLINGUAL 2 TIMES DAILY
Status: DISCONTINUED | OUTPATIENT
Start: 2025-05-20 | End: 2025-05-29 | Stop reason: HOSPADM

## 2025-05-20 RX ORDER — ALBUTEROL SULFATE 0.83 MG/ML
2.5 SOLUTION RESPIRATORY (INHALATION)
Status: DISCONTINUED | OUTPATIENT
Start: 2025-05-20 | End: 2025-05-20

## 2025-05-20 RX ORDER — BUPRENORPHINE HYDROCHLORIDE AND NALOXONE HYDROCHLORIDE DIHYDRATE 8; 2 MG/1; MG/1
1 TABLET SUBLINGUAL 2 TIMES DAILY
Status: DISCONTINUED | OUTPATIENT
Start: 2025-05-20 | End: 2025-05-20

## 2025-05-20 RX ORDER — ALBUTEROL SULFATE 90 UG/1
2 INHALANT RESPIRATORY (INHALATION) EVERY 4 HOURS
Status: DISCONTINUED | OUTPATIENT
Start: 2025-05-20 | End: 2025-05-20 | Stop reason: CLARIF

## 2025-05-20 RX ORDER — LORAZEPAM 0.5 MG/1
0.5 TABLET ORAL 2 TIMES DAILY
Status: DISCONTINUED | OUTPATIENT
Start: 2025-05-20 | End: 2025-05-29 | Stop reason: HOSPADM

## 2025-05-20 RX ORDER — GLUCAGON 1 MG/ML
1 KIT INJECTION PRN
Status: DISCONTINUED | OUTPATIENT
Start: 2025-05-20 | End: 2025-05-29 | Stop reason: HOSPADM

## 2025-05-20 RX ORDER — INSULIN LISPRO 100 [IU]/ML
0-4 INJECTION, SOLUTION INTRAVENOUS; SUBCUTANEOUS
Status: DISCONTINUED | OUTPATIENT
Start: 2025-05-20 | End: 2025-05-25

## 2025-05-20 RX ORDER — ALBUTEROL SULFATE 0.83 MG/ML
2.5 SOLUTION RESPIRATORY (INHALATION) EVERY 4 HOURS PRN
Status: DISCONTINUED | OUTPATIENT
Start: 2025-05-20 | End: 2025-05-29 | Stop reason: HOSPADM

## 2025-05-20 RX ORDER — FUROSEMIDE 10 MG/ML
20 INJECTION INTRAMUSCULAR; INTRAVENOUS 2 TIMES DAILY
Status: DISCONTINUED | OUTPATIENT
Start: 2025-05-20 | End: 2025-05-26

## 2025-05-20 RX ORDER — PREDNISONE 5 MG/1
5 TABLET ORAL 3 TIMES DAILY
Status: DISCONTINUED | OUTPATIENT
Start: 2025-05-20 | End: 2025-05-29 | Stop reason: HOSPADM

## 2025-05-20 RX ORDER — DEXTROSE MONOHYDRATE 100 MG/ML
INJECTION, SOLUTION INTRAVENOUS CONTINUOUS PRN
Status: DISCONTINUED | OUTPATIENT
Start: 2025-05-20 | End: 2025-05-29 | Stop reason: HOSPADM

## 2025-05-20 RX ORDER — POLYETHYLENE GLYCOL 3350 17 G/17G
17 POWDER, FOR SOLUTION ORAL DAILY
Status: DISCONTINUED | OUTPATIENT
Start: 2025-05-20 | End: 2025-05-29 | Stop reason: HOSPADM

## 2025-05-20 RX ORDER — BUPRENORPHINE HYDROCHLORIDE AND NALOXONE HYDROCHLORIDE DIHYDRATE 8; 2 MG/1; MG/1
1 TABLET SUBLINGUAL 2 TIMES DAILY
Status: DISCONTINUED | OUTPATIENT
Start: 2025-05-20 | End: 2025-05-20 | Stop reason: CLARIF

## 2025-05-20 RX ADMIN — DILTIAZEM HYDROCHLORIDE 240 MG: 240 CAPSULE, COATED, EXTENDED RELEASE ORAL at 08:14

## 2025-05-20 RX ADMIN — PIPERACILLIN AND TAZOBACTAM 3375 MG: 3; .375 INJECTION, POWDER, LYOPHILIZED, FOR SOLUTION INTRAVENOUS at 14:09

## 2025-05-20 RX ADMIN — ATENOLOL 25 MG: 25 TABLET ORAL at 21:12

## 2025-05-20 RX ADMIN — PIPERACILLIN AND TAZOBACTAM 3375 MG: 3; .375 INJECTION, POWDER, LYOPHILIZED, FOR SOLUTION INTRAVENOUS at 21:36

## 2025-05-20 RX ADMIN — INSULIN LISPRO 1 UNITS: 100 INJECTION, SOLUTION INTRAVENOUS; SUBCUTANEOUS at 17:28

## 2025-05-20 RX ADMIN — TIZANIDINE 4 MG: 4 TABLET ORAL at 02:15

## 2025-05-20 RX ADMIN — ALBUTEROL SULFATE 2.5 MG: 2.5 SOLUTION RESPIRATORY (INHALATION) at 05:12

## 2025-05-20 RX ADMIN — BISACODYL 5 MG: 5 TABLET, COATED ORAL at 13:43

## 2025-05-20 RX ADMIN — TIZANIDINE 4 MG: 4 TABLET ORAL at 21:12

## 2025-05-20 RX ADMIN — BISACODYL 5 MG: 5 TABLET, COATED ORAL at 21:12

## 2025-05-20 RX ADMIN — POLYETHYLENE GLYCOL 3350 17 G: 17 POWDER, FOR SOLUTION ORAL at 00:39

## 2025-05-20 RX ADMIN — LORAZEPAM 0.5 MG: 0.5 TABLET ORAL at 08:14

## 2025-05-20 RX ADMIN — ENOXAPARIN SODIUM 40 MG: 100 INJECTION SUBCUTANEOUS at 08:15

## 2025-05-20 RX ADMIN — INSULIN LISPRO 2 UNITS: 100 INJECTION, SOLUTION INTRAVENOUS; SUBCUTANEOUS at 11:36

## 2025-05-20 RX ADMIN — INSULIN LISPRO 1 UNITS: 100 INJECTION, SOLUTION INTRAVENOUS; SUBCUTANEOUS at 02:13

## 2025-05-20 RX ADMIN — ATENOLOL 25 MG: 25 TABLET ORAL at 08:14

## 2025-05-20 RX ADMIN — Medication 10 ML: at 21:37

## 2025-05-20 RX ADMIN — POLYETHYLENE GLYCOL 3350 17 G: 17 POWDER, FOR SOLUTION ORAL at 13:43

## 2025-05-20 RX ADMIN — TRAMADOL HYDROCHLORIDE 50 MG: 50 TABLET, COATED ORAL at 13:44

## 2025-05-20 RX ADMIN — PREDNISONE 5 MG: 5 TABLET ORAL at 21:12

## 2025-05-20 RX ADMIN — PIPERACILLIN AND TAZOBACTAM 3375 MG: 3; .375 INJECTION, POWDER, LYOPHILIZED, FOR SOLUTION INTRAVENOUS at 06:00

## 2025-05-20 RX ADMIN — ALBUTEROL SULFATE 2.5 MG: 2.5 SOLUTION RESPIRATORY (INHALATION) at 01:32

## 2025-05-20 RX ADMIN — BUPRENORPHINE AND NALOXONE 1 FILM: 8; 2 FILM, SOLUBLE BUCCAL; SUBLINGUAL at 21:25

## 2025-05-20 RX ADMIN — INSULIN LISPRO 1 UNITS: 100 INJECTION, SOLUTION INTRAVENOUS; SUBCUTANEOUS at 21:17

## 2025-05-20 RX ADMIN — FUROSEMIDE 20 MG: 10 INJECTION, SOLUTION INTRAMUSCULAR; INTRAVENOUS at 11:28

## 2025-05-20 RX ADMIN — TIZANIDINE 4 MG: 4 TABLET ORAL at 14:12

## 2025-05-20 RX ADMIN — TIZANIDINE 4 MG: 4 TABLET ORAL at 08:14

## 2025-05-20 RX ADMIN — LORAZEPAM 0.5 MG: 0.5 TABLET ORAL at 21:12

## 2025-05-20 RX ADMIN — Medication 10 ML: at 08:17

## 2025-05-20 RX ADMIN — VANCOMYCIN HYDROCHLORIDE 2000 MG: 10 INJECTION, POWDER, LYOPHILIZED, FOR SOLUTION INTRAVENOUS at 00:34

## 2025-05-20 RX ADMIN — FUROSEMIDE 20 MG: 10 INJECTION, SOLUTION INTRAMUSCULAR; INTRAVENOUS at 18:38

## 2025-05-20 RX ADMIN — VANCOMYCIN HYDROCHLORIDE 1250 MG: 10 INJECTION, POWDER, LYOPHILIZED, FOR SOLUTION INTRAVENOUS at 11:31

## 2025-05-20 ASSESSMENT — PAIN SCALES - GENERAL: PAINLEVEL_OUTOF10: 9

## 2025-05-20 NOTE — RT PROTOCOL NOTE
RT Nebulizer Bronchodilator Protocol Note    There is a bronchodilator order in the chart from a provider indicating to follow the RT Bronchodilator Protocol and there is an “Initiate RT Bronchodilator Protocol” order as well (see protocol at bottom of note).    CXR Findings:  No results found.    The findings from the last RT Protocol Assessment were as follows:  Smoking: Chronic pulmonary disease  Respiratory Pattern: Regular pattern and RR 12-20 bpm  Breath Sounds: Clear breath sounds  Cough: Strong, spontaneous, non-productive  Indication for Bronchodilator Therapy: On home bronchodilators  Bronchodilator Assessment Score: 2    Aerosolized bronchodilator medication orders have been revised according to the RT Nebulizer Bronchodilator Protocol below.    Respiratory Therapist to perform RT Therapy Protocol Assessment initially then follow the protocol.  Repeat RT Therapy Protocol Assessment PRN for score 0-3 or on second treatment, BID, and PRN for scores above 3.    No Indications - adjust the frequency to every 6 hours PRN wheezing or bronchospasm, if no treatments needed after 48 hours then discontinue using Per Protocol order mode.     If indication present, adjust the RT bronchodilator orders based on the Bronchodilator Assessment Score as indicated below.  If a patient is on this medication at home then do not decrease Frequency below that used at home.    0-3 - enter or revise RT bronchodilator order(s) to equivalent RT Bronchodilator order with Frequency of every 4 hours PRN for wheezing or increased work of breathing using Per Protocol order mode.       4-6 - enter or revise RT Bronchodilator order(s) to two equivalent RT bronchodilator orders with one order with BID Frequency and one order with Frequency of every 4 hours PRN wheezing or increased work of breathing using Per Protocol order mode.         7-10 - enter or revise RT Bronchodilator order(s) to two equivalent RT bronchodilator orders with one

## 2025-05-20 NOTE — PLAN OF CARE
Problem: Safety - Adult  Goal: Free from fall injury  5/20/2025 1727 by Mitul Dewey, RN  Outcome: Progressing

## 2025-05-20 NOTE — H&P
[facility] Hospitalist Group   HISTORY AND PHYSICAL EXAM      AUTHOR: Yonathan Montero MD PATIENT NAME: Li Rojas   PCP: Melvin Cardoso MD  MRN: [id number], : 1970       CHIEF COMPLAINT / REASON FOR ADMISSION: Leg swelling, pain, difficulty in walking  HPI:   This is a 55 y.o. female  has a past medical history of Arthritis, Asthma, H/O medication noncompliance, Hyperlipidemia, Hypertension, Lupus, MS (multiple sclerosis) (HCC), Multiple sclerosis (HCC), Nicotine dependence, cigarettes, uncomplicated, Palpitations, SOB (shortness of breath), SVT (supraventricular tachycardia), and Systemic lupus erythematosus, unspecified (HCC). presented with Leg swelling, pain, difficulty in walking  for last few weeks prior to arrival to the hospital.  She has bilateral lower extremity cellulitis and gram-negative bacteremia, treated with antibiotics, and discharged on Augmentin. She now reports worsening leg pain and swelling, making it difficult to walk. The patient was seen and examined at bedside, appears alert and awake with no acute distress and is able to answer simple  questions. On direct questioning, patient denied any  resting ongoing chest pain, resting SOB, hemoptysis, productive cough, fever, ongoing palpitation, active abdominal pain, hematemesis, rectal bleeding, zabrina, hematuria, any other  and GI complaints, and any new focal neuro deficits.    ROS:  Pertinent positives and negatives are noted in the HPI, all other systems are reviewed and negative    PMH:  Past Medical History:   Diagnosis Date    Arthritis     rheumatoid    Asthma     H/O medication noncompliance     Hyperlipidemia     Hypertension     Lupus     MS (multiple sclerosis) (HCC)     Multiple sclerosis (HCC)     Nicotine dependence, cigarettes, uncomplicated     Palpitations     SOB (shortness of breath)     SVT (supraventricular tachycardia)     Systemic lupus erythematosus, unspecified (HCC)        Surgical

## 2025-05-20 NOTE — CARE COORDINATION
Advance Care Planning       the patient has appointed the following active healthcare agents:    Primary Decision Maker: gaston calderon - Parent - [phone]    The Patient has the following current code status:    Code Status: Full Code

## 2025-05-20 NOTE — CARE COORDINATION
Case Management Assessment  Initial Evaluation    Date/Time of Evaluation: 5/20/2025 12:08 PM  Assessment Completed by: CATALINO Nava    If patient is discharged prior to next notation, then this note serves as note for discharge by case management.    Patient Name: Li Rojas                   YOB: 1970  Diagnosis: Edema [R60.9]  Cellulitis of lower extremity, unspecified laterality [L03.119]                   Date / Time: 5/19/2025  2:11 PM    Patient Admission Status: Observation   Readmission Risk (Low < 19, Mod (19-27), High > 27): Readmission Risk Score: 11.6    Current PCP: Melvin Cardoso MD  PCP verified by CM? Yes    Chart Reviewed:     yes  History Provided by: Patient  Patient Orientation: Alert and Oriented, Person, Place, Situation    Patient Cognition: Alert    Hospitalization in the last 30 days (Readmission):    If yes, Readmission Assessment in  Navigator will be completed.    Advance Directives:      Code Status: Full Code   Patient's Primary Decision Maker is: Legal Next of Kin    Primary Decision Maker: gaston calderon - Parent - [phone]    Discharge Planning:    Patient lives with: Parent Type of Home: one story    Primary Care Giver: Family  Patient Support Systems include: Parent mother  Current Financial resources:    Current community resources:    Current services prior to admission: None            Current DME:  WC            Type of Home Care services:  active with [facility] for PT/OT/SN will need resume order if going home.     ADLS  Prior functional level: Assistance with the following:, Mobility, Other (see comment) (primarily in wc per pt.)  Current functional level: Other (see comment) (therapy ordered.)    PT AM-PAC:   /24  OT AM-PAC:   /24    Family can provide assistance at DC: Yes    Would you like Case Management to discuss the discharge plan with any other family members/significant others, and if so, who? No  Plans to Return to Present

## 2025-05-20 NOTE — ACP (ADVANCE CARE PLANNING)
Advance Care Planning       The patient has appointed the following active healthcare agents:    Primary Decision Maker: gaston calderon - [facility] - [phone]

## 2025-05-21 LAB
ANION GAP SERPL CALCULATED.3IONS-SCNC: 18 MMOL/L (ref 7–16)
BASOPHILS # BLD: 0 K/UL (ref 0–0.2)
BASOPHILS NFR BLD: 0 % (ref 0–2)
BUN SERPL-MCNC: 23 MG/DL (ref 6–20)
CALCIUM SERPL-MCNC: 10 MG/DL (ref 8.6–10.2)
CHLORIDE SERPL-SCNC: 96 MMOL/L (ref 98–107)
CO2 SERPL-SCNC: 23 MMOL/L (ref 22–29)
CREAT SERPL-MCNC: 0.5 MG/DL (ref 0.5–1)
CRP SERPL HS-MCNC: 39.5 MG/L (ref 0–5)
DATE LAST DOSE: NORMAL
EOSINOPHIL # BLD: 0 K/UL (ref 0.05–0.5)
EOSINOPHILS RELATIVE PERCENT: 0 % (ref 0–6)
ERYTHROCYTE [DISTWIDTH] IN BLOOD BY AUTOMATED COUNT: 15.9 % (ref 11.5–15)
GFR, ESTIMATED: >90 ML/MIN/1.73M2
GLUCOSE BLD-MCNC: 204 MG/DL (ref 74–99)
GLUCOSE BLD-MCNC: 219 MG/DL (ref 74–99)
GLUCOSE BLD-MCNC: 268 MG/DL (ref 74–99)
GLUCOSE BLD-MCNC: 326 MG/DL (ref 74–99)
GLUCOSE SERPL-MCNC: 182 MG/DL (ref 74–99)
HCT VFR BLD AUTO: 43.7 % (ref 34–48)
HGB BLD-MCNC: 13.8 G/DL (ref 11.5–15.5)
LYMPHOCYTES NFR BLD: 1.28 K/UL (ref 1.5–4)
LYMPHOCYTES RELATIVE PERCENT: 9 % (ref 20–42)
MCH RBC QN AUTO: 29.9 PG (ref 26–35)
MCHC RBC AUTO-ENTMCNC: 31.6 G/DL (ref 32–34.5)
MCV RBC AUTO: 94.8 FL (ref 80–99.9)
METAMYELOCYTES ABSOLUTE COUNT: 0.28 K/UL (ref 0–0.12)
METAMYELOCYTES: 2 % (ref 0–1)
MONOCYTES NFR BLD: 0.14 K/UL (ref 0.1–0.95)
MONOCYTES NFR BLD: 1 % (ref 2–12)
NEUTROPHILS NFR BLD: 88 % (ref 43–80)
NEUTS SEG NFR BLD: 12.5 K/UL (ref 1.8–7.3)
PLATELET CONFIRMATION: NORMAL
PLATELET, FLUORESCENCE: 293 K/UL (ref 130–450)
PMV BLD AUTO: 10.4 FL (ref 7–12)
POTASSIUM SERPL-SCNC: 4.5 MMOL/L (ref 3.5–5)
PROCALCITONIN SERPL-MCNC: 0.15 NG/ML (ref 0–0.08)
RBC # BLD AUTO: 4.61 M/UL (ref 3.5–5.5)
RBC # BLD: ABNORMAL 10*6/UL
SODIUM SERPL-SCNC: 137 MMOL/L (ref 132–146)
TME LAST DOSE: NORMAL H
TSH SERPL DL<=0.05 MIU/L-ACNC: 0.87 UIU/ML (ref 0.27–4.2)
VANCOMYCIN DOSE: NORMAL MG
VANCOMYCIN TROUGH SERPL-MCNC: 13.8 UG/ML (ref 5–16)
WBC OTHER # BLD: 14.2 K/UL (ref 4.5–11.5)

## 2025-05-21 PROCEDURE — 6370000000 HC RX 637 (ALT 250 FOR IP): Performed by: INTERNAL MEDICINE

## 2025-05-21 PROCEDURE — 80048 BASIC METABOLIC PNL TOTAL CA: CPT

## 2025-05-21 PROCEDURE — 82962 GLUCOSE BLOOD TEST: CPT

## 2025-05-21 PROCEDURE — 2500000003 HC RX 250 WO HCPCS: Performed by: INTERNAL MEDICINE

## 2025-05-21 PROCEDURE — 99232 SBSQ HOSP IP/OBS MODERATE 35: CPT | Performed by: INTERNAL MEDICINE

## 2025-05-21 PROCEDURE — 1200000000 HC SEMI PRIVATE

## 2025-05-21 PROCEDURE — 97530 THERAPEUTIC ACTIVITIES: CPT | Performed by: PHYSICAL THERAPIST

## 2025-05-21 PROCEDURE — 97165 OT EVAL LOW COMPLEX 30 MIN: CPT

## 2025-05-21 PROCEDURE — 84145 PROCALCITONIN (PCT): CPT

## 2025-05-21 PROCEDURE — 86140 C-REACTIVE PROTEIN: CPT

## 2025-05-21 PROCEDURE — 84443 ASSAY THYROID STIM HORMONE: CPT

## 2025-05-21 PROCEDURE — 97161 PT EVAL LOW COMPLEX 20 MIN: CPT | Performed by: PHYSICAL THERAPIST

## 2025-05-21 PROCEDURE — 80202 ASSAY OF VANCOMYCIN: CPT

## 2025-05-21 PROCEDURE — 6360000002 HC RX W HCPCS: Performed by: INTERNAL MEDICINE

## 2025-05-21 PROCEDURE — 2580000003 HC RX 258: Performed by: INTERNAL MEDICINE

## 2025-05-21 PROCEDURE — 97535 SELF CARE MNGMENT TRAINING: CPT

## 2025-05-21 PROCEDURE — 85025 COMPLETE CBC W/AUTO DIFF WBC: CPT

## 2025-05-21 PROCEDURE — 36415 COLL VENOUS BLD VENIPUNCTURE: CPT

## 2025-05-21 RX ADMIN — PREDNISONE 5 MG: 5 TABLET ORAL at 09:20

## 2025-05-21 RX ADMIN — INSULIN LISPRO 3 UNITS: 100 INJECTION, SOLUTION INTRAVENOUS; SUBCUTANEOUS at 12:11

## 2025-05-21 RX ADMIN — PREDNISONE 5 MG: 5 TABLET ORAL at 22:13

## 2025-05-21 RX ADMIN — BUPRENORPHINE AND NALOXONE 1 FILM: 8; 2 FILM, SOLUBLE BUCCAL; SUBLINGUAL at 09:33

## 2025-05-21 RX ADMIN — FUROSEMIDE 20 MG: 10 INJECTION, SOLUTION INTRAMUSCULAR; INTRAVENOUS at 19:12

## 2025-05-21 RX ADMIN — VANCOMYCIN HYDROCHLORIDE 1250 MG: 10 INJECTION, POWDER, LYOPHILIZED, FOR SOLUTION INTRAVENOUS at 01:14

## 2025-05-21 RX ADMIN — TRAMADOL HYDROCHLORIDE 50 MG: 50 TABLET, COATED ORAL at 09:19

## 2025-05-21 RX ADMIN — TRAMADOL HYDROCHLORIDE 50 MG: 50 TABLET, COATED ORAL at 17:09

## 2025-05-21 RX ADMIN — VANCOMYCIN HYDROCHLORIDE 1250 MG: 10 INJECTION, POWDER, LYOPHILIZED, FOR SOLUTION INTRAVENOUS at 13:34

## 2025-05-21 RX ADMIN — BISACODYL 5 MG: 5 TABLET, COATED ORAL at 09:20

## 2025-05-21 RX ADMIN — TIZANIDINE 4 MG: 4 TABLET ORAL at 09:19

## 2025-05-21 RX ADMIN — POLYETHYLENE GLYCOL 3350 17 G: 17 POWDER, FOR SOLUTION ORAL at 09:19

## 2025-05-21 RX ADMIN — INSULIN LISPRO 1 UNITS: 100 INJECTION, SOLUTION INTRAVENOUS; SUBCUTANEOUS at 22:17

## 2025-05-21 RX ADMIN — TIZANIDINE 4 MG: 4 TABLET ORAL at 13:30

## 2025-05-21 RX ADMIN — BISACODYL 5 MG: 5 TABLET, COATED ORAL at 22:13

## 2025-05-21 RX ADMIN — ATENOLOL 25 MG: 25 TABLET ORAL at 22:13

## 2025-05-21 RX ADMIN — PANTOPRAZOLE SODIUM 40 MG: 40 TABLET, DELAYED RELEASE ORAL at 06:19

## 2025-05-21 RX ADMIN — FUROSEMIDE 20 MG: 10 INJECTION, SOLUTION INTRAMUSCULAR; INTRAVENOUS at 09:19

## 2025-05-21 RX ADMIN — ATENOLOL 25 MG: 25 TABLET ORAL at 09:20

## 2025-05-21 RX ADMIN — Medication 10 ML: at 09:21

## 2025-05-21 RX ADMIN — PIPERACILLIN AND TAZOBACTAM 3375 MG: 3; .375 INJECTION, POWDER, LYOPHILIZED, FOR SOLUTION INTRAVENOUS at 22:45

## 2025-05-21 RX ADMIN — LORAZEPAM 0.5 MG: 0.5 TABLET ORAL at 09:20

## 2025-05-21 RX ADMIN — LORAZEPAM 0.5 MG: 0.5 TABLET ORAL at 22:13

## 2025-05-21 RX ADMIN — INSULIN LISPRO 2 UNITS: 100 INJECTION, SOLUTION INTRAVENOUS; SUBCUTANEOUS at 17:11

## 2025-05-21 RX ADMIN — ENOXAPARIN SODIUM 40 MG: 100 INJECTION SUBCUTANEOUS at 09:20

## 2025-05-21 RX ADMIN — PREDNISONE 5 MG: 5 TABLET ORAL at 13:30

## 2025-05-21 RX ADMIN — TIZANIDINE 4 MG: 4 TABLET ORAL at 22:12

## 2025-05-21 RX ADMIN — PIPERACILLIN AND TAZOBACTAM 3375 MG: 3; .375 INJECTION, POWDER, LYOPHILIZED, FOR SOLUTION INTRAVENOUS at 13:41

## 2025-05-21 RX ADMIN — INSULIN LISPRO 1 UNITS: 100 INJECTION, SOLUTION INTRAVENOUS; SUBCUTANEOUS at 09:25

## 2025-05-21 RX ADMIN — DILTIAZEM HYDROCHLORIDE 240 MG: 240 CAPSULE, COATED, EXTENDED RELEASE ORAL at 09:19

## 2025-05-21 RX ADMIN — PIPERACILLIN AND TAZOBACTAM 3375 MG: 3; .375 INJECTION, POWDER, LYOPHILIZED, FOR SOLUTION INTRAVENOUS at 06:17

## 2025-05-21 RX ADMIN — BUPRENORPHINE AND NALOXONE 1 FILM: 8; 2 FILM, SOLUBLE BUCCAL; SUBLINGUAL at 22:51

## 2025-05-21 ASSESSMENT — PAIN SCALES - GENERAL
PAINLEVEL_OUTOF10: 8
PAINLEVEL_OUTOF10: 8
PAINLEVEL_OUTOF10: 9
PAINLEVEL_OUTOF10: 8

## 2025-05-21 ASSESSMENT — PAIN DESCRIPTION - ORIENTATION
ORIENTATION: LEFT;MID;RIGHT
ORIENTATION: MID;LOWER
ORIENTATION: RIGHT;LEFT

## 2025-05-21 ASSESSMENT — PAIN DESCRIPTION - PAIN TYPE: TYPE: CHRONIC PAIN

## 2025-05-21 ASSESSMENT — PAIN DESCRIPTION - LOCATION
LOCATION: BACK
LOCATION: LEG;BACK
LOCATION: BACK

## 2025-05-21 ASSESSMENT — PAIN DESCRIPTION - DESCRIPTORS
DESCRIPTORS: ACHING
DESCRIPTORS: ACHING
DESCRIPTORS: THROBBING

## 2025-05-21 NOTE — PLAN OF CARE
Problem: Skin/Tissue Integrity  Goal: Skin integrity remains intact  5/21/2025 0014 by Lily Pltaa, RN  Outcome: Progressing  5/20/2025 1727 by Mitul Dewey, RN  Outcome: Progressing

## 2025-05-22 LAB
ANION GAP SERPL CALCULATED.3IONS-SCNC: 11 MMOL/L (ref 7–16)
BASOPHILS # BLD: 0 K/UL (ref 0–0.2)
BASOPHILS NFR BLD: 0 % (ref 0–2)
BUN SERPL-MCNC: 20 MG/DL (ref 6–20)
CALCIUM SERPL-MCNC: 9.5 MG/DL (ref 8.6–10.2)
CHLORIDE SERPL-SCNC: 94 MMOL/L (ref 98–107)
CO2 SERPL-SCNC: 28 MMOL/L (ref 22–29)
CREAT SERPL-MCNC: 0.6 MG/DL (ref 0.5–1)
EOSINOPHIL # BLD: 0 K/UL (ref 0.05–0.5)
EOSINOPHILS RELATIVE PERCENT: 0 % (ref 0–6)
ERYTHROCYTE [DISTWIDTH] IN BLOOD BY AUTOMATED COUNT: 15.6 % (ref 11.5–15)
GFR, ESTIMATED: >90 ML/MIN/1.73M2
GLUCOSE BLD-MCNC: 156 MG/DL (ref 74–99)
GLUCOSE BLD-MCNC: 223 MG/DL (ref 74–99)
GLUCOSE BLD-MCNC: 228 MG/DL (ref 74–99)
GLUCOSE BLD-MCNC: 286 MG/DL (ref 74–99)
GLUCOSE SERPL-MCNC: 158 MG/DL (ref 74–99)
HCT VFR BLD AUTO: 40.5 % (ref 34–48)
HGB BLD-MCNC: 13 G/DL (ref 11.5–15.5)
LYMPHOCYTES NFR BLD: 1.05 K/UL (ref 1.5–4)
LYMPHOCYTES RELATIVE PERCENT: 10 % (ref 20–42)
MCH RBC QN AUTO: 30.2 PG (ref 26–35)
MCHC RBC AUTO-ENTMCNC: 32.1 G/DL (ref 32–34.5)
MCV RBC AUTO: 94.2 FL (ref 80–99.9)
METAMYELOCYTES ABSOLUTE COUNT: 0.1 K/UL (ref 0–0.12)
METAMYELOCYTES: 1 % (ref 0–1)
MONOCYTES NFR BLD: 0.38 K/UL (ref 0.1–0.95)
MONOCYTES NFR BLD: 3 % (ref 2–12)
MYELOCYTES ABSOLUTE COUNT: 0.29 K/UL
MYELOCYTES: 3 %
NEUTROPHILS NFR BLD: 84 % (ref 43–80)
NEUTS SEG NFR BLD: 9.28 K/UL (ref 1.8–7.3)
PLATELET # BLD AUTO: 266 K/UL (ref 130–450)
PMV BLD AUTO: 10.2 FL (ref 7–12)
POTASSIUM SERPL-SCNC: 2.9 MMOL/L (ref 3.5–5)
RBC # BLD AUTO: 4.3 M/UL (ref 3.5–5.5)
RBC # BLD: NORMAL 10*6/UL
SODIUM SERPL-SCNC: 133 MMOL/L (ref 132–146)
WBC OTHER # BLD: 11.1 K/UL (ref 4.5–11.5)

## 2025-05-22 PROCEDURE — 82962 GLUCOSE BLOOD TEST: CPT

## 2025-05-22 PROCEDURE — 6370000000 HC RX 637 (ALT 250 FOR IP): Performed by: INTERNAL MEDICINE

## 2025-05-22 PROCEDURE — 6360000002 HC RX W HCPCS: Performed by: INTERNAL MEDICINE

## 2025-05-22 PROCEDURE — 87040 BLOOD CULTURE FOR BACTERIA: CPT

## 2025-05-22 PROCEDURE — 1200000000 HC SEMI PRIVATE

## 2025-05-22 PROCEDURE — 80048 BASIC METABOLIC PNL TOTAL CA: CPT

## 2025-05-22 PROCEDURE — 2500000003 HC RX 250 WO HCPCS: Performed by: INTERNAL MEDICINE

## 2025-05-22 PROCEDURE — 87070 CULTURE OTHR SPECIMN AEROBIC: CPT

## 2025-05-22 PROCEDURE — 2580000003 HC RX 258: Performed by: INTERNAL MEDICINE

## 2025-05-22 PROCEDURE — 97110 THERAPEUTIC EXERCISES: CPT

## 2025-05-22 PROCEDURE — 36415 COLL VENOUS BLD VENIPUNCTURE: CPT

## 2025-05-22 PROCEDURE — 99233 SBSQ HOSP IP/OBS HIGH 50: CPT | Performed by: INTERNAL MEDICINE

## 2025-05-22 PROCEDURE — 87205 SMEAR GRAM STAIN: CPT

## 2025-05-22 PROCEDURE — 85025 COMPLETE CBC W/AUTO DIFF WBC: CPT

## 2025-05-22 PROCEDURE — 87077 CULTURE AEROBIC IDENTIFY: CPT

## 2025-05-22 RX ORDER — SODIUM CHLORIDE AND POTASSIUM CHLORIDE 300; 900 MG/100ML; MG/100ML
INJECTION, SOLUTION INTRAVENOUS CONTINUOUS
Status: DISPENSED | OUTPATIENT
Start: 2025-05-22 | End: 2025-05-23

## 2025-05-22 RX ORDER — POTASSIUM CHLORIDE 1500 MG/1
40 TABLET, EXTENDED RELEASE ORAL ONCE
Status: COMPLETED | OUTPATIENT
Start: 2025-05-22 | End: 2025-05-22

## 2025-05-22 RX ORDER — POTASSIUM CHLORIDE 7.45 MG/ML
10 INJECTION INTRAVENOUS
Status: DISCONTINUED | OUTPATIENT
Start: 2025-05-22 | End: 2025-05-22

## 2025-05-22 RX ADMIN — ENOXAPARIN SODIUM 40 MG: 100 INJECTION SUBCUTANEOUS at 09:04

## 2025-05-22 RX ADMIN — POLYETHYLENE GLYCOL 3350 17 G: 17 POWDER, FOR SOLUTION ORAL at 09:05

## 2025-05-22 RX ADMIN — POTASSIUM CHLORIDE 10 MEQ: 7.46 INJECTION, SOLUTION INTRAVENOUS at 12:16

## 2025-05-22 RX ADMIN — TRAMADOL HYDROCHLORIDE 50 MG: 50 TABLET, COATED ORAL at 09:13

## 2025-05-22 RX ADMIN — DILTIAZEM HYDROCHLORIDE 240 MG: 240 CAPSULE, COATED, EXTENDED RELEASE ORAL at 09:05

## 2025-05-22 RX ADMIN — INSULIN LISPRO 2 UNITS: 100 INJECTION, SOLUTION INTRAVENOUS; SUBCUTANEOUS at 12:08

## 2025-05-22 RX ADMIN — ATENOLOL 25 MG: 25 TABLET ORAL at 09:06

## 2025-05-22 RX ADMIN — VANCOMYCIN HYDROCHLORIDE 1250 MG: 10 INJECTION, POWDER, LYOPHILIZED, FOR SOLUTION INTRAVENOUS at 13:56

## 2025-05-22 RX ADMIN — FUROSEMIDE 20 MG: 10 INJECTION, SOLUTION INTRAMUSCULAR; INTRAVENOUS at 09:04

## 2025-05-22 RX ADMIN — BISACODYL 5 MG: 5 TABLET, COATED ORAL at 21:55

## 2025-05-22 RX ADMIN — PREDNISONE 5 MG: 5 TABLET ORAL at 13:52

## 2025-05-22 RX ADMIN — PREDNISONE 5 MG: 5 TABLET ORAL at 21:56

## 2025-05-22 RX ADMIN — ATENOLOL 25 MG: 25 TABLET ORAL at 21:55

## 2025-05-22 RX ADMIN — PANTOPRAZOLE SODIUM 40 MG: 40 TABLET, DELAYED RELEASE ORAL at 06:40

## 2025-05-22 RX ADMIN — POTASSIUM CHLORIDE AND SODIUM CHLORIDE: 900; 300 INJECTION, SOLUTION INTRAVENOUS at 15:46

## 2025-05-22 RX ADMIN — TRAMADOL HYDROCHLORIDE 50 MG: 50 TABLET, COATED ORAL at 22:02

## 2025-05-22 RX ADMIN — POTASSIUM CHLORIDE 40 MEQ: 1500 TABLET, EXTENDED RELEASE ORAL at 12:12

## 2025-05-22 RX ADMIN — PIPERACILLIN AND TAZOBACTAM 3375 MG: 3; .375 INJECTION, POWDER, LYOPHILIZED, FOR SOLUTION INTRAVENOUS at 06:40

## 2025-05-22 RX ADMIN — LORAZEPAM 0.5 MG: 0.5 TABLET ORAL at 21:56

## 2025-05-22 RX ADMIN — BISACODYL 5 MG: 5 TABLET, COATED ORAL at 09:04

## 2025-05-22 RX ADMIN — BUPRENORPHINE AND NALOXONE 1 FILM: 8; 2 FILM, SOLUBLE BUCCAL; SUBLINGUAL at 09:07

## 2025-05-22 RX ADMIN — PREDNISONE 5 MG: 5 TABLET ORAL at 09:05

## 2025-05-22 RX ADMIN — TIZANIDINE 4 MG: 4 TABLET ORAL at 02:02

## 2025-05-22 RX ADMIN — TIZANIDINE 4 MG: 4 TABLET ORAL at 09:05

## 2025-05-22 RX ADMIN — Medication 10 ML: at 09:06

## 2025-05-22 RX ADMIN — POTASSIUM CHLORIDE 40 MEQ: 1500 TABLET, EXTENDED RELEASE ORAL at 14:53

## 2025-05-22 RX ADMIN — Medication 10 ML: at 21:58

## 2025-05-22 RX ADMIN — FUROSEMIDE 20 MG: 10 INJECTION, SOLUTION INTRAMUSCULAR; INTRAVENOUS at 18:17

## 2025-05-22 RX ADMIN — TIZANIDINE 4 MG: 4 TABLET ORAL at 13:52

## 2025-05-22 RX ADMIN — INSULIN LISPRO 1 UNITS: 100 INJECTION, SOLUTION INTRAVENOUS; SUBCUTANEOUS at 17:07

## 2025-05-22 RX ADMIN — TIZANIDINE 4 MG: 4 TABLET ORAL at 21:56

## 2025-05-22 RX ADMIN — VANCOMYCIN HYDROCHLORIDE 1250 MG: 10 INJECTION, POWDER, LYOPHILIZED, FOR SOLUTION INTRAVENOUS at 00:04

## 2025-05-22 RX ADMIN — TRAMADOL HYDROCHLORIDE 50 MG: 50 TABLET, COATED ORAL at 14:55

## 2025-05-22 RX ADMIN — INSULIN LISPRO 1 UNITS: 100 INJECTION, SOLUTION INTRAVENOUS; SUBCUTANEOUS at 21:51

## 2025-05-22 RX ADMIN — LORAZEPAM 0.5 MG: 0.5 TABLET ORAL at 09:06

## 2025-05-22 RX ADMIN — BUPRENORPHINE AND NALOXONE 1 FILM: 8; 2 FILM, SOLUBLE BUCCAL; SUBLINGUAL at 21:53

## 2025-05-22 ASSESSMENT — PAIN SCALES - GENERAL
PAINLEVEL_OUTOF10: 9
PAINLEVEL_OUTOF10: 8
PAINLEVEL_OUTOF10: 6
PAINLEVEL_OUTOF10: 8

## 2025-05-22 ASSESSMENT — PAIN DESCRIPTION - LOCATION
LOCATION: LEG;BACK
LOCATION: BACK

## 2025-05-22 ASSESSMENT — PAIN DESCRIPTION - ORIENTATION
ORIENTATION: MID
ORIENTATION: RIGHT;LEFT
ORIENTATION: MID

## 2025-05-22 ASSESSMENT — PAIN DESCRIPTION - DESCRIPTORS
DESCRIPTORS: ACHING;DISCOMFORT;THROBBING
DESCRIPTORS: ACHING;DISCOMFORT;THROBBING
DESCRIPTORS: CRAMPING;SPASM

## 2025-05-22 NOTE — FLOWSHEET NOTE
Inpatient Wound Care (initial consult 437-1)    Admit Date: 5/19/2025  2:11 PM    Reason for consult:  wounds    Significant history:  Per H&P  HPI:   This is a 55 y.o. female  has a past medical history of Arthritis, Asthma, H/O medication noncompliance, Hyperlipidemia, Hypertension, Lupus, MS (multiple sclerosis) ([facility]), Multiple sclerosis (HCC), Nicotine dependence, cigarettes, uncomplicated, Palpitations, SOB (shortness of breath), SVT (supraventricular tachycardia), and Systemic lupus erythematosus, unspecified ([facility]). presented with Leg swelling, pain, difficulty in walking  for last few weeks prior to arrival to the hospital.  She has bilateral lower extremity cellulitis and gram-negative bacteremia, treated with antibiotics, and discharged on Augmentin. She now reports worsening leg pain and swelling, making it difficult to walk. The patient was seen and examined at bedside, appears alert and awake with no acute distress and is able to answer simple  questions. On direct questioning, patient denied any  resting ongoing chest pain, resting SOB, hemoptysis, productive cough, fever, ongoing palpitation, active abdominal pain, hematemesis, rectal bleeding, zabrina, hematuria, any other  and GI complaints, and any new focal neuro deficits.       Findings:     05/22/25 1355   Skin Integumentary    Skin Integrity Redness   Location bilateral buttocks, bilateral elbows   Skin Integumentary (WDL) X   Skin Integrity Site 2   Skin Integrity Location 2 Excoriation;Redness   Location 2 groin/julienne area   Skin Integrity Site 3   Skin Integrity Location 3 Ecchymosis    Location 3 BUE, abd   Wound 04/24/25 Buttocks Right   Date First Assessed/Time First Assessed: 04/24/25 1430   Present on Original Admission: Yes  Location: Buttocks  Wound Location Orientation: Right   Wound Image    Wound Etiology Skin Tear   Dressing Status New dressing applied   Wound Cleansed Cleansed with saline   Dressing/Treatment Xeroform;Silicone 
No

## 2025-05-22 NOTE — CONSULTS
the patient and the key elements of the encounter have been performed by me. I agree with the assessment, plan and orders as documented.      Treatment plan as per my recommendation     Leandro Tello MD, FACP  KALLI  5/23/2025  1:06 PM

## 2025-05-22 NOTE — CARE COORDINATION
Ss note: 5/22/2025 2:31 PM Per IDR ID consulted today. PTA pt resides with her mother in one story home. Pt with hx of MS. Follow up visit to room to continue to discuss transition of care plan. Pt relays if she needs IV abx family cannot accommodate at home. SW has been discussing and providing possible SNF options and sw informed pt that barrier to placement is suboxone and pts caresource [place] medicaid. SW had provided POSSIBLE SNF options yesterday and pt is very concerned with star ratings of facilities. Yesterday sw  offered [facility] as possible SNF option however liaison relays no beds available and none coming open anytime soon. SW offered [facility], [facility], [facility] or possible [facility] today. Pt wishes to discuss with her family and will let sw know decision in AM. Will need updated therapy notes, PRECERT, Hens and signed CARLA  IF SNF is the plan.PTA  Pt is Active with [facility] for PT/OT/SN. Sw will follow. CATALINO Solomon

## 2025-05-23 ENCOUNTER — APPOINTMENT (OUTPATIENT)
Age: 55
DRG: 720 | End: 2025-05-23
Payer: COMMERCIAL

## 2025-05-23 LAB
ACB COMPLEX DNA BLD POS QL NAA+NON-PROBE: NOT DETECTED
ANION GAP SERPL CALCULATED.3IONS-SCNC: 13 MMOL/L (ref 7–16)
ASO AB SERPL-ACNC: <20 IU/ML (ref 0–200)
B FRAGILIS DNA BLD POS QL NAA+NON-PROBE: NOT DETECTED
BASOPHILS # BLD: 0 K/UL (ref 0–0.2)
BASOPHILS NFR BLD: 0 % (ref 0–2)
BIOFIRE TEST COMMENT: ABNORMAL
BUN SERPL-MCNC: 22 MG/DL (ref 6–20)
C ALBICANS DNA BLD POS QL NAA+NON-PROBE: NOT DETECTED
C AURIS DNA BLD POS QL NAA+NON-PROBE: NOT DETECTED
C GATTII+NEOFOR DNA BLD POS QL NAA+N-PRB: NOT DETECTED
C GLABRATA DNA BLD POS QL NAA+NON-PROBE: NOT DETECTED
C KRUSEI DNA BLD POS QL NAA+NON-PROBE: NOT DETECTED
C PARAP DNA BLD POS QL NAA+NON-PROBE: NOT DETECTED
C TROPICLS DNA BLD POS QL NAA+NON-PROBE: NOT DETECTED
CALCIUM SERPL-MCNC: 9.6 MG/DL (ref 8.6–10.2)
CHLORIDE SERPL-SCNC: 98 MMOL/L (ref 98–107)
CO2 SERPL-SCNC: 26 MMOL/L (ref 22–29)
CREAT SERPL-MCNC: 0.4 MG/DL (ref 0.5–1)
E CLOAC COMP DNA BLD POS NAA+NON-PROBE: NOT DETECTED
E COLI DNA BLD POS QL NAA+NON-PROBE: NOT DETECTED
E FAECALIS DNA BLD POS QL NAA+NON-PROBE: NOT DETECTED
E FAECIUM DNA BLD POS QL NAA+NON-PROBE: NOT DETECTED
ECHO AO ASC DIAM: 2.8 CM
ECHO AO ASCENDING AORTA INDEX: 1.33 CM/M2
ECHO AV AREA PEAK VELOCITY: 3.3 CM2
ECHO AV AREA VTI: 3.6 CM2
ECHO AV AREA/BSA PEAK VELOCITY: 1.6 CM2/M2
ECHO AV AREA/BSA VTI: 1.7 CM2/M2
ECHO AV CUSP MM: 2 CM
ECHO AV MEAN GRADIENT: 3 MMHG
ECHO AV MEAN VELOCITY: 0.8 M/S
ECHO AV PEAK GRADIENT: 7 MMHG
ECHO AV PEAK VELOCITY: 1.3 M/S
ECHO AV VELOCITY RATIO: 0.85
ECHO AV VTI: 24.8 CM
ECHO BSA: 2.15 M2
ECHO EST RA PRESSURE: 3 MMHG
ECHO LA DIAMETER INDEX: 1.52 CM/M2
ECHO LA DIAMETER: 3.2 CM
ECHO LA VOL A-L A2C: 53 ML (ref 22–52)
ECHO LA VOL A-L A4C: 52 ML (ref 22–52)
ECHO LA VOL BP: 46 ML (ref 22–52)
ECHO LA VOL MOD A2C: 48 ML (ref 22–52)
ECHO LA VOL MOD A4C: 44 ML (ref 22–52)
ECHO LA VOL/BSA BIPLANE: 22 ML/M2 (ref 16–34)
ECHO LA VOLUME AREA LENGTH: 52 ML
ECHO LA VOLUME INDEX A-L A2C: 25 ML/M2 (ref 16–34)
ECHO LA VOLUME INDEX A-L A4C: 25 ML/M2 (ref 16–34)
ECHO LA VOLUME INDEX AREA LENGTH: 25 ML/M2 (ref 16–34)
ECHO LA VOLUME INDEX MOD A2C: 23 ML/M2 (ref 16–34)
ECHO LA VOLUME INDEX MOD A4C: 21 ML/M2 (ref 16–34)
ECHO LV EDV A2C: 68 ML
ECHO LV EDV A4C: 96 ML
ECHO LV EDV BP: 88 ML (ref 56–104)
ECHO LV EDV INDEX A4C: 46 ML/M2
ECHO LV EDV INDEX BP: 42 ML/M2
ECHO LV EDV NDEX A2C: 32 ML/M2
ECHO LV EF PHYSICIAN: 55 %
ECHO LV EJECTION FRACTION A2C: 55 %
ECHO LV EJECTION FRACTION A4C: 68 %
ECHO LV EJECTION FRACTION BIPLANE: 64 % (ref 55–100)
ECHO LV ESV A2C: 31 ML
ECHO LV ESV A4C: 31 ML
ECHO LV ESV BP: 32 ML (ref 19–49)
ECHO LV ESV INDEX A2C: 15 ML/M2
ECHO LV ESV INDEX A4C: 15 ML/M2
ECHO LV ESV INDEX BP: 15 ML/M2
ECHO LV FRACTIONAL SHORTENING: 30 % (ref 28–44)
ECHO LV INTERNAL DIMENSION DIASTOLE INDEX: 1.76 CM/M2
ECHO LV INTERNAL DIMENSION DIASTOLIC: 3.7 CM (ref 3.9–5.3)
ECHO LV INTERNAL DIMENSION SYSTOLIC INDEX: 1.24 CM/M2
ECHO LV INTERNAL DIMENSION SYSTOLIC: 2.6 CM
ECHO LV ISOVOLUMETRIC RELAXATION TIME (IVRT): 72.3 MS
ECHO LV IVSD: 1.1 CM (ref 0.6–0.9)
ECHO LV IVSS: 1.6 CM
ECHO LV MASS 2D: 129.3 G (ref 67–162)
ECHO LV MASS INDEX 2D: 61.6 G/M2 (ref 43–95)
ECHO LV POSTERIOR WALL DIASTOLIC: 1.1 CM (ref 0.6–0.9)
ECHO LV POSTERIOR WALL SYSTOLIC: 1.6 CM
ECHO LV RELATIVE WALL THICKNESS RATIO: 0.59
ECHO LVOT AREA: 3.8 CM2
ECHO LVOT AV VTI INDEX: 0.96
ECHO LVOT DIAM: 2.2 CM
ECHO LVOT MEAN GRADIENT: 2 MMHG
ECHO LVOT PEAK GRADIENT: 5 MMHG
ECHO LVOT PEAK VELOCITY: 1.1 M/S
ECHO LVOT STROKE VOLUME INDEX: 43.2 ML/M2
ECHO LVOT SV: 90.8 ML
ECHO LVOT VTI: 23.9 CM
ECHO MV "A" WAVE DURATION: 152.2 MSEC
ECHO MV A VELOCITY: 0.59 M/S
ECHO MV AREA PHT: 5.4 CM2
ECHO MV AREA VTI: 3.1 CM2
ECHO MV E DECELERATION TIME (DT): 218.5 MS
ECHO MV E VELOCITY: 0.94 M/S
ECHO MV E/A RATIO: 1.59
ECHO MV LVOT VTI INDEX: 1.24
ECHO MV MAX VELOCITY: 1 M/S
ECHO MV MEAN GRADIENT: 1 MMHG
ECHO MV MEAN VELOCITY: 0.5 M/S
ECHO MV PEAK GRADIENT: 4 MMHG
ECHO MV PRESSURE HALF TIME (PHT): 40.5 MS
ECHO MV VTI: 29.7 CM
ECHO PV MAX VELOCITY: 1 M/S
ECHO PV MEAN GRADIENT: 2 MMHG
ECHO PV MEAN VELOCITY: 0.6 M/S
ECHO PV PEAK GRADIENT: 4 MMHG
ECHO PV VTI: 19.5 CM
ECHO RIGHT VENTRICULAR SYSTOLIC PRESSURE (RVSP): 8 MMHG
ECHO RV INTERNAL DIMENSION: 2.9 CM
ECHO RV LONGITUDINAL DIMENSION: 4.4 CM
ECHO RV MID DIMENSION: 1.6 CM
ECHO RVOT MEAN GRADIENT: 1 MMHG
ECHO RVOT PEAK GRADIENT: 1 MMHG
ECHO RVOT PEAK VELOCITY: 0.6 M/S
ECHO RVOT VTI: 10 CM
ECHO TV REGURGITANT MAX VELOCITY: 1.12 M/S
ECHO TV REGURGITANT PEAK GRADIENT: 5 MMHG
ENTEROBACTERALES DNA BLD POS NAA+N-PRB: NOT DETECTED
EOSINOPHIL # BLD: 0 K/UL (ref 0.05–0.5)
EOSINOPHILS RELATIVE PERCENT: 0 % (ref 0–6)
ERYTHROCYTE [DISTWIDTH] IN BLOOD BY AUTOMATED COUNT: 15.4 % (ref 11.5–15)
GFR, ESTIMATED: >90 ML/MIN/1.73M2
GLUCOSE BLD-MCNC: 178 MG/DL (ref 74–99)
GLUCOSE BLD-MCNC: 258 MG/DL (ref 74–99)
GLUCOSE BLD-MCNC: 281 MG/DL (ref 74–99)
GLUCOSE BLD-MCNC: 283 MG/DL (ref 74–99)
GLUCOSE SERPL-MCNC: 179 MG/DL (ref 74–99)
GP B STREP DNA BLD POS QL NAA+NON-PROBE: NOT DETECTED
HAEM INFLU DNA BLD POS QL NAA+NON-PROBE: NOT DETECTED
HCT VFR BLD AUTO: 39.8 % (ref 34–48)
HGB BLD-MCNC: 12.5 G/DL (ref 11.5–15.5)
K OXYTOCA DNA BLD POS QL NAA+NON-PROBE: NOT DETECTED
KLEBSIELLA SP DNA BLD POS QL NAA+NON-PRB: NOT DETECTED
KLEBSIELLA SP DNA BLD POS QL NAA+NON-PRB: NOT DETECTED
L MONOCYTOG DNA BLD POS QL NAA+NON-PROBE: NOT DETECTED
LYMPHOCYTES NFR BLD: 1.43 K/UL (ref 1.5–4)
LYMPHOCYTES RELATIVE PERCENT: 13 % (ref 20–42)
MCH RBC QN AUTO: 29.8 PG (ref 26–35)
MCHC RBC AUTO-ENTMCNC: 31.4 G/DL (ref 32–34.5)
MCV RBC AUTO: 94.8 FL (ref 80–99.9)
MICROORGANISM SPEC CULT: ABNORMAL
MICROORGANISM/AGENT SPEC: ABNORMAL
MONOCYTES NFR BLD: 0.1 K/UL (ref 0.1–0.95)
MONOCYTES NFR BLD: 1 % (ref 2–12)
N MEN DNA BLD POS QL NAA+NON-PROBE: NOT DETECTED
NEUTROPHILS NFR BLD: 86 % (ref 43–80)
NEUTS SEG NFR BLD: 9.47 K/UL (ref 1.8–7.3)
P AERUGINOSA DNA BLD POS NAA+NON-PROBE: NOT DETECTED
PLATELET # BLD AUTO: 265 K/UL (ref 130–450)
PMV BLD AUTO: 10.3 FL (ref 7–12)
POTASSIUM SERPL-SCNC: 4.2 MMOL/L (ref 3.5–5)
PROTEUS SP DNA BLD POS QL NAA+NON-PROBE: NOT DETECTED
RBC # BLD AUTO: 4.2 M/UL (ref 3.5–5.5)
RBC # BLD: ABNORMAL 10*6/UL
S AUREUS DNA BLD POS QL NAA+NON-PROBE: NOT DETECTED
S AUREUS+CONS DNA BLD POS NAA+NON-PROBE: DETECTED
S EPIDERMIDIS DNA BLD POS QL NAA+NON-PRB: NOT DETECTED
S LUGDUNENSIS DNA BLD POS QL NAA+NON-PRB: NOT DETECTED
S MALTOPHILIA DNA BLD POS QL NAA+NON-PRB: NOT DETECTED
S MARCESCENS DNA BLD POS NAA+NON-PROBE: NOT DETECTED
S PNEUM DNA BLD POS QL NAA+NON-PROBE: NOT DETECTED
S PYO DNA BLD POS QL NAA+NON-PROBE: NOT DETECTED
SALMONELLA DNA BLD POS QL NAA+NON-PROBE: NOT DETECTED
SERVICE CMNT-IMP: ABNORMAL
SODIUM SERPL-SCNC: 137 MMOL/L (ref 132–146)
SPECIMEN DESCRIPTION: ABNORMAL
STREPTOCOCCUS DNA BLD POS NAA+NON-PROBE: NOT DETECTED
WBC OTHER # BLD: 11 K/UL (ref 4.5–11.5)

## 2025-05-23 PROCEDURE — 80048 BASIC METABOLIC PNL TOTAL CA: CPT

## 2025-05-23 PROCEDURE — 36415 COLL VENOUS BLD VENIPUNCTURE: CPT

## 2025-05-23 PROCEDURE — 97535 SELF CARE MNGMENT TRAINING: CPT

## 2025-05-23 PROCEDURE — 1200000000 HC SEMI PRIVATE

## 2025-05-23 PROCEDURE — 2580000003 HC RX 258: Performed by: CLINICAL NURSE SPECIALIST

## 2025-05-23 PROCEDURE — 97110 THERAPEUTIC EXERCISES: CPT

## 2025-05-23 PROCEDURE — 2580000003 HC RX 258: Performed by: INTERNAL MEDICINE

## 2025-05-23 PROCEDURE — 97530 THERAPEUTIC ACTIVITIES: CPT

## 2025-05-23 PROCEDURE — 6360000002 HC RX W HCPCS: Performed by: INTERNAL MEDICINE

## 2025-05-23 PROCEDURE — 82962 GLUCOSE BLOOD TEST: CPT

## 2025-05-23 PROCEDURE — 87081 CULTURE SCREEN ONLY: CPT

## 2025-05-23 PROCEDURE — 2500000003 HC RX 250 WO HCPCS: Performed by: INTERNAL MEDICINE

## 2025-05-23 PROCEDURE — 6370000000 HC RX 637 (ALT 250 FOR IP): Performed by: INTERNAL MEDICINE

## 2025-05-23 PROCEDURE — 99232 SBSQ HOSP IP/OBS MODERATE 35: CPT | Performed by: INTERNAL MEDICINE

## 2025-05-23 PROCEDURE — 85025 COMPLETE CBC W/AUTO DIFF WBC: CPT

## 2025-05-23 PROCEDURE — 93306 TTE W/DOPPLER COMPLETE: CPT

## 2025-05-23 PROCEDURE — 93306 TTE W/DOPPLER COMPLETE: CPT | Performed by: INTERNAL MEDICINE

## 2025-05-23 PROCEDURE — 6360000002 HC RX W HCPCS: Performed by: CLINICAL NURSE SPECIALIST

## 2025-05-23 PROCEDURE — 86063 ANTISTREPTOLYSIN O SCREEN: CPT

## 2025-05-23 RX ADMIN — BISACODYL 5 MG: 5 TABLET, COATED ORAL at 10:31

## 2025-05-23 RX ADMIN — ENOXAPARIN SODIUM 40 MG: 100 INJECTION SUBCUTANEOUS at 10:30

## 2025-05-23 RX ADMIN — ATENOLOL 25 MG: 25 TABLET ORAL at 20:42

## 2025-05-23 RX ADMIN — TIZANIDINE 4 MG: 4 TABLET ORAL at 14:11

## 2025-05-23 RX ADMIN — LORAZEPAM 0.5 MG: 0.5 TABLET ORAL at 20:42

## 2025-05-23 RX ADMIN — LORAZEPAM 0.5 MG: 0.5 TABLET ORAL at 10:30

## 2025-05-23 RX ADMIN — ATENOLOL 25 MG: 25 TABLET ORAL at 10:31

## 2025-05-23 RX ADMIN — PREDNISONE 5 MG: 5 TABLET ORAL at 20:42

## 2025-05-23 RX ADMIN — INSULIN LISPRO 2 UNITS: 100 INJECTION, SOLUTION INTRAVENOUS; SUBCUTANEOUS at 20:49

## 2025-05-23 RX ADMIN — POLYETHYLENE GLYCOL 3350 17 G: 17 POWDER, FOR SOLUTION ORAL at 10:31

## 2025-05-23 RX ADMIN — BUPRENORPHINE AND NALOXONE 1 FILM: 8; 2 FILM, SOLUBLE BUCCAL; SUBLINGUAL at 20:41

## 2025-05-23 RX ADMIN — PREDNISONE 5 MG: 5 TABLET ORAL at 14:11

## 2025-05-23 RX ADMIN — FUROSEMIDE 20 MG: 10 INJECTION, SOLUTION INTRAMUSCULAR; INTRAVENOUS at 18:41

## 2025-05-23 RX ADMIN — BUPRENORPHINE AND NALOXONE 1 FILM: 8; 2 FILM, SOLUBLE BUCCAL; SUBLINGUAL at 10:32

## 2025-05-23 RX ADMIN — TIZANIDINE 4 MG: 4 TABLET ORAL at 20:42

## 2025-05-23 RX ADMIN — CEFEPIME 2000 MG: 2 INJECTION, POWDER, FOR SOLUTION INTRAVENOUS at 20:59

## 2025-05-23 RX ADMIN — VANCOMYCIN HYDROCHLORIDE 1250 MG: 10 INJECTION, POWDER, LYOPHILIZED, FOR SOLUTION INTRAVENOUS at 00:36

## 2025-05-23 RX ADMIN — CEFEPIME 2000 MG: 2 INJECTION, POWDER, FOR SOLUTION INTRAVENOUS at 14:17

## 2025-05-23 RX ADMIN — TRAMADOL HYDROCHLORIDE 50 MG: 50 TABLET, COATED ORAL at 16:51

## 2025-05-23 RX ADMIN — TIZANIDINE 4 MG: 4 TABLET ORAL at 10:31

## 2025-05-23 RX ADMIN — TRAMADOL HYDROCHLORIDE 50 MG: 50 TABLET, COATED ORAL at 23:32

## 2025-05-23 RX ADMIN — TIZANIDINE 4 MG: 4 TABLET ORAL at 02:13

## 2025-05-23 RX ADMIN — Medication 10 ML: at 20:42

## 2025-05-23 RX ADMIN — PANTOPRAZOLE SODIUM 40 MG: 40 TABLET, DELAYED RELEASE ORAL at 05:23

## 2025-05-23 RX ADMIN — FUROSEMIDE 20 MG: 10 INJECTION, SOLUTION INTRAMUSCULAR; INTRAVENOUS at 10:31

## 2025-05-23 RX ADMIN — PREDNISONE 5 MG: 5 TABLET ORAL at 10:30

## 2025-05-23 RX ADMIN — BISACODYL 5 MG: 5 TABLET, COATED ORAL at 20:42

## 2025-05-23 RX ADMIN — INSULIN LISPRO 2 UNITS: 100 INJECTION, SOLUTION INTRAVENOUS; SUBCUTANEOUS at 16:50

## 2025-05-23 RX ADMIN — Medication 10 ML: at 10:32

## 2025-05-23 RX ADMIN — INSULIN LISPRO 2 UNITS: 100 INJECTION, SOLUTION INTRAVENOUS; SUBCUTANEOUS at 12:24

## 2025-05-23 RX ADMIN — TRAMADOL HYDROCHLORIDE 50 MG: 50 TABLET, COATED ORAL at 10:30

## 2025-05-23 RX ADMIN — DILTIAZEM HYDROCHLORIDE 240 MG: 240 CAPSULE, COATED, EXTENDED RELEASE ORAL at 10:31

## 2025-05-23 ASSESSMENT — PAIN SCALES - GENERAL
PAINLEVEL_OUTOF10: 9
PAINLEVEL_OUTOF10: 8
PAINLEVEL_OUTOF10: 7
PAINLEVEL_OUTOF10: 8
PAINLEVEL_OUTOF10: 8
PAINLEVEL_OUTOF10: 9

## 2025-05-23 ASSESSMENT — PAIN DESCRIPTION - DESCRIPTORS
DESCRIPTORS: ACHING;DISCOMFORT;THROBBING
DESCRIPTORS: ACHING;STABBING

## 2025-05-23 ASSESSMENT — PAIN DESCRIPTION - LOCATION
LOCATION: BACK
LOCATION: LEG;BACK
LOCATION: BACK

## 2025-05-23 ASSESSMENT — PAIN DESCRIPTION - ORIENTATION
ORIENTATION: MID
ORIENTATION: MID

## 2025-05-23 NOTE — DISCHARGE INSTR - COC
Continuity of Care Form    Patient Name: Li Rojas   :  1970  MRN:  [id number]    Admit date:  2025  Discharge date:  25    Code Status Order: Full Code   Advance Directives:     Admitting Physician:  Cayetano Paris MD  PCP: Melvin Cardoso MD    Discharging Nurse: ***  Discharging Hospital Unit/Room#: 0437/0437-01  Discharging Unit Phone Number: ***    Emergency Contact:   Extended Emergency Contact Information  Primary Emergency Contact: gaston calderon  Mobile Phone: [phone]  Relation: Parent   needed? No    Past Surgical History:  Past Surgical History:   Procedure Laterality Date    ABDOMEN SURGERY      ANTERIOR CRUCIATE LIGAMENT REPAIR      APPENDECTOMY       SECTION      x5    CHOLECYSTECTOMY      DILATION AND CURETTAGE OF UTERUS      HC INJECTION PROCEDURE FOR SACROILIAC JOINT Left 12/3/2020    LEFT SACROILIAC JOINT STEROID INJECTION UNDER X-RAY GUIDANCE performed by Fauzia Crowell DO at [facility] OR    NERVE BLOCK Left 10/01/2020    NERVE BLOCK Left 10/1/2020    LEFT L4-5 TRANSFORAMINAL EPIDURAL STEROID INJECTION performed by Fauzia Crowell DO at [facility] OR    OTHER SURGICAL HISTORY Left 2020    LEFT SACROILIAC STEROID INJECTION UNDER XRAY GUIDANCE    TUBAL LIGATION         Immunization History:   Immunization History   Administered Date(s) Administered    COVID-19, PFIZER PURPLE top, DILUTE for use, (age 12 y+), 30mcg/0.3mL 2021, 2021       Active Problems:  Patient Active Problem List   Diagnosis Code    SVT (supraventricular tachycardia) I47.10    MS (multiple sclerosis) ([facility]) G35    Sacroiliitis M46.1    Personal history of supraventricular tachycardia Z86.79    Anxiety F41.9    Rheumatoid arthritis ([facility]) M06.9    Osteoarthritis of left hip M16.12    Bilateral lower leg cellulitis L03.116, L03.115    Acute bilateral thoracic back pain M54.6    Edema R60.9    Asthma J45.909    Hypertension I10    Systemic

## 2025-05-23 NOTE — CARE COORDINATION
Ss note:5/23/2025 4:26 PM PRECERT has been obtained for [facility] rehab, valid till 5/31/25. Hens completed. Pts suboxone will need sent with pt to the SNF, pt obtains suboxone from [facility] in [place]. Awaiting final cultures for final abx needs. Will need signed CARLA. CATALINO Solomon

## 2025-05-23 NOTE — CARE COORDINATION
Ss note: 5/23/202512:27 PM Follow up visit to room, pt sitting in chair. ID is on and relays pt will need IV abx at discharge, will need line and final abx orders. Pt is agreeable to [facility] for skilled rehab, per liaison Dixon, pt has been accepted. Sw paged OT for updated therapy note, sw to complete Hens and facility will submit for PRECERT. Pt obtains Suboxone from [facility] [address] in [facility]. PTA resides with her mother. Will need PRECERT and signed CARLA. CATALINO Solomon   yes

## 2025-05-23 NOTE — PLAN OF CARE
Problem: Skin/Tissue Integrity  Goal: Skin integrity remains intact  Description: 1.  Monitor for areas of redness and/or skin breakdown2.  Assess vascular access sites hourly3.  Every 4-6 hours minimum:  Change oxygen saturation probe site4.  Every 4-6 hours:  If on nasal continuous positive airway pressure, respiratory therapy assess nares and determine need for appliance change or resting period  Outcome: Progressing     Problem: Safety - Adult  Goal: Free from fall injury  Outcome: Progressing     Problem: Nutrition Deficit:  Goal: Optimize nutritional status  Outcome: Progressing     Problem: Pain  Goal: Verbalizes/displays adequate comfort level or baseline comfort level  Outcome: Progressing     Problem: Chronic Conditions and Co-morbidities  Goal: Patient's chronic conditions and co-morbidity symptoms are monitored and maintained or improved  Outcome: Progressing

## 2025-05-23 NOTE — CARE COORDINATION
05/23/25 1300   Patient Choice Bypass   Responder Name kandis george   Patient Choice 1   Name [facility]   Address [address]   CMS ID [id number]   LOC Nursing Home/SNF   Choice 1 Provider Phone Number [phone]      note: 5/23/202512:27 PM Follow up visit to room, pt sitting in chair. ID is on and relays pt will need IV abx at discharge, will need line and final abx orders. Pt is agreeable to [facility] for skilled rehab, per liaison Dixon, pt has been accepted. Sw paged OT for updated therapy note, sw to complete Hens and facility will submit for PRECERT. Pt obtains Suboxone from [facility] [address] in [facility]. PTA resides with her mother. Will need PRECERT and signed CARLA. CATALINO Solomon

## 2025-05-24 PROBLEM — F11.21 OPIOID USE DISORDER, MODERATE, IN SUSTAINED REMISSION (HCC): Status: ACTIVE | Noted: 2025-05-24

## 2025-05-24 PROBLEM — R78.81 POSITIVE BLOOD CULTURES: Status: ACTIVE | Noted: 2025-05-24

## 2025-05-24 LAB
GLUCOSE BLD-MCNC: 169 MG/DL (ref 74–99)
GLUCOSE BLD-MCNC: 173 MG/DL (ref 74–99)
GLUCOSE BLD-MCNC: 232 MG/DL (ref 74–99)
GLUCOSE BLD-MCNC: 282 MG/DL (ref 74–99)
MICROORGANISM SPEC CULT: ABNORMAL
MICROORGANISM/AGENT SPEC: ABNORMAL
SERVICE CMNT-IMP: ABNORMAL
SPECIMEN DESCRIPTION: ABNORMAL
SPECIMEN DESCRIPTION: ABNORMAL

## 2025-05-24 PROCEDURE — 97110 THERAPEUTIC EXERCISES: CPT

## 2025-05-24 PROCEDURE — 2500000003 HC RX 250 WO HCPCS: Performed by: INTERNAL MEDICINE

## 2025-05-24 PROCEDURE — 6360000002 HC RX W HCPCS: Performed by: INTERNAL MEDICINE

## 2025-05-24 PROCEDURE — 99232 SBSQ HOSP IP/OBS MODERATE 35: CPT | Performed by: HOSPITALIST

## 2025-05-24 PROCEDURE — 82962 GLUCOSE BLOOD TEST: CPT

## 2025-05-24 PROCEDURE — 6370000000 HC RX 637 (ALT 250 FOR IP): Performed by: INTERNAL MEDICINE

## 2025-05-24 PROCEDURE — 2580000003 HC RX 258: Performed by: CLINICAL NURSE SPECIALIST

## 2025-05-24 PROCEDURE — 1200000000 HC SEMI PRIVATE

## 2025-05-24 PROCEDURE — 6360000002 HC RX W HCPCS: Performed by: CLINICAL NURSE SPECIALIST

## 2025-05-24 RX ADMIN — PREDNISONE 5 MG: 5 TABLET ORAL at 21:53

## 2025-05-24 RX ADMIN — CEFEPIME 2000 MG: 2 INJECTION, POWDER, FOR SOLUTION INTRAVENOUS at 22:06

## 2025-05-24 RX ADMIN — TIZANIDINE 4 MG: 4 TABLET ORAL at 14:30

## 2025-05-24 RX ADMIN — Medication 10 ML: at 22:07

## 2025-05-24 RX ADMIN — Medication 10 ML: at 10:08

## 2025-05-24 RX ADMIN — LORAZEPAM 0.5 MG: 0.5 TABLET ORAL at 21:52

## 2025-05-24 RX ADMIN — TRAMADOL HYDROCHLORIDE 50 MG: 50 TABLET, COATED ORAL at 18:31

## 2025-05-24 RX ADMIN — TIZANIDINE 4 MG: 4 TABLET ORAL at 21:52

## 2025-05-24 RX ADMIN — TIZANIDINE 4 MG: 4 TABLET ORAL at 05:42

## 2025-05-24 RX ADMIN — INSULIN LISPRO 1 UNITS: 100 INJECTION, SOLUTION INTRAVENOUS; SUBCUTANEOUS at 22:02

## 2025-05-24 RX ADMIN — MICONAZOLE NITRATE: 20 POWDER TOPICAL at 10:11

## 2025-05-24 RX ADMIN — LORAZEPAM 0.5 MG: 0.5 TABLET ORAL at 10:09

## 2025-05-24 RX ADMIN — FUROSEMIDE 20 MG: 10 INJECTION, SOLUTION INTRAMUSCULAR; INTRAVENOUS at 18:31

## 2025-05-24 RX ADMIN — TIZANIDINE 4 MG: 4 TABLET ORAL at 10:09

## 2025-05-24 RX ADMIN — POLYETHYLENE GLYCOL 3350 17 G: 17 POWDER, FOR SOLUTION ORAL at 10:08

## 2025-05-24 RX ADMIN — ATENOLOL 25 MG: 25 TABLET ORAL at 10:09

## 2025-05-24 RX ADMIN — DILTIAZEM HYDROCHLORIDE 240 MG: 240 CAPSULE, COATED, EXTENDED RELEASE ORAL at 10:09

## 2025-05-24 RX ADMIN — BISACODYL 5 MG: 5 TABLET, COATED ORAL at 21:52

## 2025-05-24 RX ADMIN — CEFEPIME 2000 MG: 2 INJECTION, POWDER, FOR SOLUTION INTRAVENOUS at 05:49

## 2025-05-24 RX ADMIN — CEFEPIME 2000 MG: 2 INJECTION, POWDER, FOR SOLUTION INTRAVENOUS at 13:05

## 2025-05-24 RX ADMIN — BUPRENORPHINE AND NALOXONE 1 FILM: 8; 2 FILM, SOLUBLE BUCCAL; SUBLINGUAL at 10:09

## 2025-05-24 RX ADMIN — PREDNISONE 5 MG: 5 TABLET ORAL at 10:09

## 2025-05-24 RX ADMIN — BUPRENORPHINE AND NALOXONE 1 FILM: 8; 2 FILM, SOLUBLE BUCCAL; SUBLINGUAL at 22:01

## 2025-05-24 RX ADMIN — PREDNISONE 5 MG: 5 TABLET ORAL at 14:30

## 2025-05-24 RX ADMIN — TRAMADOL HYDROCHLORIDE 50 MG: 50 TABLET, COATED ORAL at 11:51

## 2025-05-24 RX ADMIN — BISACODYL 5 MG: 5 TABLET, COATED ORAL at 10:10

## 2025-05-24 RX ADMIN — INSULIN LISPRO 2 UNITS: 100 INJECTION, SOLUTION INTRAVENOUS; SUBCUTANEOUS at 16:52

## 2025-05-24 RX ADMIN — ENOXAPARIN SODIUM 40 MG: 100 INJECTION SUBCUTANEOUS at 10:08

## 2025-05-24 RX ADMIN — PANTOPRAZOLE SODIUM 40 MG: 40 TABLET, DELAYED RELEASE ORAL at 05:42

## 2025-05-24 RX ADMIN — TRAMADOL HYDROCHLORIDE 50 MG: 50 TABLET, COATED ORAL at 05:42

## 2025-05-24 RX ADMIN — FUROSEMIDE 20 MG: 10 INJECTION, SOLUTION INTRAMUSCULAR; INTRAVENOUS at 10:10

## 2025-05-24 RX ADMIN — ATENOLOL 25 MG: 25 TABLET ORAL at 21:52

## 2025-05-24 ASSESSMENT — PAIN SCALES - GENERAL
PAINLEVEL_OUTOF10: 7
PAINLEVEL_OUTOF10: 8
PAINLEVEL_OUTOF10: 6
PAINLEVEL_OUTOF10: 8
PAINLEVEL_OUTOF10: 8
PAINLEVEL_OUTOF10: 3

## 2025-05-24 ASSESSMENT — PAIN DESCRIPTION - LOCATION
LOCATION: BACK;LEG
LOCATION: LEG;BACK
LOCATION: BACK;LEG
LOCATION: BACK;LEG
LOCATION: LEG

## 2025-05-24 ASSESSMENT — PAIN DESCRIPTION - ORIENTATION
ORIENTATION: RIGHT;LEFT

## 2025-05-24 ASSESSMENT — PAIN DESCRIPTION - DESCRIPTORS
DESCRIPTORS: ACHING
DESCRIPTORS: ACHING
DESCRIPTORS: ACHING;TENDER;DISCOMFORT
DESCRIPTORS: ACHING;SQUEEZING
DESCRIPTORS: ACHING;TENDER;DISCOMFORT

## 2025-05-24 ASSESSMENT — PAIN - FUNCTIONAL ASSESSMENT: PAIN_FUNCTIONAL_ASSESSMENT: PREVENTS OR INTERFERES SOME ACTIVE ACTIVITIES AND ADLS

## 2025-05-24 NOTE — PLAN OF CARE
Problem: Skin/Tissue Integrity  Goal: Skin integrity remains intact  Description: 1.  Monitor for areas of redness and/or skin breakdown2.  Assess vascular access sites hourly3.  Every 4-6 hours minimum:  Change oxygen saturation probe site4.  Every 4-6 hours:  If on nasal continuous positive airway pressure, respiratory therapy assess nares and determine need for appliance change or resting period  5/24/2025 1651 by Mitul Dewey, RN  Outcome: Progressing     Problem: Safety - Adult  Goal: Free from fall injury  5/24/2025 1651 by Mitul Dewey, RN  Outcome: Progressing     Problem: Nutrition Deficit:  Goal: Optimize nutritional status  5/24/2025 1651 by Mitul Dewey, RN  Outcome: Progressing

## 2025-05-25 LAB
GLUCOSE BLD-MCNC: 199 MG/DL (ref 74–99)
GLUCOSE BLD-MCNC: 260 MG/DL (ref 74–99)
GLUCOSE BLD-MCNC: 281 MG/DL (ref 74–99)
GLUCOSE BLD-MCNC: 306 MG/DL (ref 74–99)
GLUCOSE BLD-MCNC: 464 MG/DL (ref 74–99)
MICROORGANISM SPEC CULT: NORMAL
SPECIMEN DESCRIPTION: NORMAL

## 2025-05-25 PROCEDURE — 6360000002 HC RX W HCPCS: Performed by: CLINICAL NURSE SPECIALIST

## 2025-05-25 PROCEDURE — 82962 GLUCOSE BLOOD TEST: CPT

## 2025-05-25 PROCEDURE — 6370000000 HC RX 637 (ALT 250 FOR IP): Performed by: FAMILY MEDICINE

## 2025-05-25 PROCEDURE — 6370000000 HC RX 637 (ALT 250 FOR IP): Performed by: INTERNAL MEDICINE

## 2025-05-25 PROCEDURE — 2580000003 HC RX 258: Performed by: CLINICAL NURSE SPECIALIST

## 2025-05-25 PROCEDURE — 2500000003 HC RX 250 WO HCPCS: Performed by: INTERNAL MEDICINE

## 2025-05-25 PROCEDURE — 6360000002 HC RX W HCPCS: Performed by: INTERNAL MEDICINE

## 2025-05-25 PROCEDURE — 99233 SBSQ HOSP IP/OBS HIGH 50: CPT | Performed by: HOSPITALIST

## 2025-05-25 PROCEDURE — 1200000000 HC SEMI PRIVATE

## 2025-05-25 RX ORDER — INSULIN LISPRO 100 [IU]/ML
0-16 INJECTION, SOLUTION INTRAVENOUS; SUBCUTANEOUS
Status: DISCONTINUED | OUTPATIENT
Start: 2025-05-25 | End: 2025-05-29 | Stop reason: HOSPADM

## 2025-05-25 RX ORDER — INSULIN GLARGINE 100 [IU]/ML
10 INJECTION, SOLUTION SUBCUTANEOUS NIGHTLY
Status: DISCONTINUED | OUTPATIENT
Start: 2025-05-25 | End: 2025-05-26

## 2025-05-25 RX ADMIN — INSULIN GLARGINE 10 UNITS: 100 INJECTION, SOLUTION SUBCUTANEOUS at 22:02

## 2025-05-25 RX ADMIN — BISACODYL 5 MG: 5 TABLET, COATED ORAL at 21:19

## 2025-05-25 RX ADMIN — ENOXAPARIN SODIUM 40 MG: 100 INJECTION SUBCUTANEOUS at 09:01

## 2025-05-25 RX ADMIN — Medication 10 ML: at 09:22

## 2025-05-25 RX ADMIN — TRAMADOL HYDROCHLORIDE 50 MG: 50 TABLET, COATED ORAL at 10:22

## 2025-05-25 RX ADMIN — TRAMADOL HYDROCHLORIDE 50 MG: 50 TABLET, COATED ORAL at 21:52

## 2025-05-25 RX ADMIN — PREDNISONE 5 MG: 5 TABLET ORAL at 09:00

## 2025-05-25 RX ADMIN — PREDNISONE 5 MG: 5 TABLET ORAL at 14:32

## 2025-05-25 RX ADMIN — LORAZEPAM 0.5 MG: 0.5 TABLET ORAL at 09:00

## 2025-05-25 RX ADMIN — PIPERACILLIN AND TAZOBACTAM 4500 MG: 4; .5 INJECTION, POWDER, LYOPHILIZED, FOR SOLUTION INTRAVENOUS at 13:25

## 2025-05-25 RX ADMIN — DILTIAZEM HYDROCHLORIDE 240 MG: 240 CAPSULE, COATED, EXTENDED RELEASE ORAL at 09:00

## 2025-05-25 RX ADMIN — TIZANIDINE 4 MG: 4 TABLET ORAL at 02:09

## 2025-05-25 RX ADMIN — PIPERACILLIN AND TAZOBACTAM 4500 MG: 4; .5 INJECTION, POWDER, LYOPHILIZED, FOR SOLUTION INTRAVENOUS at 21:47

## 2025-05-25 RX ADMIN — BUPRENORPHINE AND NALOXONE 1 FILM: 8; 2 FILM, SOLUBLE BUCCAL; SUBLINGUAL at 09:11

## 2025-05-25 RX ADMIN — INSULIN LISPRO 4 UNITS: 100 INJECTION, SOLUTION INTRAVENOUS; SUBCUTANEOUS at 21:40

## 2025-05-25 RX ADMIN — FUROSEMIDE 20 MG: 10 INJECTION, SOLUTION INTRAMUSCULAR; INTRAVENOUS at 09:00

## 2025-05-25 RX ADMIN — LORAZEPAM 0.5 MG: 0.5 TABLET ORAL at 21:19

## 2025-05-25 RX ADMIN — INSULIN LISPRO 2 UNITS: 100 INJECTION, SOLUTION INTRAVENOUS; SUBCUTANEOUS at 13:27

## 2025-05-25 RX ADMIN — CEFEPIME 2000 MG: 2 INJECTION, POWDER, FOR SOLUTION INTRAVENOUS at 05:05

## 2025-05-25 RX ADMIN — BUPRENORPHINE AND NALOXONE 1 FILM: 8; 2 FILM, SOLUBLE BUCCAL; SUBLINGUAL at 21:17

## 2025-05-25 RX ADMIN — Medication 10 ML: at 23:30

## 2025-05-25 RX ADMIN — POLYETHYLENE GLYCOL 3350 17 G: 17 POWDER, FOR SOLUTION ORAL at 09:01

## 2025-05-25 RX ADMIN — PREDNISONE 5 MG: 5 TABLET ORAL at 21:19

## 2025-05-25 RX ADMIN — ATENOLOL 25 MG: 25 TABLET ORAL at 09:00

## 2025-05-25 RX ADMIN — ATENOLOL 25 MG: 25 TABLET ORAL at 21:19

## 2025-05-25 RX ADMIN — TRAMADOL HYDROCHLORIDE 50 MG: 50 TABLET, COATED ORAL at 16:47

## 2025-05-25 RX ADMIN — TIZANIDINE 4 MG: 4 TABLET ORAL at 14:32

## 2025-05-25 RX ADMIN — INSULIN LISPRO 1 UNITS: 100 INJECTION, SOLUTION INTRAVENOUS; SUBCUTANEOUS at 09:21

## 2025-05-25 RX ADMIN — TIZANIDINE 4 MG: 4 TABLET ORAL at 09:01

## 2025-05-25 RX ADMIN — MICONAZOLE NITRATE: 20 POWDER TOPICAL at 21:52

## 2025-05-25 RX ADMIN — FUROSEMIDE 20 MG: 10 INJECTION, SOLUTION INTRAMUSCULAR; INTRAVENOUS at 18:29

## 2025-05-25 RX ADMIN — BISACODYL 5 MG: 5 TABLET, COATED ORAL at 09:00

## 2025-05-25 RX ADMIN — TRAMADOL HYDROCHLORIDE 50 MG: 50 TABLET, COATED ORAL at 02:09

## 2025-05-25 RX ADMIN — MICONAZOLE NITRATE: 20 POWDER TOPICAL at 09:22

## 2025-05-25 RX ADMIN — INSULIN LISPRO 3 UNITS: 100 INJECTION, SOLUTION INTRAVENOUS; SUBCUTANEOUS at 16:49

## 2025-05-25 RX ADMIN — TIZANIDINE 4 MG: 4 TABLET ORAL at 21:19

## 2025-05-25 RX ADMIN — PANTOPRAZOLE SODIUM 40 MG: 40 TABLET, DELAYED RELEASE ORAL at 05:06

## 2025-05-25 ASSESSMENT — PAIN SCALES - GENERAL
PAINLEVEL_OUTOF10: 8

## 2025-05-25 ASSESSMENT — PAIN DESCRIPTION - ORIENTATION
ORIENTATION: MID;LOWER
ORIENTATION: LEFT;RIGHT
ORIENTATION: RIGHT;LEFT

## 2025-05-25 ASSESSMENT — PAIN DESCRIPTION - DESCRIPTORS
DESCRIPTORS: ACHING;SHOOTING;STABBING
DESCRIPTORS: THROBBING;STABBING;DISCOMFORT
DESCRIPTORS: CRAMPING;DISCOMFORT

## 2025-05-25 ASSESSMENT — PAIN DESCRIPTION - LOCATION
LOCATION: BACK
LOCATION: BACK
LOCATION: BACK;LEG

## 2025-05-25 NOTE — PLAN OF CARE
Problem: Skin/Tissue Integrity  Goal: Skin integrity remains intact  Description: 1.  Monitor for areas of redness and/or skin breakdown2.  Assess vascular access sites hourly3.  Every 4-6 hours minimum:  Change oxygen saturation probe site4.  Every 4-6 hours:  If on nasal continuous positive airway pressure, respiratory therapy assess nares and determine need for appliance change or resting period  5/25/2025 0004 by Mey West RN  Outcome: Progressing     Problem: Safety - Adult  Goal: Free from fall injury  5/25/2025 0004 by Mey West RN  Outcome: Progressing     Problem: Nutrition Deficit:  Goal: Optimize nutritional status  5/25/2025 0004 by Mey West RN  Outcome: Progressing     Problem: Pain  Goal: Verbalizes/displays adequate comfort level or baseline comfort level  5/25/2025 0004 by Mey West RN  Outcome: Progressing     Problem: Chronic Conditions and Co-morbidities  Goal: Patient's chronic conditions and co-morbidity symptoms are monitored and maintained or improved  5/25/2025 0004 by Mey West RN  Outcome: Progressing

## 2025-05-26 LAB
ALBUMIN SERPL-MCNC: 3.7 G/DL (ref 3.5–5.2)
ALP SERPL-CCNC: 437 U/L (ref 35–104)
ALT SERPL-CCNC: 132 U/L (ref 0–32)
ANION GAP SERPL CALCULATED.3IONS-SCNC: 17 MMOL/L (ref 7–16)
AST SERPL-CCNC: 60 U/L (ref 0–31)
BASOPHILS # BLD: 0 K/UL (ref 0–0.2)
BASOPHILS NFR BLD: 0 % (ref 0–2)
BILIRUB SERPL-MCNC: 0.4 MG/DL (ref 0–1.2)
BUN SERPL-MCNC: 27 MG/DL (ref 6–20)
CALCIUM SERPL-MCNC: 10.4 MG/DL (ref 8.6–10.2)
CHLORIDE SERPL-SCNC: 95 MMOL/L (ref 98–107)
CHOLEST SERPL-MCNC: 326 MG/DL
CO2 SERPL-SCNC: 25 MMOL/L (ref 22–29)
CREAT SERPL-MCNC: 0.6 MG/DL (ref 0.5–1)
EOSINOPHIL # BLD: 0 K/UL (ref 0.05–0.5)
EOSINOPHILS RELATIVE PERCENT: 0 % (ref 0–6)
ERYTHROCYTE [DISTWIDTH] IN BLOOD BY AUTOMATED COUNT: 15 % (ref 11.5–15)
GFR, ESTIMATED: >90 ML/MIN/1.73M2
GLUCOSE BLD-MCNC: 205 MG/DL (ref 74–99)
GLUCOSE BLD-MCNC: 254 MG/DL (ref 74–99)
GLUCOSE BLD-MCNC: 260 MG/DL (ref 74–99)
GLUCOSE BLD-MCNC: 272 MG/DL (ref 74–99)
GLUCOSE BLD-MCNC: 335 MG/DL (ref 74–99)
GLUCOSE SERPL-MCNC: 206 MG/DL (ref 74–99)
HCT VFR BLD AUTO: 46.1 % (ref 34–48)
HDLC SERPL-MCNC: 55 MG/DL
HGB BLD-MCNC: 14.9 G/DL (ref 11.5–15.5)
LDLC SERPL CALC-MCNC: 201 MG/DL
LYMPHOCYTES NFR BLD: 2.5 K/UL (ref 1.5–4)
LYMPHOCYTES RELATIVE PERCENT: 18 % (ref 20–42)
MCH RBC QN AUTO: 29.7 PG (ref 26–35)
MCHC RBC AUTO-ENTMCNC: 32.3 G/DL (ref 32–34.5)
MCV RBC AUTO: 91.8 FL (ref 80–99.9)
METAMYELOCYTES ABSOLUTE COUNT: 0.11 K/UL (ref 0–0.12)
METAMYELOCYTES: 1 % (ref 0–1)
MONOCYTES NFR BLD: 0 % (ref 2–12)
MONOCYTES NFR BLD: 0 K/UL (ref 0.1–0.95)
MYELOCYTES ABSOLUTE COUNT: 0.34 K/UL
MYELOCYTES: 2 %
NEUTROPHILS NFR BLD: 79 % (ref 43–80)
NEUTS SEG NFR BLD: 11.25 K/UL (ref 1.8–7.3)
PLATELET # BLD AUTO: 295 K/UL (ref 130–450)
PMV BLD AUTO: 9.9 FL (ref 7–12)
POTASSIUM SERPL-SCNC: 3.3 MMOL/L (ref 3.5–5)
PROT SERPL-MCNC: 6.9 G/DL (ref 6.4–8.3)
RBC # BLD AUTO: 5.02 M/UL (ref 3.5–5.5)
SODIUM SERPL-SCNC: 137 MMOL/L (ref 132–146)
TRIGL SERPL-MCNC: 348 MG/DL
VLDLC SERPL CALC-MCNC: 70 MG/DL
WBC OTHER # BLD: 14.2 K/UL (ref 4.5–11.5)

## 2025-05-26 PROCEDURE — 6370000000 HC RX 637 (ALT 250 FOR IP): Performed by: INTERNAL MEDICINE

## 2025-05-26 PROCEDURE — 85025 COMPLETE CBC W/AUTO DIFF WBC: CPT

## 2025-05-26 PROCEDURE — 6360000002 HC RX W HCPCS: Performed by: CLINICAL NURSE SPECIALIST

## 2025-05-26 PROCEDURE — 6360000002 HC RX W HCPCS: Performed by: INTERNAL MEDICINE

## 2025-05-26 PROCEDURE — 97530 THERAPEUTIC ACTIVITIES: CPT | Performed by: OCCUPATIONAL THERAPIST

## 2025-05-26 PROCEDURE — 99232 SBSQ HOSP IP/OBS MODERATE 35: CPT | Performed by: INTERNAL MEDICINE

## 2025-05-26 PROCEDURE — 6370000000 HC RX 637 (ALT 250 FOR IP): Performed by: FAMILY MEDICINE

## 2025-05-26 PROCEDURE — 2500000003 HC RX 250 WO HCPCS: Performed by: INTERNAL MEDICINE

## 2025-05-26 PROCEDURE — 80061 LIPID PANEL: CPT

## 2025-05-26 PROCEDURE — 2580000003 HC RX 258: Performed by: CLINICAL NURSE SPECIALIST

## 2025-05-26 PROCEDURE — 82962 GLUCOSE BLOOD TEST: CPT

## 2025-05-26 PROCEDURE — 80053 COMPREHEN METABOLIC PANEL: CPT

## 2025-05-26 PROCEDURE — 97535 SELF CARE MNGMENT TRAINING: CPT | Performed by: OCCUPATIONAL THERAPIST

## 2025-05-26 PROCEDURE — 97165 OT EVAL LOW COMPLEX 30 MIN: CPT | Performed by: OCCUPATIONAL THERAPIST

## 2025-05-26 PROCEDURE — 1200000000 HC SEMI PRIVATE

## 2025-05-26 PROCEDURE — 36415 COLL VENOUS BLD VENIPUNCTURE: CPT

## 2025-05-26 RX ORDER — INSULIN GLARGINE 100 [IU]/ML
15 INJECTION, SOLUTION SUBCUTANEOUS NIGHTLY
Status: DISCONTINUED | OUTPATIENT
Start: 2025-05-26 | End: 2025-05-29 | Stop reason: HOSPADM

## 2025-05-26 RX ORDER — POTASSIUM CHLORIDE 1500 MG/1
40 TABLET, EXTENDED RELEASE ORAL ONCE
Status: COMPLETED | OUTPATIENT
Start: 2025-05-26 | End: 2025-05-26

## 2025-05-26 RX ADMIN — FUROSEMIDE 20 MG: 10 INJECTION, SOLUTION INTRAMUSCULAR; INTRAVENOUS at 17:13

## 2025-05-26 RX ADMIN — TRAMADOL HYDROCHLORIDE 50 MG: 50 TABLET, COATED ORAL at 23:19

## 2025-05-26 RX ADMIN — DILTIAZEM HYDROCHLORIDE 240 MG: 240 CAPSULE, COATED, EXTENDED RELEASE ORAL at 09:03

## 2025-05-26 RX ADMIN — BUPRENORPHINE AND NALOXONE 1 FILM: 8; 2 FILM, SOLUBLE BUCCAL; SUBLINGUAL at 20:58

## 2025-05-26 RX ADMIN — TIZANIDINE 4 MG: 4 TABLET ORAL at 20:57

## 2025-05-26 RX ADMIN — PREDNISONE 5 MG: 5 TABLET ORAL at 20:55

## 2025-05-26 RX ADMIN — ATENOLOL 25 MG: 25 TABLET ORAL at 20:55

## 2025-05-26 RX ADMIN — PIPERACILLIN AND TAZOBACTAM 4500 MG: 4; .5 INJECTION, POWDER, LYOPHILIZED, FOR SOLUTION INTRAVENOUS at 04:19

## 2025-05-26 RX ADMIN — INSULIN LISPRO 8 UNITS: 100 INJECTION, SOLUTION INTRAVENOUS; SUBCUTANEOUS at 20:58

## 2025-05-26 RX ADMIN — INSULIN LISPRO 12 UNITS: 100 INJECTION, SOLUTION INTRAVENOUS; SUBCUTANEOUS at 12:57

## 2025-05-26 RX ADMIN — TRAMADOL HYDROCHLORIDE 50 MG: 50 TABLET, COATED ORAL at 09:01

## 2025-05-26 RX ADMIN — INSULIN GLARGINE 15 UNITS: 100 INJECTION, SOLUTION SUBCUTANEOUS at 20:58

## 2025-05-26 RX ADMIN — TIZANIDINE 4 MG: 4 TABLET ORAL at 02:15

## 2025-05-26 RX ADMIN — Medication 10 ML: at 21:42

## 2025-05-26 RX ADMIN — ATENOLOL 25 MG: 25 TABLET ORAL at 09:01

## 2025-05-26 RX ADMIN — MICONAZOLE NITRATE: 20 POWDER TOPICAL at 20:58

## 2025-05-26 RX ADMIN — TIZANIDINE 4 MG: 4 TABLET ORAL at 09:02

## 2025-05-26 RX ADMIN — TRAMADOL HYDROCHLORIDE 50 MG: 50 TABLET, COATED ORAL at 17:17

## 2025-05-26 RX ADMIN — LORAZEPAM 0.5 MG: 0.5 TABLET ORAL at 11:29

## 2025-05-26 RX ADMIN — FUROSEMIDE 20 MG: 10 INJECTION, SOLUTION INTRAMUSCULAR; INTRAVENOUS at 09:01

## 2025-05-26 RX ADMIN — BUPRENORPHINE AND NALOXONE 1 FILM: 8; 2 FILM, SOLUBLE BUCCAL; SUBLINGUAL at 09:12

## 2025-05-26 RX ADMIN — LORAZEPAM 0.5 MG: 0.5 TABLET ORAL at 20:55

## 2025-05-26 RX ADMIN — TIZANIDINE 4 MG: 4 TABLET ORAL at 14:25

## 2025-05-26 RX ADMIN — PREDNISONE 5 MG: 5 TABLET ORAL at 14:25

## 2025-05-26 RX ADMIN — ONDANSETRON 4 MG: 2 INJECTION, SOLUTION INTRAMUSCULAR; INTRAVENOUS at 09:13

## 2025-05-26 RX ADMIN — Medication 10 ML: at 09:02

## 2025-05-26 RX ADMIN — POTASSIUM CHLORIDE 40 MEQ: 1500 TABLET, EXTENDED RELEASE ORAL at 12:57

## 2025-05-26 RX ADMIN — INSULIN LISPRO 4 UNITS: 100 INJECTION, SOLUTION INTRAVENOUS; SUBCUTANEOUS at 09:12

## 2025-05-26 RX ADMIN — INSULIN LISPRO 8 UNITS: 100 INJECTION, SOLUTION INTRAVENOUS; SUBCUTANEOUS at 17:14

## 2025-05-26 RX ADMIN — ENOXAPARIN SODIUM 40 MG: 100 INJECTION SUBCUTANEOUS at 09:01

## 2025-05-26 RX ADMIN — BISACODYL 5 MG: 5 TABLET, COATED ORAL at 20:55

## 2025-05-26 RX ADMIN — PREDNISONE 5 MG: 5 TABLET ORAL at 09:01

## 2025-05-26 RX ADMIN — POLYETHYLENE GLYCOL 3350 17 G: 17 POWDER, FOR SOLUTION ORAL at 09:01

## 2025-05-26 RX ADMIN — PANTOPRAZOLE SODIUM 40 MG: 40 TABLET, DELAYED RELEASE ORAL at 05:43

## 2025-05-26 RX ADMIN — PIPERACILLIN AND TAZOBACTAM 4500 MG: 4; .5 INJECTION, POWDER, LYOPHILIZED, FOR SOLUTION INTRAVENOUS at 20:49

## 2025-05-26 RX ADMIN — BISACODYL 5 MG: 5 TABLET, COATED ORAL at 09:01

## 2025-05-26 RX ADMIN — PIPERACILLIN AND TAZOBACTAM 4500 MG: 4; .5 INJECTION, POWDER, LYOPHILIZED, FOR SOLUTION INTRAVENOUS at 13:02

## 2025-05-26 ASSESSMENT — PAIN DESCRIPTION - DESCRIPTORS: DESCRIPTORS: ACHING;CRAMPING;DISCOMFORT

## 2025-05-26 ASSESSMENT — PAIN DESCRIPTION - LOCATION: LOCATION: BACK

## 2025-05-26 ASSESSMENT — PAIN SCALES - GENERAL: PAINLEVEL_OUTOF10: 8

## 2025-05-26 NOTE — DISCHARGE INSTRUCTIONS
trough is < 5 or >20.  If the trough is <20 continue dosing as ordered.  If the trough is >20 call the office for further orders.  Do not hold the dose while waiting for the trough result.  Amingoglycosides (e.g. Gentamicin, Tobramycin and Amikacin) peaks and troughs should be drawn twice weekly (preferably on Mondays and Thursdays) or every third dose.  Aminoglycoside peaks are not to be drawn if patient on Once-Daily Dosing (ODD).  Call physician or office if the trough is:  >1 for gentamicin,   >2 for tobramycin, or   >5 for amikacin  When clinically indicated obtain:  Urine culture. If the patient has a fever with purulent drainage from Barbosa or suprapubic catheter, or foul smelling urine. Do not irrigate a clogged Barbosa catheter. Replace it.  Blood cultures and Wound Gram stain with culture & sensitivity. If the patient has a fever or increasing drainage or foul odor from a wound. Notify the treating physician in a timely manner  Stool specimen. If diarrhea occurs while on antibiotics, send stools for C. difficile and WBCs.  When a drug is discontinued due to a low white blood cell count (WBCs) draw two consecutive CBC with differential and CMP.         ALLERGIC OR ADVERSE REACTIONS TO MEDICATIONS  Mild reaction: (itching, with or without rash):  Administer Benadryl 50mg po x 1, then 25mg po q6h prn.   Notify office or physician in a timely matter.  Moderate reaction (itching with or without rash and/or wheezing, dyspnea, itchy throat):  Administer Benadryl 50 mg IV push x 1.   Notify office or physician in a timely manner.  Severe reaction i.e. Anaphylaxis (wheezing or stidor, sudden rash, lightheadedness, hypotension):  Administer epinephrine subcutaneous 0.3mg (1:1000) x 1 dose.   May repeat twice every 5 minutes if needed.  Call EMS and notify office or physician immediately.  For all above reactions: administer Solu-Cortef 100mg slow IV push x 1.    VASCULAR ACCESS DRESSING CHANGE PROTOCOL  Cleanse

## 2025-05-27 LAB
ALBUMIN SERPL-MCNC: 3.8 G/DL (ref 3.5–5.2)
ALP SERPL-CCNC: 475 U/L (ref 35–104)
ALT SERPL-CCNC: 145 U/L (ref 0–32)
ANION GAP SERPL CALCULATED.3IONS-SCNC: 17 MMOL/L (ref 7–16)
AST SERPL-CCNC: 85 U/L (ref 0–31)
BASOPHILS # BLD: 0.06 K/UL (ref 0–0.2)
BASOPHILS NFR BLD: 0 % (ref 0–2)
BILIRUB SERPL-MCNC: 0.4 MG/DL (ref 0–1.2)
BUN SERPL-MCNC: 27 MG/DL (ref 6–20)
CALCIUM SERPL-MCNC: 10.4 MG/DL (ref 8.6–10.2)
CHLORIDE SERPL-SCNC: 90 MMOL/L (ref 98–107)
CO2 SERPL-SCNC: 27 MMOL/L (ref 22–29)
CREAT SERPL-MCNC: 0.6 MG/DL (ref 0.5–1)
EOSINOPHIL # BLD: 0.13 K/UL (ref 0.05–0.5)
EOSINOPHILS RELATIVE PERCENT: 1 % (ref 0–6)
ERYTHROCYTE [DISTWIDTH] IN BLOOD BY AUTOMATED COUNT: 15 % (ref 11.5–15)
GFR, ESTIMATED: >90 ML/MIN/1.73M2
GLUCOSE BLD-MCNC: 169 MG/DL (ref 74–99)
GLUCOSE BLD-MCNC: 196 MG/DL (ref 74–99)
GLUCOSE BLD-MCNC: 261 MG/DL (ref 74–99)
GLUCOSE BLD-MCNC: 333 MG/DL (ref 74–99)
GLUCOSE SERPL-MCNC: 163 MG/DL (ref 74–99)
HCT VFR BLD AUTO: 47 % (ref 34–48)
HGB BLD-MCNC: 15.3 G/DL (ref 11.5–15.5)
IMM GRANULOCYTES # BLD AUTO: 1.26 K/UL (ref 0–0.58)
IMM GRANULOCYTES NFR BLD: 8 % (ref 0–5)
LYMPHOCYTES NFR BLD: 2.8 K/UL (ref 1.5–4)
LYMPHOCYTES RELATIVE PERCENT: 17 % (ref 20–42)
MAGNESIUM SERPL-MCNC: 2.2 MG/DL (ref 1.6–2.6)
MCH RBC QN AUTO: 29.9 PG (ref 26–35)
MCHC RBC AUTO-ENTMCNC: 32.6 G/DL (ref 32–34.5)
MCV RBC AUTO: 92 FL (ref 80–99.9)
MICROORGANISM SPEC CULT: NORMAL
MICROORGANISM SPEC CULT: NORMAL
MONOCYTES NFR BLD: 1.06 K/UL (ref 0.1–0.95)
MONOCYTES NFR BLD: 6 % (ref 2–12)
NEUTROPHILS NFR BLD: 68 % (ref 43–80)
NEUTS SEG NFR BLD: 11.19 K/UL (ref 1.8–7.3)
PLATELET # BLD AUTO: 290 K/UL (ref 130–450)
PMV BLD AUTO: 10.3 FL (ref 7–12)
POTASSIUM SERPL-SCNC: 3.9 MMOL/L (ref 3.5–5)
PROT SERPL-MCNC: 7.1 G/DL (ref 6.4–8.3)
RBC # BLD AUTO: 5.11 M/UL (ref 3.5–5.5)
SERVICE CMNT-IMP: NORMAL
SERVICE CMNT-IMP: NORMAL
SODIUM SERPL-SCNC: 134 MMOL/L (ref 132–146)
SPECIMEN DESCRIPTION: NORMAL
SPECIMEN DESCRIPTION: NORMAL
WBC OTHER # BLD: 16.5 K/UL (ref 4.5–11.5)

## 2025-05-27 PROCEDURE — 6370000000 HC RX 637 (ALT 250 FOR IP): Performed by: INTERNAL MEDICINE

## 2025-05-27 PROCEDURE — 6360000002 HC RX W HCPCS: Performed by: CLINICAL NURSE SPECIALIST

## 2025-05-27 PROCEDURE — 2500000003 HC RX 250 WO HCPCS: Performed by: INTERNAL MEDICINE

## 2025-05-27 PROCEDURE — 2580000003 HC RX 258: Performed by: CLINICAL NURSE SPECIALIST

## 2025-05-27 PROCEDURE — 6370000000 HC RX 637 (ALT 250 FOR IP): Performed by: FAMILY MEDICINE

## 2025-05-27 PROCEDURE — 6360000002 HC RX W HCPCS: Performed by: INTERNAL MEDICINE

## 2025-05-27 PROCEDURE — 80053 COMPREHEN METABOLIC PANEL: CPT

## 2025-05-27 PROCEDURE — 36415 COLL VENOUS BLD VENIPUNCTURE: CPT

## 2025-05-27 PROCEDURE — 1200000000 HC SEMI PRIVATE

## 2025-05-27 PROCEDURE — 85025 COMPLETE CBC W/AUTO DIFF WBC: CPT

## 2025-05-27 PROCEDURE — 99232 SBSQ HOSP IP/OBS MODERATE 35: CPT | Performed by: INTERNAL MEDICINE

## 2025-05-27 PROCEDURE — 82962 GLUCOSE BLOOD TEST: CPT

## 2025-05-27 PROCEDURE — 83735 ASSAY OF MAGNESIUM: CPT

## 2025-05-27 RX ORDER — MIDAZOLAM HYDROCHLORIDE 1 MG/ML
2 INJECTION, SOLUTION INTRAMUSCULAR; INTRAVENOUS ONCE
Status: COMPLETED | OUTPATIENT
Start: 2025-05-27 | End: 2025-05-27

## 2025-05-27 RX ADMIN — LORAZEPAM 0.5 MG: 0.5 TABLET ORAL at 10:06

## 2025-05-27 RX ADMIN — PREDNISONE 5 MG: 5 TABLET ORAL at 10:06

## 2025-05-27 RX ADMIN — Medication 10 ML: at 22:03

## 2025-05-27 RX ADMIN — MIDAZOLAM 2 MG: 1 INJECTION INTRAMUSCULAR; INTRAVENOUS at 16:02

## 2025-05-27 RX ADMIN — FUROSEMIDE 40 MG: 40 TABLET ORAL at 12:48

## 2025-05-27 RX ADMIN — ATENOLOL 25 MG: 25 TABLET ORAL at 21:04

## 2025-05-27 RX ADMIN — TIZANIDINE 4 MG: 4 TABLET ORAL at 02:18

## 2025-05-27 RX ADMIN — ATENOLOL 25 MG: 25 TABLET ORAL at 10:06

## 2025-05-27 RX ADMIN — INSULIN GLARGINE 15 UNITS: 100 INJECTION, SOLUTION SUBCUTANEOUS at 21:11

## 2025-05-27 RX ADMIN — INSULIN LISPRO 12 UNITS: 100 INJECTION, SOLUTION INTRAVENOUS; SUBCUTANEOUS at 16:45

## 2025-05-27 RX ADMIN — INSULIN LISPRO 8 UNITS: 100 INJECTION, SOLUTION INTRAVENOUS; SUBCUTANEOUS at 21:09

## 2025-05-27 RX ADMIN — POLYETHYLENE GLYCOL 3350 17 G: 17 POWDER, FOR SOLUTION ORAL at 10:06

## 2025-05-27 RX ADMIN — PREDNISONE 5 MG: 5 TABLET ORAL at 21:04

## 2025-05-27 RX ADMIN — INSULIN LISPRO 4 UNITS: 100 INJECTION, SOLUTION INTRAVENOUS; SUBCUTANEOUS at 10:10

## 2025-05-27 RX ADMIN — PREDNISONE 5 MG: 5 TABLET ORAL at 13:44

## 2025-05-27 RX ADMIN — DILTIAZEM HYDROCHLORIDE 240 MG: 240 CAPSULE, COATED, EXTENDED RELEASE ORAL at 11:19

## 2025-05-27 RX ADMIN — BUPRENORPHINE AND NALOXONE 1 FILM: 8; 2 FILM, SOLUBLE BUCCAL; SUBLINGUAL at 22:46

## 2025-05-27 RX ADMIN — Medication 10 ML: at 10:07

## 2025-05-27 RX ADMIN — MICONAZOLE NITRATE: 20 POWDER TOPICAL at 10:26

## 2025-05-27 RX ADMIN — PIPERACILLIN AND TAZOBACTAM 4500 MG: 4; .5 INJECTION, POWDER, LYOPHILIZED, FOR SOLUTION INTRAVENOUS at 12:52

## 2025-05-27 RX ADMIN — TRAMADOL HYDROCHLORIDE 50 MG: 50 TABLET, COATED ORAL at 21:22

## 2025-05-27 RX ADMIN — TRAMADOL HYDROCHLORIDE 50 MG: 50 TABLET, COATED ORAL at 10:07

## 2025-05-27 RX ADMIN — TIZANIDINE 4 MG: 4 TABLET ORAL at 13:44

## 2025-05-27 RX ADMIN — TIZANIDINE 4 MG: 4 TABLET ORAL at 10:06

## 2025-05-27 RX ADMIN — MICONAZOLE NITRATE: 20 POWDER TOPICAL at 23:03

## 2025-05-27 RX ADMIN — PIPERACILLIN AND TAZOBACTAM 4500 MG: 4; .5 INJECTION, POWDER, LYOPHILIZED, FOR SOLUTION INTRAVENOUS at 03:59

## 2025-05-27 RX ADMIN — TIZANIDINE 4 MG: 4 TABLET ORAL at 21:04

## 2025-05-27 RX ADMIN — PIPERACILLIN AND TAZOBACTAM 4500 MG: 4; .5 INJECTION, POWDER, LYOPHILIZED, FOR SOLUTION INTRAVENOUS at 21:34

## 2025-05-27 RX ADMIN — PANTOPRAZOLE SODIUM 40 MG: 40 TABLET, DELAYED RELEASE ORAL at 06:28

## 2025-05-27 RX ADMIN — BISACODYL 5 MG: 5 TABLET, COATED ORAL at 10:06

## 2025-05-27 RX ADMIN — BISACODYL 5 MG: 5 TABLET, COATED ORAL at 21:04

## 2025-05-27 RX ADMIN — LORAZEPAM 0.5 MG: 0.5 TABLET ORAL at 22:46

## 2025-05-27 RX ADMIN — BUPRENORPHINE AND NALOXONE 1 FILM: 8; 2 FILM, SOLUBLE BUCCAL; SUBLINGUAL at 10:08

## 2025-05-27 RX ADMIN — ENOXAPARIN SODIUM 40 MG: 100 INJECTION SUBCUTANEOUS at 10:05

## 2025-05-27 ASSESSMENT — PAIN DESCRIPTION - ORIENTATION: ORIENTATION: LEFT;RIGHT

## 2025-05-27 ASSESSMENT — PAIN - FUNCTIONAL ASSESSMENT: PAIN_FUNCTIONAL_ASSESSMENT: PREVENTS OR INTERFERES WITH ALL ACTIVE AND SOME PASSIVE ACTIVITIES

## 2025-05-27 ASSESSMENT — PAIN SCALES - GENERAL
PAINLEVEL_OUTOF10: 0
PAINLEVEL_OUTOF10: 8
PAINLEVEL_OUTOF10: 7

## 2025-05-27 ASSESSMENT — PAIN DESCRIPTION - LOCATION
LOCATION: BACK;LEG
LOCATION: BACK;LEG

## 2025-05-27 ASSESSMENT — PAIN DESCRIPTION - FREQUENCY: FREQUENCY: CONTINUOUS

## 2025-05-27 ASSESSMENT — PAIN DESCRIPTION - ONSET: ONSET: ON-GOING

## 2025-05-27 ASSESSMENT — PAIN DESCRIPTION - DESCRIPTORS: DESCRIPTORS: ACHING;CRAMPING;THROBBING

## 2025-05-27 ASSESSMENT — PAIN DESCRIPTION - PAIN TYPE: TYPE: CHRONIC PAIN

## 2025-05-27 NOTE — PROCEDURES
PROCEDURE NOTE  Date: 5/27/2025   Name: Li Rojas  YOB: 1970    In pts room up for PICC line placement, after much hesitation from the pt.  (RN's and  at bedside)She finally agreed to have PICC line with antianxiety medication, medicated per RN  Upon placing HOB to 20 degrees pt states she is unable to lay back, holding head up, unable to relax,pt states she can't lay back any farther due to broken back.  RN aware unable to do procedure, due to inability to have HOB less that 30 degree's.

## 2025-05-27 NOTE — CARE COORDINATION
5/27 CM Note: PRECERT obtained for [facility]. HENs already done. Patient's SUBOXONE NEEDS to be sent with patient. PICC team with patient and mother. Patient unsure if she will allow PICC to be placed. Brandi Liaison to accept today if discharged. Nurse to nurse: [phone]. Electronically signed by Gonzales Miller on 5/27/2025 at 3:55 PM

## 2025-05-28 ENCOUNTER — APPOINTMENT (OUTPATIENT)
Dept: INTERVENTIONAL RADIOLOGY/VASCULAR | Age: 55
DRG: 720 | End: 2025-05-28
Payer: COMMERCIAL

## 2025-05-28 LAB
ALBUMIN SERPL-MCNC: 3.9 G/DL (ref 3.5–5.2)
ALP SERPL-CCNC: 437 U/L (ref 35–104)
ALT SERPL-CCNC: 132 U/L (ref 0–32)
ANION GAP SERPL CALCULATED.3IONS-SCNC: 14 MMOL/L (ref 7–16)
AST SERPL-CCNC: 57 U/L (ref 0–31)
BASOPHILS # BLD: 0 K/UL (ref 0–0.2)
BASOPHILS NFR BLD: 0 % (ref 0–2)
BILIRUB SERPL-MCNC: 0.3 MG/DL (ref 0–1.2)
BUN SERPL-MCNC: 30 MG/DL (ref 6–20)
CALCIUM SERPL-MCNC: 10.1 MG/DL (ref 8.6–10.2)
CHLORIDE SERPL-SCNC: 91 MMOL/L (ref 98–107)
CO2 SERPL-SCNC: 31 MMOL/L (ref 22–29)
CREAT SERPL-MCNC: 0.6 MG/DL (ref 0.5–1)
EOSINOPHIL # BLD: 0.13 K/UL (ref 0.05–0.5)
EOSINOPHILS RELATIVE PERCENT: 1 % (ref 0–6)
ERYTHROCYTE [DISTWIDTH] IN BLOOD BY AUTOMATED COUNT: 14.9 % (ref 11.5–15)
GFR, ESTIMATED: >90 ML/MIN/1.73M2
GLUCOSE BLD-MCNC: 149 MG/DL (ref 74–99)
GLUCOSE BLD-MCNC: 251 MG/DL (ref 74–99)
GLUCOSE BLD-MCNC: 252 MG/DL (ref 74–99)
GLUCOSE BLD-MCNC: 290 MG/DL (ref 74–99)
GLUCOSE SERPL-MCNC: 218 MG/DL (ref 74–99)
HCT VFR BLD AUTO: 45.6 % (ref 34–48)
HGB BLD-MCNC: 14.8 G/DL (ref 11.5–15.5)
INR PPP: 0.9
LYMPHOCYTES NFR BLD: 2.31 K/UL (ref 1.5–4)
LYMPHOCYTES RELATIVE PERCENT: 16 % (ref 20–42)
MCH RBC QN AUTO: 29.8 PG (ref 26–35)
MCHC RBC AUTO-ENTMCNC: 32.5 G/DL (ref 32–34.5)
MCV RBC AUTO: 91.9 FL (ref 80–99.9)
METAMYELOCYTES ABSOLUTE COUNT: 0.26 K/UL (ref 0–0.12)
METAMYELOCYTES: 2 % (ref 0–1)
MONOCYTES NFR BLD: 0.77 K/UL (ref 0.1–0.95)
MONOCYTES NFR BLD: 5 % (ref 2–12)
MYELOCYTES ABSOLUTE COUNT: 0.26 K/UL
MYELOCYTES: 2 %
NEUTROPHILS NFR BLD: 75 % (ref 43–80)
NEUTS SEG NFR BLD: 10.89 K/UL (ref 1.8–7.3)
PLATELET # BLD AUTO: 312 K/UL (ref 130–450)
PMV BLD AUTO: 9.8 FL (ref 7–12)
POTASSIUM SERPL-SCNC: 3.2 MMOL/L (ref 3.5–5)
PROT SERPL-MCNC: 6.7 G/DL (ref 6.4–8.3)
PROTHROMBIN TIME: 9.8 SEC (ref 9.3–12.4)
RBC # BLD AUTO: 4.96 M/UL (ref 3.5–5.5)
RBC # BLD: NORMAL 10*6/UL
SODIUM SERPL-SCNC: 136 MMOL/L (ref 132–146)
WBC OTHER # BLD: 14.6 K/UL (ref 4.5–11.5)

## 2025-05-28 PROCEDURE — 85610 PROTHROMBIN TIME: CPT

## 2025-05-28 PROCEDURE — 99232 SBSQ HOSP IP/OBS MODERATE 35: CPT | Performed by: INTERNAL MEDICINE

## 2025-05-28 PROCEDURE — 6370000000 HC RX 637 (ALT 250 FOR IP): Performed by: INTERNAL MEDICINE

## 2025-05-28 PROCEDURE — 76937 US GUIDE VASCULAR ACCESS: CPT

## 2025-05-28 PROCEDURE — 36410 VNPNXR 3YR/> PHY/QHP DX/THER: CPT

## 2025-05-28 PROCEDURE — 2500000003 HC RX 250 WO HCPCS: Performed by: INTERNAL MEDICINE

## 2025-05-28 PROCEDURE — 6360000002 HC RX W HCPCS: Performed by: CLINICAL NURSE SPECIALIST

## 2025-05-28 PROCEDURE — 6360000002 HC RX W HCPCS: Performed by: INTERNAL MEDICINE

## 2025-05-28 PROCEDURE — 05H933Z INSERTION OF INFUSION DEVICE INTO RIGHT BRACHIAL VEIN, PERCUTANEOUS APPROACH: ICD-10-PCS | Performed by: INTERNAL MEDICINE

## 2025-05-28 PROCEDURE — 1200000000 HC SEMI PRIVATE

## 2025-05-28 PROCEDURE — 80053 COMPREHEN METABOLIC PANEL: CPT

## 2025-05-28 PROCEDURE — C1751 CATH, INF, PER/CENT/MIDLINE: HCPCS

## 2025-05-28 PROCEDURE — 36415 COLL VENOUS BLD VENIPUNCTURE: CPT

## 2025-05-28 PROCEDURE — 85025 COMPLETE CBC W/AUTO DIFF WBC: CPT

## 2025-05-28 PROCEDURE — 6370000000 HC RX 637 (ALT 250 FOR IP): Performed by: FAMILY MEDICINE

## 2025-05-28 PROCEDURE — 2580000003 HC RX 258: Performed by: CLINICAL NURSE SPECIALIST

## 2025-05-28 PROCEDURE — 82962 GLUCOSE BLOOD TEST: CPT

## 2025-05-28 RX ORDER — MIDAZOLAM HYDROCHLORIDE 1 MG/ML
2 INJECTION, SOLUTION INTRAMUSCULAR; INTRAVENOUS ONCE
Status: COMPLETED | OUTPATIENT
Start: 2025-05-28 | End: 2025-05-28

## 2025-05-28 RX ADMIN — POTASSIUM BICARBONATE 50 MEQ: 978 TABLET, EFFERVESCENT ORAL at 15:43

## 2025-05-28 RX ADMIN — INSULIN GLARGINE 15 UNITS: 100 INJECTION, SOLUTION SUBCUTANEOUS at 21:13

## 2025-05-28 RX ADMIN — LORAZEPAM 0.5 MG: 0.5 TABLET ORAL at 21:45

## 2025-05-28 RX ADMIN — TIZANIDINE 4 MG: 4 TABLET ORAL at 15:49

## 2025-05-28 RX ADMIN — BUPRENORPHINE AND NALOXONE 1 FILM: 8; 2 FILM, SOLUBLE BUCCAL; SUBLINGUAL at 21:47

## 2025-05-28 RX ADMIN — BISACODYL 5 MG: 5 TABLET, COATED ORAL at 15:43

## 2025-05-28 RX ADMIN — TIZANIDINE 4 MG: 4 TABLET ORAL at 01:43

## 2025-05-28 RX ADMIN — PIPERACILLIN AND TAZOBACTAM 4500 MG: 4; .5 INJECTION, POWDER, LYOPHILIZED, FOR SOLUTION INTRAVENOUS at 12:02

## 2025-05-28 RX ADMIN — ATENOLOL 25 MG: 25 TABLET ORAL at 21:45

## 2025-05-28 RX ADMIN — MICONAZOLE NITRATE: 20 POWDER TOPICAL at 11:51

## 2025-05-28 RX ADMIN — MIDAZOLAM 2 MG: 1 INJECTION INTRAMUSCULAR; INTRAVENOUS at 13:10

## 2025-05-28 RX ADMIN — PIPERACILLIN AND TAZOBACTAM 4500 MG: 4; .5 INJECTION, POWDER, LYOPHILIZED, FOR SOLUTION INTRAVENOUS at 21:44

## 2025-05-28 RX ADMIN — ATENOLOL 25 MG: 25 TABLET ORAL at 15:43

## 2025-05-28 RX ADMIN — INSULIN LISPRO 8 UNITS: 100 INJECTION, SOLUTION INTRAVENOUS; SUBCUTANEOUS at 17:19

## 2025-05-28 RX ADMIN — Medication 10 ML: at 21:14

## 2025-05-28 RX ADMIN — DILTIAZEM HYDROCHLORIDE 240 MG: 240 CAPSULE, COATED, EXTENDED RELEASE ORAL at 15:54

## 2025-05-28 RX ADMIN — PREDNISONE 5 MG: 5 TABLET ORAL at 22:46

## 2025-05-28 RX ADMIN — PREDNISONE 5 MG: 5 TABLET ORAL at 15:43

## 2025-05-28 RX ADMIN — MICONAZOLE NITRATE: 20 POWDER TOPICAL at 21:47

## 2025-05-28 RX ADMIN — BUPRENORPHINE AND NALOXONE 1 FILM: 8; 2 FILM, SOLUBLE BUCCAL; SUBLINGUAL at 13:04

## 2025-05-28 RX ADMIN — TIZANIDINE 4 MG: 4 TABLET ORAL at 21:47

## 2025-05-28 RX ADMIN — PIPERACILLIN AND TAZOBACTAM 4500 MG: 4; .5 INJECTION, POWDER, LYOPHILIZED, FOR SOLUTION INTRAVENOUS at 05:11

## 2025-05-28 RX ADMIN — BISACODYL 5 MG: 5 TABLET, COATED ORAL at 21:45

## 2025-05-28 RX ADMIN — Medication 10 ML: at 12:17

## 2025-05-28 RX ADMIN — INSULIN LISPRO 8 UNITS: 100 INJECTION, SOLUTION INTRAVENOUS; SUBCUTANEOUS at 21:11

## 2025-05-28 ASSESSMENT — PAIN SCALES - GENERAL: PAINLEVEL_OUTOF10: 0

## 2025-05-28 NOTE — CARE COORDINATION
Ss note:5/28/2025.11:04 AM Discharge order noted. Pt awaiting PICC line placement. Plan is [facility] for skilled rehab, PRECERT obtained. Hens completed. Pt son Rojas present today [phone] and aware of plan. Pts SUBOXONE will need sent with pt to the SNF. Arranged PAS wheelchair transport to SNF today at 6:00 pm. Facility liaison, nursing, pt and son aware. CATALINO Solomon

## 2025-05-28 NOTE — PLAN OF CARE
Problem: Safety - Adult  Goal: Free from fall injury  Outcome: Progressing     Problem: Nutrition Deficit:  Goal: Optimize nutritional status  5/28/2025 0358 by Kimber Sims RN  Outcome: Progressing  5/27/2025 1520 by Portia Colon RD, LD  Flowsheets (Taken 5/27/2025 1515)  Nutrient intake appropriate for improving, restoring, or maintaining nutritional needs:   Assess nutritional status and recommend course of action   Monitor oral intake, labs, and treatment plans   Recommend appropriate diets, oral nutritional supplements, and vitamin/mineral supplements   Order, calculate, and assess calorie counts as needed     Problem: Pain  Goal: Verbalizes/displays adequate comfort level or baseline comfort level  Outcome: Progressing     Problem: Chronic Conditions and Co-morbidities  Goal: Patient's chronic conditions and co-morbidity symptoms are monitored and maintained or improved  Outcome: Progressing

## 2025-05-29 VITALS
WEIGHT: 193.6 LBS | TEMPERATURE: 98.4 F | SYSTOLIC BLOOD PRESSURE: 148 MMHG | DIASTOLIC BLOOD PRESSURE: 99 MMHG | OXYGEN SATURATION: 94 % | RESPIRATION RATE: 18 BRPM | HEART RATE: 74 BPM | BODY MASS INDEX: 29.34 KG/M2 | HEIGHT: 68 IN

## 2025-05-29 LAB
ALBUMIN SERPL-MCNC: 3.9 G/DL (ref 3.5–5.2)
ALP SERPL-CCNC: 477 U/L (ref 35–104)
ALT SERPL-CCNC: 137 U/L (ref 0–32)
ANION GAP SERPL CALCULATED.3IONS-SCNC: 25 MMOL/L (ref 7–16)
AST SERPL-CCNC: 81 U/L (ref 0–31)
BASOPHILS # BLD: 0 K/UL (ref 0–0.2)
BASOPHILS NFR BLD: 0 % (ref 0–2)
BILIRUB SERPL-MCNC: 0.4 MG/DL (ref 0–1.2)
BUN SERPL-MCNC: 26 MG/DL (ref 6–20)
CALCIUM SERPL-MCNC: 10.3 MG/DL (ref 8.6–10.2)
CHLORIDE SERPL-SCNC: 91 MMOL/L (ref 98–107)
CO2 SERPL-SCNC: 21 MMOL/L (ref 22–29)
CREAT SERPL-MCNC: 0.6 MG/DL (ref 0.5–1)
EOSINOPHIL # BLD: 0 K/UL (ref 0.05–0.5)
EOSINOPHILS RELATIVE PERCENT: 0 % (ref 0–6)
ERYTHROCYTE [DISTWIDTH] IN BLOOD BY AUTOMATED COUNT: 15.1 % (ref 11.5–15)
GFR, ESTIMATED: >90 ML/MIN/1.73M2
GLUCOSE BLD-MCNC: 169 MG/DL (ref 74–99)
GLUCOSE SERPL-MCNC: 215 MG/DL (ref 74–99)
HCT VFR BLD AUTO: 45.2 % (ref 34–48)
HGB BLD-MCNC: 14.9 G/DL (ref 11.5–15.5)
LYMPHOCYTES NFR BLD: 2.7 K/UL (ref 1.5–4)
LYMPHOCYTES RELATIVE PERCENT: 19 % (ref 20–42)
MCH RBC QN AUTO: 30.2 PG (ref 26–35)
MCHC RBC AUTO-ENTMCNC: 33 G/DL (ref 32–34.5)
MCV RBC AUTO: 91.5 FL (ref 80–99.9)
METAMYELOCYTES ABSOLUTE COUNT: 0.28 K/UL (ref 0–0.12)
METAMYELOCYTES: 2 % (ref 0–1)
MONOCYTES NFR BLD: 0.43 K/UL (ref 0.1–0.95)
MONOCYTES NFR BLD: 3 % (ref 2–12)
MYELOCYTES ABSOLUTE COUNT: 0.71 K/UL
MYELOCYTES: 5 %
NEUTROPHILS NFR BLD: 71 % (ref 43–80)
NEUTS SEG NFR BLD: 10.08 K/UL (ref 1.8–7.3)
PLATELET # BLD AUTO: 288 K/UL (ref 130–450)
PMV BLD AUTO: 10.1 FL (ref 7–12)
POTASSIUM SERPL-SCNC: 3.3 MMOL/L (ref 3.5–5)
PROT SERPL-MCNC: 6.8 G/DL (ref 6.4–8.3)
RBC # BLD AUTO: 4.94 M/UL (ref 3.5–5.5)
SODIUM SERPL-SCNC: 137 MMOL/L (ref 132–146)
WBC OTHER # BLD: 14.2 K/UL (ref 4.5–11.5)

## 2025-05-29 PROCEDURE — 6370000000 HC RX 637 (ALT 250 FOR IP): Performed by: INTERNAL MEDICINE

## 2025-05-29 PROCEDURE — 6360000002 HC RX W HCPCS: Performed by: CLINICAL NURSE SPECIALIST

## 2025-05-29 PROCEDURE — 82962 GLUCOSE BLOOD TEST: CPT

## 2025-05-29 PROCEDURE — 99239 HOSP IP/OBS DSCHRG MGMT >30: CPT | Performed by: INTERNAL MEDICINE

## 2025-05-29 PROCEDURE — 80053 COMPREHEN METABOLIC PANEL: CPT

## 2025-05-29 PROCEDURE — 6360000002 HC RX W HCPCS: Performed by: INTERNAL MEDICINE

## 2025-05-29 PROCEDURE — 2500000003 HC RX 250 WO HCPCS: Performed by: INTERNAL MEDICINE

## 2025-05-29 PROCEDURE — 85025 COMPLETE CBC W/AUTO DIFF WBC: CPT

## 2025-05-29 PROCEDURE — 36415 COLL VENOUS BLD VENIPUNCTURE: CPT

## 2025-05-29 PROCEDURE — 2580000003 HC RX 258: Performed by: CLINICAL NURSE SPECIALIST

## 2025-05-29 RX ORDER — POLYETHYLENE GLYCOL 3350 17 G/17G
17 POWDER, FOR SOLUTION ORAL DAILY PRN
DISCHARGE
Start: 2025-05-29 | End: 2025-06-28

## 2025-05-29 RX ORDER — POLYETHYLENE GLYCOL 3350 17 G/17G
17 POWDER, FOR SOLUTION ORAL DAILY
DISCHARGE
Start: 2025-05-30 | End: 2025-06-29

## 2025-05-29 RX ORDER — INSULIN GLARGINE 100 [IU]/ML
15 INJECTION, SOLUTION SUBCUTANEOUS NIGHTLY
DISCHARGE
Start: 2025-05-29

## 2025-05-29 RX ORDER — INSULIN LISPRO 100 [IU]/ML
0-16 INJECTION, SOLUTION INTRAVENOUS; SUBCUTANEOUS
DISCHARGE
Start: 2025-05-29

## 2025-05-29 RX ORDER — ENOXAPARIN SODIUM 100 MG/ML
40 INJECTION SUBCUTANEOUS DAILY
DISCHARGE
Start: 2025-05-30

## 2025-05-29 RX ORDER — BISACODYL 5 MG/1
5 TABLET, DELAYED RELEASE ORAL 2 TIMES DAILY
DISCHARGE
Start: 2025-05-29

## 2025-05-29 RX ORDER — PANTOPRAZOLE SODIUM 40 MG/1
40 TABLET, DELAYED RELEASE ORAL
DISCHARGE
Start: 2025-05-30

## 2025-05-29 RX ORDER — POTASSIUM CHLORIDE 1500 MG/1
40 TABLET, EXTENDED RELEASE ORAL ONCE
Status: COMPLETED | OUTPATIENT
Start: 2025-05-29 | End: 2025-05-29

## 2025-05-29 RX ADMIN — POLYETHYLENE GLYCOL 3350 17 G: 17 POWDER, FOR SOLUTION ORAL at 08:34

## 2025-05-29 RX ADMIN — TIZANIDINE 4 MG: 4 TABLET ORAL at 02:51

## 2025-05-29 RX ADMIN — PANTOPRAZOLE SODIUM 40 MG: 40 TABLET, DELAYED RELEASE ORAL at 05:26

## 2025-05-29 RX ADMIN — ENOXAPARIN SODIUM 40 MG: 100 INJECTION SUBCUTANEOUS at 08:34

## 2025-05-29 RX ADMIN — POTASSIUM CHLORIDE 40 MEQ: 1500 TABLET, EXTENDED RELEASE ORAL at 09:49

## 2025-05-29 RX ADMIN — TIZANIDINE 4 MG: 4 TABLET ORAL at 08:34

## 2025-05-29 RX ADMIN — ATENOLOL 25 MG: 25 TABLET ORAL at 08:34

## 2025-05-29 RX ADMIN — Medication 10 ML: at 08:46

## 2025-05-29 RX ADMIN — LORAZEPAM 0.5 MG: 0.5 TABLET ORAL at 08:34

## 2025-05-29 RX ADMIN — PREDNISONE 5 MG: 5 TABLET ORAL at 08:34

## 2025-05-29 RX ADMIN — TRAMADOL HYDROCHLORIDE 50 MG: 50 TABLET, COATED ORAL at 05:48

## 2025-05-29 RX ADMIN — MICONAZOLE NITRATE: 20 POWDER TOPICAL at 08:40

## 2025-05-29 RX ADMIN — ONDANSETRON 4 MG: 2 INJECTION, SOLUTION INTRAMUSCULAR; INTRAVENOUS at 08:48

## 2025-05-29 RX ADMIN — BUPRENORPHINE AND NALOXONE 1 FILM: 8; 2 FILM, SOLUBLE BUCCAL; SUBLINGUAL at 08:37

## 2025-05-29 RX ADMIN — PIPERACILLIN AND TAZOBACTAM 4500 MG: 4; .5 INJECTION, POWDER, LYOPHILIZED, FOR SOLUTION INTRAVENOUS at 05:38

## 2025-05-29 RX ADMIN — DILTIAZEM HYDROCHLORIDE 240 MG: 240 CAPSULE, COATED, EXTENDED RELEASE ORAL at 08:36

## 2025-05-29 RX ADMIN — BISACODYL 5 MG: 5 TABLET, COATED ORAL at 08:34

## 2025-05-29 ASSESSMENT — PAIN - FUNCTIONAL ASSESSMENT: PAIN_FUNCTIONAL_ASSESSMENT: PREVENTS OR INTERFERES WITH ALL ACTIVE AND SOME PASSIVE ACTIVITIES

## 2025-05-29 ASSESSMENT — PAIN DESCRIPTION - PAIN TYPE: TYPE: CHRONIC PAIN

## 2025-05-29 ASSESSMENT — PAIN SCALES - GENERAL: PAINLEVEL_OUTOF10: 7

## 2025-05-29 ASSESSMENT — PAIN DESCRIPTION - ORIENTATION: ORIENTATION: LEFT;RIGHT

## 2025-05-29 ASSESSMENT — PAIN DESCRIPTION - DESCRIPTORS: DESCRIPTORS: ACHING;CRAMPING;STABBING

## 2025-05-29 ASSESSMENT — PAIN DESCRIPTION - LOCATION: LOCATION: BACK;LEG

## 2025-05-29 NOTE — DISCHARGE SUMMARY
[facility] Hospitalist       Hospitalist Physician Discharge Summary       [facility]  [address] [address]  [phone]  Go on 5/29/2025  Rehabilitation    Melvin Cardoso MD  [address]  [address]  [facility] [address]  [phone]    Call  Post hospital follow up    Cristina Swartz MD  [address]  [address]  [facility] [address]  [phone]    Call  Post hospital follow up      No future appointments.    Activity level: As tolerated     Diet: ADULT DIET; Regular; 4 carb choices (60 gm/meal); Low Sodium (2 gm)  ADULT ORAL NUTRITION SUPPLEMENT; Breakfast; Standard High Calorie/High Protein Oral Supplement    Labs: As per ID to monitor antibiotics.    Condition at discharge: Stable     Dispo:[facility]    Patient ID:  Li Rojas  [id number]  55 y.o.  1970    Admit date: 5/19/2025    Discharge date and time:  5/29/2025  9:53 AM    Admission Diagnoses: Principal Problem:    Edema  Active Problems:    Asthma    Hypertension    Systemic lupus erythematosus, unspecified (HCC)    Cellulitis    Type 2 diabetes mellitus without complication, without long-term current use of insulin (HCC)    Positive blood cultures    Opioid use disorder, moderate, in sustained remission (HCC)  Resolved Problems:    * No resolved hospital problems. *      Discharge Diagnoses: Principal Problem:    Edema  Active Problems:    Asthma    Hypertension    Systemic lupus erythematosus, unspecified (HCC)    Cellulitis    Type 2 diabetes mellitus without complication, without long-term current use of insulin (HCC)    Positive blood cultures    Opioid use disorder, moderate, in sustained remission (HCC)  Resolved Problems:    * No resolved hospital problems. *      Consults:  IP CONSULT TO SOCIAL WORK  IP CONSULT TO INFECTIOUS DISEASES  IP CONSULT TO VASCULAR ACCESS TEAM    Procedures:  Midline 5/28    HPI:   This is a 55 y.o. female  has a past medical history of

## 2025-05-29 NOTE — PLAN OF CARE
Problem: Skin/Tissue Integrity  Goal: Skin integrity remains intact  Description: 1.  Monitor for areas of redness and/or skin breakdown2.  Assess vascular access sites hourly3.  Every 4-6 hours minimum:  Change oxygen saturation probe site4.  Every 4-6 hours:  If on nasal continuous positive airway pressure, respiratory therapy assess nares and determine need for appliance change or resting period  Outcome: Adequate for Discharge     Problem: Safety - Adult  Goal: Free from fall injury  5/29/2025 0952 by Ginger Calloway RN  Outcome: Adequate for Discharge     Problem: Nutrition Deficit:  Goal: Optimize nutritional status  5/29/2025 0952 by Ginger Calloway RN  Outcome: Adequate for Discharge     Problem: Pain  Goal: Verbalizes/displays adequate comfort level or baseline comfort level  5/29/2025 0952 by Ginger Calloway RN  Outcome: Adequate for Discharge     Problem: Chronic Conditions and Co-morbidities  Goal: Patient's chronic conditions and co-morbidity symptoms are monitored and maintained or improved  5/29/2025 0952 by Ginger Calloway RN  Outcome: Adequate for Discharge     Problem: ABCDS Injury Assessment  Goal: Absence of physical injury  Outcome: Adequate for Discharge

## 2025-05-29 NOTE — PLAN OF CARE
Problem: Safety - Adult  Goal: Free from fall injury  Outcome: Progressing     Problem: Nutrition Deficit:  Goal: Optimize nutritional status  Outcome: Progressing     Problem: Pain  Goal: Verbalizes/displays adequate comfort level or baseline comfort level  Outcome: Progressing     Problem: Chronic Conditions and Co-morbidities  Goal: Patient's chronic conditions and co-morbidity symptoms are monitored and maintained or improved  Outcome: Progressing

## 2025-05-29 NOTE — PROGRESS NOTES
[facility]  [facility]  Progress Note    Chief complain: Bilateral leg cellulitis    SUBJECTIVE:    Patient is awake, alert , tolerating antibiotics well , up in the chair    ROS:  Constitutional:  denies fever , chills   Cardiovascular: denies chest pain or palpitation   Gastrointestinal: . Denies abdominal pain, diarrhea, no nausea or vomiting  Genitourinary:      Denies  dysuria or burning micturition  Musculoskeletal: no aches or pain   Allergic/Immunologic:  No rash or itching     OBJECTIVE:    Vitals:    05/26/25 2030 05/27/25 0744 05/27/25 1006 05/27/25 1515   BP: (!) 152/98 (!) 148/76 138/76    Pulse: 69 70 70    Resp: 19 18     Temp: 98.4 °F (36.9 °C) 97.5 °F (36.4 °C)     TempSrc: Oral Oral     SpO2: 99% 95%     Weight:       Height:    1.727 m (5' 7.99\")       HEENT :         unremarkable   Heart:             RRR,  No murmurs, no gallops  Lungs:            clear to auscultation, no wheezing , no rales  Abdomen:       soft, non tender, bowel sounds present  Extremites:     Dressing present on both lower extremity  Skin:                Normal turgor,normal texture  Lines :             peripheral              Barbosa catheter :   absent  Wound : none present                        Current Facility-Administered Medications   Medication Dose Route Frequency Provider Last Rate Last Admin    insulin glargine (LANTUS) injection vial 15 Units  15 Units SubCUTAneous Nightly Good Shaw MD   15 Units at 05/26/25 2058    piperacillin-tazobactam (ZOSYN) 4,500 mg in sodium chloride 0.9 % 100 mL extended IVPB (addEASE)  4,500 mg IntraVENous Q8H Jason Yates, LOCO - CNS 25 mL/hr at 05/27/25 1252 4,500 mg at 05/27/25 1252    insulin lispro (HUMALOG,ADMELOG) injection vial 0-16 Units  0-16 Units SubCUTAneous 4x Daily AC & HS Danelle Poon, DO   12 Units at 05/27/25 1645    miconazole (MICOTIN) 2 % powder   Topical BID Good Shaw MD   Given at 05/27/25 1026 
       [facility] Hospitalist   Progress Note    Admitting Date and Time: 5/19/2025  2:11 PM  Admit Dx: Edema [R60.9]  Cellulitis of lower extremity, unspecified laterality [L03.119]  Cellulitis [L03.90]    Subjective:    5/25: patient sitting up in chair. She is agreeable to GEORGE placement and choices given to SW today.   Per RN,  worked with PT/OT today.    5/26: Patient sitting up in bed. She was made aware that ID is planning PICC line for IV antibiotics at discharge.    Per RN:  K 3.3 today. Order received for replacement 40 mEq x one.       potassium chloride  40 mEq Oral Once    insulin glargine  15 Units SubCUTAneous Nightly    piperacillin-tazobactam (ZOSYN) 4,500 mg in sodium chloride 0.9 % 100 mL extended IVPB (addEASE)  4,500 mg IntraVENous Q8H    insulin lispro  0-16 Units SubCUTAneous 4x Daily AC & HS    miconazole   Topical BID    [Held by provider] furosemide  40 mg Oral Lunch    LORazepam  0.5 mg Oral BID    dilTIAZem  240 mg Oral Daily    tiZANidine  4 mg Oral Q6H    atenolol  25 mg Oral BID    furosemide  20 mg IntraVENous BID    bisacodyl  5 mg Oral BID    polyethylene glycol  17 g Oral Daily    predniSONE  5 mg Oral TID    pantoprazole  40 mg Oral QAM AC    buprenorphine-naloxone  1 Film SubLINGual BID    sodium chloride flush  10 mL IntraVENous 2 times per day    enoxaparin  40 mg SubCUTAneous Daily     glucose, 4 tablet, PRN  dextrose bolus, 125 mL, PRN   Or  dextrose bolus, 250 mL, PRN  glucagon (rDNA), 1 mg, PRN  dextrose, , Continuous PRN  traMADol, 50 mg, Q6H PRN  albuterol, 2.5 mg, Q4H PRN  sodium chloride flush, 10 mL, PRN  sodium chloride, , PRN  promethazine, 12.5 mg, Q6H PRN   Or  ondansetron, 4 mg, Q6H PRN  polyethylene glycol, 17 g, Daily PRN  acetaminophen, 650 mg, Q6H PRN   Or  acetaminophen, 650 mg, Q6H PRN         Objective:    BP (!) 146/90   Pulse 64   Temp 98.4 °F (36.9 °C) (Oral)   Resp 18   Ht 1.727 m (5' 8\")   Wt 88.8 kg (195 lb 12.8 oz)   LMP 05/04/2020   
       [facility] Hospitalist   Progress Note    Admitting Date and Time: 5/19/2025  2:11 PM  Admit Dx: Edema [R60.9]  Cellulitis of lower extremity, unspecified laterality [L03.119]  Cellulitis [L03.90]    Subjective:    Pt feels tired. She was sitting up in chair.    Per RN: Did not tolerate IV K bolus but did not tolerate the oral potassium       potassium chloride  40 mEq Oral Once    potassium chloride  10 mEq IntraVENous Q1H    [Held by provider] furosemide  40 mg Oral Lunch    LORazepam  0.5 mg Oral BID    dilTIAZem  240 mg Oral Daily    tiZANidine  4 mg Oral Q6H    atenolol  25 mg Oral BID    insulin lispro  0-4 Units SubCUTAneous 4x Daily AC & HS    piperacillin-tazobactam  3,375 mg IntraVENous Q8H    vancomycin  1,250 mg IntraVENous Q12H    furosemide  20 mg IntraVENous BID    bisacodyl  5 mg Oral BID    polyethylene glycol  17 g Oral Daily    predniSONE  5 mg Oral TID    pantoprazole  40 mg Oral QAM AC    buprenorphine-naloxone  1 Film SubLINGual BID    sodium chloride flush  10 mL IntraVENous 2 times per day    enoxaparin  40 mg SubCUTAneous Daily     glucose, 4 tablet, PRN  dextrose bolus, 125 mL, PRN   Or  dextrose bolus, 250 mL, PRN  glucagon (rDNA), 1 mg, PRN  dextrose, , Continuous PRN  traMADol, 50 mg, Q6H PRN  albuterol, 2.5 mg, Q4H PRN  sodium chloride flush, 10 mL, PRN  sodium chloride, , PRN  promethazine, 12.5 mg, Q6H PRN   Or  ondansetron, 4 mg, Q6H PRN  polyethylene glycol, 17 g, Daily PRN  acetaminophen, 650 mg, Q6H PRN   Or  acetaminophen, 650 mg, Q6H PRN         Objective:    /72   Pulse 76   Temp 98.6 °F (37 °C) (Oral)   Resp 16   Ht 1.727 m (5' 7.99\")   Wt 94.9 kg (209 lb 3.2 oz)   LMP 05/04/2020   SpO2 91%   BMI 31.82 kg/m²   General Appearance: alert and oriented to person, place and time and in no acute distress  Skin: warm and dry  Head: normocephalic and atraumatic  Eyes: pupils equal, round, and reactive to light, extraocular eye movements intact, 
       [facility] Hospitalist   Progress Note    Admitting Date and Time: 5/19/2025  2:11 PM  Admit Dx: Edema [R60.9]  Cellulitis of lower extremity, unspecified laterality [L03.119]  Cellulitis [L03.90]    Subjective:    Patient was seen sitting up in the chair, no acute or new complains, she continues to have some low back pain and leg pain, she is working with physical therapy, no fever, chills, cough, SOB, chest discomfort, no NV/abd pain.       piperacillin-tazobactam (ZOSYN) 4,500 mg in sodium chloride 0.9 % 100 mL extended IVPB (addEASE)  4,500 mg IntraVENous Q8H    miconazole   Topical BID    [Held by provider] furosemide  40 mg Oral Lunch    LORazepam  0.5 mg Oral BID    dilTIAZem  240 mg Oral Daily    tiZANidine  4 mg Oral Q6H    atenolol  25 mg Oral BID    insulin lispro  0-4 Units SubCUTAneous 4x Daily AC & HS    furosemide  20 mg IntraVENous BID    bisacodyl  5 mg Oral BID    polyethylene glycol  17 g Oral Daily    predniSONE  5 mg Oral TID    pantoprazole  40 mg Oral QAM AC    buprenorphine-naloxone  1 Film SubLINGual BID    sodium chloride flush  10 mL IntraVENous 2 times per day    enoxaparin  40 mg SubCUTAneous Daily     glucose, 4 tablet, PRN  dextrose bolus, 125 mL, PRN   Or  dextrose bolus, 250 mL, PRN  glucagon (rDNA), 1 mg, PRN  dextrose, , Continuous PRN  traMADol, 50 mg, Q6H PRN  albuterol, 2.5 mg, Q4H PRN  sodium chloride flush, 10 mL, PRN  sodium chloride, , PRN  promethazine, 12.5 mg, Q6H PRN   Or  ondansetron, 4 mg, Q6H PRN  polyethylene glycol, 17 g, Daily PRN  acetaminophen, 650 mg, Q6H PRN   Or  acetaminophen, 650 mg, Q6H PRN         Objective:    BP (!) 157/91   Pulse 59   Temp 98.4 °F (36.9 °C) (Oral)   Resp 18   Ht 1.727 m (5' 8\")   Wt 88.8 kg (195 lb 12.8 oz)   LMP 05/04/2020   SpO2 93%   BMI 29.77 kg/m²   General Appearance: alert   Skin: warm and dry  Head: normocephalic and atraumatic  Eyes: pupils equal, round, and reactive to light, extraocular eye movements 
       [facility] Hospitalist   Progress Note    Admitting Date and Time: 5/19/2025  2:11 PM  Admit Dx: Edema [R60.9]  Cellulitis of lower extremity, unspecified laterality [L03.119]  Cellulitis [L03.90]    Subjective:    Patient sitting up in chair. She is agreeable to GEORGE placement and choices given to SW today.     Per RN:  worked with PT/OT today.       cefepime  2,000 mg IntraVENous q8h    miconazole   Topical BID    [Held by provider] furosemide  40 mg Oral Lunch    LORazepam  0.5 mg Oral BID    dilTIAZem  240 mg Oral Daily    tiZANidine  4 mg Oral Q6H    atenolol  25 mg Oral BID    insulin lispro  0-4 Units SubCUTAneous 4x Daily AC & HS    furosemide  20 mg IntraVENous BID    bisacodyl  5 mg Oral BID    polyethylene glycol  17 g Oral Daily    predniSONE  5 mg Oral TID    pantoprazole  40 mg Oral QAM AC    buprenorphine-naloxone  1 Film SubLINGual BID    sodium chloride flush  10 mL IntraVENous 2 times per day    enoxaparin  40 mg SubCUTAneous Daily     glucose, 4 tablet, PRN  dextrose bolus, 125 mL, PRN   Or  dextrose bolus, 250 mL, PRN  glucagon (rDNA), 1 mg, PRN  dextrose, , Continuous PRN  traMADol, 50 mg, Q6H PRN  albuterol, 2.5 mg, Q4H PRN  sodium chloride flush, 10 mL, PRN  sodium chloride, , PRN  promethazine, 12.5 mg, Q6H PRN   Or  ondansetron, 4 mg, Q6H PRN  polyethylene glycol, 17 g, Daily PRN  acetaminophen, 650 mg, Q6H PRN   Or  acetaminophen, 650 mg, Q6H PRN         Objective:    BP (!) 149/79   Pulse 66   Temp 98.2 °F (36.8 °C) (Oral)   Resp 18   Ht 1.727 m (5' 8\")   Wt 96.2 kg (212 lb)   LMP 05/04/2020   SpO2 95%   BMI 32.23 kg/m²   General Appearance: alert  and in no acute distress  Skin: warm and dry  Head: normocephalic and atraumatic  Eyes: pupils equal, round, and reactive to light, extraocular eye movements intact, conjunctivae normal  Neck: neck supple and non tender without mass   Pulmonary/Chest: clear to auscultation bilaterally- no wheezes, rales or rhonchi, 
       [facility] Hospitalist   Progress Note    Admitting Date and Time: 5/19/2025  2:11 PM  Admit Dx: Edema [R60.9]  Cellulitis of lower extremity, unspecified laterality [L03.119]  Cellulitis [L03.90]    Subjective:    Patient was seen sitting up in the chair, compress low back pain and leg pain, she is working with physical therapy       cefepime  2,000 mg IntraVENous q8h    miconazole   Topical BID    [Held by provider] furosemide  40 mg Oral Lunch    LORazepam  0.5 mg Oral BID    dilTIAZem  240 mg Oral Daily    tiZANidine  4 mg Oral Q6H    atenolol  25 mg Oral BID    insulin lispro  0-4 Units SubCUTAneous 4x Daily AC & HS    furosemide  20 mg IntraVENous BID    bisacodyl  5 mg Oral BID    polyethylene glycol  17 g Oral Daily    predniSONE  5 mg Oral TID    pantoprazole  40 mg Oral QAM AC    buprenorphine-naloxone  1 Film SubLINGual BID    sodium chloride flush  10 mL IntraVENous 2 times per day    enoxaparin  40 mg SubCUTAneous Daily     glucose, 4 tablet, PRN  dextrose bolus, 125 mL, PRN   Or  dextrose bolus, 250 mL, PRN  glucagon (rDNA), 1 mg, PRN  dextrose, , Continuous PRN  traMADol, 50 mg, Q6H PRN  albuterol, 2.5 mg, Q4H PRN  sodium chloride flush, 10 mL, PRN  sodium chloride, , PRN  promethazine, 12.5 mg, Q6H PRN   Or  ondansetron, 4 mg, Q6H PRN  polyethylene glycol, 17 g, Daily PRN  acetaminophen, 650 mg, Q6H PRN   Or  acetaminophen, 650 mg, Q6H PRN         Objective:    /81   Pulse 67   Temp 98.4 °F (36.9 °C) (Oral)   Resp 18   Ht 1.727 m (5' 8\")   Wt 96.2 kg (212 lb)   LMP 05/04/2020   SpO2 96%   BMI 32.23 kg/m²   General Appearance: alert  and in no acute distress  Skin: warm and dry  Head: normocephalic and atraumatic  Eyes: pupils equal, round, and reactive to light, extraocular eye movements intact, conjunctivae normal  Neck: neck supple and non tender without mass   Pulmonary/Chest: clear to auscultation bilaterally- no wheezes, rales or rhonchi, normal air movement, no 
       [facility] Hospitalist   Progress Note    Admitting Date and Time: 5/19/2025  2:11 PM  Admit Dx: Edema [R60.9]  Cellulitis of lower extremity, unspecified laterality [L03.119]  Cellulitis [L03.90]    Subjective:    5/25: patient sitting up in chair. She is agreeable to GEORGE placement and choices given to SW today.   Per RN,  worked with PT/OT today.    5/26: Patient sitting up in bed. She was made aware that ID is planning PICC line for IV antibiotics at discharge.  Per RN,  K 3.3 today. Order received for replacement 40 mEq x one.    5/27: Patient initially declined to do PICC line as she wanted to speak to the physician who ordered it. I explained to her that ID ordered this line as she will need 4 weeks of IV antibiotics and she was counseled about this yesterday by ID NP and myself. She states she had to discuss this with her mother. Explained this has pushed back time line could get done and would likely delay discharge.   Per RN, PICC team will be back once consent is signed. Patient would not sign when they came to room to place line.    5/28: Patient just came back from special procedures.  They were unable to place a PICC line and instead put a midline.  Patient's states it was very uncomfortable and she is in a lot of pain.  She does not feel she can be transferred tonight to SNF and wants to defer until the morning.    Per RN: Patient needed sedation prior to going down to special procedures.     insulin glargine  15 Units SubCUTAneous Nightly    piperacillin-tazobactam (ZOSYN) 4,500 mg in sodium chloride 0.9 % 100 mL extended IVPB (addEASE)  4,500 mg IntraVENous Q8H    insulin lispro  0-16 Units SubCUTAneous 4x Daily AC & HS    miconazole   Topical BID    furosemide  40 mg Oral Lunch    LORazepam  0.5 mg Oral BID    dilTIAZem  240 mg Oral Daily    tiZANidine  4 mg Oral Q6H    atenolol  25 mg Oral BID    bisacodyl  5 mg Oral BID    polyethylene glycol  17 g Oral Daily    predniSONE  5 mg 
       [facility] Hospitalist   Progress Note    Admitting Date and Time: 5/19/2025  2:11 PM  Admit Dx: Edema [R60.9]  Cellulitis of lower extremity, unspecified laterality [L03.119]  Cellulitis [L03.90]    Subjective:    5/25: patient sitting up in chair. She is agreeable to GEORGE placement and choices given to SW today.   Per RN,  worked with PT/OT today.    5/26: Patient sitting up in bed. She was made aware that ID is planning PICC line for IV antibiotics at discharge.  Per RN,  K 3.3 today. Order received for replacement 40 mEq x one.    5/27: Patient initially declined to do PICC line as she wanted to speak to the physician who ordered it. I explained to her that ID ordered this line as she will need 4 weeks of IV antibiotics and she was counseled about this yesterday by ID NP and myself. She states she had to discuss this with her mother. Explained this has pushed back time line could get done and would likely delay discharge.     Per RN: PICC team will be back once consent is signed. Patient would not sign when they came to room to place line.     insulin glargine  15 Units SubCUTAneous Nightly    piperacillin-tazobactam (ZOSYN) 4,500 mg in sodium chloride 0.9 % 100 mL extended IVPB (addEASE)  4,500 mg IntraVENous Q8H    insulin lispro  0-16 Units SubCUTAneous 4x Daily AC & HS    miconazole   Topical BID    furosemide  40 mg Oral Lunch    LORazepam  0.5 mg Oral BID    dilTIAZem  240 mg Oral Daily    tiZANidine  4 mg Oral Q6H    atenolol  25 mg Oral BID    bisacodyl  5 mg Oral BID    polyethylene glycol  17 g Oral Daily    predniSONE  5 mg Oral TID    pantoprazole  40 mg Oral QAM AC    buprenorphine-naloxone  1 Film SubLINGual BID    sodium chloride flush  10 mL IntraVENous 2 times per day    enoxaparin  40 mg SubCUTAneous Daily     glucose, 4 tablet, PRN  dextrose bolus, 125 mL, PRN   Or  dextrose bolus, 250 mL, PRN  glucagon (rDNA), 1 mg, PRN  dextrose, , Continuous PRN  traMADol, 50 mg, Q6H 
     Nutrition Note    RD contacted by ambassador that pt requested Dominic be discontinued as she does not like and is not drinking. Will discontinue.     Electronically signed by Portia Colon, MARIA G, LD on 5/27/25 at 8:49 AM EDT    Contact: x [phone]    
    Hospitalist Progress Note      Synopsis:  Leg Pain (Bilat leg pain. Seeping wounds. Poss cellulitis. )   Patient admitted on 5/19/2025 55 y.o. female  has a past medical history of Arthritis, Asthma, H/O medication noncompliance, Hyperlipidemia, Hypertension, Lupus, MS (multiple sclerosis) ([facility]), Multiple sclerosis ([facility]), Nicotine dependence, cigarettes, uncomplicated, Palpitations, SOB (shortness of breath), SVT (supraventricular tachycardia), and Systemic lupus erythematosus, unspecified ([facility]). presented with Leg swelling, pain, difficulty in walking  for last few weeks .  Patient was admitted from 4/13-4/25 at [facility] for bilateral lower extremity cellulitis, edema, anaerobic bacteremia and discharged on Augmentin. She now reports worsening leg pain and swelling, making it difficult to walk. The patient was seen and examined at bedside, appears alert and awake with no acute distress and is able to answer simple  questions. On direct questioning, patient denied any  resting ongoing chest pain, resting SOB, hemoptysis, productive cough, fever, ongoing palpitation, active abdominal pain, hematemesis, rectal bleeding, zabrina, hematuria, any other  and GI complaints, and any new focal neuro deficits.     ASSESSMENT:    Principal Problem:    Edema  Active Problems:    Asthma    Hypertension    Systemic lupus erythematosus, unspecified ([facility])    Cellulitis    Type 2 diabetes mellitus without complication, without long-term current use of insulin ([facility])  Resolved Problems:    * No resolved hospital problems. *       PLAN:    Bilateral leg swelling, redness - cellulitis              H/o Gram neg bacteremia              IV Vanc + Zosyn plan to transition to Zyvox oral with discharge              Wound care              PT/OT              PRN pain meds  Procalcitonin 0.15    H/o SVT              Currently rate controlled with po meds              Continue home medications as in chart              
    Pharmacist Review and Automatic Dose Adjustment of Extended Antibiotic Infusions    The reviewing pharmacist has made an adjustment to the ordered Zosyn dose per the approved [facility] protocol and table as identified below.      Berlin Fay MD,    Li Rojas is a 55 y.o. female.     Renal Function Assessment:    Date Body Weight IBW  Adjusted BW SCr  CrCl Dialysis status   5/25/2025 88.8 kg (195 lb 12.8 oz)  Ideal body weight: 63.9 kg (140 lb 14 oz)  Adjusted ideal body weight: 73.9 kg (162 lb 13.5 oz) Serum creatinine: 0.4 mg/dL (L) 05/23/25 0457  Estimated creatinine clearance: 185 mL/min (A) N/a     Estimated Creatinine Clearance: 185 mL/min (A) (based on SCr of 0.4 mg/dL (L)).    Recent Labs     05/23/25 0457   CREATININE 0.4*       Height:   Ht Readings from Last 1 Encounters:   05/23/25 1.727 m (5' 8\")     Weight:  Wt Readings from Last 1 Encounters:   05/25/25 88.8 kg (195 lb 12.8 oz)           Plan: Based upon the patient's weight and renal function, the ordered Zosyn dose of 3375 mg every 8 hours has been changed/converted to 4500 mg every 8 hours.      Thank you,  Sagar Hatfield, [facility]  5/25/2025   11:40 AM       
    Pharmacist Review and Automatic Dose Adjustment of Extended Antibiotic Infusions    The reviewing pharmacist has made an adjustment to the ordered Zosyn dose per the approved [facility] protocol and table as identified below.      Berlin Fay MD,    Li Rojas is a 55 y.o. female.     Renal Function Assessment:    Date Body Weight IBW  Adjusted BW SCr  CrCl Dialysis status   5/25/2025 88.8 kg (195 lb 12.8 oz)  Ideal body weight: 63.9 kg (140 lb 14 oz)  Adjusted ideal body weight: 73.9 kg (162 lb 13.5 oz) Serum creatinine: 0.4 mg/dL (L) 05/23/25 0457  Estimated creatinine clearance: 185 mL/min (A) N/a     Estimated Creatinine Clearance: 185 mL/min (A) (based on SCr of 0.4 mg/dL (L)).    Recent Labs     05/23/25 0457   CREATININE 0.4*       Height:   Ht Readings from Last 1 Encounters:   05/23/25 1.727 m (5' 8\")     Weight:  Wt Readings from Last 1 Encounters:   05/25/25 88.8 kg (195 lb 12.8 oz)           Plan: Based upon the patient's weight and renal function, the ordered Zosyn dose of 3375 mg every 8 hours has been changed/converted to 4500 mg every 8 hours.      Thank you,  Sagar Hatfield, [facility]  5/25/2025   11:40 AM       
  Physician Progress Note      PATIENT:               JYOTI LUONG  CSN #:                  [id number]  :                       1970  ADMIT DATE:       2025 2:11 PM  DISCH DATE:  RESPONDING  PROVIDER #:        Good Shaw MD          QUERY TEXT:    Bilateral lower extremity cellulitis is documented in the medical record per   PCP. WBC 17.8, Procal 0.15, CRP 39.5, Vitals 98.4 80 18 147/91. Please clarify   any associated diagnoses:    The clinical indicators include:  Pt admitted with leg swelling, pain, difficulty in walking for last few weeks   with bilateral lower extremity cellulitis and gram-negative bacteremia.  - presented with Leg swelling, pain, difficulty in walking  for last few weeks   prior to arrival to the hospital.  She has bilateral lower extremity   cellulitis and gram-negative bacteremia, treated with antibiotics, and   discharged on Augmentin (H/P)  - Bilateral leg swelling, redness - cellulitis H/o Gram neg bacteremia IV Vanc   + Zosyn (PCP )  -Treated with IV ATB  Options provided:  -- Sepsis, present on admission  -- Sepsis was ruled out  -- Other - I will add my own diagnosis  -- Disagree - Not applicable / Not valid  -- Disagree - Clinically unable to determine / Unknown  -- Refer to Clinical Documentation Reviewer    PROVIDER RESPONSE TEXT:    This patient has sepsis which was present on admission.    Query created by: Camila Harper on 2025 3:50 PM      Electronically signed by:  Good Shaw MD 2025 6:36 AM          
4 Eyes Skin Assessment     NAME:  Li Rojas  YOB: 1970  MEDICAL RECORD NUMBER:  [id number]    The patient is being assessed for  Admission    I agree that at least one RN has performed a thorough Head to Toe Skin Assessment on the patient. ALL assessment sites listed below have been assessed.      Areas assessed by both nurses:    Head, Face, Ears, Shoulders, Back, Chest, Arms, Elbows, Hands, Sacrum. Buttock, Coccyx, Ischium, Legs. Feet and Heels, and Under Medical Devices         Does the Patient have a Wound? Yes wound(s) were present on assessment. LDA wound assessment was Initiated and completed by RN       Jad Prevention initiated by RN: Yes  Wound Care Orders initiated by RN: Yes    Pressure Injury (Stage 3,4, Unstageable, DTI, NWPT, and Complex wounds) if present, place Wound referral order by RN under : Yes    New Ostomies, if present place, Ostomy referral order under : No     Nurse 1 eSignature: Electronically signed by Lily Plata RN on 5/19/25 at 11:53 PM EDT    **SHARE this note so that the co-signing nurse can place an eSignature**    Nurse 2 eSignature: Electronically signed by Keara Gonzalez RN on 5/20/25 at 12:00 AM EDT  
Antibiotic Extended Infusion Policy     This patient is on medication that requires renal, weight, and/or indication dose adjustment.      Date Body Weight IBW  Adjusted BW SCr  CrCl Dialysis status BMI   5/23/2025 96.2 kg (212 lb) Ideal body weight: 63.9 kg (140 lb 14 oz)  Adjusted ideal body weight: 76.8 kg (169 lb 5.2 oz) Serum creatinine: 0.4 mg/dL (L) 05/23/25 0457  Estimated creatinine clearance: 193 mL/min (A) N/a Body mass index is 32.23 kg/m².       Pharmacy has dose-adjusted the following medication(s):    Ordered Medication: Cefepime 2000mg q12h     Order Changed/converted to: Cefepime 2000mg q8h (dose adjusted for Bloodstream infection indication and extended infusion dosing per protocol)    These changes were made per protocol according to the [facility]   Automatic Extended Infusion Dose Adjustment Policy.     *Please note this dose may need readjusted if patient's condition changes.    Please contact pharmacy with any questions regarding these changes.    Sagar Mcmahon RPH  5/23/2025  12:19 PM    
[facility]  [facility]  Progress Note  CC: bilateral leg cellulitis, bacteremia  Face to face encounter   SUBJECTIVE:  5/26/2025  Sitting up in chair. Updated patient on plans.  On room air.   Has been afebrile.     5/25/2025-  Patient is sitting up in bed- reports some abdominal pain.   Has been afebrile. Legs still with pain.   No nausea, vomiting, diarrhea.    Medications:  Scheduled Meds:   piperacillin-tazobactam (ZOSYN) 4,500 mg in sodium chloride 0.9 % 100 mL extended IVPB (addEASE)  4,500 mg IntraVENous Q8H    insulin glargine  10 Units SubCUTAneous Nightly    insulin lispro  0-16 Units SubCUTAneous 4x Daily AC & HS    miconazole   Topical BID    [Held by provider] furosemide  40 mg Oral Lunch    LORazepam  0.5 mg Oral BID    dilTIAZem  240 mg Oral Daily    tiZANidine  4 mg Oral Q6H    atenolol  25 mg Oral BID    furosemide  20 mg IntraVENous BID    bisacodyl  5 mg Oral BID    polyethylene glycol  17 g Oral Daily    predniSONE  5 mg Oral TID    pantoprazole  40 mg Oral QAM AC    buprenorphine-naloxone  1 Film SubLINGual BID    sodium chloride flush  10 mL IntraVENous 2 times per day    enoxaparin  40 mg SubCUTAneous Daily     Continuous Infusions:   dextrose      sodium chloride       PRN Meds:glucose, dextrose bolus **OR** dextrose bolus, glucagon (rDNA), dextrose, traMADol, albuterol, sodium chloride flush, sodium chloride, promethazine **OR** ondansetron, polyethylene glycol, acetaminophen **OR** acetaminophen  OBJECTIVE:  Patient Vitals for the past 24 hrs:   BP Temp Temp src Pulse Resp SpO2   05/26/25 0831 (!) 146/90 98.4 °F (36.9 °C) Oral 64 18 97 %   05/25/25 2147 -- -- -- -- 18 --   05/25/25 2119 (!) 125/94 98.2 °F (36.8 °C) Oral 72 19 99 %     General Appearance: alert, oriented x3, sitting up in chair.   Eyes: PERRLA, EOMI  HENT: mucous membranes moist, normocephalic atraumatic  Neck: supple, no masses  Cardiovascular: normal heart sounds, regular rate, regular 
Called Dr Tello office regarding patient was not able to get PICC and got MID line in SP. Left message with front office for  to call to give him update.  
Comprehensive Nutrition Assessment    Type and Reason for Visit:  Initial, Wound, Positive nutrition screen    Nutrition Recommendations/Plan:   Continue Current Diet (Regular)  Begin ONS (wound) BID  Begin ONS (low rosmery/high protein) BID  Consult RD as needed      Malnutrition Assessment:  Malnutrition Status:  At risk for malnutrition (05/20/25 1136)    Context:  Chronic Illness     Findings of the 6 clinical characteristics of malnutrition:  Energy Intake:  No decrease in energy intake  Weight Loss:  No weight loss     Body Fat Loss:  No body fat loss     Muscle Mass Loss:  No muscle mass loss    Fluid Accumulation:  Severe Extremities   Strength:  Not Performed    Nutrition Assessment:    Pt admit w/ BLLE cellulitis w/ swelling, Sacral PI stage 4, Buttocks and R,L pre-tibial wounds. PMHx: Arthritis, Lupus, Ashtma, MS, SVT, Intra-abd abscess. Pt hospitalized 3wks ago at [facility] for cellulitis and sepsis. Pt reports fair appetite eating 50-75% of all meals. Will provide Dominic BID, ONS BID, continue inpatient monitoring, f/up per policy.    Nutrition Related Findings:    A/O x4, I/O not recorded, abd wdl, +BS x4, +2 weeping edema, e'lytes wnl Wound Type: Multiple, Pressure Injury, Unstageable, Open Wounds       Current Nutrition Intake & Therapies:    Average Meal Intake: 51-75%  Average Supplements Intake: None Ordered  ADULT DIET; Regular  ADULT ORAL NUTRITION SUPPLEMENT; Lunch, Dinner; Wound Healing Oral Supplement  ADULT ORAL NUTRITION SUPPLEMENT; Dinner, Breakfast; Low Calorie/High Protein Oral Supplement    Anthropometric Measures:  Height: 172.7 cm (5' 7.99\")  Ideal Body Weight (IBW): 140 lbs (64 kg)    Admission Body Weight: 97.5 kg (214 lb 15.2 oz) (05/19/25 stated)  Current Body Weight: 97.5 kg (214 lb 15.2 oz), 153.5 % IBW. Weight Source: Stated (05/19/25)  Current BMI (kg/m2): 32.7  Usual Body Weight: 83.5 kg (184 lb 1.4 oz) (02/27/24 stated)     % Weight Change (Calculated): 16.8  Weight Adjustment 
Comprehensive Nutrition Assessment    Type and Reason for Visit:  Reassess    Nutrition Recommendations/Plan:   Continue Current Diet (Regular 4 CHO)  Continue ONS (low rosmery/high protein) BID  Consult RD as needed      Malnutrition Assessment:  Malnutrition Status:  At risk for malnutrition (05/20/25 1136)    Context:  Chronic Illness     Findings of the 6 clinical characteristics of malnutrition:  Energy Intake:  No decrease in energy intake  Weight Loss:  No weight loss     Body Fat Loss:  No body fat loss     Muscle Mass Loss:  No muscle mass loss    Fluid Accumulation:  Severe Extremities   Strength:  Not Performed    Nutrition Assessment:    Pt admit w/ BLLE cellulitis w/ swelling, Sacral PI stage 4, Buttocks and R,L pre-tibial wounds. PMHx: Arthritis, Lupus, Ashtma, MS, SVT, Intra-abd abscess. Pt hospitalized 3wks ago at [facility] for cellulitis and sepsis. Pt reports fair appetite eating 50-75% of all meals. Will provide Dominic BID, ONS BID, continue inpatient monitoring, f/up per policy. 05/27/25: F/up: Pt w/ PICC line for IV abx at discharge, planning underway. Pt does not care for Dominic will discontinue. Pt eating % of all meals and ONS. Will continue inpatient monitoring, f/up per policy.    Nutrition Related Findings:    A/O x 1, abd wdl, +BS x 4, I/O -4418 since admit, labs noted Wound Type: Multiple, Pressure Injury, Open Wounds, Unstageable       Current Nutrition Intake & Therapies:    Average Meal Intake: %  Average Supplements Intake: 1-25%  ADULT ORAL NUTRITION SUPPLEMENT; Dinner, Breakfast; Low Calorie/High Protein Oral Supplement  ADULT DIET; Regular; 4 carb choices (60 gm/meal); Low Sodium (2 gm)    Anthropometric Measures:  Height: 172.7 cm (5' 7.99\")  Ideal Body Weight (IBW): 140 lbs (64 kg)    Admission Body Weight: 97.5 kg (214 lb 15.2 oz) (05/19/25 stated)  Current Body Weight: 88.8 kg (195 lb 12.3 oz), 139.8 % IBW. Weight Source: Standing scale (05/25/25)  Current BMI (kg/m2): 
Lock box on small side suboxe was placed on admission . 28 films counted in front of pt with charge Vera.  
[facility]  [facility]  Progress Note  CC: bilateral leg cellulitis, bacteremia  Face to face encounter   SUBJECTIVE:  5/23/2025  Patient is sitting up at bedside. Reports legs are sore. Ace wrap in place.  Has been afebrile.   Tolerating antibiotics.   Patient is tolerating medications. No reported adverse drug reactions.  No nausea, vomiting, diarrhea.    Medications:  Scheduled Meds:   [Held by provider] furosemide  40 mg Oral Lunch    LORazepam  0.5 mg Oral BID    dilTIAZem  240 mg Oral Daily    tiZANidine  4 mg Oral Q6H    atenolol  25 mg Oral BID    insulin lispro  0-4 Units SubCUTAneous 4x Daily AC & HS    vancomycin  1,250 mg IntraVENous Q12H    furosemide  20 mg IntraVENous BID    bisacodyl  5 mg Oral BID    polyethylene glycol  17 g Oral Daily    predniSONE  5 mg Oral TID    pantoprazole  40 mg Oral QAM AC    buprenorphine-naloxone  1 Film SubLINGual BID    sodium chloride flush  10 mL IntraVENous 2 times per day    enoxaparin  40 mg SubCUTAneous Daily     Continuous Infusions:   dextrose      sodium chloride       PRN Meds:glucose, dextrose bolus **OR** dextrose bolus, glucagon (rDNA), dextrose, traMADol, albuterol, sodium chloride flush, sodium chloride, promethazine **OR** ondansetron, polyethylene glycol, acetaminophen **OR** acetaminophen  OBJECTIVE:  Patient Vitals for the past 24 hrs:   BP Temp Temp src Pulse Resp SpO2 Height Weight   05/23/25 1015 (!) 145/91 97.7 °F (36.5 °C) Oral 68 17 97 % -- --   05/23/25 0912 139/86 -- -- 80 -- -- 1.727 m (5' 8\") 96.2 kg (212 lb)   05/23/25 0653 -- -- -- -- -- -- -- 96.6 kg (212 lb 14.4 oz)   05/23/25 0515 -- -- -- -- -- -- -- 96.6 kg (212 lb 14.4 oz)   05/22/25 2232 -- -- -- -- 16 -- -- --   05/22/25 2223 -- -- -- -- 16 -- -- --   05/22/25 2202 -- -- -- -- 16 -- -- --   05/22/25 2155 139/86 -- -- 80 -- -- -- --   05/22/25 2030 139/86 97.9 °F (36.6 °C) Oral 80 18 95 % -- --     Body Habitus: normal/healthy weight   General 
OCCUPATIONAL THERAPY INITIAL EVALUATION    [facility]  [address] [place]. [place]        Date:2025                                                  Patient Name: Li Rojas    MRN: [id number]    : 1970    Room: [facility]0437-      Evaluating OT: Chelsy Trujillo OTR/L;  [id number]     Referring Provider and Specific Provider Orders/Date:      25 07  OT eval and treat  Start:  25 07,   End:  25 07,   ONE TIME,   Standing Count:  1 Occurrences,   R         Yonathan Montero MD      Placement Recommendation: Acute versus subcaute rehab       Diagnosis:   1. Cellulitis of lower extremity, unspecified laterality         Surgery: none      Pertinent Medical History:       Past Medical History:   Diagnosis Date    Arthritis     rheumatoid    Asthma     H/O medication noncompliance     Hyperlipidemia     Hypertension     Lupus     MS (multiple sclerosis) (HCC)     Multiple sclerosis (HCC)     Nicotine dependence, cigarettes, uncomplicated     Palpitations     SOB (shortness of breath)     SVT (supraventricular tachycardia)     Systemic lupus erythematosus, unspecified (HCC)     Type 2 diabetes mellitus without complication, without long-term current use of insulin ([facility]) 2025         Past Surgical History:   Procedure Laterality Date    ABDOMEN SURGERY      ANTERIOR CRUCIATE LIGAMENT REPAIR      APPENDECTOMY       SECTION      x5    CHOLECYSTECTOMY      DILATION AND CURETTAGE OF UTERUS      HC INJECTION PROCEDURE FOR SACROILIAC JOINT Left 12/3/2020    LEFT SACROILIAC JOINT STEROID INJECTION UNDER X-RAY GUIDANCE performed by Fauzia Crowell DO at [facility] OR    NERVE BLOCK Left 10/01/2020    NERVE BLOCK Left 10/1/2020    LEFT L4-5 TRANSFORAMINAL EPIDURAL STEROID INJECTION performed by Fauzia Crowell DO at [facility] OR    OTHER SURGICAL HISTORY Left 2020    LEFT SACROILIAC STEROID INJECTION 
OCCUPATIONAL THERAPY Treatment Note  [facility]  [address]  [place]. [place]        Date:2025                                                  Patient Name: Li Rojas    MRN: [id number]    : 1970    Room: [facility]0437-01      Evaluating OT: Chelsy Trujillo OTR/L;  [id number]     Referring Provider and Specific Provider Orders/Date:      25 07  OT eval and treat  Start:  25 07,   End:  25 07,   ONE TIME,   Standing Count:  1 Occurrences,   R         Yonathan Montero MD      Placement Recommendation: Acute versus subcaute rehab       Diagnosis:   1. Cellulitis of lower extremity, unspecified laterality         Surgery: none      Pertinent Medical History:       Past Medical History:   Diagnosis Date    Arthritis     rheumatoid    Asthma     H/O medication noncompliance     Hyperlipidemia     Hypertension     Lupus     MS (multiple sclerosis) (HCC)     Multiple sclerosis (HCC)     Nicotine dependence, cigarettes, uncomplicated     Palpitations     SOB (shortness of breath)     SVT (supraventricular tachycardia)     Systemic lupus erythematosus, unspecified (HCC)     Type 2 diabetes mellitus without complication, without long-term current use of insulin ([facility]) 2025         Past Surgical History:   Procedure Laterality Date    ABDOMEN SURGERY      ANTERIOR CRUCIATE LIGAMENT REPAIR      APPENDECTOMY       SECTION      x5    CHOLECYSTECTOMY      DILATION AND CURETTAGE OF UTERUS      HC INJECTION PROCEDURE FOR SACROILIAC JOINT Left 12/3/2020    LEFT SACROILIAC JOINT STEROID INJECTION UNDER X-RAY GUIDANCE performed by Fauzia Crowlel DO at [facility] OR    NERVE BLOCK Left 10/01/2020    NERVE BLOCK Left 10/1/2020    LEFT L4-5 TRANSFORAMINAL EPIDURAL STEROID INJECTION performed by Fauzia Crowell DO at [facility] OR    OTHER SURGICAL HISTORY Left 2020    LEFT SACROILIAC STEROID INJECTION UNDER 
OCCUPATIONAL THERAPY Treatment note  [facility]  [address]    Date:2025                                                   Patient Name: Li Rojas     MRN: [id number]     : 1970     Room: 0436/0436-02     EVAL  Chelsy Tuckerara OTR/L;  [id number]      Referring Provider and Specific Provider Orders/Date:      25 07   OT eval and treat  Start:  25 0700,   End:  25 0700,   ONE TIME,   Standing Count:  1 Occurrences,   R         Yonathan Montero MD       Placement Recommendation: Acute versus subcaute rehab        Diagnosis:   1. Cellulitis of lower extremity, unspecified laterality         Surgery: none       Pertinent Medical History:       Past Medical History        Past Medical History:   Diagnosis Date    Arthritis       rheumatoid    Asthma      H/O medication noncompliance      Hyperlipidemia      Hypertension      Lupus      MS (multiple sclerosis) (HCC)      Multiple sclerosis (HCC)      Nicotine dependence, cigarettes, uncomplicated      Palpitations      SOB (shortness of breath)      SVT (supraventricular tachycardia)      Systemic lupus erythematosus, unspecified (HCC)      Type 2 diabetes mellitus without complication, without long-term current use of insulin ([facility]) 2025            Past Surgical History         Past Surgical History:   Procedure Laterality Date    ABDOMEN SURGERY        ANTERIOR CRUCIATE LIGAMENT REPAIR        APPENDECTOMY         SECTION         x5    CHOLECYSTECTOMY        DILATION AND CURETTAGE OF UTERUS        HC INJECTION PROCEDURE FOR SACROILIAC JOINT Left 12/3/2020     LEFT SACROILIAC JOINT STEROID INJECTION UNDER X-RAY GUIDANCE performed by Fauzia Crowell DO at [facility] OR    NERVE BLOCK Left 10/01/2020    NERVE BLOCK Left 10/1/2020     LEFT L4-5 TRANSFORAMINAL EPIDURAL STEROID INJECTION performed by Fauzia Crowell DO at [facility] OR    
Patient came to specials lab for PICC insertion. Patient reported that she cannot lie flat and refused PICC line because she was not able to lie flat. He reported that she was lied to by \"upstairs\" because they told her that she would be \"put out\". Dr. Davis at bedside and procedure changed to midline instead of PICC. Patient remains in tears because she wanted to something for pain. One unsuccessful attempt. Second attempt successul, 25cm at brachial. Report called to floor, spoke to Bozena MACHADO.   
Patient does not feel comfortable being discharged tonight. Dr. Shaw aware and cancelled discharge. Patient was scheduled to be picked up by [facility] at 1800 tonight. I called and cancelled  and rescheduled patient for  tomorrow at 10AM.   
Patient on low air loss bed  
Patient refused suboxone tablet it was opened so it was wasted in med room in the pill destroyer, witnessed by noel benavides lpn, went to waste in [facility] but did not ask for a witness  
Patient refusing Q2H turns to off load pressure from buttocks d/t wounds. Refusing to lay in hospital bed, pt prefers to sleep in chair. 1:1 education provided to pt multiple times, pt still refuses.   
Pharmacy Consultation Note  (Antibiotic Dosing and Monitoring)    Initial consult date: 05/20/2025  Consulting physician/provider: Kylah  Drug: Vancomycin  Indication: SSTI    Age/  Gender Height Weight IBW  Allergy Information   55 y.o./female 172.7 cm (5' 8\") 97.5 kg (215 lb)     Ideal body weight: 63.9 kg (140 lb 13.3 oz)  Adjusted ideal body weight: 76.3 kg (168 lb 2.9 oz)   Lyrica [pregabalin], Other/food, Sulfa antibiotics, Darvocet [propoxyphene n-acetaminophen], Ultram [tramadol hcl], and Metoprolol      Renal Function:  Recent Labs     05/19/25  1521 05/21/25  0641   BUN 39* 23*   CREATININE 0.5 0.5     No intake or output data in the 24 hours ending 05/21/25 1348    Vancomycin Monitoring:  Trough:    Recent Labs     05/21/25  1211   VANCOTROUGH 13.8     Random:  No results for input(s): \"VANCORANDOM\" in the last 72 hours.    Vancomycin Administration Times:  Recent vancomycin administrations                     vancomycin (VANCOCIN) 2000 mg in 0.9% sodium chloride 500 mL IVPB (mg) 2,000 mg New Bag 05/20/25 0034                    Assessment:  Patient is a 55 y.o. female who has been initiated on vancomycin  Estimated Creatinine Clearance: 153 mL/min (based on SCr of 0.5 mg/dL).  To dose vancomycin, pharmacy will be utilizing [employer] calculation software for goal AUC/ASHLEY 400-600 mg/L-hr   5/21: Trough = 13.8 mcg/mL. AUC/ASHLEY 584 mg/L.hr    Plan:  Will continue vancomycin 1250 mg IV every 12 hours  Will continue to monitor renal function   Pharmacy to follow    Emiliana Patel, PharmD, BCPS 5/21/2025 1:51 PM   Ext: [phone]    [facility]: [phone]   
Pharmacy Consultation Note  (Antibiotic Dosing and Monitoring)    Initial consult date: 05/20/2025  Consulting physician/provider: Kylah  Drug: Vancomycin  Indication: SSTI    Age/  Gender Height Weight IBW  Allergy Information   55 y.o./female 172.7 cm (5' 8\") 97.5 kg (215 lb)     Ideal body weight: 63.9 kg (140 lb 13.3 oz)  Adjusted ideal body weight: 76.3 kg (168 lb 2.9 oz)   Lyrica [pregabalin], Other/food, Sulfa antibiotics, Darvocet [propoxyphene n-acetaminophen], Ultram [tramadol hcl], and Metoprolol      Renal Function:  Recent Labs     05/19/25  1521 05/21/25  0641 05/22/25  0506   BUN 39* 23* 20   CREATININE 0.5 0.5 0.6       Intake/Output Summary (Last 24 hours) at 5/22/2025 0903  Last data filed at 5/22/2025 0828  Gross per 24 hour   Intake 370 ml   Output 250 ml   Net 120 ml       Vancomycin Monitoring:  Trough:    Recent Labs     05/21/25  1211   VANCOTROUGH 13.8     Random:  No results for input(s): \"VANCORANDOM\" in the last 72 hours.    Recent vancomycin administrations                     vancomycin (VANCOCIN) 1,250 mg in sodium chloride 0.9 % 250 mL IVPB (mg) 1,250 mg New Bag 05/22/25 0004     1,250 mg New Bag 05/21/25 1334     1,250 mg New Bag  0114     1,250 mg New Bag 05/20/25 1131    vancomycin (VANCOCIN) 2000 mg in 0.9% sodium chloride 500 mL IVPB (mg) 2,000 mg New Bag 05/20/25 0034             Assessment:  Patient is a 55 y.o. female who has been initiated on vancomycin  Estimated Creatinine Clearance: 128 mL/min (based on SCr of 0.6 mg/dL).  To dose vancomycin, pharmacy will be utilizing [employer] calculation software for goal AUC/ASHLEY 400-600 mg/L-hr   5/21: Trough = 13.8 mcg/mL. AUC/ASHLEY 584 mg/L.hr    Plan:  Will continue vancomycin 1250 mg IV every 12 hours  Will continue to monitor renal function   Pharmacy to follow    Emiliana Patel PharmD, BCPS 5/22/2025 9:03 AM   Ext: [phone]    [facility]: [phone]   
Pharmacy Consultation Note  (Antibiotic Dosing and Monitoring)    Initial consult date: 05/20/2025  Consulting physician/provider: Kylah  Drug: Vancomycin  Indication: SSTI    Age/  Gender Height Weight IBW  Allergy Information   55 y.o./female 172.7 cm (5' 8\") 97.5 kg (215 lb)     Ideal body weight: 63.9 kg (140 lb 14 oz)  Adjusted ideal body weight: 77.3 kg (170 lb 8.4 oz)   Lyrica [pregabalin], Other/food, Sulfa antibiotics, Darvocet [propoxyphene n-acetaminophen], Ultram [tramadol hcl], and Metoprolol      Renal Function:  Recent Labs     05/19/25  1521   BUN 39*   CREATININE 0.5     No intake or output data in the 24 hours ending 05/20/25 0537    Vancomycin Monitoring:  Trough:  No results for input(s): \"VANCOTROUGH\" in the last 72 hours.  Random:  No results for input(s): \"VANCORANDOM\" in the last 72 hours.    Vancomycin Administration Times:  Recent vancomycin administrations                     vancomycin (VANCOCIN) 2000 mg in 0.9% sodium chloride 500 mL IVPB (mg) 2,000 mg New Bag 05/20/25 0034                    Assessment:  Patient is a 55 y.o. female who has been initiated on vancomycin  Estimated Creatinine Clearance: 155 mL/min (based on SCr of 0.5 mg/dL).  To dose vancomycin, pharmacy will be utilizing [employer] calculation software for goal AUC/ASHLEY 400-600 mg/L-hr (predicted AUC/ASHLEY = 442, Tr =9.3 mcg/mL)    Plan:  Will continue vancomycin 1250 mg IV every 12 hours  Will check vancomycin levels when appropriate  Will continue to monitor renal function   Pharmacy to follow      [unfilled]    DAVID: [id number]  SEY: [phone]  [facility]: [phone]   
Physical Therapy  Physical Therapy    Room #:   0436/0436-02    Date: 2025       Patient Name: Li Rojas  : 1970      MRN: [id number]     Patient unavailable for physical therapy treatment due to  patient is scheduled for a PICC line today, checked on this am, she is stressed out about her procedure and did not get any anxiety meds; so she did not want to do any therapy . Will attempt PT treatment tomorrow. Thank you.      Korey Cherry  [facility]  LIC # [id number]        
Physical Therapy  Physical Therapy Treatment Note/Plan of Care    Room #:  0437/0437-01  Patient Name: Li Rojas  YOB: 1970  MRN: [id number]    Date of Service: 5/24/2025     Tentative placement recommendation: Home with Home Health Physical Therapy versus subacute if goals not met  Equipment recommendation: Patient has needed equipment       Evaluating Physical Therapist: Toby Olmstead, PT, DPT #[id number]      Specific Provider Orders/Date/Referring Provider :     05/20/25 0700    PT eval and treat  Start:  05/20/25 0700,   End:  05/20/25 0700,   ONE TIME,   Standing Count:  1 Occurrences,   R       Yonathan Montero MD Acknowledge New      Admitting Diagnosis:   Edema [R60.9]  Cellulitis of lower extremity, unspecified laterality [L03.119]  Cellulitis [L03.90]      Surgery: none      Patient Active Problem List   Diagnosis    SVT (supraventricular tachycardia)    MS (multiple sclerosis) ([facility])    Sacroiliitis    Personal history of supraventricular tachycardia    Anxiety    Rheumatoid arthritis ([facility])    Osteoarthritis of left hip    Bilateral lower leg cellulitis    Acute bilateral thoracic back pain    Edema    Asthma    Hypertension    Systemic lupus erythematosus, unspecified ([facility])    Cellulitis    Type 2 diabetes mellitus without complication, without long-term current use of insulin ([facility])    Positive blood cultures    Opioid use disorder, moderate, in sustained remission ([facility])        ASSESSMENT of Current Deficits Patient exhibits decreased strength, balance, and endurance impairing functional mobility, transfers, gait , gait distance, and tolerance to activity. Pt agreeable to do seated exercises only due to not wanting to try and stand up and walk due to her c/o pain.  Pt able to perform all exercises with AROM and rest periods as needed.      PHYSICAL THERAPY  PLAN OF CARE       Physical therapy plan of care is established based on physician order,  patient diagnosis and clinical 
Physical Therapy  Physical Therapy Treatment Note/Plan of Care    Room #:  0437/0437-01  Patient Name: Li Rojas  YOB: 1970  MRN: [id number]    Date of Service: 5/23/2025     Tentative placement recommendation: Home with Home Health Physical Therapy versus subacute if goals not met  Equipment recommendation: Patient has needed equipment       Evaluating Physical Therapist: Toby Olmstead, PT, DPT #[id number]      Specific Provider Orders/Date/Referring Provider :     05/20/25 0700    PT eval and treat  Start:  05/20/25 0700,   End:  05/20/25 0700,   ONE TIME,   Standing Count:  1 Occurrences,   R       Yonathan Montero MD Acknowledge New      Admitting Diagnosis:   Edema [R60.9]  Cellulitis of lower extremity, unspecified laterality [L03.119]  Cellulitis [L03.90]      Surgery: none      Patient Active Problem List   Diagnosis    SVT (supraventricular tachycardia)    MS (multiple sclerosis) (HCC)    Sacroiliitis    Personal history of supraventricular tachycardia    Anxiety    Rheumatoid arthritis (HCC)    Osteoarthritis of left hip    Bilateral lower leg cellulitis    Acute bilateral thoracic back pain    Edema    Asthma    Hypertension    Systemic lupus erythematosus, unspecified (HCC)    Cellulitis    Type 2 diabetes mellitus without complication, without long-term current use of insulin (HCC)        ASSESSMENT of Current Deficits Patient exhibits decreased strength, balance, and endurance impairing functional mobility, transfers, gait , gait distance, and tolerance to activity. Pt needing minimal assist for all functional mobility and getting on the transport cart for testing.      PHYSICAL THERAPY  PLAN OF CARE       Physical therapy plan of care is established based on physician order,  patient diagnosis and clinical assessment    Current Treatment Recommendations:    -Bed Mobility: Lower and upper extremity exercises, and trunk control activities  -Sitting Balance: Incorporate reaching 
Physical Therapy  Physical Therapy Treatment Note/Plan of Care    Room #:  0437/0437-01  Patient Name: Li Rojas  YOB: 1970  MRN: [id number]    Date of Service: 5/22/2025     Tentative placement recommendation: Home with Home Health Physical Therapy versus subacute if goals not met  Equipment recommendation: Patient has needed equipment       Evaluating Physical Therapist: Toby Olmstead PT, DPT #[id number]      Specific Provider Orders/Date/Referring Provider :     05/20/25 0700    PT eval and treat  Start:  05/20/25 0700,   End:  05/20/25 0700,   ONE TIME,   Standing Count:  1 Occurrences,   R       Yonathan Montero MD Acknowledge New      Admitting Diagnosis:   Edema [R60.9]  Cellulitis of lower extremity, unspecified laterality [L03.119]  Cellulitis [L03.90]      Surgery: none      Patient Active Problem List   Diagnosis    SVT (supraventricular tachycardia)    MS (multiple sclerosis) (HCC)    Sacroiliitis    Personal history of supraventricular tachycardia    Anxiety    Rheumatoid arthritis (HCC)    Osteoarthritis of left hip    Bilateral lower leg cellulitis    Acute bilateral thoracic back pain    Edema    Asthma    Hypertension    Systemic lupus erythematosus, unspecified (HCC)    Cellulitis    Type 2 diabetes mellitus without complication, without long-term current use of insulin ([facility])        ASSESSMENT of Current Deficits Patient exhibits decreased strength, balance, and endurance impairing functional mobility, transfers, gait , gait distance, and tolerance to activity. Pt was to tired from standing up with the wound care nurse and transferring to the BSC today. Pt able to perform all exercises with AROM and rest periods as needed.      PHYSICAL THERAPY  PLAN OF CARE       Physical therapy plan of care is established based on physician order,  patient diagnosis and clinical assessment    Current Treatment Recommendations:    -Bed Mobility: Lower and upper extremity exercises, and trunk 
Pts order is to change dressings on her sacrum daily. Patient is refusing dressing on this site. Pt stated \"they changed it last night and it does not need changed.\" Pt allowed me to change her dressings on both lower extremities. All needs met at this time.  
Spiritual Health History and Assessment/Progress Note  [facility]    (P) Follow-up,  ,  ,      Name: Li Rojas MRN: [id number]    Age: 55 y.o.     Sex: female   Language: English   Rastafarian: [employer]   Edema     Date: 5/23/2025                           Spiritual Assessment continued in [facility] MED SURG TELE        Referral/Consult From: (P) Rounding   Encounter Overview/Reason: (P) Follow-up  Service Provided For: (P) Patient    Meaghan, Belief, Meaning:   Patient has beliefs or practices that help with coping during difficult times  Family/Friends No family/friends present      Importance and Influence:  Patient has spiritual/personal beliefs that influence decisions regarding their health  Family/Friends No family/friends present    Community:  Patient feels well-supported. Support system includes: Parent/s and Extended family  Family/Friends No family/friends present    Assessment and Plan of Care:     Patient Interventions include: Facilitated expression of thoughts and feelings  Family/Friends Interventions include: No family/friends present    Patient Plan of Care: Spiritual Care available upon further referral  Family/Friends Plan of Care: No family/friends present    Electronically signed by Chaplain Brei on 5/23/2025 at 1:33 PM   
Spiritual Health History and Assessment/Progress Note  [facility]    (P) Spiritual/Emotional Needs,  ,  ,      Name: Li Rojas MRN: [id number]    Age: 55 y.o.     Sex: female   Language: English   Mandaeism: [employer]   Edema     Date: 5/21/2025                           Spiritual Assessment began in [facility] MED SURG TELE        Referral/Consult From: (P) Rounding   Encounter Overview/Reason: (P) Spiritual/Emotional Needs  Service Provided For: (P) Patient    Meaghan, Belief, Meaning:   Patient identifies as spiritual  Family/Friends No family/friends present      Importance and Influence:  Patient has spiritual/personal beliefs that influence decisions regarding their health  Family/Friends No family/friends present    Community:  Patient is connected with a spiritual community  Family/Friends No family/friends present    Assessment and Plan of Care:     Patient Interventions include: Facilitated expression of thoughts and feelings, Explored spiritual coping/struggle/distress, and Engaged in theological reflection  Family/Friends Interventions include: No family/friends present    Patient Plan of Care: Spiritual Care available upon further referral  Family/Friends Plan of Care: No family/friends present    Electronically signed by Chaplain Claire Do on 5/21/2025 at 4:23 PM   
Ten suboxone strips returned to patient from lock box.  
Vancomycin discontinued, pharmacy will sign off. Please re-consult if needed.    Sagar Mcmahon [facility] 5/23/2025 12:19 PM   
[facility]  [facility]  Progress Note  CC: bilateral leg cellulitis, bacteremia  Face to face encounter   SUBJECTIVE:  5/25/2025  Patient is sitting up in bed- reports some abdominal pain.   Has been afebrile. Legs still with pain.   No nausea, vomiting, diarrhea.    Medications:  Scheduled Meds:   cefepime  2,000 mg IntraVENous q8h    miconazole   Topical BID    [Held by provider] furosemide  40 mg Oral Lunch    LORazepam  0.5 mg Oral BID    dilTIAZem  240 mg Oral Daily    tiZANidine  4 mg Oral Q6H    atenolol  25 mg Oral BID    insulin lispro  0-4 Units SubCUTAneous 4x Daily AC & HS    furosemide  20 mg IntraVENous BID    bisacodyl  5 mg Oral BID    polyethylene glycol  17 g Oral Daily    predniSONE  5 mg Oral TID    pantoprazole  40 mg Oral QAM AC    buprenorphine-naloxone  1 Film SubLINGual BID    sodium chloride flush  10 mL IntraVENous 2 times per day    enoxaparin  40 mg SubCUTAneous Daily     Continuous Infusions:   dextrose      sodium chloride       PRN Meds:glucose, dextrose bolus **OR** dextrose bolus, glucagon (rDNA), dextrose, traMADol, albuterol, sodium chloride flush, sodium chloride, promethazine **OR** ondansetron, polyethylene glycol, acetaminophen **OR** acetaminophen  OBJECTIVE:  Patient Vitals for the past 24 hrs:   BP Temp Temp src Pulse Resp SpO2 Weight   05/25/25 0712 (!) 157/91 98.4 °F (36.9 °C) Oral 59 18 93 % --   05/25/25 0135 -- -- -- -- -- -- 88.8 kg (195 lb 12.8 oz)   05/24/25 2145 (!) 148/82 97.3 °F (36.3 °C) Infrared 66 16 -- --   05/24/25 1831 -- -- -- -- 16 -- --   05/24/25 1630 111/78 97.3 °F (36.3 °C) Infrared 63 18 95 % --   05/24/25 1221 -- -- -- -- 18 -- --   05/24/25 1151 -- -- -- -- 18 -- --     General Appearance: alert, oriented x3, sitting up in chair.   Eyes: PERRLA, EOMI  HENT: mucous membranes moist, normocephalic atraumatic  Neck: supple, no masses  Cardiovascular: normal heart sounds, regular rate, regular rhythm  Respiratory: normal 
mg Nebulization Q4H RT    piperacillin-tazobactam  3,375 mg IntraVENous Q8H    vancomycin  1,250 mg IntraVENous Q12H    sodium chloride flush  10 mL IntraVENous 2 times per day    enoxaparin  40 mg SubCUTAneous Daily     PRN Meds: glucose, dextrose bolus **OR** dextrose bolus, glucagon (rDNA), dextrose, sodium chloride flush, sodium chloride, promethazine **OR** ondansetron, polyethylene glycol, acetaminophen **OR** acetaminophen    I/O  No intake or output data in the 24 hours ending 05/20/25 1049    Labs:   Recent Labs     05/19/25  1521 05/20/25  0620   WBC 17.8* 15.3*   HGB 15.5 13.9   HCT 47.7 43.2    281       Recent Labs     05/19/25  1521   *   K 5.2*   CL 92*   CO2 24   BUN 39*   CREATININE 0.5   CALCIUM 10.6*       Recent Labs     05/19/25  1521   ALKPHOS 429*   *   AST 75*   BILITOT 0.4       No results for input(s): \"INR\" in the last 72 hours.    No results for input(s): \"CKTOTAL\", \"TROPONINI\" in the last 72 hours.    Chronic labs:  Lab Results   Component Value Date    CHOL 182 02/02/2023    TRIG 197 (H) 02/02/2023    HDL 24 02/02/2023    TSH 0.58 01/13/2024    INR 1.2 04/16/2011    LABA1C 7.0 (H) 04/25/2025       Radiology:  Imaging studies reviewed today.        +++++++++++++++++++++++++++++++++++++++++++++++++  Cayetano Paris MD   [facility].  +++++++++++++++++++++++++++++++++++++++++++++++++  NOTE: This report was transcribed using voice recognition software. Every effort was made to ensure accuracy; however, inadvertent computerized transcription errors may be present.      
room?: Total  How much help is needed climbing 3-5 steps with a railing?: Total  AM-PAC Inpatient Mobility Raw Score : 14  AM-PAC Inpatient T-Scale Score : 38.1  Mobility Inpatient CMS 0-100% Score: 61.29  Mobility Inpatient CMS G-Code Modifier : CL    Nursing cleared patient for PT evaluation. The admitting diagnosis and active problem list as listed above have been reviewed prior to the initiation of this evaluation.    OBJECTIVE:   Initial Evaluation  Date: 5/21/2025 Treatment Date:     Short Term/ Long Term   Goals   Was pt agreeable to Eval/treatment? Yes  To be met in 3 days   Pain level   8-9/10  Low back and BLE     Bed Mobility  Using rails and head of bed elevated:     Rolling: Supervision     Supine to sit: Supervision     Sit to supine: Supervision     Scooting: Supervision     Rolling: Independent    Supine to sit: Independent    Sit to supine: Independent    Scooting: Independent     Transfers Sit to stand: Minimal assist of 1 from elevated bed  Sit to stand: Supervision     Ambulation     5 feet using  wheeled walker with Minimal assist of 1   for balance, upright, weight shift, and safety    > 15 feet using  wheeled walker with Supervision     ROM Within functional limits    Increase range of motion 10% of affected joints    Strength BUE:  refer to OT eval  RLE:  3+/5  LLE:   3+/5  Increase strength in affected mm groups by 1/3 grade   Balance Sitting EOB:  fair+  Dynamic Standing:  fair with WW  Sitting EOB:  good-  Dynamic Standing: fair+     Patient is Alert & Oriented x person, place, time, and situation and follows directions    Sensation:  Patient  denies numbness/tingling   Edema:  no   Endurance: fair -    Vitals: room air   Blood Pressure at rest  Blood Pressure during session    Heart Rate at rest  Heart Rate during session    SPO2 at rest %  SPO2 during session %     Patient education  Patient educated on role of Physical Therapy, risks of immobility, safety and plan of care, energy 
    [employer] test comment AEROBIC BLD BOTTLE     Direct Exam DIRECT GRAM STAIN FROM BOTTLE: GRAM POSITIVE COCCI IN CLUSTERS     Comment: Direct Gram Stain from bottle and Polymerase Chain Reaction (PCR) results called to and read   back by: MAI PENNY RN 5/21/2025 0952           Culture STAPHYLOCOCCUS WARNERI     Candida auris Not Detected     Candida albicans Not Detected     Candida glabrata Not Detected     Candida krusei Not Detected     Candida parapsilosis Not Detected     Candida tropicalis Not Detected     Cryptococcus neoformans/gattii Not Detected     Acinetobacter calcoaceticus-baumannii complex Not Detected     Escherichia Coli Not Detected     Enterobacter Cloacae Complex Not Detected     Enterobacterales Not Detected     Klebsiella oxytoca Not Detected     Klebsiella aerogenes Not Detected     Klebsiella pneumoniae group Not Detected     Pseudomonas aeruginosa Not Detected     Proteus spp Not Detected     Salmonella species Not Detected     SERRATIA MARCESCENS Not Detected     Stenotrophomonas maltophilia Not Detected     Haemophilus influenzae Not Detected     Listeria monocytogenes Not Detected     Bacteroides fragilis Not Detected     Neisseria Meningitidis, NMNI Not Detected     Staphylococcus spp DETECTED     Staphylococcus Aureus Not Detected     Staphylococcus epidermidis Not Detected     Staphylococcus lugdunensis Not Detected     Streptococcus spp Not Detected     Enterococcus faecalis Not Detected     Enterococcus faecium Not Detected     Streptococcus agalactiae (Group B) Not Detected     Streptococcus pneumoniae Not Detected     Streptococcus pyogenes Not Detected    Susceptibility        Staphylococcus warneri      BACTERIAL SUSCEPTIBILITY PANEL ASHLEY      clindamycin 0.5  Resistant      doxycycline 2  Sensitive      erythromycin >=8  Resistant      gentamicin <=0.5  Sensitive  [1]       Induced Clind Resist POSITIVE  Resistant  [2]       oxacillin 0.5  Resistant      rifampin <=0.5  
includes:      Oxidase Positive Gram Negative Josue      GRAM NEGATIVE RODS      CORYNEBACTERIUM STRIATUM Identification by MALDI-TOF No further workup    Blood Culture 1 [[id number]]  (Abnormal)  (Susceptibility) Collected: 05/19/25 9702    Order Status: Completed Specimen: Blood Updated: 05/23/25 0624     Specimen Description .BLOOD     Special Requests          [employer] test comment AEROBIC BLD BOTTLE     Direct Exam DIRECT GRAM STAIN FROM BOTTLE: GRAM POSITIVE COCCI IN CLUSTERS     Comment: Direct Gram Stain from bottle and Polymerase Chain Reaction (PCR) results called to and read   back by: MAI PENNY RN 5/21/2025 0952           Culture STAPHYLOCOCCUS WARNERI     Candida auris Not Detected     Candida albicans Not Detected     Candida glabrata Not Detected     Candida krusei Not Detected     Candida parapsilosis Not Detected     Candida tropicalis Not Detected     Cryptococcus neoformans/gattii Not Detected     Acinetobacter calcoaceticus-baumannii complex Not Detected     Escherichia Coli Not Detected     Enterobacter Cloacae Complex Not Detected     Enterobacterales Not Detected     Klebsiella oxytoca Not Detected     Klebsiella aerogenes Not Detected     Klebsiella pneumoniae group Not Detected     Pseudomonas aeruginosa Not Detected     Proteus spp Not Detected     Salmonella species Not Detected     SERRATIA MARCESCENS Not Detected     Stenotrophomonas maltophilia Not Detected     Haemophilus influenzae Not Detected     Listeria monocytogenes Not Detected     Bacteroides fragilis Not Detected     Neisseria Meningitidis, NMNI Not Detected     Staphylococcus spp DETECTED     Staphylococcus Aureus Not Detected     Staphylococcus epidermidis Not Detected     Staphylococcus lugdunensis Not Detected     Streptococcus spp Not Detected     Enterococcus faecalis Not Detected     Enterococcus faecium Not Detected     Streptococcus agalactiae (Group B) Not Detected     Streptococcus pneumoniae Not Detected     
unit(s)  Therapeutic exercises (29966)   8 minutes  1 unit(s)    Korey Cherry  [facility]  LIC # [id number]

## 2025-06-04 ENCOUNTER — OUTSIDE SERVICES (OUTPATIENT)
Dept: PRIMARY CARE CLINIC | Age: 55
End: 2025-06-04
Payer: COMMERCIAL

## 2025-06-04 DIAGNOSIS — F11.21 OPIOID DEPENDENCE IN REMISSION (HCC): ICD-10-CM

## 2025-06-04 DIAGNOSIS — F41.9 ANXIETY: ICD-10-CM

## 2025-06-04 DIAGNOSIS — E11.9 TYPE 2 DIABETES MELLITUS WITHOUT COMPLICATION, WITHOUT LONG-TERM CURRENT USE OF INSULIN (HCC): ICD-10-CM

## 2025-06-04 DIAGNOSIS — L03.90 CELLULITIS, UNSPECIFIED CELLULITIS SITE: Primary | ICD-10-CM

## 2025-06-04 DIAGNOSIS — J45.51 SEVERE PERSISTENT ASTHMA WITH ACUTE EXACERBATION (HCC): ICD-10-CM

## 2025-06-04 DIAGNOSIS — M32.9 SYSTEMIC LUPUS ERYTHEMATOSUS, UNSPECIFIED SLE TYPE, UNSPECIFIED ORGAN INVOLVEMENT STATUS (HCC): ICD-10-CM

## 2025-06-04 DIAGNOSIS — R60.1 GENERALIZED EDEMA: ICD-10-CM

## 2025-06-04 DIAGNOSIS — M62.81 MUSCLE WEAKNESS (GENERALIZED): ICD-10-CM

## 2025-06-04 DIAGNOSIS — M16.12 UNILATERAL PRIMARY OSTEOARTHRITIS, LEFT HIP: ICD-10-CM

## 2025-06-04 DIAGNOSIS — I10 PRIMARY HYPERTENSION: ICD-10-CM

## 2025-06-04 PROCEDURE — 99306 1ST NF CARE HIGH MDM 50: CPT | Performed by: INTERNAL MEDICINE

## 2025-06-05 NOTE — PROGRESS NOTES
Cardiovascular:      Rate and Rhythm: Normal rate and regular rhythm.      Pulses: Normal pulses.      Heart sounds: No murmur heard.     No gallop.   Pulmonary:      Effort: Pulmonary effort is normal. No respiratory distress.      Breath sounds: Wheezing present. No rhonchi or rales.   Chest:      Chest wall: No tenderness.   Abdominal:      General: Bowel sounds are normal.      Palpations: Abdomen is soft. There is no mass.      Tenderness: There is no abdominal tenderness. There is no guarding.      Hernia: No hernia is present.   Musculoskeletal:         General: No swelling, tenderness, deformity or signs of injury.      Cervical back: Normal range of motion and neck supple. No rigidity or tenderness.      Right lower leg: Edema present.      Left lower leg: Edema present.   Lymphadenopathy:      Cervical: No cervical adenopathy.   Skin:     General: Skin is warm and dry.      Capillary Refill: Capillary refill takes 2 to 3 seconds.      Findings: No bruising, lesion or rash.   Neurological:      General: No focal deficit present.      Mental Status: She is alert and oriented to person, place, and time.      Sensory: No sensory deficit.      Motor: Weakness present.      Gait: Gait abnormal.   Psychiatric:         Behavior: Behavior normal.         Thought Content: Thought content normal.         Judgment: Judgment normal.         Assessment & Plan:  1. Cellulitis, unspecified cellulitis site  2. Muscle weakness (generalized)  3. Systemic lupus erythematosus, unspecified SLE type, unspecified organ involvement status ([facility])  4. Type 2 diabetes mellitus without complication, without long-term current use of insulin ([facility])  5. Primary hypertension  6. Severe persistent asthma with acute exacerbation ([facility])  7. Generalized edema  8. Unilateral primary osteoarthritis, left hip  9. Anxiety  10. Opioid dependence in remission ([facility])     Hospital chart reviewed  Hospital labs independently reviewed showing  Lab

## 2025-06-10 NOTE — PROGRESS NOTES
Visit Date: 6/1125  Li Rojas  1970  female 55 y.o.      Subjective:    CC: Patient presents with Cellulitis BLE. Patient presents for follow up of HTN,  DM2, Edema, and anxiety.    HPI:  Edema  This is a new problem. The current episode started 1 to 4 weeks ago. The problem occurs constantly (d/t cellulitis BLE). The problem has been gradually improving. Associated symptoms include fatigue, joint swelling, myalgias, nausea and weakness. Pertinent negatives include no abdominal pain, arthralgias, chest pain, congestion, coughing, fever, headaches, neck pain, rash, sore throat or vomiting.   Hypertension  This is a chronic problem. The current episode started more than 1 year ago. The problem is unchanged. The problem is uncontrolled. Associated symptoms include anxiety, malaise/fatigue, peripheral edema and shortness of breath. Pertinent negatives include no chest pain, headaches, neck pain or palpitations.   Diabetes  She presents for her follow-up diabetic visit. She has type 2 diabetes mellitus. Her disease course has been fluctuating. Hypoglycemia symptoms include nervousness/anxiousness. Pertinent negatives for hypoglycemia include no dizziness, headaches or tremors. Associated symptoms include fatigue and weakness. Pertinent negatives for diabetes include no chest pain, no polydipsia and no polyuria. Symptoms are worsening.   Other  This is a chronic problem. The current episode started more than 1 year ago. The problem occurs intermittently (SYSTEMIC LUPUS ERYTHEMATOSUS). The problem has been waxing and waning. Associated symptoms include fatigue, joint swelling, myalgias, nausea and weakness. Pertinent negatives include no abdominal pain, arthralgias, chest pain, congestion, coughing, fever, headaches, neck pain, rash, sore throat or vomiting.       ROS:  Review of Systems   Constitutional:  Positive for fatigue and malaise/fatigue. Negative for activity change, appetite change, fever and

## 2025-06-11 ENCOUNTER — OUTSIDE SERVICES (OUTPATIENT)
Dept: PRIMARY CARE CLINIC | Age: 55
End: 2025-06-11

## 2025-06-11 DIAGNOSIS — F11.21 OPIOID DEPENDENCE IN REMISSION (HCC): ICD-10-CM

## 2025-06-11 DIAGNOSIS — R60.1 GENERALIZED EDEMA: ICD-10-CM

## 2025-06-11 DIAGNOSIS — L03.90 CELLULITIS, UNSPECIFIED CELLULITIS SITE: Primary | ICD-10-CM

## 2025-06-11 DIAGNOSIS — M32.9 SYSTEMIC LUPUS ERYTHEMATOSUS, UNSPECIFIED SLE TYPE, UNSPECIFIED ORGAN INVOLVEMENT STATUS (HCC): ICD-10-CM

## 2025-06-11 DIAGNOSIS — F41.9 ANXIETY: ICD-10-CM

## 2025-06-11 DIAGNOSIS — M62.81 MUSCLE WEAKNESS (GENERALIZED): ICD-10-CM

## 2025-06-11 DIAGNOSIS — M16.12 UNILATERAL PRIMARY OSTEOARTHRITIS, LEFT HIP: ICD-10-CM

## 2025-06-11 DIAGNOSIS — J45.51 SEVERE PERSISTENT ASTHMA WITH ACUTE EXACERBATION (HCC): ICD-10-CM

## 2025-06-11 DIAGNOSIS — E11.9 TYPE 2 DIABETES MELLITUS WITHOUT COMPLICATION, WITHOUT LONG-TERM CURRENT USE OF INSULIN (HCC): ICD-10-CM

## 2025-06-11 DIAGNOSIS — I10 PRIMARY HYPERTENSION: ICD-10-CM

## 2025-06-16 ENCOUNTER — HOSPITAL ENCOUNTER (INPATIENT)
Age: 55
LOS: 9 days | Discharge: NURSING, REHAB OR LONG-TERM CARE | DRG: 469 | End: 2025-06-25
Attending: EMERGENCY MEDICINE | Admitting: STUDENT IN AN ORGANIZED HEALTH CARE EDUCATION/TRAINING PROGRAM
Payer: COMMERCIAL

## 2025-06-16 ENCOUNTER — APPOINTMENT (OUTPATIENT)
Dept: GENERAL RADIOLOGY | Age: 55
DRG: 469 | End: 2025-06-16
Payer: COMMERCIAL

## 2025-06-16 DIAGNOSIS — E87.5 HYPERKALEMIA: ICD-10-CM

## 2025-06-16 DIAGNOSIS — N17.9 AKI (ACUTE KIDNEY INJURY): Primary | ICD-10-CM

## 2025-06-16 DIAGNOSIS — E11.9 TYPE 2 DIABETES MELLITUS WITHOUT COMPLICATION, WITHOUT LONG-TERM CURRENT USE OF INSULIN (HCC): ICD-10-CM

## 2025-06-16 DIAGNOSIS — E83.52 HYPERCALCEMIA: ICD-10-CM

## 2025-06-16 DIAGNOSIS — L03.116 BILATERAL LOWER LEG CELLULITIS: ICD-10-CM

## 2025-06-16 DIAGNOSIS — L03.115 BILATERAL LOWER LEG CELLULITIS: ICD-10-CM

## 2025-06-16 DIAGNOSIS — Z79.2 RECEIVING INTRAVENOUS ANTIBIOTIC TREATMENT AS OUTPATIENT: ICD-10-CM

## 2025-06-16 LAB
ALBUMIN SERPL-MCNC: 3.5 G/DL (ref 3.5–5.2)
ALP SERPL-CCNC: 627 U/L (ref 35–104)
ALT SERPL-CCNC: 88 U/L (ref 0–35)
ANION GAP SERPL CALCULATED.3IONS-SCNC: 16 MMOL/L (ref 7–16)
AST SERPL-CCNC: 67 U/L (ref 0–35)
B PARAP IS1001 DNA NPH QL NAA+NON-PROBE: NOT DETECTED
B PERT DNA SPEC QL NAA+PROBE: NOT DETECTED
B.E.: -6.2 MMOL/L (ref -3–3)
BASOPHILS # BLD: 0 K/UL (ref 0–0.2)
BASOPHILS NFR BLD: 0 % (ref 0–2)
BILIRUB SERPL-MCNC: 0.3 MG/DL (ref 0–1.2)
BNP SERPL-MCNC: 946 PG/ML (ref 0–125)
BUN SERPL-MCNC: 71 MG/DL (ref 6–20)
C PNEUM DNA NPH QL NAA+NON-PROBE: NOT DETECTED
CALCIUM SERPL-MCNC: 10.1 MG/DL (ref 8.6–10)
CHLORIDE SERPL-SCNC: 101 MMOL/L (ref 98–107)
CO2 SERPL-SCNC: 19 MMOL/L (ref 22–29)
COHB: 0.9 % (ref 0–1.5)
COMMENT: ABNORMAL
CREAT SERPL-MCNC: 1.4 MG/DL (ref 0.5–1)
CRITICAL: ABNORMAL
DATE ANALYZED: ABNORMAL
DATE OF COLLECTION: ABNORMAL
EOSINOPHIL # BLD: 0 K/UL (ref 0.05–0.5)
EOSINOPHILS RELATIVE PERCENT: 0 % (ref 0–6)
ERYTHROCYTE [DISTWIDTH] IN BLOOD BY AUTOMATED COUNT: 15.1 % (ref 11.5–15)
FLUAV RNA NPH QL NAA+NON-PROBE: NOT DETECTED
FLUBV RNA NPH QL NAA+NON-PROBE: NOT DETECTED
GFR, ESTIMATED: 44 ML/MIN/1.73M2
GLUCOSE BLD-MCNC: 138 MG/DL (ref 74–99)
GLUCOSE SERPL-MCNC: 203 MG/DL (ref 74–99)
HADV DNA NPH QL NAA+NON-PROBE: NOT DETECTED
HCO3: 18.3 MMOL/L (ref 22–26)
HCOV 229E RNA NPH QL NAA+NON-PROBE: NOT DETECTED
HCOV HKU1 RNA NPH QL NAA+NON-PROBE: NOT DETECTED
HCOV NL63 RNA NPH QL NAA+NON-PROBE: NOT DETECTED
HCOV OC43 RNA NPH QL NAA+NON-PROBE: NOT DETECTED
HCT VFR BLD AUTO: 45.9 % (ref 34–48)
HGB BLD-MCNC: 15.3 G/DL (ref 11.5–15.5)
HHB: 3.8 % (ref 0–5)
HMPV RNA NPH QL NAA+NON-PROBE: NOT DETECTED
HPIV1 RNA NPH QL NAA+NON-PROBE: NOT DETECTED
HPIV2 RNA NPH QL NAA+NON-PROBE: NOT DETECTED
HPIV3 RNA NPH QL NAA+NON-PROBE: NOT DETECTED
HPIV4 RNA NPH QL NAA+NON-PROBE: NOT DETECTED
LAB: ABNORMAL
LACTATE BLDV-SCNC: 1.3 MMOL/L (ref 0.5–1.9)
LYMPHOCYTES NFR BLD: 0.57 K/UL (ref 1.5–4)
LYMPHOCYTES RELATIVE PERCENT: 3 % (ref 20–42)
Lab: 1416
M PNEUMO DNA NPH QL NAA+NON-PROBE: NOT DETECTED
MAGNESIUM SERPL-MCNC: 2.7 MG/DL (ref 1.6–2.6)
MCH RBC QN AUTO: 29.5 PG (ref 26–35)
MCHC RBC AUTO-ENTMCNC: 33.3 G/DL (ref 32–34.5)
MCV RBC AUTO: 88.6 FL (ref 80–99.9)
METAMYELOCYTES ABSOLUTE COUNT: 1.32 K/UL (ref 0–0.12)
METAMYELOCYTES: 6 % (ref 0–1)
METHB: 0 % (ref 0–1.5)
MODE: ABNORMAL
MONOCYTES NFR BLD: 0.57 K/UL (ref 0.1–0.95)
MONOCYTES NFR BLD: 3 % (ref 2–12)
MYELOCYTES ABSOLUTE COUNT: 0.38 K/UL
MYELOCYTES: 2 %
NEUTROPHILS NFR BLD: 88 % (ref 43–80)
NEUTS SEG NFR BLD: 19.78 K/UL (ref 1.8–7.3)
O2 SATURATION: 96.2 % (ref 92–98.5)
O2HB: 95.3 % (ref 94–97)
OPERATOR ID: 2067
PATIENT TEMP: 37 C
PCO2: 33.9 MMHG (ref 35–45)
PH BLOOD GAS: 7.35 (ref 7.35–7.45)
PLATELET # BLD AUTO: 377 K/UL (ref 130–450)
PMV BLD AUTO: 9.8 FL (ref 7–12)
PO2: 87.8 MMHG (ref 75–100)
POTASSIUM SERPL-SCNC: 5.5 MMOL/L (ref 3.5–5.1)
PROCALCITONIN SERPL-MCNC: 0.47 NG/ML (ref 0–0.08)
PROT SERPL-MCNC: 6.8 G/DL (ref 6.4–8.3)
RBC # BLD AUTO: 5.18 M/UL (ref 3.5–5.5)
RBC # BLD: ABNORMAL 10*6/UL
RSV RNA NPH QL NAA+NON-PROBE: NOT DETECTED
RV+EV RNA NPH QL NAA+NON-PROBE: NOT DETECTED
SARS-COV-2 RNA NPH QL NAA+NON-PROBE: NOT DETECTED
SODIUM SERPL-SCNC: 136 MMOL/L (ref 136–145)
SOURCE, BLOOD GAS: ABNORMAL
SPECIMEN DESCRIPTION: NORMAL
THB: 16 G/DL (ref 11.5–16.5)
TIME ANALYZED: 1426
TROPONIN I SERPL HS-MCNC: 46 NG/L (ref 0–14)
TROPONIN I SERPL HS-MCNC: 62 NG/L (ref 0–14)
WBC OTHER # BLD: 22.6 K/UL (ref 4.5–11.5)

## 2025-06-16 PROCEDURE — 96374 THER/PROPH/DIAG INJ IV PUSH: CPT

## 2025-06-16 PROCEDURE — 6370000000 HC RX 637 (ALT 250 FOR IP): Performed by: EMERGENCY MEDICINE

## 2025-06-16 PROCEDURE — 82805 BLOOD GASES W/O2 SATURATION: CPT

## 2025-06-16 PROCEDURE — 83605 ASSAY OF LACTIC ACID: CPT

## 2025-06-16 PROCEDURE — 84145 PROCALCITONIN (PCT): CPT

## 2025-06-16 PROCEDURE — 6360000002 HC RX W HCPCS: Performed by: STUDENT IN AN ORGANIZED HEALTH CARE EDUCATION/TRAINING PROGRAM

## 2025-06-16 PROCEDURE — 83880 ASSAY OF NATRIURETIC PEPTIDE: CPT

## 2025-06-16 PROCEDURE — 87040 BLOOD CULTURE FOR BACTERIA: CPT

## 2025-06-16 PROCEDURE — 93005 ELECTROCARDIOGRAM TRACING: CPT | Performed by: EMERGENCY MEDICINE

## 2025-06-16 PROCEDURE — 6370000000 HC RX 637 (ALT 250 FOR IP): Performed by: STUDENT IN AN ORGANIZED HEALTH CARE EDUCATION/TRAINING PROGRAM

## 2025-06-16 PROCEDURE — 71046 X-RAY EXAM CHEST 2 VIEWS: CPT

## 2025-06-16 PROCEDURE — 2140000000 HC CCU INTERMEDIATE R&B

## 2025-06-16 PROCEDURE — 6360000002 HC RX W HCPCS: Performed by: EMERGENCY MEDICINE

## 2025-06-16 PROCEDURE — 80053 COMPREHEN METABOLIC PANEL: CPT

## 2025-06-16 PROCEDURE — 2580000003 HC RX 258: Performed by: STUDENT IN AN ORGANIZED HEALTH CARE EDUCATION/TRAINING PROGRAM

## 2025-06-16 PROCEDURE — 0202U NFCT DS 22 TRGT SARS-COV-2: CPT

## 2025-06-16 PROCEDURE — 2580000003 HC RX 258: Performed by: EMERGENCY MEDICINE

## 2025-06-16 PROCEDURE — 83735 ASSAY OF MAGNESIUM: CPT

## 2025-06-16 PROCEDURE — 82962 GLUCOSE BLOOD TEST: CPT

## 2025-06-16 PROCEDURE — 85025 COMPLETE CBC W/AUTO DIFF WBC: CPT

## 2025-06-16 PROCEDURE — 84484 ASSAY OF TROPONIN QUANT: CPT

## 2025-06-16 PROCEDURE — 99285 EMERGENCY DEPT VISIT HI MDM: CPT

## 2025-06-16 RX ORDER — SODIUM CHLORIDE 9 MG/ML
INJECTION, SOLUTION INTRAVENOUS CONTINUOUS
Status: DISCONTINUED | OUTPATIENT
Start: 2025-06-16 | End: 2025-06-21

## 2025-06-16 RX ORDER — POLYETHYLENE GLYCOL 3350 17 G/17G
17 POWDER, FOR SOLUTION ORAL DAILY
Status: DISCONTINUED | OUTPATIENT
Start: 2025-06-16 | End: 2025-06-26 | Stop reason: HOSPADM

## 2025-06-16 RX ORDER — LORAZEPAM 0.5 MG/1
0.5 TABLET ORAL 2 TIMES DAILY
Status: DISCONTINUED | OUTPATIENT
Start: 2025-06-16 | End: 2025-06-26 | Stop reason: HOSPADM

## 2025-06-16 RX ORDER — FUROSEMIDE 40 MG/1
40 TABLET ORAL
Status: DISCONTINUED | OUTPATIENT
Start: 2025-06-16 | End: 2025-06-26 | Stop reason: HOSPADM

## 2025-06-16 RX ORDER — ATENOLOL 25 MG/1
25 TABLET ORAL
Status: DISCONTINUED | OUTPATIENT
Start: 2025-06-17 | End: 2025-06-26 | Stop reason: HOSPADM

## 2025-06-16 RX ORDER — INSULIN LISPRO 100 [IU]/ML
0-8 INJECTION, SOLUTION INTRAVENOUS; SUBCUTANEOUS
Status: DISCONTINUED | OUTPATIENT
Start: 2025-06-16 | End: 2025-06-26 | Stop reason: HOSPADM

## 2025-06-16 RX ORDER — SODIUM CHLORIDE 0.9 % (FLUSH) 0.9 %
10 SYRINGE (ML) INJECTION PRN
Status: DISCONTINUED | OUTPATIENT
Start: 2025-06-16 | End: 2025-06-26 | Stop reason: HOSPADM

## 2025-06-16 RX ORDER — ACETAMINOPHEN 325 MG/1
650 TABLET ORAL EVERY 6 HOURS PRN
Status: DISCONTINUED | OUTPATIENT
Start: 2025-06-16 | End: 2025-06-26 | Stop reason: HOSPADM

## 2025-06-16 RX ORDER — DILTIAZEM HYDROCHLORIDE 120 MG/1
240 CAPSULE, COATED, EXTENDED RELEASE ORAL DAILY
Status: DISCONTINUED | OUTPATIENT
Start: 2025-06-16 | End: 2025-06-26 | Stop reason: HOSPADM

## 2025-06-16 RX ORDER — KETOROLAC TROMETHAMINE 30 MG/ML
15 INJECTION, SOLUTION INTRAMUSCULAR; INTRAVENOUS ONCE
Status: COMPLETED | OUTPATIENT
Start: 2025-06-16 | End: 2025-06-16

## 2025-06-16 RX ORDER — GLUCAGON 1 MG/ML
1 KIT INJECTION PRN
Status: DISCONTINUED | OUTPATIENT
Start: 2025-06-16 | End: 2025-06-26 | Stop reason: HOSPADM

## 2025-06-16 RX ORDER — PREDNISONE 10 MG/1
15 TABLET ORAL DAILY
Status: DISCONTINUED | OUTPATIENT
Start: 2025-06-16 | End: 2025-06-26 | Stop reason: HOSPADM

## 2025-06-16 RX ORDER — SENNOSIDES 8.6 MG/1
1 TABLET ORAL DAILY PRN
Status: DISCONTINUED | OUTPATIENT
Start: 2025-06-16 | End: 2025-06-26 | Stop reason: HOSPADM

## 2025-06-16 RX ORDER — DEXTROSE MONOHYDRATE 100 MG/ML
INJECTION, SOLUTION INTRAVENOUS CONTINUOUS PRN
Status: DISCONTINUED | OUTPATIENT
Start: 2025-06-16 | End: 2025-06-26 | Stop reason: HOSPADM

## 2025-06-16 RX ORDER — ENOXAPARIN SODIUM 100 MG/ML
40 INJECTION SUBCUTANEOUS DAILY
Status: DISCONTINUED | OUTPATIENT
Start: 2025-06-16 | End: 2025-06-26 | Stop reason: HOSPADM

## 2025-06-16 RX ORDER — ONDANSETRON 4 MG/1
4 TABLET, ORALLY DISINTEGRATING ORAL EVERY 8 HOURS PRN
Status: DISCONTINUED | OUTPATIENT
Start: 2025-06-16 | End: 2025-06-26 | Stop reason: HOSPADM

## 2025-06-16 RX ORDER — SODIUM CHLORIDE 0.9 % (FLUSH) 0.9 %
10 SYRINGE (ML) INJECTION EVERY 12 HOURS SCHEDULED
Status: DISCONTINUED | OUTPATIENT
Start: 2025-06-16 | End: 2025-06-26 | Stop reason: HOSPADM

## 2025-06-16 RX ORDER — SODIUM CHLORIDE 9 MG/ML
INJECTION, SOLUTION INTRAVENOUS PRN
Status: DISCONTINUED | OUTPATIENT
Start: 2025-06-16 | End: 2025-06-26 | Stop reason: HOSPADM

## 2025-06-16 RX ORDER — BISACODYL 5 MG/1
5 TABLET, DELAYED RELEASE ORAL 2 TIMES DAILY
Status: DISCONTINUED | OUTPATIENT
Start: 2025-06-16 | End: 2025-06-26 | Stop reason: HOSPADM

## 2025-06-16 RX ORDER — POTASSIUM CHLORIDE 7.45 MG/ML
10 INJECTION INTRAVENOUS PRN
Status: DISCONTINUED | OUTPATIENT
Start: 2025-06-16 | End: 2025-06-26 | Stop reason: HOSPADM

## 2025-06-16 RX ORDER — TRAMADOL HYDROCHLORIDE 50 MG/1
50 TABLET ORAL EVERY 6 HOURS PRN
Status: DISCONTINUED | OUTPATIENT
Start: 2025-06-16 | End: 2025-06-18

## 2025-06-16 RX ORDER — PANTOPRAZOLE SODIUM 40 MG/1
40 TABLET, DELAYED RELEASE ORAL
Status: DISCONTINUED | OUTPATIENT
Start: 2025-06-17 | End: 2025-06-26 | Stop reason: HOSPADM

## 2025-06-16 RX ORDER — CALCIUM GLUCONATE 20 MG/ML
1000 INJECTION, SOLUTION INTRAVENOUS ONCE
Status: COMPLETED | OUTPATIENT
Start: 2025-06-16 | End: 2025-06-16

## 2025-06-16 RX ORDER — ACETAMINOPHEN 650 MG/1
650 SUPPOSITORY RECTAL EVERY 6 HOURS PRN
Status: DISCONTINUED | OUTPATIENT
Start: 2025-06-16 | End: 2025-06-26 | Stop reason: HOSPADM

## 2025-06-16 RX ORDER — INSULIN GLARGINE 100 [IU]/ML
15 INJECTION, SOLUTION SUBCUTANEOUS NIGHTLY
Status: DISCONTINUED | OUTPATIENT
Start: 2025-06-16 | End: 2025-06-26 | Stop reason: HOSPADM

## 2025-06-16 RX ORDER — ONDANSETRON 2 MG/ML
4 INJECTION INTRAMUSCULAR; INTRAVENOUS EVERY 6 HOURS PRN
Status: DISCONTINUED | OUTPATIENT
Start: 2025-06-16 | End: 2025-06-26 | Stop reason: HOSPADM

## 2025-06-16 RX ORDER — POTASSIUM CHLORIDE 1500 MG/1
40 TABLET, EXTENDED RELEASE ORAL PRN
Status: DISCONTINUED | OUTPATIENT
Start: 2025-06-16 | End: 2025-06-26 | Stop reason: HOSPADM

## 2025-06-16 RX ORDER — BUPRENORPHINE HYDROCHLORIDE AND NALOXONE HYDROCHLORIDE DIHYDRATE 8; 2 MG/1; MG/1
1 TABLET SUBLINGUAL 2 TIMES DAILY
Status: DISCONTINUED | OUTPATIENT
Start: 2025-06-16 | End: 2025-06-18

## 2025-06-16 RX ADMIN — PIPERACILLIN AND TAZOBACTAM 3375 MG: 3; .375 INJECTION, POWDER, LYOPHILIZED, FOR SOLUTION INTRAVENOUS at 22:07

## 2025-06-16 RX ADMIN — SODIUM ZIRCONIUM CYCLOSILICATE 10 G: 10 POWDER, FOR SUSPENSION ORAL at 18:55

## 2025-06-16 RX ADMIN — TRAMADOL HYDROCHLORIDE 50 MG: 50 TABLET, COATED ORAL at 18:55

## 2025-06-16 RX ADMIN — INSULIN GLARGINE 15 UNITS: 100 INJECTION, SOLUTION SUBCUTANEOUS at 21:42

## 2025-06-16 RX ADMIN — KETOROLAC TROMETHAMINE 15 MG: 30 INJECTION, SOLUTION INTRAMUSCULAR at 16:49

## 2025-06-16 RX ADMIN — LORAZEPAM 0.5 MG: 0.5 TABLET ORAL at 21:43

## 2025-06-16 RX ADMIN — CALCIUM GLUCONATE 1000 MG: 20 INJECTION, SOLUTION INTRAVENOUS at 18:55

## 2025-06-16 RX ADMIN — SODIUM CHLORIDE: 0.9 INJECTION, SOLUTION INTRAVENOUS at 16:26

## 2025-06-16 ASSESSMENT — PAIN SCALES - GENERAL: PAINLEVEL_OUTOF10: 5

## 2025-06-16 ASSESSMENT — PAIN DESCRIPTION - PAIN TYPE: TYPE: CHRONIC PAIN

## 2025-06-16 ASSESSMENT — PAIN - FUNCTIONAL ASSESSMENT: PAIN_FUNCTIONAL_ASSESSMENT: 0-10

## 2025-06-16 ASSESSMENT — PAIN DESCRIPTION - DESCRIPTORS: DESCRIPTORS: ACHING

## 2025-06-16 ASSESSMENT — PAIN DESCRIPTION - LOCATION: LOCATION: BACK;LEG

## 2025-06-16 ASSESSMENT — PAIN DESCRIPTION - FREQUENCY: FREQUENCY: CONTINUOUS

## 2025-06-16 NOTE — CARE COORDINATION
06/16/25 Case Management note: Chart reviewed as patient is a return to the ER within 30 days of discharge to [facility] at the [facility] for rehab with piccline for antibiotic therapy. She is currently in the ER being evaluated for c/o \"altered mental status'. She arrived to the ER alert and orientd per documentation. Electronically signed by Dorita Beverly RN on 6/16/2025 at 2:44 PM

## 2025-06-16 NOTE — ED PROVIDER NOTES
ENOXAPARIN (LOVENOX) 40 MG/0.4ML    Inject 0.4 mLs into the skin daily    FUROSEMIDE (LASIX) 40 MG TABLET    Take 1 tablet by mouth Daily with lunch    GLUCOSE 4 G CHEWABLE TABLET    Take 4 tablets by mouth as needed for Low blood sugar    INSULIN GLARGINE (LANTUS) 100 UNIT/ML INJECTION VIAL    Inject 15 Units into the skin nightly    INSULIN LISPRO (HUMALOG,ADMELOG) 100 UNIT/ML SOLN INJECTION VIAL    Inject 0-16 Units into the skin 4 times daily (before meals and nightly) **High Dose Corrective Algorithm**  Glucose: Dose:    No Insulin  180-249 4 Units  250-299 8 Units  300-349 12 Units  Over 349 16 Units and notify physician    Administer as soon as possible within 60 minutes of last blood glucose check    LORAZEPAM (ATIVAN) 0.5 MG TABLET    Take 1 tablet by mouth 2 times daily.    MICONAZOLE (MICOTIN) 2 % POWDER    Apply topically 2 times daily.    NONFORMULARY    Apply 1 each topically daily XEROFOAM GEL, APPLY TOPICAL TO BILATERAL LEGS ONCE DAILY.    PANTOPRAZOLE (PROTONIX) 40 MG TABLET    Take 1 tablet by mouth every morning (before breakfast)    PIPERACILLIN-TAZOBACTAM (ZOSYN) INFUSION    Infuse 3.375 g intravenously in the morning and 3.375 g at noon and 3.375 g in the evening. Do all this for 28 days. Compound per protocol..    POLYETHYLENE GLYCOL (GLYCOLAX) 17 G PACKET    Take 1 packet by mouth daily    POLYETHYLENE GLYCOL (GLYCOLAX) 17 G PACKET    Take 1 packet by mouth daily as needed for Constipation    POTASSIUM CHLORIDE (KLOR-CON M) 10 MEQ EXTENDED RELEASE TABLET    take 2 tablets by mouth once daily    PREDNISONE (DELTASONE) 5 MG TABLET    Take 3 tablets by mouth daily    TIZANIDINE (ZANAFLEX) 4 MG TABLET    Take 1 tablet by mouth every 6 hours       ALLERGIES     Lyrica [pregabalin], Other, Sulfa antibiotics, Darvocet [propoxyphene n-acetaminophen], Ultram [tramadol hcl], and Metoprolol    FAMILYHISTORY       Family History   Problem Relation Age of Onset    Heart Disease Mother     Heart  remission (HCC)  Resolved Problems:    * No resolved hospital problems. *       REVIEW OF SYSTEMS :          ENCOUNTER LIMITATION:    Please note that the HPI, ROS, Past History, and Physical Examination are limited due to this patient's mental status.        Review of Systems:   A complete review of systems was unable to be performed secondary to the limitations noted above        The patient was placed on the cardiac monitor for continuous monitoring of rhythm and vitals. EKG ordered to evaluate patient's current cardiac rate, rhythm, and QT interval. CBC was ordered as part of my assessment for possible infection, anemia or thrombocytopenia. CMP to assess electrolytes, kidney function, liver function or any metabolic derangements. Lactic acid as a marker of hypoperfusion or ischemia. Urinalysis to evaluate for a UTI. Troponin as a marker for myocardial ischemia or heart strain. BNP to evaluate for heart failure and/or as a marker for heart strain. Viral swabs due to possible viral etiology of symptoms. Blood cultures ordered for suspicion of bacteremia. Chest x-ray for any possible signs of, but without limitation to, pneumonia, pleural effusions, cardiomegaly, pneumothorax, atelectasis, rib or sternal abnormalities including fractures.    Workup shows worsening leukocytosis compared to recent admission, evidence of acute kidney injury with hyperkalemia and hypercalcemia, IV fluids were initiated, blood cultures were obtained, she already received IV antibiotics today, will defer additional IV antibiotics for her cellulitis pending likely infectious disease consult.  Spoke with medicine patient be admitted for further care      Amount and/or Complexity of Data Reviewed  Labs: ordered. Decision-making details documented in ED Course.  Radiology: ordered and independent interpretation performed. Decision-making details documented in ED Course.  ECG/medicine tests: ordered and independent interpretation performed.

## 2025-06-17 ENCOUNTER — APPOINTMENT (OUTPATIENT)
Dept: CT IMAGING | Age: 55
DRG: 469 | End: 2025-06-17
Payer: COMMERCIAL

## 2025-06-17 LAB
ALBUMIN SERPL-MCNC: 3 G/DL (ref 3.5–5.2)
ALP SERPL-CCNC: 551 U/L (ref 35–104)
ALT SERPL-CCNC: 76 U/L (ref 0–35)
ANION GAP SERPL CALCULATED.3IONS-SCNC: 16 MMOL/L (ref 7–16)
AST SERPL-CCNC: 59 U/L (ref 0–35)
BASOPHILS # BLD: 0.02 K/UL (ref 0–0.2)
BASOPHILS NFR BLD: 0 % (ref 0–2)
BILIRUB SERPL-MCNC: 0.3 MG/DL (ref 0–1.2)
BUN SERPL-MCNC: 52 MG/DL (ref 6–20)
CALCIUM SERPL-MCNC: 9.6 MG/DL (ref 8.6–10)
CHLORIDE SERPL-SCNC: 107 MMOL/L (ref 98–107)
CO2 SERPL-SCNC: 14 MMOL/L (ref 22–29)
CREAT SERPL-MCNC: 0.9 MG/DL (ref 0.5–1)
CREAT UR-MCNC: 52 MG/DL (ref 29–226)
CREAT UR-MCNC: 52.2 MG/DL (ref 29–226)
EKG ATRIAL RATE: 65 BPM
EKG ATRIAL RATE: 67 BPM
EKG P AXIS: 11 DEGREES
EKG P AXIS: 18 DEGREES
EKG P-R INTERVAL: 162 MS
EKG P-R INTERVAL: 168 MS
EKG Q-T INTERVAL: 378 MS
EKG Q-T INTERVAL: 390 MS
EKG QRS DURATION: 48 MS
EKG QRS DURATION: 58 MS
EKG QTC CALCULATION (BAZETT): 393 MS
EKG QTC CALCULATION (BAZETT): 412 MS
EKG R AXIS: 0 DEGREES
EKG R AXIS: 24 DEGREES
EKG T AXIS: 8 DEGREES
EKG T AXIS: 97 DEGREES
EKG VENTRICULAR RATE: 65 BPM
EKG VENTRICULAR RATE: 67 BPM
EOSINOPHIL # BLD: 0 K/UL (ref 0.05–0.5)
EOSINOPHILS RELATIVE PERCENT: 0 % (ref 0–6)
ERYTHROCYTE [DISTWIDTH] IN BLOOD BY AUTOMATED COUNT: 15.6 % (ref 11.5–15)
GFR, ESTIMATED: 80 ML/MIN/1.73M2
GLUCOSE BLD-MCNC: 104 MG/DL (ref 74–99)
GLUCOSE BLD-MCNC: 113 MG/DL (ref 74–99)
GLUCOSE BLD-MCNC: 162 MG/DL (ref 74–99)
GLUCOSE BLD-MCNC: 198 MG/DL (ref 74–99)
GLUCOSE SERPL-MCNC: 133 MG/DL (ref 74–99)
HBA1C MFR BLD: 8.7 % (ref 4–5.6)
HCT VFR BLD AUTO: 44.9 % (ref 34–48)
HGB BLD-MCNC: 14.3 G/DL (ref 11.5–15.5)
IMM GRANULOCYTES # BLD AUTO: 1.06 K/UL (ref 0–0.58)
IMM GRANULOCYTES NFR BLD: 6 % (ref 0–5)
LYMPHOCYTES NFR BLD: 0.69 K/UL (ref 1.5–4)
LYMPHOCYTES RELATIVE PERCENT: 4 % (ref 20–42)
MCH RBC QN AUTO: 29.7 PG (ref 26–35)
MCHC RBC AUTO-ENTMCNC: 31.8 G/DL (ref 32–34.5)
MCV RBC AUTO: 93.3 FL (ref 80–99.9)
MONOCYTES NFR BLD: 0.6 K/UL (ref 0.1–0.95)
MONOCYTES NFR BLD: 3 % (ref 2–12)
NEUTROPHILS NFR BLD: 87 % (ref 43–80)
NEUTS SEG NFR BLD: 15.78 K/UL (ref 1.8–7.3)
PLATELET CONFIRMATION: NORMAL
PLATELET, FLUORESCENCE: 353 K/UL (ref 130–450)
PMV BLD AUTO: 10.1 FL (ref 7–12)
POTASSIUM SERPL-SCNC: 4.6 MMOL/L (ref 3.5–5.1)
PROT SERPL-MCNC: 6.4 G/DL (ref 6.4–8.3)
RBC # BLD AUTO: 4.81 M/UL (ref 3.5–5.5)
RBC # BLD: ABNORMAL 10*6/UL
SODIUM SERPL-SCNC: 137 MMOL/L (ref 136–145)
SODIUM UR-SCNC: <20 MMOL/L
TOTAL PROTEIN, URINE: 32 MG/DL (ref 0–12)
URINE TOTAL PROTEIN CREATININE RATIO: 0.61 (ref 0–0.2)
UUN UR-MCNC: 1015 MG/DL (ref 800–1666)
WBC OTHER # BLD: 18.2 K/UL (ref 4.5–11.5)

## 2025-06-17 PROCEDURE — 93010 ELECTROCARDIOGRAM REPORT: CPT | Performed by: INTERNAL MEDICINE

## 2025-06-17 PROCEDURE — 6360000002 HC RX W HCPCS: Performed by: STUDENT IN AN ORGANIZED HEALTH CARE EDUCATION/TRAINING PROGRAM

## 2025-06-17 PROCEDURE — 84300 ASSAY OF URINE SODIUM: CPT

## 2025-06-17 PROCEDURE — 6370000000 HC RX 637 (ALT 250 FOR IP): Performed by: STUDENT IN AN ORGANIZED HEALTH CARE EDUCATION/TRAINING PROGRAM

## 2025-06-17 PROCEDURE — 36415 COLL VENOUS BLD VENIPUNCTURE: CPT

## 2025-06-17 PROCEDURE — 84540 ASSAY OF URINE/UREA-N: CPT

## 2025-06-17 PROCEDURE — 2580000003 HC RX 258: Performed by: STUDENT IN AN ORGANIZED HEALTH CARE EDUCATION/TRAINING PROGRAM

## 2025-06-17 PROCEDURE — 6360000002 HC RX W HCPCS: Performed by: NURSE PRACTITIONER

## 2025-06-17 PROCEDURE — 83036 HEMOGLOBIN GLYCOSYLATED A1C: CPT

## 2025-06-17 PROCEDURE — 2140000000 HC CCU INTERMEDIATE R&B

## 2025-06-17 PROCEDURE — 84156 ASSAY OF PROTEIN URINE: CPT

## 2025-06-17 PROCEDURE — 2500000003 HC RX 250 WO HCPCS: Performed by: STUDENT IN AN ORGANIZED HEALTH CARE EDUCATION/TRAINING PROGRAM

## 2025-06-17 PROCEDURE — 80053 COMPREHEN METABOLIC PANEL: CPT

## 2025-06-17 PROCEDURE — 82962 GLUCOSE BLOOD TEST: CPT

## 2025-06-17 PROCEDURE — 82570 ASSAY OF URINE CREATININE: CPT

## 2025-06-17 PROCEDURE — 85025 COMPLETE CBC W/AUTO DIFF WBC: CPT

## 2025-06-17 PROCEDURE — 94640 AIRWAY INHALATION TREATMENT: CPT

## 2025-06-17 RX ORDER — ALBUTEROL SULFATE 0.63 MG/3ML
0.63 SOLUTION RESPIRATORY (INHALATION) 4 TIMES DAILY PRN
Status: DISCONTINUED | OUTPATIENT
Start: 2025-06-17 | End: 2025-06-26 | Stop reason: HOSPADM

## 2025-06-17 RX ADMIN — DILTIAZEM HYDROCHLORIDE 240 MG: 240 CAPSULE, COATED, EXTENDED RELEASE ORAL at 02:00

## 2025-06-17 RX ADMIN — PIPERACILLIN AND TAZOBACTAM 3375 MG: 3; .375 INJECTION, POWDER, LYOPHILIZED, FOR SOLUTION INTRAVENOUS at 08:49

## 2025-06-17 RX ADMIN — SODIUM CHLORIDE, PRESERVATIVE FREE 10 ML: 5 INJECTION INTRAVENOUS at 08:51

## 2025-06-17 RX ADMIN — TIZANIDINE 4 MG: 4 TABLET ORAL at 17:38

## 2025-06-17 RX ADMIN — LORAZEPAM 0.5 MG: 0.5 TABLET ORAL at 21:15

## 2025-06-17 RX ADMIN — ALBUTEROL SULFATE 0.63 MG: 0.63 SOLUTION RESPIRATORY (INHALATION) at 22:22

## 2025-06-17 RX ADMIN — SODIUM CHLORIDE, PRESERVATIVE FREE 10 ML: 5 INJECTION INTRAVENOUS at 21:18

## 2025-06-17 RX ADMIN — PREDNISONE 15 MG: 10 TABLET ORAL at 01:56

## 2025-06-17 RX ADMIN — LORAZEPAM 0.5 MG: 0.5 TABLET ORAL at 08:50

## 2025-06-17 RX ADMIN — PIPERACILLIN AND TAZOBACTAM 3375 MG: 3; .375 INJECTION, POWDER, LYOPHILIZED, FOR SOLUTION INTRAVENOUS at 21:11

## 2025-06-17 RX ADMIN — BISACODYL 5 MG: 5 TABLET, COATED ORAL at 08:50

## 2025-06-17 RX ADMIN — ATENOLOL 25 MG: 25 TABLET ORAL at 17:38

## 2025-06-17 RX ADMIN — TIZANIDINE 4 MG: 4 TABLET ORAL at 01:59

## 2025-06-17 RX ADMIN — INSULIN GLARGINE 15 UNITS: 100 INJECTION, SOLUTION SUBCUTANEOUS at 21:12

## 2025-06-17 RX ADMIN — ATENOLOL 25 MG: 25 TABLET ORAL at 08:50

## 2025-06-17 RX ADMIN — TIZANIDINE 4 MG: 4 TABLET ORAL at 08:54

## 2025-06-17 RX ADMIN — MICONAZOLE NITRATE: 20.6 POWDER TOPICAL at 21:05

## 2025-06-17 RX ADMIN — ENOXAPARIN SODIUM 40 MG: 100 INJECTION SUBCUTANEOUS at 02:04

## 2025-06-17 RX ADMIN — ENOXAPARIN SODIUM 40 MG: 100 INJECTION SUBCUTANEOUS at 08:50

## 2025-06-17 RX ADMIN — TIZANIDINE 4 MG: 4 TABLET ORAL at 13:08

## 2025-06-17 RX ADMIN — PANTOPRAZOLE SODIUM 40 MG: 40 TABLET, DELAYED RELEASE ORAL at 08:50

## 2025-06-17 RX ADMIN — INSULIN LISPRO 2 UNITS: 100 INJECTION, SOLUTION INTRAVENOUS; SUBCUTANEOUS at 12:22

## 2025-06-17 RX ADMIN — BISACODYL 5 MG: 5 TABLET, COATED ORAL at 21:15

## 2025-06-17 RX ADMIN — DILTIAZEM HYDROCHLORIDE 240 MG: 240 CAPSULE, COATED, EXTENDED RELEASE ORAL at 09:01

## 2025-06-17 RX ADMIN — PIPERACILLIN AND TAZOBACTAM 3375 MG: 3; .375 INJECTION, POWDER, LYOPHILIZED, FOR SOLUTION INTRAVENOUS at 17:41

## 2025-06-17 ASSESSMENT — PAIN SCALES - GENERAL: PAINLEVEL_OUTOF10: 6

## 2025-06-17 NOTE — CONSULTS
Department of Internal Medicine  Infectious Diseases   Consult Note      Reason for Consult:  Cellulitis, leukocytosis       Requesting Physician:  Dr Duncan         HISTORY OF PRESENT ILLNESS:       Pt is known to me . This is a 55 yrs old female with hx of RA on prednisone, MS, HTN, chronic venous insufficiency presented to the ER from the nursing home with ? Change in mental status - pt denied fever or chills , reported chronic leg swelling, pain and wound   WBC was  22 K   Procalcitonin 0.47  Pt was given zosyn       Past Medical History:      Past Medical History:   Diagnosis Date    Arthritis     rheumatoid    Asthma     H/O medication noncompliance     Hyperlipidemia     Hypertension     Lupus     MS (multiple sclerosis) (HCC)     Multiple sclerosis (HCC)     Nicotine dependence, cigarettes, uncomplicated     Palpitations     SOB (shortness of breath)     SVT (supraventricular tachycardia)     Systemic lupus erythematosus, unspecified ([facility])     Type 2 diabetes mellitus without complication, without long-term current use of insulin ([facility]) 2025         Past Surgical History:      Past Surgical History:   Procedure Laterality Date    ABDOMEN SURGERY      ANTERIOR CRUCIATE LIGAMENT REPAIR      APPENDECTOMY       SECTION      x5    CHOLECYSTECTOMY      DILATION AND CURETTAGE OF UTERUS      HC INJECTION PROCEDURE FOR SACROILIAC JOINT Left 12/3/2020    LEFT SACROILIAC JOINT STEROID INJECTION UNDER X-RAY GUIDANCE performed by Fauzia Crowell DO at [facility] OR    NERVE BLOCK Left 10/01/2020    NERVE BLOCK Left 10/1/2020    LEFT L4-5 TRANSFORAMINAL EPIDURAL STEROID INJECTION performed by Fauzia Crowell DO at [facility] OR    OTHER SURGICAL HISTORY Left 2020    LEFT SACROILIAC STEROID INJECTION UNDER XRAY GUIDANCE    TUBAL LIGATION           Current Medications:      Current Facility-Administered Medications   Medication Dose Route Frequency Provider Last Rate Last Admin    0.9

## 2025-06-17 NOTE — PROGRESS NOTES
4 eyes assessment completed with this nurse and FRANCK Crowe. Patient refused to turn to allow for assessment and documentation of wounds on buttocks.     Rika Payton RN

## 2025-06-17 NOTE — H&P
Internal Medicine History & Physical     Name: Li Rojas  : 1970  Chief Complaint: Altered Mental Status (Pt from [facility] and staff states pt is more altered than usual. Pt is A&Ox4 at time of triage. Pt states she is just tired because they woke her up at 4AM last night )  Primary Care Physician: Melvin Cardoso MD  Admission date: 2025  Date of service: 2025     History of Present Illness  Li is a 55 y.o. year old female who presented with a chief complaint of AMS from facility but she is not altered when I saw her today nor was she when she arrived to the [facility] yesterday per reports. She is found to have an MARCO which is being treated she also has BL LE cellulitis and open wounds for which ID and POD have been consulted. She was at [facility] recently and was DC to SNF on Zosyn which she still has over a week left.  Examined both legs in the ED.       Past Medical History:   Diagnosis Date    Arthritis     rheumatoid    Asthma     H/O medication noncompliance     Hyperlipidemia     Hypertension     Lupus     MS (multiple sclerosis) (HCC)     Multiple sclerosis (HCC)     Nicotine dependence, cigarettes, uncomplicated     Palpitations     SOB (shortness of breath)     SVT (supraventricular tachycardia)     Systemic lupus erythematosus, unspecified (HCC)     Type 2 diabetes mellitus without complication, without long-term current use of insulin (HCC) 2025       Past Surgical History:   Procedure Laterality Date    ABDOMEN SURGERY      ANTERIOR CRUCIATE LIGAMENT REPAIR      APPENDECTOMY       SECTION      x5    CHOLECYSTECTOMY      DILATION AND CURETTAGE OF UTERUS      HC INJECTION PROCEDURE FOR SACROILIAC JOINT Left 12/3/2020    LEFT SACROILIAC JOINT STEROID INJECTION UNDER X-RAY GUIDANCE performed by Fauzia Crowell DO at [facility] OR    NERVE BLOCK Left 10/01/2020    NERVE BLOCK Left 10/1/2020    LEFT L4-5 TRANSFORAMINAL EPIDURAL STEROID INJECTION  Acute otitis externa of left ear of this note might be different from the original.  Assessment: .    Last Assessment & Plan:   Formatting of this note might be different from the original.  Assessment: managed with Suboxone per patient      MARCO (acute kidney injury) 06/16/2025    Positive blood cultures 05/24/2025    Opioid use disorder, moderate, in sustained remission ([facility]) 05/24/2025    Cellulitis 05/20/2025    Type 2 diabetes mellitus without complication, without long-term current use of insulin ([facility]) 05/20/2025    Edema 05/19/2025    Asthma     Hypertension     Systemic lupus erythematosus, unspecified ([facility])     Acute bilateral thoracic back pain 04/18/2025    Bilateral lower leg cellulitis 04/13/2025    Osteoarthritis of left hip 06/14/2024    Sacroiliitis 12/03/2020    MS (multiple sclerosis) ([facility]) 02/24/2017     This Diagnosis was added to the Problem List based on transcribed orders from Dr. Deangelo MONAE (supraventricular tachycardia) 02/03/2014       Plan  MARCO   Baseline Cr 0.6  Presenting Cr 1.4 --> 0.9  Waiting for CT abdomen pelvis and repeat morning labs   Again, we have had this issue before of the patients refusal to lay flat for CT scans of the abdomen and pelvis and we are having it again now. She states this is due to chronic pain. We have attempted in the past to medicate her with IV narcotics without success as well.     BL LE cellulitis   [facility]   Podiatry and ID to see while in house     PT/OT  Home medications to be reconciled and confirmed prior to being ordered  Discharge plan: DC as soon as tomorrow     Nicolás Duncan MD  Internal medicine   6/17/2025  9:22 AM    I can be reached through [employer].    NOTE:  This report was transcribed using voice recognition software.  Every effort was made to ensure accuracy; however, inadvertent computerized transcription errors may be present.

## 2025-06-17 NOTE — CONSULTS
Department of Podiatry   Consult Note        Reason for Consult: Bilateral leg wounds    CHIEF COMPLAINT: Back pain    HISTORY OF PRESENT ILLNESS:    Li Rojas is a 55 y.o. female with significant past medical history of arthritis, hypertension, lupus, multiple sclerosis, and type 2 diabetes.  Patient states that she is having a dull pain for the wounds on her legs that is 7/10 in intensity.  Patient states that she just wants to sleep.  Patient denies any N/V/D/F/C/SOB/CP and has no other pedal complaints at this time.     Past Medical History:        Diagnosis Date    Arthritis     rheumatoid    Asthma     H/O medication noncompliance     Hyperlipidemia     Hypertension     Lupus     MS (multiple sclerosis) (HCC)     Multiple sclerosis ([facility])     Nicotine dependence, cigarettes, uncomplicated     Palpitations     SOB (shortness of breath)     SVT (supraventricular tachycardia)     Systemic lupus erythematosus, unspecified ([facility])     Type 2 diabetes mellitus without complication, without long-term current use of insulin ([facility]) 2025       Past Surgical History:        Procedure Laterality Date    ABDOMEN SURGERY      ANTERIOR CRUCIATE LIGAMENT REPAIR      APPENDECTOMY       SECTION      x5    CHOLECYSTECTOMY      DILATION AND CURETTAGE OF UTERUS      HC INJECTION PROCEDURE FOR SACROILIAC JOINT Left 12/3/2020    LEFT SACROILIAC JOINT STEROID INJECTION UNDER X-RAY GUIDANCE performed by Fauzia Crowell DO at [facility] OR    NERVE BLOCK Left 10/01/2020    NERVE BLOCK Left 10/1/2020    LEFT L4-5 TRANSFORAMINAL EPIDURAL STEROID INJECTION performed by Fauzia Crowell DO at [facility] OR    OTHER SURGICAL HISTORY Left 2020    LEFT SACROILIAC STEROID INJECTION UNDER XRAY GUIDANCE    TUBAL LIGATION         Medications Prior to Admission:    Not in a hospital admission.    Allergies:  Lyrica [pregabalin], Other, Sulfa antibiotics, Darvocet [propoxyphene n-acetaminophen], Ultram

## 2025-06-17 NOTE — ED NOTES
Pt from [facility] to [facility] temporarily to use bedside commode in private. This RN remains at bedside for patient safety.

## 2025-06-17 NOTE — CARE COORDINATION
Chart reviewed.  Patient admitted from [facility] at the [place] for AMS, MARCO.  Dr Duncan seeing patient at this time as well.  Cellulitis BLE.  Wounds to RLE.  Consult to ID and Podiatry.  CT abdomen and Pelvis. Therapy ordered.  Spoke with patient.  Midline RUE and IV Zosyn Q8.  Patient on Suboxone BID.  Spoke with the patient at the bedside re: transition of care planning.  Patient is WC bound mostly at baseline and had been living with her mother at home in a one story home, but family could not do antbx at home.  Patient is not sure she would like to return to SNF at discharge.  Call placed to the patient's mother Megan to discuss transition of care planning, no answer and unable to leave .  [facility] referral sent to [facility] in the event return is needed for additional IV antibiotics.  Spoke with Brandi, liaison with [facility] and patient's concerns relayed.  CM/SW will continue to follow for further transition of care planning needs.         Deanna Win RN.  P:  [phone]          Case Management Assessment  Initial Evaluation    Date/Time of Evaluation: 6/17/2025 11:36 AM  Assessment Completed by: Deanna Win RN    If patient is discharged prior to next notation, then this note serves as note for discharge by case management.    Patient Name: Li Rojsa                   YOB: 1970  Diagnosis: MARCO (acute kidney injury) [N17.9]                   Date / Time: 6/16/2025 11:10 AM    Patient Admission Status: Inpatient   Readmission Risk (Low < 19, Mod (19-27), High > 27): Readmission Risk Score: 22.7    Current PCP: Melvin Cardoso MD  PCP verified by ? Yes (Melvin Cardoso MD)    Chart Reviewed: Yes      History Provided by: Patient, Medical Record  Patient Orientation: Person, Place    Patient Cognition: Alert    Hospitalization in the last 30 days (Readmission):  yes   If yes, Readmission Assessment in  Navigator will be completed.    Advance Directives:      Code Status:

## 2025-06-17 NOTE — PROGRESS NOTES
4 Eyes Skin Assessment     NAME:  Li Rojas  YOB: 1970  MEDICAL RECORD NUMBER:  [id number]    The patient is being assessed for  Admission    I agree that at least one RN has performed a thorough Head to Toe Skin Assessment on the patient. ALL assessment sites listed below have been assessed.      Areas assessed by both nurses:    Head, Face, Ears, Shoulders, Back, Chest, Arms, Elbows, Hands, Legs. Feet and Heels, and Under Medical Devices   Patient refused to turn to allow for assessment of wounds on buttocks.         Does the Patient have a Wound? Yes wound(s) were present on assessment. LDA wound assessment was Initiated and completed by RN       Jad Prevention initiated by RN: Yes  Wound Care Orders initiated by RN: Yes    Pressure Injury (Stage 3,4, Unstageable, DTI, NWPT, and Complex wounds) if present, place Wound referral order by RN under : Yes    New Ostomies, if present place, Ostomy referral order under : No     Nurse 1 eSignature: Electronically signed by Rika Payton RN on 6/17/25 at 3:19 PM EDT    **SHARE this note so that the co-signing nurse can place an eSignature**    Nurse 2 eSignature: Electronically signed by Xin Crowe RN on 6/17/25 at 3:35 PM EDT

## 2025-06-17 NOTE — PROCEDURES
PROCEDURE NOTE  Date: 6/17/2025   Name: Li Rojas  YOB: 1970    Procedures  Please call [phone] when patient is medicated and able to lay flat for CT scans

## 2025-06-18 LAB
ALBUMIN SERPL-MCNC: 2.8 G/DL (ref 3.5–5.2)
ALP SERPL-CCNC: 588 U/L (ref 35–104)
ALT SERPL-CCNC: 68 U/L (ref 0–35)
ANION GAP SERPL CALCULATED.3IONS-SCNC: 19 MMOL/L (ref 7–16)
AST SERPL-CCNC: 65 U/L (ref 0–35)
BASOPHILS # BLD: 0 K/UL (ref 0–0.2)
BASOPHILS NFR BLD: 0 % (ref 0–2)
BILIRUB SERPL-MCNC: 0.3 MG/DL (ref 0–1.2)
BUN SERPL-MCNC: 32 MG/DL (ref 6–20)
CALCIUM SERPL-MCNC: 9.8 MG/DL (ref 8.6–10)
CHLORIDE SERPL-SCNC: 111 MMOL/L (ref 98–107)
CO2 SERPL-SCNC: 12 MMOL/L (ref 22–29)
CREAT SERPL-MCNC: 0.6 MG/DL (ref 0.5–1)
EOSINOPHIL # BLD: 0.17 K/UL (ref 0.05–0.5)
EOSINOPHILS RELATIVE PERCENT: 1 % (ref 0–6)
ERYTHROCYTE [DISTWIDTH] IN BLOOD BY AUTOMATED COUNT: 15.8 % (ref 11.5–15)
GFR, ESTIMATED: >90 ML/MIN/1.73M2
GLUCOSE BLD-MCNC: 129 MG/DL (ref 74–99)
GLUCOSE BLD-MCNC: 137 MG/DL (ref 74–99)
GLUCOSE BLD-MCNC: 190 MG/DL (ref 74–99)
GLUCOSE BLD-MCNC: 84 MG/DL (ref 74–99)
GLUCOSE SERPL-MCNC: 63 MG/DL (ref 74–99)
HCT VFR BLD AUTO: 50.1 % (ref 34–48)
HGB BLD-MCNC: 15.2 G/DL (ref 11.5–15.5)
LYMPHOCYTES NFR BLD: 2.08 K/UL (ref 1.5–4)
LYMPHOCYTES RELATIVE PERCENT: 12 % (ref 20–42)
MCH RBC QN AUTO: 29.3 PG (ref 26–35)
MCHC RBC AUTO-ENTMCNC: 30.3 G/DL (ref 32–34.5)
MCV RBC AUTO: 96.7 FL (ref 80–99.9)
METAMYELOCYTES ABSOLUTE COUNT: 0.17 K/UL (ref 0–0.12)
METAMYELOCYTES: 1 % (ref 0–1)
MONOCYTES NFR BLD: 0.35 K/UL (ref 0.1–0.95)
MONOCYTES NFR BLD: 2 % (ref 2–12)
MYELOCYTES ABSOLUTE COUNT: 0.52 K/UL
MYELOCYTES: 3 %
NEUTROPHILS NFR BLD: 81 % (ref 43–80)
NEUTS SEG NFR BLD: 14.01 K/UL (ref 1.8–7.3)
PLATELET # BLD AUTO: 343 K/UL (ref 130–450)
PMV BLD AUTO: 9.8 FL (ref 7–12)
POTASSIUM SERPL-SCNC: 4.4 MMOL/L (ref 3.5–5.1)
PROT SERPL-MCNC: 6.3 G/DL (ref 6.4–8.3)
RBC # BLD AUTO: 5.18 M/UL (ref 3.5–5.5)
RBC # BLD: ABNORMAL 10*6/UL
SODIUM SERPL-SCNC: 142 MMOL/L (ref 136–145)
WBC OTHER # BLD: 17.3 K/UL (ref 4.5–11.5)

## 2025-06-18 PROCEDURE — 2500000003 HC RX 250 WO HCPCS: Performed by: STUDENT IN AN ORGANIZED HEALTH CARE EDUCATION/TRAINING PROGRAM

## 2025-06-18 PROCEDURE — 85025 COMPLETE CBC W/AUTO DIFF WBC: CPT

## 2025-06-18 PROCEDURE — 80053 COMPREHEN METABOLIC PANEL: CPT

## 2025-06-18 PROCEDURE — 6360000002 HC RX W HCPCS: Performed by: NURSE PRACTITIONER

## 2025-06-18 PROCEDURE — 6360000002 HC RX W HCPCS: Performed by: STUDENT IN AN ORGANIZED HEALTH CARE EDUCATION/TRAINING PROGRAM

## 2025-06-18 PROCEDURE — 97530 THERAPEUTIC ACTIVITIES: CPT

## 2025-06-18 PROCEDURE — 6370000000 HC RX 637 (ALT 250 FOR IP): Performed by: STUDENT IN AN ORGANIZED HEALTH CARE EDUCATION/TRAINING PROGRAM

## 2025-06-18 PROCEDURE — 2700000000 HC OXYGEN THERAPY PER DAY

## 2025-06-18 PROCEDURE — 97165 OT EVAL LOW COMPLEX 30 MIN: CPT

## 2025-06-18 PROCEDURE — 2580000003 HC RX 258: Performed by: EMERGENCY MEDICINE

## 2025-06-18 PROCEDURE — 2580000003 HC RX 258: Performed by: STUDENT IN AN ORGANIZED HEALTH CARE EDUCATION/TRAINING PROGRAM

## 2025-06-18 PROCEDURE — 36415 COLL VENOUS BLD VENIPUNCTURE: CPT

## 2025-06-18 PROCEDURE — 94640 AIRWAY INHALATION TREATMENT: CPT

## 2025-06-18 PROCEDURE — 82962 GLUCOSE BLOOD TEST: CPT

## 2025-06-18 PROCEDURE — 97161 PT EVAL LOW COMPLEX 20 MIN: CPT

## 2025-06-18 PROCEDURE — 2140000000 HC CCU INTERMEDIATE R&B

## 2025-06-18 RX ORDER — BUPRENORPHINE AND NALOXONE 8; 2 MG/1; MG/1
1 FILM, SOLUBLE BUCCAL; SUBLINGUAL 2 TIMES DAILY
Status: DISCONTINUED | OUTPATIENT
Start: 2025-06-18 | End: 2025-06-26 | Stop reason: HOSPADM

## 2025-06-18 RX ORDER — TRAMADOL HYDROCHLORIDE 50 MG/1
50 TABLET ORAL EVERY 6 HOURS PRN
Status: COMPLETED | OUTPATIENT
Start: 2025-06-18 | End: 2025-06-18

## 2025-06-18 RX ADMIN — PREDNISONE 15 MG: 10 TABLET ORAL at 08:49

## 2025-06-18 RX ADMIN — ATENOLOL 25 MG: 25 TABLET ORAL at 06:25

## 2025-06-18 RX ADMIN — BUPRENORPHINE AND NALOXONE 1 FILM: 8; 2 FILM, SOLUBLE BUCCAL; SUBLINGUAL at 09:29

## 2025-06-18 RX ADMIN — ALBUTEROL SULFATE 0.63 MG: 0.63 SOLUTION RESPIRATORY (INHALATION) at 04:34

## 2025-06-18 RX ADMIN — MICONAZOLE NITRATE: 20.6 POWDER TOPICAL at 22:36

## 2025-06-18 RX ADMIN — INSULIN GLARGINE 15 UNITS: 100 INJECTION, SOLUTION SUBCUTANEOUS at 22:07

## 2025-06-18 RX ADMIN — TIZANIDINE 4 MG: 4 TABLET ORAL at 13:10

## 2025-06-18 RX ADMIN — BISACODYL 5 MG: 5 TABLET, COATED ORAL at 22:04

## 2025-06-18 RX ADMIN — DILTIAZEM HYDROCHLORIDE 240 MG: 240 CAPSULE, COATED, EXTENDED RELEASE ORAL at 08:48

## 2025-06-18 RX ADMIN — PIPERACILLIN AND TAZOBACTAM 3375 MG: 3; .375 INJECTION, POWDER, LYOPHILIZED, FOR SOLUTION INTRAVENOUS at 06:31

## 2025-06-18 RX ADMIN — PANTOPRAZOLE SODIUM 40 MG: 40 TABLET, DELAYED RELEASE ORAL at 06:25

## 2025-06-18 RX ADMIN — SODIUM CHLORIDE: 0.9 INJECTION, SOLUTION INTRAVENOUS at 08:51

## 2025-06-18 RX ADMIN — LORAZEPAM 0.5 MG: 0.5 TABLET ORAL at 22:04

## 2025-06-18 RX ADMIN — ALBUTEROL SULFATE 0.63 MG: 0.63 SOLUTION RESPIRATORY (INHALATION) at 10:54

## 2025-06-18 RX ADMIN — TIZANIDINE 4 MG: 4 TABLET ORAL at 06:26

## 2025-06-18 RX ADMIN — ATENOLOL 25 MG: 25 TABLET ORAL at 17:11

## 2025-06-18 RX ADMIN — TRAMADOL HYDROCHLORIDE 50 MG: 50 TABLET, COATED ORAL at 22:04

## 2025-06-18 RX ADMIN — ENOXAPARIN SODIUM 40 MG: 100 INJECTION SUBCUTANEOUS at 08:48

## 2025-06-18 RX ADMIN — SODIUM CHLORIDE, PRESERVATIVE FREE 10 ML: 5 INJECTION INTRAVENOUS at 22:05

## 2025-06-18 RX ADMIN — TRAMADOL HYDROCHLORIDE 50 MG: 50 TABLET, COATED ORAL at 13:09

## 2025-06-18 RX ADMIN — BUPRENORPHINE AND NALOXONE 1 FILM: 8; 2 FILM, SOLUBLE BUCCAL; SUBLINGUAL at 22:05

## 2025-06-18 RX ADMIN — LORAZEPAM 0.5 MG: 0.5 TABLET ORAL at 08:49

## 2025-06-18 RX ADMIN — PIPERACILLIN AND TAZOBACTAM 3375 MG: 3; .375 INJECTION, POWDER, LYOPHILIZED, FOR SOLUTION INTRAVENOUS at 13:09

## 2025-06-18 RX ADMIN — INSULIN LISPRO 2 UNITS: 100 INJECTION, SOLUTION INTRAVENOUS; SUBCUTANEOUS at 17:12

## 2025-06-18 RX ADMIN — PIPERACILLIN AND TAZOBACTAM 3375 MG: 3; .375 INJECTION, POWDER, LYOPHILIZED, FOR SOLUTION INTRAVENOUS at 22:36

## 2025-06-18 RX ADMIN — SODIUM CHLORIDE, PRESERVATIVE FREE 10 ML: 5 INJECTION INTRAVENOUS at 08:49

## 2025-06-18 RX ADMIN — MICONAZOLE NITRATE: 20.6 POWDER TOPICAL at 08:50

## 2025-06-18 RX ADMIN — TIZANIDINE 4 MG: 4 TABLET ORAL at 22:10

## 2025-06-18 RX ADMIN — TIZANIDINE 4 MG: 4 TABLET ORAL at 01:36

## 2025-06-18 ASSESSMENT — PAIN DESCRIPTION - DESCRIPTORS
DESCRIPTORS: ACHING
DESCRIPTORS: ACHING;DISCOMFORT
DESCRIPTORS: ACHING;NAGGING
DESCRIPTORS: ACHING;SORE;DISCOMFORT
DESCRIPTORS: ACHING

## 2025-06-18 ASSESSMENT — PAIN SCALES - GENERAL
PAINLEVEL_OUTOF10: 5
PAINLEVEL_OUTOF10: 5
PAINLEVEL_OUTOF10: 0
PAINLEVEL_OUTOF10: 0
PAINLEVEL_OUTOF10: 6
PAINLEVEL_OUTOF10: 6
PAINLEVEL_OUTOF10: 9
PAINLEVEL_OUTOF10: 2

## 2025-06-18 ASSESSMENT — PAIN DESCRIPTION - ORIENTATION
ORIENTATION: RIGHT
ORIENTATION: MID;LOWER

## 2025-06-18 ASSESSMENT — PAIN SCALES - WONG BAKER: WONGBAKER_NUMERICALRESPONSE: NO HURT

## 2025-06-18 ASSESSMENT — PAIN DESCRIPTION - LOCATION
LOCATION: BACK
LOCATION: ABDOMEN
LOCATION: BACK
LOCATION: BACK
LOCATION: BACK;NECK

## 2025-06-18 NOTE — PROGRESS NOTES
Department of Internal Medicine  Infectious Diseases  Progress  Note      C/C : leukocytosis , leg swelling       Pt is awake and alert  Denies fever or chills  Mild abdomen pain   Afebrile       Current Facility-Administered Medications   Medication Dose Route Frequency Provider Last Rate Last Admin    white petrolatum ointment   Topical BID PRN Nicolás Duncan MD        buprenorphine-naloxone (SUBOXONE) 8-2 MG SL film 1 Film  1 Film SubLINGual BID Nicolás Duncan MD   1 Film at 06/18/25 0929    traMADol (ULTRAM) tablet 50 mg  50 mg Oral Q6H PRN Nicolás Duncan MD   50 mg at 06/18/25 1309    miconazole (MICOTIN) 2 % powder   Topical BID Nicolás Duncan MD   Given at 06/18/25 0850    albuterol (ACCUNEB) nebulizer solution 0.63 mg  0.63 mg Nebulization 4x Daily PRN Myra Joyner, LOCO - CNP   0.63 mg at 06/18/25 1054    0.9 % sodium chloride infusion   IntraVENous Continuous Mickey Brown  mL/hr at 06/18/25 0851 New Bag at 06/18/25 0851    sodium chloride flush 0.9 % injection 10 mL  10 mL IntraVENous 2 times per day Nicolás Duncan MD   10 mL at 06/18/25 0849    sodium chloride flush 0.9 % injection 10 mL  10 mL IntraVENous PRN Nicolás Duncan MD        0.9 % sodium chloride infusion   IntraVENous PRN Nicolás Duncan MD        potassium chloride (KLOR-CON M) extended release tablet 40 mEq  40 mEq Oral PRN Nicolás Duncan MD        Or    potassium bicarb-citric acid (EFFER-K) effervescent tablet 40 mEq  40 mEq Oral PRN Nicolás Duncan MD        Or    potassium chloride 10 mEq/100 mL IVPB (Peripheral Line)  10 mEq IntraVENous PRN Nicolás Duncan MD        enoxaparin (LOVENOX) injection 40 mg  40 mg SubCUTAneous Daily Nicolás Duncan MD   40 mg at 06/18/25 0848    ondansetron (ZOFRAN-ODT) disintegrating tablet 4 mg  4 mg Oral Q8H PRN Nicolás Duncan MD        Or    ondansetron (ZOFRAN) injection 4 mg  4 mg IntraVENous Q6H PRN RonNicolás pro MD senna (SENOKOT) tablet 8.6 mg  1 tablet Oral Daily PRN Dakota,

## 2025-06-18 NOTE — DISCHARGE INSTR - COC
Continuity of Care Form    Patient Name: Li Rojas   :  1970  MRN:  [id number]    Admit date:  2025  Discharge date:  2025    Code Status Order: Full Code   Advance Directives:     Admitting Physician:  Nicolás Duncan MD  PCP: Melvin Cardoso MD    Discharging Nurse[facility]6401-A  Discharging Unit Phone Number: [phone]    Emergency Contact:   Extended Emergency Contact Information  Primary Emergency Contact: gaston calderon  Mobile Phone: [phone]  Relation: Parent   needed? No    Past Surgical History:  Past Surgical History:   Procedure Laterality Date    ABDOMEN SURGERY      ANTERIOR CRUCIATE LIGAMENT REPAIR      APPENDECTOMY       SECTION      x5    CHOLECYSTECTOMY      DILATION AND CURETTAGE OF UTERUS      HC INJECTION PROCEDURE FOR SACROILIAC JOINT Left 12/3/2020    LEFT SACROILIAC JOINT STEROID INJECTION UNDER X-RAY GUIDANCE performed by Fauzia Crowell DO at [facility] OR    NERVE BLOCK Left 10/01/2020    NERVE BLOCK Left 10/1/2020    LEFT L4-5 TRANSFORAMINAL EPIDURAL STEROID INJECTION performed by Fauzia Crowell DO at [facility] OR    OTHER SURGICAL HISTORY Left 2020    LEFT SACROILIAC STEROID INJECTION UNDER XRAY GUIDANCE    TUBAL LIGATION         Immunization History:   Immunization History   Administered Date(s) Administered    COVID-19, PFIZER PURPLE top, DILUTE for use, (age 12 y+), 30mcg/0.3mL 2021, 2021       Active Problems:  Patient Active Problem List   Diagnosis Code    SVT (supraventricular tachycardia) I47.10    MS (multiple sclerosis) ([facility]) G35    Sacroiliitis M46.1    Personal history of supraventricular tachycardia Z86.79    Anxiety F41.9    Rheumatoid arthritis ([facility]) M06.9    Osteoarthritis of left hip M16.12    Bilateral lower leg cellulitis L03.116, L03.115    Acute bilateral thoracic back pain M54.6    Edema R60.9    Asthma J45.909    Hypertension I10     No  Bladder: No  Urinary Catheter: None   Colostomy/Ileostomy/Ileal Conduit: No       Date of Last BM: 06/24/2025    Intake/Output Summary (Last 24 hours) at 6/18/2025 1205  Last data filed at 6/18/2025 0430  Gross per 24 hour   Intake 1637.83 ml   Output 800 ml   Net 837.83 ml     I/O last 3 completed shifts:  In: 1687.9 [P.O.:510; I.V.:1000; IV Piggyback:177.9]  Out: 800 [Urine:800]    Safety Concerns:     At Risk for Falls and Aspiration Risk    Impairments/Disabilities:      None    Nutrition Therapy:  Current Nutrition Therapy:   - Oral Diet:  General    Routes of Feeding: Oral  Liquids: No Restrictions  Daily Fluid Restriction: no  Last Modified Barium Swallow with Video (Video Swallowing Test): not done    Treatments at the Time of Hospital Discharge:   Respiratory Treatments: received the following while inpatient  albuterol (ACCUNEB) nebulizer solution 0.63 mg Dose: 0.63 mg  :  Nebulization  :  4 TIMES DAILY PRN  :  Wheezing.   arformoterol tartrate (BROVANA) nebulizer solution 15 mcgDose: 15 mcg  :  Nebulization  :  2 TIMES DAILY RESP   budesonide (PULMICORT) nebulizer suspension 500 mcgOrdered Dose: 0.5 mg  :  Admin Dose: 500 mcg  :  Nebulization  :  2 TIMES DAILY RESP   ipratropium 0.5 mg-albuterol 2.5 mg (DUONEB) nebulizer solution 1 Dose Dose: 1 Dose  :  Inhalation  :  EVERY 4 HOURS PRN  :  Shortness of Breath   Oxygen Therapy:  is on oxygen at 6 L/min per nasal cannula.  Ventilator:    - No ventilator support    Rehab Therapies: Physical Therapy, Occupational Therapy, and Speech/Language Therapy  Weight Bearing Status/Restrictions: No weight bearing restrictions  Other Medical Equipment (for information only, NOT a DME order):  wheelchair  Other Treatments: Wear soft TLSO when greater than 45 degrees x 3 months  Wound care pt received inpatient:  miconazole nitrate 2 % ointment Topical  :  2 TIMES DAILY and TID PRN (apply to bilateral buttocks and posterior thigh)  white petrolatum ointment Topical  :

## 2025-06-18 NOTE — PROGRESS NOTES
Perfect serve message sent to Dr. Duncan to notify patient refusing ct of abdomen and wants to sign out AMA.

## 2025-06-18 NOTE — PROGRESS NOTES
Continues to refuse to evaluate julienne area and buttocks for wound.  Refused to change dressings applied to legs applied and evaluated on admission. Emotional support given. Re attempt frequently to reposition and t urn and julienne care.

## 2025-06-18 NOTE — PROGRESS NOTES
Internal Medicine Progress Note    Patient's name: Li Rojas  : 1970  Chief complaints (on day of admission): Altered Mental Status (Pt from [facility] and staff states pt is more altered than usual. Pt is A&Ox4 at time of triage. Pt states she is just tired because they woke her up at 4AM last night )  Admission date: 2025  Date of service: 2025   Room: [address]  Primary care physician: Melvin Cardoso MD  Reason for visit: Follow-up for AMS     Subjective  Li was seen and examined at bedside     Refusing to do the CT abdomen pelvis   We have had this issue in the hospital before   Identified barriers to patient not willing have scan   She is worried about pain   Offered solution of holding suboxone and then giving IV pain medication to get scan done   She is absolutely unwilling to consider this due to her concern of withdraw and her concern of not getting her suboxone continued as an outpatient   Discussed this at great length and offered to come up with other options for patient   Warned her extensively of the risk of an undiagnosed intra abdominal infectious process that could lead to sepsis septic shock and death to name a few   She was able to reiterate these risks back to me   States she wants to go back to rehab and take her IV anti biotics and then try for the scan at a later date when she feels ready   Again went back to the room with social work to attempt to find a way to get patient to agree to ct scan of the abdomen   This was to no avail again   Spoke with Dr Elliott   Spoke with social work       Review of Systems  There are no new complaints of chest pain, shortness of breath, abdominal pain, nausea, vomiting, diarrhea, constipation unless otherwise mentioned above.     Hospital Medications  Current Facility-Administered Medications   Medication Dose Route Frequency Provider Last Rate Last Admin    white petrolatum ointment   Topical BID PRN Ronci,

## 2025-06-18 NOTE — PLAN OF CARE
Problem: Safety - Adult  Goal: Free from fall injury  6/18/2025 0335 by Xiomy Valdez RN  Outcome: Progressing  6/17/2025 1630 by Xin Crowe RN  Outcome: Progressing     Problem: Chronic Conditions and Co-morbidities  Goal: Patient's chronic conditions and co-morbidity symptoms are monitored and maintained or improved  6/18/2025 0335 by Xiomy Valdez RN  Outcome: Progressing  6/17/2025 1630 by Xin Crowe RN  Outcome: Progressing     Problem: Discharge Planning  Goal: Discharge to home or other facility with appropriate resources  6/18/2025 0335 by Xiomy Valdez RN  Outcome: Progressing  6/17/2025 1630 by Xin Crowe RN  Outcome: Progressing     Problem: Pain  Goal: Verbalizes/displays adequate comfort level or baseline comfort level  6/18/2025 0335 by Xiomy Valdez RN  Outcome: Progressing  6/17/2025 1630 by Xin Crowe RN  Outcome: Progressing     Problem: Skin/Tissue Integrity  Goal: Skin integrity remains intact  Description: 1.  Monitor for areas of redness and/or skin breakdown  2.  Assess vascular access sites hourly  3.  Every 4-6 hours minimum:  Change oxygen saturation probe site  4.  Every 4-6 hours:  If on nasal continuous positive airway pressure, respiratory therapy assess nares and determine need for appliance change or resting period  6/18/2025 0335 by Xiomy Valdez RN  Outcome: Progressing  6/17/2025 1630 by Xin Crowe RN  Outcome: Progressing     Problem: Confusion  Goal: Confusion, delirium, dementia, or psychosis is improved or at baseline  Description: INTERVENTIONS:  1. Assess for possible contributors to thought disturbance, including medications, impaired vision or hearing, underlying metabolic abnormalities, dehydration, psychiatric diagnoses, and notify attending LIP  2. [place] high risk fall precautions, as indicated  3. Provide frequent short contacts to provide reality reorientation, refocusing and direction  4. Decrease environmental

## 2025-06-18 NOTE — PROGRESS NOTES
Physical Therapy Initial Evaluation    Name: Li Rojas  : 1970  MRN: [id number]      Date of Service: 2025    Evaluating PT:  Sagar Lackey, PT [id number]    Referring provider/PT Order:  PT Eval and Treat   25  PT evaluation and treat  Start:  25,   End:  25,   ONE TIME,   Standing Count:  1 Occurrences,   R         Nicolás Duncan MD     Room #:  6401/6401-A  Diagnosis:  Hypercalcemia [E83.52]  Hyperkalemia [E87.5]  MARCO (acute kidney injury) [N17.9]  Receiving intravenous antibiotic treatment as outpatient [Z79.2]  PMHx/PSHx:     has a past medical history of Arthritis, Asthma, H/O medication noncompliance, Hyperlipidemia, Hypertension, Lupus, MS (multiple sclerosis) ([facility]), Multiple sclerosis (H[facility]), Nicotine dependence, cigarettes, uncomplicated, Palpitations, SOB (shortness of breath), SVT (supraventricular tachycardia), Systemic lupus erythematosus, unspecified ([facility]), and Type 2 diabetes mellitus without complication, without long-term current use of insulin ([facility]).    has a past surgical history that includes Appendectomy; Abdomen surgery; Cholecystectomy; Tubal ligation;  section; Anterior cruciate ligament repair; Dilation and curettage of uterus; Nerve Block (Left, 10/01/2020); Nerve Block (Left, 10/1/2020); other surgical history (Left, 2020); and Injection Procedure For Sacroiliac Joint (Left, 12/3/2020).     Procedure/Surgery:  none  Precautions:  Falls, skin integrity, confusion, MS,  ,   Equipment Needs: To be determined,    SUBJECTIVE:    Per chart review admitted from facility. Per chart review Patient lived with her Mother in 1st floor apt prior. W/c for mobility. Unknown current level of mobility at facility. Equipment owned: Equipment at Nursing Home,      OBJECTIVE:   Initial Evaluation  Date: 25 Treatment Short Term/ Long Term   Goals   AM-PAC 6 Clicks       Was pt agreeable to Eval/treatment? Yes      Does pt have pain? Yes  eat call light and phone within reach,  all lines and tubes intact, nursing notified.   This patient can benefit from the continuation of skilled PT possibly in a rehab setting to maximize functional level and return to PLOF.       Treatment:  Patient completed and was instructed in the following treatment:      *Bed mobility training - pt given verbal and tactile cues to facilitate proper sequencing and safety during rolling and supine>sit as well as provided with physical assistance to complete task  *Patient Education- regarding use of call light for safety.  *Skillful positioning in bed to protect skin/joint integrity.  *Vitals and symptoms were closely monitored throughout session.    Pt's/ family goals      None stated    Prognosis is good  for reaching above PT goals.    Patient and or family understand(s) diagnosis, prognosis, and plan of care.  yes    PHYSICAL THERAPY PLAN OF CARE:    PT POC is established based on physician order and patient diagnosis     Diagnosis:  Hypercalcemia [E83.52]  Hyperkalemia [E87.5]  MARCO (acute kidney injury) [N17.9]  Receiving intravenous antibiotic treatment as outpatient [Z79.2]  Specific instructions for next treatment:  Sit EOB, postural exercises  Current Treatment Recommendations:     [x] Strengthening to improve independence with functional mobility   [x] ROM to improve independence with functional mobility   [x] Balance Training to improve static/dynamic balance and to reduce fall risk  [x] Endurance Training to improve activity tolerance during functional mobility   [x] Transfer Training to improve safety and independence with all functional transfers   [x] Gait Training to improve gait mechanics, endurance and asses need for appropriate assistive device when appropriate.   [x] Stair Training in preparation for safe discharge home and/or into the community when appropriate  [] Positioning to prevent skin breakdown and contractures  [x] Safety and Education Training

## 2025-06-18 NOTE — PROGRESS NOTES
Pt agitated, repetitive ,weepy, refusing turns, elevate heels, heel boots, wedges or pillow turns, wants to ambulate to bathroom but was two person complete assist to bed on admit. Pt want to keep her albuterol inhaler at bedside was educated not able to. DR. Dakota allen served for possible prn aerosol treatments. Pt refused her suboxon also

## 2025-06-18 NOTE — CARE COORDINATION
Spoke with patient at bedside after being informed she wants to return home. Discussed patient not able to be set up for home IV abx due to lack of support and provided education. Patient expressed understanding is agreeable to continue treatment and plans to return to the facility.    12:15P  Discharge order noted. Message sent to [facility] at the [place] to confirm if patient needs auth to return. Spoke with Chanda, she states patient was at their facility skilled and needs auth; she will check with their  to see if they can accept her back under her medicaid and will call SW back. PT/OT ordered. Call made to therapy department for PT/OT evals for auth. Ambulance form and CARLA completed; envelope placed on the soft chart. Ambulance transport placed on will-call via [facility].    Electronically signed by KEIKO Juan on 6/18/2025 at 12:54 PM

## 2025-06-18 NOTE — PROGRESS NOTES
Department of Podiatry  Progress Note    SUBJECTIVE:  Li Rojas is seen at bedside for B/L lower leg cellulitis and wounds. No acute events overnight. Patient denies any N/V/D/F/C/SOB/CP.  Patient states no changes in pain or sensation to the bilateral lower extremity.  No other pedal complaints at this time.     OBJECTIVE:    Scheduled Meds:   buprenorphine-naloxone  1 Film SubLINGual BID    miconazole   Topical BID    sodium chloride flush  10 mL IntraVENous 2 times per day    enoxaparin  40 mg SubCUTAneous Daily    atenolol  25 mg Oral BID AC    bisacodyl  5 mg Oral BID    dilTIAZem  240 mg Oral Daily    [Held by provider] furosemide  40 mg Oral Lunch    insulin glargine  15 Units SubCUTAneous Nightly    LORazepam  0.5 mg Oral BID    pantoprazole  40 mg Oral QAM AC    polyethylene glycol  17 g Oral Daily    predniSONE  15 mg Oral Daily    tiZANidine  4 mg Oral Q6H    insulin lispro  0-8 Units SubCUTAneous 4x Daily AC & HS    piperacillin-tazobactam  3,375 mg IntraVENous Q8H     Continuous Infusions:   sodium chloride 100 mL/hr at 06/18/25 0851    sodium chloride      dextrose       PRN Meds:.white petrolatum, albuterol, traMADol, sodium chloride flush, sodium chloride, potassium chloride **OR** potassium alternative oral replacement **OR** potassium chloride, ondansetron **OR** ondansetron, senna, acetaminophen **OR** acetaminophen, glucose, dextrose bolus **OR** dextrose bolus, glucagon (rDNA), dextrose    Allergies   Allergen Reactions    Lyrica [Pregabalin] Anaphylaxis    Other Shortness Of Breath    Sulfa Antibiotics Anaphylaxis     Hallucinations and rash    Darvocet [Propoxyphene N-Acetaminophen]     Ultram [Tramadol Hcl]     Metoprolol Rash       /79   Pulse 97   Temp 97.7 °F (36.5 °C) (Temporal)   Resp 21   Ht 1.72 m (5' 7.72\")   Wt 88.9 kg (195 lb 14.4 oz)   LMP 05/04/2020   SpO2 96%   BMI 30.04 kg/m²       EXAM:  Dressings clean and intact B/L. CFT < 5 seconds to all

## 2025-06-18 NOTE — PROGRESS NOTES
Refusing turns and complete skin assessment at this time. Patient states \"maybe later\". Educated on importance with compliance to prevent further breakdown.

## 2025-06-18 NOTE — CARE COORDINATION
Refusing to do the CT abdomen pelvis   We have had this issue in the hospital before   Identified barriers to patient not willing have scan   She is worried about pain   Offered solution of holding suboxone and then giving IV pain medication to get scan done   She is absolutely unwilling to consider this due to her concern of withdraw and her concern of not getting her suboxone continued as an outpatient   Discussed this at great length and offered to come up with other options for patient   Warned her extensively of the risk of an undiagnosed intra abdominal infectious process that could lead to sepsis septic shock and death to name a few   She was able to reiterate these risks back to me   States she wants to go back to rehab and take her IV anti biotics and then try for the scan at a later date when she feels ready   Again went back to the room with social work to attempt to find a way to get patient to agree to ct scan of the abdomen   This was to no avail again

## 2025-06-18 NOTE — PROGRESS NOTES
Occupational Therapy  OCCUPATIONAL THERAPY INITIAL EVALUATION    [facility]  [address] [place], [place]      Date:2025                                                Patient Name: Li Rojas  MRN: [id number]  : 1970  Room: 6401/6401-    Evaluating OT: Nicolás Freeman OTR/L #[id number]     Referring Provider: Nicolás Duncan MD   Specific Provider Orders/Date: OT eval and treat 25    Diagnosis: Hypercalcemia [E83.52]  Hyperkalemia [E87.5]  MARCO (acute kidney injury) [N17.9]  Receiving intravenous antibiotic treatment as outpatient [Z79.2]   Pt admitted to hospital with AMS    Pertinent Medical History:  has a past medical history of Arthritis, Asthma, H/O medication noncompliance, Hyperlipidemia, Hypertension, Lupus, MS (multiple sclerosis) ([facility]), Multiple sclerosis ([facility]), Nicotine dependence, cigarettes, uncomplicated, Palpitations, SOB (shortness of breath), SVT (supraventricular tachycardia), Systemic lupus erythematosus, unspecified ([facility]), and Type 2 diabetes mellitus without complication, without long-term current use of insulin ([facility]).       Precautions:  Fall Risk, cognition, bed alarm     Assessment of current deficits    [x] Functional mobility  [x]ADLs  [x] Strength               []Cognition    [x] Functional transfers   [x] IADLs         [x] Safety Awareness   [x]Endurance    [] Fine Coordination              [x] Balance      [] Vision/perception   []Sensation     []Gross Motor Coordination  [] ROM  [] Delirium                   [] Motor Control     OT PLAN OF CARE   OT POC based on physician orders, patient diagnosis and results of clinical assessment    Frequency/Duration 1-5 days/wk for 2 weeks PRN   Specific OT Treatment Interventions to include:   * Instruction/training on adapted ADL techniques and AE recommendations to increase functional independence within precautions       * Training on energy conservation strategies,    Therapeutic Ex 63004       Therapeutic Activities 32808       ADL/Self Care 35120       Orthotic Management 74768       Manual [id number]     Neuro Re-Ed 91222       Non-Billable Time          Evaluation Time additionally includes thorough review of current medical information, gathering information on past medical history/social history and prior level of function, interpretation of standardized testing/informal observation of tasks, assessment of data and development of plan of care and goals.          Nicolás Freeman OTR/L #[id number]

## 2025-06-18 NOTE — PROGRESS NOTES
Spoke with pharmacy to see if we are able to switch sublingual suboxone to film as patient states she can not take pill form. Pharmacist states we do have some film, will perfect serve Dr. Duncan to see if we are able to switch order.

## 2025-06-19 LAB
ALBUMIN SERPL-MCNC: 2.6 G/DL (ref 3.5–5.2)
ALP SERPL-CCNC: 558 U/L (ref 35–104)
ALT SERPL-CCNC: 63 U/L (ref 0–35)
ANION GAP SERPL CALCULATED.3IONS-SCNC: 13 MMOL/L (ref 7–16)
AST SERPL-CCNC: 50 U/L (ref 0–35)
BASOPHILS # BLD: 0 K/UL (ref 0–0.2)
BASOPHILS NFR BLD: 0 % (ref 0–2)
BILIRUB SERPL-MCNC: 0.4 MG/DL (ref 0–1.2)
BUN SERPL-MCNC: 20 MG/DL (ref 6–20)
CALCIUM SERPL-MCNC: 9.9 MG/DL (ref 8.6–10)
CHLORIDE SERPL-SCNC: 116 MMOL/L (ref 98–107)
CO2 SERPL-SCNC: 16 MMOL/L (ref 22–29)
CREAT SERPL-MCNC: 0.6 MG/DL (ref 0.5–1)
EOSINOPHIL # BLD: 0 K/UL (ref 0.05–0.5)
EOSINOPHILS RELATIVE PERCENT: 0 % (ref 0–6)
ERYTHROCYTE [DISTWIDTH] IN BLOOD BY AUTOMATED COUNT: 15.7 % (ref 11.5–15)
GFR, ESTIMATED: >90 ML/MIN/1.73M2
GLUCOSE BLD-MCNC: 123 MG/DL (ref 74–99)
GLUCOSE BLD-MCNC: 137 MG/DL (ref 74–99)
GLUCOSE BLD-MCNC: 144 MG/DL (ref 74–99)
GLUCOSE BLD-MCNC: 183 MG/DL (ref 74–99)
GLUCOSE SERPL-MCNC: 134 MG/DL (ref 74–99)
HCT VFR BLD AUTO: 47.4 % (ref 34–48)
HGB BLD-MCNC: 14.5 G/DL (ref 11.5–15.5)
LYMPHOCYTES NFR BLD: 1.11 K/UL (ref 1.5–4)
LYMPHOCYTES RELATIVE PERCENT: 6 % (ref 20–42)
MCH RBC QN AUTO: 29.3 PG (ref 26–35)
MCHC RBC AUTO-ENTMCNC: 30.6 G/DL (ref 32–34.5)
MCV RBC AUTO: 95.8 FL (ref 80–99.9)
METAMYELOCYTES ABSOLUTE COUNT: 0.32 K/UL (ref 0–0.12)
METAMYELOCYTES: 2 % (ref 0–1)
MONOCYTES NFR BLD: 0.79 K/UL (ref 0.1–0.95)
MONOCYTES NFR BLD: 4 % (ref 2–12)
MYELOCYTES ABSOLUTE COUNT: 0.79 K/UL
MYELOCYTES: 4 %
NEUTROPHILS NFR BLD: 84 % (ref 43–80)
NEUTS SEG NFR BLD: 15.19 K/UL (ref 1.8–7.3)
PLATELET, FLUORESCENCE: 356 K/UL (ref 130–450)
PMV BLD AUTO: 9.9 FL (ref 7–12)
POTASSIUM SERPL-SCNC: 3.7 MMOL/L (ref 3.5–5.1)
PROT SERPL-MCNC: 6.1 G/DL (ref 6.4–8.3)
RBC # BLD AUTO: 4.95 M/UL (ref 3.5–5.5)
RBC # BLD: ABNORMAL 10*6/UL
SODIUM SERPL-SCNC: 145 MMOL/L (ref 136–145)
WBC OTHER # BLD: 18.8 K/UL (ref 4.5–11.5)

## 2025-06-19 PROCEDURE — 2700000000 HC OXYGEN THERAPY PER DAY

## 2025-06-19 PROCEDURE — 6360000002 HC RX W HCPCS: Performed by: STUDENT IN AN ORGANIZED HEALTH CARE EDUCATION/TRAINING PROGRAM

## 2025-06-19 PROCEDURE — 94640 AIRWAY INHALATION TREATMENT: CPT

## 2025-06-19 PROCEDURE — 80053 COMPREHEN METABOLIC PANEL: CPT

## 2025-06-19 PROCEDURE — 6370000000 HC RX 637 (ALT 250 FOR IP): Performed by: STUDENT IN AN ORGANIZED HEALTH CARE EDUCATION/TRAINING PROGRAM

## 2025-06-19 PROCEDURE — 2500000003 HC RX 250 WO HCPCS: Performed by: STUDENT IN AN ORGANIZED HEALTH CARE EDUCATION/TRAINING PROGRAM

## 2025-06-19 PROCEDURE — 6370000000 HC RX 637 (ALT 250 FOR IP): Performed by: INTERNAL MEDICINE

## 2025-06-19 PROCEDURE — 2580000003 HC RX 258: Performed by: EMERGENCY MEDICINE

## 2025-06-19 PROCEDURE — 2580000003 HC RX 258: Performed by: STUDENT IN AN ORGANIZED HEALTH CARE EDUCATION/TRAINING PROGRAM

## 2025-06-19 PROCEDURE — 85025 COMPLETE CBC W/AUTO DIFF WBC: CPT

## 2025-06-19 PROCEDURE — 36415 COLL VENOUS BLD VENIPUNCTURE: CPT

## 2025-06-19 PROCEDURE — 82962 GLUCOSE BLOOD TEST: CPT

## 2025-06-19 PROCEDURE — 2140000000 HC CCU INTERMEDIATE R&B

## 2025-06-19 RX ORDER — IPRATROPIUM BROMIDE AND ALBUTEROL SULFATE 2.5; .5 MG/3ML; MG/3ML
1 SOLUTION RESPIRATORY (INHALATION)
Status: DISCONTINUED | OUTPATIENT
Start: 2025-06-19 | End: 2025-06-20

## 2025-06-19 RX ORDER — ARFORMOTEROL TARTRATE 15 UG/2ML
15 SOLUTION RESPIRATORY (INHALATION)
Status: DISCONTINUED | OUTPATIENT
Start: 2025-06-19 | End: 2025-06-26 | Stop reason: HOSPADM

## 2025-06-19 RX ORDER — BUDESONIDE 0.5 MG/2ML
0.5 INHALANT ORAL
Status: DISCONTINUED | OUTPATIENT
Start: 2025-06-19 | End: 2025-06-26 | Stop reason: HOSPADM

## 2025-06-19 RX ADMIN — BISACODYL 5 MG: 5 TABLET, COATED ORAL at 20:08

## 2025-06-19 RX ADMIN — ATENOLOL 25 MG: 25 TABLET ORAL at 07:49

## 2025-06-19 RX ADMIN — PIPERACILLIN AND TAZOBACTAM 3375 MG: 3; .375 INJECTION, POWDER, LYOPHILIZED, FOR SOLUTION INTRAVENOUS at 05:41

## 2025-06-19 RX ADMIN — BUPRENORPHINE AND NALOXONE 1 FILM: 8; 2 FILM, SOLUBLE BUCCAL; SUBLINGUAL at 20:09

## 2025-06-19 RX ADMIN — BUDESONIDE 500 MCG: 0.5 SUSPENSION RESPIRATORY (INHALATION) at 11:42

## 2025-06-19 RX ADMIN — SODIUM CHLORIDE 1 ML: 0.9 INJECTION, SOLUTION INTRAVENOUS at 16:17

## 2025-06-19 RX ADMIN — LORAZEPAM 0.5 MG: 0.5 TABLET ORAL at 20:09

## 2025-06-19 RX ADMIN — MICONAZOLE NITRATE: 20.6 POWDER TOPICAL at 21:26

## 2025-06-19 RX ADMIN — IPRATROPIUM BROMIDE AND ALBUTEROL SULFATE 1 DOSE: .5; 3 SOLUTION RESPIRATORY (INHALATION) at 19:28

## 2025-06-19 RX ADMIN — ATENOLOL 25 MG: 25 TABLET ORAL at 16:19

## 2025-06-19 RX ADMIN — ARFORMOTEROL TARTRATE 15 MCG: 15 SOLUTION RESPIRATORY (INHALATION) at 11:42

## 2025-06-19 RX ADMIN — BUDESONIDE 500 MCG: 0.5 SUSPENSION RESPIRATORY (INHALATION) at 19:28

## 2025-06-19 RX ADMIN — AMOXICILLIN AND CLAVULANATE POTASSIUM 1 TABLET: 875; 125 TABLET, FILM COATED ORAL at 21:25

## 2025-06-19 RX ADMIN — BISACODYL 5 MG: 5 TABLET, COATED ORAL at 09:43

## 2025-06-19 RX ADMIN — LORAZEPAM 0.5 MG: 0.5 TABLET ORAL at 09:43

## 2025-06-19 RX ADMIN — PREDNISONE 15 MG: 10 TABLET ORAL at 09:43

## 2025-06-19 RX ADMIN — DILTIAZEM HYDROCHLORIDE 240 MG: 240 CAPSULE, COATED, EXTENDED RELEASE ORAL at 09:42

## 2025-06-19 RX ADMIN — PETROLATUM: 420 OINTMENT TOPICAL at 21:26

## 2025-06-19 RX ADMIN — PANTOPRAZOLE SODIUM 40 MG: 40 TABLET, DELAYED RELEASE ORAL at 05:40

## 2025-06-19 RX ADMIN — TIZANIDINE 4 MG: 4 TABLET ORAL at 20:09

## 2025-06-19 RX ADMIN — INSULIN LISPRO 2 UNITS: 100 INJECTION, SOLUTION INTRAVENOUS; SUBCUTANEOUS at 21:25

## 2025-06-19 RX ADMIN — ARFORMOTEROL TARTRATE 15 MCG: 15 SOLUTION RESPIRATORY (INHALATION) at 19:28

## 2025-06-19 RX ADMIN — SENNOSIDES 8.6 MG: 8.6 TABLET, FILM COATED ORAL at 09:43

## 2025-06-19 RX ADMIN — ENOXAPARIN SODIUM 40 MG: 100 INJECTION SUBCUTANEOUS at 09:42

## 2025-06-19 RX ADMIN — IPRATROPIUM BROMIDE AND ALBUTEROL SULFATE 1 DOSE: .5; 3 SOLUTION RESPIRATORY (INHALATION) at 11:42

## 2025-06-19 RX ADMIN — SODIUM CHLORIDE, PRESERVATIVE FREE 10 ML: 5 INJECTION INTRAVENOUS at 21:26

## 2025-06-19 RX ADMIN — ACETAMINOPHEN 650 MG: 325 TABLET ORAL at 20:10

## 2025-06-19 RX ADMIN — TIZANIDINE 4 MG: 4 TABLET ORAL at 02:12

## 2025-06-19 RX ADMIN — IPRATROPIUM BROMIDE AND ALBUTEROL SULFATE 1 DOSE: .5; 3 SOLUTION RESPIRATORY (INHALATION) at 15:32

## 2025-06-19 RX ADMIN — INSULIN GLARGINE 15 UNITS: 100 INJECTION, SOLUTION SUBCUTANEOUS at 21:25

## 2025-06-19 RX ADMIN — ACETAMINOPHEN 650 MG: 325 TABLET ORAL at 07:49

## 2025-06-19 RX ADMIN — TIZANIDINE 4 MG: 4 TABLET ORAL at 07:49

## 2025-06-19 ASSESSMENT — PAIN SCALES - GENERAL
PAINLEVEL_OUTOF10: 3
PAINLEVEL_OUTOF10: 0
PAINLEVEL_OUTOF10: 6

## 2025-06-19 ASSESSMENT — PAIN DESCRIPTION - DESCRIPTORS
DESCRIPTORS: ACHING
DESCRIPTORS: ACHING;SORE;DISCOMFORT

## 2025-06-19 ASSESSMENT — PAIN DESCRIPTION - LOCATION
LOCATION: BACK

## 2025-06-19 ASSESSMENT — PAIN - FUNCTIONAL ASSESSMENT: PAIN_FUNCTIONAL_ASSESSMENT: PREVENTS OR INTERFERES WITH ALL ACTIVE AND SOME PASSIVE ACTIVITIES

## 2025-06-19 ASSESSMENT — PAIN DESCRIPTION - ORIENTATION: ORIENTATION: LOWER

## 2025-06-19 NOTE — PROGRESS NOTES
Please call [phone] if patient becomes agreeable to have CT exam.   Patient is a 87y old  Female who presents with a chief complaint of COVID encephalopathy (18 Oct 2022 12:31)      INTERVAL HPI/OVERNIGHT EVENTS: no overnight events    I&O's Summary    18 Oct 2022 07:01  -  19 Oct 2022 07:00  --------------------------------------------------------  IN: 300 mL / OUT: 600 mL / NET: -300 mL      Vital Signs Last 24 Hrs  T(C): 36 (19 Oct 2022 14:32), Max: 36.7 (18 Oct 2022 20:49)  T(F): 96.8 (19 Oct 2022 14:32), Max: 98 (18 Oct 2022 20:49)  HR: 72 (19 Oct 2022 14:32) (72 - 84)  BP: 121/65 (19 Oct 2022 14:32) (96/66 - 122/53)  BP(mean): --  RR: 16 (19 Oct 2022 14:32) (16 - 17)  SpO2: 95% (19 Oct 2022 14:32) (95% - 96%)    Parameters below as of 19 Oct 2022 14:32  Patient On (Oxygen Delivery Method): room air      PAST MEDICAL & SURGICAL HISTORY:  Chronic obstructive pulmonary disease (COPD)      Chronic CHF (congestive heart failure)      HTN (hypertension)      Gout      Dementia      Hypothyroidism      No significant past surgical history          SOCIAL HISTORY  Alcohol:  Tobacco:  Illicit substance use:      FAMILY HISTORY:      LABS:                        11.9   14.08 )-----------( 465      ( 18 Oct 2022 06:55 )             37.8     10-19    142  |  106  |  36<H>  ----------------------------<  106<H>  3.6   |  28  |  1.19    Ca    9.2      19 Oct 2022 06:22    TPro  6.2  /  Alb  2.6<L>  /  TBili  0.3  /  DBili  <0.1  /  AST  17  /  ALT  9<L>  /  AlkPhos  59  10-19    PT/INR - ( 19 Oct 2022 06:22 )   PT: 15.7 sec;   INR: 1.32 ratio             CAPILLARY BLOOD GLUCOSE                MEDICATIONS  (STANDING):  ALBUTerol    90 MICROgram(s) HFA Inhaler 2 Puff(s) Inhalation every 6 hours  allopurinol 100 milliGRAM(s) Oral daily  amLODIPine   Tablet 5 milliGRAM(s) Oral daily  apixaban 2.5 milliGRAM(s) Oral two times a day  atropine 1% Solution 1 Drop(s) Left EYE two times a day  brimonidine 0.2% Ophthalmic Solution 1 Drop(s) Left EYE three times a day  chlorhexidine 2% Cloths 1 Application(s) Topical <User Schedule>  donepezil 5 milliGRAM(s) Oral at bedtime  dorzolamide 2%/timolol 0.5% Ophthalmic Solution 1 Drop(s) Left EYE two times a day  fluticasone propionate 50 MICROgram(s)/spray Nasal Spray 1 Spray(s) Both Nostrils every 12 hours  furosemide    Tablet 20 milliGRAM(s) Oral daily  latanoprost 0.005% Ophthalmic Solution 1 Drop(s) Left EYE at bedtime  levothyroxine 112 MICROGram(s) Oral daily  pantoprazole    Tablet 40 milliGRAM(s) Oral before breakfast  prednisoLONE acetate 1% Suspension 1 Drop(s) Left EYE three times a day  senna 2 Tablet(s) Oral at bedtime  sodium chloride 0.9%. 1000 milliLiter(s) (50 mL/Hr) IV Continuous <Continuous>    MEDICATIONS  (PRN):  acetaminophen     Tablet .. 650 milliGRAM(s) Oral every 6 hours PRN Temp greater or equal to 38C (100.4F), Mild Pain (1 - 3)  guaiFENesin Oral Liquid (Sugar-Free) 200 milliGRAM(s) Oral every 6 hours PRN Cough  melatonin 3 milliGRAM(s) Oral at bedtime PRN Insomnia  ondansetron Injectable 4 milliGRAM(s) IV Push every 8 hours PRN Nausea and/or Vomiting      REVIEW OF SYSTEMS: LIMITED  CONSTITUTIONAL: No fever  EYES: No eye pain  ENMT:  No difficulty hearing, No sinus or throat pain  NECK: No pain   RESPIRATORY: No cough, No shortness of breath  CARDIOVASCULAR: No chest pain,  GASTROINTESTINAL: No abdominal pain. No nausea, vomiting,No diarrhea.  GENITOURINARY: No dysuria,   NEUROLOGICAL: No headaches,  MUSCULOSKELETAL: No joint pain or swelling    RADIOLOGY & ADDITIONAL TESTS:  < from: CT Head No Cont (10.10.22 @ 22:53) >    ACC: [id number] EXAM:  CT BRAIN                          PROCEDURE DATE:  10/10/2022          INTERPRETATION:  CLINICAL HISTORY:  Altered mental status.    TECHNIQUE:  CT of the head without contrast.  Contiguous transaxial images of the head were acquired from the skull   base to the vertex without the administration of iodinated contrast.   Coronal and sagittal reformatted images are provided.    COMPARISON: None available.    FINDINGS:    There is no acute intracranial hemorrhage, vasogenic edema or evidence   for acute large vascular territory infarct. Patchy areas of   low-attenuation in the bihemispheric white matter, nonspecific, but   likely the sequela of mild chronic microvascular change.    The ventricles, sulci and cisternal spaces are mildly diffusely prominent   but in proportion compatible with age-related involutional change.  There   is no midline shift or abnormal extra-axial fluid collection.    The calvarium is intact.  There are no osteoblastic or lytic calvarial or   skull base lesions. Mild mucosal thickening in the ethmoid and maxillary   sinuses. The remaining paranasal sinuses and mastoid air cells are clear.   Bilateral cataract surgery.    IMPRESSION:  No acute intracranial hemorrhage, vasogenic edema, extra-axial collection   or hydrocephalus. Mild chronic microvascular changes.    --- End of Report ---      HUGH CRABTREE MD; Attending Radiologist  This document has been electronically signed. Oct 10 2022 11:36PM    < end of copied text >    ACC: [id number] EXAM:  XR CHEST PORTABLE URGENT 1V                          PROCEDURE DATE:  10/10/2022          INTERPRETATION:  AP semierect chest on October 10, 2022 at 11:15 PM.   Patient is short of breath and has altered mental status.    COMPARISON: None available.    Heart magnified by technique.    Lungs grossly clear.    IMPRESSION: As above.    --- End of Report ---      ALISHA LINTON MD; Attending Radiologist  This document has been electronically signed. Oct 11 2022 12:11PM    Imaging Personally Reviewed:  [x ] YES  [ ] NO    Consultant(s) Notes Reviewed:  [ x] YES  [ ] NO    PHYSICAL EXAM:  GENERAL: NAD  HEAD:  Atraumatic, Normocephalic  EYES:  conjunctiva and sclera clear  ENMT: Moist mucous membranes  NECK: Supple  NERVOUS SYSTEM:  Alert & Oriented no new deficits  CHEST/LUNG: CTA bilaterally; No rales, rhonchi, wheezing  HEART: Regular rate and rhythm  ABDOMEN: Soft, Nontender, Nondistended; Bowel sounds present  EXTREMITIES:  2+ Peripheral Pulses  SKIN: No rashes or lesions    Care Collaborated Discussed with Consultants/Other Providers [ x] YES  [ ] NO Patient is a 87y old  Female who presents with a chief complaint of COVID encephalopathy (18 Oct 2022 12:31)      INTERVAL HPI/OVERNIGHT EVENTS: no overnight events    I&O's Summary    18 Oct 2022 07:01  -  19 Oct 2022 07:00  --------------------------------------------------------  IN: 300 mL / OUT: 600 mL / NET: -300 mL      Vital Signs Last 24 Hrs  T(C): 36 (19 Oct 2022 14:32), Max: 36.7 (18 Oct 2022 20:49)  T(F): 96.8 (19 Oct 2022 14:32), Max: 98 (18 Oct 2022 20:49)  HR: 72 (19 Oct 2022 14:32) (72 - 84)  BP: 121/65 (19 Oct 2022 14:32) (96/66 - 122/53)  BP(mean): --  RR: 16 (19 Oct 2022 14:32) (16 - 17)  SpO2: 95% (19 Oct 2022 14:32) (95% - 96%)    Parameters below as of 19 Oct 2022 14:32  Patient On (Oxygen Delivery Method): room air      PAST MEDICAL & SURGICAL HISTORY:  Chronic obstructive pulmonary disease (COPD)      Chronic CHF (congestive heart failure)      HTN (hypertension)      Gout      Dementia      Hypothyroidism      No significant past surgical history          SOCIAL HISTORY  Alcohol:  Tobacco:  Illicit substance use:      FAMILY HISTORY:      LABS:                        11.9   14.08 )-----------( 465      ( 18 Oct 2022 06:55 )             37.8     10-19    142  |  106  |  36<H>  ----------------------------<  106<H>  3.6   |  28  |  1.19    Ca    9.2      19 Oct 2022 06:22    TPro  6.2  /  Alb  2.6<L>  /  TBili  0.3  /  DBili  <0.1  /  AST  17  /  ALT  9<L>  /  AlkPhos  59  10-19    PT/INR - ( 19 Oct 2022 06:22 )   PT: 15.7 sec;   INR: 1.32 ratio             CAPILLARY BLOOD GLUCOSE                MEDICATIONS  (STANDING):  ALBUTerol    90 MICROgram(s) HFA Inhaler 2 Puff(s) Inhalation every 6 hours  allopurinol 100 milliGRAM(s) Oral daily  amLODIPine   Tablet 5 milliGRAM(s) Oral daily  apixaban 2.5 milliGRAM(s) Oral two times a day  atropine 1% Solution 1 Drop(s) Left EYE two times a day  brimonidine 0.2% Ophthalmic Solution 1 Drop(s) Left EYE three times a day  chlorhexidine 2% Cloths 1 Application(s) Topical <User Schedule>  donepezil 5 milliGRAM(s) Oral at bedtime  dorzolamide 2%/timolol 0.5% Ophthalmic Solution 1 Drop(s) Left EYE two times a day  fluticasone propionate 50 MICROgram(s)/spray Nasal Spray 1 Spray(s) Both Nostrils every 12 hours  furosemide    Tablet 20 milliGRAM(s) Oral daily  latanoprost 0.005% Ophthalmic Solution 1 Drop(s) Left EYE at bedtime  levothyroxine 112 MICROGram(s) Oral daily  pantoprazole    Tablet 40 milliGRAM(s) Oral before breakfast  prednisoLONE acetate 1% Suspension 1 Drop(s) Left EYE three times a day  senna 2 Tablet(s) Oral at bedtime  sodium chloride 0.9%. 1000 milliLiter(s) (50 mL/Hr) IV Continuous <Continuous>    MEDICATIONS  (PRN):  acetaminophen     Tablet .. 650 milliGRAM(s) Oral every 6 hours PRN Temp greater or equal to 38C (100.4F), Mild Pain (1 - 3)  guaiFENesin Oral Liquid (Sugar-Free) 200 milliGRAM(s) Oral every 6 hours PRN Cough  melatonin 3 milliGRAM(s) Oral at bedtime PRN Insomnia  ondansetron Injectable 4 milliGRAM(s) IV Push every 8 hours PRN Nausea and/or Vomiting      REVIEW OF SYSTEMS: LIMITED  CONSTITUTIONAL: No fever  EYES: No eye pain  ENMT:  No difficulty hearing, No sinus or throat pain  NECK: No pain   RESPIRATORY: No cough, No shortness of breath  CARDIOVASCULAR: No chest pain,  GASTROINTESTINAL: No abdominal pain. No nausea, vomiting,No diarrhea.  GENITOURINARY: No dysuria,   NEUROLOGICAL: No headaches,  MUSCULOSKELETAL: No joint pain or swelling    RADIOLOGY & ADDITIONAL TESTS:  < from: CT Head No Cont (10.10.22 @ 22:53) >    ACC: [id number] EXAM:  CT BRAIN                          PROCEDURE DATE:  10/10/2022          INTERPRETATION:  CLINICAL HISTORY:  Altered mental status.    TECHNIQUE:  CT of the head without contrast.  Contiguous transaxial images of the head were acquired from the skull   base to the vertex without the administration of iodinated contrast.   Coronal and sagittal reformatted images are provided.    COMPARISON: None available.    FINDINGS:    There is no acute intracranial hemorrhage, vasogenic edema or evidence   for acute large vascular territory infarct. Patchy areas of   low-attenuation in the bihemispheric white matter, nonspecific, but   likely the sequela of mild chronic microvascular change.    The ventricles, sulci and cisternal spaces are mildly diffusely prominent   but in proportion compatible with age-related involutional change.  There   is no midline shift or abnormal extra-axial fluid collection.    The calvarium is intact.  There are no osteoblastic or lytic calvarial or   skull base lesions. Mild mucosal thickening in the ethmoid and maxillary   sinuses. The remaining paranasal sinuses and mastoid air cells are clear.   Bilateral cataract surgery.    IMPRESSION:  No acute intracranial hemorrhage, vasogenic edema, extra-axial collection   or hydrocephalus. Mild chronic microvascular changes.    --- End of Report ---      HUGH CRABTREE MD; Attending Radiologist  This document has been electronically signed. Oct 10 2022 11:36PM    < end of copied text >    ACC: [id number] EXAM:  XR CHEST PORTABLE URGENT 1V                          PROCEDURE DATE:  10/10/2022          INTERPRETATION:  AP semierect chest on October 10, 2022 at 11:15 PM.   Patient is short of breath and has altered mental status.    COMPARISON: None available.    Heart magnified by technique.    Lungs grossly clear.    IMPRESSION: As above.    --- End of Report ---      ALISHA LINTON MD; Attending Radiologist  This document has been electronically signed. Oct 11 2022 12:11PM    Imaging Personally Reviewed:  [x ] YES  [ ] NO    Consultant(s) Notes Reviewed:  [ x] YES  [ ] NO    PHYSICAL EXAM:  GENERAL: NAD  HEAD:  Atraumatic, Normocephalic  EYES:  conjunctiva and sclera clear  ENMT: Moist mucous membranes  NECK: Supple  NERVOUS SYSTEM:  Alert & Oriented no new deficits  CHEST/LUNG: CTA bilaterally; No rales, rhonchi, wheezing  HEART: Regular rate and rhythm  ABDOMEN: Soft, Nontender, Nondistended; Bowel sounds present  EXTREMITIES:  2+ Peripheral Pulses  SKIN: No rashes or lesions    Care Collaborated Discussed with Consultants/Other Providers [ x] YES  [ ] NO Patient is a 87y old  Female who presents with a chief complaint of COVID encephalopathy (18 Oct 2022 12:31)      INTERVAL HPI/OVERNIGHT EVENTS: no overnight events    I&O's Summary    18 Oct 2022 07:01  -  19 Oct 2022 07:00  --------------------------------------------------------  IN: 300 mL / OUT: 600 mL / NET: -300 mL      Vital Signs Last 24 Hrs  T(C): 36 (19 Oct 2022 14:32), Max: 36.7 (18 Oct 2022 20:49)  T(F): 96.8 (19 Oct 2022 14:32), Max: 98 (18 Oct 2022 20:49)  HR: 72 (19 Oct 2022 14:32) (72 - 84)  BP: 121/65 (19 Oct 2022 14:32) (96/66 - 122/53)  BP(mean): --  RR: 16 (19 Oct 2022 14:32) (16 - 17)  SpO2: 95% (19 Oct 2022 14:32) (95% - 96%)    Parameters below as of 19 Oct 2022 14:32  Patient On (Oxygen Delivery Method): room air      PAST MEDICAL & SURGICAL HISTORY:  Chronic obstructive pulmonary disease (COPD)      Chronic CHF (congestive heart failure)      HTN (hypertension)      Gout      Dementia      Hypothyroidism      No significant past surgical history          SOCIAL HISTORY  Alcohol:  Tobacco:  Illicit substance use:      FAMILY HISTORY:      LABS:                        11.9   14.08 )-----------( 465      ( 18 Oct 2022 06:55 )             37.8     10-19    142  |  106  |  36<H>  ----------------------------<  106<H>  3.6   |  28  |  1.19    Ca    9.2      19 Oct 2022 06:22    TPro  6.2  /  Alb  2.6<L>  /  TBili  0.3  /  DBili  <0.1  /  AST  17  /  ALT  9<L>  /  AlkPhos  59  10-19    PT/INR - ( 19 Oct 2022 06:22 )   PT: 15.7 sec;   INR: 1.32 ratio             CAPILLARY BLOOD GLUCOSE                MEDICATIONS  (STANDING):  ALBUTerol    90 MICROgram(s) HFA Inhaler 2 Puff(s) Inhalation every 6 hours  allopurinol 100 milliGRAM(s) Oral daily  amLODIPine   Tablet 5 milliGRAM(s) Oral daily  apixaban 2.5 milliGRAM(s) Oral two times a day  atropine 1% Solution 1 Drop(s) Left EYE two times a day  brimonidine 0.2% Ophthalmic Solution 1 Drop(s) Left EYE three times a day  chlorhexidine 2% Cloths 1 Application(s) Topical <User Schedule>  donepezil 5 milliGRAM(s) Oral at bedtime  dorzolamide 2%/timolol 0.5% Ophthalmic Solution 1 Drop(s) Left EYE two times a day  fluticasone propionate 50 MICROgram(s)/spray Nasal Spray 1 Spray(s) Both Nostrils every 12 hours  furosemide    Tablet 20 milliGRAM(s) Oral daily  latanoprost 0.005% Ophthalmic Solution 1 Drop(s) Left EYE at bedtime  levothyroxine 112 MICROGram(s) Oral daily  pantoprazole    Tablet 40 milliGRAM(s) Oral before breakfast  prednisoLONE acetate 1% Suspension 1 Drop(s) Left EYE three times a day  senna 2 Tablet(s) Oral at bedtime  sodium chloride 0.9%. 1000 milliLiter(s) (50 mL/Hr) IV Continuous <Continuous>    MEDICATIONS  (PRN):  acetaminophen     Tablet .. 650 milliGRAM(s) Oral every 6 hours PRN Temp greater or equal to 38C (100.4F), Mild Pain (1 - 3)  guaiFENesin Oral Liquid (Sugar-Free) 200 milliGRAM(s) Oral every 6 hours PRN Cough  melatonin 3 milliGRAM(s) Oral at bedtime PRN Insomnia  ondansetron Injectable 4 milliGRAM(s) IV Push every 8 hours PRN Nausea and/or Vomiting      REVIEW OF SYSTEMS: LIMITED  CONSTITUTIONAL: No fever  EYES: No eye pain  ENMT:  No difficulty hearing, No sinus or throat pain  NECK: No pain   RESPIRATORY: No cough, No shortness of breath  CARDIOVASCULAR: No chest pain,  GASTROINTESTINAL: No abdominal pain. No nausea, vomiting,No diarrhea.  GENITOURINARY: No dysuria,   NEUROLOGICAL: No headaches,  MUSCULOSKELETAL: No joint pain or swelling    RADIOLOGY & ADDITIONAL TESTS:  < from: CT Head No Cont (10.10.22 @ 22:53) >    ACC: [id number] EXAM:  CT BRAIN                          PROCEDURE DATE:  10/10/2022          INTERPRETATION:  CLINICAL HISTORY:  Altered mental status.    TECHNIQUE:  CT of the head without contrast.  Contiguous transaxial images of the head were acquired from the skull   base to the vertex without the administration of iodinated contrast.   Coronal and sagittal reformatted images are provided.    COMPARISON: None available.    FINDINGS:    There is no acute intracranial hemorrhage, vasogenic edema or evidence   for acute large vascular territory infarct. Patchy areas of   low-attenuation in the bihemispheric white matter, nonspecific, but   likely the sequela of mild chronic microvascular change.    The ventricles, sulci and cisternal spaces are mildly diffusely prominent   but in proportion compatible with age-related involutional change.  There   is no midline shift or abnormal extra-axial fluid collection.    The calvarium is intact.  There are no osteoblastic or lytic calvarial or   skull base lesions. Mild mucosal thickening in the ethmoid and maxillary   sinuses. The remaining paranasal sinuses and mastoid air cells are clear.   Bilateral cataract surgery.    IMPRESSION:  No acute intracranial hemorrhage, vasogenic edema, extra-axial collection   or hydrocephalus. Mild chronic microvascular changes.    --- End of Report ---      HUGH CRABTREE MD; Attending Radiologist  This document has been electronically signed. Oct 10 2022 11:36PM    < end of copied text >    ACC: [id number] EXAM:  XR CHEST PORTABLE URGENT 1V                          PROCEDURE DATE:  10/10/2022          INTERPRETATION:  AP semierect chest on October 10, 2022 at 11:15 PM.   Patient is short of breath and has altered mental status.    COMPARISON: None available.    Heart magnified by technique.    Lungs grossly clear.    IMPRESSION: As above.    --- End of Report ---      ALISHA LINTON MD; Attending Radiologist  This document has been electronically signed. Oct 11 2022 12:11PM    Imaging Personally Reviewed:  [x ] YES  [ ] NO    Consultant(s) Notes Reviewed:  [ x] YES  [ ] NO    PHYSICAL EXAM:  GENERAL: NAD  HEAD:  Atraumatic, Normocephalic  EYES:  conjunctiva and sclera clear  ENMT: Moist mucous membranes  NECK: Supple  NERVOUS SYSTEM:  Alert & Oriented no new deficits  CHEST/LUNG: CTA bilaterally; No rales, rhonchi, wheezing  HEART: Regular rate and rhythm  ABDOMEN: Soft, Nontender, Nondistended; Bowel sounds present  EXTREMITIES:  2+ Peripheral Pulses  SKIN: No rashes or lesions    Care Collaborated Discussed with Consultants/Other Providers [ x] YES  [ ] NO

## 2025-06-19 NOTE — CARE COORDINATION
Pre-cert for [facility] pending. Ambulance transport placed on will-call via [facility]. Ambulance form and CARLA completed; envelope placed on the soft chart.    Electronically signed by KEIKO Juan on 6/19/2025 at 9:16 AM

## 2025-06-19 NOTE — PLAN OF CARE
Problem: Safety - Adult  Goal: Free from fall injury  6/19/2025 1416 by Nikki Santiago RN  Outcome: Progressing  6/19/2025 0544 by Blaise Hoyos RN  Outcome: Progressing     Problem: Chronic Conditions and Co-morbidities  Goal: Patient's chronic conditions and co-morbidity symptoms are monitored and maintained or improved  6/19/2025 1416 by Nikki Santiago RN  Outcome: Progressing  6/19/2025 0544 by Blaise Hoyos RN  Outcome: Progressing     Problem: Discharge Planning  Goal: Discharge to home or other facility with appropriate resources  6/19/2025 1416 by Nikki Santiago RN  Outcome: Progressing  6/19/2025 0544 by Blaise Hoyos RN  Outcome: Progressing     Problem: Pain  Goal: Verbalizes/displays adequate comfort level or baseline comfort level  6/19/2025 0544 by Blaise Hoyos RN  Outcome: Progressing     Problem: Skin/Tissue Integrity  Goal: Skin integrity remains intact  Description: 1.  Monitor for areas of redness and/or skin breakdown  2.  Assess vascular access sites hourly  3.  Every 4-6 hours minimum:  Change oxygen saturation probe site  4.  Every 4-6 hours:  If on nasal continuous positive airway pressure, respiratory therapy assess nares and determine need for appliance change or resting period  6/19/2025 0544 by Blaise Hoyos RN  Outcome: Progressing     Problem: ABCDS Injury Assessment  Goal: Absence of physical injury  6/19/2025 0544 by Blaise Hoyos RN  Outcome: Progressing     Problem: Confusion  Goal: Confusion, delirium, dementia, or psychosis is improved or at baseline  Description: INTERVENTIONS:  1. Assess for possible contributors to thought disturbance, including medications, impaired vision or hearing, underlying metabolic abnormalities, dehydration, psychiatric diagnoses, and notify attending LIP  2. [place] high risk fall precautions, as indicated  3. Provide frequent short contacts to provide reality reorientation, refocusing and direction  4. Decrease

## 2025-06-19 NOTE — PROGRESS NOTES
Department of Internal Medicine  Infectious Diseases  Progress  Note      C/C : leukocytosis , leg swelling       Pt is awake and alert  Denies fever or chills  Denies abdomen pain   Afebrile       Current Facility-Administered Medications   Medication Dose Route Frequency Provider Last Rate Last Admin    ipratropium 0.5 mg-albuterol 2.5 mg (DUONEB) nebulizer solution 1 Dose  1 Dose Inhalation 4x Daily RT Nicolás Duncan MD   1 Dose at 06/19/25 1142    budesonide (PULMICORT) nebulizer suspension 500 mcg  0.5 mg Nebulization BID RT Nicolás Duncan MD   500 mcg at 06/19/25 1142    arformoterol tartrate (BROVANA) nebulizer solution 15 mcg  15 mcg Nebulization BID RT Nicolás Duncan MD   15 mcg at 06/19/25 1142    white petrolatum ointment   Topical BID PRN Nicolás Duncan MD        buprenorphine-naloxone (SUBOXONE) 8-2 MG SL film 1 Film  1 Film SubLINGual BID Nicolás Duncan MD   1 Film at 06/18/25 2205    miconazole (MICOTIN) 2 % powder   Topical BID Nicolás Duncan MD   Given at 06/18/25 2236    albuterol (ACCUNEB) nebulizer solution 0.63 mg  0.63 mg Nebulization 4x Daily PRN Myra Joyner APRN - CNP   0.63 mg at 06/18/25 1054    0.9 % sodium chloride infusion   IntraVENous Continuous Mickey Brown  mL/hr at 06/18/25 0851 New Bag at 06/18/25 0851    sodium chloride flush 0.9 % injection 10 mL  10 mL IntraVENous 2 times per day Nicolás Duncan MD   10 mL at 06/18/25 2205    sodium chloride flush 0.9 % injection 10 mL  10 mL IntraVENous PRN Nicolás Duncan MD        0.9 % sodium chloride infusion   IntraVENous PRN Nicolás Duncan MD        potassium chloride (KLOR-CON M) extended release tablet 40 mEq  40 mEq Oral PRN Nicolás Duncan MD        Or    potassium bicarb-citric acid (EFFER-K) effervescent tablet 40 mEq  40 mEq Oral PRN Nicolás Duncan MD        Or    potassium chloride 10 mEq/100 mL IVPB (Peripheral Line)  10 mEq IntraVENous PRN Nicolás Duncan MD        enoxaparin (LOVENOX) injection 40 mg  40 mg

## 2025-06-19 NOTE — PROGRESS NOTES
Patient agreed to  take suboxone film; when attempted to administer it; she refused; stating it makes her sick; medication properly disposed of with witness.

## 2025-06-19 NOTE — PLAN OF CARE
Problem: Safety - Adult  Goal: Free from fall injury  6/19/2025 0544 by Blaise Hoyos, RN  Outcome: Progressing  6/18/2025 1828 by Camila Velasco RN  Outcome: Progressing     Problem: Chronic Conditions and Co-morbidities  Goal: Patient's chronic conditions and co-morbidity symptoms are monitored and maintained or improved  6/19/2025 0544 by Blaise Hoyos RN  Outcome: Progressing  6/18/2025 1828 by Camila Velasco RN  Outcome: Progressing     Problem: Discharge Planning  Goal: Discharge to home or other facility with appropriate resources  6/19/2025 0544 by Blaise Hoyos RN  Outcome: Progressing  6/18/2025 1828 by Camila Velasco RN  Outcome: Progressing     Problem: Pain  Goal: Verbalizes/displays adequate comfort level or baseline comfort level  Outcome: Progressing     Problem: Skin/Tissue Integrity  Goal: Skin integrity remains intact  Description: 1.  Monitor for areas of redness and/or skin breakdown  2.  Assess vascular access sites hourly  3.  Every 4-6 hours minimum:  Change oxygen saturation probe site  4.  Every 4-6 hours:  If on nasal continuous positive airway pressure, respiratory therapy assess nares and determine need for appliance change or resting period  Outcome: Progressing     Problem: ABCDS Injury Assessment  Goal: Absence of physical injury  Outcome: Progressing     Problem: Confusion  Goal: Confusion, delirium, dementia, or psychosis is improved or at baseline  Description: INTERVENTIONS:  1. Assess for possible contributors to thought disturbance, including medications, impaired vision or hearing, underlying metabolic abnormalities, dehydration, psychiatric diagnoses, and notify attending LIP  2. [place] high risk fall precautions, as indicated  3. Provide frequent short contacts to provide reality reorientation, refocusing and direction  4. Decrease environmental stimuli, including noise as appropriate  5. Monitor and intervene to maintain adequate nutrition, hydration,

## 2025-06-19 NOTE — PROGRESS NOTES
Internal Medicine Progress Note    Patient's name: Li Rojas  : 1970  Chief complaints (on day of admission): Altered Mental Status (Pt from continuing healthcare and staff states pt is more altered than usual. Pt is A&Ox4 at time of triage. Pt states she is just tired because they woke her up at 4AM last night )  Admission date: 2025  Date of service: 2025   Room: [address]  Primary care physician: Melvin Cardoso MD  Reason for visit: Follow-up for AMS     Subjective  Li was seen and examined at bedside       On O2   Start nebs for her   Monitor breathing   Still does not want a scan       Refusing to do the CT abdomen pelvis   We have had this issue in the hospital before   Identified barriers to patient not willing have scan   She is worried about pain   Offered solution of holding suboxone and then giving IV pain medication to get scan done   She is absolutely unwilling to consider this due to her concern of withdraw and her concern of not getting her suboxone continued as an outpatient   Discussed this at great length and offered to come up with other options for patient   Warned her extensively of the risk of an undiagnosed intra abdominal infectious process that could lead to sepsis septic shock and death to name a few   She was able to reiterate these risks back to me   States she wants to go back to rehab and take her IV anti biotics and then try for the scan at a later date when she feels ready   Again went back to the room with social work to attempt to find a way to get patient to agree to ct scan of the abdomen   This was to no avail again   Spoke with Dr Elliott   Spoke with social work       Review of Systems  There are no new complaints of chest pain, shortness of breath, abdominal pain, nausea, vomiting, diarrhea, constipation unless otherwise mentioned above.     Hospital Medications  Current Facility-Administered Medications   Medication Dose Route  MD         Facility-Administered Medications Ordered in Other Encounters   Medication Dose Route Frequency Provider Last Rate Last Admin    heparin flush 100 UNIT/ML injection 500 Units  500 Units Intercatheter PRN Anahy Bright MD        heparin flush 100 UNIT/ML injection 500 Units  500 Units Intercatheter PRN Anahy Bright MD           PRN Medications  white petrolatum, albuterol, sodium chloride flush, sodium chloride, potassium chloride **OR** potassium alternative oral replacement **OR** potassium chloride, ondansetron **OR** ondansetron, senna, acetaminophen **OR** acetaminophen, glucose, dextrose bolus **OR** dextrose bolus, glucagon (rDNA), dextrose    Objective  Most Recent Recorded Vitals  /64   Pulse 81   Temp 97.5 °F (36.4 °C) (Axillary)   Resp 22   Ht 1.72 m (5' 7.72\")   Wt 88.9 kg (195 lb 14.4 oz)   LMP 05/04/2020   SpO2 93%   BMI 30.04 kg/m²   I/O last 3 completed shifts:  In: 1997.8 [P.O.:870; I.V.:1000; IV Piggyback:127.8]  Out: 2000 [Urine:2000]  No intake/output data recorded.    Physical Exam:  General: AAO to person/place/time/purpose, NAD, no labored breathing  Eyes: conjunctivae/corneas clear, sclera non icteric  Skin: color/texture/turgor normal, no rashes or lesions  Lungs: CTAB, no retractions/use of accessory muscles, no vocal fremitus, no rhonchi, no crackle, no rales  Heart: regular rate, regular rhythm, no murmur  Abdomen: soft, NT, bowel sounds normal  Extremities: atraumatic, no edema  Neurologic: cranial nerves 2-12 grossly intact, no slurred speech    Most Recent Labs  Lab Results   Component Value Date    WBC PENDING 06/19/2025    HGB PENDING 06/19/2025    HCT PENDING 06/19/2025    PLT PENDING 06/19/2025     06/19/2025    K 3.7 06/19/2025     (H) 06/19/2025    CREATININE 0.6 06/19/2025    BUN 20 06/19/2025    CO2 16 (L) 06/19/2025    GLUCOSE 134 (H) 06/19/2025    ALT 63 (H) 06/19/2025    AST 50 (H) 06/19/2025    INR 0.9 05/28/2025    TSH 0.87

## 2025-06-19 NOTE — PROGRESS NOTES
Department of Podiatry  Progress Note    SUBJECTIVE:  Li Rojas is seen at bedside for B/L lower leg cellulitis and wounds. No acute events overnight. Patient denies any N/V/D/F/C/SOB/CP.  Patient states no changes in pain or sensation to the bilateral lower extremities.  No other pedal complaints at this time.     OBJECTIVE:    Scheduled Meds:   ipratropium 0.5 mg-albuterol 2.5 mg  1 Dose Inhalation 4x Daily RT    budesonide  0.5 mg Nebulization BID RT    arformoterol tartrate  15 mcg Nebulization BID RT    amoxicillin-clavulanate  1 tablet Oral 2 times per day    buprenorphine-naloxone  1 Film SubLINGual BID    miconazole   Topical BID    sodium chloride flush  10 mL IntraVENous 2 times per day    enoxaparin  40 mg SubCUTAneous Daily    atenolol  25 mg Oral BID AC    bisacodyl  5 mg Oral BID    dilTIAZem  240 mg Oral Daily    [Held by provider] furosemide  40 mg Oral Lunch    insulin glargine  15 Units SubCUTAneous Nightly    LORazepam  0.5 mg Oral BID    pantoprazole  40 mg Oral QAM AC    polyethylene glycol  17 g Oral Daily    predniSONE  15 mg Oral Daily    tiZANidine  4 mg Oral Q6H    insulin lispro  0-8 Units SubCUTAneous 4x Daily AC & HS     Continuous Infusions:   sodium chloride 100 mL/hr at 06/18/25 0851    sodium chloride      dextrose       PRN Meds:.white petrolatum, albuterol, sodium chloride flush, sodium chloride, potassium chloride **OR** potassium alternative oral replacement **OR** potassium chloride, ondansetron **OR** ondansetron, senna, acetaminophen **OR** acetaminophen, glucose, dextrose bolus **OR** dextrose bolus, glucagon (rDNA), dextrose    Allergies   Allergen Reactions    Lyrica [Pregabalin] Anaphylaxis    Other Shortness Of Breath    Sulfa Antibiotics Anaphylaxis     Hallucinations and rash    Darvocet [Propoxyphene N-Acetaminophen]     Ultram [Tramadol Hcl]     Metoprolol Rash       /64   Pulse 81   Temp 97.5 °F (36.4 °C) (Axillary)   Resp 22   Ht 1.72 m (5' 7.72\")    Wt 88.9 kg (195 lb 14.4 oz)   LMP 05/04/2020   SpO2 93%   BMI 30.04 kg/m²       EXAM:    VASCULAR:  DP and PT pulses are non-palpable due to edema. CFT < 5 seconds B/L.  Warm to warm from the tibial tuberosity to the distal aspect of the digits dorsally. Hair growth not noted to the distal aspects dorsally.     NEUROLOGIC:  Light touch is present     MUSCULOSKELETAL: Deformities are not noted in Bilateral lower extremities.  5/5 Gross Muscle strength in all 4 quadrants.     DERM:  Skin is taut and shiny with serous drainage to the bilateral lower extremities. Toenails 1-5 b/l are abnormal in thickness, color and length. Webspaces 1-4 b/l are C/D/I. Edema is severe. Erythema is present circumferentially around bilateral lower legs. Wounds are Present on the Bilateral lower extremity and demonstrate granular and fibrotic bases with macerated borders.  No drainage from wound was noted on exam.  No malodor.  No increased temperature in lower extremities.      Scheduled Meds:   ipratropium 0.5 mg-albuterol 2.5 mg  1 Dose Inhalation 4x Daily RT    budesonide  0.5 mg Nebulization BID RT    arformoterol tartrate  15 mcg Nebulization BID RT    amoxicillin-clavulanate  1 tablet Oral 2 times per day    buprenorphine-naloxone  1 Film SubLINGual BID    miconazole   Topical BID    sodium chloride flush  10 mL IntraVENous 2 times per day    enoxaparin  40 mg SubCUTAneous Daily    atenolol  25 mg Oral BID AC    bisacodyl  5 mg Oral BID    dilTIAZem  240 mg Oral Daily    [Held by provider] furosemide  40 mg Oral Lunch    insulin glargine  15 Units SubCUTAneous Nightly    LORazepam  0.5 mg Oral BID    pantoprazole  40 mg Oral QAM AC    polyethylene glycol  17 g Oral Daily    predniSONE  15 mg Oral Daily    tiZANidine  4 mg Oral Q6H    insulin lispro  0-8 Units SubCUTAneous 4x Daily AC & HS     Continuous Infusions:   sodium chloride 100 mL/hr at 06/18/25 0851    sodium chloride      dextrose       PRN Meds:.white petrolatum,

## 2025-06-19 NOTE — PROGRESS NOTES
Comprehensive Nutrition Assessment    Type and Reason for Visit:  Initial, Positive nutrition screen, Wound    Nutrition Recommendations/Plan:   Continue Diet.    Will Start ONS and monitor.       Malnutrition Assessment:  Malnutrition Status:  At risk for malnutrition (06/19/25 1407)    Context:  Chronic Illness     Findings of the 6 clinical characteristics of malnutrition:  Energy Intake:  Unable to assess (2/2 limited intakes per doc flow at this time)  Weight Loss:  Unable to assess (2/2 possible fluid shifts)     Body Fat Loss:  Unable to assess     Muscle Mass Loss:  Unable to assess    Fluid Accumulation:  Unable to assess (2/2 multifactorial at this time)     Strength:  Not Performed    Nutrition Assessment:    Pt adm from SNF w/ AMS, however noted to be A&O upon adm, found to have MARCO. PMHx RA, SVT, MS, HTN, HLD, SLE, DM, WCB, multiple recent admit's including [facility] ~3wks ago. Adm w/ MARCO and ongoing BLE cellulitis. Noted ongoing pt refusal of CTAP. At risk d/t subjective wt loss/poor appetite/intake w/ increased needs for healing of multiple wounds. Will Start ONS and monitor.    Nutrition Related Findings:    A&O, edentulous, tender/non-distended Abd, +BS, +constipation, +2/3 edema, I/O's WNL, hypocalcemia, elevated Cl/BGL/LFTs Wound Type: Multiple, Pressure Injury, Stage II, Open Wounds (PI x 2, open x 9)       Current Nutrition Intake & Therapies:    Average Meal Intake: Unable to assess (2/2 limited intake data per doc flow at this time)  Average Supplements Intake: None Ordered  ADULT DIET; Regular    Anthropometric Measures:  Height: 172 cm (5' 7.72\")  Ideal Body Weight (IBW): 139 lbs (63 kg)    Admission Body Weight: 86.2 kg (190 lb) (actual 6/17)  Current Body Weight: 86.2 kg (190 lb) (actual 6/17; suspected dry wt used as CBW appears slightly elevated at this time),   IBW. Weight Source: Bed scale  Current BMI (kg/m2): 29.1  Usual Body Weight: 84.8 kg (187 lb) (bed 4/14/25 per EMR; MINE wt change

## 2025-06-20 LAB
GLUCOSE BLD-MCNC: 103 MG/DL (ref 74–99)
GLUCOSE BLD-MCNC: 123 MG/DL (ref 74–99)
GLUCOSE BLD-MCNC: 161 MG/DL (ref 74–99)
GLUCOSE BLD-MCNC: 217 MG/DL (ref 74–99)

## 2025-06-20 PROCEDURE — 2580000003 HC RX 258: Performed by: EMERGENCY MEDICINE

## 2025-06-20 PROCEDURE — 97530 THERAPEUTIC ACTIVITIES: CPT

## 2025-06-20 PROCEDURE — 94640 AIRWAY INHALATION TREATMENT: CPT

## 2025-06-20 PROCEDURE — 6370000000 HC RX 637 (ALT 250 FOR IP): Performed by: INTERNAL MEDICINE

## 2025-06-20 PROCEDURE — 97535 SELF CARE MNGMENT TRAINING: CPT

## 2025-06-20 PROCEDURE — 82962 GLUCOSE BLOOD TEST: CPT

## 2025-06-20 PROCEDURE — 6370000000 HC RX 637 (ALT 250 FOR IP): Performed by: STUDENT IN AN ORGANIZED HEALTH CARE EDUCATION/TRAINING PROGRAM

## 2025-06-20 PROCEDURE — 6360000002 HC RX W HCPCS: Performed by: STUDENT IN AN ORGANIZED HEALTH CARE EDUCATION/TRAINING PROGRAM

## 2025-06-20 PROCEDURE — 2500000003 HC RX 250 WO HCPCS: Performed by: STUDENT IN AN ORGANIZED HEALTH CARE EDUCATION/TRAINING PROGRAM

## 2025-06-20 PROCEDURE — 2140000000 HC CCU INTERMEDIATE R&B

## 2025-06-20 PROCEDURE — 2700000000 HC OXYGEN THERAPY PER DAY

## 2025-06-20 RX ORDER — TRAMADOL HYDROCHLORIDE 50 MG/1
50 TABLET ORAL EVERY 6 HOURS PRN
Status: DISCONTINUED | OUTPATIENT
Start: 2025-06-20 | End: 2025-06-21

## 2025-06-20 RX ORDER — IPRATROPIUM BROMIDE AND ALBUTEROL SULFATE 2.5; .5 MG/3ML; MG/3ML
1 SOLUTION RESPIRATORY (INHALATION) EVERY 4 HOURS PRN
Status: DISCONTINUED | OUTPATIENT
Start: 2025-06-20 | End: 2025-06-26 | Stop reason: HOSPADM

## 2025-06-20 RX ADMIN — MICONAZOLE NITRATE: 20.6 POWDER TOPICAL at 21:34

## 2025-06-20 RX ADMIN — AMOXICILLIN AND CLAVULANATE POTASSIUM 1 TABLET: 875; 125 TABLET, FILM COATED ORAL at 08:39

## 2025-06-20 RX ADMIN — PREDNISONE 15 MG: 10 TABLET ORAL at 08:40

## 2025-06-20 RX ADMIN — BISACODYL 5 MG: 5 TABLET, COATED ORAL at 21:13

## 2025-06-20 RX ADMIN — BUDESONIDE 500 MCG: 0.5 SUSPENSION RESPIRATORY (INHALATION) at 07:23

## 2025-06-20 RX ADMIN — ENOXAPARIN SODIUM 40 MG: 100 INJECTION SUBCUTANEOUS at 08:38

## 2025-06-20 RX ADMIN — PANTOPRAZOLE SODIUM 40 MG: 40 TABLET, DELAYED RELEASE ORAL at 05:39

## 2025-06-20 RX ADMIN — BUPRENORPHINE AND NALOXONE 1 FILM: 8; 2 FILM, SOLUBLE BUCCAL; SUBLINGUAL at 21:14

## 2025-06-20 RX ADMIN — AMOXICILLIN AND CLAVULANATE POTASSIUM 1 TABLET: 875; 125 TABLET, FILM COATED ORAL at 21:34

## 2025-06-20 RX ADMIN — BISACODYL 5 MG: 5 TABLET, COATED ORAL at 08:40

## 2025-06-20 RX ADMIN — TIZANIDINE 4 MG: 4 TABLET ORAL at 05:39

## 2025-06-20 RX ADMIN — SODIUM CHLORIDE, PRESERVATIVE FREE 10 ML: 5 INJECTION INTRAVENOUS at 21:15

## 2025-06-20 RX ADMIN — DILTIAZEM HYDROCHLORIDE 240 MG: 240 CAPSULE, COATED, EXTENDED RELEASE ORAL at 08:39

## 2025-06-20 RX ADMIN — BUPRENORPHINE AND NALOXONE 1 FILM: 8; 2 FILM, SOLUBLE BUCCAL; SUBLINGUAL at 13:01

## 2025-06-20 RX ADMIN — INSULIN LISPRO 2 UNITS: 100 INJECTION, SOLUTION INTRAVENOUS; SUBCUTANEOUS at 17:23

## 2025-06-20 RX ADMIN — MICONAZOLE NITRATE: 20.6 POWDER TOPICAL at 08:43

## 2025-06-20 RX ADMIN — ATENOLOL 25 MG: 25 TABLET ORAL at 05:39

## 2025-06-20 RX ADMIN — ARFORMOTEROL TARTRATE 15 MCG: 15 SOLUTION RESPIRATORY (INHALATION) at 07:23

## 2025-06-20 RX ADMIN — SODIUM CHLORIDE: 0.9 INJECTION, SOLUTION INTRAVENOUS at 17:04

## 2025-06-20 RX ADMIN — LORAZEPAM 0.5 MG: 0.5 TABLET ORAL at 08:39

## 2025-06-20 RX ADMIN — LORAZEPAM 0.5 MG: 0.5 TABLET ORAL at 21:13

## 2025-06-20 RX ADMIN — TIZANIDINE 4 MG: 4 TABLET ORAL at 00:33

## 2025-06-20 RX ADMIN — TIZANIDINE 4 MG: 4 TABLET ORAL at 13:28

## 2025-06-20 RX ADMIN — ATENOLOL 25 MG: 25 TABLET ORAL at 17:03

## 2025-06-20 RX ADMIN — IPRATROPIUM BROMIDE AND ALBUTEROL SULFATE 1 DOSE: .5; 3 SOLUTION RESPIRATORY (INHALATION) at 07:23

## 2025-06-20 RX ADMIN — TIZANIDINE 4 MG: 4 TABLET ORAL at 21:14

## 2025-06-20 RX ADMIN — INSULIN GLARGINE 15 UNITS: 100 INJECTION, SOLUTION SUBCUTANEOUS at 21:14

## 2025-06-20 RX ADMIN — TRAMADOL HYDROCHLORIDE 50 MG: 50 TABLET, COATED ORAL at 13:27

## 2025-06-20 ASSESSMENT — PAIN SCALES - GENERAL
PAINLEVEL_OUTOF10: 3
PAINLEVEL_OUTOF10: 7

## 2025-06-20 ASSESSMENT — PAIN DESCRIPTION - LOCATION: LOCATION: BACK

## 2025-06-20 ASSESSMENT — PAIN SCALES - WONG BAKER: WONGBAKER_NUMERICALRESPONSE: NO HURT

## 2025-06-20 ASSESSMENT — PAIN DESCRIPTION - DESCRIPTORS: DESCRIPTORS: CRAMPING;ACHING

## 2025-06-20 ASSESSMENT — PAIN DESCRIPTION - ORIENTATION: ORIENTATION: LOWER

## 2025-06-20 NOTE — CARE COORDINATION
Auth for [facility] still pending. Ambulance transport placed on will-call via [facility] [phone]. Patient's mother will need notified of discharge. Ambulance form and CARLA completed; envelope placed on the soft chart.    Electronically signed by KEIKO Juan on 6/20/2025 at 10:03 AM

## 2025-06-20 NOTE — PROGRESS NOTES
Physical Therapy  Treatment Note    Name: Li Rojas  : 1970  MRN: [id number]      Date of Service: 2025    Evaluating PT:  Sagar Lackey, PT [id number]    Referring provider/PT Order:  PT Eval and Treat   25  PT evaluation and treat  Start:  25,   End:  25,   ONE TIME,   Standing Count:  1 Occurrences,   R         Nicolás Duncan MD     Room #:  6401/6401-A  Diagnosis:  Hypercalcemia [E83.52]  Hyperkalemia [E87.5]  MARCO (acute kidney injury) [N17.9]  Receiving intravenous antibiotic treatment as outpatient [Z79.2]  PMHx/PSHx:     has a past medical history of Arthritis, Asthma, H/O medication noncompliance, Hyperlipidemia, Hypertension, Lupus, MS (multiple sclerosis) ([facility]), Multiple sclerosis (HCC), Nicotine dependence, cigarettes, uncomplicated, Palpitations, SOB (shortness of breath), SVT (supraventricular tachycardia), Systemic lupus erythematosus, unspecified ([facility]), and Type 2 diabetes mellitus without complication, without long-term current use of insulin (HCC).    has a past surgical history that includes Appendectomy; Abdomen surgery; Cholecystectomy; Tubal ligation;  section; Anterior cruciate ligament repair; Dilation and curettage of uterus; Nerve Block (Left, 10/01/2020); Nerve Block (Left, 10/1/2020); other surgical history (Left, 2020); and Injection Procedure For Sacroiliac Joint (Left, 12/3/2020).     Procedure/Surgery:  none  Precautions:  Falls, skin integrity, confusion, MS,  ,   Equipment Needs: To be determined,    SUBJECTIVE:    Per chart review admitted from facility. Per chart review Patient lived with her Mother in 1st floor apt prior. W/c for mobility. Unknown current level of mobility at facility. Equipment owned: Equipment at Nursing Home,      OBJECTIVE:   Initial Evaluation  Date: 25 Treatment  Date: 25 Short Term/ Long Term   Goals   AM-PAC 6 Clicks  8/24 10/24    Was pt agreeable to Eval/treatment? Yes  yes    Does

## 2025-06-20 NOTE — PLAN OF CARE
Problem: Safety - Adult  Goal: Free from fall injury  6/20/2025 0220 by Ervin Newberry RN  Outcome: Progressing  6/19/2025 1416 by Nikki Santiago RN  Outcome: Progressing     Problem: Chronic Conditions and Co-morbidities  Goal: Patient's chronic conditions and co-morbidity symptoms are monitored and maintained or improved  6/20/2025 0220 by Ervin Newberry RN  Outcome: Progressing  6/19/2025 1416 by Nikki Santiago RN  Outcome: Progressing     Problem: Discharge Planning  Goal: Discharge to home or other facility with appropriate resources  6/20/2025 0220 by Ervin Newberry RN  Outcome: Progressing  6/19/2025 1416 by Nikki Santiago RN  Outcome: Progressing     Problem: Pain  Goal: Verbalizes/displays adequate comfort level or baseline comfort level  Outcome: Progressing     Problem: Skin/Tissue Integrity  Goal: Skin integrity remains intact  Description: 1.  Monitor for areas of redness and/or skin breakdown  2.  Assess vascular access sites hourly  3.  Every 4-6 hours minimum:  Change oxygen saturation probe site  4.  Every 4-6 hours:  If on nasal continuous positive airway pressure, respiratory therapy assess nares and determine need for appliance change or resting period  Outcome: Progressing     Problem: Confusion  Goal: Confusion, delirium, dementia, or psychosis is improved or at baseline  Description: INTERVENTIONS:  1. Assess for possible contributors to thought disturbance, including medications, impaired vision or hearing, underlying metabolic abnormalities, dehydration, psychiatric diagnoses, and notify attending LIP  2. [place] high risk fall precautions, as indicated  3. Provide frequent short contacts to provide reality reorientation, refocusing and direction  4. Decrease environmental stimuli, including noise as appropriate  5. Monitor and intervene to maintain adequate nutrition, hydration, elimination, sleep and activity  6. If unable to ensure safety without constant  attention obtain sitter and review sitter guidelines with assigned personnel  7. Initiate Psychosocial CNS and Spiritual Care consult, as indicated  Outcome: Progressing     Problem: Skin/Tissue Integrity - Adult  Goal: Skin integrity remains intact  Description: 1.  Monitor for areas of redness and/or skin breakdown  2.  Assess vascular access sites hourly  3.  Every 4-6 hours minimum:  Change oxygen saturation probe site  4.  Every 4-6 hours:  If on nasal continuous positive airway pressure, respiratory therapy assess nares and determine need for appliance change or resting period  Outcome: Progressing     Problem: Infection - Adult  Goal: Absence of infection at discharge  Outcome: Progressing

## 2025-06-20 NOTE — PROGRESS NOTES
Occupational Therapy  OT BEDSIDE TREATMENT NOTE   [facility]  [address], [place]      Date:2025  Patient Name: Li Rojas  MRN: [id number]  : 1970  Room: 640/6401-     Evaluating OT: Nicolás Freeman OTR/L #[id number]      Referring Provider: Nicolás Duncan MD   Specific Provider Orders/Date: OT eval and treat 25     Diagnosis: Hypercalcemia [E83.52]  Hyperkalemia [E87.5]  MARCO (acute kidney injury) [N17.9]  Receiving intravenous antibiotic treatment as outpatient [Z79.2]   Pt admitted to hospital with AMS     Pertinent Medical History:  has a past medical history of Arthritis, Asthma, H/O medication noncompliance, Hyperlipidemia, Hypertension, Lupus, MS (multiple sclerosis) ([facility]), Multiple sclerosis ([facility]), Nicotine dependence, cigarettes, uncomplicated, Palpitations, SOB (shortness of breath), SVT (supraventricular tachycardia), Systemic lupus erythematosus, unspecified ([facility]), and Type 2 diabetes mellitus without complication, without long-term current use of insulin ([facility]).         Precautions:  Fall Risk, cognition, bed alarm      Assessment of current deficits    [x] Functional mobility          [x]ADLs           [x] Strength                  []Cognition    [x] Functional transfers        [x] IADLs         [x] Safety Awareness   [x]Endurance    [] Fine Coordination                        [x] Balance      [] Vision/perception   []Sensation      []Gross Motor Coordination            [] ROM           [] Delirium                   [] Motor Control      OT PLAN OF CARE   OT POC based on physician orders, patient diagnosis and results of clinical assessment     Frequency/Duration 1-5 days/wk for 2 weeks PRN   Specific OT Treatment Interventions to include:   * Instruction/training on adapted ADL techniques and AE recommendations to increase functional independence within precautions       * Training on energy conservation strategies,

## 2025-06-20 NOTE — PROGRESS NOTES
Department of Internal Medicine  Infectious Diseases  Progress  Note      C/C : leukocytosis , leg swelling       Pt is awake and alert  Denies fever or chills  Denies abdomen pain   Afebrile       Current Facility-Administered Medications   Medication Dose Route Frequency Provider Last Rate Last Admin    ipratropium 0.5 mg-albuterol 2.5 mg (DUONEB) nebulizer solution 1 Dose  1 Dose Inhalation 4x Daily RT Nicolás Duncan MD   1 Dose at 06/20/25 0723    budesonide (PULMICORT) nebulizer suspension 500 mcg  0.5 mg Nebulization BID RT Nicolás Duncan MD   500 mcg at 06/20/25 0723    arformoterol tartrate (BROVANA) nebulizer solution 15 mcg  15 mcg Nebulization BID RT Nicolás Duncan MD   15 mcg at 06/20/25 0723    amoxicillin-clavulanate (AUGMENTIN) 875-125 MG per tablet 1 tablet  1 tablet Oral 2 times per day Chris Elliott MD   1 tablet at 06/20/25 0839    white petrolatum ointment   Topical BID PRN Nicolás Duncan MD   Given at 06/19/25 2126    buprenorphine-naloxone (SUBOXONE) 8-2 MG SL film 1 Film  1 Film SubLINGual BID Nicolás Duncan MD   1 Film at 06/19/25 2009    miconazole (MICOTIN) 2 % powder   Topical BID Nicolás Duncan MD   Given at 06/20/25 0843    albuterol (ACCUNEB) nebulizer solution 0.63 mg  0.63 mg Nebulization 4x Daily PRN Myra Joyner, LOCO - CNP   0.63 mg at 06/18/25 1054    0.9 % sodium chloride infusion   IntraVENous Continuous Mickey Brown  mL/hr at 06/20/25 1116 Rate Verify at 06/20/25 1116    sodium chloride flush 0.9 % injection 10 mL  10 mL IntraVENous 2 times per day Nicolás Duncan MD   10 mL at 06/19/25 2126    sodium chloride flush 0.9 % injection 10 mL  10 mL IntraVENous PRN Nicolás Duncan MD        0.9 % sodium chloride infusion   IntraVENous PRN Nicolás Duncan MD        potassium chloride (KLOR-CON M) extended release tablet 40 mEq  40 mEq Oral PRN Nicolás Duncan MD        Or    potassium bicarb-citric acid (EFFER-K) effervescent tablet 40 mEq  40 mEq Oral PRN [place]

## 2025-06-20 NOTE — PLAN OF CARE
Problem: Safety - Adult  Goal: Free from fall injury  6/20/2025 1014 by Nikki Santiago RN  Outcome: Progressing  6/20/2025 0220 by Ervin Newberry RN  Outcome: Progressing     Problem: Chronic Conditions and Co-morbidities  Goal: Patient's chronic conditions and co-morbidity symptoms are monitored and maintained or improved  6/20/2025 1014 by Nikki Santiago RN  Outcome: Progressing  6/20/2025 0220 by Ervin Newberry RN  Outcome: Progressing     Problem: Discharge Planning  Goal: Discharge to home or other facility with appropriate resources  6/20/2025 1014 by Nikki Santiago RN  Outcome: Progressing  6/20/2025 0220 by Ervin Newberry RN  Outcome: Progressing     Problem: Pain  Goal: Verbalizes/displays adequate comfort level or baseline comfort level  6/20/2025 1014 by Nikki Santiago RN  Outcome: Progressing  6/20/2025 0220 by Ervin Newberry RN  Outcome: Progressing     Problem: Skin/Tissue Integrity  Goal: Skin integrity remains intact  Description: 1.  Monitor for areas of redness and/or skin breakdown  2.  Assess vascular access sites hourly  3.  Every 4-6 hours minimum:  Change oxygen saturation probe site  4.  Every 4-6 hours:  If on nasal continuous positive airway pressure, respiratory therapy assess nares and determine need for appliance change or resting period  6/20/2025 0220 by Ervin Newberry RN  Outcome: Progressing     Problem: Confusion  Goal: Confusion, delirium, dementia, or psychosis is improved or at baseline  Description: INTERVENTIONS:  1. Assess for possible contributors to thought disturbance, including medications, impaired vision or hearing, underlying metabolic abnormalities, dehydration, psychiatric diagnoses, and notify attending LIP  2. [place] high risk fall precautions, as indicated  3. Provide frequent short contacts to provide reality reorientation, refocusing and direction  4. Decrease environmental stimuli, including noise as appropriate  5. Monitor

## 2025-06-20 NOTE — PROGRESS NOTES
Department of Podiatry  Progress Note    SUBJECTIVE:  Li Rojas is seen at bedside for B/L lower leg cellulitis and wounds. No acute events overnight. Patient states some shortness of breath but otherwise denies any N/V/D/F/C/CP.  Patient states she is still having significant pain in her bilateral lower extremities.  No other pedal complaints at this time.     OBJECTIVE:    Scheduled Meds:   ipratropium 0.5 mg-albuterol 2.5 mg  1 Dose Inhalation 4x Daily RT    budesonide  0.5 mg Nebulization BID RT    arformoterol tartrate  15 mcg Nebulization BID RT    amoxicillin-clavulanate  1 tablet Oral 2 times per day    buprenorphine-naloxone  1 Film SubLINGual BID    miconazole   Topical BID    sodium chloride flush  10 mL IntraVENous 2 times per day    enoxaparin  40 mg SubCUTAneous Daily    atenolol  25 mg Oral BID AC    bisacodyl  5 mg Oral BID    dilTIAZem  240 mg Oral Daily    [Held by provider] furosemide  40 mg Oral Lunch    insulin glargine  15 Units SubCUTAneous Nightly    LORazepam  0.5 mg Oral BID    pantoprazole  40 mg Oral QAM AC    polyethylene glycol  17 g Oral Daily    predniSONE  15 mg Oral Daily    tiZANidine  4 mg Oral Q6H    insulin lispro  0-8 Units SubCUTAneous 4x Daily AC & HS     Continuous Infusions:   sodium chloride 100 mL/hr at 06/19/25 1710    sodium chloride      dextrose       PRN Meds:.white petrolatum, albuterol, sodium chloride flush, sodium chloride, potassium chloride **OR** potassium alternative oral replacement **OR** potassium chloride, ondansetron **OR** ondansetron, senna, acetaminophen **OR** acetaminophen, glucose, dextrose bolus **OR** dextrose bolus, glucagon (rDNA), dextrose    Allergies   Allergen Reactions    Lyrica [Pregabalin] Anaphylaxis    Other Shortness Of Breath    Sulfa Antibiotics Anaphylaxis     Hallucinations and rash    Darvocet [Propoxyphene N-Acetaminophen]     Ultram [Tramadol Hcl]     Metoprolol Rash       BP 92/76   Pulse 73   Temp 97.8 °F (36.6 °C)  dextrose       PRN Meds:.white petrolatum, albuterol, sodium chloride flush, sodium chloride, potassium chloride **OR** potassium alternative oral replacement **OR** potassium chloride, ondansetron **OR** ondansetron, senna, acetaminophen **OR** acetaminophen, glucose, dextrose bolus **OR** dextrose bolus, glucagon (rDNA), dextrose    RADIOLOGY:  XR CHEST (2 VW)   Final Result   1. Left basilar atelectasis.   2. Minimal blunting of left costophrenic angle may represent a small pleural   effusion.         CT ABDOMEN PELVIS WO CONTRAST Additional Contrast? None    (Results Pending)   Vascular lower extremity arterial segmental pressures w PPG    (Results Pending)   Vascular duplex lower extremity venous bilateral    (Results Pending)     BP 92/76   Pulse 73   Temp 97.8 °F (36.6 °C) (Tympanic)   Resp 15   Ht 1.72 m (5' 7.72\")   Wt 94.2 kg (207 lb 11.2 oz)   LMP 05/04/2020   SpO2 97%   BMI 31.85 kg/m²     LABS:    Recent Labs     06/18/25  0420 06/19/25  0841   WBC 17.3* 18.8*   HGB 15.2 14.5   HCT 50.1* 47.4     --         Recent Labs     06/19/25  0841      K 3.7   *   CO2 16*   BUN 20   CREATININE 0.6      No results for input(s): \"INR\", \"APTT\" in the last 72 hours.    Invalid input(s): \"PROT\"      ASSESSMENT:  - Cellulitis, bilateral lower extremity  - Ulcerations, bilateral lower extremity  - Edema, bilateral lower extremity  - Diabetes  - Lupus  - Multiple sclerosis    PLAN:  - Patient was examined and evaluated. Labs, images and ancillary service notes reviewed.   - Pain Control: IV and PO  - WBC 18.8 as of 6/19/2025  - Antibiotics per ID: [email or web address]; plan for oral Augmentin per ID  - Vascular: Arterial and venous lower extremity studies pending  - X-ray: X-ray B/L tib/fib (5/19/2025) had impression of no acute osseous abnormality and diffuse soft tissue swelling  - Weightbearing: WBAT to Bilateral leg(s).  - Dressing: Betadine, Adaptic, noncompressive dressing; will transition to

## 2025-06-20 NOTE — PROGRESS NOTES
Internal Medicine Progress Note    Patient's name: Li Rojas  : 1970  Chief complaints (on day of admission): Altered Mental Status (Pt from [facility] healthcare and staff states pt is more altered than usual. Pt is A&Ox4 at time of triage. Pt states she is just tired because they woke her up at 4AM last night )  Admission date: 2025  Date of service: 2025   Room: [address]  Primary care physician: Melvin Cardoso MD  Reason for visit: Follow-up for AMS     Subjective  Li was seen and examined at bedside       Calm in bed   DC pending precert       On O2   Start nebs for her   Monitor breathing   Still does not want a scan       Refusing to do the CT abdomen pelvis   We have had this issue in the hospital before   Identified barriers to patient not willing have scan   She is worried about pain   Offered solution of holding suboxone and then giving IV pain medication to get scan done   She is absolutely unwilling to consider this due to her concern of withdraw and her concern of not getting her suboxone continued as an outpatient   Discussed this at great length and offered to come up with other options for patient   Warned her extensively of the risk of an undiagnosed intra abdominal infectious process that could lead to sepsis septic shock and death to name a few   She was able to reiterate these risks back to me   States she wants to go back to rehab and take her IV anti biotics and then try for the scan at a later date when she feels ready   Again went back to the room with social work to attempt to find a way to get patient to agree to ct scan of the abdomen   This was to no avail again   Spoke with Dr Elliott   Spoke with social work       Review of Systems  There are no new complaints of chest pain, shortness of breath, abdominal pain, nausea, vomiting, diarrhea, constipation unless otherwise mentioned above.     Hospital Medications  Current Facility-Administered  250 mL IntraVENous PRN Nicolás Duncan MD        glucagon injection 1 mg  1 mg SubCUTAneous PRN Nicolás Duncan MD        dextrose 10 % infusion   IntraVENous Continuous PRN Nicolás Duncan MD         Facility-Administered Medications Ordered in Other Encounters   Medication Dose Route Frequency Provider Last Rate Last Admin    heparin flush 100 UNIT/ML injection 500 Units  500 Units Intercatheter PRN Anahy Bright MD        heparin flush 100 UNIT/ML injection 500 Units  500 Units Intercatheter PRN Anahy Bright MD           PRN Medications  traMADol, white petrolatum, albuterol, sodium chloride flush, sodium chloride, potassium chloride **OR** potassium alternative oral replacement **OR** potassium chloride, ondansetron **OR** ondansetron, senna, acetaminophen **OR** acetaminophen, glucose, dextrose bolus **OR** dextrose bolus, glucagon (rDNA), dextrose    Objective  Most Recent Recorded Vitals  /76   Pulse 81   Temp 98.9 °F (37.2 °C) (Oral)   Resp 20   Ht 1.72 m (5' 7.72\")   Wt 94.2 kg (207 lb 11.2 oz)   LMP 05/04/2020   SpO2 98%   BMI 31.85 kg/m²   I/O last 3 completed shifts:  In: 2324.4 [P.O.:120; I.V.:2010; IV Piggyback:194.4]  Out: 900 [Urine:900]  No intake/output data recorded.    Physical Exam:  General: AAO to person/place/time/purpose, NAD, no labored breathing  Eyes: conjunctivae/corneas clear, sclera non icteric  Skin: color/texture/turgor normal, no rashes or lesions  Lungs: CTAB, no retractions/use of accessory muscles, no vocal fremitus, no rhonchi, no crackle, no rales  Heart: regular rate, regular rhythm, no murmur  Abdomen: soft, NT, bowel sounds normal  Extremities: atraumatic, no edema  Neurologic: cranial nerves 2-12 grossly intact, no slurred speech    Most Recent Labs  Lab Results   Component Value Date    WBC 18.8 (H) 06/19/2025    HGB 14.5 06/19/2025    HCT 47.4 06/19/2025     06/18/2025     06/19/2025    K 3.7 06/19/2025     (H) 06/19/2025

## 2025-06-21 ENCOUNTER — APPOINTMENT (OUTPATIENT)
Dept: GENERAL RADIOLOGY | Age: 55
DRG: 469 | End: 2025-06-21
Payer: COMMERCIAL

## 2025-06-21 LAB
ANION GAP SERPL CALCULATED.3IONS-SCNC: 12 MMOL/L (ref 7–16)
BASOPHILS # BLD: 0.04 K/UL (ref 0–0.2)
BASOPHILS NFR BLD: 0 % (ref 0–2)
BNP SERPL-MCNC: 905 PG/ML (ref 0–125)
BUN SERPL-MCNC: 11 MG/DL (ref 6–20)
CALCIUM SERPL-MCNC: 10 MG/DL (ref 8.6–10)
CHLORIDE SERPL-SCNC: 117 MMOL/L (ref 98–107)
CO2 SERPL-SCNC: 21 MMOL/L (ref 22–29)
CREAT SERPL-MCNC: 0.5 MG/DL (ref 0.5–1)
EOSINOPHIL # BLD: 0.06 K/UL (ref 0.05–0.5)
EOSINOPHILS RELATIVE PERCENT: 0 % (ref 0–6)
ERYTHROCYTE [DISTWIDTH] IN BLOOD BY AUTOMATED COUNT: 15.9 % (ref 11.5–15)
GFR, ESTIMATED: >90 ML/MIN/1.73M2
GLUCOSE BLD-MCNC: 127 MG/DL (ref 74–99)
GLUCOSE BLD-MCNC: 151 MG/DL (ref 74–99)
GLUCOSE BLD-MCNC: 240 MG/DL (ref 74–99)
GLUCOSE BLD-MCNC: 98 MG/DL (ref 74–99)
GLUCOSE SERPL-MCNC: 130 MG/DL (ref 74–99)
HCT VFR BLD AUTO: 37.9 % (ref 34–48)
HGB BLD-MCNC: 11.7 G/DL (ref 11.5–15.5)
IMM GRANULOCYTES # BLD AUTO: 0.38 K/UL (ref 0–0.58)
IMM GRANULOCYTES NFR BLD: 2 % (ref 0–5)
LYMPHOCYTES NFR BLD: 0.54 K/UL (ref 1.5–4)
LYMPHOCYTES RELATIVE PERCENT: 3 % (ref 20–42)
MAGNESIUM SERPL-MCNC: 1.8 MG/DL (ref 1.6–2.6)
MCH RBC QN AUTO: 29.5 PG (ref 26–35)
MCHC RBC AUTO-ENTMCNC: 30.9 G/DL (ref 32–34.5)
MCV RBC AUTO: 95.7 FL (ref 80–99.9)
MICROORGANISM SPEC CULT: NORMAL
MICROORGANISM SPEC CULT: NORMAL
MONOCYTES NFR BLD: 0.61 K/UL (ref 0.1–0.95)
MONOCYTES NFR BLD: 4 % (ref 2–12)
NEUTROPHILS NFR BLD: 90 % (ref 43–80)
NEUTS SEG NFR BLD: 14.81 K/UL (ref 1.8–7.3)
PLATELET # BLD AUTO: 261 K/UL (ref 130–450)
PMV BLD AUTO: 9.4 FL (ref 7–12)
POTASSIUM SERPL-SCNC: 3.3 MMOL/L (ref 3.5–5.1)
PROCALCITONIN SERPL-MCNC: 0.11 NG/ML (ref 0–0.08)
RBC # BLD AUTO: 3.96 M/UL (ref 3.5–5.5)
RBC # BLD: ABNORMAL 10*6/UL
RBC # BLD: ABNORMAL 10*6/UL
SERVICE CMNT-IMP: NORMAL
SERVICE CMNT-IMP: NORMAL
SODIUM SERPL-SCNC: 149 MMOL/L (ref 136–145)
SPECIMEN DESCRIPTION: NORMAL
SPECIMEN DESCRIPTION: NORMAL
WBC OTHER # BLD: 16.4 K/UL (ref 4.5–11.5)

## 2025-06-21 PROCEDURE — 6360000002 HC RX W HCPCS: Performed by: STUDENT IN AN ORGANIZED HEALTH CARE EDUCATION/TRAINING PROGRAM

## 2025-06-21 PROCEDURE — 2140000000 HC CCU INTERMEDIATE R&B

## 2025-06-21 PROCEDURE — 83735 ASSAY OF MAGNESIUM: CPT

## 2025-06-21 PROCEDURE — 94640 AIRWAY INHALATION TREATMENT: CPT

## 2025-06-21 PROCEDURE — 71045 X-RAY EXAM CHEST 1 VIEW: CPT

## 2025-06-21 PROCEDURE — 82962 GLUCOSE BLOOD TEST: CPT

## 2025-06-21 PROCEDURE — 83880 ASSAY OF NATRIURETIC PEPTIDE: CPT

## 2025-06-21 PROCEDURE — 2500000003 HC RX 250 WO HCPCS: Performed by: STUDENT IN AN ORGANIZED HEALTH CARE EDUCATION/TRAINING PROGRAM

## 2025-06-21 PROCEDURE — 84145 PROCALCITONIN (PCT): CPT

## 2025-06-21 PROCEDURE — 80048 BASIC METABOLIC PNL TOTAL CA: CPT

## 2025-06-21 PROCEDURE — 6370000000 HC RX 637 (ALT 250 FOR IP): Performed by: STUDENT IN AN ORGANIZED HEALTH CARE EDUCATION/TRAINING PROGRAM

## 2025-06-21 PROCEDURE — 6370000000 HC RX 637 (ALT 250 FOR IP): Performed by: INTERNAL MEDICINE

## 2025-06-21 PROCEDURE — 2580000003 HC RX 258: Performed by: EMERGENCY MEDICINE

## 2025-06-21 PROCEDURE — 85025 COMPLETE CBC W/AUTO DIFF WBC: CPT

## 2025-06-21 RX ORDER — FUROSEMIDE 10 MG/ML
40 INJECTION INTRAMUSCULAR; INTRAVENOUS ONCE
Status: COMPLETED | OUTPATIENT
Start: 2025-06-21 | End: 2025-06-21

## 2025-06-21 RX ORDER — TRAMADOL HYDROCHLORIDE 50 MG/1
100 TABLET ORAL EVERY 6 HOURS PRN
Status: DISCONTINUED | OUTPATIENT
Start: 2025-06-21 | End: 2025-06-22 | Stop reason: SDUPTHER

## 2025-06-21 RX ORDER — TRAMADOL HYDROCHLORIDE 50 MG/1
50 TABLET ORAL ONCE
Status: COMPLETED | OUTPATIENT
Start: 2025-06-21 | End: 2025-06-21

## 2025-06-21 RX ADMIN — SODIUM CHLORIDE, PRESERVATIVE FREE 10 ML: 5 INJECTION INTRAVENOUS at 10:53

## 2025-06-21 RX ADMIN — MICONAZOLE NITRATE: 20.6 POWDER TOPICAL at 10:51

## 2025-06-21 RX ADMIN — LORAZEPAM 0.5 MG: 0.5 TABLET ORAL at 21:14

## 2025-06-21 RX ADMIN — AMOXICILLIN AND CLAVULANATE POTASSIUM 1 TABLET: 875; 125 TABLET, FILM COATED ORAL at 10:55

## 2025-06-21 RX ADMIN — SODIUM CHLORIDE: 0.9 INJECTION, SOLUTION INTRAVENOUS at 03:27

## 2025-06-21 RX ADMIN — BUPRENORPHINE AND NALOXONE 1 FILM: 8; 2 FILM, SOLUBLE BUCCAL; SUBLINGUAL at 10:47

## 2025-06-21 RX ADMIN — TIZANIDINE 4 MG: 4 TABLET ORAL at 00:25

## 2025-06-21 RX ADMIN — INSULIN GLARGINE 15 UNITS: 100 INJECTION, SOLUTION SUBCUTANEOUS at 21:15

## 2025-06-21 RX ADMIN — SODIUM CHLORIDE, PRESERVATIVE FREE 10 ML: 5 INJECTION INTRAVENOUS at 21:16

## 2025-06-21 RX ADMIN — BUPRENORPHINE AND NALOXONE 1 FILM: 8; 2 FILM, SOLUBLE BUCCAL; SUBLINGUAL at 21:15

## 2025-06-21 RX ADMIN — BISACODYL 5 MG: 5 TABLET, COATED ORAL at 21:16

## 2025-06-21 RX ADMIN — LORAZEPAM 0.5 MG: 0.5 TABLET ORAL at 10:49

## 2025-06-21 RX ADMIN — AMOXICILLIN AND CLAVULANATE POTASSIUM 1 TABLET: 875; 125 TABLET, FILM COATED ORAL at 21:14

## 2025-06-21 RX ADMIN — DILTIAZEM HYDROCHLORIDE 240 MG: 240 CAPSULE, COATED, EXTENDED RELEASE ORAL at 10:47

## 2025-06-21 RX ADMIN — ARFORMOTEROL TARTRATE 15 MCG: 15 SOLUTION RESPIRATORY (INHALATION) at 19:21

## 2025-06-21 RX ADMIN — TRAMADOL HYDROCHLORIDE 50 MG: 50 TABLET, COATED ORAL at 11:46

## 2025-06-21 RX ADMIN — ATENOLOL 25 MG: 25 TABLET ORAL at 05:19

## 2025-06-21 RX ADMIN — PREDNISONE 15 MG: 10 TABLET ORAL at 10:49

## 2025-06-21 RX ADMIN — ENOXAPARIN SODIUM 40 MG: 100 INJECTION SUBCUTANEOUS at 10:47

## 2025-06-21 RX ADMIN — TIZANIDINE 4 MG: 4 TABLET ORAL at 17:49

## 2025-06-21 RX ADMIN — TIZANIDINE 4 MG: 4 TABLET ORAL at 11:25

## 2025-06-21 RX ADMIN — PANTOPRAZOLE SODIUM 40 MG: 40 TABLET, DELAYED RELEASE ORAL at 05:18

## 2025-06-21 RX ADMIN — ATENOLOL 25 MG: 25 TABLET ORAL at 16:53

## 2025-06-21 RX ADMIN — BUDESONIDE 500 MCG: 0.5 SUSPENSION RESPIRATORY (INHALATION) at 19:21

## 2025-06-21 RX ADMIN — POTASSIUM BICARBONATE 40 MEQ: 782 TABLET, EFFERVESCENT ORAL at 21:38

## 2025-06-21 RX ADMIN — INSULIN LISPRO 2 UNITS: 100 INJECTION, SOLUTION INTRAVENOUS; SUBCUTANEOUS at 21:15

## 2025-06-21 RX ADMIN — MICONAZOLE NITRATE: 20.6 POWDER TOPICAL at 21:16

## 2025-06-21 RX ADMIN — TIZANIDINE 4 MG: 4 TABLET ORAL at 05:23

## 2025-06-21 RX ADMIN — TRAMADOL HYDROCHLORIDE 50 MG: 50 TABLET, COATED ORAL at 11:25

## 2025-06-21 RX ADMIN — FUROSEMIDE 40 MG: 10 INJECTION, SOLUTION INTRAMUSCULAR; INTRAVENOUS at 14:13

## 2025-06-21 ASSESSMENT — PAIN DESCRIPTION - ORIENTATION
ORIENTATION: LOWER
ORIENTATION: LOWER

## 2025-06-21 ASSESSMENT — PAIN SCALES - GENERAL
PAINLEVEL_OUTOF10: 8
PAINLEVEL_OUTOF10: 7

## 2025-06-21 ASSESSMENT — PAIN DESCRIPTION - LOCATION
LOCATION: BACK
LOCATION: BACK

## 2025-06-21 ASSESSMENT — PAIN DESCRIPTION - DESCRIPTORS
DESCRIPTORS: SHARP;SHOOTING;STABBING
DESCRIPTORS: SHARP;SHOOTING;STABBING

## 2025-06-21 ASSESSMENT — PAIN DESCRIPTION - FREQUENCY
FREQUENCY: CONTINUOUS
FREQUENCY: CONTINUOUS

## 2025-06-21 ASSESSMENT — PAIN DESCRIPTION - PAIN TYPE
TYPE: CHRONIC PAIN
TYPE: CHRONIC PAIN

## 2025-06-21 ASSESSMENT — PAIN DESCRIPTION - ONSET
ONSET: ON-GOING
ONSET: ON-GOING

## 2025-06-21 NOTE — PLAN OF CARE
Problem: Safety - Adult  Goal: Free from fall injury  6/20/2025 2308 by Ervin Newberry RN  Outcome: Progressing     Problem: Pain  Goal: Verbalizes/displays adequate comfort level or baseline comfort level  6/20/2025 2308 by Ervin Newberry RN  Outcome: Progressing  6/20/2025 1014 by Nikki Santiago RN  Outcome: Progressing     Problem: Skin/Tissue Integrity  Goal: Skin integrity remains intact  Description: 1.  Monitor for areas of redness and/or skin breakdown  2.  Assess vascular access sites hourly  3.  Every 4-6 hours minimum:  Change oxygen saturation probe site  4.  Every 4-6 hours:  If on nasal continuous positive airway pressure, respiratory therapy assess nares and determine need for appliance change or resting period  Outcome: Progressing     Problem: Confusion  Goal: Confusion, delirium, dementia, or psychosis is improved or at baseline  Description: INTERVENTIONS:  1. Assess for possible contributors to thought disturbance, including medications, impaired vision or hearing, underlying metabolic abnormalities, dehydration, psychiatric diagnoses, and notify attending LIP  2. [place] high risk fall precautions, as indicated  3. Provide frequent short contacts to provide reality reorientation, refocusing and direction  4. Decrease environmental stimuli, including noise as appropriate  5. Monitor and intervene to maintain adequate nutrition, hydration, elimination, sleep and activity  6. If unable to ensure safety without constant attention obtain sitter and review sitter guidelines with assigned personnel  7. Initiate Psychosocial CNS and Spiritual Care consult, as indicated  Outcome: Progressing     Problem: Neurosensory - Adult  Goal: Achieves stable or improved neurological status  Outcome: Progressing     Problem: Respiratory - Adult  Goal: Achieves optimal ventilation and oxygenation  Outcome: Progressing     Problem: Cardiovascular - Adult  Goal: Maintains optimal cardiac output and  hemodynamic stability  Outcome: Progressing     Problem: Skin/Tissue Integrity - Adult  Goal: Skin integrity remains intact  Description: 1.  Monitor for areas of redness and/or skin breakdown  2.  Assess vascular access sites hourly  3.  Every 4-6 hours minimum:  Change oxygen saturation probe site  4.  Every 4-6 hours:  If on nasal continuous positive airway pressure, respiratory therapy assess nares and determine need for appliance change or resting period  Outcome: Progressing     Problem: Skin/Tissue Integrity - Adult  Goal: Incisions, wounds, or drain sites healing without S/S of infection  Outcome: Progressing     Problem: Gastrointestinal - Adult  Goal: Maintains or returns to baseline bowel function  Outcome: Progressing     Problem: Gastrointestinal - Adult  Goal: Maintains adequate nutritional intake  Outcome: Progressing     Problem: Genitourinary - Adult  Goal: Absence of urinary retention  Outcome: Progressing     Problem: Infection - Adult  Goal: Absence of infection at discharge  Outcome: Progressing     Problem: Metabolic/Fluid and Electrolytes - Adult  Goal: Electrolytes maintained within normal limits  Outcome: Progressing  Goal: Hemodynamic stability and optimal renal function maintained  Outcome: Progressing  Goal: Glucose maintained within prescribed range  Outcome: Progressing     Problem: Hematologic - Adult  Goal: Maintains hematologic stability  Outcome: Progressing     Problem: Nutrition Deficit:  Goal: Optimize nutritional status  Outcome: Progressing

## 2025-06-21 NOTE — PLAN OF CARE
Problem: Safety - Adult  Goal: Free from fall injury  Outcome: Progressing  Flowsheets (Taken 6/21/2025 0800)  Free From Fall Injury: Instruct family/caregiver on patient safety     Problem: Chronic Conditions and Co-morbidities  Goal: Patient's chronic conditions and co-morbidity symptoms are monitored and maintained or improved  Outcome: Progressing     Problem: Discharge Planning  Goal: Discharge to home or other facility with appropriate resources  Outcome: Progressing     Problem: Pain  Goal: Verbalizes/displays adequate comfort level or baseline comfort level  Outcome: Progressing     Problem: Skin/Tissue Integrity  Goal: Skin integrity remains intact  Description: 1.  Monitor for areas of redness and/or skin breakdown  2.  Assess vascular access sites hourly  3.  Every 4-6 hours minimum:  Change oxygen saturation probe site  4.  Every 4-6 hours:  If on nasal continuous positive airway pressure, respiratory therapy assess nares and determine need for appliance change or resting period  Outcome: Progressing  Flowsheets (Taken 6/21/2025 0800)  Skin Integrity Remains Intact: Monitor for areas of redness and/or skin breakdown     Problem: ABCDS Injury Assessment  Goal: Absence of physical injury  Outcome: Progressing  Flowsheets (Taken 6/21/2025 0800)  Absence of Physical Injury: Implement safety measures based on patient assessment     Problem: Confusion  Goal: Confusion, delirium, dementia, or psychosis is improved or at baseline  Description: INTERVENTIONS:  1. Assess for possible contributors to thought disturbance, including medications, impaired vision or hearing, underlying metabolic abnormalities, dehydration, psychiatric diagnoses, and notify attending LIP  2. [place] high risk fall precautions, as indicated  3. Provide frequent short contacts to provide reality reorientation, refocusing and direction  4. Decrease environmental stimuli, including noise as appropriate  5. Monitor and intervene to  proper alignment of affected body part  Outcome: Progressing  Goal: Return ADL status to a safe level of function  Outcome: Progressing     Problem: Gastrointestinal - Adult  Goal: Maintains or returns to baseline bowel function  Outcome: Progressing  Goal: Maintains adequate nutritional intake  Outcome: Progressing     Problem: Genitourinary - Adult  Goal: Absence of urinary retention  Outcome: Progressing     Problem: Infection - Adult  Goal: Absence of infection at discharge  Outcome: Progressing  Goal: Absence of infection during hospitalization  Outcome: Progressing  Goal: Absence of fever/infection during anticipated neutropenic period  Outcome: Progressing     Problem: Metabolic/Fluid and Electrolytes - Adult  Goal: Electrolytes maintained within normal limits  Outcome: Progressing  Goal: Hemodynamic stability and optimal renal function maintained  Outcome: Progressing  Goal: Glucose maintained within prescribed range  Outcome: Progressing     Problem: Hematologic - Adult  Goal: Maintains hematologic stability  Outcome: Progressing     Problem: Nutrition Deficit:  Goal: Optimize nutritional status  Outcome: Progressing

## 2025-06-21 NOTE — CARE COORDINATION
CM update: Dc order noted. Message left for Brandi at [facility] at the [place]. Precert remains pending at this time. Pt cannot DC until precert is obtained. Ambulance transport placed on will-call via [facility] [phone]. Patient's mother will need notified of discharge. Ambulance form and CARLA completed; envelope placed on the soft chart.     12:11 Auth has been obtained. Pt has been arranged to be picked up by 1:30pm. Nursing Brandi and pts mother notified.     12:22 Pts mom is very angry that her daughter is being discharged. She does not want her leaving over the weekend at all. She is on her way to the hospital she is requesting a CT of the pts abdomen. Pt placed on will call with [facility]. Nursing updated. Brandi reports the pts auth is good through 6/30. AF    Lakshmi QUIROSN, RN  Case Management   [phone]

## 2025-06-21 NOTE — PROGRESS NOTES
Spoke with Dr. Duncan, hold discharge today. Labs pending and pt mother to speak with her to see if she will agree to get CT scans.

## 2025-06-21 NOTE — PROGRESS NOTES
Internal Medicine Progress Note    Patient's name: Li Rojas  : 1970  Chief complaints (on day of admission): Altered Mental Status ([facility] from [facility] and staff states pt is more altered than usual. Pt is A&Ox4 at time of triage. Pt states she is just tired because they woke her up at 4AM last night )  Admission date: 2025  Date of service: 2025   Room: [address]  Primary care physician: Melvin Cardoso MD  Reason for visit: Follow-up for AMS     Subjective  Li was seen and examined at bedside       Patient tells me she just wants to go home   She is having a lot of back pain   She is tearful   Spoke with mom on phone she wants her to have the CT scan of her abdomen however I explained I cannot force her to do it and she has too much back pain to lay flat   She will talk with her today and see if she can convince her       Calm in bed   DC pending precert       On O2   Start nebs for her   Monitor breathing   Still does not want a scan       Refusing to do the CT abdomen pelvis   We have had this issue in the hospital before   Identified barriers to patient not willing have scan   She is worried about pain   Offered solution of holding suboxone and then giving IV pain medication to get scan done   She is absolutely unwilling to consider this due to her concern of withdraw and her concern of not getting her suboxone continued as an outpatient   Discussed this at great length and offered to come up with other options for patient   Warned her extensively of the risk of an undiagnosed intra abdominal infectious process that could lead to sepsis septic shock and death to name a few   She was able to reiterate these risks back to me   States she wants to go back to rehab and take her IV anti biotics and then try for the scan at a later date when she feels ready   Again went back to the room with social work to attempt to find a way to get patient to agree to

## 2025-06-21 NOTE — PROGRESS NOTES
Department of Podiatry  Progress Note    SUBJECTIVE:  Patient is seen at bedside for B/L lower leg cellulitis and wounds. No acute events overnight. States her pain is tolerable and is controlled with some relief with prn meds. Patient denies any N/V/D/F/C/SOB/CP. No other pedal complaints at this time.     OBJECTIVE:    Scheduled Meds:   budesonide  0.5 mg Nebulization BID RT    arformoterol tartrate  15 mcg Nebulization BID RT    amoxicillin-clavulanate  1 tablet Oral 2 times per day    buprenorphine-naloxone  1 Film SubLINGual BID    miconazole   Topical BID    sodium chloride flush  10 mL IntraVENous 2 times per day    enoxaparin  40 mg SubCUTAneous Daily    atenolol  25 mg Oral BID AC    bisacodyl  5 mg Oral BID    dilTIAZem  240 mg Oral Daily    [Held by provider] furosemide  40 mg Oral Lunch    insulin glargine  15 Units SubCUTAneous Nightly    LORazepam  0.5 mg Oral BID    pantoprazole  40 mg Oral QAM AC    polyethylene glycol  17 g Oral Daily    predniSONE  15 mg Oral Daily    tiZANidine  4 mg Oral Q6H    insulin lispro  0-8 Units SubCUTAneous 4x Daily AC & HS     Continuous Infusions:   sodium chloride      dextrose       PRN Meds:.traMADol, ipratropium 0.5 mg-albuterol 2.5 mg, white petrolatum, albuterol, sodium chloride flush, sodium chloride, potassium chloride **OR** potassium alternative oral replacement **OR** potassium chloride, ondansetron **OR** ondansetron, senna, acetaminophen **OR** acetaminophen, glucose, dextrose bolus **OR** dextrose bolus, glucagon (rDNA), dextrose    Allergies   Allergen Reactions    Lyrica [Pregabalin] Anaphylaxis    Other Shortness Of Breath    Sulfa Antibiotics Anaphylaxis     Hallucinations and rash    Darvocet [Propoxyphene N-Acetaminophen]     Ultram [Tramadol Hcl]     Metoprolol Rash       /80   Pulse 82   Temp 98.9 °F (37.2 °C) (Oral)   Resp 15   Ht 1.72 m (5' 7.72\")   Wt 94.2 kg (207 lb 11.2 oz)   LMP 05/04/2020   SpO2 97%   BMI 31.85 kg/m²  ondansetron **OR** ondansetron, senna, acetaminophen **OR** acetaminophen, glucose, dextrose bolus **OR** dextrose bolus, glucagon (rDNA), dextrose    RADIOLOGY:  XR CHEST (2 VW)   Final Result   1. Left basilar atelectasis.   2. Minimal blunting of left costophrenic angle may represent a small pleural   effusion.         CT ABDOMEN PELVIS WO CONTRAST Additional Contrast? None    (Results Pending)   Vascular lower extremity arterial segmental pressures w PPG    (Results Pending)   Vascular duplex lower extremity venous bilateral    (Results Pending)     /80   Pulse 82   Temp 98.9 °F (37.2 °C) (Oral)   Resp 15   Ht 1.72 m (5' 7.72\")   Wt 94.2 kg (207 lb 11.2 oz)   LMP 05/04/2020   SpO2 97%   BMI 31.85 kg/m²     LABS:    Recent Labs     06/19/25  0841   WBC 18.8*   HGB 14.5   HCT 47.4        Recent Labs     06/19/25  0841      K 3.7   *   CO2 16*   BUN 20   CREATININE 0.6      No results for input(s): \"INR\", \"APTT\" in the last 72 hours.    Invalid input(s): \"PROT\"    ASSESSMENT:  - Cellulitis, bilateral lower extremity  - Ulcerations, bilateral lower extremity  - Edema, bilateral lower extremity  - Diabetes  - Lupus  - Multiple sclerosis     PLAN:  - Patient was examined and evaluated. Labs, images and ancillary service notes reviewed.   - Pain Control: IV and PO  - WBC 16.4 today down from 18.8 as of 6/19/2025  - Antibiotics per ID: oral Augmentin per ID  - Vascular: Arterial and venous lower extremity studies pending  - X-ray: X-ray B/L tib/fib (5/19/2025) had impression of no acute osseous abnormality and diffuse soft tissue swelling  - Weightbearing: WBAT to Bilateral leg(s).  - Dressing: Betadine, Adaptic, noncompressive dressing; will transition to compressive dressing pending arterial and venous studies  - Surgical intervention is not planned.   - Will continue to follow patient while they are in-house.  - Discussed patient with Dr. Hernández

## 2025-06-21 NOTE — PROGRESS NOTES
Patient refused to go to ultrasound because of severe back pain. Patient was medicated at 1146 with 100mg of Tramadol. States she received no relief. Both Dr. Duncan and Dr. Dasilva were notified.   Fall with Harm Risk

## 2025-06-21 NOTE — PROGRESS NOTES
Department of Internal Medicine  Infectious Diseases  Progress  Note      C/C : leukocytosis , leg swelling       Pt is awake and alert  Denies fever or chills  Denies abdomen pain   Afebrile       Current Facility-Administered Medications   Medication Dose Route Frequency Provider Last Rate Last Admin    furosemide (LASIX) injection 40 mg  40 mg IntraVENous Once Nicolás Duncan MD        traMADol (ULTRAM) tablet 100 mg  100 mg Oral Q6H PRN Nicolás Duncan MD        ipratropium 0.5 mg-albuterol 2.5 mg (DUONEB) nebulizer solution 1 Dose  1 Dose Inhalation Q4H PRN Nicolás Duncan MD        budesonide (PULMICORT) nebulizer suspension 500 mcg  0.5 mg Nebulization BID RT Nicolás Duncan MD   500 mcg at 06/20/25 0723    arformoterol tartrate (BROVANA) nebulizer solution 15 mcg  15 mcg Nebulization BID RT Nicolás Duncan MD   15 mcg at 06/20/25 0723    amoxicillin-clavulanate (AUGMENTIN) 875-125 MG per tablet 1 tablet  1 tablet Oral 2 times per day Chris Elliott MD   1 tablet at 06/21/25 1055    white petrolatum ointment   Topical BID PRN Nicolás Duncan MD   Given at 06/19/25 2126    buprenorphine-naloxone (SUBOXONE) 8-2 MG SL film 1 Film  1 Film SubLINGual BID Nicolás Duncan MD   1 Film at 06/21/25 1047    miconazole (MICOTIN) 2 % powder   Topical BID Nicolás Duncan MD   Given at 06/21/25 1051    albuterol (ACCUNEB) nebulizer solution 0.63 mg  0.63 mg Nebulization 4x Daily PRN Myra Joyner, LOCO - CNP   0.63 mg at 06/18/25 1054    sodium chloride flush 0.9 % injection 10 mL  10 mL IntraVENous 2 times per day Nicolás Duncan MD   10 mL at 06/21/25 1053    sodium chloride flush 0.9 % injection 10 mL  10 mL IntraVENous PRN Nicolás Duncan MD        0.9 % sodium chloride infusion   IntraVENous PRN Nicolás Duncan MD        potassium chloride (KLOR-CON M) extended release tablet 40 mEq  40 mEq Oral PRN Nicolás Duncan MD        Or    potassium bicarb-citric acid (EFFER-K) effervescent tablet 40 mEq  40 mEq Oral PRN Nicolás Duncan,

## 2025-06-22 PROBLEM — Z71.89 GOALS OF CARE, COUNSELING/DISCUSSION: Status: ACTIVE | Noted: 2025-06-22

## 2025-06-22 PROBLEM — Z51.5 PALLIATIVE CARE ENCOUNTER: Status: ACTIVE | Noted: 2025-06-22

## 2025-06-22 LAB
ANION GAP SERPL CALCULATED.3IONS-SCNC: 10 MMOL/L (ref 7–16)
B PARAP IS1001 DNA NPH QL NAA+NON-PROBE: NOT DETECTED
B PERT DNA SPEC QL NAA+PROBE: NOT DETECTED
B.E.: 4.6 MMOL/L (ref -3–3)
BASOPHILS # BLD: 0.07 K/UL (ref 0–0.2)
BASOPHILS NFR BLD: 1 % (ref 0–2)
BUN SERPL-MCNC: 10 MG/DL (ref 6–20)
C PNEUM DNA NPH QL NAA+NON-PROBE: NOT DETECTED
CALCIUM SERPL-MCNC: 9.9 MG/DL (ref 8.6–10)
CHLORIDE SERPL-SCNC: 112 MMOL/L (ref 98–107)
CO2 SERPL-SCNC: 26 MMOL/L (ref 22–29)
COHB: 0.5 % (ref 0–1.5)
CREAT SERPL-MCNC: 0.5 MG/DL (ref 0.5–1)
CRITICAL: ABNORMAL
DATE ANALYZED: ABNORMAL
DATE OF COLLECTION: ABNORMAL
EOSINOPHIL # BLD: 0.08 K/UL (ref 0.05–0.5)
EOSINOPHILS RELATIVE PERCENT: 1 % (ref 0–6)
ERYTHROCYTE [DISTWIDTH] IN BLOOD BY AUTOMATED COUNT: 15.7 % (ref 11.5–15)
FLUAV RNA NPH QL NAA+NON-PROBE: NOT DETECTED
FLUBV RNA NPH QL NAA+NON-PROBE: NOT DETECTED
GFR, ESTIMATED: >90 ML/MIN/1.73M2
GLUCOSE BLD-MCNC: 125 MG/DL (ref 74–99)
GLUCOSE BLD-MCNC: 165 MG/DL (ref 74–99)
GLUCOSE BLD-MCNC: 175 MG/DL (ref 74–99)
GLUCOSE BLD-MCNC: 95 MG/DL (ref 74–99)
GLUCOSE SERPL-MCNC: 105 MG/DL (ref 74–99)
HADV DNA NPH QL NAA+NON-PROBE: NOT DETECTED
HCO3: 30.2 MMOL/L (ref 22–26)
HCOV 229E RNA NPH QL NAA+NON-PROBE: NOT DETECTED
HCOV HKU1 RNA NPH QL NAA+NON-PROBE: NOT DETECTED
HCOV NL63 RNA NPH QL NAA+NON-PROBE: NOT DETECTED
HCOV OC43 RNA NPH QL NAA+NON-PROBE: NOT DETECTED
HCT VFR BLD AUTO: 40.4 % (ref 34–48)
HGB BLD-MCNC: 12.5 G/DL (ref 11.5–15.5)
HHB: 4.7 % (ref 0–5)
HMPV RNA NPH QL NAA+NON-PROBE: NOT DETECTED
HPIV1 RNA NPH QL NAA+NON-PROBE: NOT DETECTED
HPIV2 RNA NPH QL NAA+NON-PROBE: NOT DETECTED
HPIV3 RNA NPH QL NAA+NON-PROBE: NOT DETECTED
HPIV4 RNA NPH QL NAA+NON-PROBE: NOT DETECTED
IMM GRANULOCYTES # BLD AUTO: 0.42 K/UL (ref 0–0.58)
IMM GRANULOCYTES NFR BLD: 3 % (ref 0–5)
LAB: ABNORMAL
LACTATE BLDV-SCNC: 1.4 MMOL/L (ref 0.5–2.2)
LYMPHOCYTES NFR BLD: 1.34 K/UL (ref 1.5–4)
LYMPHOCYTES RELATIVE PERCENT: 10 % (ref 20–42)
Lab: 1755
M PNEUMO DNA NPH QL NAA+NON-PROBE: NOT DETECTED
MAGNESIUM SERPL-MCNC: 1.8 MG/DL (ref 1.6–2.6)
MCH RBC QN AUTO: 28.9 PG (ref 26–35)
MCHC RBC AUTO-ENTMCNC: 30.9 G/DL (ref 32–34.5)
MCV RBC AUTO: 93.5 FL (ref 80–99.9)
METHB: 0.5 % (ref 0–1.5)
MODE: ABNORMAL
MONOCYTES NFR BLD: 0.64 K/UL (ref 0.1–0.95)
MONOCYTES NFR BLD: 5 % (ref 2–12)
NEUTROPHILS NFR BLD: 80 % (ref 43–80)
NEUTS SEG NFR BLD: 10.34 K/UL (ref 1.8–7.3)
O2 SATURATION: 95.3 % (ref 92–98.5)
O2HB: 94.3 % (ref 94–97)
OPERATOR ID: 7278
PATIENT TEMP: 37 C
PCO2: 48.2 MMHG (ref 35–45)
PH BLOOD GAS: 7.42 (ref 7.35–7.45)
PLATELET # BLD AUTO: 298 K/UL (ref 130–450)
PMV BLD AUTO: 9.3 FL (ref 7–12)
PO2: 72.9 MMHG (ref 75–100)
POTASSIUM SERPL-SCNC: 3.2 MMOL/L (ref 3.5–5.1)
POTASSIUM SERPL-SCNC: 3.55 MMOL/L (ref 3.5–5)
RBC # BLD AUTO: 4.32 M/UL (ref 3.5–5.5)
RSV RNA NPH QL NAA+NON-PROBE: NOT DETECTED
RV+EV RNA NPH QL NAA+NON-PROBE: NOT DETECTED
SARS-COV-2 RNA NPH QL NAA+NON-PROBE: NOT DETECTED
SODIUM SERPL-SCNC: 148 MMOL/L (ref 136–145)
SOURCE, BLOOD GAS: ABNORMAL
SPECIMEN DESCRIPTION: NORMAL
THB: 14.7 G/DL (ref 11.5–16.5)
TIME ANALYZED: 1758
WBC OTHER # BLD: 12.9 K/UL (ref 4.5–11.5)

## 2025-06-22 PROCEDURE — 80048 BASIC METABOLIC PNL TOTAL CA: CPT

## 2025-06-22 PROCEDURE — 99222 1ST HOSP IP/OBS MODERATE 55: CPT

## 2025-06-22 PROCEDURE — 6370000000 HC RX 637 (ALT 250 FOR IP): Performed by: STUDENT IN AN ORGANIZED HEALTH CARE EDUCATION/TRAINING PROGRAM

## 2025-06-22 PROCEDURE — 6370000000 HC RX 637 (ALT 250 FOR IP): Performed by: INTERNAL MEDICINE

## 2025-06-22 PROCEDURE — 2700000000 HC OXYGEN THERAPY PER DAY

## 2025-06-22 PROCEDURE — 85025 COMPLETE CBC W/AUTO DIFF WBC: CPT

## 2025-06-22 PROCEDURE — 6360000002 HC RX W HCPCS: Performed by: STUDENT IN AN ORGANIZED HEALTH CARE EDUCATION/TRAINING PROGRAM

## 2025-06-22 PROCEDURE — 99222 1ST HOSP IP/OBS MODERATE 55: CPT | Performed by: NEUROLOGICAL SURGERY

## 2025-06-22 PROCEDURE — 2500000003 HC RX 250 WO HCPCS: Performed by: STUDENT IN AN ORGANIZED HEALTH CARE EDUCATION/TRAINING PROGRAM

## 2025-06-22 PROCEDURE — 2140000000 HC CCU INTERMEDIATE R&B

## 2025-06-22 PROCEDURE — 82962 GLUCOSE BLOOD TEST: CPT

## 2025-06-22 PROCEDURE — 84132 ASSAY OF SERUM POTASSIUM: CPT

## 2025-06-22 PROCEDURE — 83605 ASSAY OF LACTIC ACID: CPT

## 2025-06-22 PROCEDURE — 0202U NFCT DS 22 TRGT SARS-COV-2: CPT

## 2025-06-22 PROCEDURE — 94640 AIRWAY INHALATION TREATMENT: CPT

## 2025-06-22 PROCEDURE — 83735 ASSAY OF MAGNESIUM: CPT

## 2025-06-22 PROCEDURE — 82805 BLOOD GASES W/O2 SATURATION: CPT

## 2025-06-22 RX ORDER — MAGNESIUM SULFATE 1 G/100ML
1000 INJECTION INTRAVENOUS ONCE
Status: COMPLETED | OUTPATIENT
Start: 2025-06-22 | End: 2025-06-22

## 2025-06-22 RX ORDER — FUROSEMIDE 10 MG/ML
20 INJECTION INTRAMUSCULAR; INTRAVENOUS ONCE
Status: COMPLETED | OUTPATIENT
Start: 2025-06-22 | End: 2025-06-22

## 2025-06-22 RX ORDER — LANOLIN ALCOHOL/MO/W.PET/CERES
400 CREAM (GRAM) TOPICAL 2 TIMES DAILY
Status: COMPLETED | OUTPATIENT
Start: 2025-06-22 | End: 2025-06-23

## 2025-06-22 RX ORDER — OXYCODONE HYDROCHLORIDE 5 MG/1
5 TABLET ORAL EVERY 4 HOURS PRN
Refills: 0 | Status: DISCONTINUED | OUTPATIENT
Start: 2025-06-22 | End: 2025-06-26 | Stop reason: HOSPADM

## 2025-06-22 RX ORDER — POTASSIUM CHLORIDE 1500 MG/1
40 TABLET, EXTENDED RELEASE ORAL ONCE
Status: COMPLETED | OUTPATIENT
Start: 2025-06-22 | End: 2025-06-22

## 2025-06-22 RX ORDER — GUAIFENESIN 400 MG/1
400 TABLET ORAL EVERY 8 HOURS
Status: DISCONTINUED | OUTPATIENT
Start: 2025-06-22 | End: 2025-06-26 | Stop reason: HOSPADM

## 2025-06-22 RX ORDER — TRAMADOL HYDROCHLORIDE 50 MG/1
100 TABLET ORAL EVERY 6 HOURS PRN
Status: DISCONTINUED | OUTPATIENT
Start: 2025-06-22 | End: 2025-06-26 | Stop reason: HOSPADM

## 2025-06-22 RX ADMIN — AMOXICILLIN AND CLAVULANATE POTASSIUM 1 TABLET: 875; 125 TABLET, FILM COATED ORAL at 09:44

## 2025-06-22 RX ADMIN — ARFORMOTEROL TARTRATE 15 MCG: 15 SOLUTION RESPIRATORY (INHALATION) at 06:16

## 2025-06-22 RX ADMIN — BUDESONIDE 500 MCG: 0.5 SUSPENSION RESPIRATORY (INHALATION) at 19:50

## 2025-06-22 RX ADMIN — MICONAZOLE NITRATE: 20.6 POWDER TOPICAL at 09:46

## 2025-06-22 RX ADMIN — IPRATROPIUM BROMIDE AND ALBUTEROL SULFATE 1 DOSE: .5; 2.5 SOLUTION RESPIRATORY (INHALATION) at 06:16

## 2025-06-22 RX ADMIN — MAGNESIUM SULFATE HEPTAHYDRATE 1000 MG: 1 INJECTION, SOLUTION INTRAVENOUS at 10:27

## 2025-06-22 RX ADMIN — ATENOLOL 25 MG: 25 TABLET ORAL at 06:22

## 2025-06-22 RX ADMIN — TRAMADOL HYDROCHLORIDE 100 MG: 50 TABLET, COATED ORAL at 06:22

## 2025-06-22 RX ADMIN — TIZANIDINE 4 MG: 4 TABLET ORAL at 01:28

## 2025-06-22 RX ADMIN — IPRATROPIUM BROMIDE AND ALBUTEROL SULFATE 1 DOSE: .5; 2.5 SOLUTION RESPIRATORY (INHALATION) at 11:49

## 2025-06-22 RX ADMIN — BUDESONIDE 500 MCG: 0.5 SUSPENSION RESPIRATORY (INHALATION) at 06:16

## 2025-06-22 RX ADMIN — BISACODYL 5 MG: 5 TABLET, COATED ORAL at 09:44

## 2025-06-22 RX ADMIN — POTASSIUM CHLORIDE 40 MEQ: 1500 TABLET, EXTENDED RELEASE ORAL at 09:43

## 2025-06-22 RX ADMIN — PANTOPRAZOLE SODIUM 40 MG: 40 TABLET, DELAYED RELEASE ORAL at 05:27

## 2025-06-22 RX ADMIN — BISACODYL 5 MG: 5 TABLET, COATED ORAL at 21:37

## 2025-06-22 RX ADMIN — ENOXAPARIN SODIUM 40 MG: 100 INJECTION SUBCUTANEOUS at 09:42

## 2025-06-22 RX ADMIN — TIZANIDINE 4 MG: 4 TABLET ORAL at 13:40

## 2025-06-22 RX ADMIN — PREDNISONE 15 MG: 10 TABLET ORAL at 09:44

## 2025-06-22 RX ADMIN — LORAZEPAM 0.5 MG: 0.5 TABLET ORAL at 09:46

## 2025-06-22 RX ADMIN — FUROSEMIDE 20 MG: 10 INJECTION, SOLUTION INTRAMUSCULAR; INTRAVENOUS at 09:45

## 2025-06-22 RX ADMIN — INSULIN GLARGINE 15 UNITS: 100 INJECTION, SOLUTION SUBCUTANEOUS at 21:37

## 2025-06-22 RX ADMIN — ATENOLOL 25 MG: 25 TABLET ORAL at 19:09

## 2025-06-22 RX ADMIN — GUAIFENESIN 400 MG: 400 TABLET ORAL at 19:10

## 2025-06-22 RX ADMIN — POLYETHYLENE GLYCOL 3350 17 G: 17 POWDER, FOR SOLUTION ORAL at 09:45

## 2025-06-22 RX ADMIN — TIZANIDINE 4 MG: 4 TABLET ORAL at 19:09

## 2025-06-22 RX ADMIN — GUAIFENESIN 400 MG: 400 TABLET ORAL at 09:43

## 2025-06-22 RX ADMIN — FUROSEMIDE 40 MG: 40 TABLET ORAL at 13:40

## 2025-06-22 RX ADMIN — Medication 400 MG: at 09:43

## 2025-06-22 RX ADMIN — TIZANIDINE 4 MG: 4 TABLET ORAL at 06:22

## 2025-06-22 RX ADMIN — Medication 400 MG: at 21:37

## 2025-06-22 RX ADMIN — DILTIAZEM HYDROCHLORIDE 240 MG: 240 CAPSULE, COATED, EXTENDED RELEASE ORAL at 09:42

## 2025-06-22 RX ADMIN — AMOXICILLIN AND CLAVULANATE POTASSIUM 1 TABLET: 875; 125 TABLET, FILM COATED ORAL at 21:36

## 2025-06-22 RX ADMIN — SODIUM CHLORIDE, PRESERVATIVE FREE 10 ML: 5 INJECTION INTRAVENOUS at 09:45

## 2025-06-22 RX ADMIN — SODIUM CHLORIDE, PRESERVATIVE FREE 10 ML: 5 INJECTION INTRAVENOUS at 21:37

## 2025-06-22 RX ADMIN — ARFORMOTEROL TARTRATE 15 MCG: 15 SOLUTION RESPIRATORY (INHALATION) at 19:50

## 2025-06-22 RX ADMIN — MICONAZOLE NITRATE: 20.6 POWDER TOPICAL at 21:39

## 2025-06-22 ASSESSMENT — PAIN SCALES - WONG BAKER
WONGBAKER_NUMERICALRESPONSE: NO HURT
WONGBAKER_NUMERICALRESPONSE: NO HURT

## 2025-06-22 ASSESSMENT — PAIN SCALES - GENERAL
PAINLEVEL_OUTOF10: 0
PAINLEVEL_OUTOF10: 9
PAINLEVEL_OUTOF10: 0

## 2025-06-22 ASSESSMENT — PAIN DESCRIPTION - LOCATION: LOCATION: BACK

## 2025-06-22 ASSESSMENT — PAIN - FUNCTIONAL ASSESSMENT: PAIN_FUNCTIONAL_ASSESSMENT: PREVENTS OR INTERFERES WITH ALL ACTIVE AND SOME PASSIVE ACTIVITIES

## 2025-06-22 ASSESSMENT — PAIN DESCRIPTION - DESCRIPTORS: DESCRIPTORS: ACHING;THROBBING;SORE

## 2025-06-22 NOTE — CONSULTS
[facility]              [address] [place] [address]                              CONSULTATION      PATIENT NAME: JYOTI LUONG            : 1970  MED REC NO: [id number]                        ROOM: 6401  ACCOUNT NO: [id number]                       ADMIT DATE: 2025  PROVIDER: Miller Doe MD    NEUROSURGERY CONSULT    CONSULT DATE: 2025      REASON FOR CONSULT:  Thoracic back pain.    HISTORY OF PRESENT ILLNESS:  The patient is a 55-year-old lady, who is known to my service.  She was seen about 2 months ago for thoracic back pain.  She continues to complain of persistent back pain that she rates about 6-7/10.  She states that it is worse with movement and activity and better with rest.  She denies any new numbness, tingling, or weakness or loss of control of bowel or bladder function.    PAST MEDICAL HISTORY:  Positive for arthritis, asthma, hyperlipidemia, hypertension, lupus, multiple sclerosis, and SVT.    PAST SURGICAL HISTORY:  Positive for abdominal surgery, ACL repair, appendectomy, , cholecystectomy, D and C, and kyphoplasty.    FAMILY HISTORY:  Positive for cancer in her father.    SOCIAL HISTORY:  Positive for tobacco use.    ALLERGIES:  INCLUDE LYRICA, SULFA, DARVOCET, ULTRAM, AND METOPROLOL.      REVIEW OF SYSTEMS:  HEENT:  Negative for headache, double vision, or blurred vision.  CARDIOVASCULAR:  Negative for chest pain, arrhythmia, or palpitations.  RESPIRATORY:  Positive for shortness of breath.  GASTROINTESTINAL:  Negative for heartburn, nausea, vomiting, diarrhea, or constipation.  GENITOURINARY:  Negative for hematuria or dysuria.  HEMATOLOGIC:  Positive for easy bruising.  INFECTIOUS:  Negative for any recent infection.  MUSCULOSKELETAL:  Positive for back pain.  PSYCHIATRIC:  Positive for depression.  NEUROLOGIC:  Negative for seizure or stroke, but positive for multiple sclerosis.  ENDOCRINE:  Negative  for thyroid disorder or diabetes.    PHYSICAL EXAMINATION:  VITAL SIGNS:  She is currently afebrile with a T-current of 36.9 degrees Celsius, pulse 98, blood pressure is 150/84.  GENERAL:  She is resting in bed, appears to be in mild-to-moderate distress secondary to pain.  She appears her stated age.  HEENT:  Her head is normocephalic and atraumatic.  Pupils are 3 to 2 mm and reactive.  She has no drainage out of her eyes, ears, nose, or throat.  SKIN:  Warm and dry.  MUSCULOSKELETAL:  She has good range of motion of bilateral upper and lower extremities.  ABDOMEN:  Soft, nontender, nondistended.  RESPIRATORY:  She is not using any accessory muscles of respiration.  NEUROLOGIC:  On rest of her neurologic, she is awake, alert, and oriented x3.  Cranial nerves 2 through 12 are intact bilaterally.  Motor exam reveals 4/5 strength in her bilateral upper and lower extremities.  Sensation is grossly intact to light touch.  Reflexes are 1+ and symmetric.  Toes are going down.    LABORATORY DATA:  Sodium 140, potassium 3.2, chloride 112, glucose 105, BUN is 10, creatinine is 0.5.  On review of imaging, she has no new imaging for me to review at this time.    ASSESSMENT AND PLAN:  The patient is a 55-year-old lady with thoracic back pain.  She is neurologically stable.  I am recommending a bone scan and a CT scan of her thoracic spine to determine etiology of her back pain.          SUZETTE EVANGELISTA MD      D:  06/22/2025 09:32:34     T:  06/22/2025 09:55:11     SIMÓN/GUNNAR  Job #:  [id number]     Doc#:  [id number]

## 2025-06-22 NOTE — CONSULTS
Palliative Care Department  [phone]  Palliative Care Initial Consult  Provider Stephanie Menard, APRN - CNP     Li Rojas  [id number]  Hospital Day: 7  Date of Initial Consult: 6/21/25  Referring Provider: Nicolás Duncan MD   Palliative Medicine was consulted for assistance with: Goals of Care    HPI:   Li Rojas is a 55 y.o. with a medical history of Lupus, MS, non-compliance, SVT, chronic suboxone use, who was admitted on 6/16/2025 from nursing facility with a CHIEF COMPLAINT of AMS.  Patient was sent from nursing facility for AMS, but was found to alert and oriented in ER. She was however admitted after workup with MARCO and BLE cellulitis. Patient also c/o extensive back pain and refusing CT scan abdomen/US on multiple attempts d/t pain. She is on suboxone.  Palliative medicine consulted for goals of care.    ASSESSMENT/PLAN:     Pertinent Hospital Diagnoses     MARCO  BLE Cellulitis  Hx MS  Hx SLE  Chronic suboxone use for opioid dependence      Palliative Care Encounter / Counseling Regarding Goals of Care  Please see detailed goals of care discussion as below  At this time, Li Rojas, Does Not have capacity for medical decision-making.  Capacity is time limited and situation/question specific  During encounter Karol was surrogate medical decision-maker  Outcome of goals of care meeting:   Continue current medical management, patient now agreeable to proceed with imaging studies tomorrow  Continue full code  Will assist with HCPOA paperwork prior to discharge  Code status Full Code  Advanced Directives: no POA or living will in epic  Surrogate/Legal NOK:  Karol Massey ([facility] [place] [phone]        Spiritual assessment: no spiritual distress identified  Bereavement and grief: to be determined  Referrals to: none today  SUBJECTIVE:     Current medical issues leading to Palliative Medicine involvement include   Active Hospital Problems    Diagnosis Date Noted    MARCO (acute kidney

## 2025-06-22 NOTE — PLAN OF CARE
Problem: Safety - Adult  Goal: Free from fall injury  6/22/2025 1618 by Karol Jordan RN  Outcome: Progressing  6/22/2025 0301 by Ervin Newberry RN  Outcome: Progressing  6/22/2025 0300 by Ervin Newberry RN  Outcome: Progressing     Problem: Chronic Conditions and Co-morbidities  Goal: Patient's chronic conditions and co-morbidity symptoms are monitored and maintained or improved  6/22/2025 1618 by Karol Jordan RN  Outcome: Progressing  6/22/2025 0301 by Ervin Newberry RN  Outcome: Progressing  6/22/2025 0300 by Ervin Newberry RN  Outcome: Progressing     Problem: Discharge Planning  Goal: Discharge to home or other facility with appropriate resources  Outcome: Progressing     Problem: Pain  Goal: Verbalizes/displays adequate comfort level or baseline comfort level  6/22/2025 1618 by Karol Jordan RN  Outcome: Progressing  6/22/2025 0301 by Ervin Newberry RN  Outcome: Progressing  6/22/2025 0300 by Ervin Newberry RN  Outcome: Progressing     Problem: Skin/Tissue Integrity  Goal: Skin integrity remains intact  Description: 1.  Monitor for areas of redness and/or skin breakdown  2.  Assess vascular access sites hourly  3.  Every 4-6 hours minimum:  Change oxygen saturation probe site  4.  Every 4-6 hours:  If on nasal continuous positive airway pressure, respiratory therapy assess nares and determine need for appliance change or resting period  6/22/2025 1618 by Karol Jordan RN  Outcome: Progressing  6/22/2025 0301 by Ervin Newberry RN  Outcome: Progressing     Problem: ABCDS Injury Assessment  Goal: Absence of physical injury  Outcome: Progressing     Problem: Confusion  Goal: Confusion, delirium, dementia, or psychosis is improved or at baseline  Description: INTERVENTIONS:  1. Assess for possible contributors to thought disturbance, including medications, impaired vision or hearing, underlying metabolic abnormalities, dehydration, psychiatric diagnoses, and notify

## 2025-06-22 NOTE — PROGRESS NOTES
Patient AOx4. This nurse has explained the risks and benefits to patient with regards to position changes. Patient verbalizes understanding of those risks and benefits. Patient refuses to be turned.

## 2025-06-22 NOTE — PROGRESS NOTES
Patient AOx4. This nurse has explained the risks and benefits to patient with regards to position changes. Patient verbalizes understanding of those risks and benefits. Patient continues to refuse turns.

## 2025-06-22 NOTE — PROGRESS NOTES
Floor notiified nm bone scan would be done in AM. Nuclear medicine has to be ordered from [place].

## 2025-06-22 NOTE — PLAN OF CARE
Problem: Safety - Adult  Goal: Free from fall injury  6/22/2025 0301 by Ervin Newberry RN  Outcome: Progressing  6/22/2025 0300 by Ervin Newberry RN  Outcome: Progressing  6/21/2025 1602 by Karol Jordan RN  Outcome: Progressing  Flowsheets (Taken 6/21/2025 0800)  Free From Fall Injury: Instruct family/caregiver on patient safety     Problem: Chronic Conditions and Co-morbidities  Goal: Patient's chronic conditions and co-morbidity symptoms are monitored and maintained or improved  6/22/2025 0301 by Ervin Newberry RN  Outcome: Progressing  6/22/2025 0300 by Ervin Newberry RN  Outcome: Progressing  6/21/2025 1602 by Karol Jordan RN  Outcome: Progressing     Problem: Discharge Planning  Goal: Discharge to home or other facility with appropriate resources  6/21/2025 1602 by Karol Jordan RN  Outcome: Progressing     Problem: Pain  Goal: Verbalizes/displays adequate comfort level or baseline comfort level  6/22/2025 0301 by Ervin Newberry RN  Outcome: Progressing  6/22/2025 0300 by Ervin Newberry RN  Outcome: Progressing  6/21/2025 1602 by Karol Jordan RN  Outcome: Progressing     Problem: Skin/Tissue Integrity  Goal: Skin integrity remains intact  Description: 1.  Monitor for areas of redness and/or skin breakdown  2.  Assess vascular access sites hourly  3.  Every 4-6 hours minimum:  Change oxygen saturation probe site  4.  Every 4-6 hours:  If on nasal continuous positive airway pressure, respiratory therapy assess nares and determine need for appliance change or resting period  6/22/2025 0301 by Ervin Newberry RN  Outcome: Progressing  6/21/2025 1602 by Karol Jordan RN  Outcome: Progressing  Flowsheets (Taken 6/21/2025 0800)  Skin Integrity Remains Intact: Monitor for areas of redness and/or skin breakdown     Problem: ABCDS Injury Assessment  Goal: Absence of physical injury  6/21/2025 1602 by Karol Jordan RN  Outcome: Progressing  Flowsheets (Taken

## 2025-06-22 NOTE — PROGRESS NOTES
Internal Medicine Progress Note    Patient's name: Li Rojas  : 1970  Chief complaints (on day of admission): Altered Mental Status (Pt from [facility] and staff states pt is more altered than usual. Pt is A&Ox4 at time of triage. Pt states she is just tired because they woke her up at 4AM last night )  Admission date: 2025  Date of service: 2025   Room: [address]  Primary care physician: Melvin Cardoso MD  Reason for visit: Follow-up for AMS     Subjective  Li was seen and examined at bedside       Mom at bedside   Nurse at bedside   Patient in bed   Patient now agreeable to scans after a lengthy discussion and emotional support were given   Agreeable now to hold suboxone and trial opiate therapy in an attempt to control her pain to lay flat for the scans   Orders are placed   Plan for scans of the spine and abdomen pelvis tomorrow - give dilaudid prior to going down  she will need to go on the monitor       Patient tells me she just wants to go home   She is having a lot of back pain   She is tearful   Spoke with mom on phone she wants her to have the CT scan of her abdomen however I explained I cannot force her to do it and she has too much back pain to lay flat   She will talk with her today and see if she can convince her       Calm in bed   DC pending precert       On O2   Start nebs for her   Monitor breathing   Still does not want a scan       Refusing to do the CT abdomen pelvis   We have had this issue in the hospital before   Identified barriers to patient not willing have scan   She is worried about pain   Offered solution of holding suboxone and then giving IV pain medication to get scan done   She is absolutely unwilling to consider this due to her concern of withdraw and her concern of not getting her suboxone continued as an outpatient   Discussed this at great length and offered to come up with other options for patient   Warned her  glucagon (rDNA), dextrose    Objective  Most Recent Recorded Vitals  /86   Pulse 91   Temp 98.2 °F (36.8 °C) (Axillary)   Resp 28   Ht 1.72 m (5' 7.72\")   Wt 94.2 kg (207 lb 11.2 oz)   LMP 05/04/2020   SpO2 97%   BMI 31.85 kg/m²   I/O last 3 completed shifts:  In: 1006.1 [I.V.:1006.1]  Out: 2000 [Urine:2000]  No intake/output data recorded.    Physical Exam:  General: AAO to person/place/time/purpose, NAD, no labored breathing  Eyes: conjunctivae/corneas clear, sclera non icteric  Skin: color/texture/turgor normal, no rashes or lesions  Lungs: CTAB, no retractions/use of accessory muscles, no vocal fremitus, no rhonchi, no crackle, no rales  Heart: regular rate, regular rhythm, no murmur  Abdomen: soft, NT, bowel sounds normal  Extremities: atraumatic, no edema  Neurologic: cranial nerves 2-12 grossly intact, no slurred speech    Most Recent Labs  Lab Results   Component Value Date    WBC 12.9 (H) 06/22/2025    HGB 12.5 06/22/2025    HCT 40.4 06/22/2025     06/22/2025     (H) 06/22/2025    K 3.2 (L) 06/22/2025     (H) 06/22/2025    CREATININE 0.5 06/22/2025    BUN 10 06/22/2025    CO2 26 06/22/2025    GLUCOSE 105 (H) 06/22/2025    ALT 63 (H) 06/19/2025    AST 50 (H) 06/19/2025    INR 0.9 05/28/2025    TSH 0.87 05/21/2025    LABA1C 8.7 (H) 06/17/2025       XR CHEST PORTABLE   Final Result   Addendum (preliminary) 1 of 1   ADDENDUM:   There is a stable appearing midline catheter projecting over the right    axilla         Final   1. Increased bilateral patchy ground-glass densities within the lungs   concerning for developing atypical pneumonia or viral airway disease.   2. Small left pleural effusion.            XR CHEST (2 VW)   Final Result   1. Left basilar atelectasis.   2. Minimal blunting of left costophrenic angle may represent a small pleural   effusion.         CT ABDOMEN PELVIS WO CONTRAST Additional Contrast? None    (Results Pending)   Vascular lower extremity arterial

## 2025-06-22 NOTE — PROGRESS NOTES
Department of Internal Medicine  Infectious Diseases  Progress  Note      C/C : leukocytosis , leg swelling       Pt is awake and alert  Denies fever or chills  Denies abdomen pain   Afebrile       Current Facility-Administered Medications   Medication Dose Route Frequency Provider Last Rate Last Admin    magnesium oxide (MAG-OX) tablet 400 mg  400 mg Oral BID Nicolás Duncan MD   400 mg at 06/22/25 0943    guaiFENesin tablet 400 mg  400 mg Oral q8h Nicolás Duncan MD   400 mg at 06/22/25 0943    oxyCODONE (ROXICODONE) immediate release tablet 5 mg  5 mg Oral Q4H PRN Nicolás Duncan MD        [START ON 6/23/2025] HYDROmorphone (DILAUDID) injection 0.5 mg  0.5 mg IntraVENous Once PRN Nicolás Duncan MD        traMADol (ULTRAM) tablet 100 mg  100 mg Oral Q6H PRN Nicolás Duncan MD        ipratropium 0.5 mg-albuterol 2.5 mg (DUONEB) nebulizer solution 1 Dose  1 Dose Inhalation Q4H PRN Nicolás Duncan MD   1 Dose at 06/22/25 1149    budesonide (PULMICORT) nebulizer suspension 500 mcg  0.5 mg Nebulization BID RT Nicolás Duncan MD   500 mcg at 06/22/25 0616    arformoterol tartrate (BROVANA) nebulizer solution 15 mcg  15 mcg Nebulization BID RT Nicolás Duncan MD   15 mcg at 06/22/25 0616    amoxicillin-clavulanate (AUGMENTIN) 875-125 MG per tablet 1 tablet  1 tablet Oral 2 times per day Chris Elliott MD   1 tablet at 06/22/25 0944    white petrolatum ointment   Topical BID PRN Nicolás Duncan MD   Given at 06/19/25 2126    [Held by provider] buprenorphine-naloxone (SUBOXONE) 8-2 MG SL film 1 Film  1 Film SubLINGual BID Nicolás Duncan MD   1 Film at 06/21/25 2115    miconazole (MICOTIN) 2 % powder   Topical BID Nicolás Duncan MD   Given at 06/22/25 0946    albuterol (ACCUNEB) nebulizer solution 0.63 mg  0.63 mg Nebulization 4x Daily PRN Myra Joyner APRN - CNP   0.63 mg at 06/18/25 1054    sodium chloride flush 0.9 % injection 10 mL  10 mL IntraVENous 2 times per day Nicolás Duncan MD   10 mL at 06/22/25 0988    sodium

## 2025-06-23 ENCOUNTER — APPOINTMENT (OUTPATIENT)
Dept: CT IMAGING | Age: 55
DRG: 469 | End: 2025-06-23
Payer: COMMERCIAL

## 2025-06-23 ENCOUNTER — APPOINTMENT (OUTPATIENT)
Dept: NUCLEAR MEDICINE | Age: 55
DRG: 469 | End: 2025-06-23
Payer: COMMERCIAL

## 2025-06-23 LAB
ANION GAP SERPL CALCULATED.3IONS-SCNC: 13 MMOL/L (ref 7–16)
BASOPHILS # BLD: 0.11 K/UL (ref 0–0.2)
BASOPHILS NFR BLD: 1 % (ref 0–2)
BUN SERPL-MCNC: 10 MG/DL (ref 6–20)
CALCIUM SERPL-MCNC: 10 MG/DL (ref 8.6–10)
CHLORIDE SERPL-SCNC: 109 MMOL/L (ref 98–107)
CO2 SERPL-SCNC: 29 MMOL/L (ref 22–29)
CREAT SERPL-MCNC: 0.5 MG/DL (ref 0.5–1)
EOSINOPHIL # BLD: 0.08 K/UL (ref 0.05–0.5)
EOSINOPHILS RELATIVE PERCENT: 1 % (ref 0–6)
ERYTHROCYTE [DISTWIDTH] IN BLOOD BY AUTOMATED COUNT: 15.7 % (ref 11.5–15)
GFR, ESTIMATED: >90 ML/MIN/1.73M2
GLUCOSE BLD-MCNC: 127 MG/DL (ref 74–99)
GLUCOSE BLD-MCNC: 174 MG/DL (ref 74–99)
GLUCOSE BLD-MCNC: 175 MG/DL (ref 74–99)
GLUCOSE BLD-MCNC: 183 MG/DL (ref 74–99)
GLUCOSE SERPL-MCNC: 165 MG/DL (ref 74–99)
HCT VFR BLD AUTO: 45.3 % (ref 34–48)
HGB BLD-MCNC: 14 G/DL (ref 11.5–15.5)
IMM GRANULOCYTES # BLD AUTO: 0.37 K/UL (ref 0–0.58)
IMM GRANULOCYTES NFR BLD: 2 % (ref 0–5)
LYMPHOCYTES NFR BLD: 1.42 K/UL (ref 1.5–4)
LYMPHOCYTES RELATIVE PERCENT: 9 % (ref 20–42)
MAGNESIUM SERPL-MCNC: 2 MG/DL (ref 1.6–2.6)
MCH RBC QN AUTO: 28.9 PG (ref 26–35)
MCHC RBC AUTO-ENTMCNC: 30.9 G/DL (ref 32–34.5)
MCV RBC AUTO: 93.4 FL (ref 80–99.9)
MONOCYTES NFR BLD: 0.7 K/UL (ref 0.1–0.95)
MONOCYTES NFR BLD: 4 % (ref 2–12)
NEUTROPHILS NFR BLD: 83 % (ref 43–80)
NEUTS SEG NFR BLD: 13.24 K/UL (ref 1.8–7.3)
PLATELET, FLUORESCENCE: 361 K/UL (ref 130–450)
PMV BLD AUTO: 9.3 FL (ref 7–12)
POTASSIUM SERPL-SCNC: 3.1 MMOL/L (ref 3.5–5.1)
RBC # BLD AUTO: 4.85 M/UL (ref 3.5–5.5)
SODIUM SERPL-SCNC: 151 MMOL/L (ref 136–145)
TSH SERPL DL<=0.05 MIU/L-ACNC: 1.09 UIU/ML (ref 0.27–4.2)
WBC OTHER # BLD: 15.9 K/UL (ref 4.5–11.5)

## 2025-06-23 PROCEDURE — 82962 GLUCOSE BLOOD TEST: CPT

## 2025-06-23 PROCEDURE — 84443 ASSAY THYROID STIM HORMONE: CPT

## 2025-06-23 PROCEDURE — 99254 IP/OBS CNSLTJ NEW/EST MOD 60: CPT | Performed by: INTERNAL MEDICINE

## 2025-06-23 PROCEDURE — 80048 BASIC METABOLIC PNL TOTAL CA: CPT

## 2025-06-23 PROCEDURE — 78306 BONE IMAGING WHOLE BODY: CPT | Performed by: NEUROLOGICAL SURGERY

## 2025-06-23 PROCEDURE — 36415 COLL VENOUS BLD VENIPUNCTURE: CPT

## 2025-06-23 PROCEDURE — 85025 COMPLETE CBC W/AUTO DIFF WBC: CPT

## 2025-06-23 PROCEDURE — 72131 CT LUMBAR SPINE W/O DYE: CPT

## 2025-06-23 PROCEDURE — A9503 TC99M MEDRONATE: HCPCS | Performed by: RADIOLOGY

## 2025-06-23 PROCEDURE — 6360000002 HC RX W HCPCS: Performed by: STUDENT IN AN ORGANIZED HEALTH CARE EDUCATION/TRAINING PROGRAM

## 2025-06-23 PROCEDURE — 2700000000 HC OXYGEN THERAPY PER DAY

## 2025-06-23 PROCEDURE — 2140000000 HC CCU INTERMEDIATE R&B

## 2025-06-23 PROCEDURE — 6370000000 HC RX 637 (ALT 250 FOR IP): Performed by: STUDENT IN AN ORGANIZED HEALTH CARE EDUCATION/TRAINING PROGRAM

## 2025-06-23 PROCEDURE — 3430000000 HC RX DIAGNOSTIC RADIOPHARMACEUTICAL: Performed by: RADIOLOGY

## 2025-06-23 PROCEDURE — 2500000003 HC RX 250 WO HCPCS: Performed by: STUDENT IN AN ORGANIZED HEALTH CARE EDUCATION/TRAINING PROGRAM

## 2025-06-23 PROCEDURE — 72128 CT CHEST SPINE W/O DYE: CPT

## 2025-06-23 PROCEDURE — 74176 CT ABD & PELVIS W/O CONTRAST: CPT

## 2025-06-23 PROCEDURE — 94640 AIRWAY INHALATION TREATMENT: CPT

## 2025-06-23 PROCEDURE — 83735 ASSAY OF MAGNESIUM: CPT

## 2025-06-23 PROCEDURE — 6370000000 HC RX 637 (ALT 250 FOR IP): Performed by: INTERNAL MEDICINE

## 2025-06-23 PROCEDURE — 72125 CT NECK SPINE W/O DYE: CPT

## 2025-06-23 RX ORDER — POTASSIUM CHLORIDE 1500 MG/1
40 TABLET, EXTENDED RELEASE ORAL ONCE
Status: DISCONTINUED | OUTPATIENT
Start: 2025-06-23 | End: 2025-06-23

## 2025-06-23 RX ORDER — TC 99M MEDRONATE 20 MG/10ML
25 INJECTION, POWDER, LYOPHILIZED, FOR SOLUTION INTRAVENOUS
Status: COMPLETED | OUTPATIENT
Start: 2025-06-23 | End: 2025-06-23

## 2025-06-23 RX ORDER — POTASSIUM CHLORIDE 7.45 MG/ML
10 INJECTION INTRAVENOUS
Status: DISCONTINUED | OUTPATIENT
Start: 2025-06-23 | End: 2025-06-23

## 2025-06-23 RX ORDER — POTASSIUM CHLORIDE 7.45 MG/ML
10 INJECTION INTRAVENOUS
Status: COMPLETED | OUTPATIENT
Start: 2025-06-23 | End: 2025-06-23

## 2025-06-23 RX ADMIN — AMOXICILLIN AND CLAVULANATE POTASSIUM 1 TABLET: 875; 125 TABLET, FILM COATED ORAL at 21:11

## 2025-06-23 RX ADMIN — IPRATROPIUM BROMIDE AND ALBUTEROL SULFATE 1 DOSE: .5; 2.5 SOLUTION RESPIRATORY (INHALATION) at 10:31

## 2025-06-23 RX ADMIN — PETROLATUM: 420 OINTMENT TOPICAL at 21:14

## 2025-06-23 RX ADMIN — MICONAZOLE NITRATE: 20.6 POWDER TOPICAL at 21:14

## 2025-06-23 RX ADMIN — ENOXAPARIN SODIUM 40 MG: 100 INJECTION SUBCUTANEOUS at 09:24

## 2025-06-23 RX ADMIN — GUAIFENESIN 400 MG: 400 TABLET ORAL at 17:14

## 2025-06-23 RX ADMIN — SODIUM CHLORIDE, PRESERVATIVE FREE 10 ML: 5 INJECTION INTRAVENOUS at 12:43

## 2025-06-23 RX ADMIN — HYDROMORPHONE HYDROCHLORIDE 0.5 MG: 1 INJECTION, SOLUTION INTRAMUSCULAR; INTRAVENOUS; SUBCUTANEOUS at 10:48

## 2025-06-23 RX ADMIN — SODIUM CHLORIDE, PRESERVATIVE FREE 10 ML: 5 INJECTION INTRAVENOUS at 21:11

## 2025-06-23 RX ADMIN — TIZANIDINE 4 MG: 4 TABLET ORAL at 23:50

## 2025-06-23 RX ADMIN — BISACODYL 5 MG: 5 TABLET, COATED ORAL at 21:11

## 2025-06-23 RX ADMIN — FUROSEMIDE 40 MG: 40 TABLET ORAL at 12:42

## 2025-06-23 RX ADMIN — POTASSIUM CHLORIDE 10 MEQ: 7.46 INJECTION, SOLUTION INTRAVENOUS at 16:50

## 2025-06-23 RX ADMIN — TIZANIDINE 4 MG: 4 TABLET ORAL at 00:29

## 2025-06-23 RX ADMIN — LORAZEPAM 0.5 MG: 0.5 TABLET ORAL at 09:25

## 2025-06-23 RX ADMIN — TC 99M MEDRONATE 25 MILLICURIE: 20 INJECTION, POWDER, LYOPHILIZED, FOR SOLUTION INTRAVENOUS at 08:40

## 2025-06-23 RX ADMIN — OXYCODONE 5 MG: 5 TABLET ORAL at 10:21

## 2025-06-23 RX ADMIN — OXYCODONE 5 MG: 5 TABLET ORAL at 15:30

## 2025-06-23 RX ADMIN — Medication 400 MG: at 09:25

## 2025-06-23 RX ADMIN — ATENOLOL 25 MG: 25 TABLET ORAL at 17:13

## 2025-06-23 RX ADMIN — GUAIFENESIN 400 MG: 400 TABLET ORAL at 09:25

## 2025-06-23 RX ADMIN — POLYETHYLENE GLYCOL 3350 17 G: 17 POWDER, FOR SOLUTION ORAL at 09:27

## 2025-06-23 RX ADMIN — POTASSIUM CHLORIDE 10 MEQ: 7.46 INJECTION, SOLUTION INTRAVENOUS at 13:48

## 2025-06-23 RX ADMIN — IPRATROPIUM BROMIDE AND ALBUTEROL SULFATE 1 DOSE: .5; 2.5 SOLUTION RESPIRATORY (INHALATION) at 04:53

## 2025-06-23 RX ADMIN — IPRATROPIUM BROMIDE AND ALBUTEROL SULFATE 1 DOSE: .5; 2.5 SOLUTION RESPIRATORY (INHALATION) at 17:40

## 2025-06-23 RX ADMIN — GUAIFENESIN 400 MG: 400 TABLET ORAL at 23:50

## 2025-06-23 RX ADMIN — POTASSIUM CHLORIDE 10 MEQ: 7.46 INJECTION, SOLUTION INTRAVENOUS at 12:50

## 2025-06-23 RX ADMIN — POTASSIUM CHLORIDE 10 MEQ: 7.46 INJECTION, SOLUTION INTRAVENOUS at 15:55

## 2025-06-23 RX ADMIN — INSULIN GLARGINE 15 UNITS: 100 INJECTION, SOLUTION SUBCUTANEOUS at 21:13

## 2025-06-23 RX ADMIN — TIZANIDINE 4 MG: 4 TABLET ORAL at 12:42

## 2025-06-23 RX ADMIN — GUAIFENESIN 400 MG: 400 TABLET ORAL at 00:28

## 2025-06-23 RX ADMIN — TIZANIDINE 4 MG: 4 TABLET ORAL at 05:50

## 2025-06-23 RX ADMIN — TIZANIDINE 4 MG: 4 TABLET ORAL at 18:19

## 2025-06-23 RX ADMIN — POTASSIUM CHLORIDE 10 MEQ: 7.46 INJECTION, SOLUTION INTRAVENOUS at 14:55

## 2025-06-23 RX ADMIN — PREDNISONE 15 MG: 10 TABLET ORAL at 09:24

## 2025-06-23 RX ADMIN — Medication 400 MG: at 21:11

## 2025-06-23 RX ADMIN — MICONAZOLE NITRATE: 20.6 POWDER TOPICAL at 12:44

## 2025-06-23 RX ADMIN — TRAMADOL HYDROCHLORIDE 100 MG: 50 TABLET, COATED ORAL at 12:42

## 2025-06-23 RX ADMIN — POTASSIUM CHLORIDE 10 MEQ: 7.46 INJECTION, SOLUTION INTRAVENOUS at 17:50

## 2025-06-23 RX ADMIN — PANTOPRAZOLE SODIUM 40 MG: 40 TABLET, DELAYED RELEASE ORAL at 05:50

## 2025-06-23 RX ADMIN — DILTIAZEM HYDROCHLORIDE 240 MG: 240 CAPSULE, COATED, EXTENDED RELEASE ORAL at 09:24

## 2025-06-23 RX ADMIN — ATENOLOL 25 MG: 25 TABLET ORAL at 05:50

## 2025-06-23 RX ADMIN — AMOXICILLIN AND CLAVULANATE POTASSIUM 1 TABLET: 875; 125 TABLET, FILM COATED ORAL at 09:24

## 2025-06-23 ASSESSMENT — PAIN DESCRIPTION - LOCATION
LOCATION: BACK

## 2025-06-23 ASSESSMENT — PAIN DESCRIPTION - DESCRIPTORS
DESCRIPTORS: ACHING;DISCOMFORT;TENDER
DESCRIPTORS: ACHING;SORE;TENDER
DESCRIPTORS: ACHING;SHARP;SORE
DESCRIPTORS: ACHING;SHARP;SORE

## 2025-06-23 ASSESSMENT — PAIN SCALES - GENERAL
PAINLEVEL_OUTOF10: 7
PAINLEVEL_OUTOF10: 7
PAINLEVEL_OUTOF10: 5
PAINLEVEL_OUTOF10: 6
PAINLEVEL_OUTOF10: 3
PAINLEVEL_OUTOF10: 5
PAINLEVEL_OUTOF10: 5

## 2025-06-23 ASSESSMENT — PAIN DESCRIPTION - ORIENTATION
ORIENTATION: MID
ORIENTATION: MID
ORIENTATION: RIGHT
ORIENTATION: LOWER

## 2025-06-23 NOTE — PROGRESS NOTES
Neurosurgery Attending    CT scan show subacute compression fracture of T7, T10, T11 and T12.  Unclear if patient can tolerate kyphoplasty due to respiratory status.  Will recommend TLSO brace    Miller Doe MD

## 2025-06-23 NOTE — PROGRESS NOTES
Department of Podiatry  Progress Note    SUBJECTIVE:  Li Rojas is seen at bedside for B/L lower leg cellulitis and wounds. No acute events overnight. Patient denies any N/V/D/F/C/SOB/CP, however patient appears short of breath at encounter. No other pedal complaints at this time.     OBJECTIVE:    Scheduled Meds:   magnesium oxide  400 mg Oral BID    guaiFENesin  400 mg Oral q8h    budesonide  0.5 mg Nebulization BID RT    arformoterol tartrate  15 mcg Nebulization BID RT    amoxicillin-clavulanate  1 tablet Oral 2 times per day    [Held by provider] buprenorphine-naloxone  1 Film SubLINGual BID    miconazole   Topical BID    sodium chloride flush  10 mL IntraVENous 2 times per day    enoxaparin  40 mg SubCUTAneous Daily    atenolol  25 mg Oral BID AC    bisacodyl  5 mg Oral BID    dilTIAZem  240 mg Oral Daily    furosemide  40 mg Oral Lunch    insulin glargine  15 Units SubCUTAneous Nightly    LORazepam  0.5 mg Oral BID    pantoprazole  40 mg Oral QAM AC    polyethylene glycol  17 g Oral Daily    predniSONE  15 mg Oral Daily    tiZANidine  4 mg Oral Q6H    insulin lispro  0-8 Units SubCUTAneous 4x Daily AC & HS     Continuous Infusions:   sodium chloride      dextrose       PRN Meds:.oxyCODONE, HYDROmorphone, traMADol, ipratropium 0.5 mg-albuterol 2.5 mg, white petrolatum, albuterol, sodium chloride flush, sodium chloride, potassium chloride **OR** potassium alternative oral replacement **OR** potassium chloride, ondansetron **OR** ondansetron, senna, acetaminophen **OR** acetaminophen, glucose, dextrose bolus **OR** dextrose bolus, glucagon (rDNA), dextrose    Allergies   Allergen Reactions    Lyrica [Pregabalin] Anaphylaxis    Other Shortness Of Breath    Sulfa Antibiotics Anaphylaxis     Hallucinations and rash    Darvocet [Propoxyphene N-Acetaminophen]     Ultram [Tramadol Hcl]     Metoprolol Rash       /80   Pulse 96   Temp 96.8 °F (36 °C) (Oral)   Resp 19   Ht 1.72 m (5' 7.72\")   Wt 97.3

## 2025-06-23 NOTE — CARE COORDINATION
CM Update: Discharge plan is [facility]. Insurance authorization good till 06/30 @ 2359. Destination and CARLA updated. Face Sheet, Ambulance Form, and Envelope in soft chart. Patient needs CT abdo, and Bone scan. New pulmonology consult noted. CM/SW to follow. Sagar Rodríguez [phone]    1035- Met with Karol (mother). She is dissatisfied with the quality of care at [facility]. Provided her with a list of Skilled Nursing Facilities and explained that we can make referrals. That it is possible we won't be able to get a transfer prior to discharge and if that happens she will have to do an interagency transfer after discharge. CM/SW to follow. Sagar Rodríguez [phone]

## 2025-06-23 NOTE — PROGRESS NOTES
Patient complaining of shortness of breath, saturating at 88% on 3L, bumped her oxygen up to 5L and called respiratory for PRN breathing treatment.

## 2025-06-23 NOTE — PROGRESS NOTES
Department of Internal Medicine  Infectious Diseases  Progress  Note      C/C : leukocytosis , leg swelling       Pt is awake and alert  Denies fever or chills  Denies abdomen pain   Afebrile       Current Facility-Administered Medications   Medication Dose Route Frequency Provider Last Rate Last Admin    potassium chloride 10 mEq/100 mL IVPB (Peripheral Line)  10 mEq IntraVENous Q1H Robles Wallace  mL/hr at 06/23/25 1555 10 mEq at 06/23/25 1555    [START ON 6/24/2025] ALTEplase (CATHFLO) injection 1 mg  1 mg IntraCATHeter Once Chrissie Jenkins RN        [START ON 6/24/2025] ALTEplase (CATHFLO) injection 1 mg  1 mg IntraCATHeter Once Chrissie Jenkins RN        magnesium oxide (MAG-OX) tablet 400 mg  400 mg Oral BID Nicolás Duncan MD   400 mg at 06/23/25 0925    guaiFENesin tablet 400 mg  400 mg Oral q8h Nicolás Duncan MD   400 mg at 06/23/25 0925    oxyCODONE (ROXICODONE) immediate release tablet 5 mg  5 mg Oral Q4H PRN Nicolás Duncan MD   5 mg at 06/23/25 1530    traMADol (ULTRAM) tablet 100 mg  100 mg Oral Q6H PRN Nicolás Duncan MD   100 mg at 06/23/25 1242    ipratropium 0.5 mg-albuterol 2.5 mg (DUONEB) nebulizer solution 1 Dose  1 Dose Inhalation Q4H PRN Nicolás Duncan MD   1 Dose at 06/23/25 1031    budesonide (PULMICORT) nebulizer suspension 500 mcg  0.5 mg Nebulization BID RT Nicolás Duncan MD   500 mcg at 06/22/25 1950    arformoterol tartrate (BROVANA) nebulizer solution 15 mcg  15 mcg Nebulization BID RT Nicolás Duncan MD   15 mcg at 06/22/25 1950    amoxicillin-clavulanate (AUGMENTIN) 875-125 MG per tablet 1 tablet  1 tablet Oral 2 times per day Chris Elliott MD   1 tablet at 06/23/25 0924    white petrolatum ointment   Topical BID PRN Nicolás Duncan MD   Given at 06/19/25 2126    [Held by provider] buprenorphine-naloxone (SUBOXONE) 8-2 MG SL film 1 Film  1 Film SubLINGual BID Nicolás Duncan MD   1 Film at 06/21/25 2115    miconazole (MICOTIN) 2 % powder   Topical BID Nicolás Duncan MD    Given at 06/23/25 1244    albuterol (ACCUNEB) nebulizer solution 0.63 mg  0.63 mg Nebulization 4x Daily PRN Myra Joyner, LOCO - CNP   0.63 mg at 06/18/25 1054    sodium chloride flush 0.9 % injection 10 mL  10 mL IntraVENous 2 times per day Nicolás Duncan MD   10 mL at 06/23/25 1243    sodium chloride flush 0.9 % injection 10 mL  10 mL IntraVENous PRN Nicolás Duncan MD        0.9 % sodium chloride infusion   IntraVENous PRN Nicolás Duncan MD        potassium chloride (KLOR-CON M) extended release tablet 40 mEq  40 mEq Oral PRN Nicolás Duncan MD        Or    potassium bicarb-citric acid (EFFER-K) effervescent tablet 40 mEq  40 mEq Oral PRN Nicolás Duncan MD   40 mEq at 06/21/25 2138    Or    potassium chloride 10 mEq/100 mL IVPB (Peripheral Line)  10 mEq IntraVENous PRN Nicolás Duncan MD        enoxaparin (LOVENOX) injection 40 mg  40 mg SubCUTAneous Daily Nicolás Duncan MD   40 mg at 06/23/25 0924    ondansetron (ZOFRAN-ODT) disintegrating tablet 4 mg  4 mg Oral Q8H PRN Nicolás Duncan MD        Or    ondansetron (ZOFRAN) injection 4 mg  4 mg IntraVENous Q6H PRN Nicolás Duncan MD        senna (SENOKOT) tablet 8.6 mg  1 tablet Oral Daily PRN Nicolás Duncan MD   8.6 mg at 06/19/25 0943    acetaminophen (TYLENOL) tablet 650 mg  650 mg Oral Q6H PRN Nicolás Duncan MD   650 mg at 06/19/25 2010    Or    acetaminophen (TYLENOL) suppository 650 mg  650 mg Rectal Q6H PRN Nicolás Duncan MD        atenolol (TENORMIN) tablet 25 mg  25 mg Oral BID AC Nicolás Duncan MD   25 mg at 06/23/25 0550    bisacodyl (DULCOLAX) EC tablet 5 mg  5 mg Oral BID Nicolás Duncan MD   5 mg at 06/22/25 2137    dilTIAZem (CARDIZEM CD) extended release capsule 240 mg  240 mg Oral Daily Nicolás Duncan MD   240 mg at 06/23/25 0924    furosemide (LASIX) tablet 40 mg  40 mg Oral Lunch Nicolás Duncan MD   40 mg at 06/23/25 1242    insulin glargine (LANTUS) injection vial 15 Units  15 Units SubCUTAneous Nightly Nicolás Duncan MD   15 Units at 06/22/25

## 2025-06-23 NOTE — CONSULTS
Pulmonary Critical Care Medicine      PULMONARY CRITICAL CARE CONSULTATION NOTE.    06/23/25    CONSULTING  PHYSICIAN: Robles Wallace MD     Assessment / Recommendations-     Acute respiratory insufficiency and impending failure.  Differentials include Ongoing aspiration pneumonia/pneumonitis vs viral lower respiratory tract infection versus community-acquired pneumonia vs drug-induced/toxin mediated pneumonitis    Multiple compression deformities of thoracic and lumbar spine      -Check respiratory viral panel  - Send sputum cultures  - Continue Pulmicort, Brovana and as needed DuoNebs  - Continue Augmentin for total duration of 7 days  - Continue diuresis as tolerated  - Neurosurgery following  -Continue PT OT as permitted by neurosurgery  - Continue with supplemental oxygen as tolerated, patient is currently on 5 L nasal cannula  - Palliative care following  - Check urine drug screen      History of Present Illness    Li Rojas  is a 55 y.o. current everyday smoker  female with about 5-10-pack-year history of smoking , chronic Suboxone use , who was initially brought into the hospital with altered mental status from her facility.  During their evaluation in the emergency room patient was alert and oriented x 4  She was found to have bilateral lower extremity cellulitis.  She was complaining of extensive back pain, subsequently she received CT scans of the spine which revealed multiple compression deformities of thoracic and lumbar spine.  Neurosurgery following.  During my evaluation patient was drowsy and not following commands but mother states that she was given some medications to calm her down during the CT scans recently.      Baseline Exercise tolerance/MMRC score( Bold )     MMRC Dyspnea Scale-history obtained from mother, hence unable to evaluate this information  Grade Description of Breathlessness   0 I only get breathless with strenuous exercise.   1 I get short of breath when  06/21/25 2115    miconazole (MICOTIN) 2 % powder   Topical BID Nicolás Duncan MD   Given at 06/23/25 1244    albuterol (ACCUNEB) nebulizer solution 0.63 mg  0.63 mg Nebulization 4x Daily PRN Myra Joyner, LOCO - CNP   0.63 mg at 06/18/25 1054    sodium chloride flush 0.9 % injection 10 mL  10 mL IntraVENous 2 times per day Nicolás Duncan MD   10 mL at 06/23/25 1243    sodium chloride flush 0.9 % injection 10 mL  10 mL IntraVENous PRN Nicolás Duncan MD        0.9 % sodium chloride infusion   IntraVENous PRN Nicolás Duncan MD        potassium chloride (KLOR-CON M) extended release tablet 40 mEq  40 mEq Oral PRN Nicolás Duncan MD        Or    potassium bicarb-citric acid (EFFER-K) effervescent tablet 40 mEq  40 mEq Oral PRN Nicolás Duncan MD   40 mEq at 06/21/25 2138    Or    potassium chloride 10 mEq/100 mL IVPB (Peripheral Line)  10 mEq IntraVENous PRN Nicolás Duncan MD        enoxaparin (LOVENOX) injection 40 mg  40 mg SubCUTAneous Daily Nicolás Duncan MD   40 mg at 06/23/25 0924    ondansetron (ZOFRAN-ODT) disintegrating tablet 4 mg  4 mg Oral Q8H PRN Nicolás Duncan MD        Or    ondansetron (ZOFRAN) injection 4 mg  4 mg IntraVENous Q6H PRN Nicolás Duncan MD        senna (SENOKOT) tablet 8.6 mg  1 tablet Oral Daily PRN Nicolás Duncan MD   8.6 mg at 06/19/25 0943    acetaminophen (TYLENOL) tablet 650 mg  650 mg Oral Q6H PRN Nicolás Duncan MD   650 mg at 06/19/25 2010    Or    acetaminophen (TYLENOL) suppository 650 mg  650 mg Rectal Q6H PRN Nicolás Duncan MD        atenolol (TENORMIN) tablet 25 mg  25 mg Oral BID AC Nicolás Duncan MD   25 mg at 06/23/25 0550    bisacodyl (DULCOLAX) EC tablet 5 mg  5 mg Oral BID Nicolás Duncan MD   5 mg at 06/22/25 2137    dilTIAZem (CARDIZEM CD) extended release capsule 240 mg  240 mg Oral Daily Nicolás Duncan MD   240 mg at 06/23/25 0924    furosemide (LASIX) tablet 40 mg  40 mg Oral Lunch Nicolás Duncan MD   40 mg at 06/23/25 1242    insulin glargine (LANTUS) injection vial     298  --      BMP:    Recent Labs     06/21/25  0900 06/22/25  0729 06/22/25  1755 06/23/25  0918   * 148*  --  151*   K 3.3* 3.2* 3.55 3.1*   * 112*  --  109*   CO2 21* 26  --  29   BUN 11 10  --  10   CREATININE 0.5 0.5  --  0.5   GLUCOSE 130* 105*  --  165*   CALCIUM 10.0 9.9  --  10.0   MG 1.8 1.8  --  2.0       LACTATE:   Recent Labs     06/22/25  1734   LACTA 1.4     PROCALCITONIN:   Recent Labs     06/21/25  1300   PROCAL 0.11*       BLOOD GAS:   Recent Labs     06/22/25  1755   PH 7.415   PCO2 48.2*   PO2 72.9*   HCO3 30.2*   O2SAT 95.3     TSH:   Recent Labs     06/23/25  0918   TSH 1.09              Radiology:  CT-scan of the chest (personally reviewed), from 2024    chest X-ray  (personally reviewed)    I spent 61 minutes completing this encounter. Total time included the following:  Independently interviewing the patient (HPI, ROS, PMH, PSH, FMH, SH, allergies and medications).  Independently performing a medical appropriate examination  Ordering medications, tests and/or procedures  Formulating the assessment/plan and reviewing the rationale for the above recommendations  Reviewing available records, results of all previously ordered testing/procedures and current problem list  Counseling/educating the patient  Coordinating care with other healthcare professionals  Documenting clinical information in the patient's electronic health records      Valeriano Whitlock MD

## 2025-06-23 NOTE — PROGRESS NOTES
Occupational Therapy  OT BEDSIDE TREATMENT NOTE   [facility]  [address], [place]      Date:2025  Patient Name: Li Rojas  MRN: [id number]  : 1970  Room: [address] [place] [address]-A       Attempted OT session this date:    [] unavailable due to other medical staff currently with pt   [] on hold per nursing staff   [] on hold per nursing staff secondary to lab / radiology results    [] declined treatment  this date due to ____.  Benefits of participation in therapy reviewed with pt.    [x] off unit for imaging, will re attempt   [] Other:      Continue with current OT P.O.C      Della YODER/L [id number]

## 2025-06-23 NOTE — PROGRESS NOTES
Internal Medicine Progress Note    Patient's name: Li Rojas  : 1970  Chief complaints (on day of admission): Altered Mental Status (Pt from continuing healthcare and staff states pt is more altered than usual. Pt is A&Ox4 at time of triage. Pt states she is just tired because they woke her up at 4AM last night )  Admission date: 2025  Date of service: 2025   Room: [address]  Primary care physician: Melvin Cardoso MD  Reason for visit: Follow-up for AMS     Subjective  Li was seen and examined at bedside       Patient seen at bedside before imaging  Patient agreeable for imaging, but states that she must have her mom at bedside or she is not going. Patient states that her mom is on the way  Aside from this no other concerns  Awaiting imaging      Mom at bedside   Nurse at bedside   Patient in bed   Patient now agreeable to scans after a lengthy discussion and emotional support were given   Agreeable now to hold suboxone and trial opiate therapy in an attempt to control her pain to lay flat for the scans   Orders are placed   Plan for scans of the spine and abdomen pelvis tomorrow - give dilaudid prior to going down  she will need to go on the monitor       Patient tells me she just wants to go home   She is having a lot of back pain   She is tearful   Spoke with mom on phone she wants her to have the CT scan of her abdomen however I explained I cannot force her to do it and she has too much back pain to lay flat   She will talk with her today and see if she can convince her       Calm in bed   DC pending precert       On O2   Start nebs for her   Monitor breathing   Still does not want a scan       Refusing to do the CT abdomen pelvis   We have had this issue in the hospital before   Identified barriers to patient not willing have scan   She is worried about pain   Offered solution of holding suboxone and then giving IV pain medication to get scan done  Result   1. Left basilar atelectasis.   2. Minimal blunting of left costophrenic angle may represent a small pleural   effusion.         CT ABDOMEN PELVIS WO CONTRAST Additional Contrast? None    (Results Pending)   Vascular lower extremity arterial segmental pressures w PPG    (Results Pending)   Vascular duplex lower extremity venous bilateral    (Results Pending)   NM BONE SCAN WHOLE BODY    (Results Pending)   CT CERVICAL SPINE WO CONTRAST    (Results Pending)   CT THORACIC SPINE WO CONTRAST    (Results Pending)   CT LUMBAR SPINE WO CONTRAST    (Results Pending)         Assessment   Active Hospital Problems    Diagnosis     Rheumatoid arthritis ([facility]) [M06.9]      Priority: Medium    Palliative care encounter [Z51.5]     Goals of care, counseling/discussion [Z71.89]     MARCO (acute kidney injury) [N17.9]     Opioid use disorder, moderate, in sustained remission ([facility]) [F11.21]     Type 2 diabetes mellitus without complication, without long-term current use of insulin ([facility]) [E11.9]     Systemic lupus erythematosus, unspecified ([facility]) [M32.9]     Hypertension [I10]     Bilateral lower leg cellulitis [L03.116, L03.115]     MS (multiple sclerosis) ([facility]) [G35]     SVT (supraventricular tachycardia) [I47.10]          Plan  MARCO, resolved   Baseline Cr 0.6  Presenting Cr 1.4 --> 0.9  Waiting for CT abdomen pelvis and repeat morning labs   Again, we have had this issue before of the patients refusal to lay flat for CT scans of the abdomen and pelvis and we are having it again now. She states this is due to chronic pain. We have attempted in the past to medicate her with IV narcotics without success as well.   Refusing scans talked to patient at length see above   Now agreeable see above     BL LE cellulitis   Recently DC from [facility] on [facility]   Podiatry and ID to see while in house     PT/OT  Home medications to be reconciled and confirmed prior to being ordered  Discharge plan: DC to facility soon     Electronically signed

## 2025-06-23 NOTE — PLAN OF CARE
Problem: Safety - Adult  Goal: Free from fall injury  6/23/2025 0155 by Ervin Newberry RN  Outcome: Progressing  6/22/2025 1618 by Karol Jordan RN  Outcome: Progressing     Problem: Chronic Conditions and Co-morbidities  Goal: Patient's chronic conditions and co-morbidity symptoms are monitored and maintained or improved  6/22/2025 1618 by Karol Jordan RN  Outcome: Progressing     Problem: Discharge Planning  Goal: Discharge to home or other facility with appropriate resources  6/23/2025 0155 by Ervin Newberry RN  Outcome: Progressing  6/22/2025 1618 by Karol Jordan RN  Outcome: Progressing     Problem: Pain  Goal: Verbalizes/displays adequate comfort level or baseline comfort level  6/23/2025 0155 by Ervin Newberry RN  Outcome: Progressing  6/22/2025 1618 by Karol Jordan RN  Outcome: Progressing     Problem: Skin/Tissue Integrity  Goal: Skin integrity remains intact  Description: 1.  Monitor for areas of redness and/or skin breakdown  2.  Assess vascular access sites hourly  3.  Every 4-6 hours minimum:  Change oxygen saturation probe site  4.  Every 4-6 hours:  If on nasal continuous positive airway pressure, respiratory therapy assess nares and determine need for appliance change or resting period  6/23/2025 0155 by Ervin Newberry RN  Outcome: Progressing  6/22/2025 1618 by Karol Jordan RN  Outcome: Progressing  Flowsheets (Taken 6/22/2025 0800)  Skin Integrity Remains Intact: Monitor for areas of redness and/or skin breakdown     Problem: Confusion  Goal: Confusion, delirium, dementia, or psychosis is improved or at baseline  Description: INTERVENTIONS:  1. Assess for possible contributors to thought disturbance, including medications, impaired vision or hearing, underlying metabolic abnormalities, dehydration, psychiatric diagnoses, and notify attending LIP  2. [place] high risk fall precautions, as indicated  3. Provide frequent short contacts to provide reality

## 2025-06-23 NOTE — PROGRESS NOTES
Per day shift nurse, NP for Dr. Duncan notified of patient's ABG.   Latest Reference Range & Units Most Recent   PCO2 35.0 - 45.0 mmHg 48.2 (H)  6/22/25 17:55   pO2 75.0 - 100.0 mmHg 72.9 (L)  6/22/25 17:55   HCO3 22.0 - 26.0 mmol/L 30.2 (H)  6/22/25 17:55   Base Excess -3.0 - 3.0 mmol/L 4.6 (H)  6/22/25 17:55   O2 Sat 92.0 - 98.5 % 95.3  6/22/25 17:55

## 2025-06-24 ENCOUNTER — APPOINTMENT (OUTPATIENT)
Dept: ULTRASOUND IMAGING | Age: 55
DRG: 469 | End: 2025-06-24
Payer: COMMERCIAL

## 2025-06-24 LAB
ANION GAP SERPL CALCULATED.3IONS-SCNC: 14 MMOL/L (ref 7–16)
BASOPHILS # BLD: 0.11 K/UL (ref 0–0.2)
BASOPHILS NFR BLD: 1 % (ref 0–2)
BUN SERPL-MCNC: 10 MG/DL (ref 6–20)
CALCIUM SERPL-MCNC: 9.5 MG/DL (ref 8.6–10)
CHLORIDE SERPL-SCNC: 106 MMOL/L (ref 98–107)
CO2 SERPL-SCNC: 29 MMOL/L (ref 22–29)
CREAT SERPL-MCNC: 0.5 MG/DL (ref 0.5–1)
EOSINOPHIL # BLD: 0.06 K/UL (ref 0.05–0.5)
EOSINOPHILS RELATIVE PERCENT: 0 % (ref 0–6)
ERYTHROCYTE [DISTWIDTH] IN BLOOD BY AUTOMATED COUNT: 15.7 % (ref 11.5–15)
GFR, ESTIMATED: >90 ML/MIN/1.73M2
GLUCOSE BLD-MCNC: 173 MG/DL (ref 74–99)
GLUCOSE BLD-MCNC: 176 MG/DL (ref 74–99)
GLUCOSE BLD-MCNC: 182 MG/DL (ref 74–99)
GLUCOSE BLD-MCNC: 183 MG/DL (ref 74–99)
GLUCOSE SERPL-MCNC: 120 MG/DL (ref 74–99)
HCT VFR BLD AUTO: 43.2 % (ref 34–48)
HGB BLD-MCNC: 13.4 G/DL (ref 11.5–15.5)
IMM GRANULOCYTES # BLD AUTO: 0.36 K/UL (ref 0–0.58)
IMM GRANULOCYTES NFR BLD: 3 % (ref 0–5)
LYMPHOCYTES NFR BLD: 1.75 K/UL (ref 1.5–4)
LYMPHOCYTES RELATIVE PERCENT: 13 % (ref 20–42)
MAGNESIUM SERPL-MCNC: 2.1 MG/DL (ref 1.6–2.6)
MCH RBC QN AUTO: 29.2 PG (ref 26–35)
MCHC RBC AUTO-ENTMCNC: 31 G/DL (ref 32–34.5)
MCV RBC AUTO: 94.1 FL (ref 80–99.9)
MONOCYTES NFR BLD: 0.59 K/UL (ref 0.1–0.95)
MONOCYTES NFR BLD: 4 % (ref 2–12)
NEUTROPHILS NFR BLD: 79 % (ref 43–80)
NEUTS SEG NFR BLD: 10.47 K/UL (ref 1.8–7.3)
PLATELET # BLD AUTO: 321 K/UL (ref 130–450)
PMV BLD AUTO: 9.7 FL (ref 7–12)
POTASSIUM SERPL-SCNC: 3.2 MMOL/L (ref 3.5–5.1)
RBC # BLD AUTO: 4.59 M/UL (ref 3.5–5.5)
SODIUM SERPL-SCNC: 148 MMOL/L (ref 136–145)
WBC OTHER # BLD: 13.3 K/UL (ref 4.5–11.5)

## 2025-06-24 PROCEDURE — 80048 BASIC METABOLIC PNL TOTAL CA: CPT

## 2025-06-24 PROCEDURE — 6370000000 HC RX 637 (ALT 250 FOR IP): Performed by: STUDENT IN AN ORGANIZED HEALTH CARE EDUCATION/TRAINING PROGRAM

## 2025-06-24 PROCEDURE — 6360000002 HC RX W HCPCS: Performed by: STUDENT IN AN ORGANIZED HEALTH CARE EDUCATION/TRAINING PROGRAM

## 2025-06-24 PROCEDURE — 2500000003 HC RX 250 WO HCPCS: Performed by: STUDENT IN AN ORGANIZED HEALTH CARE EDUCATION/TRAINING PROGRAM

## 2025-06-24 PROCEDURE — 82962 GLUCOSE BLOOD TEST: CPT

## 2025-06-24 PROCEDURE — 99233 SBSQ HOSP IP/OBS HIGH 50: CPT | Performed by: INTERNAL MEDICINE

## 2025-06-24 PROCEDURE — 93970 EXTREMITY STUDY: CPT

## 2025-06-24 PROCEDURE — 94640 AIRWAY INHALATION TREATMENT: CPT

## 2025-06-24 PROCEDURE — 85025 COMPLETE CBC W/AUTO DIFF WBC: CPT

## 2025-06-24 PROCEDURE — 2140000000 HC CCU INTERMEDIATE R&B

## 2025-06-24 PROCEDURE — 36415 COLL VENOUS BLD VENIPUNCTURE: CPT

## 2025-06-24 PROCEDURE — 99232 SBSQ HOSP IP/OBS MODERATE 35: CPT

## 2025-06-24 PROCEDURE — 83735 ASSAY OF MAGNESIUM: CPT

## 2025-06-24 PROCEDURE — 6370000000 HC RX 637 (ALT 250 FOR IP): Performed by: INTERNAL MEDICINE

## 2025-06-24 PROCEDURE — 2700000000 HC OXYGEN THERAPY PER DAY

## 2025-06-24 RX ORDER — HYDROXYZINE PAMOATE 50 MG/1
50 CAPSULE ORAL 3 TIMES DAILY PRN
Status: DISCONTINUED | OUTPATIENT
Start: 2025-06-24 | End: 2025-06-26 | Stop reason: HOSPADM

## 2025-06-24 RX ORDER — POTASSIUM CHLORIDE 7.45 MG/ML
10 INJECTION INTRAVENOUS
Status: DISCONTINUED | OUTPATIENT
Start: 2025-06-24 | End: 2025-06-24

## 2025-06-24 RX ADMIN — DILTIAZEM HYDROCHLORIDE 240 MG: 240 CAPSULE, COATED, EXTENDED RELEASE ORAL at 07:33

## 2025-06-24 RX ADMIN — BUDESONIDE 500 MCG: 0.5 SUSPENSION RESPIRATORY (INHALATION) at 19:57

## 2025-06-24 RX ADMIN — GUAIFENESIN 400 MG: 400 TABLET ORAL at 22:56

## 2025-06-24 RX ADMIN — AMOXICILLIN AND CLAVULANATE POTASSIUM 1 TABLET: 875; 125 TABLET, FILM COATED ORAL at 07:33

## 2025-06-24 RX ADMIN — INSULIN GLARGINE 15 UNITS: 100 INJECTION, SOLUTION SUBCUTANEOUS at 22:21

## 2025-06-24 RX ADMIN — INSULIN LISPRO 2 UNITS: 100 INJECTION, SOLUTION INTRAVENOUS; SUBCUTANEOUS at 22:22

## 2025-06-24 RX ADMIN — INSULIN LISPRO 2 UNITS: 100 INJECTION, SOLUTION INTRAVENOUS; SUBCUTANEOUS at 17:48

## 2025-06-24 RX ADMIN — ARFORMOTEROL TARTRATE 15 MCG: 15 SOLUTION RESPIRATORY (INHALATION) at 19:57

## 2025-06-24 RX ADMIN — TIZANIDINE 4 MG: 4 TABLET ORAL at 05:17

## 2025-06-24 RX ADMIN — ENOXAPARIN SODIUM 40 MG: 100 INJECTION SUBCUTANEOUS at 07:34

## 2025-06-24 RX ADMIN — MICONAZOLE NITRATE: 20 OINTMENT TOPICAL at 22:23

## 2025-06-24 RX ADMIN — AMOXICILLIN AND CLAVULANATE POTASSIUM 1 TABLET: 875; 125 TABLET, FILM COATED ORAL at 22:21

## 2025-06-24 RX ADMIN — IPRATROPIUM BROMIDE AND ALBUTEROL SULFATE 1 DOSE: .5; 2.5 SOLUTION RESPIRATORY (INHALATION) at 16:43

## 2025-06-24 RX ADMIN — IPRATROPIUM BROMIDE AND ALBUTEROL SULFATE 1 DOSE: .5; 2.5 SOLUTION RESPIRATORY (INHALATION) at 03:41

## 2025-06-24 RX ADMIN — GUAIFENESIN 400 MG: 400 TABLET ORAL at 07:34

## 2025-06-24 RX ADMIN — TRAMADOL HYDROCHLORIDE 100 MG: 50 TABLET, COATED ORAL at 15:41

## 2025-06-24 RX ADMIN — ATENOLOL 25 MG: 25 TABLET ORAL at 05:09

## 2025-06-24 RX ADMIN — MICONAZOLE NITRATE: 20.6 POWDER TOPICAL at 22:22

## 2025-06-24 RX ADMIN — GUAIFENESIN 400 MG: 400 TABLET ORAL at 15:41

## 2025-06-24 RX ADMIN — LORAZEPAM 0.5 MG: 0.5 TABLET ORAL at 07:33

## 2025-06-24 RX ADMIN — PREDNISONE 15 MG: 10 TABLET ORAL at 07:33

## 2025-06-24 RX ADMIN — SODIUM CHLORIDE, PRESERVATIVE FREE 10 ML: 5 INJECTION INTRAVENOUS at 22:21

## 2025-06-24 RX ADMIN — SODIUM CHLORIDE, PRESERVATIVE FREE 10 ML: 5 INJECTION INTRAVENOUS at 07:34

## 2025-06-24 RX ADMIN — IPRATROPIUM BROMIDE AND ALBUTEROL SULFATE 1 DOSE: .5; 2.5 SOLUTION RESPIRATORY (INHALATION) at 19:57

## 2025-06-24 RX ADMIN — OXYCODONE 5 MG: 5 TABLET ORAL at 17:48

## 2025-06-24 RX ADMIN — POTASSIUM BICARBONATE 40 MEQ: 782 TABLET, EFFERVESCENT ORAL at 07:33

## 2025-06-24 RX ADMIN — PETROLATUM: 420 OINTMENT TOPICAL at 22:23

## 2025-06-24 RX ADMIN — BUDESONIDE 500 MCG: 0.5 SUSPENSION RESPIRATORY (INHALATION) at 07:24

## 2025-06-24 RX ADMIN — TIZANIDINE 4 MG: 4 TABLET ORAL at 12:14

## 2025-06-24 RX ADMIN — ATENOLOL 25 MG: 25 TABLET ORAL at 17:48

## 2025-06-24 RX ADMIN — MICONAZOLE NITRATE: 20.6 POWDER TOPICAL at 07:37

## 2025-06-24 RX ADMIN — IPRATROPIUM BROMIDE AND ALBUTEROL SULFATE 1 DOSE: .5; 2.5 SOLUTION RESPIRATORY (INHALATION) at 09:29

## 2025-06-24 RX ADMIN — MICONAZOLE NITRATE: 20 OINTMENT TOPICAL at 11:43

## 2025-06-24 RX ADMIN — ARFORMOTEROL TARTRATE 15 MCG: 15 SOLUTION RESPIRATORY (INHALATION) at 07:24

## 2025-06-24 RX ADMIN — OXYCODONE 5 MG: 5 TABLET ORAL at 13:08

## 2025-06-24 RX ADMIN — HYDROXYZINE PAMOATE 50 MG: 50 CAPSULE ORAL at 17:48

## 2025-06-24 RX ADMIN — PANTOPRAZOLE SODIUM 40 MG: 40 TABLET, DELAYED RELEASE ORAL at 05:09

## 2025-06-24 RX ADMIN — LORAZEPAM 0.5 MG: 0.5 TABLET ORAL at 22:20

## 2025-06-24 ASSESSMENT — PAIN SCALES - GENERAL
PAINLEVEL_OUTOF10: 10
PAINLEVEL_OUTOF10: 2
PAINLEVEL_OUTOF10: 1
PAINLEVEL_OUTOF10: 0
PAINLEVEL_OUTOF10: 8
PAINLEVEL_OUTOF10: 0
PAINLEVEL_OUTOF10: 3
PAINLEVEL_OUTOF10: 6

## 2025-06-24 ASSESSMENT — PAIN - FUNCTIONAL ASSESSMENT
PAIN_FUNCTIONAL_ASSESSMENT: ACTIVITIES ARE NOT PREVENTED
PAIN_FUNCTIONAL_ASSESSMENT: PREVENTS OR INTERFERES WITH ALL ACTIVE AND SOME PASSIVE ACTIVITIES
PAIN_FUNCTIONAL_ASSESSMENT: PREVENTS OR INTERFERES SOME ACTIVE ACTIVITIES AND ADLS

## 2025-06-24 ASSESSMENT — PAIN DESCRIPTION - ONSET: ONSET: ON-GOING

## 2025-06-24 ASSESSMENT — PAIN DESCRIPTION - ORIENTATION
ORIENTATION: LOWER;MID
ORIENTATION: MID
ORIENTATION: LOWER;MID

## 2025-06-24 ASSESSMENT — PAIN DESCRIPTION - FREQUENCY: FREQUENCY: CONTINUOUS

## 2025-06-24 ASSESSMENT — PAIN DESCRIPTION - LOCATION
LOCATION: BACK

## 2025-06-24 ASSESSMENT — PAIN DESCRIPTION - PAIN TYPE: TYPE: CHRONIC PAIN

## 2025-06-24 ASSESSMENT — PAIN DESCRIPTION - DESCRIPTORS
DESCRIPTORS: ACHING;DISCOMFORT;SORE
DESCRIPTORS: STABBING;THROBBING;SHARP
DESCRIPTORS: ACHING;SORE;DISCOMFORT

## 2025-06-24 NOTE — PROGRESS NOTES
Department of Podiatry  Progress Note    SUBJECTIVE:  Li Rojas is seen at bedside for B/L lower leg cellulitis and wounds. No acute events overnight. Patient denies any N/V/D/F/C/SOB/CP.  Patient states no changes in pain or sensation of bilateral lower extremity.  No other pedal complaints at this time.     OBJECTIVE:    Scheduled Meds:   ALTEplase  1 mg IntraCATHeter Once    ALTEplase  1 mg IntraCATHeter Once    guaiFENesin  400 mg Oral q8h    budesonide  0.5 mg Nebulization BID RT    arformoterol tartrate  15 mcg Nebulization BID RT    amoxicillin-clavulanate  1 tablet Oral 2 times per day    [Held by provider] buprenorphine-naloxone  1 Film SubLINGual BID    miconazole   Topical BID    sodium chloride flush  10 mL IntraVENous 2 times per day    enoxaparin  40 mg SubCUTAneous Daily    atenolol  25 mg Oral BID AC    bisacodyl  5 mg Oral BID    dilTIAZem  240 mg Oral Daily    furosemide  40 mg Oral Lunch    insulin glargine  15 Units SubCUTAneous Nightly    LORazepam  0.5 mg Oral BID    pantoprazole  40 mg Oral QAM AC    polyethylene glycol  17 g Oral Daily    predniSONE  15 mg Oral Daily    tiZANidine  4 mg Oral Q6H    insulin lispro  0-8 Units SubCUTAneous 4x Daily AC & HS     Continuous Infusions:   sodium chloride      dextrose       PRN Meds:.oxyCODONE, traMADol, ipratropium 0.5 mg-albuterol 2.5 mg, white petrolatum, albuterol, sodium chloride flush, sodium chloride, potassium chloride **OR** potassium alternative oral replacement **OR** potassium chloride, ondansetron **OR** ondansetron, senna, acetaminophen **OR** acetaminophen, glucose, dextrose bolus **OR** dextrose bolus, glucagon (rDNA), dextrose    Allergies   Allergen Reactions    Lyrica [Pregabalin] Anaphylaxis    Other Shortness Of Breath    Sulfa Antibiotics Anaphylaxis     Hallucinations and rash    Darvocet [Propoxyphene N-Acetaminophen]     Ultram [Tramadol Hcl]     Metoprolol Rash       /72   Pulse 85   Temp 97.5 °F (36.4 °C)

## 2025-06-24 NOTE — PROGRESS NOTES
Internal Medicine Progress Note    Patient's name: Li Rojas  : 1970  Chief complaints (on day of admission): Altered Mental Status ([facility] from [facility] and staff states pt is more altered than usual. Pt is A&Ox4 at time of triage. Pt states she is just tired because they woke her up at 4AM last night )  Admission date: 2025  Date of service: 2025   Room: [address]  Primary care physician: Melvin Cardoso MD  Reason for visit: Follow-up for AMS     Subjective  Li was seen and examined at bedside       Patient went for imaging yesterday  Imaging showing concerns of subacute compression fractures of T7, T10, T11, T12  Patient also had concerns of pneumonia, started on augmentin  Mother at bedside, all questions addressed      Patient seen at bedside before imaging  Patient agreeable for imaging, but states that she must have her mom at bedside or she is not going. Patient states that her mom is on the way  Aside from this no other concerns  Awaiting imaging      Mom at bedside   Nurse at bedside   Patient in bed   Patient now agreeable to scans after a lengthy discussion and emotional support were given   Agreeable now to hold suboxone and trial opiate therapy in an attempt to control her pain to lay flat for the scans   Orders are placed   Plan for scans of the spine and abdomen pelvis tomorrow - give dilaudid prior to going down  she will need to go on the monitor       Patient tells me she just wants to go home   She is having a lot of back pain   She is tearful   Spoke with mom on phone she wants her to have the CT scan of her abdomen however I explained I cannot force her to do it and she has too much back pain to lay flat   She will talk with her today and see if she can convince her       Calm in bed   DC pending precert       On O2   Start nebs for her   Monitor breathing   Still does not want a scan       Refusing to do the CT abdomen

## 2025-06-24 NOTE — PLAN OF CARE
Problem: Safety - Adult  Goal: Free from fall injury  Outcome: Progressing  Flowsheets (Taken 6/24/2025 0625 by Ronaldo Arevalo RN)  Free From Fall Injury: Instruct family/caregiver on patient safety     Problem: Chronic Conditions and Co-morbidities  Goal: Patient's chronic conditions and co-morbidity symptoms are monitored and maintained or improved  Outcome: Progressing     Problem: Discharge Planning  Goal: Discharge to home or other facility with appropriate resources  Outcome: Progressing     Problem: Pain  Goal: Verbalizes/displays adequate comfort level or baseline comfort level  Outcome: Progressing     Problem: Skin/Tissue Integrity  Goal: Skin integrity remains intact  Description: 1.  Monitor for areas of redness and/or skin breakdown  2.  Assess vascular access sites hourly  3.  Every 4-6 hours minimum:  Change oxygen saturation probe site  4.  Every 4-6 hours:  If on nasal continuous positive airway pressure, respiratory therapy assess nares and determine need for appliance change or resting period  Outcome: Progressing  Flowsheets  Taken 6/24/2025 0625 by Ronaldo Arevalo RN  Skin Integrity Remains Intact: Monitor for areas of redness and/or skin breakdown  Taken 6/24/2025 0325 by Ronaldo Arevalo RN  Skin Integrity Remains Intact: Monitor for areas of redness and/or skin breakdown     Problem: ABCDS Injury Assessment  Goal: Absence of physical injury  Outcome: Progressing  Flowsheets (Taken 6/24/2025 0625 by Ronaldo Arevalo RN)  Absence of Physical Injury: Implement safety measures based on patient assessment     Problem: Confusion  Goal: Confusion, delirium, dementia, or psychosis is improved or at baseline  Description: INTERVENTIONS:  1. Assess for possible contributors to thought disturbance, including medications, impaired vision or hearing, underlying metabolic abnormalities, dehydration, psychiatric diagnoses, and notify attending LIP  2. [place] high risk fall precautions, as indicated  3.  Provide frequent short contacts to provide reality reorientation, refocusing and direction  4. Decrease environmental stimuli, including noise as appropriate  5. Monitor and intervene to maintain adequate nutrition, hydration, elimination, sleep and activity  6. If unable to ensure safety without constant attention obtain sitter and review sitter guidelines with assigned personnel  7. Initiate Psychosocial CNS and Spiritual Care consult, as indicated  Outcome: Progressing  Flowsheets (Taken 6/24/2025 0325 by Ronaldo Arevalo RN)  Effect of thought disturbance (confusion, delirium, dementia, or psychosis) are managed with adequate functional status: Assess for contributors to thought disturbance, including medications, impaired vision or hearing, underlying metabolic abnormalities, dehydration, psychiatric diagnoses, notify LIP     Problem: Neurosensory - Adult  Goal: Achieves stable or improved neurological status  Outcome: Progressing  Goal: Achieves maximal functionality and self care  Outcome: Progressing     Problem: Respiratory - Adult  Goal: Achieves optimal ventilation and oxygenation  Outcome: Progressing     Problem: Cardiovascular - Adult  Goal: Maintains optimal cardiac output and hemodynamic stability  Outcome: Progressing  Goal: Absence of cardiac dysrhythmias or at baseline  Outcome: Progressing     Problem: Skin/Tissue Integrity - Adult  Goal: Skin integrity remains intact  Description: 1.  Monitor for areas of redness and/or skin breakdown  2.  Assess vascular access sites hourly  3.  Every 4-6 hours minimum:  Change oxygen saturation probe site  4.  Every 4-6 hours:  If on nasal continuous positive airway pressure, respiratory therapy assess nares and determine need for appliance change or resting period  Outcome: Progressing  Flowsheets  Taken 6/24/2025 0625 by Ronaldo Arevalo, RN  Skin Integrity Remains Intact: Monitor for areas of redness and/or skin breakdown  Taken 6/24/2025 0325 by Irina

## 2025-06-24 NOTE — PROGRESS NOTES
Patient incontinent of urine and external leaked. Patient cleaned up and new pads placed on bed. White petrolatum ointment placed on bilateral buttocks and bilateral posterior thighs; miconazole powder placed on abdominal and groin folds. Patient thoroughly educated on the importance of pressure reduction and q2 turns. Patient adamantly refusing at this time.    Stephanie Jordan RN

## 2025-06-24 NOTE — CONSULTS
Palliative Care Department  [phone]  Palliative Care Progress Note  Provider Shannon Zaldivar, APRN - CNP     Li Rojas  [id number]  Hospital Day: 9  Date of Initial Consult: 6/21/25  Referring Provider:  Robles Wallace MD    Palliative Medicine was consulted for assistance with: Goals of Care, multiple vertebral fractures, assistance in pain control     HPI:   Li Rojas is a 55 y.o. with a medical history of Lupus, MS, non-compliance, SVT, chronic suboxone use, who was admitted on 6/16/2025 from nursing facility with a CHIEF COMPLAINT of AMS.  Patient was sent from nursing facility for AMS, but was found to alert and oriented in ER. She was however admitted after workup with MARCO and BLE cellulitis. Patient also c/o extensive back pain and refusing CT scan abdomen/US on multiple attempts d/t pain. She is on suboxone.  Palliative medicine consulted for goals of care.    ASSESSMENT/PLAN:     Pertinent Hospital Diagnoses     MARCO  BLE Cellulitis  Hx MS  Hx SLE  Chronic suboxone use for opioid dependence      Palliative Care Encounter / Counseling Regarding Goals of Care  Please see detailed goals of care discussion as below  At this time, Li Rojas, Does Not have capacity for medical decision-making.  Capacity is time limited and situation/question specific  During encounter Karol was surrogate medical decision-maker  Outcome of goals of care meeting:   Continue current medical management, patient now agreeable to proceed with imaging studies tomorrow  Continue full code  Will assist with HCPOA paperwork prior to discharge  Code status Full Code  Advanced Directives: no POA or living will in [facility]  Surrogate/Legal NOK:  Karol Massey ([facility]) [phone]    Acute on chronic pain/compression fracture of T7, T10, T11, T12   - Zanaflex 4 mg every 6 hours scheduled   - Oxycodone 5 mg every 4 hours as needed   - Tramadol 100 mg every 6 hours as needed   - I would not recommend increasing

## 2025-06-24 NOTE — ACP (ADVANCE CARE PLANNING)
Advance Care Planning     Palliative Team Advance Care Planning (ACP) Conversation    Date of Conversation: 06/24/25    Individuals present for the conversation: Patient     ACP documents on file prior to discussion:  -None    Previously completed document/s not on file: Unknown, or patient / participant is uncertain.    Healthcare Decision Maker:    Primary Decision Maker: gaston calderon - Parent - [phone]     Conversation Summary:LSW met with pt to discuss adavcne directives. Pt is lethargic off and on during conversation and unable to fully engage in discussion. She does state Megan and her son Adrián Edouard should be her contacts. She also states she has 5 adult children. Pt is unable to provide any contact numbers. LSW attempted to contact pts mother, no answer and unable to leave message.    Resuscitation Status:   Code Status: Full Code     Documentation Completed:  -No new documents completed.    I spent 20 minutes with the patient and/or surrogate decision maker discussing the patient's wishes and goals.      CATALINO Hughes

## 2025-06-24 NOTE — PLAN OF CARE
Problem: Safety - Adult  Goal: Free from fall injury  Outcome: Progressing     Problem: Chronic Conditions and Co-morbidities  Goal: Patient's chronic conditions and co-morbidity symptoms are monitored and maintained or improved  Outcome: Progressing     Problem: Discharge Planning  Goal: Discharge to home or other facility with appropriate resources  Outcome: Progressing     Problem: Pain  Goal: Verbalizes/displays adequate comfort level or baseline comfort level  Outcome: Progressing     Problem: Skin/Tissue Integrity  Goal: Skin integrity remains intact  Description: 1.  Monitor for areas of redness and/or skin breakdown  2.  Assess vascular access sites hourly  3.  Every 4-6 hours minimum:  Change oxygen saturation probe site  4.  Every 4-6 hours:  If on nasal continuous positive airway pressure, respiratory therapy assess nares and determine need for appliance change or resting period  Outcome: Progressing     Problem: ABCDS Injury Assessment  Goal: Absence of physical injury  Outcome: Progressing     Problem: Confusion  Goal: Confusion, delirium, dementia, or psychosis is improved or at baseline  Description: INTERVENTIONS:  1. Assess for possible contributors to thought disturbance, including medications, impaired vision or hearing, underlying metabolic abnormalities, dehydration, psychiatric diagnoses, and notify attending LIP  2. [place] high risk fall precautions, as indicated  3. Provide frequent short contacts to provide reality reorientation, refocusing and direction  4. Decrease environmental stimuli, including noise as appropriate  5. Monitor and intervene to maintain adequate nutrition, hydration, elimination, sleep and activity  6. If unable to ensure safety without constant attention obtain sitter and review sitter guidelines with assigned personnel  7. Initiate Psychosocial CNS and Spiritual Care consult, as indicated  Outcome: Progressing     Problem: Neurosensory - Adult  Goal: Achieves

## 2025-06-24 NOTE — FLOWSHEET NOTE
Inpatient Wound Care (Initial consult) 6401A    Admit Date: 6/16/2025 11:10 AM    Reason for consult:  Bilateral lower legs, buttocks    Significant history:  Per H&P    History of Present Illness  Li is a 55 y.o. year old female who presented with a chief complaint of AMS from facility but she is not altered when I saw her today nor was she when she arrived to the [facility] yesterday per reports. She is found to have an MARCO which is being treated she also has BL LE cellulitis and open wounds for which ID and POD have been consulted. She was at [facility] recently and was DC to SNF on Zosyn which she still has over a week left.  Examined both legs in the ED.     Findings:     06/24/25 0924   Skin Integumentary    Skin Integrity Ecchymosis   Location BUE, left hip and right hip   Skin Integrity Site 2   Skin Integrity Location 2 Erosion/denuded   Location 2 bilateral buttock, posterior thighs   Wound 06/17/25 Pretibial Left left shin 0.4x0.6x0.2   Date First Assessed/Time First Assessed: 06/17/25 1506   Present on Original Admission: Yes  Primary Wound Type: Other (Comment)  Location: Pretibial  Wound Location Orientation: Left  Wound Description (Comments): left shin 0.4x0.6x0.2   Dressing Status Intact   Wound 06/17/25 Pretibial Left;Proximal 2x1.5x0.3   Date First Assessed/Time First Assessed: 06/17/25 1508   Present on Original Admission: Yes  Primary Wound Type: Other (Comment)  Location: Pretibial  Wound Location Orientation: Left;Proximal  Wound Description (Comments): 2x1.5x0.3   Dressing Status Intact   Wound 06/17/25 Knee Left;Anterior   Date First Assessed/Time First Assessed: 06/17/25 1508   Present on Original Admission: Yes  Location: Knee  Wound Location Orientation: Left;Anterior   Dressing Status Intact   Wound 06/17/25 Toe (Comment  which one) Left   Date First Assessed/Time First Assessed: 06/17/25 1509   Present on Original Admission: Yes  Location: (c) Toe (Comment  which one)  Wound Location

## 2025-06-24 NOTE — PROGRESS NOTES
[facility]  DOV  Progress Note    SUBJECTIVE:  Chief Complaint   Patient presents with    Altered Mental Status     Pt from continuing healthcare and staff states pt is more altered than usual. Pt is A&Ox4 at time of triage. Pt states she is just tired because they woke her up at 4AM last night        Patient resting in bed. Denies fever or chills. No nausea, vomiting, rash or diarrhea. Mother at bedside.   More alert and conversive today       Review of systems:  As stated above in the chief complaint, otherwise negative.    Medications:  Scheduled Meds:   miconazole nitrate   Topical BID    ALTEplase  1 mg IntraCATHeter Once    ALTEplase  1 mg IntraCATHeter Once    guaiFENesin  400 mg Oral q8h    budesonide  0.5 mg Nebulization BID RT    arformoterol tartrate  15 mcg Nebulization BID RT    amoxicillin-clavulanate  1 tablet Oral 2 times per day    [Held by provider] buprenorphine-naloxone  1 Film SubLINGual BID    miconazole   Topical BID    sodium chloride flush  10 mL IntraVENous 2 times per day    enoxaparin  40 mg SubCUTAneous Daily    atenolol  25 mg Oral BID AC    bisacodyl  5 mg Oral BID    dilTIAZem  240 mg Oral Daily    furosemide  40 mg Oral Lunch    insulin glargine  15 Units SubCUTAneous Nightly    LORazepam  0.5 mg Oral BID    pantoprazole  40 mg Oral QAM AC    polyethylene glycol  17 g Oral Daily    predniSONE  15 mg Oral Daily    tiZANidine  4 mg Oral Q6H    insulin lispro  0-8 Units SubCUTAneous 4x Daily AC & HS     Continuous Infusions:   sodium chloride      dextrose       PRN Meds:miconazole nitrate **AND** miconazole nitrate, oxyCODONE, traMADol, ipratropium 0.5 mg-albuterol 2.5 mg, white petrolatum, albuterol, sodium chloride flush, sodium chloride, potassium chloride **OR** potassium alternative oral replacement **OR** potassium chloride, ondansetron **OR** ondansetron, senna, acetaminophen **OR** acetaminophen, glucose, dextrose bolus **OR** dextrose  bolus, glucagon (rDNA), dextrose    OBJECTIVE:  /68   Pulse 88   Temp 98.1 °F (36.7 °C) (Axillary)   Resp 19   Ht 1.72 m (5' 7.72\")   Wt 99.2 kg (218 lb 11.2 oz)   LMP 2020   SpO2 90%   BMI 33.53 kg/m²   Temp  Av.4 °F (36.3 °C)  Min: 96.9 °F (36.1 °C)  Max: 98.1 °F (36.7 °C)    Constitutional: NAD  Skin: Warm and dry. No rashes were noted.   Neuro: Somnolent.   HEENT: Round and reactive pupils.  Moist mucous membranes.  No ulcerations or thrush.  Chest: .Respirations slightly labored with diaphragmatic and abd use. . Breath sounds clear.   Cardiovascular:  RRR  Abdomen: Soft. Distended. Bowel sounds present.   Extremities: 2+ LE edema. Multiple dressings.     Lines: Midline right brachial 25      Laboratory and Tests:  Lab Results   Component Value Date    WBC 13.3 (H) 2025    WBC 15.9 (H) 2025    WBC 12.9 (H) 2025    HGB 13.4 2025    HCT 43.2 2025    MCV 94.1 2025     2025     Lab Results   Component Value Date    CRP 39.5 (H) 2025    CRP 37.7 (H) 2025    CRP 22.3 (H) 2025     Lab Results   Component Value Date    SEDRATE 45 (H) 2025    SEDRATE 77 (H) 2025    SEDRATE 92 (H) 2025   Microbiology  Respiratory panel 2025 negative  Blood cultures 2025 no growth 5 days    Radiology:    Impression:        1. Bilateral mid and lower lung zone multifocal interstitial infiltration is  well as consolidative airspace disease in the right middle and bilateral  lower lobes.  Pneumonia not excluded.  2. Solitary punctate calculus in the left kidney.  No evidence of ureteral or  bladder calculi or obstructive uropathy.  3. Focal fatty infiltration along the anterior wall of the transverse portion  of the duodenum raising the possibility of duodenitis or possibly underlying  ulcer disease.  No evidence of extraluminal air to suggest bowel perforation  and there is no evidence of phlegmon or abscess.

## 2025-06-24 NOTE — PROGRESS NOTES
Occupational Therapy  Attempted OT Re-Eval at b/s, however, pt and pt's family member declined to attempt to don TLSO w/ Therapists despite much encouragement, reassurance and pt education re; Benefits of wearing of brace for pain mgmt w/ upright ax.  Pt reported experiencing severe pain of abdomen r/t skin irritation w/ Orthotist's attempts to don brace.  RN offered pain medication but pt and pt's family member continued to request therapists return next date for session.  Will attempt OT Re-Evaluation next date.  Thank you for this referral DOMINIC Youssef, OTR/L  # [id number]

## 2025-06-24 NOTE — PROGRESS NOTES
Order, facesheet, ticket to ride, diagnosis, snapshot all faxed to [facility]. Notified the office of order for soft TLSO brace.

## 2025-06-24 NOTE — PROGRESS NOTES
Department of Neurosurgery  Progress Note    CHIEF COMPLAINT: T7, T10, T11 and T12 compression fractures    SUBJECTIVE:  TLSO brace not received. Continues to complain of severe back pain     REVIEW OF SYSTEMS :  Constitutional: Negative for chills and fever.    Neurological: Negative for dizziness, tremors and speech change.     OBJECTIVE:   VITALS:  /72   Pulse 85   Temp 97.5 °F (36.4 °C) (Axillary)   Resp 19   Ht 1.72 m (5' 7.72\")   Wt 99.2 kg (218 lb 11.2 oz)   LMP 05/04/2020   SpO2 93%   BMI 33.53 kg/m²     PHYSICAL:  Neurologic: Alert and oriented x3; PERRL  Motor Exam:  Motor exam is symmetrical 5 out of 5 all extremities bilaterally  Sensory:  Sensory intact  TTP thoracic spine    DATA:  CBC:   Lab Results   Component Value Date/Time    WBC 13.3 06/24/2025 04:07 AM    RBC 4.59 06/24/2025 04:07 AM    HGB 13.4 06/24/2025 04:07 AM    HCT 43.2 06/24/2025 04:07 AM    MCV 94.1 06/24/2025 04:07 AM    MCH 29.2 06/24/2025 04:07 AM    MCHC 31.0 06/24/2025 04:07 AM    RDW 15.7 06/24/2025 04:07 AM     06/24/2025 04:07 AM    MPV 9.7 06/24/2025 04:07 AM     BMP:    Lab Results   Component Value Date/Time     06/24/2025 04:07 AM    K 3.2 06/24/2025 04:07 AM    K 3.0 02/01/2023 10:54 PM     06/24/2025 04:07 AM    CO2 29 06/24/2025 04:07 AM    BUN 10 06/24/2025 04:07 AM    CREATININE 0.5 06/24/2025 04:07 AM    CALCIUM 9.5 06/24/2025 04:07 AM    GFRAA >60 11/06/2020 02:30 PM    LABGLOM >90 06/24/2025 04:07 AM    LABGLOM >60 01/13/2024 05:08 AM    GLUCOSE 120 06/24/2025 04:07 AM    GLUCOSE 98 06/06/2012 11:12 PM     PT/INR:    Lab Results   Component Value Date/Time    PROTIME 9.8 05/28/2025 05:45 AM    PROTIME 13.1 04/16/2011 06:20 AM    INR 0.9 05/28/2025 05:45 AM     PTT:  No results found for: \"APTT\"[APTT}    Current Inpatient Medications  Current Facility-Administered Medications: miconazole nitrate 2 % ointment, , Topical, BID **AND** miconazole nitrate 2 % ointment, , Topical, TID

## 2025-06-24 NOTE — PROGRESS NOTES
Pulmonary Critical Care Medicine           PULMONARY  CRITICAL CARE   SERVICE DAILY PROGRESS  NOTE     2025   Hospital LOS:  LOS: 8 days     Impression / Recommendations:    Acute respiratory insufficiency -   Differentials include Ongoing aspiration pneumonia/pneumonitis vs viral lower respiratory tract infection versus community-acquired pneumonia vs drug-induced/toxin mediated pneumonitis     Multiple compression deformities of thoracic and lumbar spine        -Respiratory viral panel negative this admission  - Send sputum cultures  - Continue Pulmicort, Brovana and as needed DuoNebs  - Continue Augmentin for total duration of 7 days  - Continue diuresis as tolerated  - Neurosurgery following  -Continue PT OT as permitted by neurosurgery  - Continue with supplemental oxygen as tolerated, patient is currently on 5 L nasal cannula, patient uses oxygen as needed at home  - Palliative care following  - Check urine drug screen from the admission urine sample      Interval History/Event Changes:  Much more awake today and also interactive  Mother at the bedside, discussed the case and answered all the questions      Allergies  Allergies   Allergen Reactions    Lyrica [Pregabalin] Anaphylaxis    Other Shortness Of Breath    Sulfa Antibiotics Anaphylaxis     Hallucinations and rash    Darvocet [Propoxyphene N-Acetaminophen]     Ultram [Tramadol Hcl]     Metoprolol Rash       Review of Systems    A pertinent review of systems was performed and was otherwise non-contributory.    Vitals-     /73   Pulse 81   Temp 97.5 °F (36.4 °C) (Axillary)   Resp 18   Ht 1.72 m (5' 7.72\")   Wt 99.2 kg (218 lb 11.2 oz)   LMP 2020   SpO2 97%   BMI 33.53 kg/m²    Tmax: Temp (24hrs), Av.4 °F (36.3 °C), Min:96.9 °F (36.1 °C), Max:98.1 °F (36.7 °C)      Hemodynamics:  Cuff:   Systolic (24hrs), Av , Min:109 , Max:134   /Diastolic (24hrs), Av, Min:68, Max:82    Cuff MAP:MAP (mmHg)  Av.4  Min: 77   documentation.         Valeriano Whitlock MD  06/24/25

## 2025-06-24 NOTE — CARE COORDINATION
Imaging showed subacute compression fractures; awaiting TLSO brace to be delivered. Patient currently on 7L NC, K 3.4, sodium 148; pulmonology, palliative, neurosurgery, wound care, ID and podiatry following. Auth for [facility] obtained and good through 6/30. Ambulance form and CARLA completed; envelope placed on the soft chart.    Electronically signed by KEIKO Juan on 6/24/2025 at 3:15 PM

## 2025-06-24 NOTE — PROGRESS NOTES
Physical Therapy  Physical Therapy Missed Visit    Name: Li Rojas  : 1970  MRN: [id number]  Room #: 6401/6401-A    Date of Service: 2025    Attempted PT treatment. Pt declined to participate, stating too much pain this date. Will attempt again on later date, as schedule permits.    Paco Bueno, PT, DPT  [id number]

## 2025-06-25 ENCOUNTER — APPOINTMENT (OUTPATIENT)
Dept: INTERVENTIONAL RADIOLOGY/VASCULAR | Age: 55
DRG: 469 | End: 2025-06-25
Payer: COMMERCIAL

## 2025-06-25 VITALS
RESPIRATION RATE: 25 BRPM | TEMPERATURE: 97.2 F | HEART RATE: 82 BPM | BODY MASS INDEX: 33.15 KG/M2 | HEIGHT: 68 IN | WEIGHT: 218.7 LBS | DIASTOLIC BLOOD PRESSURE: 86 MMHG | SYSTOLIC BLOOD PRESSURE: 129 MMHG | OXYGEN SATURATION: 97 %

## 2025-06-25 DIAGNOSIS — E11.9 TYPE 2 DIABETES MELLITUS WITHOUT COMPLICATION, WITHOUT LONG-TERM CURRENT USE OF INSULIN (HCC): ICD-10-CM

## 2025-06-25 DIAGNOSIS — L03.116 BILATERAL LOWER LEG CELLULITIS: Primary | ICD-10-CM

## 2025-06-25 DIAGNOSIS — L03.115 BILATERAL LOWER LEG CELLULITIS: Primary | ICD-10-CM

## 2025-06-25 LAB
ANION GAP SERPL CALCULATED.3IONS-SCNC: 13 MMOL/L (ref 7–16)
BASOPHILS # BLD: 0 K/UL (ref 0–0.2)
BASOPHILS NFR BLD: 0 % (ref 0–2)
BUN SERPL-MCNC: 12 MG/DL (ref 6–20)
CALCIUM SERPL-MCNC: 9.7 MG/DL (ref 8.6–10)
CHLORIDE SERPL-SCNC: 103 MMOL/L (ref 98–107)
CO2 SERPL-SCNC: 29 MMOL/L (ref 22–29)
CREAT SERPL-MCNC: 0.5 MG/DL (ref 0.5–1)
EOSINOPHIL # BLD: 0 K/UL (ref 0.05–0.5)
EOSINOPHILS RELATIVE PERCENT: 0 % (ref 0–6)
ERYTHROCYTE [DISTWIDTH] IN BLOOD BY AUTOMATED COUNT: 15.9 % (ref 11.5–15)
GFR, ESTIMATED: >90 ML/MIN/1.73M2
GLUCOSE BLD-MCNC: 117 MG/DL (ref 74–99)
GLUCOSE BLD-MCNC: 174 MG/DL (ref 74–99)
GLUCOSE BLD-MCNC: 191 MG/DL (ref 74–99)
GLUCOSE SERPL-MCNC: 114 MG/DL (ref 74–99)
HCT VFR BLD AUTO: 41.5 % (ref 34–48)
HGB BLD-MCNC: 12.3 G/DL (ref 11.5–15.5)
LYMPHOCYTES NFR BLD: 1.83 K/UL (ref 1.5–4)
LYMPHOCYTES RELATIVE PERCENT: 15 % (ref 20–42)
MCH RBC QN AUTO: 29.2 PG (ref 26–35)
MCHC RBC AUTO-ENTMCNC: 29.6 G/DL (ref 32–34.5)
MCV RBC AUTO: 98.6 FL (ref 80–99.9)
MONOCYTES NFR BLD: 0.24 K/UL (ref 0.1–0.95)
MONOCYTES NFR BLD: 2 % (ref 2–12)
MYELOCYTES ABSOLUTE COUNT: 0.12 K/UL
MYELOCYTES: 1 %
NEUTROPHILS NFR BLD: 82 % (ref 43–80)
NEUTS SEG NFR BLD: 10 K/UL (ref 1.8–7.3)
PLATELET # BLD AUTO: 346 K/UL (ref 130–450)
PMV BLD AUTO: 9.2 FL (ref 7–12)
POTASSIUM SERPL-SCNC: 3.9 MMOL/L (ref 3.5–5.1)
RBC # BLD AUTO: 4.21 M/UL (ref 3.5–5.5)
RBC # BLD: ABNORMAL 10*6/UL
SODIUM SERPL-SCNC: 145 MMOL/L (ref 136–145)
WBC OTHER # BLD: 12.2 K/UL (ref 4.5–11.5)

## 2025-06-25 PROCEDURE — 99233 SBSQ HOSP IP/OBS HIGH 50: CPT | Performed by: INTERNAL MEDICINE

## 2025-06-25 PROCEDURE — 97530 THERAPEUTIC ACTIVITIES: CPT

## 2025-06-25 PROCEDURE — 80048 BASIC METABOLIC PNL TOTAL CA: CPT

## 2025-06-25 PROCEDURE — 2700000000 HC OXYGEN THERAPY PER DAY

## 2025-06-25 PROCEDURE — 94640 AIRWAY INHALATION TREATMENT: CPT

## 2025-06-25 PROCEDURE — 6370000000 HC RX 637 (ALT 250 FOR IP): Performed by: STUDENT IN AN ORGANIZED HEALTH CARE EDUCATION/TRAINING PROGRAM

## 2025-06-25 PROCEDURE — 97535 SELF CARE MNGMENT TRAINING: CPT

## 2025-06-25 PROCEDURE — 36415 COLL VENOUS BLD VENIPUNCTURE: CPT

## 2025-06-25 PROCEDURE — 97168 OT RE-EVAL EST PLAN CARE: CPT

## 2025-06-25 PROCEDURE — 6360000002 HC RX W HCPCS: Performed by: STUDENT IN AN ORGANIZED HEALTH CARE EDUCATION/TRAINING PROGRAM

## 2025-06-25 PROCEDURE — 5A0935A ASSISTANCE WITH RESPIRATORY VENTILATION, LESS THAN 24 CONSECUTIVE HOURS, HIGH NASAL FLOW/VELOCITY: ICD-10-PCS | Performed by: STUDENT IN AN ORGANIZED HEALTH CARE EDUCATION/TRAINING PROGRAM

## 2025-06-25 PROCEDURE — 2500000003 HC RX 250 WO HCPCS: Performed by: STUDENT IN AN ORGANIZED HEALTH CARE EDUCATION/TRAINING PROGRAM

## 2025-06-25 PROCEDURE — 6370000000 HC RX 637 (ALT 250 FOR IP): Performed by: INTERNAL MEDICINE

## 2025-06-25 PROCEDURE — 85025 COMPLETE CBC W/AUTO DIFF WBC: CPT

## 2025-06-25 PROCEDURE — 82962 GLUCOSE BLOOD TEST: CPT

## 2025-06-25 RX ADMIN — PETROLATUM: 420 OINTMENT TOPICAL at 08:25

## 2025-06-25 RX ADMIN — ARFORMOTEROL TARTRATE 15 MCG: 15 SOLUTION RESPIRATORY (INHALATION) at 19:15

## 2025-06-25 RX ADMIN — INSULIN LISPRO 2 UNITS: 100 INJECTION, SOLUTION INTRAVENOUS; SUBCUTANEOUS at 17:31

## 2025-06-25 RX ADMIN — ATENOLOL 25 MG: 25 TABLET ORAL at 16:25

## 2025-06-25 RX ADMIN — TIZANIDINE 4 MG: 4 TABLET ORAL at 14:11

## 2025-06-25 RX ADMIN — TIZANIDINE 4 MG: 4 TABLET ORAL at 18:47

## 2025-06-25 RX ADMIN — ATENOLOL 25 MG: 25 TABLET ORAL at 06:14

## 2025-06-25 RX ADMIN — MICONAZOLE NITRATE: 20 OINTMENT TOPICAL at 08:23

## 2025-06-25 RX ADMIN — TRAMADOL HYDROCHLORIDE 100 MG: 50 TABLET, COATED ORAL at 16:25

## 2025-06-25 RX ADMIN — AMOXICILLIN AND CLAVULANATE POTASSIUM 1 TABLET: 875; 125 TABLET, FILM COATED ORAL at 08:22

## 2025-06-25 RX ADMIN — ARFORMOTEROL TARTRATE 15 MCG: 15 SOLUTION RESPIRATORY (INHALATION) at 06:28

## 2025-06-25 RX ADMIN — BUDESONIDE 500 MCG: 0.5 SUSPENSION RESPIRATORY (INHALATION) at 06:28

## 2025-06-25 RX ADMIN — PANTOPRAZOLE SODIUM 40 MG: 40 TABLET, DELAYED RELEASE ORAL at 05:45

## 2025-06-25 RX ADMIN — SODIUM CHLORIDE, PRESERVATIVE FREE 10 ML: 5 INJECTION INTRAVENOUS at 08:23

## 2025-06-25 RX ADMIN — TIZANIDINE 4 MG: 4 TABLET ORAL at 05:45

## 2025-06-25 RX ADMIN — IPRATROPIUM BROMIDE AND ALBUTEROL SULFATE 1 DOSE: .5; 2.5 SOLUTION RESPIRATORY (INHALATION) at 06:27

## 2025-06-25 RX ADMIN — POLYETHYLENE GLYCOL 3350 17 G: 17 POWDER, FOR SOLUTION ORAL at 08:22

## 2025-06-25 RX ADMIN — ENOXAPARIN SODIUM 40 MG: 100 INJECTION SUBCUTANEOUS at 08:21

## 2025-06-25 RX ADMIN — TIZANIDINE 4 MG: 4 TABLET ORAL at 00:18

## 2025-06-25 RX ADMIN — FUROSEMIDE 40 MG: 40 TABLET ORAL at 14:11

## 2025-06-25 RX ADMIN — OXYCODONE 5 MG: 5 TABLET ORAL at 11:51

## 2025-06-25 RX ADMIN — IPRATROPIUM BROMIDE AND ALBUTEROL SULFATE 1 DOSE: .5; 2.5 SOLUTION RESPIRATORY (INHALATION) at 11:26

## 2025-06-25 RX ADMIN — TRAMADOL HYDROCHLORIDE 100 MG: 50 TABLET, COATED ORAL at 06:14

## 2025-06-25 RX ADMIN — LORAZEPAM 0.5 MG: 0.5 TABLET ORAL at 08:23

## 2025-06-25 RX ADMIN — GUAIFENESIN 400 MG: 400 TABLET ORAL at 08:22

## 2025-06-25 RX ADMIN — MICONAZOLE NITRATE: 20.6 POWDER TOPICAL at 08:24

## 2025-06-25 RX ADMIN — BUDESONIDE 500 MCG: 0.5 SUSPENSION RESPIRATORY (INHALATION) at 19:15

## 2025-06-25 RX ADMIN — DILTIAZEM HYDROCHLORIDE 240 MG: 240 CAPSULE, COATED, EXTENDED RELEASE ORAL at 08:22

## 2025-06-25 RX ADMIN — PREDNISONE 15 MG: 10 TABLET ORAL at 08:23

## 2025-06-25 RX ADMIN — IPRATROPIUM BROMIDE AND ALBUTEROL SULFATE 1 DOSE: .5; 2.5 SOLUTION RESPIRATORY (INHALATION) at 16:26

## 2025-06-25 RX ADMIN — GUAIFENESIN 400 MG: 400 TABLET ORAL at 16:25

## 2025-06-25 RX ADMIN — BISACODYL 5 MG: 5 TABLET, COATED ORAL at 08:22

## 2025-06-25 ASSESSMENT — PAIN DESCRIPTION - LOCATION
LOCATION: BACK

## 2025-06-25 ASSESSMENT — PAIN SCALES - GENERAL
PAINLEVEL_OUTOF10: 6
PAINLEVEL_OUTOF10: 7
PAINLEVEL_OUTOF10: 6

## 2025-06-25 ASSESSMENT — PAIN DESCRIPTION - ORIENTATION
ORIENTATION: MID

## 2025-06-25 ASSESSMENT — PAIN - FUNCTIONAL ASSESSMENT: PAIN_FUNCTIONAL_ASSESSMENT: PREVENTS OR INTERFERES WITH ALL ACTIVE AND SOME PASSIVE ACTIVITIES

## 2025-06-25 ASSESSMENT — PAIN DESCRIPTION - DESCRIPTORS
DESCRIPTORS: ACHING;DISCOMFORT;SORE
DESCRIPTORS: ACHING;SORE;DISCOMFORT
DESCRIPTORS: ACHING;SORE;NAGGING

## 2025-06-25 NOTE — PROGRESS NOTES
Nurse to nurse report called to Alanna at [facility]. Paperwork faxed.    CONRAD MORGAN RN

## 2025-06-25 NOTE — PROGRESS NOTES
Message sent to Dr. Dasilva to check discharge from podiatry standpoint.    CONRAD MORGAN RN     -apply bacitracin

## 2025-06-25 NOTE — PROGRESS NOTES
[facility]  DOV  Progress Note    SUBJECTIVE:  Chief Complaint   Patient presents with    Altered Mental Status     Pt from continuing healthcare and staff states pt is more altered than usual. Pt is A&Ox4 at time of triage. Pt states she is just tired because they woke her up at 4AM last night        Patient resting in bed. Denies fever or chills. No nausea, vomiting, rash or diarrhea. Mother at bedside.          Review of systems:  As stated above in the chief complaint, otherwise negative.    Medications:  Scheduled Meds:   miconazole nitrate   Topical BID    ALTEplase  1 mg IntraCATHeter Once    ALTEplase  1 mg IntraCATHeter Once    guaiFENesin  400 mg Oral q8h    budesonide  0.5 mg Nebulization BID RT    arformoterol tartrate  15 mcg Nebulization BID RT    amoxicillin-clavulanate  1 tablet Oral 2 times per day    [Held by provider] buprenorphine-naloxone  1 Film SubLINGual BID    miconazole   Topical BID    sodium chloride flush  10 mL IntraVENous 2 times per day    enoxaparin  40 mg SubCUTAneous Daily    atenolol  25 mg Oral BID AC    bisacodyl  5 mg Oral BID    dilTIAZem  240 mg Oral Daily    furosemide  40 mg Oral Lunch    insulin glargine  15 Units SubCUTAneous Nightly    LORazepam  0.5 mg Oral BID    pantoprazole  40 mg Oral QAM AC    polyethylene glycol  17 g Oral Daily    predniSONE  15 mg Oral Daily    tiZANidine  4 mg Oral Q6H    insulin lispro  0-8 Units SubCUTAneous 4x Daily AC & HS     Continuous Infusions:   sodium chloride      dextrose       PRN Meds:miconazole nitrate **AND** miconazole nitrate, hydrOXYzine pamoate, oxyCODONE, traMADol, ipratropium 0.5 mg-albuterol 2.5 mg, white petrolatum, albuterol, sodium chloride flush, sodium chloride, potassium chloride **OR** potassium alternative oral replacement **OR** potassium chloride, ondansetron **OR** ondansetron, senna, acetaminophen **OR** acetaminophen, glucose, dextrose bolus **OR** dextrose bolus,  glucagon (rDNA), dextrose    OBJECTIVE:  /84   Pulse 85   Temp 98 °F (36.7 °C) (Axillary)   Resp 18   Ht 1.72 m (5' 7.72\")   Wt 99.2 kg (218 lb 11.2 oz)   LMP 2020   SpO2 93%   BMI 33.53 kg/m²   Temp  Av.6 °F (36.4 °C)  Min: 97.1 °F (36.2 °C)  Max: 98 °F (36.7 °C)    Constitutional: NAD  Skin: Warm and dry. No rashes were noted.   Neuro: Somnolent.   HEENT: Round and reactive pupils.  Moist mucous membranes.  No ulcerations or thrush.  Chest: .Respirations slightly labored with diaphragmatic and abd use. . Breath sounds clear.   Cardiovascular:  RRR  Abdomen: Soft. Distended. Bowel sounds present.   Extremities: 2+ LE edema. Multiple dressings.     Lines: Midline right brachial 25      Laboratory and Tests:  Lab Results   Component Value Date    WBC 12.2 (H) 2025    WBC 13.3 (H) 2025    WBC 15.9 (H) 2025    HGB 12.3 2025    HCT 41.5 2025    MCV 98.6 2025     2025     Lab Results   Component Value Date    CRP 39.5 (H) 2025    CRP 37.7 (H) 2025    CRP 22.3 (H) 2025     Lab Results   Component Value Date    SEDRATE 45 (H) 2025    SEDRATE 77 (H) 2025    SEDRATE 92 (H) 2025   Microbiology  Respiratory panel 2025 negative  Blood cultures 2025 no growth 5 days    Radiology:    Impression:        1. Bilateral mid and lower lung zone multifocal interstitial infiltration is  well as consolidative airspace disease in the right middle and bilateral  lower lobes.  Pneumonia not excluded.  2. Solitary punctate calculus in the left kidney.  No evidence of ureteral or  bladder calculi or obstructive uropathy.  3. Focal fatty infiltration along the anterior wall of the transverse portion  of the duodenum raising the possibility of duodenitis or possibly underlying  ulcer disease.  No evidence of extraluminal air to suggest bowel perforation  and there is no evidence of phlegmon or abscess.       IMPRESSION:

## 2025-06-25 NOTE — PROGRESS NOTES
Speech Language Pathology  NAME:  Li Rojas  :  1970  DATE: 2025  ROOM:  [facility]-A    Pt unavailable at 1330 for Clinical Swallow Evaluation Discussed with Patient , caregiver/family , patient nurse in person, and charge nurse in person     REASON:  Declined intervention despite encouragement and education of benefits of participation.    Will re-attempt as appropriate.       Thank You    Kimber Vaca M.S., CCC-SLP  Speech-Language Pathologist  [id number]  2025

## 2025-06-25 NOTE — PROGRESS NOTES
OCCUPATIONAL THERAPY RE-EVALUATION    [facility]  [address], [place]      Date:2025                                                Patient Name: Li Rojas  MRN: [id number]  : 1970  Room: [address] [place] [address]    Evaluating OT: Nicolás Freeman OTR/L #[id number]   Re-Evaluating OT:  DOMINIC Youssef, OTR/L  # [id number]     Reason for Re-eval:  Pt c/o severe Back Pain, Imaging revealed spinal fractures, NS recommended wearing Soft TLSO for all upright ax    Referring Provider: Nicolás Duncan MD   Specific Provider Orders/Date: OT eval and treat 25    Diagnosis: Hypercalcemia [E83.52]  Hyperkalemia [E87.5]  MARCO (acute kidney injury) [N17.9]  Receiving intravenous antibiotic treatment as outpatient [Z79.2]   Pt admitted to hospital with AMS    Pertinent Medical History:  has a past medical history of Arthritis, Asthma, H/O medication noncompliance, Hyperlipidemia, Hypertension, Lupus, MS (multiple sclerosis) ([facility]), Multiple sclerosis (HCC), Nicotine dependence, cigarettes, uncomplicated, Palpitations, SOB (shortness of breath), SVT (supraventricular tachycardia), Systemic lupus erythematosus, unspecified ([facility]), and Type 2 diabetes mellitus without complication, without long-term current use of insulin ([facility]).       Precautions:  Fall Risk, cognition, bed alarm, Spinal Precautions, Soft TLSO, External catheter, 8L O2 via HF NC    Assessment of current deficits    [x] Functional mobility  [x]ADLs  [x] Strength               []Cognition    [x] Functional transfers   [x] IADLs         [x] Safety Awareness   [x]Endurance    [] Fine Coordination              [x] Balance      [] Vision/perception   []Sensation     []Gross Motor Coordination  [] ROM  [] Delirium                   [] Motor Control     OT PLAN OF CARE   OT POC based on physician orders, patient diagnosis and results of clinical assessment    Frequency/Duration 1-5 days/wk for 2 weeks PRN  w/ rest/repositioning this session    Cognition: A&O: 4/4; Follows 2 step directions   Memory: good   Sequencing: good   Problem solving: good   Judgement/safety: fair      Functional Assessment:  AM-PAC Daily Activity Raw Score: 11/24   Initial Eval Status  Date: 6/25/2025   Treatment Status  Date: STGs = LTGs  Time frame: 10-14 days   Feeding Set-up  Mod I   Grooming Minimal Assist   Stand by Assist    UB Dressing Moderate Assist -- Gown  Max A - TLSO    Pt unable to tolerate Supine position d/t respiratory distress and back pain for donning of Brace - donned seated EOB     Stand by Assist    LB Dressing Dependent   Assist of 2 seated/standing    Maximal Assist    Bathing Dependent    Simulated in supine w/ Assist of 2  Maximal Assist    Toileting Dependent     Assist of 2 seated/standing    Maximal Assist    Bed Mobility  Rolling: Maximal Assist   Supine<>Sit:  Mod A of 2  Supine to sit: Moderate Assist   Sit to supine: Moderate Assist    Functional Transfers Dep    Mod-Max A of 2 to stand from EOB - partial stance 1st attempt, Full stance 2nd attempt    Moderate Assist    Functional Mobility Dep    Unable to side-step despite Assist of 2 + use of WW    Moderate Assist    Balance Sitting: SBA EOB  Standing: Mod-Max A of 2 w/ WW       Activity Tolerance Fair(-)    Sat EOB ~ 20 mins  Stood ~ 3 mins    Pt limited due to fatigue and weakness, Pain, respiratory Distress - O2 sats remained > 90%  F+   Visual/  Perceptual wfl                  Hand Dominance right   Strength ROM Additional Info:    RUE  Grossly 3+/5 Grossly wfl   good  and wfl  FMC/dexterity noted during ADL tasks     LUE Grossly 3+/5 Grossly wfl   good  and wfl  FMC/dexterity noted during ADL tasks     Hearing: wfl   Sensation: denies numbness and tingling   Tone: wfl  Edema:none noted       Comments: Upon arrival, patient was semi-supine in bed  and agreeable to OT session.  Therapist educated pt on role of OT. At end of session, patient  semi-supine in bed ; with call light and phone within reach; all lines and tubes intact.  Overall patient demonstrated  decreased independence and safety during completion of ADLs and bed mobility .   Pt would benefit from continued skilled OT to increase safety and independence with completion of ADL tasks for functional independence and quality of life.    Rehab Potential:  Good  for established goals     Patient / Family Goal: Regain [place]     Patient and/or family were instructed on functional diagnosis, prognosis/goals and OT plan of care. Demonstrated fair understanding.     Re-Eval Complexity: Moderate    Time In: 1228  Time Out: 1322      Min Units   OT Eval Low 93326       OT Eval Medium 27324      OT Eval High 71450      OT Re-Eval 97168  X  1   Therapeutic Ex 02614       Therapeutic Activities 77868       ADL/Self Care 03306  39  3   Orthotic Management 54130       Manual 27777     Neuro Re-Ed 65414       Non-Billable Time          Re-Evaluation Time additionally includes thorough review of current medical information, gathering information on past medical history/social history and prior level of function, interpretation of standardized testing/informal observation of tasks, assessment of data and development of plan of care and goals.          Tonja Nicolas, MOT, OTR/L  # [id number]

## 2025-06-25 NOTE — CARE COORDINATION
Discharge order noted. Patient to discharge back to [facility]. Spoke with patient and her mother at bedside, provided update. Patient's mother was not aware patient refused vascular studies and wants her to have it done, patient agreeable. Advised patient discharged and would check with attending. Message sent to attending, patient to be set up outpatient. Ambulance transport placed on will-call via [facility]. Ambulance form and CARLA completed; envelope placed on the soft chart. Patient and her mother updated.    [facility] set up for 5P. Nurse, patient and facility informed.    Electronically signed by KEIKO Juan on 6/25/2025 at 1:03 PM

## 2025-06-25 NOTE — PROGRESS NOTES
Attempted vascular study, pt is refusing vascular study. Two vascular techs spoke to the pt, pt refusing both techs and wants to go back to her room. Spoke with RN, she is aware of pts refusal.

## 2025-06-25 NOTE — PLAN OF CARE
Problem: Safety - Adult  Goal: Free from fall injury  6/25/2025 1206 by Dianne Mcgill RN  Outcome: Progressing  6/25/2025 0321 by Ervin Newberry RN  Outcome: Progressing     Problem: Chronic Conditions and Co-morbidities  Goal: Patient's chronic conditions and co-morbidity symptoms are monitored and maintained or improved  6/25/2025 1206 by Dianne Mcgill RN  Outcome: Progressing  6/25/2025 0321 by Ervin Newberry RN  Outcome: Progressing     Problem: Discharge Planning  Goal: Discharge to home or other facility with appropriate resources  6/25/2025 1206 by Dianne Mcgill RN  Outcome: Progressing  6/25/2025 0321 by Ervin Newberry RN  Outcome: Progressing     Problem: Pain  Goal: Verbalizes/displays adequate comfort level or baseline comfort level  6/25/2025 1206 by Dianne Mcgill RN  Outcome: Progressing  6/25/2025 0321 by Ervin Newberry RN  Outcome: Progressing     Problem: Skin/Tissue Integrity  Goal: Skin integrity remains intact  Description: 1.  Monitor for areas of redness and/or skin breakdown  2.  Assess vascular access sites hourly  3.  Every 4-6 hours minimum:  Change oxygen saturation probe site  4.  Every 4-6 hours:  If on nasal continuous positive airway pressure, respiratory therapy assess nares and determine need for appliance change or resting period  6/25/2025 1206 by Dianne Mcgill RN  Outcome: Progressing  6/25/2025 0321 by Ervin Newberry RN  Outcome: Progressing     Problem: ABCDS Injury Assessment  Goal: Absence of physical injury  Outcome: Progressing     Problem: Confusion  Goal: Confusion, delirium, dementia, or psychosis is improved or at baseline  Description: INTERVENTIONS:  1. Assess for possible contributors to thought disturbance, including medications, impaired vision or hearing, underlying metabolic abnormalities, dehydration, psychiatric diagnoses, and notify attending LIP  2. Kyle high risk fall precautions, as indicated  3. Provide frequent short

## 2025-06-25 NOTE — PROGRESS NOTES
Pulmonary Critical Care Medicine           PULMONARY  CRITICAL CARE   SERVICE DAILY PROGRESS  NOTE     2025   Hospital LOS:  LOS: 9 days     Impression / Recommendations:    Acute respiratory insufficiency -   Differentials include Ongoing aspiration pneumonia/pneumonitis vs viral lower respiratory tract infection versus community-acquired pneumonia vs drug-induced/toxin mediated pneumonitis     Multiple compression deformities of thoracic and lumbar spine        -Respiratory viral panel negative this admission  - Sputum cultures- unable to obtain sample   - Continue Pulmicort, Brovana and as needed DuoNebs  - Continue Augmentin for total duration of 7 days  - Continue diuresis as tolerated  - Neurosurgery following  -Continue PT OT as permitted by neurosurgery  - Continue with supplemental oxygen as tolerated, patient is currently on 6 L nasal cannula, patient uses oxygen as needed at home  - Palliative care following  - Check urine drug screen from the admission urine sample  - Will need repeat CT chest to follow up on the GGOs about 6 to 8 weeks after discharge       Interval History/Event Changes:  Awake and participating in PT , getting the back brace now.   No new complains         Allergies  Allergies   Allergen Reactions    Lyrica [Pregabalin] Anaphylaxis    Other Shortness Of Breath    Sulfa Antibiotics Anaphylaxis     Hallucinations and rash    Darvocet [Propoxyphene N-Acetaminophen]     Ultram [Tramadol Hcl]     Metoprolol Rash       Review of Systems    A pertinent review of systems was performed and was otherwise non-contributory.    Vitals-     /77   Pulse 88   Temp 97.6 °F (36.4 °C) (Axillary)   Resp 16   Ht 1.72 m (5' 7.72\")   Wt 99.2 kg (218 lb 11.2 oz)   LMP 2020   SpO2 96%   BMI 33.53 kg/m²    Tmax: Temp (24hrs), Av.6 °F (36.4 °C), Min:97.1 °F (36.2 °C), Max:98 °F (36.7 °C)      Hemodynamics:  Cuff:   Systolic (24hrs), Av , Min:107 , Max:143   /Diastolic

## 2025-06-25 NOTE — PROGRESS NOTES
Notified ordering provider, Dr. Dasilva, of patient refusing arterial studies this morning in the vascular lab.    CONRAD MORGAN RN

## 2025-06-25 NOTE — PROGRESS NOTES
Physical Therapy  Treatment Note    Name: Li Rojas  : 1970  MRN: [id number]      Date of Service: 2025    Evaluating PT:  Sagar Lackey, PT [id number]    Referring provider/PT Order:  PT Eval and Treat   25  PT evaluation and treat  Start:  25,   End:  25,   ONE TIME,   Standing Count:  1 Occurrences,   R         Nicolás Duncan MD     Room #:  6401/6401-A  Diagnosis:  Hypercalcemia [E83.52]  Hyperkalemia [E87.5]  MARCO (acute kidney injury) [N17.9]  Receiving intravenous antibiotic treatment as outpatient [Z79.2]  PMHx/PSHx:     has a past medical history of Arthritis, Asthma, H/O medication noncompliance, Hyperlipidemia, Hypertension, Lupus, MS (multiple sclerosis) ([facility]), Multiple sclerosis (HCC), Nicotine dependence, cigarettes, uncomplicated, Palpitations, SOB (shortness of breath), SVT (supraventricular tachycardia), Systemic lupus erythematosus, unspecified ([facility]), and Type 2 diabetes mellitus without complication, without long-term current use of insulin ([facility]).    has a past surgical history that includes Appendectomy; Abdomen surgery; Cholecystectomy; Tubal ligation;  section; Anterior cruciate ligament repair; Dilation and curettage of uterus; Nerve Block (Left, 10/01/2020); Nerve Block (Left, 10/1/2020); other surgical history (Left, 2020); and Injection Procedure For Sacroiliac Joint (Left, 12/3/2020).     Procedure/Surgery:  none  Precautions:  Falls, skin integrity, confusion, MS, TLSO (t/s fx)   Equipment Needs: To be determined,    SUBJECTIVE:    Per chart review admitted from facility. Per chart review Patient lived with her Mother in 1st floor apt prior. W/c for mobility. Unknown current level of mobility at facility. Equipment owned: Equipment at Nursing Home,      OBJECTIVE:   Initial Evaluation  Date: 25 Treatment  Date: 25 Short Term/ Long Term   Goals   AM-PAC 6 Clicks AM-PAC Inpatient Mobility Raw Score : 108/24     Was

## 2025-06-25 NOTE — PROGRESS NOTES
Coordination of care discussion and chart review with PM team.LSW met with pt for support while hospitalized. She expresses discomfort and is waiting for her nurse. Otherwise she does not engage in visit. No immediate PM psychosocial needs identified for Li Rojas.

## 2025-06-25 NOTE — PROGRESS NOTES
Department of Podiatry  Progress Note    SUBJECTIVE:  Li Rojas is seen at bedside for B/L lower leg cellulitis and wounds. No acute events overnight. Patient denies any N/V/D/F/C/SOB/CP. Patient states she has pain when she is turned for dressing changes; pain in lower extremities has not changed. No other pedal complaints at this time.     OBJECTIVE:    Scheduled Meds:   miconazole nitrate   Topical BID    ALTEplase  1 mg IntraCATHeter Once    ALTEplase  1 mg IntraCATHeter Once    guaiFENesin  400 mg Oral q8h    budesonide  0.5 mg Nebulization BID RT    arformoterol tartrate  15 mcg Nebulization BID RT    amoxicillin-clavulanate  1 tablet Oral 2 times per day    [Held by provider] buprenorphine-naloxone  1 Film SubLINGual BID    miconazole   Topical BID    sodium chloride flush  10 mL IntraVENous 2 times per day    enoxaparin  40 mg SubCUTAneous Daily    atenolol  25 mg Oral BID AC    bisacodyl  5 mg Oral BID    dilTIAZem  240 mg Oral Daily    furosemide  40 mg Oral Lunch    insulin glargine  15 Units SubCUTAneous Nightly    LORazepam  0.5 mg Oral BID    pantoprazole  40 mg Oral QAM AC    polyethylene glycol  17 g Oral Daily    predniSONE  15 mg Oral Daily    tiZANidine  4 mg Oral Q6H    insulin lispro  0-8 Units SubCUTAneous 4x Daily AC & HS     Continuous Infusions:   sodium chloride      dextrose       PRN Meds:.miconazole nitrate **AND** miconazole nitrate, hydrOXYzine pamoate, oxyCODONE, traMADol, ipratropium 0.5 mg-albuterol 2.5 mg, white petrolatum, albuterol, sodium chloride flush, sodium chloride, potassium chloride **OR** potassium alternative oral replacement **OR** potassium chloride, ondansetron **OR** ondansetron, senna, acetaminophen **OR** acetaminophen, glucose, dextrose bolus **OR** dextrose bolus, glucagon (rDNA), dextrose    Allergies   Allergen Reactions    Lyrica [Pregabalin] Anaphylaxis    Other Shortness Of Breath    Sulfa Antibiotics Anaphylaxis     Hallucinations and rash

## 2025-06-25 NOTE — PROGRESS NOTES
Patient has been scheduled for their Vascular pressure study as an outpatient. They have been given the date and time with instructions for testing. Date of testin2025  @ 0915am  arrival. Alanna MACHADO @ [facility] made appt and confirmed transportation. Dianne MACHADO on floor has been notified that appointment has been made.

## 2025-06-25 NOTE — PLAN OF CARE
Problem: Safety - Adult  Goal: Free from fall injury  6/25/2025 0321 by Ervin Newberry RN  Outcome: Progressing  6/24/2025 1555 by oJan Chacko RN  Outcome: Progressing  Flowsheets (Taken 6/24/2025 0625 by Ronaldo Arevalo RN)  Free From Fall Injury: Instruct family/caregiver on patient safety     Problem: Chronic Conditions and Co-morbidities  Goal: Patient's chronic conditions and co-morbidity symptoms are monitored and maintained or improved  6/25/2025 0321 by Ervin Newberry RN  Outcome: Progressing  6/24/2025 1555 by Joan Chacko RN  Outcome: Progressing     Problem: Discharge Planning  Goal: Discharge to home or other facility with appropriate resources  6/25/2025 0321 by Ervin Newberry RN  Outcome: Progressing  6/24/2025 1555 by Joan Chacko RN  Outcome: Progressing     Problem: Pain  Goal: Verbalizes/displays adequate comfort level or baseline comfort level  6/25/2025 0321 by Ervin Newberry RN  Outcome: Progressing  6/24/2025 1555 by Joan Chacko RN  Outcome: Progressing     Problem: Skin/Tissue Integrity  Goal: Skin integrity remains intact  Description: 1.  Monitor for areas of redness and/or skin breakdown  2.  Assess vascular access sites hourly  3.  Every 4-6 hours minimum:  Change oxygen saturation probe site  4.  Every 4-6 hours:  If on nasal continuous positive airway pressure, respiratory therapy assess nares and determine need for appliance change or resting period  6/25/2025 0321 by Ervin Newberry RN  Outcome: Progressing  6/24/2025 1555 by Joan Chacko RN  Outcome: Progressing  Flowsheets  Taken 6/24/2025 0625 by Ronaldo Arevalo RN  Skin Integrity Remains Intact: Monitor for areas of redness and/or skin breakdown  Taken 6/24/2025 0325 by Ronaldo Arevalo RN  Skin Integrity Remains Intact: Monitor for areas of redness and/or skin breakdown     Problem: Confusion  Goal: Confusion, delirium, dementia, or psychosis is improved or at baseline  Description:

## 2025-06-25 NOTE — PROGRESS NOTES
Ok for discharge per Dr. Dasilva from podiatry standpoint with follow up with Dr. Hernández for outpatient wound care. Added to discharge instructions.    CONRAD MORGAN RN

## 2025-06-29 ENCOUNTER — APPOINTMENT (OUTPATIENT)
Dept: GENERAL RADIOLOGY | Age: 55
DRG: 720 | End: 2025-06-29
Payer: COMMERCIAL

## 2025-06-29 ENCOUNTER — APPOINTMENT (OUTPATIENT)
Dept: CT IMAGING | Age: 55
DRG: 720 | End: 2025-06-29
Payer: COMMERCIAL

## 2025-06-29 ENCOUNTER — APPOINTMENT (OUTPATIENT)
Dept: ULTRASOUND IMAGING | Age: 55
DRG: 720 | End: 2025-06-29
Payer: COMMERCIAL

## 2025-06-29 ENCOUNTER — HOSPITAL ENCOUNTER (INPATIENT)
Age: 55
LOS: 16 days | Discharge: NURSING, REHAB OR LONG-TERM CARE | DRG: 720 | End: 2025-07-15
Attending: EMERGENCY MEDICINE | Admitting: STUDENT IN AN ORGANIZED HEALTH CARE EDUCATION/TRAINING PROGRAM
Payer: COMMERCIAL

## 2025-06-29 DIAGNOSIS — R41.0 ACUTE DELIRIUM: ICD-10-CM

## 2025-06-29 DIAGNOSIS — S32.000A COMPRESSION FRACTURE OF LUMBAR VERTEBRA, UNSPECIFIED LUMBAR VERTEBRAL LEVEL, INITIAL ENCOUNTER (HCC): ICD-10-CM

## 2025-06-29 DIAGNOSIS — J18.9 HOSPITAL-ACQUIRED PNEUMONIA: ICD-10-CM
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
## 2025-06-29 DIAGNOSIS — J96.21 ACUTE ON CHRONIC RESPIRATORY FAILURE WITH HYPOXIA (HCC): Primary | ICD-10-CM

## 2025-06-29 PROBLEM — S22.000A COMPRESSION FRACTURE OF BODY OF THORACIC VERTEBRA (HCC): Status: ACTIVE | Noted: 2025-06-29

## 2025-06-29 PROBLEM — J96.01 ACUTE HYPOXIC RESPIRATORY FAILURE (HCC): Status: ACTIVE | Noted: 2025-06-29

## 2025-06-29 LAB
ALBUMIN SERPL-MCNC: 3 G/DL (ref 3.5–5.2)
ALP SERPL-CCNC: 742 U/L (ref 35–104)
ALT SERPL-CCNC: 49 U/L (ref 0–35)
ANION GAP SERPL CALCULATED.3IONS-SCNC: 13 MMOL/L (ref 7–16)
AST SERPL-CCNC: 53 U/L (ref 0–35)
BASOPHILS # BLD: 0 K/UL (ref 0–0.2)
BASOPHILS NFR BLD: 0 % (ref 0–2)
BILIRUB SERPL-MCNC: 0.4 MG/DL (ref 0–1.2)
BNP SERPL-MCNC: 557 PG/ML (ref 0–125)
BUN SERPL-MCNC: 10 MG/DL (ref 6–20)
CALCIUM SERPL-MCNC: 9.5 MG/DL (ref 8.6–10)
CHLORIDE SERPL-SCNC: 96 MMOL/L (ref 98–107)
CO2 SERPL-SCNC: 32 MMOL/L (ref 22–29)
CREAT SERPL-MCNC: 0.5 MG/DL (ref 0.5–1)
D-DIMER QUANTITATIVE: 1950 NG/ML DDU (ref 0–230)
EOSINOPHIL # BLD: 0.11 K/UL (ref 0.05–0.5)
EOSINOPHILS RELATIVE PERCENT: 1 % (ref 0–6)
ERYTHROCYTE [DISTWIDTH] IN BLOOD BY AUTOMATED COUNT: 16.5 % (ref 11.5–15)
FLOW RATE: 15
FLOW RATE: 15
GFR, ESTIMATED: >90 ML/MIN/1.73M2
GLUCOSE SERPL-MCNC: 200 MG/DL (ref 74–99)
HCT VFR BLD AUTO: 42.6 % (ref 34–48)
HGB BLD-MCNC: 13.2 G/DL (ref 11.5–15.5)
LACTATE BLDV-SCNC: 1.5 MMOL/L (ref 0.5–1.9)
LACTATE BLDV-SCNC: 1.6 MMOL/L (ref 0.5–1.9)
LYMPHOCYTES NFR BLD: 0.22 K/UL (ref 1.5–4)
LYMPHOCYTES RELATIVE PERCENT: 2 % (ref 20–42)
MCH RBC QN AUTO: 28.6 PG (ref 26–35)
MCHC RBC AUTO-ENTMCNC: 31 G/DL (ref 32–34.5)
MCV RBC AUTO: 92.4 FL (ref 80–99.9)
METAMYELOCYTES ABSOLUTE COUNT: 0.11 K/UL (ref 0–0.12)
METAMYELOCYTES: 1 % (ref 0–1)
MONOCYTES NFR BLD: 0.33 K/UL (ref 0.1–0.95)
MONOCYTES NFR BLD: 3 % (ref 2–12)
MYELOCYTES ABSOLUTE COUNT: 0.33 K/UL
MYELOCYTES: 3 %
NEUTROPHILS NFR BLD: 91 % (ref 43–80)
NEUTS SEG NFR BLD: 11.31 K/UL (ref 1.8–7.3)
O2 DELIVERY DEVICE: ABNORMAL
O2 DELIVERY DEVICE: ABNORMAL
PLATELET # BLD AUTO: 388 K/UL (ref 130–450)
PMV BLD AUTO: 9.1 FL (ref 7–12)
POC HCO3: 39.6 MMOL/L (ref 22–26)
POC HCO3: 44.3 MMOL/L (ref 22–26)
POC O2 SATURATION: 97.7 % (ref 92–98.5)
POC O2 SATURATION: 99.2 % (ref 92–98.5)
POC PCO2: 56 MM HG (ref 35–45)
POC PCO2: 59.3 MM HG (ref 35–45)
POC PH: 7.46 (ref 7.35–7.45)
POC PH: 7.48 (ref 7.35–7.45)
POC PO2: 137.9 MM HG (ref 80–100)
POC PO2: 96.1 MM HG (ref 80–100)
POSITIVE BASE EXCESS, ART: 13.2 MMOL/L (ref 0–3)
POSITIVE BASE EXCESS, ART: 17.5 MMOL/L (ref 0–3)
POTASSIUM SERPL-SCNC: 3.7 MMOL/L (ref 3.5–5.1)
PROT SERPL-MCNC: 6.3 G/DL (ref 6.4–8.3)
RBC # BLD AUTO: 4.61 M/UL (ref 3.5–5.5)
RBC # BLD: ABNORMAL 10*6/UL
RBC # BLD: ABNORMAL 10*6/UL
SODIUM SERPL-SCNC: 141 MMOL/L (ref 136–145)
TROPONIN I SERPL HS-MCNC: 41 NG/L (ref 0–14)
TROPONIN I SERPL HS-MCNC: 47 NG/L (ref 0–14)
WBC OTHER # BLD: 12.4 K/UL (ref 4.5–11.5)

## 2025-06-29 PROCEDURE — 83880 ASSAY OF NATRIURETIC PEPTIDE: CPT

## 2025-06-29 PROCEDURE — 94640 AIRWAY INHALATION TREATMENT: CPT

## 2025-06-29 PROCEDURE — 87040 BLOOD CULTURE FOR BACTERIA: CPT

## 2025-06-29 PROCEDURE — 85025 COMPLETE CBC W/AUTO DIFF WBC: CPT

## 2025-06-29 PROCEDURE — 2060000000 HC ICU INTERMEDIATE R&B

## 2025-06-29 PROCEDURE — 2580000003 HC RX 258: Performed by: EMERGENCY MEDICINE

## 2025-06-29 PROCEDURE — 99222 1ST HOSP IP/OBS MODERATE 55: CPT | Performed by: STUDENT IN AN ORGANIZED HEALTH CARE EDUCATION/TRAINING PROGRAM

## 2025-06-29 PROCEDURE — 84156 ASSAY OF PROTEIN URINE: CPT

## 2025-06-29 PROCEDURE — 0202U NFCT DS 22 TRGT SARS-COV-2: CPT

## 2025-06-29 PROCEDURE — 86140 C-REACTIVE PROTEIN: CPT

## 2025-06-29 PROCEDURE — 84484 ASSAY OF TROPONIN QUANT: CPT

## 2025-06-29 PROCEDURE — 82803 BLOOD GASES ANY COMBINATION: CPT

## 2025-06-29 PROCEDURE — 80053 COMPREHEN METABOLIC PANEL: CPT

## 2025-06-29 PROCEDURE — 84166 PROTEIN E-PHORESIS/URINE/CSF: CPT

## 2025-06-29 PROCEDURE — 6360000004 HC RX CONTRAST MEDICATION: Performed by: RADIOLOGY

## 2025-06-29 PROCEDURE — 99285 EMERGENCY DEPT VISIT HI MDM: CPT

## 2025-06-29 PROCEDURE — 76705 ECHO EXAM OF ABDOMEN: CPT

## 2025-06-29 PROCEDURE — 87081 CULTURE SCREEN ONLY: CPT

## 2025-06-29 PROCEDURE — 87899 AGENT NOS ASSAY W/OPTIC: CPT

## 2025-06-29 PROCEDURE — 71045 X-RAY EXAM CHEST 1 VIEW: CPT

## 2025-06-29 PROCEDURE — 71275 CT ANGIOGRAPHY CHEST: CPT

## 2025-06-29 PROCEDURE — 83605 ASSAY OF LACTIC ACID: CPT

## 2025-06-29 PROCEDURE — 93005 ELECTROCARDIOGRAM TRACING: CPT | Performed by: EMERGENCY MEDICINE

## 2025-06-29 PROCEDURE — 85652 RBC SED RATE AUTOMATED: CPT

## 2025-06-29 PROCEDURE — 84165 PROTEIN E-PHORESIS SERUM: CPT

## 2025-06-29 PROCEDURE — 87449 NOS EACH ORGANISM AG IA: CPT

## 2025-06-29 PROCEDURE — 6360000002 HC RX W HCPCS: Performed by: EMERGENCY MEDICINE

## 2025-06-29 PROCEDURE — 2500000003 HC RX 250 WO HCPCS: Performed by: EMERGENCY MEDICINE

## 2025-06-29 PROCEDURE — 96375 TX/PRO/DX INJ NEW DRUG ADDON: CPT

## 2025-06-29 PROCEDURE — 96365 THER/PROPH/DIAG IV INF INIT: CPT

## 2025-06-29 PROCEDURE — 84145 PROCALCITONIN (PCT): CPT

## 2025-06-29 PROCEDURE — 86738 MYCOPLASMA ANTIBODY: CPT

## 2025-06-29 PROCEDURE — 84155 ASSAY OF PROTEIN SERUM: CPT

## 2025-06-29 PROCEDURE — 85379 FIBRIN DEGRADATION QUANT: CPT

## 2025-06-29 PROCEDURE — 6370000000 HC RX 637 (ALT 250 FOR IP): Performed by: EMERGENCY MEDICINE

## 2025-06-29 RX ORDER — SODIUM CHLORIDE 9 MG/ML
INJECTION, SOLUTION INTRAVENOUS PRN
Status: DISCONTINUED | OUTPATIENT
Start: 2025-06-29 | End: 2025-07-15 | Stop reason: HOSPADM

## 2025-06-29 RX ORDER — KETOROLAC TROMETHAMINE 30 MG/ML
15 INJECTION, SOLUTION INTRAMUSCULAR; INTRAVENOUS ONCE
Status: COMPLETED | OUTPATIENT
Start: 2025-06-29 | End: 2025-06-29

## 2025-06-29 RX ORDER — SODIUM CHLORIDE 0.9 % (FLUSH) 0.9 %
5-40 SYRINGE (ML) INJECTION PRN
Status: DISCONTINUED | OUTPATIENT
Start: 2025-06-29 | End: 2025-07-15 | Stop reason: HOSPADM

## 2025-06-29 RX ORDER — ONDANSETRON 4 MG/1
4 TABLET, ORALLY DISINTEGRATING ORAL EVERY 8 HOURS PRN
Status: DISCONTINUED | OUTPATIENT
Start: 2025-06-29 | End: 2025-07-15 | Stop reason: HOSPADM

## 2025-06-29 RX ORDER — IPRATROPIUM BROMIDE AND ALBUTEROL SULFATE 2.5; .5 MG/3ML; MG/3ML
3 SOLUTION RESPIRATORY (INHALATION) ONCE
Status: COMPLETED | OUTPATIENT
Start: 2025-06-29 | End: 2025-06-29

## 2025-06-29 RX ORDER — POLYETHYLENE GLYCOL 3350 17 G/17G
17 POWDER, FOR SOLUTION ORAL DAILY PRN
Status: DISCONTINUED | OUTPATIENT
Start: 2025-06-29 | End: 2025-07-15 | Stop reason: HOSPADM

## 2025-06-29 RX ORDER — ENOXAPARIN SODIUM 100 MG/ML
40 INJECTION SUBCUTANEOUS DAILY
Status: DISCONTINUED | OUTPATIENT
Start: 2025-06-30 | End: 2025-07-15 | Stop reason: HOSPADM

## 2025-06-29 RX ORDER — ONDANSETRON 2 MG/ML
4 INJECTION INTRAMUSCULAR; INTRAVENOUS EVERY 6 HOURS PRN
Status: DISCONTINUED | OUTPATIENT
Start: 2025-06-29 | End: 2025-07-15 | Stop reason: HOSPADM

## 2025-06-29 RX ORDER — IOPAMIDOL 755 MG/ML
75 INJECTION, SOLUTION INTRAVASCULAR
Status: COMPLETED | OUTPATIENT
Start: 2025-06-29 | End: 2025-06-29

## 2025-06-29 RX ORDER — POTASSIUM CHLORIDE 1500 MG/1
40 TABLET, EXTENDED RELEASE ORAL PRN
Status: DISCONTINUED | OUTPATIENT
Start: 2025-06-29 | End: 2025-07-01

## 2025-06-29 RX ORDER — MAGNESIUM SULFATE IN WATER 40 MG/ML
2000 INJECTION, SOLUTION INTRAVENOUS PRN
Status: DISCONTINUED | OUTPATIENT
Start: 2025-06-29 | End: 2025-07-15 | Stop reason: HOSPADM

## 2025-06-29 RX ORDER — SODIUM CHLORIDE 0.9 % (FLUSH) 0.9 %
5-40 SYRINGE (ML) INJECTION EVERY 12 HOURS SCHEDULED
Status: DISCONTINUED | OUTPATIENT
Start: 2025-06-29 | End: 2025-07-15 | Stop reason: HOSPADM

## 2025-06-29 RX ORDER — POTASSIUM CHLORIDE 7.45 MG/ML
10 INJECTION INTRAVENOUS PRN
Status: DISCONTINUED | OUTPATIENT
Start: 2025-06-29 | End: 2025-07-01

## 2025-06-29 RX ADMIN — IPRATROPIUM BROMIDE AND ALBUTEROL SULFATE 3 DOSE: .5; 3 SOLUTION RESPIRATORY (INHALATION) at 13:15

## 2025-06-29 RX ADMIN — VANCOMYCIN HYDROCHLORIDE 2000 MG: 10 INJECTION, POWDER, LYOPHILIZED, FOR SOLUTION INTRAVENOUS at 20:01

## 2025-06-29 RX ADMIN — KETOROLAC TROMETHAMINE 15 MG: 30 INJECTION, SOLUTION INTRAMUSCULAR at 17:19

## 2025-06-29 RX ADMIN — CEFEPIME 2000 MG: 2 INJECTION, POWDER, FOR SOLUTION INTRAVENOUS at 19:22

## 2025-06-29 RX ADMIN — IOPAMIDOL 75 ML: 755 INJECTION, SOLUTION INTRAVENOUS at 17:49

## 2025-06-29 RX ADMIN — WATER 125 MG: 1 INJECTION INTRAMUSCULAR; INTRAVENOUS; SUBCUTANEOUS at 17:12

## 2025-06-29 ASSESSMENT — PAIN SCALES - GENERAL: PAINLEVEL_OUTOF10: 8

## 2025-06-29 ASSESSMENT — PAIN DESCRIPTION - LOCATION: LOCATION: BACK

## 2025-06-29 ASSESSMENT — PAIN - FUNCTIONAL ASSESSMENT: PAIN_FUNCTIONAL_ASSESSMENT: PREVENTS OR INTERFERES WITH ALL ACTIVE AND SOME PASSIVE ACTIVITIES

## 2025-06-29 NOTE — ED PROVIDER NOTES
ED PROVIDER NOTE    Chief Complaint   Patient presents with    Shortness of Breath     Patient 84% on 6 L NC at triage, HX of asthma, recent DX of pneumonia. Patient does not normally wear O2.       HPI:  6/29/25,   Time: 12:57 PM EDT       Li Rojas is a 55 y.o. female presenting to the ED for respiratory distress.  Patient presents from nursing facility.  She was discharged 4 days ago from an inpatient admission at [facility].  She was admitted there for MARCO.  She states that she has been increasingly short of breath over the last few days.  She denies fever or chills.  She does endorse a cough.  No chest pain or abdominal pain.  No nausea or vomiting.  She does have peripheral edema which is improving since her recent admission.  On arrival O2 sats of 85% which required 15 L/min nonrebreather to improve. Subsequently placed on BPAP with improvement.    Chart review: hx of RA, asthma, HTN, HLD, SLE, MS, SVT, DM2  Lab Results   Component Value Date    LVEF 65 06/25/2020       Reviewed inpatient pulmonology progress note from 6/25/25 by Dr. Whitlock:  Dx acute respiratory insufficiency, Ddx aspiration pneumonia, viral infection, pneumonitis    Review of Systems:     Review of Systems  Pertinent positives and negatives as stated in HPI     --------------------------------------------- PAST HISTORY ---------------------------------------------  Past Medical History:   Past Medical History:   Diagnosis Date    Arthritis     rheumatoid    Asthma     H/O medication noncompliance     Hyperlipidemia     Hypertension     Lupus     MS (multiple sclerosis) (HCC)     Multiple sclerosis (HCC)     Nicotine dependence, cigarettes, uncomplicated     Palpitations     SOB (shortness of breath)     SVT (supraventricular tachycardia)     Systemic lupus erythematosus, unspecified (HCC)     Type 2 diabetes mellitus without complication, without long-term current use of insulin ([facility]) 5/20/2025       Past Surgical

## 2025-06-30 PROBLEM — R44.1 VISUAL HALLUCINATIONS: Status: ACTIVE | Noted: 2025-06-30

## 2025-06-30 PROBLEM — J96.21 ACUTE ON CHRONIC RESPIRATORY FAILURE WITH HYPOXIA (HCC): Status: ACTIVE | Noted: 2025-06-29

## 2025-06-30 PROBLEM — R41.0 ACUTE DELIRIUM: Status: ACTIVE | Noted: 2025-06-30
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
## 2025-06-30 LAB
ALBUMIN SERPL-MCNC: 3 G/DL (ref 3.5–5.2)
ALP SERPL-CCNC: 693 U/L (ref 35–104)
ALT SERPL-CCNC: 47 U/L (ref 0–35)
ANION GAP SERPL CALCULATED.3IONS-SCNC: 16 MMOL/L (ref 7–16)
AST SERPL-CCNC: 48 U/L (ref 0–35)
B PARAP IS1001 DNA NPH QL NAA+NON-PROBE: NOT DETECTED
B PERT DNA SPEC QL NAA+PROBE: NOT DETECTED
B.E.: 6.4 MMOL/L (ref -3–3)
BASOPHILS # BLD: 0.1 K/UL (ref 0–0.2)
BASOPHILS NFR BLD: 1 % (ref 0–2)
BILIRUB SERPL-MCNC: 0.4 MG/DL (ref 0–1.2)
BUN SERPL-MCNC: 15 MG/DL (ref 6–20)
C PNEUM DNA NPH QL NAA+NON-PROBE: NOT DETECTED
CALCIUM SERPL-MCNC: 9 MG/DL (ref 8.6–10)
CHLORIDE SERPL-SCNC: 96 MMOL/L (ref 98–107)
CO2 SERPL-SCNC: 28 MMOL/L (ref 22–29)
COHB: 0.7 % (ref 0–1.5)
CREAT SERPL-MCNC: 0.6 MG/DL (ref 0.5–1)
CRITICAL: ABNORMAL
CRP SERPL HS-MCNC: 88 MG/L (ref 0–5)
DATE ANALYZED: ABNORMAL
DATE OF COLLECTION: ABNORMAL
EKG ATRIAL RATE: 93 BPM
EKG P AXIS: 18 DEGREES
EKG P-R INTERVAL: 150 MS
EKG Q-T INTERVAL: 364 MS
EKG QRS DURATION: 66 MS
EKG QTC CALCULATION (BAZETT): 452 MS
EKG R AXIS: 28 DEGREES
EKG T AXIS: 15 DEGREES
EKG VENTRICULAR RATE: 93 BPM
EOSINOPHIL # BLD: 0 K/UL (ref 0.05–0.5)
EOSINOPHILS RELATIVE PERCENT: 0 % (ref 0–6)
ERYTHROCYTE [DISTWIDTH] IN BLOOD BY AUTOMATED COUNT: 16.4 % (ref 11.5–15)
ERYTHROCYTE [SEDIMENTATION RATE] IN BLOOD BY WESTERGREN METHOD: 69 MM/HR (ref 0–20)
FLUAV RNA NPH QL NAA+NON-PROBE: NOT DETECTED
FLUBV RNA NPH QL NAA+NON-PROBE: NOT DETECTED
GFR, ESTIMATED: >90 ML/MIN/1.73M2
GLUCOSE BLD-MCNC: 235 MG/DL (ref 74–99)
GLUCOSE BLD-MCNC: 243 MG/DL (ref 74–99)
GLUCOSE BLD-MCNC: 244 MG/DL (ref 74–99)
GLUCOSE BLD-MCNC: 280 MG/DL (ref 74–99)
GLUCOSE SERPL-MCNC: 229 MG/DL (ref 74–99)
HADV DNA NPH QL NAA+NON-PROBE: NOT DETECTED
HCO3: 30.2 MMOL/L (ref 22–26)
HCOV 229E RNA NPH QL NAA+NON-PROBE: NOT DETECTED
HCOV HKU1 RNA NPH QL NAA+NON-PROBE: NOT DETECTED
HCOV NL63 RNA NPH QL NAA+NON-PROBE: NOT DETECTED
HCOV OC43 RNA NPH QL NAA+NON-PROBE: NOT DETECTED
HCT VFR BLD AUTO: 39.7 % (ref 34–48)
HGB BLD-MCNC: 12.6 G/DL (ref 11.5–15.5)
HHB: 5.8 % (ref 0–5)
HMPV RNA NPH QL NAA+NON-PROBE: NOT DETECTED
HPIV1 RNA NPH QL NAA+NON-PROBE: NOT DETECTED
HPIV2 RNA NPH QL NAA+NON-PROBE: NOT DETECTED
HPIV3 RNA NPH QL NAA+NON-PROBE: NOT DETECTED
HPIV4 RNA NPH QL NAA+NON-PROBE: NOT DETECTED
L PNEUMO1 AG UR QL IA.RAPID: NEGATIVE
LAB: ABNORMAL
LYMPHOCYTES NFR BLD: 0.2 K/UL (ref 1.5–4)
LYMPHOCYTES RELATIVE PERCENT: 2 % (ref 20–42)
Lab: 1147
M PNEUMO DNA NPH QL NAA+NON-PROBE: NOT DETECTED
MCH RBC QN AUTO: 29 PG (ref 26–35)
MCHC RBC AUTO-ENTMCNC: 31.7 G/DL (ref 32–34.5)
MCV RBC AUTO: 91.3 FL (ref 80–99.9)
METHB: 0.1 % (ref 0–1.5)
MODE: ABNORMAL
MONOCYTES NFR BLD: 0.3 K/UL (ref 0.1–0.95)
MONOCYTES NFR BLD: 3 % (ref 2–12)
MYCOPLASMA AB,IGM: NORMAL
MYELOCYTES ABSOLUTE COUNT: 0.2 K/UL
MYELOCYTES: 2 %
NEUTROPHILS NFR BLD: 92 % (ref 43–80)
NEUTS SEG NFR BLD: 9.29 K/UL (ref 1.8–7.3)
O2 CONTENT: 17.2 ML/DL
O2 SATURATION: 94.2 % (ref 92–98.5)
O2HB: 93.4 % (ref 94–97)
OPERATOR ID: ABNORMAL
PATIENT TEMP: 37 C
PCO2: 40.3 MMHG (ref 35–45)
PH BLOOD GAS: 7.49 (ref 7.35–7.45)
PLATELET CONFIRMATION: NORMAL
PLATELET, FLUORESCENCE: 480 K/UL (ref 130–450)
PMV BLD AUTO: 9.7 FL (ref 7–12)
PO2: 66.1 MMHG (ref 75–100)
POTASSIUM SERPL-SCNC: 4.1 MMOL/L (ref 3.5–5.1)
PROCALCITONIN SERPL-MCNC: 0.09 NG/ML (ref 0–0.08)
PROCALCITONIN SERPL-MCNC: 0.1 NG/ML (ref 0–0.08)
PROT SERPL-MCNC: 6.3 G/DL (ref 6.4–8.3)
RBC # BLD AUTO: 4.35 M/UL (ref 3.5–5.5)
RSV RNA NPH QL NAA+NON-PROBE: NOT DETECTED
RV+EV RNA NPH QL NAA+NON-PROBE: NOT DETECTED
S PNEUM AG SPEC QL: NEGATIVE
SARS-COV-2 RNA NPH QL NAA+NON-PROBE: NOT DETECTED
SODIUM SERPL-SCNC: 140 MMOL/L (ref 136–145)
SOURCE, BLOOD GAS: ABNORMAL
SPECIMEN DESCRIPTION: NORMAL
SPECIMEN SOURCE: NORMAL
THB: 13.1 G/DL (ref 11.5–16.5)
TIME ANALYZED: 1151
WBC OTHER # BLD: 10.1 K/UL (ref 4.5–11.5)

## 2025-06-30 PROCEDURE — 97161 PT EVAL LOW COMPLEX 20 MIN: CPT | Performed by: PHYSICAL THERAPIST

## 2025-06-30 PROCEDURE — 6360000002 HC RX W HCPCS: Performed by: STUDENT IN AN ORGANIZED HEALTH CARE EDUCATION/TRAINING PROGRAM

## 2025-06-30 PROCEDURE — 2580000003 HC RX 258: Performed by: STUDENT IN AN ORGANIZED HEALTH CARE EDUCATION/TRAINING PROGRAM

## 2025-06-30 PROCEDURE — 36415 COLL VENOUS BLD VENIPUNCTURE: CPT

## 2025-06-30 PROCEDURE — 93010 ELECTROCARDIOGRAM REPORT: CPT | Performed by: INTERNAL MEDICINE

## 2025-06-30 PROCEDURE — 2060000000 HC ICU INTERMEDIATE R&B

## 2025-06-30 PROCEDURE — 2700000000 HC OXYGEN THERAPY PER DAY

## 2025-06-30 PROCEDURE — 36600 WITHDRAWAL OF ARTERIAL BLOOD: CPT

## 2025-06-30 PROCEDURE — 6370000000 HC RX 637 (ALT 250 FOR IP): Performed by: STUDENT IN AN ORGANIZED HEALTH CARE EDUCATION/TRAINING PROGRAM

## 2025-06-30 PROCEDURE — 2500000003 HC RX 250 WO HCPCS: Performed by: STUDENT IN AN ORGANIZED HEALTH CARE EDUCATION/TRAINING PROGRAM

## 2025-06-30 PROCEDURE — 82962 GLUCOSE BLOOD TEST: CPT

## 2025-06-30 PROCEDURE — 84145 PROCALCITONIN (PCT): CPT

## 2025-06-30 PROCEDURE — 92526 ORAL FUNCTION THERAPY: CPT

## 2025-06-30 PROCEDURE — 6370000000 HC RX 637 (ALT 250 FOR IP): Performed by: INTERNAL MEDICINE

## 2025-06-30 PROCEDURE — 85025 COMPLETE CBC W/AUTO DIFF WBC: CPT

## 2025-06-30 PROCEDURE — 6360000002 HC RX W HCPCS: Performed by: INTERNAL MEDICINE

## 2025-06-30 PROCEDURE — 92610 EVALUATE SWALLOWING FUNCTION: CPT

## 2025-06-30 PROCEDURE — 97530 THERAPEUTIC ACTIVITIES: CPT | Performed by: PHYSICAL THERAPIST

## 2025-06-30 PROCEDURE — 80053 COMPREHEN METABOLIC PANEL: CPT

## 2025-06-30 PROCEDURE — 82805 BLOOD GASES W/O2 SATURATION: CPT

## 2025-06-30 PROCEDURE — 94640 AIRWAY INHALATION TREATMENT: CPT

## 2025-06-30 RX ORDER — BISACODYL 5 MG/1
5 TABLET, DELAYED RELEASE ORAL 2 TIMES DAILY
Status: DISCONTINUED | OUTPATIENT
Start: 2025-06-30 | End: 2025-07-15 | Stop reason: HOSPADM

## 2025-06-30 RX ORDER — DEXTROSE MONOHYDRATE 100 MG/ML
INJECTION, SOLUTION INTRAVENOUS CONTINUOUS PRN
Status: DISCONTINUED | OUTPATIENT
Start: 2025-06-30 | End: 2025-07-15 | Stop reason: HOSPADM

## 2025-06-30 RX ORDER — INSULIN LISPRO 100 [IU]/ML
0-4 INJECTION, SOLUTION INTRAVENOUS; SUBCUTANEOUS
Status: DISCONTINUED | OUTPATIENT
Start: 2025-06-30 | End: 2025-07-15 | Stop reason: HOSPADM

## 2025-06-30 RX ORDER — LORAZEPAM 0.5 MG/1
0.5 TABLET ORAL 2 TIMES DAILY
Status: DISCONTINUED | OUTPATIENT
Start: 2025-06-30 | End: 2025-07-01

## 2025-06-30 RX ORDER — POTASSIUM CHLORIDE 1500 MG/1
20 TABLET, EXTENDED RELEASE ORAL
Status: DISCONTINUED | OUTPATIENT
Start: 2025-06-30 | End: 2025-07-15 | Stop reason: HOSPADM

## 2025-06-30 RX ORDER — DILTIAZEM HYDROCHLORIDE 120 MG/1
240 CAPSULE, COATED, EXTENDED RELEASE ORAL DAILY
Status: DISCONTINUED | OUTPATIENT
Start: 2025-06-30 | End: 2025-07-15 | Stop reason: HOSPADM

## 2025-06-30 RX ORDER — ALBUTEROL SULFATE 90 UG/1
2 INHALANT RESPIRATORY (INHALATION) EVERY 6 HOURS PRN
Status: DISCONTINUED | OUTPATIENT
Start: 2025-06-30 | End: 2025-06-30 | Stop reason: SDUPTHER

## 2025-06-30 RX ORDER — FUROSEMIDE 40 MG/1
40 TABLET ORAL
Status: DISCONTINUED | OUTPATIENT
Start: 2025-06-30 | End: 2025-07-06

## 2025-06-30 RX ORDER — PANTOPRAZOLE SODIUM 40 MG/1
40 TABLET, DELAYED RELEASE ORAL
Status: DISCONTINUED | OUTPATIENT
Start: 2025-06-30 | End: 2025-07-15 | Stop reason: HOSPADM

## 2025-06-30 RX ORDER — ATENOLOL 25 MG/1
25 TABLET ORAL
Status: DISCONTINUED | OUTPATIENT
Start: 2025-06-30 | End: 2025-07-15 | Stop reason: HOSPADM

## 2025-06-30 RX ORDER — BUPRENORPHINE HYDROCHLORIDE AND NALOXONE HYDROCHLORIDE DIHYDRATE 8; 2 MG/1; MG/1
1 TABLET SUBLINGUAL 2 TIMES DAILY
Status: DISCONTINUED | OUTPATIENT
Start: 2025-06-30 | End: 2025-07-03

## 2025-06-30 RX ORDER — GLUCAGON 1 MG/ML
1 KIT INJECTION PRN
Status: DISCONTINUED | OUTPATIENT
Start: 2025-06-30 | End: 2025-07-15 | Stop reason: HOSPADM

## 2025-06-30 RX ORDER — BUPRENORPHINE AND NALOXONE 8; 2 MG/1; MG/1
1 FILM, SOLUBLE BUCCAL; SUBLINGUAL 2 TIMES DAILY
Status: DISCONTINUED | OUTPATIENT
Start: 2025-06-30 | End: 2025-06-30 | Stop reason: CLARIF

## 2025-06-30 RX ORDER — TRAMADOL HYDROCHLORIDE 50 MG/1
50 TABLET ORAL EVERY 8 HOURS PRN
Status: DISCONTINUED | OUTPATIENT
Start: 2025-06-30 | End: 2025-07-02

## 2025-06-30 RX ORDER — INSULIN GLARGINE 100 [IU]/ML
12 INJECTION, SOLUTION SUBCUTANEOUS NIGHTLY
Status: DISCONTINUED | OUTPATIENT
Start: 2025-06-30 | End: 2025-07-01

## 2025-06-30 RX ORDER — BUDESONIDE 0.5 MG/2ML
0.5 INHALANT ORAL
Status: DISCONTINUED | OUTPATIENT
Start: 2025-06-30 | End: 2025-07-15 | Stop reason: HOSPADM

## 2025-06-30 RX ORDER — TRAMADOL HYDROCHLORIDE 50 MG/1
50 TABLET ORAL EVERY 8 HOURS PRN
Status: ON HOLD | COMMUNITY
End: 2025-07-15 | Stop reason: HOSPADM

## 2025-06-30 RX ORDER — IPRATROPIUM BROMIDE AND ALBUTEROL SULFATE 2.5; .5 MG/3ML; MG/3ML
1 SOLUTION RESPIRATORY (INHALATION)
Status: DISCONTINUED | OUTPATIENT
Start: 2025-06-30 | End: 2025-07-15 | Stop reason: HOSPADM

## 2025-06-30 RX ORDER — ALBUTEROL SULFATE 0.83 MG/ML
2.5 SOLUTION RESPIRATORY (INHALATION) EVERY 6 HOURS PRN
Status: DISCONTINUED | OUTPATIENT
Start: 2025-06-30 | End: 2025-07-15 | Stop reason: HOSPADM

## 2025-06-30 RX ADMIN — ALBUTEROL SULFATE 2.5 MG: 2.5 SOLUTION RESPIRATORY (INHALATION) at 09:47

## 2025-06-30 RX ADMIN — VANCOMYCIN HYDROCHLORIDE 1500 MG: 10 INJECTION, POWDER, LYOPHILIZED, FOR SOLUTION INTRAVENOUS at 22:33

## 2025-06-30 RX ADMIN — INSULIN LISPRO 2 UNITS: 100 INJECTION, SOLUTION INTRAVENOUS; SUBCUTANEOUS at 18:26

## 2025-06-30 RX ADMIN — ENOXAPARIN SODIUM 40 MG: 100 INJECTION SUBCUTANEOUS at 09:39

## 2025-06-30 RX ADMIN — Medication 10 ML: at 09:40

## 2025-06-30 RX ADMIN — TRAMADOL HYDROCHLORIDE 50 MG: 50 TABLET, COATED ORAL at 15:13

## 2025-06-30 RX ADMIN — CEFEPIME 2000 MG: 2 INJECTION, POWDER, FOR SOLUTION INTRAVENOUS at 18:35

## 2025-06-30 RX ADMIN — CEFEPIME 2000 MG: 2 INJECTION, POWDER, FOR SOLUTION INTRAVENOUS at 02:48

## 2025-06-30 RX ADMIN — INSULIN LISPRO 1 UNITS: 100 INJECTION, SOLUTION INTRAVENOUS; SUBCUTANEOUS at 09:42

## 2025-06-30 RX ADMIN — ALBUTEROL SULFATE 2.5 MG: 2.5 SOLUTION RESPIRATORY (INHALATION) at 15:21

## 2025-06-30 RX ADMIN — PREDNISONE 15 MG: 5 TABLET ORAL at 09:41

## 2025-06-30 RX ADMIN — DILTIAZEM HYDROCHLORIDE 240 MG: 240 CAPSULE, COATED, EXTENDED RELEASE ORAL at 09:41

## 2025-06-30 RX ADMIN — Medication 5 ML: at 00:08

## 2025-06-30 RX ADMIN — BUDESONIDE 500 MCG: 0.5 SUSPENSION RESPIRATORY (INHALATION) at 17:16

## 2025-06-30 RX ADMIN — ALBUTEROL SULFATE 2.5 MG: 2.5 SOLUTION RESPIRATORY (INHALATION) at 23:23

## 2025-06-30 RX ADMIN — BISACODYL 5 MG: 5 TABLET, COATED ORAL at 21:28

## 2025-06-30 RX ADMIN — FUROSEMIDE 40 MG: 40 TABLET ORAL at 12:52

## 2025-06-30 RX ADMIN — ATENOLOL 25 MG: 25 TABLET ORAL at 06:46

## 2025-06-30 RX ADMIN — MICONAZOLE NITRATE: 20 POWDER TOPICAL at 09:43

## 2025-06-30 RX ADMIN — INSULIN LISPRO 2 UNITS: 100 INJECTION, SOLUTION INTRAVENOUS; SUBCUTANEOUS at 21:39

## 2025-06-30 RX ADMIN — PANTOPRAZOLE SODIUM 40 MG: 40 TABLET, DELAYED RELEASE ORAL at 06:46

## 2025-06-30 RX ADMIN — INSULIN LISPRO 1 UNITS: 100 INJECTION, SOLUTION INTRAVENOUS; SUBCUTANEOUS at 12:53

## 2025-06-30 RX ADMIN — CEFEPIME 2000 MG: 2 INJECTION, POWDER, FOR SOLUTION INTRAVENOUS at 12:58

## 2025-06-30 RX ADMIN — LORAZEPAM 0.5 MG: 0.5 TABLET ORAL at 09:40

## 2025-06-30 RX ADMIN — Medication 10 ML: at 21:28

## 2025-06-30 RX ADMIN — INSULIN GLARGINE 12 UNITS: 100 INJECTION, SOLUTION SUBCUTANEOUS at 21:28

## 2025-06-30 RX ADMIN — POTASSIUM CHLORIDE 20 MEQ: 1500 TABLET, EXTENDED RELEASE ORAL at 12:52

## 2025-06-30 RX ADMIN — ATENOLOL 25 MG: 25 TABLET ORAL at 18:29

## 2025-06-30 RX ADMIN — VANCOMYCIN HYDROCHLORIDE 1500 MG: 10 INJECTION, POWDER, LYOPHILIZED, FOR SOLUTION INTRAVENOUS at 10:15

## 2025-06-30 RX ADMIN — MICONAZOLE NITRATE: 20 POWDER TOPICAL at 21:29

## 2025-06-30 RX ADMIN — IPRATROPIUM BROMIDE AND ALBUTEROL SULFATE 1 DOSE: .5; 2.5 SOLUTION RESPIRATORY (INHALATION) at 17:16

## 2025-06-30 ASSESSMENT — PAIN DESCRIPTION - DESCRIPTORS
DESCRIPTORS: DISCOMFORT;ACHING
DESCRIPTORS: SHARP;DISCOMFORT;ACHING

## 2025-06-30 ASSESSMENT — PAIN DESCRIPTION - LOCATION
LOCATION: BACK

## 2025-06-30 ASSESSMENT — PAIN SCALES - GENERAL
PAINLEVEL_OUTOF10: 7
PAINLEVEL_OUTOF10: 7
PAINLEVEL_OUTOF10: 5
PAINLEVEL_OUTOF10: 8

## 2025-06-30 ASSESSMENT — PAIN DESCRIPTION - ORIENTATION
ORIENTATION: LOWER
ORIENTATION: LOWER

## 2025-06-30 NOTE — FLOWSHEET NOTE
Location Orientation: Right;Medial   Wound Etiology   (partial thickness)   Wound Length (cm) 2 cm   Wound Width (cm) 2.5 cm   Wound Depth (cm) 0.2 cm   Wound Surface Area (cm^2) 5 cm^2   Change in Wound Size % (l*w) 61.54   Wound Volume (cm^3) 1 cm^3   Wound Healing % 23   Wound Assessment Pink/red   Drainage Amount None (dry)   Odor None   Mallory-wound Assessment Fragile;Non-blanchable erythema   Wound 06/20/25 Abdomen Lower;Medial   Date First Assessed/Time First Assessed: 06/20/25 0430   Location: Abdomen  Wound Location Orientation: Lower;Medial   Wound Length (cm) 0.8 cm   Wound Width (cm) 7.5 cm   Wound Depth (cm) 0.2 cm   Wound Surface Area (cm^2) 6 cm^2   Change in Wound Size % (l*w) 29.41   Wound Volume (cm^3) 1.2 cm^3   Wound Healing % -41   Wound Assessment Pink/red   Drainage Amount Scant (moist but unmeasurable)   Drainage Description Serosanguinous   Odor None   Wound 06/20/25 Buttocks Right   Date First Assessed/Time First Assessed: 06/20/25 0430   Location: Buttocks  Wound Location Orientation: Right   Wound Image    Wound Etiology   (incontinent contact dermatitis)   Wound Cleansed Cleansed with saline   Dressing/Treatment   (TRIAD)   Wound Length (cm) 13 cm   Wound Width (cm) 6 cm   Wound Depth (cm) 0.2 cm   Wound Surface Area (cm^2) 78 cm^2   Change in Wound Size % (l*w) -3429.41   Wound Volume (cm^3) 15.6 cm^3   Wound Healing % -6959   Wound Assessment Pink/red   Drainage Amount Scant (moist but unmeasurable)   Odor None   Mallory-wound Assessment Non-blanchable erythema   Wound 06/20/25 Buttocks Left   Date First Assessed/Time First Assessed: 06/20/25 0430   Present on Original Admission: No  Location: Buttocks  Wound Location Orientation: Left   Wound Image    Wound Etiology   (irritant contact dermatitis)   Wound Cleansed Cleansed with saline   Dressing/Treatment   (TRIAD)   Wound Length (cm) 11 cm   Wound Width (cm) 5 cm   Wound Depth (cm) 0.2 cm   Wound Surface Area (cm^2) 55 cm^2   Change in

## 2025-06-30 NOTE — DISCHARGE SUMMARY
Internal Medicine Discharge Summary    NAME: Li Rojas :  1970  MRN:  [id number] PCP:Melvin Cardoso MD    ADMITTED: 2025   DISCHARGED: 2025  8:00 PM    ADMITTING PHYSICIAN: Phyllis att. providers found    PCP: Melvin Cardoso MD    CONSULTANT(S):   IP CONSULT TO INTERNAL MEDICINE  IP CONSULT TO PODIATRY  IP CONSULT TO INFECTIOUS DISEASES  IP CONSULT TO NEUROSURGERY  IP CONSULT TO PALLIATIVE CARE  IP CONSULT TO PULMONOLOGY  INPATIENT CONSULT TO ORTHOTIST/PROSTHETIST  IP CONSULT TO PALLIATIVE CARE     ADMITTING DIAGNOSIS:   Hypercalcemia [E83.52]  Hyperkalemia [E87.5]  MARCO (acute kidney injury) [N17.9]  Receiving intravenous antibiotic treatment as outpatient [Z79.2]     Please see H&P for further details    DISCHARGE DIAGNOSES:   Active Hospital Problems    Diagnosis     Rheumatoid arthritis (HCC) [M06.9]      Priority: Medium    Palliative care encounter [Z51.5]     Goals of care, counseling/discussion [Z71.89]     MARCO (acute kidney injury) [N17.9]     Opioid use disorder, moderate, in sustained remission (HCC) [F11.21]     Type 2 diabetes mellitus without complication, without long-term current use of insulin (HCC) [E11.9]     Systemic lupus erythematosus, unspecified (HCC) [M32.9]     Hypertension [I10]     Bilateral lower leg cellulitis [L03.116, L03.115]     MS (multiple sclerosis) (HCC) [G35]     SVT (supraventricular tachycardia) [I47.10]          HOSPITAL COURSE:   The patient presented to the hospital with the chief complaint of Altered Mental Status (Pt from continuing healthcare and staff states pt is more altered than usual. Pt is A&Ox4 at time of triage. Pt states she is just tired because they woke her up at 4AM last night )  . The patient was admitted to the hospital.     Li is a 55 y.o. year old female who presented with a chief complaint of AMS from facility but she is not altered when I saw her today nor was she when she arrived to the [facility] yesterday per reports. She is

## 2025-07-01 LAB
ALBUMIN SERPL-MCNC: 3.1 G/DL (ref 3.5–5.2)
ALP SERPL-CCNC: 675 U/L (ref 35–104)
ALT SERPL-CCNC: 49 U/L (ref 0–35)
ANION GAP SERPL CALCULATED.3IONS-SCNC: 15 MMOL/L (ref 7–16)
ANION GAP SERPL CALCULATED.3IONS-SCNC: 18 MMOL/L (ref 7–16)
AST SERPL-CCNC: 45 U/L (ref 0–35)
BASOPHILS # BLD: 0 K/UL (ref 0–0.2)
BASOPHILS NFR BLD: 0 % (ref 0–2)
BILIRUB DIRECT SERPL-MCNC: 0.2 MG/DL (ref 0–0.2)
BILIRUB INDIRECT SERPL-MCNC: 0.2 MG/DL (ref 0–1)
BILIRUB SERPL-MCNC: 0.4 MG/DL (ref 0–1.2)
BUN SERPL-MCNC: 17 MG/DL (ref 6–20)
BUN SERPL-MCNC: 18 MG/DL (ref 6–20)
CALCIUM SERPL-MCNC: 9 MG/DL (ref 8.6–10)
CALCIUM SERPL-MCNC: 9.3 MG/DL (ref 8.6–10)
CHLORIDE SERPL-SCNC: 96 MMOL/L (ref 98–107)
CHLORIDE SERPL-SCNC: 99 MMOL/L (ref 98–107)
CO2 SERPL-SCNC: 22 MMOL/L (ref 22–29)
CO2 SERPL-SCNC: 30 MMOL/L (ref 22–29)
CREAT SERPL-MCNC: 0.6 MG/DL (ref 0.5–1)
CREAT SERPL-MCNC: 0.6 MG/DL (ref 0.5–1)
DATE LAST DOSE: ABNORMAL
DATE LAST DOSE: ABNORMAL
EOSINOPHIL # BLD: 0 K/UL (ref 0.05–0.5)
EOSINOPHILS RELATIVE PERCENT: 0 % (ref 0–6)
ERYTHROCYTE [DISTWIDTH] IN BLOOD BY AUTOMATED COUNT: 16.3 % (ref 11.5–15)
GFR, ESTIMATED: >90 ML/MIN/1.73M2
GFR, ESTIMATED: >90 ML/MIN/1.73M2
GLUCOSE BLD-MCNC: 177 MG/DL (ref 74–99)
GLUCOSE BLD-MCNC: 189 MG/DL (ref 74–99)
GLUCOSE BLD-MCNC: 202 MG/DL (ref 74–99)
GLUCOSE BLD-MCNC: 373 MG/DL (ref 74–99)
GLUCOSE SERPL-MCNC: 150 MG/DL (ref 74–99)
GLUCOSE SERPL-MCNC: 333 MG/DL (ref 74–99)
HCT VFR BLD AUTO: 40.6 % (ref 34–48)
HGB BLD-MCNC: 12.7 G/DL (ref 11.5–15.5)
LYMPHOCYTES NFR BLD: 0.78 K/UL (ref 1.5–4)
LYMPHOCYTES RELATIVE PERCENT: 5 % (ref 20–42)
MCH RBC QN AUTO: 28.5 PG (ref 26–35)
MCHC RBC AUTO-ENTMCNC: 31.3 G/DL (ref 32–34.5)
MCV RBC AUTO: 91 FL (ref 80–99.9)
METAMYELOCYTES ABSOLUTE COUNT: 0.13 K/UL (ref 0–0.12)
METAMYELOCYTES: 1 % (ref 0–1)
MICROORGANISM SPEC CULT: NORMAL
MONOCYTES NFR BLD: 0.52 K/UL (ref 0.1–0.95)
MONOCYTES NFR BLD: 3 % (ref 2–12)
MYELOCYTES ABSOLUTE COUNT: 0.13 K/UL
MYELOCYTES: 1 %
NEUTROPHILS NFR BLD: 90 % (ref 43–80)
NEUTS SEG NFR BLD: 13.73 K/UL (ref 1.8–7.3)
NUCLEATED RED BLOOD CELLS: 1 PER 100 WBC
PLATELET # BLD AUTO: 468 K/UL (ref 130–450)
PMV BLD AUTO: 9.6 FL (ref 7–12)
POTASSIUM SERPL-SCNC: 3 MMOL/L (ref 3.5–5.1)
POTASSIUM SERPL-SCNC: 3.8 MMOL/L (ref 3.5–5.1)
PROT SERPL-MCNC: 6.5 G/DL (ref 6.4–8.3)
RBC # BLD AUTO: 4.46 M/UL (ref 3.5–5.5)
RBC # BLD: NORMAL 10*6/UL
SODIUM SERPL-SCNC: 136 MMOL/L (ref 136–145)
SODIUM SERPL-SCNC: 143 MMOL/L (ref 136–145)
SPECIMEN DESCRIPTION: NORMAL
TME LAST DOSE: ABNORMAL H
TME LAST DOSE: ABNORMAL H
VANCOMYCIN DOSE: ABNORMAL MG
VANCOMYCIN DOSE: ABNORMAL MG
VANCOMYCIN TROUGH SERPL-MCNC: 32.2 UG/ML (ref 5–16)
VANCOMYCIN TROUGH SERPL-MCNC: 55.6 UG/ML (ref 5–16)
WBC OTHER # BLD: 15.3 K/UL (ref 4.5–11.5)

## 2025-07-01 PROCEDURE — 6370000000 HC RX 637 (ALT 250 FOR IP): Performed by: STUDENT IN AN ORGANIZED HEALTH CARE EDUCATION/TRAINING PROGRAM

## 2025-07-01 PROCEDURE — 2060000000 HC ICU INTERMEDIATE R&B

## 2025-07-01 PROCEDURE — 97530 THERAPEUTIC ACTIVITIES: CPT

## 2025-07-01 PROCEDURE — 6360000002 HC RX W HCPCS: Performed by: INTERNAL MEDICINE

## 2025-07-01 PROCEDURE — 6360000002 HC RX W HCPCS: Performed by: STUDENT IN AN ORGANIZED HEALTH CARE EDUCATION/TRAINING PROGRAM

## 2025-07-01 PROCEDURE — 97165 OT EVAL LOW COMPLEX 30 MIN: CPT

## 2025-07-01 PROCEDURE — 80053 COMPREHEN METABOLIC PANEL: CPT

## 2025-07-01 PROCEDURE — 85025 COMPLETE CBC W/AUTO DIFF WBC: CPT

## 2025-07-01 PROCEDURE — 80048 BASIC METABOLIC PNL TOTAL CA: CPT

## 2025-07-01 PROCEDURE — 2700000000 HC OXYGEN THERAPY PER DAY

## 2025-07-01 PROCEDURE — 97110 THERAPEUTIC EXERCISES: CPT

## 2025-07-01 PROCEDURE — 92526 ORAL FUNCTION THERAPY: CPT

## 2025-07-01 PROCEDURE — 94640 AIRWAY INHALATION TREATMENT: CPT

## 2025-07-01 PROCEDURE — 6370000000 HC RX 637 (ALT 250 FOR IP): Performed by: INTERNAL MEDICINE

## 2025-07-01 PROCEDURE — 82248 BILIRUBIN DIRECT: CPT

## 2025-07-01 PROCEDURE — 82962 GLUCOSE BLOOD TEST: CPT

## 2025-07-01 PROCEDURE — 2580000003 HC RX 258: Performed by: STUDENT IN AN ORGANIZED HEALTH CARE EDUCATION/TRAINING PROGRAM

## 2025-07-01 PROCEDURE — 36415 COLL VENOUS BLD VENIPUNCTURE: CPT

## 2025-07-01 PROCEDURE — 80202 ASSAY OF VANCOMYCIN: CPT

## 2025-07-01 RX ORDER — POTASSIUM CHLORIDE 1500 MG/1
40 TABLET, EXTENDED RELEASE ORAL 3 TIMES DAILY
Status: COMPLETED | OUTPATIENT
Start: 2025-07-01 | End: 2025-07-02

## 2025-07-01 RX ORDER — LORAZEPAM 0.5 MG/1
0.25 TABLET ORAL 2 TIMES DAILY
Status: DISCONTINUED | OUTPATIENT
Start: 2025-07-01 | End: 2025-07-03

## 2025-07-01 RX ORDER — INSULIN LISPRO 100 [IU]/ML
5 INJECTION, SOLUTION INTRAVENOUS; SUBCUTANEOUS
Status: DISCONTINUED | OUTPATIENT
Start: 2025-07-01 | End: 2025-07-06

## 2025-07-01 RX ORDER — INSULIN GLARGINE 100 [IU]/ML
15 INJECTION, SOLUTION SUBCUTANEOUS NIGHTLY
Status: DISCONTINUED | OUTPATIENT
Start: 2025-07-01 | End: 2025-07-04

## 2025-07-01 RX ORDER — POTASSIUM CHLORIDE 7.45 MG/ML
10 INJECTION INTRAVENOUS ONCE
Status: DISCONTINUED | OUTPATIENT
Start: 2025-07-01 | End: 2025-07-15 | Stop reason: HOSPADM

## 2025-07-01 RX ADMIN — PANTOPRAZOLE SODIUM 40 MG: 40 TABLET, DELAYED RELEASE ORAL at 05:39

## 2025-07-01 RX ADMIN — CEFEPIME 2000 MG: 2 INJECTION, POWDER, FOR SOLUTION INTRAVENOUS at 03:09

## 2025-07-01 RX ADMIN — LORAZEPAM 0.25 MG: 0.5 TABLET ORAL at 21:05

## 2025-07-01 RX ADMIN — BUPRENORPHINE HYDROCHLORIDE AND NALOXONE HYDROCHLORIDE DIHYDRATE 1 TABLET: 8; 2 TABLET SUBLINGUAL at 21:05

## 2025-07-01 RX ADMIN — TRAMADOL HYDROCHLORIDE 50 MG: 50 TABLET, COATED ORAL at 16:11

## 2025-07-01 RX ADMIN — POTASSIUM CHLORIDE 40 MEQ: 1500 TABLET, EXTENDED RELEASE ORAL at 12:29

## 2025-07-01 RX ADMIN — IPRATROPIUM BROMIDE AND ALBUTEROL SULFATE 1 DOSE: .5; 2.5 SOLUTION RESPIRATORY (INHALATION) at 16:37

## 2025-07-01 RX ADMIN — IPRATROPIUM BROMIDE AND ALBUTEROL SULFATE 1 DOSE: .5; 2.5 SOLUTION RESPIRATORY (INHALATION) at 12:15

## 2025-07-01 RX ADMIN — MICONAZOLE NITRATE: 20 POWDER TOPICAL at 08:17

## 2025-07-01 RX ADMIN — INSULIN LISPRO 1 UNITS: 100 INJECTION, SOLUTION INTRAVENOUS; SUBCUTANEOUS at 08:29

## 2025-07-01 RX ADMIN — ATENOLOL 25 MG: 25 TABLET ORAL at 16:11

## 2025-07-01 RX ADMIN — IPRATROPIUM BROMIDE AND ALBUTEROL SULFATE 1 DOSE: .5; 2.5 SOLUTION RESPIRATORY (INHALATION) at 06:21

## 2025-07-01 RX ADMIN — TRAMADOL HYDROCHLORIDE 50 MG: 50 TABLET, COATED ORAL at 08:06

## 2025-07-01 RX ADMIN — FUROSEMIDE 40 MG: 40 TABLET ORAL at 12:29

## 2025-07-01 RX ADMIN — VANCOMYCIN HYDROCHLORIDE 1500 MG: 10 INJECTION, POWDER, LYOPHILIZED, FOR SOLUTION INTRAVENOUS at 08:17

## 2025-07-01 RX ADMIN — INSULIN LISPRO 4 UNITS: 100 INJECTION, SOLUTION INTRAVENOUS; SUBCUTANEOUS at 17:22

## 2025-07-01 RX ADMIN — IPRATROPIUM BROMIDE AND ALBUTEROL SULFATE 1 DOSE: .5; 2.5 SOLUTION RESPIRATORY (INHALATION) at 09:34

## 2025-07-01 RX ADMIN — ENOXAPARIN SODIUM 40 MG: 100 INJECTION SUBCUTANEOUS at 08:05

## 2025-07-01 RX ADMIN — CEFEPIME 2000 MG: 2 INJECTION, POWDER, FOR SOLUTION INTRAVENOUS at 12:35

## 2025-07-01 RX ADMIN — INSULIN LISPRO 1 UNITS: 100 INJECTION, SOLUTION INTRAVENOUS; SUBCUTANEOUS at 12:28

## 2025-07-01 RX ADMIN — PREDNISONE 15 MG: 5 TABLET ORAL at 08:05

## 2025-07-01 RX ADMIN — CEFEPIME 2000 MG: 2 INJECTION, POWDER, FOR SOLUTION INTRAVENOUS at 21:10

## 2025-07-01 RX ADMIN — ATENOLOL 25 MG: 25 TABLET ORAL at 05:39

## 2025-07-01 RX ADMIN — INSULIN LISPRO 5 UNITS: 100 INJECTION, SOLUTION INTRAVENOUS; SUBCUTANEOUS at 17:23

## 2025-07-01 RX ADMIN — POTASSIUM CHLORIDE 40 MEQ: 1500 TABLET, EXTENDED RELEASE ORAL at 21:04

## 2025-07-01 RX ADMIN — BUDESONIDE 500 MCG: 0.5 SUSPENSION RESPIRATORY (INHALATION) at 16:37

## 2025-07-01 RX ADMIN — BISACODYL 5 MG: 5 TABLET, COATED ORAL at 21:05

## 2025-07-01 RX ADMIN — POTASSIUM CHLORIDE 20 MEQ: 1500 TABLET, EXTENDED RELEASE ORAL at 14:02

## 2025-07-01 RX ADMIN — Medication 3 MG: at 21:04

## 2025-07-01 RX ADMIN — DILTIAZEM HYDROCHLORIDE 240 MG: 240 CAPSULE, COATED, EXTENDED RELEASE ORAL at 08:06

## 2025-07-01 RX ADMIN — BUDESONIDE 500 MCG: 0.5 SUSPENSION RESPIRATORY (INHALATION) at 06:21

## 2025-07-01 RX ADMIN — MICONAZOLE NITRATE: 20 POWDER TOPICAL at 21:25

## 2025-07-01 ASSESSMENT — PAIN DESCRIPTION - LOCATION
LOCATION: BACK
LOCATION: BACK

## 2025-07-01 ASSESSMENT — PAIN SCALES - GENERAL
PAINLEVEL_OUTOF10: 4
PAINLEVEL_OUTOF10: 7
PAINLEVEL_OUTOF10: 8

## 2025-07-01 ASSESSMENT — PAIN DESCRIPTION - FREQUENCY: FREQUENCY: CONTINUOUS

## 2025-07-01 ASSESSMENT — PAIN DESCRIPTION - ONSET: ONSET: ON-GOING

## 2025-07-01 ASSESSMENT — PAIN DESCRIPTION - ORIENTATION
ORIENTATION: MID;LOWER
ORIENTATION: LOWER;MID

## 2025-07-01 ASSESSMENT — PAIN DESCRIPTION - PAIN TYPE: TYPE: CHRONIC PAIN

## 2025-07-01 ASSESSMENT — PAIN DESCRIPTION - DESCRIPTORS: DESCRIPTORS: ACHING;SHARP;STABBING

## 2025-07-01 ASSESSMENT — PAIN - FUNCTIONAL ASSESSMENT: PAIN_FUNCTIONAL_ASSESSMENT: PREVENTS OR INTERFERES SOME ACTIVE ACTIVITIES AND ADLS

## 2025-07-01 NOTE — CARE COORDINATION
7/1/25 1334 CM note: resp panel (-) 6/29/25, bl cx 6/29/25 ngtd. IV vanco & cefepime. PIV. WILL NEED CLARIFICATION OF ATB AT DC, IF IV ATB AT DC WILL NEED LINE PLACEMENT. Suboxone bid- per pt for pain mgmt. ID, wound care, and PT/OT following. Hx MS, lupus, asthma. 6L hfnc. Met with patient and her mom Karol at the bedside to discuss coordination of care at discharge. Pt was admitted from [facility], pt/family states she has been there for 2-3 months. They are concerned about patient returning to SNF stating they feel her needs may be more then they can handle. Discussed benefits/location of LTAC, and pt/family would like to see if she meets criteria for LTAC. Referral placed via careport- awaiting their response. Chanda [facility] liaison, also following via careport, and states pt can return with auth pending. CM will follow. Electronically signed by Alix Owens RN on 7/1/2025 at 1:58 PM

## 2025-07-02 ENCOUNTER — APPOINTMENT (OUTPATIENT)
Dept: GENERAL RADIOLOGY | Age: 55
DRG: 720 | End: 2025-07-02
Payer: COMMERCIAL

## 2025-07-02 LAB
ANION GAP SERPL CALCULATED.3IONS-SCNC: 12 MMOL/L (ref 7–16)
BNP SERPL-MCNC: 1179 PG/ML (ref 0–125)
BUN SERPL-MCNC: 18 MG/DL (ref 6–20)
CALCIUM SERPL-MCNC: 9.6 MG/DL (ref 8.6–10)
CHLORIDE SERPL-SCNC: 102 MMOL/L (ref 98–107)
CO2 SERPL-SCNC: 28 MMOL/L (ref 22–29)
CREAT SERPL-MCNC: 0.6 MG/DL (ref 0.5–1)
GFR, ESTIMATED: >90 ML/MIN/1.73M2
GLUCOSE BLD-MCNC: 148 MG/DL (ref 74–99)
GLUCOSE BLD-MCNC: 171 MG/DL (ref 74–99)
GLUCOSE BLD-MCNC: 176 MG/DL (ref 74–99)
GLUCOSE BLD-MCNC: 217 MG/DL (ref 74–99)
GLUCOSE SERPL-MCNC: 219 MG/DL (ref 74–99)
MAGNESIUM SERPL-MCNC: 1.8 MG/DL (ref 1.6–2.6)
POTASSIUM SERPL-SCNC: 4 MMOL/L (ref 3.5–5.1)
SODIUM SERPL-SCNC: 142 MMOL/L (ref 136–145)

## 2025-07-02 PROCEDURE — 87081 CULTURE SCREEN ONLY: CPT

## 2025-07-02 PROCEDURE — 36415 COLL VENOUS BLD VENIPUNCTURE: CPT

## 2025-07-02 PROCEDURE — 6370000000 HC RX 637 (ALT 250 FOR IP): Performed by: INTERNAL MEDICINE

## 2025-07-02 PROCEDURE — 83735 ASSAY OF MAGNESIUM: CPT

## 2025-07-02 PROCEDURE — 80048 BASIC METABOLIC PNL TOTAL CA: CPT

## 2025-07-02 PROCEDURE — 6360000002 HC RX W HCPCS: Performed by: STUDENT IN AN ORGANIZED HEALTH CARE EDUCATION/TRAINING PROGRAM

## 2025-07-02 PROCEDURE — 83880 ASSAY OF NATRIURETIC PEPTIDE: CPT

## 2025-07-02 PROCEDURE — 2500000003 HC RX 250 WO HCPCS: Performed by: INTERNAL MEDICINE

## 2025-07-02 PROCEDURE — 94640 AIRWAY INHALATION TREATMENT: CPT

## 2025-07-02 PROCEDURE — 2580000003 HC RX 258: Performed by: STUDENT IN AN ORGANIZED HEALTH CARE EDUCATION/TRAINING PROGRAM

## 2025-07-02 PROCEDURE — 6360000002 HC RX W HCPCS: Performed by: SPECIALIST

## 2025-07-02 PROCEDURE — 6360000002 HC RX W HCPCS: Performed by: INTERNAL MEDICINE

## 2025-07-02 PROCEDURE — 2700000000 HC OXYGEN THERAPY PER DAY

## 2025-07-02 PROCEDURE — 2060000000 HC ICU INTERMEDIATE R&B

## 2025-07-02 PROCEDURE — 71045 X-RAY EXAM CHEST 1 VIEW: CPT

## 2025-07-02 PROCEDURE — 2580000003 HC RX 258: Performed by: SPECIALIST

## 2025-07-02 PROCEDURE — 82962 GLUCOSE BLOOD TEST: CPT

## 2025-07-02 PROCEDURE — 6370000000 HC RX 637 (ALT 250 FOR IP): Performed by: STUDENT IN AN ORGANIZED HEALTH CARE EDUCATION/TRAINING PROGRAM

## 2025-07-02 RX ORDER — HYDROCODONE BITARTRATE AND ACETAMINOPHEN 5; 325 MG/1; MG/1
0.5 TABLET ORAL EVERY 6 HOURS PRN
Status: DISCONTINUED | OUTPATIENT
Start: 2025-07-02 | End: 2025-07-08

## 2025-07-02 RX ADMIN — POTASSIUM CHLORIDE 40 MEQ: 1500 TABLET, EXTENDED RELEASE ORAL at 08:57

## 2025-07-02 RX ADMIN — Medication 3 MG: at 20:57

## 2025-07-02 RX ADMIN — IPRATROPIUM BROMIDE AND ALBUTEROL SULFATE 1 DOSE: .5; 2.5 SOLUTION RESPIRATORY (INHALATION) at 06:53

## 2025-07-02 RX ADMIN — TRAMADOL HYDROCHLORIDE 50 MG: 50 TABLET, COATED ORAL at 08:57

## 2025-07-02 RX ADMIN — CEFEPIME 2000 MG: 2 INJECTION, POWDER, FOR SOLUTION INTRAVENOUS at 21:02

## 2025-07-02 RX ADMIN — HYDROCODONE BITARTRATE AND ACETAMINOPHEN 0.5 TABLET: 5; 325 TABLET ORAL at 16:24

## 2025-07-02 RX ADMIN — FUROSEMIDE 40 MG: 40 TABLET ORAL at 12:10

## 2025-07-02 RX ADMIN — LORAZEPAM 0.25 MG: 0.5 TABLET ORAL at 08:56

## 2025-07-02 RX ADMIN — DILTIAZEM HYDROCHLORIDE 240 MG: 240 CAPSULE, COATED, EXTENDED RELEASE ORAL at 08:57

## 2025-07-02 RX ADMIN — PANTOPRAZOLE SODIUM 40 MG: 40 TABLET, DELAYED RELEASE ORAL at 05:51

## 2025-07-02 RX ADMIN — POTASSIUM CHLORIDE 20 MEQ: 1500 TABLET, EXTENDED RELEASE ORAL at 12:10

## 2025-07-02 RX ADMIN — ATENOLOL 25 MG: 25 TABLET ORAL at 05:51

## 2025-07-02 RX ADMIN — MICONAZOLE NITRATE: 20 POWDER TOPICAL at 12:26

## 2025-07-02 RX ADMIN — BUPRENORPHINE HYDROCHLORIDE AND NALOXONE HYDROCHLORIDE DIHYDRATE 1 TABLET: 8; 2 TABLET SUBLINGUAL at 20:57

## 2025-07-02 RX ADMIN — TIZANIDINE 4 MG: 4 TABLET ORAL at 20:57

## 2025-07-02 RX ADMIN — INSULIN LISPRO 5 UNITS: 100 INJECTION, SOLUTION INTRAVENOUS; SUBCUTANEOUS at 12:26

## 2025-07-02 RX ADMIN — BUDESONIDE 500 MCG: 0.5 SUSPENSION RESPIRATORY (INHALATION) at 16:37

## 2025-07-02 RX ADMIN — IPRATROPIUM BROMIDE AND ALBUTEROL SULFATE 1 DOSE: .5; 2.5 SOLUTION RESPIRATORY (INHALATION) at 12:54

## 2025-07-02 RX ADMIN — LORAZEPAM 0.25 MG: 0.5 TABLET ORAL at 20:57

## 2025-07-02 RX ADMIN — AZITHROMYCIN MONOHYDRATE 500 MG: 500 INJECTION, POWDER, LYOPHILIZED, FOR SOLUTION INTRAVENOUS at 18:35

## 2025-07-02 RX ADMIN — CEFEPIME 2000 MG: 2 INJECTION, POWDER, FOR SOLUTION INTRAVENOUS at 05:30

## 2025-07-02 RX ADMIN — INSULIN LISPRO 5 UNITS: 100 INJECTION, SOLUTION INTRAVENOUS; SUBCUTANEOUS at 17:40

## 2025-07-02 RX ADMIN — INSULIN LISPRO 1 UNITS: 100 INJECTION, SOLUTION INTRAVENOUS; SUBCUTANEOUS at 17:40

## 2025-07-02 RX ADMIN — ENOXAPARIN SODIUM 40 MG: 100 INJECTION SUBCUTANEOUS at 08:55

## 2025-07-02 RX ADMIN — BUPRENORPHINE HYDROCHLORIDE AND NALOXONE HYDROCHLORIDE DIHYDRATE 1 TABLET: 8; 2 TABLET SUBLINGUAL at 08:57

## 2025-07-02 RX ADMIN — BISACODYL 5 MG: 5 TABLET, COATED ORAL at 08:58

## 2025-07-02 RX ADMIN — CEFEPIME 2000 MG: 2 INJECTION, POWDER, FOR SOLUTION INTRAVENOUS at 12:16

## 2025-07-02 RX ADMIN — INSULIN LISPRO 5 UNITS: 100 INJECTION, SOLUTION INTRAVENOUS; SUBCUTANEOUS at 08:55

## 2025-07-02 RX ADMIN — BUDESONIDE 500 MCG: 0.5 SUSPENSION RESPIRATORY (INHALATION) at 06:54

## 2025-07-02 RX ADMIN — ATENOLOL 25 MG: 25 TABLET ORAL at 16:24

## 2025-07-02 RX ADMIN — IPRATROPIUM BROMIDE AND ALBUTEROL SULFATE 1 DOSE: .5; 2.5 SOLUTION RESPIRATORY (INHALATION) at 09:25

## 2025-07-02 RX ADMIN — ALBUTEROL SULFATE 2.5 MG: 2.5 SOLUTION RESPIRATORY (INHALATION) at 09:25

## 2025-07-02 RX ADMIN — MICONAZOLE NITRATE: 20 POWDER TOPICAL at 21:32

## 2025-07-02 RX ADMIN — HYDROCODONE BITARTRATE AND ACETAMINOPHEN 0.5 TABLET: 5; 325 TABLET ORAL at 10:17

## 2025-07-02 RX ADMIN — METHYLPREDNISOLONE SODIUM SUCCINATE 40 MG: 40 INJECTION, POWDER, LYOPHILIZED, FOR SOLUTION INTRAMUSCULAR; INTRAVENOUS at 20:57

## 2025-07-02 RX ADMIN — PREDNISONE 15 MG: 5 TABLET ORAL at 08:56

## 2025-07-02 RX ADMIN — IPRATROPIUM BROMIDE AND ALBUTEROL SULFATE 1 DOSE: .5; 2.5 SOLUTION RESPIRATORY (INHALATION) at 16:37

## 2025-07-02 ASSESSMENT — PAIN SCALES - GENERAL
PAINLEVEL_OUTOF10: 7
PAINLEVEL_OUTOF10: 5
PAINLEVEL_OUTOF10: 7
PAINLEVEL_OUTOF10: 5
PAINLEVEL_OUTOF10: 7
PAINLEVEL_OUTOF10: 4
PAINLEVEL_OUTOF10: 8
PAINLEVEL_OUTOF10: 6

## 2025-07-02 ASSESSMENT — PAIN DESCRIPTION - DESCRIPTORS
DESCRIPTORS: ACHING

## 2025-07-02 ASSESSMENT — PAIN DESCRIPTION - LOCATION
LOCATION: BACK
LOCATION: BACK
LOCATION: GENERALIZED
LOCATION: BACK

## 2025-07-02 ASSESSMENT — PAIN DESCRIPTION - ORIENTATION: ORIENTATION: POSTERIOR

## 2025-07-03 ENCOUNTER — APPOINTMENT (OUTPATIENT)
Dept: ULTRASOUND IMAGING | Age: 55
DRG: 720 | End: 2025-07-03
Payer: COMMERCIAL

## 2025-07-03 PROBLEM — J44.1 COPD WITH ACUTE EXACERBATION (HCC): Status: ACTIVE | Noted: 2025-07-03

## 2025-07-03 LAB
ALBUMIN SERPL-MCNC: 2.1 G/DL (ref 3.5–4.7)
ALPHA1 GLOB SERPL ELPH-MCNC: 0.4 G/DL (ref 0.2–0.4)
ALPHA2 GLOB SERPL ELPH-MCNC: 1.3 G/DL (ref 0.5–1)
AMMONIA PLAS-SCNC: 27 UMOL/L (ref 11–51)
B-GLOBULIN SERPL ELPH-MCNC: 1.1 G/DL (ref 0.8–1.3)
EKG ATRIAL RATE: 69 BPM
EKG P AXIS: 29 DEGREES
EKG P-R INTERVAL: 156 MS
EKG Q-T INTERVAL: 394 MS
EKG QRS DURATION: 66 MS
EKG QTC CALCULATION (BAZETT): 422 MS
EKG R AXIS: 19 DEGREES
EKG T AXIS: 51 DEGREES
EKG VENTRICULAR RATE: 69 BPM
GAMMA GLOB SERPL ELPH-MCNC: 0.4 G/DL (ref 0.7–1.6)
GLUCOSE BLD-MCNC: 212 MG/DL (ref 74–99)
GLUCOSE BLD-MCNC: 227 MG/DL (ref 74–99)
GLUCOSE BLD-MCNC: 269 MG/DL (ref 74–99)
GLUCOSE BLD-MCNC: 325 MG/DL (ref 74–99)
LIPASE SERPL-CCNC: 108 U/L (ref 13–60)
O2 DELIVERY DEVICE: ABNORMAL
P E INTERPRETATION, U: NORMAL
PATHOLOGIST: ABNORMAL
PATHOLOGIST: NORMAL
POC HCO3: 31.7 MMOL/L (ref 22–26)
POC O2 SATURATION: 96.9 % (ref 92–98.5)
POC PCO2: 42.5 MM HG (ref 35–45)
POC PH: 7.48 (ref 7.35–7.45)
POC PO2: 83.1 MM HG (ref 80–100)
POSITIVE BASE EXCESS, ART: 7.3 MMOL/L (ref 0–3)
PROCALCITONIN SERPL-MCNC: 0.18 NG/ML (ref 0–0.08)
PROT PATTERN SERPL ELPH-IMP: ABNORMAL
PROT SERPL-MCNC: 5.3 G/DL (ref 6.4–8.3)
SPECIMEN TYPE: NORMAL

## 2025-07-03 PROCEDURE — 6360000002 HC RX W HCPCS

## 2025-07-03 PROCEDURE — 93005 ELECTROCARDIOGRAM TRACING: CPT | Performed by: INTERNAL MEDICINE

## 2025-07-03 PROCEDURE — 6370000000 HC RX 637 (ALT 250 FOR IP): Performed by: INTERNAL MEDICINE

## 2025-07-03 PROCEDURE — 6370000000 HC RX 637 (ALT 250 FOR IP): Performed by: SPECIALIST

## 2025-07-03 PROCEDURE — 6370000000 HC RX 637 (ALT 250 FOR IP): Performed by: STUDENT IN AN ORGANIZED HEALTH CARE EDUCATION/TRAINING PROGRAM

## 2025-07-03 PROCEDURE — 82140 ASSAY OF AMMONIA: CPT

## 2025-07-03 PROCEDURE — 94660 CPAP INITIATION&MGMT: CPT

## 2025-07-03 PROCEDURE — 6360000002 HC RX W HCPCS: Performed by: INTERNAL MEDICINE

## 2025-07-03 PROCEDURE — 36415 COLL VENOUS BLD VENIPUNCTURE: CPT

## 2025-07-03 PROCEDURE — 2700000000 HC OXYGEN THERAPY PER DAY

## 2025-07-03 PROCEDURE — 83690 ASSAY OF LIPASE: CPT

## 2025-07-03 PROCEDURE — 82803 BLOOD GASES ANY COMBINATION: CPT

## 2025-07-03 PROCEDURE — 82962 GLUCOSE BLOOD TEST: CPT

## 2025-07-03 PROCEDURE — 2500000003 HC RX 250 WO HCPCS: Performed by: INTERNAL MEDICINE

## 2025-07-03 PROCEDURE — 6360000002 HC RX W HCPCS: Performed by: STUDENT IN AN ORGANIZED HEALTH CARE EDUCATION/TRAINING PROGRAM

## 2025-07-03 PROCEDURE — 84145 PROCALCITONIN (PCT): CPT

## 2025-07-03 PROCEDURE — 6360000002 HC RX W HCPCS: Performed by: SPECIALIST

## 2025-07-03 PROCEDURE — 76705 ECHO EXAM OF ABDOMEN: CPT

## 2025-07-03 PROCEDURE — 87449 NOS EACH ORGANISM AG IA: CPT

## 2025-07-03 PROCEDURE — 2060000000 HC ICU INTERMEDIATE R&B

## 2025-07-03 PROCEDURE — 2580000003 HC RX 258: Performed by: STUDENT IN AN ORGANIZED HEALTH CARE EDUCATION/TRAINING PROGRAM

## 2025-07-03 PROCEDURE — 93010 ELECTROCARDIOGRAM REPORT: CPT | Performed by: INTERNAL MEDICINE

## 2025-07-03 PROCEDURE — 94640 AIRWAY INHALATION TREATMENT: CPT

## 2025-07-03 PROCEDURE — 2580000003 HC RX 258: Performed by: SPECIALIST

## 2025-07-03 PROCEDURE — 2500000003 HC RX 250 WO HCPCS: Performed by: STUDENT IN AN ORGANIZED HEALTH CARE EDUCATION/TRAINING PROGRAM

## 2025-07-03 RX ORDER — BUPRENORPHINE HYDROCHLORIDE AND NALOXONE HYDROCHLORIDE DIHYDRATE 2; .5 MG/1; MG/1
2 TABLET SUBLINGUAL 2 TIMES DAILY
Status: DISCONTINUED | OUTPATIENT
Start: 2025-07-03 | End: 2025-07-08

## 2025-07-03 RX ORDER — LORAZEPAM 0.5 MG/1
0.25 TABLET ORAL ONCE
Status: COMPLETED | OUTPATIENT
Start: 2025-07-03 | End: 2025-07-04

## 2025-07-03 RX ORDER — LINEZOLID 600 MG/1
600 TABLET, FILM COATED ORAL EVERY 12 HOURS SCHEDULED
Status: DISCONTINUED | OUTPATIENT
Start: 2025-07-03 | End: 2025-07-08

## 2025-07-03 RX ORDER — FUROSEMIDE 10 MG/ML
40 INJECTION INTRAMUSCULAR; INTRAVENOUS ONCE
Status: COMPLETED | OUTPATIENT
Start: 2025-07-03 | End: 2025-07-03

## 2025-07-03 RX ADMIN — FUROSEMIDE 40 MG: 10 INJECTION, SOLUTION INTRAMUSCULAR; INTRAVENOUS at 13:42

## 2025-07-03 RX ADMIN — IPRATROPIUM BROMIDE AND ALBUTEROL SULFATE 1 DOSE: .5; 2.5 SOLUTION RESPIRATORY (INHALATION) at 18:19

## 2025-07-03 RX ADMIN — INSULIN GLARGINE 15 UNITS: 100 INJECTION, SOLUTION SUBCUTANEOUS at 20:50

## 2025-07-03 RX ADMIN — CEFEPIME 2000 MG: 2 INJECTION, POWDER, FOR SOLUTION INTRAVENOUS at 14:18

## 2025-07-03 RX ADMIN — IPRATROPIUM BROMIDE AND ALBUTEROL SULFATE 1 DOSE: .5; 2.5 SOLUTION RESPIRATORY (INHALATION) at 13:19

## 2025-07-03 RX ADMIN — AZITHROMYCIN MONOHYDRATE 500 MG: 500 INJECTION, POWDER, LYOPHILIZED, FOR SOLUTION INTRAVENOUS at 19:23

## 2025-07-03 RX ADMIN — BUPRENORPHINE HYDROCHLORIDE AND NALOXONE HYDROCHLORIDE DIHYDRATE 1 TABLET: 8; 2 TABLET SUBLINGUAL at 09:11

## 2025-07-03 RX ADMIN — IPRATROPIUM BROMIDE AND ALBUTEROL SULFATE 1 DOSE: .5; 2.5 SOLUTION RESPIRATORY (INHALATION) at 06:02

## 2025-07-03 RX ADMIN — Medication 3 MG: at 20:10

## 2025-07-03 RX ADMIN — CEFEPIME 2000 MG: 2 INJECTION, POWDER, FOR SOLUTION INTRAVENOUS at 22:04

## 2025-07-03 RX ADMIN — ENOXAPARIN SODIUM 40 MG: 100 INJECTION SUBCUTANEOUS at 09:10

## 2025-07-03 RX ADMIN — IPRATROPIUM BROMIDE AND ALBUTEROL SULFATE 1 DOSE: .5; 2.5 SOLUTION RESPIRATORY (INHALATION) at 09:21

## 2025-07-03 RX ADMIN — INSULIN LISPRO 2 UNITS: 100 INJECTION, SOLUTION INTRAVENOUS; SUBCUTANEOUS at 18:23

## 2025-07-03 RX ADMIN — INSULIN LISPRO 5 UNITS: 100 INJECTION, SOLUTION INTRAVENOUS; SUBCUTANEOUS at 09:14

## 2025-07-03 RX ADMIN — BUPRENORPHINE AND NALOXONE 2 TABLET: 2; .5 TABLET SUBLINGUAL at 20:10

## 2025-07-03 RX ADMIN — BUDESONIDE 500 MCG: 0.5 SUSPENSION RESPIRATORY (INHALATION) at 18:19

## 2025-07-03 RX ADMIN — PANTOPRAZOLE SODIUM 40 MG: 40 TABLET, DELAYED RELEASE ORAL at 06:11

## 2025-07-03 RX ADMIN — DILTIAZEM HYDROCHLORIDE 240 MG: 240 CAPSULE, COATED, EXTENDED RELEASE ORAL at 09:10

## 2025-07-03 RX ADMIN — POTASSIUM CHLORIDE 20 MEQ: 1500 TABLET, EXTENDED RELEASE ORAL at 11:51

## 2025-07-03 RX ADMIN — ATENOLOL 25 MG: 25 TABLET ORAL at 18:23

## 2025-07-03 RX ADMIN — METHYLPREDNISOLONE SODIUM SUCCINATE 40 MG: 40 INJECTION, POWDER, LYOPHILIZED, FOR SOLUTION INTRAMUSCULAR; INTRAVENOUS at 09:10

## 2025-07-03 RX ADMIN — LORAZEPAM 0.25 MG: 0.5 TABLET ORAL at 09:10

## 2025-07-03 RX ADMIN — METHYLPREDNISOLONE SODIUM SUCCINATE 40 MG: 40 INJECTION, POWDER, LYOPHILIZED, FOR SOLUTION INTRAMUSCULAR; INTRAVENOUS at 20:10

## 2025-07-03 RX ADMIN — Medication 5 ML: at 21:43

## 2025-07-03 RX ADMIN — MICONAZOLE NITRATE: 20 POWDER TOPICAL at 09:19

## 2025-07-03 RX ADMIN — INSULIN LISPRO 5 UNITS: 100 INJECTION, SOLUTION INTRAVENOUS; SUBCUTANEOUS at 18:23

## 2025-07-03 RX ADMIN — CEFEPIME 2000 MG: 2 INJECTION, POWDER, FOR SOLUTION INTRAVENOUS at 06:11

## 2025-07-03 RX ADMIN — MICONAZOLE NITRATE: 20 POWDER TOPICAL at 21:44

## 2025-07-03 RX ADMIN — LINEZOLID 600 MG: 600 TABLET, FILM COATED ORAL at 20:10

## 2025-07-03 RX ADMIN — HYDROCODONE BITARTRATE AND ACETAMINOPHEN 0.5 TABLET: 5; 325 TABLET ORAL at 16:00

## 2025-07-03 RX ADMIN — INSULIN LISPRO 1 UNITS: 100 INJECTION, SOLUTION INTRAVENOUS; SUBCUTANEOUS at 11:51

## 2025-07-03 RX ADMIN — ATENOLOL 25 MG: 25 TABLET ORAL at 06:11

## 2025-07-03 RX ADMIN — INSULIN LISPRO 5 UNITS: 100 INJECTION, SOLUTION INTRAVENOUS; SUBCUTANEOUS at 11:51

## 2025-07-03 RX ADMIN — FUROSEMIDE 40 MG: 40 TABLET ORAL at 11:51

## 2025-07-03 RX ADMIN — INSULIN LISPRO 1 UNITS: 100 INJECTION, SOLUTION INTRAVENOUS; SUBCUTANEOUS at 09:14

## 2025-07-03 RX ADMIN — BUDESONIDE 500 MCG: 0.5 SUSPENSION RESPIRATORY (INHALATION) at 06:02

## 2025-07-03 RX ADMIN — TIZANIDINE 4 MG: 4 TABLET ORAL at 20:10

## 2025-07-03 RX ADMIN — INSULIN LISPRO 3 UNITS: 100 INJECTION, SOLUTION INTRAVENOUS; SUBCUTANEOUS at 20:49

## 2025-07-03 ASSESSMENT — PAIN SCALES - GENERAL
PAINLEVEL_OUTOF10: 6
PAINLEVEL_OUTOF10: 5

## 2025-07-03 ASSESSMENT — PAIN DESCRIPTION - ORIENTATION: ORIENTATION: MID

## 2025-07-03 ASSESSMENT — PAIN DESCRIPTION - LOCATION: LOCATION: ABDOMEN

## 2025-07-03 NOTE — DISCHARGE INSTR - COC
Continuity of Care Form    Patient Name: Li Rojas   :  1970  MRN:  [id number]    Admit date:  2025  Discharge date:  ***    Code Status Order: Full Code   Advance Directives:     Admitting Physician:  Paradise Mcclelland MD  PCP: Melvin Cardoso MD    Discharging Nurse: ***  Discharging Hospital Unit/Room#: 0521/0521-01  Discharging Unit Phone Number: ***    Emergency Contact:   Extended Emergency Contact Information  Primary Emergency Contact: gaston calderon  Mobile Phone: [phone]  Relation: Parent   needed? No    Past Surgical History:  Past Surgical History:   Procedure Laterality Date    ABDOMEN SURGERY      ANTERIOR CRUCIATE LIGAMENT REPAIR      APPENDECTOMY       SECTION      x5    CHOLECYSTECTOMY      DILATION AND CURETTAGE OF UTERUS      HC INJECTION PROCEDURE FOR SACROILIAC JOINT Left 12/3/2020    LEFT SACROILIAC JOINT STEROID INJECTION UNDER X-RAY GUIDANCE performed by Fauzia Crowell DO at [facility] OR    NERVE BLOCK Left 10/01/2020    NERVE BLOCK Left 10/1/2020    LEFT L4-5 TRANSFORAMINAL EPIDURAL STEROID INJECTION performed by Fauzia Crowell DO at [facility] OR    OTHER SURGICAL HISTORY Left 2020    LEFT SACROILIAC STEROID INJECTION UNDER XRAY GUIDANCE    TUBAL LIGATION         Immunization History:   Immunization History   Administered Date(s) Administered    COVID-19, PFIZER PURPLE top, DILUTE for use, (age 12 y+), 30mcg/0.3mL 2021, 2021       Active Problems:  Patient Active Problem List   Diagnosis Code    SVT (supraventricular tachycardia) I47.10    MS (multiple sclerosis) ([facility]) G35    Sacroiliitis M46.1    Personal history of supraventricular tachycardia Z86.79    Elevated LFTs R79.89    Anxiety F41.9    Rheumatoid arthritis ([facility]) M06.9    Osteoarthritis of left hip M16.12    Bilateral lower leg cellulitis L03.116, L03.115    Acute bilateral thoracic back pain M54.6    Edema R60.9    Asthma J45.909    Hypertension I10

## 2025-07-03 NOTE — CARE COORDINATION
7/3/25 1550 CM note: resp panel (-) 6/29/25, bl cx 6/29/25 ngtd. IV zithromax, cefepime & solumedrol and po zyvox & prednisone. PIV. WILL NEED CLARIFICATION OF ATB AT DC, IF IV ATB AT DC WILL NEED LINE PLACEMENT. Suboxone bid- per pt for pain mgmt. Pulmonary, ID, wound care, speech, and PT/OT following. Hx MS, lupus, asthma.Pt NPO for US gallbladder. 8L hfnc. Pt was admitted from [facility], pt/family states she has been there for 2-3 months. They are concerned about patient returning to SNF stating they feel her needs may be more then they can handle. Per Branden they will follow, pt would have to need IV ATB at dc for them to consider accepting patient. Chanda, [facility] liaison, started auth on 7/1/25- remains pending. CM will follow. Electronically signed by Alix Owens RN on 7/3/2025 at 4:15 PM

## 2025-07-04 LAB
ALBUMIN SERPL-MCNC: 3.1 G/DL (ref 3.5–5.2)
ALP SERPL-CCNC: 729 U/L (ref 35–104)
ALT SERPL-CCNC: 44 U/L (ref 0–35)
ANION GAP SERPL CALCULATED.3IONS-SCNC: 14 MMOL/L (ref 7–16)
AST SERPL-CCNC: 34 U/L (ref 0–35)
B PARAP IS1001 DNA NPH QL NAA+NON-PROBE: NOT DETECTED
B PERT DNA SPEC QL NAA+PROBE: NOT DETECTED
BASOPHILS # BLD: 0 K/UL (ref 0–0.2)
BASOPHILS NFR BLD: 0 % (ref 0–2)
BILIRUB SERPL-MCNC: 0.3 MG/DL (ref 0–1.2)
BUN SERPL-MCNC: 28 MG/DL (ref 6–20)
C PNEUM DNA NPH QL NAA+NON-PROBE: NOT DETECTED
CALCIUM SERPL-MCNC: 9.7 MG/DL (ref 8.6–10)
CHLORIDE SERPL-SCNC: 97 MMOL/L (ref 98–107)
CO2 SERPL-SCNC: 28 MMOL/L (ref 22–29)
CREAT SERPL-MCNC: 0.6 MG/DL (ref 0.5–1)
EOSINOPHIL # BLD: 0 K/UL (ref 0.05–0.5)
EOSINOPHILS RELATIVE PERCENT: 0 % (ref 0–6)
ERYTHROCYTE [DISTWIDTH] IN BLOOD BY AUTOMATED COUNT: 16.5 % (ref 11.5–15)
FLUAV RNA NPH QL NAA+NON-PROBE: NOT DETECTED
FLUBV RNA NPH QL NAA+NON-PROBE: NOT DETECTED
GFR, ESTIMATED: >90 ML/MIN/1.73M2
GLUCOSE BLD-MCNC: 224 MG/DL (ref 74–99)
GLUCOSE BLD-MCNC: 248 MG/DL (ref 74–99)
GLUCOSE BLD-MCNC: 249 MG/DL (ref 74–99)
GLUCOSE BLD-MCNC: 260 MG/DL (ref 74–99)
GLUCOSE SERPL-MCNC: 245 MG/DL (ref 74–99)
HADV DNA NPH QL NAA+NON-PROBE: NOT DETECTED
HCOV 229E RNA NPH QL NAA+NON-PROBE: NOT DETECTED
HCOV HKU1 RNA NPH QL NAA+NON-PROBE: NOT DETECTED
HCOV NL63 RNA NPH QL NAA+NON-PROBE: NOT DETECTED
HCOV OC43 RNA NPH QL NAA+NON-PROBE: NOT DETECTED
HCT VFR BLD AUTO: 41.2 % (ref 34–48)
HGB BLD-MCNC: 12.8 G/DL (ref 11.5–15.5)
HMPV RNA NPH QL NAA+NON-PROBE: NOT DETECTED
HPIV1 RNA NPH QL NAA+NON-PROBE: NOT DETECTED
HPIV2 RNA NPH QL NAA+NON-PROBE: NOT DETECTED
HPIV3 RNA NPH QL NAA+NON-PROBE: NOT DETECTED
HPIV4 RNA NPH QL NAA+NON-PROBE: NOT DETECTED
LYMPHOCYTES NFR BLD: 0.35 K/UL (ref 1.5–4)
LYMPHOCYTES RELATIVE PERCENT: 2 % (ref 20–42)
M PNEUMO DNA NPH QL NAA+NON-PROBE: NOT DETECTED
MAGNESIUM SERPL-MCNC: 1.9 MG/DL (ref 1.6–2.6)
MCH RBC QN AUTO: 28.4 PG (ref 26–35)
MCHC RBC AUTO-ENTMCNC: 31.1 G/DL (ref 32–34.5)
MCV RBC AUTO: 91.4 FL (ref 80–99.9)
METAMYELOCYTES ABSOLUTE COUNT: 0.35 K/UL (ref 0–0.12)
METAMYELOCYTES: 2 % (ref 0–1)
MICROORGANISM SPEC CULT: NORMAL
MONOCYTES NFR BLD: 0.69 K/UL (ref 0.1–0.95)
MONOCYTES NFR BLD: 4 % (ref 2–12)
MYELOCYTES ABSOLUTE COUNT: 0.52 K/UL
MYELOCYTES: 3 %
NEUTROPHILS NFR BLD: 89 % (ref 43–80)
NEUTS SEG NFR BLD: 15.4 K/UL (ref 1.8–7.3)
PHOSPHATE SERPL-MCNC: 2.9 MG/DL (ref 2.5–4.5)
PLATELET # BLD AUTO: 431 K/UL (ref 130–450)
PLATELET ESTIMATE: ABNORMAL
PMV BLD AUTO: 9.9 FL (ref 7–12)
POTASSIUM SERPL-SCNC: 3.8 MMOL/L (ref 3.5–5.1)
PROT SERPL-MCNC: 6.6 G/DL (ref 6.4–8.3)
RBC # BLD AUTO: 4.51 M/UL (ref 3.5–5.5)
RSV RNA NPH QL NAA+NON-PROBE: NOT DETECTED
RV+EV RNA NPH QL NAA+NON-PROBE: NOT DETECTED
SARS-COV-2 RNA NPH QL NAA+NON-PROBE: NOT DETECTED
SERVICE CMNT-IMP: NORMAL
SERVICE CMNT-IMP: NORMAL
SODIUM SERPL-SCNC: 139 MMOL/L (ref 136–145)
SPECIMEN DESCRIPTION: NORMAL
WBC OTHER # BLD: 17.3 K/UL (ref 4.5–11.5)

## 2025-07-04 PROCEDURE — 6360000002 HC RX W HCPCS

## 2025-07-04 PROCEDURE — 6370000000 HC RX 637 (ALT 250 FOR IP): Performed by: SPECIALIST

## 2025-07-04 PROCEDURE — 2700000000 HC OXYGEN THERAPY PER DAY

## 2025-07-04 PROCEDURE — 6370000000 HC RX 637 (ALT 250 FOR IP): Performed by: INTERNAL MEDICINE

## 2025-07-04 PROCEDURE — 82962 GLUCOSE BLOOD TEST: CPT

## 2025-07-04 PROCEDURE — 2500000003 HC RX 250 WO HCPCS: Performed by: STUDENT IN AN ORGANIZED HEALTH CARE EDUCATION/TRAINING PROGRAM

## 2025-07-04 PROCEDURE — 6360000002 HC RX W HCPCS: Performed by: INTERNAL MEDICINE

## 2025-07-04 PROCEDURE — 94660 CPAP INITIATION&MGMT: CPT

## 2025-07-04 PROCEDURE — 83735 ASSAY OF MAGNESIUM: CPT

## 2025-07-04 PROCEDURE — 6360000002 HC RX W HCPCS: Performed by: STUDENT IN AN ORGANIZED HEALTH CARE EDUCATION/TRAINING PROGRAM

## 2025-07-04 PROCEDURE — 94640 AIRWAY INHALATION TREATMENT: CPT

## 2025-07-04 PROCEDURE — 84100 ASSAY OF PHOSPHORUS: CPT

## 2025-07-04 PROCEDURE — 2580000003 HC RX 258: Performed by: SPECIALIST

## 2025-07-04 PROCEDURE — 80053 COMPREHEN METABOLIC PANEL: CPT

## 2025-07-04 PROCEDURE — 2580000003 HC RX 258: Performed by: STUDENT IN AN ORGANIZED HEALTH CARE EDUCATION/TRAINING PROGRAM

## 2025-07-04 PROCEDURE — 94761 N-INVAS EAR/PLS OXIMETRY MLT: CPT

## 2025-07-04 PROCEDURE — 6370000000 HC RX 637 (ALT 250 FOR IP): Performed by: STUDENT IN AN ORGANIZED HEALTH CARE EDUCATION/TRAINING PROGRAM

## 2025-07-04 PROCEDURE — 6370000000 HC RX 637 (ALT 250 FOR IP)

## 2025-07-04 PROCEDURE — 36415 COLL VENOUS BLD VENIPUNCTURE: CPT

## 2025-07-04 PROCEDURE — 2500000003 HC RX 250 WO HCPCS: Performed by: INTERNAL MEDICINE

## 2025-07-04 PROCEDURE — 2060000000 HC ICU INTERMEDIATE R&B

## 2025-07-04 PROCEDURE — 0202U NFCT DS 22 TRGT SARS-COV-2: CPT

## 2025-07-04 PROCEDURE — 85025 COMPLETE CBC W/AUTO DIFF WBC: CPT

## 2025-07-04 PROCEDURE — 6360000002 HC RX W HCPCS: Performed by: SPECIALIST

## 2025-07-04 RX ORDER — INSULIN GLARGINE 100 [IU]/ML
20 INJECTION, SOLUTION SUBCUTANEOUS NIGHTLY
Status: DISCONTINUED | OUTPATIENT
Start: 2025-07-04 | End: 2025-07-10

## 2025-07-04 RX ADMIN — IPRATROPIUM BROMIDE AND ALBUTEROL SULFATE 1 DOSE: .5; 2.5 SOLUTION RESPIRATORY (INHALATION) at 05:12

## 2025-07-04 RX ADMIN — LINEZOLID 600 MG: 600 TABLET, FILM COATED ORAL at 08:13

## 2025-07-04 RX ADMIN — IPRATROPIUM BROMIDE AND ALBUTEROL SULFATE 1 DOSE: .5; 2.5 SOLUTION RESPIRATORY (INHALATION) at 13:47

## 2025-07-04 RX ADMIN — INSULIN LISPRO 1 UNITS: 100 INJECTION, SOLUTION INTRAVENOUS; SUBCUTANEOUS at 08:08

## 2025-07-04 RX ADMIN — ATENOLOL 25 MG: 25 TABLET ORAL at 17:34

## 2025-07-04 RX ADMIN — INSULIN LISPRO 1 UNITS: 100 INJECTION, SOLUTION INTRAVENOUS; SUBCUTANEOUS at 12:49

## 2025-07-04 RX ADMIN — INSULIN GLARGINE 20 UNITS: 100 INJECTION, SOLUTION SUBCUTANEOUS at 20:56

## 2025-07-04 RX ADMIN — INSULIN LISPRO 5 UNITS: 100 INJECTION, SOLUTION INTRAVENOUS; SUBCUTANEOUS at 17:33

## 2025-07-04 RX ADMIN — METHYLPREDNISOLONE SODIUM SUCCINATE 40 MG: 40 INJECTION, POWDER, LYOPHILIZED, FOR SOLUTION INTRAMUSCULAR; INTRAVENOUS at 20:55

## 2025-07-04 RX ADMIN — CEFEPIME 2000 MG: 2 INJECTION, POWDER, FOR SOLUTION INTRAVENOUS at 06:22

## 2025-07-04 RX ADMIN — POTASSIUM CHLORIDE 20 MEQ: 1500 TABLET, EXTENDED RELEASE ORAL at 12:50

## 2025-07-04 RX ADMIN — HYDROCODONE BITARTRATE AND ACETAMINOPHEN 0.5 TABLET: 5; 325 TABLET ORAL at 14:22

## 2025-07-04 RX ADMIN — Medication 3 MG: at 20:55

## 2025-07-04 RX ADMIN — PANTOPRAZOLE SODIUM 40 MG: 40 TABLET, DELAYED RELEASE ORAL at 06:22

## 2025-07-04 RX ADMIN — Medication 10 ML: at 08:21

## 2025-07-04 RX ADMIN — DILTIAZEM HYDROCHLORIDE 240 MG: 240 CAPSULE, COATED, EXTENDED RELEASE ORAL at 08:13

## 2025-07-04 RX ADMIN — INSULIN LISPRO 1 UNITS: 100 INJECTION, SOLUTION INTRAVENOUS; SUBCUTANEOUS at 17:33

## 2025-07-04 RX ADMIN — BUPRENORPHINE AND NALOXONE 2 TABLET: 2; .5 TABLET SUBLINGUAL at 20:55

## 2025-07-04 RX ADMIN — MICONAZOLE NITRATE: 20 POWDER TOPICAL at 08:36

## 2025-07-04 RX ADMIN — LINEZOLID 600 MG: 600 TABLET, FILM COATED ORAL at 20:55

## 2025-07-04 RX ADMIN — CEFEPIME 2000 MG: 2 INJECTION, POWDER, FOR SOLUTION INTRAVENOUS at 20:53

## 2025-07-04 RX ADMIN — TIZANIDINE 4 MG: 4 TABLET ORAL at 17:34

## 2025-07-04 RX ADMIN — MICAFUNGIN SODIUM 100 MG: 100 INJECTION, POWDER, LYOPHILIZED, FOR SOLUTION INTRAVENOUS at 12:55

## 2025-07-04 RX ADMIN — LORAZEPAM 0.25 MG: 0.5 TABLET ORAL at 08:35

## 2025-07-04 RX ADMIN — BUDESONIDE 500 MCG: 0.5 SUSPENSION RESPIRATORY (INHALATION) at 05:12

## 2025-07-04 RX ADMIN — BISACODYL 5 MG: 5 TABLET, COATED ORAL at 08:13

## 2025-07-04 RX ADMIN — INSULIN LISPRO 2 UNITS: 100 INJECTION, SOLUTION INTRAVENOUS; SUBCUTANEOUS at 20:56

## 2025-07-04 RX ADMIN — FUROSEMIDE 40 MG: 40 TABLET ORAL at 12:50

## 2025-07-04 RX ADMIN — BUDESONIDE 500 MCG: 0.5 SUSPENSION RESPIRATORY (INHALATION) at 17:04

## 2025-07-04 RX ADMIN — HYDROCODONE BITARTRATE AND ACETAMINOPHEN 0.5 TABLET: 5; 325 TABLET ORAL at 08:35

## 2025-07-04 RX ADMIN — ATENOLOL 25 MG: 25 TABLET ORAL at 06:22

## 2025-07-04 RX ADMIN — INSULIN LISPRO 5 UNITS: 100 INJECTION, SOLUTION INTRAVENOUS; SUBCUTANEOUS at 08:09

## 2025-07-04 RX ADMIN — CEFEPIME 2000 MG: 2 INJECTION, POWDER, FOR SOLUTION INTRAVENOUS at 14:22

## 2025-07-04 RX ADMIN — IPRATROPIUM BROMIDE AND ALBUTEROL SULFATE 1 DOSE: .5; 2.5 SOLUTION RESPIRATORY (INHALATION) at 09:33

## 2025-07-04 RX ADMIN — IPRATROPIUM BROMIDE AND ALBUTEROL SULFATE 1 DOSE: .5; 2.5 SOLUTION RESPIRATORY (INHALATION) at 17:04

## 2025-07-04 RX ADMIN — BISACODYL 5 MG: 5 TABLET, COATED ORAL at 20:55

## 2025-07-04 RX ADMIN — METHYLPREDNISOLONE SODIUM SUCCINATE 40 MG: 40 INJECTION, POWDER, LYOPHILIZED, FOR SOLUTION INTRAMUSCULAR; INTRAVENOUS at 08:11

## 2025-07-04 RX ADMIN — INSULIN LISPRO 5 UNITS: 100 INJECTION, SOLUTION INTRAVENOUS; SUBCUTANEOUS at 12:49

## 2025-07-04 RX ADMIN — BUPRENORPHINE AND NALOXONE 2 TABLET: 2; .5 TABLET SUBLINGUAL at 08:13

## 2025-07-04 RX ADMIN — ENOXAPARIN SODIUM 40 MG: 100 INJECTION SUBCUTANEOUS at 08:10

## 2025-07-04 RX ADMIN — AZITHROMYCIN MONOHYDRATE 500 MG: 500 INJECTION, POWDER, LYOPHILIZED, FOR SOLUTION INTRAVENOUS at 18:39

## 2025-07-04 ASSESSMENT — PAIN DESCRIPTION - LOCATION: LOCATION: BACK

## 2025-07-04 ASSESSMENT — PAIN SCALES - GENERAL: PAINLEVEL_OUTOF10: 10

## 2025-07-05 LAB
ALBUMIN SERPL-MCNC: 3.2 G/DL (ref 3.5–5.2)
ALP SERPL-CCNC: 671 U/L (ref 35–104)
ALT SERPL-CCNC: 45 U/L (ref 0–35)
ANION GAP SERPL CALCULATED.3IONS-SCNC: 14 MMOL/L (ref 7–16)
AST SERPL-CCNC: 45 U/L (ref 0–35)
BASOPHILS # BLD: 0 K/UL (ref 0–0.2)
BASOPHILS NFR BLD: 0 % (ref 0–2)
BILIRUB SERPL-MCNC: 0.4 MG/DL (ref 0–1.2)
BUN SERPL-MCNC: 35 MG/DL (ref 6–20)
CALCIUM SERPL-MCNC: 10.1 MG/DL (ref 8.6–10)
CHLORIDE SERPL-SCNC: 98 MMOL/L (ref 98–107)
CO2 SERPL-SCNC: 29 MMOL/L (ref 22–29)
CREAT SERPL-MCNC: 0.7 MG/DL (ref 0.5–1)
EOSINOPHIL # BLD: 0 K/UL (ref 0.05–0.5)
EOSINOPHILS RELATIVE PERCENT: 0 % (ref 0–6)
ERYTHROCYTE [DISTWIDTH] IN BLOOD BY AUTOMATED COUNT: 16.3 % (ref 11.5–15)
GFR, ESTIMATED: >90 ML/MIN/1.73M2
GLUCOSE BLD-MCNC: 191 MG/DL (ref 74–99)
GLUCOSE BLD-MCNC: 194 MG/DL (ref 74–99)
GLUCOSE BLD-MCNC: 195 MG/DL (ref 74–99)
GLUCOSE BLD-MCNC: 226 MG/DL (ref 74–99)
GLUCOSE SERPL-MCNC: 242 MG/DL (ref 74–99)
HCT VFR BLD AUTO: 41.9 % (ref 34–48)
HGB BLD-MCNC: 13 G/DL (ref 11.5–15.5)
LYMPHOCYTES NFR BLD: 0.74 K/UL (ref 1.5–4)
LYMPHOCYTES RELATIVE PERCENT: 4 % (ref 20–42)
MAGNESIUM SERPL-MCNC: 2.1 MG/DL (ref 1.6–2.6)
MCH RBC QN AUTO: 28.6 PG (ref 26–35)
MCHC RBC AUTO-ENTMCNC: 31 G/DL (ref 32–34.5)
MCV RBC AUTO: 92.1 FL (ref 80–99.9)
METAMYELOCYTES ABSOLUTE COUNT: 0.3 K/UL (ref 0–0.12)
METAMYELOCYTES: 2 % (ref 0–1)
MONOCYTES NFR BLD: 0.15 K/UL (ref 0.1–0.95)
MONOCYTES NFR BLD: 1 % (ref 2–12)
MYELOCYTES ABSOLUTE COUNT: 0.3 K/UL
MYELOCYTES: 2 %
NEUTROPHILS NFR BLD: 91 % (ref 43–80)
NEUTS SEG NFR BLD: 15.61 K/UL (ref 1.8–7.3)
NUCLEATED RED BLOOD CELLS: 2 PER 100 WBC
PHOSPHATE SERPL-MCNC: 3 MG/DL (ref 2.5–4.5)
PLATELET # BLD AUTO: 422 K/UL (ref 130–450)
PMV BLD AUTO: 9.9 FL (ref 7–12)
POTASSIUM SERPL-SCNC: 3.7 MMOL/L (ref 3.5–5.1)
PROT SERPL-MCNC: 6.3 G/DL (ref 6.4–8.3)
RBC # BLD AUTO: 4.55 M/UL (ref 3.5–5.5)
RBC # BLD: NORMAL 10*6/UL
SODIUM SERPL-SCNC: 140 MMOL/L (ref 136–145)
WBC OTHER # BLD: 17.1 K/UL (ref 4.5–11.5)

## 2025-07-05 PROCEDURE — 97110 THERAPEUTIC EXERCISES: CPT

## 2025-07-05 PROCEDURE — 80053 COMPREHEN METABOLIC PANEL: CPT

## 2025-07-05 PROCEDURE — 94640 AIRWAY INHALATION TREATMENT: CPT

## 2025-07-05 PROCEDURE — 6360000002 HC RX W HCPCS: Performed by: SPECIALIST

## 2025-07-05 PROCEDURE — 82962 GLUCOSE BLOOD TEST: CPT

## 2025-07-05 PROCEDURE — 6370000000 HC RX 637 (ALT 250 FOR IP): Performed by: INTERNAL MEDICINE

## 2025-07-05 PROCEDURE — 6370000000 HC RX 637 (ALT 250 FOR IP): Performed by: STUDENT IN AN ORGANIZED HEALTH CARE EDUCATION/TRAINING PROGRAM

## 2025-07-05 PROCEDURE — 2060000000 HC ICU INTERMEDIATE R&B

## 2025-07-05 PROCEDURE — 83735 ASSAY OF MAGNESIUM: CPT

## 2025-07-05 PROCEDURE — 84100 ASSAY OF PHOSPHORUS: CPT

## 2025-07-05 PROCEDURE — 6360000002 HC RX W HCPCS: Performed by: STUDENT IN AN ORGANIZED HEALTH CARE EDUCATION/TRAINING PROGRAM

## 2025-07-05 PROCEDURE — 2580000003 HC RX 258: Performed by: SPECIALIST

## 2025-07-05 PROCEDURE — 6370000000 HC RX 637 (ALT 250 FOR IP)

## 2025-07-05 PROCEDURE — 85025 COMPLETE CBC W/AUTO DIFF WBC: CPT

## 2025-07-05 PROCEDURE — 6360000002 HC RX W HCPCS

## 2025-07-05 PROCEDURE — 2500000003 HC RX 250 WO HCPCS: Performed by: INTERNAL MEDICINE

## 2025-07-05 PROCEDURE — 2580000003 HC RX 258: Performed by: STUDENT IN AN ORGANIZED HEALTH CARE EDUCATION/TRAINING PROGRAM

## 2025-07-05 PROCEDURE — 87040 BLOOD CULTURE FOR BACTERIA: CPT

## 2025-07-05 PROCEDURE — 36415 COLL VENOUS BLD VENIPUNCTURE: CPT

## 2025-07-05 PROCEDURE — 2500000003 HC RX 250 WO HCPCS: Performed by: STUDENT IN AN ORGANIZED HEALTH CARE EDUCATION/TRAINING PROGRAM

## 2025-07-05 PROCEDURE — 2700000000 HC OXYGEN THERAPY PER DAY

## 2025-07-05 PROCEDURE — 94660 CPAP INITIATION&MGMT: CPT

## 2025-07-05 PROCEDURE — 6370000000 HC RX 637 (ALT 250 FOR IP): Performed by: SPECIALIST

## 2025-07-05 PROCEDURE — 6360000002 HC RX W HCPCS: Performed by: INTERNAL MEDICINE

## 2025-07-05 RX ORDER — AZITHROMYCIN 250 MG/1
500 TABLET, FILM COATED ORAL ONCE
Status: COMPLETED | OUTPATIENT
Start: 2025-07-06 | End: 2025-07-06

## 2025-07-05 RX ADMIN — Medication 10 ML: at 08:51

## 2025-07-05 RX ADMIN — ALBUTEROL SULFATE 2.5 MG: 2.5 SOLUTION RESPIRATORY (INHALATION) at 00:48

## 2025-07-05 RX ADMIN — INSULIN LISPRO 5 UNITS: 100 INJECTION, SOLUTION INTRAVENOUS; SUBCUTANEOUS at 12:05

## 2025-07-05 RX ADMIN — MICONAZOLE NITRATE: 20 POWDER TOPICAL at 09:00

## 2025-07-05 RX ADMIN — LINEZOLID 600 MG: 600 TABLET, FILM COATED ORAL at 21:21

## 2025-07-05 RX ADMIN — BISACODYL 5 MG: 5 TABLET, COATED ORAL at 08:50

## 2025-07-05 RX ADMIN — HYDROCODONE BITARTRATE AND ACETAMINOPHEN 0.5 TABLET: 5; 325 TABLET ORAL at 23:30

## 2025-07-05 RX ADMIN — ALBUTEROL SULFATE 2.5 MG: 2.5 SOLUTION RESPIRATORY (INHALATION) at 21:10

## 2025-07-05 RX ADMIN — MICAFUNGIN SODIUM 100 MG: 100 INJECTION, POWDER, LYOPHILIZED, FOR SOLUTION INTRAVENOUS at 09:57

## 2025-07-05 RX ADMIN — IPRATROPIUM BROMIDE AND ALBUTEROL SULFATE 1 DOSE: .5; 2.5 SOLUTION RESPIRATORY (INHALATION) at 06:05

## 2025-07-05 RX ADMIN — AZITHROMYCIN MONOHYDRATE 500 MG: 500 INJECTION, POWDER, LYOPHILIZED, FOR SOLUTION INTRAVENOUS at 18:46

## 2025-07-05 RX ADMIN — MICONAZOLE NITRATE: 20 POWDER TOPICAL at 21:26

## 2025-07-05 RX ADMIN — BUDESONIDE 500 MCG: 0.5 SUSPENSION RESPIRATORY (INHALATION) at 17:57

## 2025-07-05 RX ADMIN — IPRATROPIUM BROMIDE AND ALBUTEROL SULFATE 1 DOSE: .5; 2.5 SOLUTION RESPIRATORY (INHALATION) at 14:12

## 2025-07-05 RX ADMIN — LINEZOLID 600 MG: 600 TABLET, FILM COATED ORAL at 08:50

## 2025-07-05 RX ADMIN — METHYLPREDNISOLONE SODIUM SUCCINATE 40 MG: 40 INJECTION, POWDER, LYOPHILIZED, FOR SOLUTION INTRAMUSCULAR; INTRAVENOUS at 21:22

## 2025-07-05 RX ADMIN — ONDANSETRON 4 MG: 4 TABLET, ORALLY DISINTEGRATING ORAL at 12:58

## 2025-07-05 RX ADMIN — BUDESONIDE 500 MCG: 0.5 SUSPENSION RESPIRATORY (INHALATION) at 06:06

## 2025-07-05 RX ADMIN — BUPRENORPHINE AND NALOXONE 2 TABLET: 2; .5 TABLET SUBLINGUAL at 21:21

## 2025-07-05 RX ADMIN — POTASSIUM CHLORIDE 20 MEQ: 1500 TABLET, EXTENDED RELEASE ORAL at 12:00

## 2025-07-05 RX ADMIN — INSULIN GLARGINE 20 UNITS: 100 INJECTION, SOLUTION SUBCUTANEOUS at 21:29

## 2025-07-05 RX ADMIN — HYDROCODONE BITARTRATE AND ACETAMINOPHEN 0.5 TABLET: 5; 325 TABLET ORAL at 12:00

## 2025-07-05 RX ADMIN — BISACODYL 5 MG: 5 TABLET, COATED ORAL at 21:21

## 2025-07-05 RX ADMIN — ATENOLOL 25 MG: 25 TABLET ORAL at 17:58

## 2025-07-05 RX ADMIN — HYDROCODONE BITARTRATE AND ACETAMINOPHEN 0.5 TABLET: 5; 325 TABLET ORAL at 05:28

## 2025-07-05 RX ADMIN — TIZANIDINE 4 MG: 4 TABLET ORAL at 23:31

## 2025-07-05 RX ADMIN — INSULIN LISPRO 5 UNITS: 100 INJECTION, SOLUTION INTRAVENOUS; SUBCUTANEOUS at 08:50

## 2025-07-05 RX ADMIN — INSULIN LISPRO 2 UNITS: 100 INJECTION, SOLUTION INTRAVENOUS; SUBCUTANEOUS at 08:50

## 2025-07-05 RX ADMIN — INSULIN LISPRO 5 UNITS: 100 INJECTION, SOLUTION INTRAVENOUS; SUBCUTANEOUS at 17:58

## 2025-07-05 RX ADMIN — DILTIAZEM HYDROCHLORIDE 240 MG: 240 CAPSULE, COATED, EXTENDED RELEASE ORAL at 08:50

## 2025-07-05 RX ADMIN — INSULIN LISPRO 1 UNITS: 100 INJECTION, SOLUTION INTRAVENOUS; SUBCUTANEOUS at 17:57

## 2025-07-05 RX ADMIN — TIZANIDINE 4 MG: 4 TABLET ORAL at 08:50

## 2025-07-05 RX ADMIN — PANTOPRAZOLE SODIUM 40 MG: 40 TABLET, DELAYED RELEASE ORAL at 05:28

## 2025-07-05 RX ADMIN — IPRATROPIUM BROMIDE AND ALBUTEROL SULFATE 1 DOSE: .5; 2.5 SOLUTION RESPIRATORY (INHALATION) at 17:58

## 2025-07-05 RX ADMIN — ENOXAPARIN SODIUM 40 MG: 100 INJECTION SUBCUTANEOUS at 08:51

## 2025-07-05 RX ADMIN — CEFEPIME 2000 MG: 2 INJECTION, POWDER, FOR SOLUTION INTRAVENOUS at 14:58

## 2025-07-05 RX ADMIN — INSULIN LISPRO 1 UNITS: 100 INJECTION, SOLUTION INTRAVENOUS; SUBCUTANEOUS at 12:05

## 2025-07-05 RX ADMIN — METHYLPREDNISOLONE SODIUM SUCCINATE 40 MG: 40 INJECTION, POWDER, LYOPHILIZED, FOR SOLUTION INTRAMUSCULAR; INTRAVENOUS at 08:49

## 2025-07-05 RX ADMIN — CEFEPIME 2000 MG: 2 INJECTION, POWDER, FOR SOLUTION INTRAVENOUS at 05:21

## 2025-07-05 RX ADMIN — BUPRENORPHINE AND NALOXONE 2 TABLET: 2; .5 TABLET SUBLINGUAL at 08:49

## 2025-07-05 RX ADMIN — ATENOLOL 25 MG: 25 TABLET ORAL at 05:28

## 2025-07-05 RX ADMIN — IPRATROPIUM BROMIDE AND ALBUTEROL SULFATE 1 DOSE: .5; 2.5 SOLUTION RESPIRATORY (INHALATION) at 09:51

## 2025-07-05 ASSESSMENT — PAIN DESCRIPTION - LOCATION
LOCATION: BUTTOCKS
LOCATION: BACK

## 2025-07-05 ASSESSMENT — PAIN DESCRIPTION - DESCRIPTORS
DESCRIPTORS: ACHING;DISCOMFORT
DESCRIPTORS: ACHING

## 2025-07-05 ASSESSMENT — PAIN SCALES - GENERAL
PAINLEVEL_OUTOF10: 7
PAINLEVEL_OUTOF10: 7
PAINLEVEL_OUTOF10: 0
PAINLEVEL_OUTOF10: 8
PAINLEVEL_OUTOF10: 6

## 2025-07-05 ASSESSMENT — PAIN DESCRIPTION - ORIENTATION
ORIENTATION: MID
ORIENTATION: LOWER

## 2025-07-06 LAB
1,3 BETA GLUCAN SER-MCNC: 79 PG/ML
1,3 BETA-D-GLUCAN INTERP: ABNORMAL
GLUCOSE BLD-MCNC: 204 MG/DL (ref 74–99)
GLUCOSE BLD-MCNC: 231 MG/DL (ref 74–99)
GLUCOSE BLD-MCNC: 237 MG/DL (ref 74–99)
GLUCOSE BLD-MCNC: 327 MG/DL (ref 74–99)

## 2025-07-06 PROCEDURE — 6360000002 HC RX W HCPCS

## 2025-07-06 PROCEDURE — 6360000002 HC RX W HCPCS: Performed by: INTERNAL MEDICINE

## 2025-07-06 PROCEDURE — 6360000002 HC RX W HCPCS: Performed by: STUDENT IN AN ORGANIZED HEALTH CARE EDUCATION/TRAINING PROGRAM

## 2025-07-06 PROCEDURE — 6370000000 HC RX 637 (ALT 250 FOR IP): Performed by: INTERNAL MEDICINE

## 2025-07-06 PROCEDURE — 94660 CPAP INITIATION&MGMT: CPT

## 2025-07-06 PROCEDURE — 94640 AIRWAY INHALATION TREATMENT: CPT

## 2025-07-06 PROCEDURE — 2500000003 HC RX 250 WO HCPCS: Performed by: INTERNAL MEDICINE

## 2025-07-06 PROCEDURE — 2580000003 HC RX 258: Performed by: SPECIALIST

## 2025-07-06 PROCEDURE — 6370000000 HC RX 637 (ALT 250 FOR IP): Performed by: STUDENT IN AN ORGANIZED HEALTH CARE EDUCATION/TRAINING PROGRAM

## 2025-07-06 PROCEDURE — 6360000002 HC RX W HCPCS: Performed by: SPECIALIST

## 2025-07-06 PROCEDURE — 2700000000 HC OXYGEN THERAPY PER DAY

## 2025-07-06 PROCEDURE — 2060000000 HC ICU INTERMEDIATE R&B

## 2025-07-06 PROCEDURE — 6370000000 HC RX 637 (ALT 250 FOR IP)

## 2025-07-06 PROCEDURE — 6370000000 HC RX 637 (ALT 250 FOR IP): Performed by: FAMILY MEDICINE

## 2025-07-06 PROCEDURE — 82962 GLUCOSE BLOOD TEST: CPT

## 2025-07-06 PROCEDURE — 2500000003 HC RX 250 WO HCPCS: Performed by: STUDENT IN AN ORGANIZED HEALTH CARE EDUCATION/TRAINING PROGRAM

## 2025-07-06 PROCEDURE — 6370000000 HC RX 637 (ALT 250 FOR IP): Performed by: SPECIALIST

## 2025-07-06 RX ORDER — FUROSEMIDE 10 MG/ML
20 INJECTION INTRAMUSCULAR; INTRAVENOUS 2 TIMES DAILY
Status: DISCONTINUED | OUTPATIENT
Start: 2025-07-06 | End: 2025-07-15 | Stop reason: HOSPADM

## 2025-07-06 RX ORDER — LORAZEPAM 0.5 MG/1
0.25 TABLET ORAL DAILY PRN
Status: DISCONTINUED | OUTPATIENT
Start: 2025-07-06 | End: 2025-07-08

## 2025-07-06 RX ORDER — INSULIN LISPRO 100 [IU]/ML
7 INJECTION, SOLUTION INTRAVENOUS; SUBCUTANEOUS
Status: DISCONTINUED | OUTPATIENT
Start: 2025-07-06 | End: 2025-07-15 | Stop reason: HOSPADM

## 2025-07-06 RX ADMIN — Medication 10 ML: at 21:08

## 2025-07-06 RX ADMIN — DILTIAZEM HYDROCHLORIDE 240 MG: 240 CAPSULE, COATED, EXTENDED RELEASE ORAL at 08:51

## 2025-07-06 RX ADMIN — IPRATROPIUM BROMIDE AND ALBUTEROL SULFATE 1 DOSE: .5; 2.5 SOLUTION RESPIRATORY (INHALATION) at 13:32

## 2025-07-06 RX ADMIN — Medication 10 ML: at 08:52

## 2025-07-06 RX ADMIN — IPRATROPIUM BROMIDE AND ALBUTEROL SULFATE 1 DOSE: .5; 2.5 SOLUTION RESPIRATORY (INHALATION) at 17:13

## 2025-07-06 RX ADMIN — IPRATROPIUM BROMIDE AND ALBUTEROL SULFATE 1 DOSE: .5; 2.5 SOLUTION RESPIRATORY (INHALATION) at 09:31

## 2025-07-06 RX ADMIN — INSULIN LISPRO 5 UNITS: 100 INJECTION, SOLUTION INTRAVENOUS; SUBCUTANEOUS at 08:52

## 2025-07-06 RX ADMIN — INSULIN LISPRO 5 UNITS: 100 INJECTION, SOLUTION INTRAVENOUS; SUBCUTANEOUS at 12:08

## 2025-07-06 RX ADMIN — LINEZOLID 600 MG: 600 TABLET, FILM COATED ORAL at 08:51

## 2025-07-06 RX ADMIN — PANTOPRAZOLE SODIUM 40 MG: 40 TABLET, DELAYED RELEASE ORAL at 06:15

## 2025-07-06 RX ADMIN — INSULIN GLARGINE 20 UNITS: 100 INJECTION, SOLUTION SUBCUTANEOUS at 21:11

## 2025-07-06 RX ADMIN — BUPRENORPHINE AND NALOXONE 2 TABLET: 2; .5 TABLET SUBLINGUAL at 21:07

## 2025-07-06 RX ADMIN — IPRATROPIUM BROMIDE AND ALBUTEROL SULFATE 1 DOSE: .5; 2.5 SOLUTION RESPIRATORY (INHALATION) at 06:35

## 2025-07-06 RX ADMIN — METHYLPREDNISOLONE SODIUM SUCCINATE 40 MG: 40 INJECTION, POWDER, LYOPHILIZED, FOR SOLUTION INTRAMUSCULAR; INTRAVENOUS at 21:07

## 2025-07-06 RX ADMIN — HYDROCODONE BITARTRATE AND ACETAMINOPHEN 0.5 TABLET: 5; 325 TABLET ORAL at 17:46

## 2025-07-06 RX ADMIN — BUDESONIDE 500 MCG: 0.5 SUSPENSION RESPIRATORY (INHALATION) at 17:13

## 2025-07-06 RX ADMIN — LORAZEPAM 0.25 MG: 0.5 TABLET ORAL at 18:56

## 2025-07-06 RX ADMIN — INSULIN LISPRO 1 UNITS: 100 INJECTION, SOLUTION INTRAVENOUS; SUBCUTANEOUS at 08:52

## 2025-07-06 RX ADMIN — TIZANIDINE 4 MG: 4 TABLET ORAL at 15:58

## 2025-07-06 RX ADMIN — ENOXAPARIN SODIUM 40 MG: 100 INJECTION SUBCUTANEOUS at 08:50

## 2025-07-06 RX ADMIN — MICONAZOLE NITRATE: 20 POWDER TOPICAL at 21:09

## 2025-07-06 RX ADMIN — MICONAZOLE NITRATE: 20 POWDER TOPICAL at 16:06

## 2025-07-06 RX ADMIN — POTASSIUM CHLORIDE 20 MEQ: 1500 TABLET, EXTENDED RELEASE ORAL at 12:09

## 2025-07-06 RX ADMIN — BUDESONIDE 500 MCG: 0.5 SUSPENSION RESPIRATORY (INHALATION) at 06:35

## 2025-07-06 RX ADMIN — INSULIN LISPRO 3 UNITS: 100 INJECTION, SOLUTION INTRAVENOUS; SUBCUTANEOUS at 12:08

## 2025-07-06 RX ADMIN — INSULIN LISPRO 7 UNITS: 100 INJECTION, SOLUTION INTRAVENOUS; SUBCUTANEOUS at 17:47

## 2025-07-06 RX ADMIN — BISACODYL 5 MG: 5 TABLET, COATED ORAL at 21:07

## 2025-07-06 RX ADMIN — MICAFUNGIN SODIUM 100 MG: 100 INJECTION, POWDER, LYOPHILIZED, FOR SOLUTION INTRAVENOUS at 16:03

## 2025-07-06 RX ADMIN — LINEZOLID 600 MG: 600 TABLET, FILM COATED ORAL at 21:15

## 2025-07-06 RX ADMIN — BISACODYL 5 MG: 5 TABLET, COATED ORAL at 08:51

## 2025-07-06 RX ADMIN — AZITHROMYCIN DIHYDRATE 500 MG: 250 TABLET ORAL at 17:46

## 2025-07-06 RX ADMIN — ATENOLOL 25 MG: 25 TABLET ORAL at 15:58

## 2025-07-06 RX ADMIN — INSULIN LISPRO 1 UNITS: 100 INJECTION, SOLUTION INTRAVENOUS; SUBCUTANEOUS at 17:46

## 2025-07-06 RX ADMIN — ATENOLOL 25 MG: 25 TABLET ORAL at 06:15

## 2025-07-06 RX ADMIN — HYDROCODONE BITARTRATE AND ACETAMINOPHEN 0.5 TABLET: 5; 325 TABLET ORAL at 08:51

## 2025-07-06 RX ADMIN — FUROSEMIDE 20 MG: 10 INJECTION, SOLUTION INTRAMUSCULAR; INTRAVENOUS at 17:46

## 2025-07-06 RX ADMIN — BUPRENORPHINE AND NALOXONE 2 TABLET: 2; .5 TABLET SUBLINGUAL at 08:51

## 2025-07-06 RX ADMIN — METHYLPREDNISOLONE SODIUM SUCCINATE 40 MG: 40 INJECTION, POWDER, LYOPHILIZED, FOR SOLUTION INTRAMUSCULAR; INTRAVENOUS at 08:51

## 2025-07-06 RX ADMIN — ALBUTEROL SULFATE 2.5 MG: 2.5 SOLUTION RESPIRATORY (INHALATION) at 22:17

## 2025-07-06 RX ADMIN — INSULIN LISPRO 1 UNITS: 100 INJECTION, SOLUTION INTRAVENOUS; SUBCUTANEOUS at 21:12

## 2025-07-06 RX ADMIN — ONDANSETRON 4 MG: 4 TABLET, ORALLY DISINTEGRATING ORAL at 15:58

## 2025-07-06 ASSESSMENT — PAIN SCALES - GENERAL
PAINLEVEL_OUTOF10: 5
PAINLEVEL_OUTOF10: 6
PAINLEVEL_OUTOF10: 6
PAINLEVEL_OUTOF10: 5

## 2025-07-06 ASSESSMENT — PAIN DESCRIPTION - LOCATION
LOCATION: BACK

## 2025-07-06 ASSESSMENT — PAIN DESCRIPTION - DESCRIPTORS: DESCRIPTORS: DISCOMFORT

## 2025-07-06 ASSESSMENT — PAIN DESCRIPTION - ORIENTATION: ORIENTATION: LOWER

## 2025-07-07 ENCOUNTER — APPOINTMENT (OUTPATIENT)
Dept: GENERAL RADIOLOGY | Age: 55
DRG: 720 | End: 2025-07-07
Payer: COMMERCIAL

## 2025-07-07 LAB
B.E.: 4.9 MMOL/L (ref -3–3)
BASOPHILS # BLD: 0 K/UL (ref 0–0.2)
BASOPHILS NFR BLD: 0 % (ref 0–2)
COHB: 0.7 % (ref 0–1.5)
CRITICAL: ABNORMAL
DATE ANALYZED: ABNORMAL
DATE OF COLLECTION: ABNORMAL
EOSINOPHIL # BLD: 0 K/UL (ref 0.05–0.5)
EOSINOPHILS RELATIVE PERCENT: 0 % (ref 0–6)
ERYTHROCYTE [DISTWIDTH] IN BLOOD BY AUTOMATED COUNT: 16.4 % (ref 11.5–15)
GLUCOSE BLD-MCNC: 192 MG/DL (ref 74–99)
GLUCOSE BLD-MCNC: 199 MG/DL (ref 74–99)
GLUCOSE BLD-MCNC: 224 MG/DL (ref 74–99)
GLUCOSE BLD-MCNC: 245 MG/DL (ref 74–99)
HCO3: 31.7 MMOL/L (ref 22–26)
HCT VFR BLD AUTO: 44.9 % (ref 34–48)
HGB BLD-MCNC: 14 G/DL (ref 11.5–15.5)
HHB: 3.9 % (ref 0–5)
LAB: ABNORMAL
LYMPHOCYTES NFR BLD: 1.25 K/UL (ref 1.5–4)
LYMPHOCYTES RELATIVE PERCENT: 5 % (ref 20–42)
Lab: 1315
MCH RBC QN AUTO: 29.3 PG (ref 26–35)
MCHC RBC AUTO-ENTMCNC: 31.2 G/DL (ref 32–34.5)
MCV RBC AUTO: 93.9 FL (ref 80–99.9)
METAMYELOCYTES ABSOLUTE COUNT: 0.5 K/UL (ref 0–0.12)
METAMYELOCYTES: 2 % (ref 0–1)
METHB: 0.2 % (ref 0–1.5)
MODE: ABNORMAL
MONOCYTES NFR BLD: 0.25 K/UL (ref 0.1–0.95)
MONOCYTES NFR BLD: 1 % (ref 2–12)
NEUTROPHILS NFR BLD: 92 % (ref 43–80)
NEUTS SEG NFR BLD: 23 K/UL (ref 1.8–7.3)
O2 CONTENT: 19.8 ML/DL
O2 SATURATION: 96.1 % (ref 92–98.5)
O2HB: 95.2 % (ref 94–97)
OPERATOR ID: 1119
PATIENT TEMP: 37 C
PCO2: 55.9 MMHG (ref 35–45)
PH BLOOD GAS: 7.37 (ref 7.35–7.45)
PLATELET CONFIRMATION: NORMAL
PLATELET, FLUORESCENCE: 354 K/UL (ref 130–450)
PMV BLD AUTO: 10.7 FL (ref 7–12)
PO2: 81.3 MMHG (ref 75–100)
RBC # BLD AUTO: 4.78 M/UL (ref 3.5–5.5)
RBC # BLD: ABNORMAL 10*6/UL
SOURCE, BLOOD GAS: ABNORMAL
THB: 14.8 G/DL (ref 11.5–16.5)
TIME ANALYZED: 1319
WBC OTHER # BLD: 25 K/UL (ref 4.5–11.5)

## 2025-07-07 PROCEDURE — 94640 AIRWAY INHALATION TREATMENT: CPT

## 2025-07-07 PROCEDURE — 2500000003 HC RX 250 WO HCPCS: Performed by: INTERNAL MEDICINE

## 2025-07-07 PROCEDURE — 36415 COLL VENOUS BLD VENIPUNCTURE: CPT

## 2025-07-07 PROCEDURE — 6370000000 HC RX 637 (ALT 250 FOR IP): Performed by: STUDENT IN AN ORGANIZED HEALTH CARE EDUCATION/TRAINING PROGRAM

## 2025-07-07 PROCEDURE — 2700000000 HC OXYGEN THERAPY PER DAY

## 2025-07-07 PROCEDURE — 6370000000 HC RX 637 (ALT 250 FOR IP)

## 2025-07-07 PROCEDURE — 85025 COMPLETE CBC W/AUTO DIFF WBC: CPT

## 2025-07-07 PROCEDURE — 2060000000 HC ICU INTERMEDIATE R&B

## 2025-07-07 PROCEDURE — 2580000003 HC RX 258: Performed by: SPECIALIST

## 2025-07-07 PROCEDURE — 36600 WITHDRAWAL OF ARTERIAL BLOOD: CPT

## 2025-07-07 PROCEDURE — 6360000002 HC RX W HCPCS: Performed by: STUDENT IN AN ORGANIZED HEALTH CARE EDUCATION/TRAINING PROGRAM

## 2025-07-07 PROCEDURE — 6370000000 HC RX 637 (ALT 250 FOR IP): Performed by: INTERNAL MEDICINE

## 2025-07-07 PROCEDURE — 82962 GLUCOSE BLOOD TEST: CPT

## 2025-07-07 PROCEDURE — 2500000003 HC RX 250 WO HCPCS: Performed by: STUDENT IN AN ORGANIZED HEALTH CARE EDUCATION/TRAINING PROGRAM

## 2025-07-07 PROCEDURE — 6360000002 HC RX W HCPCS: Performed by: INTERNAL MEDICINE

## 2025-07-07 PROCEDURE — 71045 X-RAY EXAM CHEST 1 VIEW: CPT

## 2025-07-07 PROCEDURE — 6370000000 HC RX 637 (ALT 250 FOR IP): Performed by: SPECIALIST

## 2025-07-07 PROCEDURE — 6360000002 HC RX W HCPCS

## 2025-07-07 PROCEDURE — 6360000002 HC RX W HCPCS: Performed by: FAMILY MEDICINE

## 2025-07-07 PROCEDURE — 6360000002 HC RX W HCPCS: Performed by: SPECIALIST

## 2025-07-07 PROCEDURE — 5A09357 ASSISTANCE WITH RESPIRATORY VENTILATION, LESS THAN 24 CONSECUTIVE HOURS, CONTINUOUS POSITIVE AIRWAY PRESSURE: ICD-10-PCS | Performed by: INTERNAL MEDICINE

## 2025-07-07 PROCEDURE — 82805 BLOOD GASES W/O2 SATURATION: CPT

## 2025-07-07 PROCEDURE — 94660 CPAP INITIATION&MGMT: CPT

## 2025-07-07 RX ORDER — LIDOCAINE 4 G/G
1 PATCH TOPICAL DAILY
Status: DISCONTINUED | OUTPATIENT
Start: 2025-07-07 | End: 2025-07-15 | Stop reason: HOSPADM

## 2025-07-07 RX ORDER — DEXMEDETOMIDINE HYDROCHLORIDE 4 UG/ML
.1-1.5 INJECTION, SOLUTION INTRAVENOUS CONTINUOUS
Status: DISCONTINUED | OUTPATIENT
Start: 2025-07-07 | End: 2025-07-09

## 2025-07-07 RX ORDER — HYDRALAZINE HYDROCHLORIDE 20 MG/ML
10 INJECTION INTRAMUSCULAR; INTRAVENOUS EVERY 4 HOURS PRN
Status: DISCONTINUED | OUTPATIENT
Start: 2025-07-07 | End: 2025-07-15 | Stop reason: HOSPADM

## 2025-07-07 RX ADMIN — IPRATROPIUM BROMIDE AND ALBUTEROL SULFATE 1 DOSE: .5; 2.5 SOLUTION RESPIRATORY (INHALATION) at 18:29

## 2025-07-07 RX ADMIN — BUDESONIDE 500 MCG: 0.5 SUSPENSION RESPIRATORY (INHALATION) at 04:53

## 2025-07-07 RX ADMIN — FUROSEMIDE 20 MG: 10 INJECTION, SOLUTION INTRAMUSCULAR; INTRAVENOUS at 09:20

## 2025-07-07 RX ADMIN — METHYLPREDNISOLONE SODIUM SUCCINATE 40 MG: 40 INJECTION, POWDER, LYOPHILIZED, FOR SOLUTION INTRAMUSCULAR; INTRAVENOUS at 20:30

## 2025-07-07 RX ADMIN — IPRATROPIUM BROMIDE AND ALBUTEROL SULFATE 1 DOSE: .5; 2.5 SOLUTION RESPIRATORY (INHALATION) at 08:51

## 2025-07-07 RX ADMIN — Medication 10 ML: at 09:25

## 2025-07-07 RX ADMIN — METHYLPREDNISOLONE SODIUM SUCCINATE 40 MG: 40 INJECTION, POWDER, LYOPHILIZED, FOR SOLUTION INTRAMUSCULAR; INTRAVENOUS at 09:21

## 2025-07-07 RX ADMIN — MICONAZOLE NITRATE: 20 POWDER TOPICAL at 09:28

## 2025-07-07 RX ADMIN — IPRATROPIUM BROMIDE AND ALBUTEROL SULFATE 1 DOSE: .5; 2.5 SOLUTION RESPIRATORY (INHALATION) at 04:53

## 2025-07-07 RX ADMIN — MICONAZOLE NITRATE: 20 POWDER TOPICAL at 20:32

## 2025-07-07 RX ADMIN — BUPRENORPHINE AND NALOXONE 2 TABLET: 2; .5 TABLET SUBLINGUAL at 09:20

## 2025-07-07 RX ADMIN — IPRATROPIUM BROMIDE AND ALBUTEROL SULFATE 1 DOSE: .5; 2.5 SOLUTION RESPIRATORY (INHALATION) at 14:35

## 2025-07-07 RX ADMIN — Medication 10 ML: at 20:32

## 2025-07-07 RX ADMIN — BISACODYL 5 MG: 5 TABLET, COATED ORAL at 09:20

## 2025-07-07 RX ADMIN — INSULIN LISPRO 7 UNITS: 100 INJECTION, SOLUTION INTRAVENOUS; SUBCUTANEOUS at 09:34

## 2025-07-07 RX ADMIN — POTASSIUM CHLORIDE 20 MEQ: 1500 TABLET, EXTENDED RELEASE ORAL at 12:54

## 2025-07-07 RX ADMIN — DILTIAZEM HYDROCHLORIDE 240 MG: 240 CAPSULE, COATED, EXTENDED RELEASE ORAL at 09:19

## 2025-07-07 RX ADMIN — LINEZOLID 600 MG: 600 TABLET, FILM COATED ORAL at 09:33

## 2025-07-07 RX ADMIN — ENOXAPARIN SODIUM 40 MG: 100 INJECTION SUBCUTANEOUS at 09:21

## 2025-07-07 RX ADMIN — INSULIN LISPRO 7 UNITS: 100 INJECTION, SOLUTION INTRAVENOUS; SUBCUTANEOUS at 12:55

## 2025-07-07 RX ADMIN — INSULIN GLARGINE 20 UNITS: 100 INJECTION, SOLUTION SUBCUTANEOUS at 20:30

## 2025-07-07 RX ADMIN — BUDESONIDE 500 MCG: 0.5 SUSPENSION RESPIRATORY (INHALATION) at 18:29

## 2025-07-07 RX ADMIN — INSULIN LISPRO 1 UNITS: 100 INJECTION, SOLUTION INTRAVENOUS; SUBCUTANEOUS at 12:55

## 2025-07-07 RX ADMIN — ATENOLOL 25 MG: 25 TABLET ORAL at 09:19

## 2025-07-07 RX ADMIN — INSULIN LISPRO 1 UNITS: 100 INJECTION, SOLUTION INTRAVENOUS; SUBCUTANEOUS at 20:34

## 2025-07-07 RX ADMIN — FUROSEMIDE 20 MG: 10 INJECTION, SOLUTION INTRAMUSCULAR; INTRAVENOUS at 17:37

## 2025-07-07 RX ADMIN — MICAFUNGIN SODIUM 100 MG: 100 INJECTION, POWDER, LYOPHILIZED, FOR SOLUTION INTRAVENOUS at 09:48

## 2025-07-07 RX ADMIN — INSULIN LISPRO 1 UNITS: 100 INJECTION, SOLUTION INTRAVENOUS; SUBCUTANEOUS at 09:34

## 2025-07-08 ENCOUNTER — APPOINTMENT (OUTPATIENT)
Dept: GENERAL RADIOLOGY | Age: 55
DRG: 720 | End: 2025-07-08
Payer: COMMERCIAL

## 2025-07-08 LAB
AMMONIA PLAS-SCNC: 27 UMOL/L (ref 11–51)
AMPHET UR QL SCN: NEGATIVE
ANION GAP SERPL CALCULATED.3IONS-SCNC: 12 MMOL/L (ref 7–16)
BARBITURATES UR QL SCN: NEGATIVE
BASOPHILS # BLD: 0 K/UL (ref 0–0.2)
BASOPHILS NFR BLD: 0 % (ref 0–2)
BENZODIAZ UR QL: NEGATIVE
BUN SERPL-MCNC: 40 MG/DL (ref 6–20)
BUPRENORPHINE UR QL: POSITIVE
CALCIUM SERPL-MCNC: 9 MG/DL (ref 8.6–10)
CANNABINOIDS UR QL SCN: NEGATIVE
CHLORIDE SERPL-SCNC: 102 MMOL/L (ref 98–107)
CO2 SERPL-SCNC: 28 MMOL/L (ref 22–29)
COCAINE UR QL SCN: NEGATIVE
CREAT SERPL-MCNC: 0.6 MG/DL (ref 0.5–1)
EOSINOPHIL # BLD: 0 K/UL (ref 0.05–0.5)
EOSINOPHILS RELATIVE PERCENT: 0 % (ref 0–6)
ERYTHROCYTE [DISTWIDTH] IN BLOOD BY AUTOMATED COUNT: 16.2 % (ref 11.5–15)
FENTANYL UR QL: NEGATIVE
FIO2: 30
GFR, ESTIMATED: >90 ML/MIN/1.73M2
GLUCOSE BLD-MCNC: 181 MG/DL (ref 74–99)
GLUCOSE BLD-MCNC: 197 MG/DL (ref 74–99)
GLUCOSE BLD-MCNC: 256 MG/DL (ref 74–99)
GLUCOSE BLD-MCNC: 336 MG/DL (ref 74–99)
GLUCOSE SERPL-MCNC: 182 MG/DL (ref 74–99)
HCT VFR BLD AUTO: 37.5 % (ref 34–48)
HGB BLD-MCNC: 11.9 G/DL (ref 11.5–15.5)
LYMPHOCYTES NFR BLD: 0.19 K/UL (ref 1.5–4)
LYMPHOCYTES RELATIVE PERCENT: 1 % (ref 20–42)
MAGNESIUM SERPL-MCNC: 2.3 MG/DL (ref 1.6–2.6)
MCH RBC QN AUTO: 29.1 PG (ref 26–35)
MCHC RBC AUTO-ENTMCNC: 31.7 G/DL (ref 32–34.5)
MCV RBC AUTO: 91.7 FL (ref 80–99.9)
METAMYELOCYTES ABSOLUTE COUNT: 0.58 K/UL (ref 0–0.12)
METAMYELOCYTES: 3 % (ref 0–1)
METHADONE UR QL: NEGATIVE
MONOCYTES NFR BLD: 0.39 K/UL (ref 0.1–0.95)
MONOCYTES NFR BLD: 2 % (ref 2–12)
MYELOCYTES ABSOLUTE COUNT: 0.39 K/UL
MYELOCYTES: 2 %
NEUTROPHILS NFR BLD: 92 % (ref 43–80)
NEUTS SEG NFR BLD: 17.85 K/UL (ref 1.8–7.3)
NUCLEATED RED BLOOD CELLS: 1 PER 100 WBC
O2 DELIVERY DEVICE: ABNORMAL
OPIATES UR QL SCN: POSITIVE
OXYCODONE UR QL SCN: NEGATIVE
PCP UR QL SCN: NEGATIVE
PHOSPHATE SERPL-MCNC: 2.7 MG/DL (ref 2.5–4.5)
PLATELET # BLD AUTO: 270 K/UL (ref 130–450)
PMV BLD AUTO: 10.4 FL (ref 7–12)
POC HCO3: 34.8 MMOL/L (ref 22–26)
POC O2 SATURATION: 97.8 % (ref 92–98.5)
POC PCO2: 56.7 MM HG (ref 35–45)
POC PH: 7.4 (ref 7.35–7.45)
POC PO2: 104.3 MM HG (ref 80–100)
POSITIVE BASE EXCESS, ART: 8.2 MMOL/L (ref 0–3)
POTASSIUM SERPL-SCNC: 3.8 MMOL/L (ref 3.5–5.1)
PROCALCITONIN SERPL-MCNC: 0.14 NG/ML (ref 0–0.08)
RBC # BLD AUTO: 4.09 M/UL (ref 3.5–5.5)
SODIUM SERPL-SCNC: 142 MMOL/L (ref 136–145)
TEST INFORMATION: ABNORMAL
WBC OTHER # BLD: 19.4 K/UL (ref 4.5–11.5)

## 2025-07-08 PROCEDURE — 6370000000 HC RX 637 (ALT 250 FOR IP): Performed by: STUDENT IN AN ORGANIZED HEALTH CARE EDUCATION/TRAINING PROGRAM

## 2025-07-08 PROCEDURE — 6360000002 HC RX W HCPCS: Performed by: INTERNAL MEDICINE

## 2025-07-08 PROCEDURE — 83735 ASSAY OF MAGNESIUM: CPT

## 2025-07-08 PROCEDURE — 94660 CPAP INITIATION&MGMT: CPT

## 2025-07-08 PROCEDURE — 71045 X-RAY EXAM CHEST 1 VIEW: CPT

## 2025-07-08 PROCEDURE — 2580000003 HC RX 258: Performed by: SPECIALIST

## 2025-07-08 PROCEDURE — 2000000000 HC ICU R&B

## 2025-07-08 PROCEDURE — 94640 AIRWAY INHALATION TREATMENT: CPT

## 2025-07-08 PROCEDURE — 2500000003 HC RX 250 WO HCPCS: Performed by: INTERNAL MEDICINE

## 2025-07-08 PROCEDURE — 6370000000 HC RX 637 (ALT 250 FOR IP): Performed by: INTERNAL MEDICINE

## 2025-07-08 PROCEDURE — 80048 BASIC METABOLIC PNL TOTAL CA: CPT

## 2025-07-08 PROCEDURE — 84145 PROCALCITONIN (PCT): CPT

## 2025-07-08 PROCEDURE — 84100 ASSAY OF PHOSPHORUS: CPT

## 2025-07-08 PROCEDURE — 6360000002 HC RX W HCPCS: Performed by: SPECIALIST

## 2025-07-08 PROCEDURE — 2500000003 HC RX 250 WO HCPCS: Performed by: STUDENT IN AN ORGANIZED HEALTH CARE EDUCATION/TRAINING PROGRAM

## 2025-07-08 PROCEDURE — 2700000000 HC OXYGEN THERAPY PER DAY

## 2025-07-08 PROCEDURE — 82140 ASSAY OF AMMONIA: CPT

## 2025-07-08 PROCEDURE — 82803 BLOOD GASES ANY COMBINATION: CPT

## 2025-07-08 PROCEDURE — 82962 GLUCOSE BLOOD TEST: CPT

## 2025-07-08 PROCEDURE — 85025 COMPLETE CBC W/AUTO DIFF WBC: CPT

## 2025-07-08 PROCEDURE — 6360000002 HC RX W HCPCS: Performed by: STUDENT IN AN ORGANIZED HEALTH CARE EDUCATION/TRAINING PROGRAM

## 2025-07-08 PROCEDURE — 6370000000 HC RX 637 (ALT 250 FOR IP)

## 2025-07-08 PROCEDURE — 80307 DRUG TEST PRSMV CHEM ANLYZR: CPT

## 2025-07-08 RX ORDER — LINEZOLID 2 MG/ML
600 INJECTION, SOLUTION INTRAVENOUS EVERY 12 HOURS
Status: COMPLETED | OUTPATIENT
Start: 2025-07-08 | End: 2025-07-10

## 2025-07-08 RX ORDER — HYDROCODONE BITARTRATE AND ACETAMINOPHEN 5; 325 MG/1; MG/1
1 TABLET ORAL EVERY 6 HOURS PRN
Status: DISCONTINUED | OUTPATIENT
Start: 2025-07-08 | End: 2025-07-15 | Stop reason: HOSPADM

## 2025-07-08 RX ORDER — BUPRENORPHINE HYDROCHLORIDE AND NALOXONE HYDROCHLORIDE DIHYDRATE 2; .5 MG/1; MG/1
1 TABLET SUBLINGUAL 2 TIMES DAILY
Status: DISCONTINUED | OUTPATIENT
Start: 2025-07-08 | End: 2025-07-10

## 2025-07-08 RX ADMIN — ATENOLOL 25 MG: 25 TABLET ORAL at 16:52

## 2025-07-08 RX ADMIN — MICAFUNGIN SODIUM 100 MG: 100 INJECTION, POWDER, LYOPHILIZED, FOR SOLUTION INTRAVENOUS at 08:39

## 2025-07-08 RX ADMIN — INSULIN LISPRO 2 UNITS: 100 INJECTION, SOLUTION INTRAVENOUS; SUBCUTANEOUS at 22:14

## 2025-07-08 RX ADMIN — BUDESONIDE 500 MCG: 0.5 SUSPENSION RESPIRATORY (INHALATION) at 18:21

## 2025-07-08 RX ADMIN — ENOXAPARIN SODIUM 40 MG: 100 INJECTION SUBCUTANEOUS at 08:40

## 2025-07-08 RX ADMIN — INSULIN LISPRO 7 UNITS: 100 INJECTION, SOLUTION INTRAVENOUS; SUBCUTANEOUS at 16:51

## 2025-07-08 RX ADMIN — MICONAZOLE NITRATE: 20 POWDER TOPICAL at 08:24

## 2025-07-08 RX ADMIN — BUPRENORPHINE AND NALOXONE 1 TABLET: 2; .5 TABLET SUBLINGUAL at 22:01

## 2025-07-08 RX ADMIN — IPRATROPIUM BROMIDE AND ALBUTEROL SULFATE 1 DOSE: .5; 2.5 SOLUTION RESPIRATORY (INHALATION) at 14:43

## 2025-07-08 RX ADMIN — FUROSEMIDE 20 MG: 10 INJECTION, SOLUTION INTRAMUSCULAR; INTRAVENOUS at 16:52

## 2025-07-08 RX ADMIN — BISACODYL 5 MG: 5 TABLET, COATED ORAL at 22:15

## 2025-07-08 RX ADMIN — BUDESONIDE 500 MCG: 0.5 SUSPENSION RESPIRATORY (INHALATION) at 05:26

## 2025-07-08 RX ADMIN — IPRATROPIUM BROMIDE AND ALBUTEROL SULFATE 1 DOSE: .5; 2.5 SOLUTION RESPIRATORY (INHALATION) at 09:57

## 2025-07-08 RX ADMIN — METHYLPREDNISOLONE SODIUM SUCCINATE 40 MG: 40 INJECTION, POWDER, LYOPHILIZED, FOR SOLUTION INTRAMUSCULAR; INTRAVENOUS at 08:40

## 2025-07-08 RX ADMIN — MICONAZOLE NITRATE: 20 POWDER TOPICAL at 22:12

## 2025-07-08 RX ADMIN — METHYLPREDNISOLONE SODIUM SUCCINATE 40 MG: 40 INJECTION, POWDER, LYOPHILIZED, FOR SOLUTION INTRAMUSCULAR; INTRAVENOUS at 22:02

## 2025-07-08 RX ADMIN — INSULIN GLARGINE 20 UNITS: 100 INJECTION, SOLUTION SUBCUTANEOUS at 22:01

## 2025-07-08 RX ADMIN — IPRATROPIUM BROMIDE AND ALBUTEROL SULFATE 1 DOSE: .5; 2.5 SOLUTION RESPIRATORY (INHALATION) at 05:26

## 2025-07-08 RX ADMIN — SODIUM CHLORIDE, PRESERVATIVE FREE 10 ML: 5 INJECTION INTRAVENOUS at 17:48

## 2025-07-08 RX ADMIN — INSULIN LISPRO 3 UNITS: 100 INJECTION, SOLUTION INTRAVENOUS; SUBCUTANEOUS at 16:50

## 2025-07-08 RX ADMIN — IPRATROPIUM BROMIDE AND ALBUTEROL SULFATE 1 DOSE: .5; 2.5 SOLUTION RESPIRATORY (INHALATION) at 18:21

## 2025-07-08 RX ADMIN — LINEZOLID 600 MG: 600 INJECTION, SOLUTION INTRAVENOUS at 16:48

## 2025-07-08 RX ADMIN — HYDROCODONE BITARTRATE AND ACETAMINOPHEN 1 TABLET: 5; 325 TABLET ORAL at 17:45

## 2025-07-08 RX ADMIN — FUROSEMIDE 20 MG: 10 INJECTION, SOLUTION INTRAMUSCULAR; INTRAVENOUS at 08:44

## 2025-07-08 RX ADMIN — Medication 10 ML: at 22:15

## 2025-07-08 RX ADMIN — Medication 10 ML: at 08:23

## 2025-07-08 ASSESSMENT — PAIN DESCRIPTION - ORIENTATION: ORIENTATION: LOWER

## 2025-07-08 ASSESSMENT — PAIN SCALES - GENERAL
PAINLEVEL_OUTOF10: 0

## 2025-07-08 ASSESSMENT — PAIN DESCRIPTION - LOCATION
LOCATION: BACK
LOCATION: BACK;LEG

## 2025-07-08 ASSESSMENT — PAIN - FUNCTIONAL ASSESSMENT: PAIN_FUNCTIONAL_ASSESSMENT: PREVENTS OR INTERFERES SOME ACTIVE ACTIVITIES AND ADLS

## 2025-07-08 ASSESSMENT — PAIN DESCRIPTION - DESCRIPTORS: DESCRIPTORS: ACHING;DISCOMFORT

## 2025-07-08 NOTE — ACP (ADVANCE CARE PLANNING)
Advance Care Planning   Healthcare Decision Maker:    Primary Decision Maker: gaston calderon - [facility] - [phone]    Today we documented Decision Maker(s) consistent with Legal Next of Kin hierarchy.       Electronically signed by Wendy Kingston RN-BC on 7/8/2025 at 7:27 AM

## 2025-07-08 NOTE — CARE COORDINATION
7/8/2025 ICU, continuous bipap. Precedex gtt, Lasix, Solumedrol, Mycamine. Suboxone bid- per pt for pain mgmt.  Pt was admitted from [facility] following, pt/family states she has been there for 2-3 months. They are concerned about patient returning to SNF stating they feel her needs may be more then they can handle. LTACH  following. Electronically signed by Wendy Kingston RN-BC on 7/8/2025 at 11:11 AM

## 2025-07-09 ENCOUNTER — APPOINTMENT (OUTPATIENT)
Dept: GENERAL RADIOLOGY | Age: 55
DRG: 720 | End: 2025-07-09
Payer: COMMERCIAL

## 2025-07-09 LAB
ALBUMIN SERPL-MCNC: 3 G/DL (ref 3.5–5.2)
ALP SERPL-CCNC: 866 U/L (ref 35–104)
ALT SERPL-CCNC: 61 U/L (ref 0–35)
ANION GAP SERPL CALCULATED.3IONS-SCNC: 12 MMOL/L (ref 7–16)
AST SERPL-CCNC: 59 U/L (ref 0–35)
BASOPHILS # BLD: 0 K/UL (ref 0–0.2)
BASOPHILS NFR BLD: 0 % (ref 0–2)
BILIRUB SERPL-MCNC: 0.3 MG/DL (ref 0–1.2)
BUN SERPL-MCNC: 30 MG/DL (ref 6–20)
CALCIUM SERPL-MCNC: 9.3 MG/DL (ref 8.6–10)
CHLORIDE SERPL-SCNC: 98 MMOL/L (ref 98–107)
CO2 SERPL-SCNC: 30 MMOL/L (ref 22–29)
CREAT SERPL-MCNC: 0.5 MG/DL (ref 0.5–1)
EOSINOPHIL # BLD: 0 K/UL (ref 0.05–0.5)
EOSINOPHILS RELATIVE PERCENT: 0 % (ref 0–6)
ERYTHROCYTE [DISTWIDTH] IN BLOOD BY AUTOMATED COUNT: 16.1 % (ref 11.5–15)
GFR, ESTIMATED: >90 ML/MIN/1.73M2
GLUCOSE BLD-MCNC: 167 MG/DL (ref 74–99)
GLUCOSE BLD-MCNC: 185 MG/DL (ref 74–99)
GLUCOSE BLD-MCNC: 200 MG/DL (ref 74–99)
GLUCOSE BLD-MCNC: 229 MG/DL (ref 74–99)
GLUCOSE SERPL-MCNC: 222 MG/DL (ref 74–99)
HCT VFR BLD AUTO: 38.3 % (ref 34–48)
HGB BLD-MCNC: 12.3 G/DL (ref 11.5–15.5)
LYMPHOCYTES NFR BLD: 0.94 K/UL (ref 1.5–4)
LYMPHOCYTES RELATIVE PERCENT: 4 % (ref 20–42)
MAGNESIUM SERPL-MCNC: 2.1 MG/DL (ref 1.6–2.6)
MCH RBC QN AUTO: 28.9 PG (ref 26–35)
MCHC RBC AUTO-ENTMCNC: 32.1 G/DL (ref 32–34.5)
MCV RBC AUTO: 89.9 FL (ref 80–99.9)
MONOCYTES NFR BLD: 0.94 K/UL (ref 0.1–0.95)
MONOCYTES NFR BLD: 4 % (ref 2–12)
MYELOCYTES ABSOLUTE COUNT: 0.94 K/UL
MYELOCYTES: 4 %
NEUTROPHILS NFR BLD: 88 % (ref 43–80)
NEUTS SEG NFR BLD: 20.59 K/UL (ref 1.8–7.3)
NUCLEATED RED BLOOD CELLS: 1 PER 100 WBC
PHOSPHATE SERPL-MCNC: 1.4 MG/DL (ref 2.5–4.5)
PLATELET, FLUORESCENCE: 327 K/UL (ref 130–450)
PMV BLD AUTO: 10 FL (ref 7–12)
POTASSIUM SERPL-SCNC: 4 MMOL/L (ref 3.5–5.1)
PROT SERPL-MCNC: 6.3 G/DL (ref 6.4–8.3)
RBC # BLD AUTO: 4.26 M/UL (ref 3.5–5.5)
SODIUM SERPL-SCNC: 139 MMOL/L (ref 136–145)
WBC OTHER # BLD: 23.4 K/UL (ref 4.5–11.5)

## 2025-07-09 PROCEDURE — 94660 CPAP INITIATION&MGMT: CPT

## 2025-07-09 PROCEDURE — 2000000000 HC ICU R&B

## 2025-07-09 PROCEDURE — 6370000000 HC RX 637 (ALT 250 FOR IP): Performed by: NURSE PRACTITIONER

## 2025-07-09 PROCEDURE — 82962 GLUCOSE BLOOD TEST: CPT

## 2025-07-09 PROCEDURE — 6360000002 HC RX W HCPCS: Performed by: INTERNAL MEDICINE

## 2025-07-09 PROCEDURE — 6370000000 HC RX 637 (ALT 250 FOR IP): Performed by: INTERNAL MEDICINE

## 2025-07-09 PROCEDURE — 92526 ORAL FUNCTION THERAPY: CPT

## 2025-07-09 PROCEDURE — 2700000000 HC OXYGEN THERAPY PER DAY

## 2025-07-09 PROCEDURE — 94640 AIRWAY INHALATION TREATMENT: CPT

## 2025-07-09 PROCEDURE — 6360000002 HC RX W HCPCS: Performed by: FAMILY MEDICINE

## 2025-07-09 PROCEDURE — 2500000003 HC RX 250 WO HCPCS: Performed by: INTERNAL MEDICINE

## 2025-07-09 PROCEDURE — 71045 X-RAY EXAM CHEST 1 VIEW: CPT

## 2025-07-09 PROCEDURE — 2500000003 HC RX 250 WO HCPCS: Performed by: STUDENT IN AN ORGANIZED HEALTH CARE EDUCATION/TRAINING PROGRAM

## 2025-07-09 PROCEDURE — 80053 COMPREHEN METABOLIC PANEL: CPT

## 2025-07-09 PROCEDURE — 6370000000 HC RX 637 (ALT 250 FOR IP)

## 2025-07-09 PROCEDURE — 2580000003 HC RX 258: Performed by: NURSE PRACTITIONER

## 2025-07-09 PROCEDURE — 85025 COMPLETE CBC W/AUTO DIFF WBC: CPT

## 2025-07-09 PROCEDURE — 83735 ASSAY OF MAGNESIUM: CPT

## 2025-07-09 PROCEDURE — 84100 ASSAY OF PHOSPHORUS: CPT

## 2025-07-09 PROCEDURE — 92611 MOTION FLUOROSCOPY/SWALLOW: CPT

## 2025-07-09 PROCEDURE — 2500000003 HC RX 250 WO HCPCS: Performed by: NURSE PRACTITIONER

## 2025-07-09 PROCEDURE — 6360000002 HC RX W HCPCS: Performed by: STUDENT IN AN ORGANIZED HEALTH CARE EDUCATION/TRAINING PROGRAM

## 2025-07-09 PROCEDURE — 74230 X-RAY XM SWLNG FUNCJ C+: CPT

## 2025-07-09 PROCEDURE — 6370000000 HC RX 637 (ALT 250 FOR IP): Performed by: STUDENT IN AN ORGANIZED HEALTH CARE EDUCATION/TRAINING PROGRAM

## 2025-07-09 PROCEDURE — 2500000003 HC RX 250 WO HCPCS: Performed by: RADIOLOGY

## 2025-07-09 PROCEDURE — 6360000002 HC RX W HCPCS: Performed by: SPECIALIST

## 2025-07-09 RX ORDER — ACETAMINOPHEN 325 MG/1
650 TABLET ORAL EVERY 6 HOURS PRN
Status: DISCONTINUED | OUTPATIENT
Start: 2025-07-09 | End: 2025-07-15 | Stop reason: HOSPADM

## 2025-07-09 RX ADMIN — METHYLPREDNISOLONE SODIUM SUCCINATE 40 MG: 40 INJECTION, POWDER, LYOPHILIZED, FOR SOLUTION INTRAMUSCULAR; INTRAVENOUS at 08:16

## 2025-07-09 RX ADMIN — BARIUM SULFATE 45 G: 0.81 POWDER, FOR SUSPENSION ORAL at 14:25

## 2025-07-09 RX ADMIN — BUPRENORPHINE AND NALOXONE 1 TABLET: 2; .5 TABLET SUBLINGUAL at 08:22

## 2025-07-09 RX ADMIN — INSULIN LISPRO 7 UNITS: 100 INJECTION, SOLUTION INTRAVENOUS; SUBCUTANEOUS at 11:10

## 2025-07-09 RX ADMIN — INSULIN LISPRO 1 UNITS: 100 INJECTION, SOLUTION INTRAVENOUS; SUBCUTANEOUS at 08:06

## 2025-07-09 RX ADMIN — DILTIAZEM HYDROCHLORIDE 240 MG: 240 CAPSULE, COATED, EXTENDED RELEASE ORAL at 08:22

## 2025-07-09 RX ADMIN — ATENOLOL 25 MG: 25 TABLET ORAL at 06:29

## 2025-07-09 RX ADMIN — BISACODYL 5 MG: 5 TABLET, COATED ORAL at 21:28

## 2025-07-09 RX ADMIN — INSULIN LISPRO 1 UNITS: 100 INJECTION, SOLUTION INTRAVENOUS; SUBCUTANEOUS at 16:08

## 2025-07-09 RX ADMIN — BUPRENORPHINE AND NALOXONE 1 TABLET: 2; .5 TABLET SUBLINGUAL at 21:27

## 2025-07-09 RX ADMIN — HYDRALAZINE HYDROCHLORIDE 10 MG: 20 INJECTION INTRAMUSCULAR; INTRAVENOUS at 17:07

## 2025-07-09 RX ADMIN — FUROSEMIDE 20 MG: 10 INJECTION, SOLUTION INTRAMUSCULAR; INTRAVENOUS at 08:16

## 2025-07-09 RX ADMIN — IPRATROPIUM BROMIDE AND ALBUTEROL SULFATE 1 DOSE: .5; 2.5 SOLUTION RESPIRATORY (INHALATION) at 07:09

## 2025-07-09 RX ADMIN — ACETAMINOPHEN 650 MG: 325 TABLET ORAL at 06:29

## 2025-07-09 RX ADMIN — ENOXAPARIN SODIUM 40 MG: 100 INJECTION SUBCUTANEOUS at 08:08

## 2025-07-09 RX ADMIN — BISACODYL 5 MG: 5 TABLET, COATED ORAL at 08:22

## 2025-07-09 RX ADMIN — INSULIN LISPRO 7 UNITS: 100 INJECTION, SOLUTION INTRAVENOUS; SUBCUTANEOUS at 16:08

## 2025-07-09 RX ADMIN — INSULIN LISPRO 1 UNITS: 100 INJECTION, SOLUTION INTRAVENOUS; SUBCUTANEOUS at 11:10

## 2025-07-09 RX ADMIN — LINEZOLID 600 MG: 600 INJECTION, SOLUTION INTRAVENOUS at 16:12

## 2025-07-09 RX ADMIN — IPRATROPIUM BROMIDE AND ALBUTEROL SULFATE 1 DOSE: .5; 2.5 SOLUTION RESPIRATORY (INHALATION) at 10:35

## 2025-07-09 RX ADMIN — SODIUM PHOSPHATE, MONOBASIC, MONOHYDRATE AND SODIUM PHOSPHATE, DIBASIC, ANHYDROUS 20 MMOL: 142; 276 INJECTION, SOLUTION INTRAVENOUS at 07:41

## 2025-07-09 RX ADMIN — POTASSIUM CHLORIDE 20 MEQ: 1500 TABLET, EXTENDED RELEASE ORAL at 11:10

## 2025-07-09 RX ADMIN — ATENOLOL 25 MG: 25 TABLET ORAL at 16:08

## 2025-07-09 RX ADMIN — HYDROCODONE BITARTRATE AND ACETAMINOPHEN 1 TABLET: 5; 325 TABLET ORAL at 17:08

## 2025-07-09 RX ADMIN — BUDESONIDE 500 MCG: 0.5 SUSPENSION RESPIRATORY (INHALATION) at 18:26

## 2025-07-09 RX ADMIN — FUROSEMIDE 20 MG: 10 INJECTION, SOLUTION INTRAMUSCULAR; INTRAVENOUS at 16:56

## 2025-07-09 RX ADMIN — Medication 10 ML: at 08:18

## 2025-07-09 RX ADMIN — PANTOPRAZOLE SODIUM 40 MG: 40 TABLET, DELAYED RELEASE ORAL at 06:29

## 2025-07-09 RX ADMIN — BARIUM SULFATE 45 ML: 400 PASTE ORAL at 14:24

## 2025-07-09 RX ADMIN — INSULIN LISPRO 7 UNITS: 100 INJECTION, SOLUTION INTRAVENOUS; SUBCUTANEOUS at 08:06

## 2025-07-09 RX ADMIN — LINEZOLID 600 MG: 600 INJECTION, SOLUTION INTRAVENOUS at 05:09

## 2025-07-09 RX ADMIN — IPRATROPIUM BROMIDE AND ALBUTEROL SULFATE 1 DOSE: .5; 2.5 SOLUTION RESPIRATORY (INHALATION) at 14:40

## 2025-07-09 RX ADMIN — MICONAZOLE NITRATE: 20 POWDER TOPICAL at 21:34

## 2025-07-09 RX ADMIN — MICONAZOLE NITRATE: 20 POWDER TOPICAL at 08:15

## 2025-07-09 RX ADMIN — BARIUM SULFATE 45 ML: 400 SUSPENSION ORAL at 14:25

## 2025-07-09 RX ADMIN — IPRATROPIUM BROMIDE AND ALBUTEROL SULFATE 1 DOSE: .5; 2.5 SOLUTION RESPIRATORY (INHALATION) at 18:26

## 2025-07-09 RX ADMIN — Medication 10 ML: at 21:28

## 2025-07-09 RX ADMIN — METHYLPREDNISOLONE SODIUM SUCCINATE 40 MG: 40 INJECTION, POWDER, LYOPHILIZED, FOR SOLUTION INTRAMUSCULAR; INTRAVENOUS at 21:28

## 2025-07-09 ASSESSMENT — PAIN DESCRIPTION - ORIENTATION: ORIENTATION: MID

## 2025-07-09 ASSESSMENT — PAIN DESCRIPTION - LOCATION
LOCATION: NECK
LOCATION: BACK;LEG

## 2025-07-09 ASSESSMENT — PAIN SCALES - GENERAL
PAINLEVEL_OUTOF10: 0
PAINLEVEL_OUTOF10: 0
PAINLEVEL_OUTOF10: 8

## 2025-07-09 ASSESSMENT — PAIN DESCRIPTION - DESCRIPTORS: DESCRIPTORS: SORE;ACHING

## 2025-07-09 NOTE — CARE COORDINATION
7/9/2025 ICU, 6 lnc, Lethargic.  Suboxone bid.  Pt was admitted from [facility] following, pt/family states she has been there for 2-3 months. They are concerned about patient returning to SNF, no alternative options yet. LTACH  following. PRECERT needed, Needs signed CARLA. Palliative care for goals of care may be beneficial. Electronically signed by Wendy Kingston RN-BC on 7/9/2025 at 9:42 AM

## 2025-07-09 NOTE — CARE COORDINATION
Sent to [facility] for consideration, they do accept Suboxone per Justine. Electronically signed by REBEKAH Dawson on 7/9/2025 at 10:34 AM    Call to Karol mother, legal nok. To update and she really wants [facility] must be approved. Electronically signed by REBEKAH Dawson on 7/9/2025 at 10:43 AM

## 2025-07-09 NOTE — PROGRESS NOTES
Physician Progress Note      PATIENT:               JYOTI LUONG  [facility] #:                  [id number]  :                       1970  ADMIT DATE:       2025 11:10 AM  DISCH DATE:        2025 8:00 PM  RESPONDING  PROVIDER #:        Robles Wallace MD          QUERY TEXT:    Please document the relationship, if any, between the cellulitis and diabetes:    The clinical indicators include:  - 55 y.o. female patient with PMH of DM2 presented with AMS and MARCO. Currently   being treated at SNF for BLE cellulitis. (H&P, )  - HbA1C 8.7% ( labs)  - Zosyn, Humalog, Augmentin (MAR)  Options provided:  -- Bilateral lower extremity cellulitis related to Diabetes  -- Bilateral lower extremity cellulitis unrelated to Diabetes  -- Other - I will add my own diagnosis  -- Disagree - Not applicable / Not valid  -- Disagree - Clinically unable to determine / Unknown  -- Refer to Clinical Documentation Reviewer    PROVIDER RESPONSE TEXT:    Bilateral lower extremity cellulitis related to Diabetes.    Query created by: Deanna Oreilly on 2025 10:16 AM      Electronically signed by:  Robles Wallace MD 2025 9:00 AM

## 2025-07-10 ENCOUNTER — APPOINTMENT (OUTPATIENT)
Dept: GENERAL RADIOLOGY | Age: 55
DRG: 720 | End: 2025-07-10
Payer: COMMERCIAL

## 2025-07-10 LAB
ALBUMIN SERPL-MCNC: 3 G/DL (ref 3.5–5.2)
ALP SERPL-CCNC: 824 U/L (ref 35–104)
ALT SERPL-CCNC: 60 U/L (ref 0–35)
ANION GAP SERPL CALCULATED.3IONS-SCNC: 14 MMOL/L (ref 7–16)
AST SERPL-CCNC: 59 U/L (ref 0–35)
B.E.: 10.3 MMOL/L (ref -3–3)
BASOPHILS # BLD: 0 K/UL (ref 0–0.2)
BASOPHILS NFR BLD: 0 % (ref 0–2)
BILIRUB SERPL-MCNC: 0.4 MG/DL (ref 0–1.2)
BUN SERPL-MCNC: 25 MG/DL (ref 6–20)
CALCIUM SERPL-MCNC: 9.1 MG/DL (ref 8.6–10)
CHLORIDE SERPL-SCNC: 95 MMOL/L (ref 98–107)
CO2 SERPL-SCNC: 29 MMOL/L (ref 22–29)
COHB: 0.7 % (ref 0–1.5)
CREAT SERPL-MCNC: 0.5 MG/DL (ref 0.5–1)
CRITICAL: ABNORMAL
DATE ANALYZED: ABNORMAL
DATE OF COLLECTION: ABNORMAL
EOSINOPHIL # BLD: 0 K/UL (ref 0.05–0.5)
EOSINOPHILS RELATIVE PERCENT: 0 % (ref 0–6)
ERYTHROCYTE [DISTWIDTH] IN BLOOD BY AUTOMATED COUNT: 16.2 % (ref 11.5–15)
GFR, ESTIMATED: >90 ML/MIN/1.73M2
GLUCOSE BLD-MCNC: 189 MG/DL (ref 74–99)
GLUCOSE BLD-MCNC: 196 MG/DL (ref 74–99)
GLUCOSE BLD-MCNC: 227 MG/DL (ref 74–99)
GLUCOSE BLD-MCNC: 247 MG/DL (ref 74–99)
GLUCOSE SERPL-MCNC: 283 MG/DL (ref 74–99)
HCO3: 35 MMOL/L (ref 22–26)
HCT VFR BLD AUTO: 39 % (ref 34–48)
HGB BLD-MCNC: 12.5 G/DL (ref 11.5–15.5)
HHB: 4.8 % (ref 0–5)
LAB: ABNORMAL
LYMPHOCYTES NFR BLD: 0.21 K/UL (ref 1.5–4)
LYMPHOCYTES RELATIVE PERCENT: 1 % (ref 20–42)
Lab: 1615
MAGNESIUM SERPL-MCNC: 1.9 MG/DL (ref 1.6–2.6)
MCH RBC QN AUTO: 28.6 PG (ref 26–35)
MCHC RBC AUTO-ENTMCNC: 32.1 G/DL (ref 32–34.5)
MCV RBC AUTO: 89.2 FL (ref 80–99.9)
METAMYELOCYTES ABSOLUTE COUNT: 0.21 K/UL (ref 0–0.12)
METAMYELOCYTES: 1 % (ref 0–1)
METHB: 0.2 % (ref 0–1.5)
MICROORGANISM SPEC CULT: NORMAL
MICROORGANISM SPEC CULT: NORMAL
MODE: ABNORMAL
MONOCYTES NFR BLD: 0.42 K/UL (ref 0.1–0.95)
MONOCYTES NFR BLD: 2 % (ref 2–12)
MYELOCYTES ABSOLUTE COUNT: 0.42 K/UL
MYELOCYTES: 2 %
NEUTROPHILS NFR BLD: 95 % (ref 43–80)
NEUTS SEG NFR BLD: 22.93 K/UL (ref 1.8–7.3)
O2 CONTENT: 18.8 ML/DL
O2 SATURATION: 95.2 % (ref 92–98.5)
O2HB: 94.3 % (ref 94–97)
OPERATOR ID: 514
PATIENT TEMP: 37 C
PCO2: 46.8 MMHG (ref 35–45)
PH BLOOD GAS: 7.49 (ref 7.35–7.45)
PHOSPHATE SERPL-MCNC: 2.5 MG/DL (ref 2.5–4.5)
PLATELET # BLD AUTO: 286 K/UL (ref 130–450)
PMV BLD AUTO: 10.3 FL (ref 7–12)
PO2: 69.8 MMHG (ref 75–100)
POTASSIUM SERPL-SCNC: 4.1 MMOL/L (ref 3.5–5.1)
PROT SERPL-MCNC: 6.3 G/DL (ref 6.4–8.3)
RBC # BLD AUTO: 4.37 M/UL (ref 3.5–5.5)
RBC # BLD: ABNORMAL 10*6/UL
RBC # BLD: ABNORMAL 10*6/UL
SERVICE CMNT-IMP: NORMAL
SERVICE CMNT-IMP: NORMAL
SODIUM SERPL-SCNC: 138 MMOL/L (ref 136–145)
SOURCE, BLOOD GAS: ABNORMAL
SPECIMEN DESCRIPTION: NORMAL
SPECIMEN DESCRIPTION: NORMAL
THB: 14.2 G/DL (ref 11.5–16.5)
TIME ANALYZED: 1628
WBC OTHER # BLD: 24.2 K/UL (ref 4.5–11.5)

## 2025-07-10 PROCEDURE — 6370000000 HC RX 637 (ALT 250 FOR IP): Performed by: INTERNAL MEDICINE

## 2025-07-10 PROCEDURE — 80053 COMPREHEN METABOLIC PANEL: CPT

## 2025-07-10 PROCEDURE — 94660 CPAP INITIATION&MGMT: CPT

## 2025-07-10 PROCEDURE — 83735 ASSAY OF MAGNESIUM: CPT

## 2025-07-10 PROCEDURE — 97530 THERAPEUTIC ACTIVITIES: CPT

## 2025-07-10 PROCEDURE — 6360000002 HC RX W HCPCS: Performed by: INTERNAL MEDICINE

## 2025-07-10 PROCEDURE — 71045 X-RAY EXAM CHEST 1 VIEW: CPT

## 2025-07-10 PROCEDURE — 6370000000 HC RX 637 (ALT 250 FOR IP): Performed by: NURSE PRACTITIONER

## 2025-07-10 PROCEDURE — 6360000002 HC RX W HCPCS

## 2025-07-10 PROCEDURE — 2700000000 HC OXYGEN THERAPY PER DAY

## 2025-07-10 PROCEDURE — 2500000003 HC RX 250 WO HCPCS: Performed by: INTERNAL MEDICINE

## 2025-07-10 PROCEDURE — 85025 COMPLETE CBC W/AUTO DIFF WBC: CPT

## 2025-07-10 PROCEDURE — 6360000002 HC RX W HCPCS: Performed by: SPECIALIST

## 2025-07-10 PROCEDURE — 82805 BLOOD GASES W/O2 SATURATION: CPT

## 2025-07-10 PROCEDURE — 6370000000 HC RX 637 (ALT 250 FOR IP): Performed by: STUDENT IN AN ORGANIZED HEALTH CARE EDUCATION/TRAINING PROGRAM

## 2025-07-10 PROCEDURE — 6360000002 HC RX W HCPCS: Performed by: STUDENT IN AN ORGANIZED HEALTH CARE EDUCATION/TRAINING PROGRAM

## 2025-07-10 PROCEDURE — 6370000000 HC RX 637 (ALT 250 FOR IP)

## 2025-07-10 PROCEDURE — 82962 GLUCOSE BLOOD TEST: CPT

## 2025-07-10 PROCEDURE — 2000000000 HC ICU R&B

## 2025-07-10 PROCEDURE — 2500000003 HC RX 250 WO HCPCS

## 2025-07-10 PROCEDURE — 94640 AIRWAY INHALATION TREATMENT: CPT

## 2025-07-10 PROCEDURE — 84100 ASSAY OF PHOSPHORUS: CPT

## 2025-07-10 PROCEDURE — 97110 THERAPEUTIC EXERCISES: CPT

## 2025-07-10 PROCEDURE — 2500000003 HC RX 250 WO HCPCS: Performed by: STUDENT IN AN ORGANIZED HEALTH CARE EDUCATION/TRAINING PROGRAM

## 2025-07-10 RX ORDER — BUPRENORPHINE HYDROCHLORIDE AND NALOXONE HYDROCHLORIDE DIHYDRATE 2; .5 MG/1; MG/1
1 TABLET SUBLINGUAL DAILY
Status: DISCONTINUED | OUTPATIENT
Start: 2025-07-11 | End: 2025-07-12

## 2025-07-10 RX ORDER — INSULIN GLARGINE 100 [IU]/ML
15 INJECTION, SOLUTION SUBCUTANEOUS NIGHTLY
Status: DISCONTINUED | OUTPATIENT
Start: 2025-07-10 | End: 2025-07-15 | Stop reason: HOSPADM

## 2025-07-10 RX ADMIN — INSULIN GLARGINE 15 UNITS: 100 INJECTION, SOLUTION SUBCUTANEOUS at 22:06

## 2025-07-10 RX ADMIN — PANTOPRAZOLE SODIUM 40 MG: 40 TABLET, DELAYED RELEASE ORAL at 06:14

## 2025-07-10 RX ADMIN — METHYLPREDNISOLONE SODIUM SUCCINATE 20 MG: 40 INJECTION, POWDER, LYOPHILIZED, FOR SOLUTION INTRAMUSCULAR; INTRAVENOUS at 21:39

## 2025-07-10 RX ADMIN — FUROSEMIDE 20 MG: 10 INJECTION, SOLUTION INTRAMUSCULAR; INTRAVENOUS at 18:08

## 2025-07-10 RX ADMIN — Medication 10 ML: at 21:40

## 2025-07-10 RX ADMIN — LINEZOLID 600 MG: 600 INJECTION, SOLUTION INTRAVENOUS at 04:25

## 2025-07-10 RX ADMIN — ENOXAPARIN SODIUM 40 MG: 100 INJECTION SUBCUTANEOUS at 08:47

## 2025-07-10 RX ADMIN — POTASSIUM CHLORIDE 20 MEQ: 1500 TABLET, EXTENDED RELEASE ORAL at 13:14

## 2025-07-10 RX ADMIN — MICONAZOLE NITRATE: 20 POWDER TOPICAL at 21:49

## 2025-07-10 RX ADMIN — INSULIN LISPRO 7 UNITS: 100 INJECTION, SOLUTION INTRAVENOUS; SUBCUTANEOUS at 09:04

## 2025-07-10 RX ADMIN — IPRATROPIUM BROMIDE AND ALBUTEROL SULFATE 1 DOSE: .5; 2.5 SOLUTION RESPIRATORY (INHALATION) at 09:34

## 2025-07-10 RX ADMIN — INSULIN LISPRO 1 UNITS: 100 INJECTION, SOLUTION INTRAVENOUS; SUBCUTANEOUS at 13:10

## 2025-07-10 RX ADMIN — ACETAMINOPHEN 650 MG: 325 TABLET ORAL at 13:19

## 2025-07-10 RX ADMIN — ATENOLOL 25 MG: 25 TABLET ORAL at 06:14

## 2025-07-10 RX ADMIN — BUPRENORPHINE AND NALOXONE 1 TABLET: 2; .5 TABLET SUBLINGUAL at 08:48

## 2025-07-10 RX ADMIN — METHYLPREDNISOLONE SODIUM SUCCINATE 40 MG: 40 INJECTION, POWDER, LYOPHILIZED, FOR SOLUTION INTRAMUSCULAR; INTRAVENOUS at 08:48

## 2025-07-10 RX ADMIN — IPRATROPIUM BROMIDE AND ALBUTEROL SULFATE 1 DOSE: .5; 2.5 SOLUTION RESPIRATORY (INHALATION) at 04:52

## 2025-07-10 RX ADMIN — FUROSEMIDE 20 MG: 10 INJECTION, SOLUTION INTRAMUSCULAR; INTRAVENOUS at 08:48

## 2025-07-10 RX ADMIN — INSULIN LISPRO 7 UNITS: 100 INJECTION, SOLUTION INTRAVENOUS; SUBCUTANEOUS at 18:11

## 2025-07-10 RX ADMIN — IPRATROPIUM BROMIDE AND ALBUTEROL SULFATE 1 DOSE: .5; 2.5 SOLUTION RESPIRATORY (INHALATION) at 13:51

## 2025-07-10 RX ADMIN — Medication 10 ML: at 09:19

## 2025-07-10 RX ADMIN — ATENOLOL 25 MG: 25 TABLET ORAL at 18:08

## 2025-07-10 RX ADMIN — INSULIN LISPRO 1 UNITS: 100 INJECTION, SOLUTION INTRAVENOUS; SUBCUTANEOUS at 18:09

## 2025-07-10 RX ADMIN — HYDROCODONE BITARTRATE AND ACETAMINOPHEN 1 TABLET: 5; 325 TABLET ORAL at 00:34

## 2025-07-10 RX ADMIN — BUDESONIDE 500 MCG: 0.5 SUSPENSION RESPIRATORY (INHALATION) at 17:08

## 2025-07-10 RX ADMIN — BISACODYL 5 MG: 5 TABLET, COATED ORAL at 21:40

## 2025-07-10 RX ADMIN — INSULIN LISPRO 7 UNITS: 100 INJECTION, SOLUTION INTRAVENOUS; SUBCUTANEOUS at 13:11

## 2025-07-10 RX ADMIN — MICONAZOLE NITRATE: 20 POWDER TOPICAL at 09:19

## 2025-07-10 RX ADMIN — BUDESONIDE 500 MCG: 0.5 SUSPENSION RESPIRATORY (INHALATION) at 04:52

## 2025-07-10 RX ADMIN — IPRATROPIUM BROMIDE AND ALBUTEROL SULFATE 1 DOSE: .5; 2.5 SOLUTION RESPIRATORY (INHALATION) at 17:08

## 2025-07-10 RX ADMIN — BISACODYL 5 MG: 5 TABLET, COATED ORAL at 08:48

## 2025-07-10 RX ADMIN — DILTIAZEM HYDROCHLORIDE 240 MG: 240 CAPSULE, COATED, EXTENDED RELEASE ORAL at 08:48

## 2025-07-10 RX ADMIN — INSULIN LISPRO 1 UNITS: 100 INJECTION, SOLUTION INTRAVENOUS; SUBCUTANEOUS at 22:04

## 2025-07-10 RX ADMIN — INSULIN LISPRO 1 UNITS: 100 INJECTION, SOLUTION INTRAVENOUS; SUBCUTANEOUS at 08:48

## 2025-07-10 ASSESSMENT — PAIN DESCRIPTION - ORIENTATION: ORIENTATION: MID

## 2025-07-10 ASSESSMENT — PAIN DESCRIPTION - LOCATION: LOCATION: BACK

## 2025-07-10 ASSESSMENT — PAIN SCALES - GENERAL
PAINLEVEL_OUTOF10: 7
PAINLEVEL_OUTOF10: 8

## 2025-07-10 ASSESSMENT — PAIN - FUNCTIONAL ASSESSMENT: PAIN_FUNCTIONAL_ASSESSMENT: PREVENTS OR INTERFERES SOME ACTIVE ACTIVITIES AND ADLS

## 2025-07-10 ASSESSMENT — PAIN DESCRIPTION - DESCRIPTORS: DESCRIPTORS: ACHING;SORE;SHOOTING;DISCOMFORT

## 2025-07-10 NOTE — CARE COORDINATION
7/10/2025 ICU, Lasix, Solumedrol and many wounds. Pt was admitted from [facility]- Family requested [facility] and ICU doctor agrees.  LTACH MOM adamant for [facility]- will ask them to review and start the precert if appropriate.  PRECERT needed, Needs signed CARLA. Electronically signed by Wendy Kingston RN-BC on 7/10/2025 at 1:14 PM

## 2025-07-10 NOTE — CARE COORDINATION
7/10/2025 ICU, Lasix, Solumedrol 5lnc- declines bipap when needed. Pt was admitted from [facility]- Family requested [facility] and ICU doctor agrees.  LTACH  following. PRECERT needed, Needs signed CARLA. Palliative care for goals of care may be beneficial. Electronically signed by Wendy Kingston RN-BC on 7/10/2025 at 11:40 AM

## 2025-07-11 ENCOUNTER — APPOINTMENT (OUTPATIENT)
Dept: GENERAL RADIOLOGY | Age: 55
DRG: 720 | End: 2025-07-11
Payer: COMMERCIAL

## 2025-07-11 LAB
ALBUMIN SERPL-MCNC: 3.1 G/DL (ref 3.5–5.2)
ALP SERPL-CCNC: 941 U/L (ref 35–104)
ALT SERPL-CCNC: 62 U/L (ref 0–35)
ANION GAP SERPL CALCULATED.3IONS-SCNC: 13 MMOL/L (ref 7–16)
AST SERPL-CCNC: 64 U/L (ref 0–35)
BASOPHILS # BLD: 0 K/UL (ref 0–0.2)
BASOPHILS NFR BLD: 0 % (ref 0–2)
BILIRUB SERPL-MCNC: 0.5 MG/DL (ref 0–1.2)
BILIRUB UR QL STRIP: NEGATIVE
BUN SERPL-MCNC: 26 MG/DL (ref 6–20)
CALCIUM SERPL-MCNC: 9.5 MG/DL (ref 8.6–10)
CHLORIDE SERPL-SCNC: 96 MMOL/L (ref 98–107)
CLARITY UR: CLEAR
CO2 SERPL-SCNC: 31 MMOL/L (ref 22–29)
COLOR UR: YELLOW
CREAT SERPL-MCNC: 0.5 MG/DL (ref 0.5–1)
EOSINOPHIL # BLD: 0 K/UL (ref 0.05–0.5)
EOSINOPHILS RELATIVE PERCENT: 0 % (ref 0–6)
EPI CELLS #/AREA URNS HPF: ABNORMAL /HPF
ERYTHROCYTE [DISTWIDTH] IN BLOOD BY AUTOMATED COUNT: 16.4 % (ref 11.5–15)
GFR, ESTIMATED: >90 ML/MIN/1.73M2
GLUCOSE BLD-MCNC: 201 MG/DL (ref 74–99)
GLUCOSE BLD-MCNC: 229 MG/DL (ref 74–99)
GLUCOSE BLD-MCNC: 230 MG/DL (ref 74–99)
GLUCOSE BLD-MCNC: 249 MG/DL (ref 74–99)
GLUCOSE SERPL-MCNC: 240 MG/DL (ref 74–99)
GLUCOSE UR STRIP-MCNC: NEGATIVE MG/DL
HCT VFR BLD AUTO: 41 % (ref 34–48)
HGB BLD-MCNC: 12.9 G/DL (ref 11.5–15.5)
HGB UR QL STRIP.AUTO: ABNORMAL
KETONES UR STRIP-MCNC: ABNORMAL MG/DL
LEUKOCYTE ESTERASE UR QL STRIP: NEGATIVE
LYMPHOCYTES NFR BLD: 0.41 K/UL (ref 1.5–4)
LYMPHOCYTES RELATIVE PERCENT: 2 % (ref 20–42)
MAGNESIUM SERPL-MCNC: 2 MG/DL (ref 1.6–2.6)
MCH RBC QN AUTO: 28.3 PG (ref 26–35)
MCHC RBC AUTO-ENTMCNC: 31.5 G/DL (ref 32–34.5)
MCV RBC AUTO: 89.9 FL (ref 80–99.9)
METAMYELOCYTES ABSOLUTE COUNT: 0.21 K/UL (ref 0–0.12)
METAMYELOCYTES: 1 % (ref 0–1)
MONOCYTES NFR BLD: 0.21 K/UL (ref 0.1–0.95)
MONOCYTES NFR BLD: 1 % (ref 2–12)
NEUTROPHILS NFR BLD: 97 % (ref 43–80)
NEUTS SEG NFR BLD: 22.97 K/UL (ref 1.8–7.3)
NITRITE UR QL STRIP: NEGATIVE
PH UR STRIP: 6.5 [PH] (ref 5–8)
PHOSPHATE SERPL-MCNC: 2.6 MG/DL (ref 2.5–4.5)
PLATELET # BLD AUTO: 271 K/UL (ref 130–450)
PMV BLD AUTO: 10.5 FL (ref 7–12)
POTASSIUM SERPL-SCNC: 4.1 MMOL/L (ref 3.5–5.1)
PROT SERPL-MCNC: 6.5 G/DL (ref 6.4–8.3)
PROT UR STRIP-MCNC: ABNORMAL MG/DL
RBC # BLD AUTO: 4.56 M/UL (ref 3.5–5.5)
RBC # BLD: NORMAL 10*6/UL
RBC #/AREA URNS HPF: ABNORMAL /HPF
SODIUM SERPL-SCNC: 140 MMOL/L (ref 136–145)
SP GR UR STRIP: 1.01 (ref 1–1.03)
UROBILINOGEN UR STRIP-ACNC: 1 EU/DL (ref 0–1)
WBC #/AREA URNS HPF: ABNORMAL /HPF
WBC OTHER # BLD: 23.8 K/UL (ref 4.5–11.5)

## 2025-07-11 PROCEDURE — 2580000003 HC RX 258: Performed by: SPECIALIST

## 2025-07-11 PROCEDURE — 2500000003 HC RX 250 WO HCPCS

## 2025-07-11 PROCEDURE — 82962 GLUCOSE BLOOD TEST: CPT

## 2025-07-11 PROCEDURE — 87040 BLOOD CULTURE FOR BACTERIA: CPT

## 2025-07-11 PROCEDURE — 87086 URINE CULTURE/COLONY COUNT: CPT

## 2025-07-11 PROCEDURE — 6370000000 HC RX 637 (ALT 250 FOR IP)

## 2025-07-11 PROCEDURE — 6360000002 HC RX W HCPCS: Performed by: INTERNAL MEDICINE

## 2025-07-11 PROCEDURE — 94660 CPAP INITIATION&MGMT: CPT

## 2025-07-11 PROCEDURE — 85025 COMPLETE CBC W/AUTO DIFF WBC: CPT

## 2025-07-11 PROCEDURE — 6360000002 HC RX W HCPCS: Performed by: SPECIALIST

## 2025-07-11 PROCEDURE — 2700000000 HC OXYGEN THERAPY PER DAY

## 2025-07-11 PROCEDURE — 6360000002 HC RX W HCPCS

## 2025-07-11 PROCEDURE — 71045 X-RAY EXAM CHEST 1 VIEW: CPT

## 2025-07-11 PROCEDURE — 6370000000 HC RX 637 (ALT 250 FOR IP): Performed by: INTERNAL MEDICINE

## 2025-07-11 PROCEDURE — 84100 ASSAY OF PHOSPHORUS: CPT

## 2025-07-11 PROCEDURE — 6370000000 HC RX 637 (ALT 250 FOR IP): Performed by: NURSE PRACTITIONER

## 2025-07-11 PROCEDURE — 2500000003 HC RX 250 WO HCPCS: Performed by: STUDENT IN AN ORGANIZED HEALTH CARE EDUCATION/TRAINING PROGRAM

## 2025-07-11 PROCEDURE — 83735 ASSAY OF MAGNESIUM: CPT

## 2025-07-11 PROCEDURE — 2580000003 HC RX 258

## 2025-07-11 PROCEDURE — 81001 URINALYSIS AUTO W/SCOPE: CPT

## 2025-07-11 PROCEDURE — 6360000002 HC RX W HCPCS: Performed by: FAMILY MEDICINE

## 2025-07-11 PROCEDURE — 2000000000 HC ICU R&B

## 2025-07-11 PROCEDURE — 6370000000 HC RX 637 (ALT 250 FOR IP): Performed by: STUDENT IN AN ORGANIZED HEALTH CARE EDUCATION/TRAINING PROGRAM

## 2025-07-11 PROCEDURE — 6360000002 HC RX W HCPCS: Performed by: STUDENT IN AN ORGANIZED HEALTH CARE EDUCATION/TRAINING PROGRAM

## 2025-07-11 PROCEDURE — 36415 COLL VENOUS BLD VENIPUNCTURE: CPT

## 2025-07-11 PROCEDURE — 80053 COMPREHEN METABOLIC PANEL: CPT

## 2025-07-11 PROCEDURE — 94640 AIRWAY INHALATION TREATMENT: CPT

## 2025-07-11 RX ADMIN — Medication 10 ML: at 08:38

## 2025-07-11 RX ADMIN — METHYLPREDNISOLONE SODIUM SUCCINATE 20 MG: 40 INJECTION, POWDER, LYOPHILIZED, FOR SOLUTION INTRAMUSCULAR; INTRAVENOUS at 08:36

## 2025-07-11 RX ADMIN — ACETAZOLAMIDE SODIUM 250 MG: 500 INJECTION, POWDER, LYOPHILIZED, FOR SOLUTION INTRAVENOUS at 12:07

## 2025-07-11 RX ADMIN — ATENOLOL 25 MG: 25 TABLET ORAL at 06:34

## 2025-07-11 RX ADMIN — INSULIN LISPRO 7 UNITS: 100 INJECTION, SOLUTION INTRAVENOUS; SUBCUTANEOUS at 08:38

## 2025-07-11 RX ADMIN — IPRATROPIUM BROMIDE AND ALBUTEROL SULFATE 1 DOSE: .5; 2.5 SOLUTION RESPIRATORY (INHALATION) at 09:52

## 2025-07-11 RX ADMIN — INSULIN LISPRO 1 UNITS: 100 INJECTION, SOLUTION INTRAVENOUS; SUBCUTANEOUS at 08:39

## 2025-07-11 RX ADMIN — BUDESONIDE 500 MCG: 0.5 SUSPENSION RESPIRATORY (INHALATION) at 17:28

## 2025-07-11 RX ADMIN — INSULIN LISPRO 7 UNITS: 100 INJECTION, SOLUTION INTRAVENOUS; SUBCUTANEOUS at 12:02

## 2025-07-11 RX ADMIN — INSULIN LISPRO 7 UNITS: 100 INJECTION, SOLUTION INTRAVENOUS; SUBCUTANEOUS at 18:11

## 2025-07-11 RX ADMIN — ACETAMINOPHEN 650 MG: 325 TABLET ORAL at 12:15

## 2025-07-11 RX ADMIN — MICONAZOLE NITRATE: 20 POWDER TOPICAL at 22:01

## 2025-07-11 RX ADMIN — BISACODYL 5 MG: 5 TABLET, COATED ORAL at 08:36

## 2025-07-11 RX ADMIN — FUROSEMIDE 20 MG: 10 INJECTION, SOLUTION INTRAMUSCULAR; INTRAVENOUS at 08:37

## 2025-07-11 RX ADMIN — INSULIN LISPRO 1 UNITS: 100 INJECTION, SOLUTION INTRAVENOUS; SUBCUTANEOUS at 21:58

## 2025-07-11 RX ADMIN — Medication 10 ML: at 22:02

## 2025-07-11 RX ADMIN — ATENOLOL 25 MG: 25 TABLET ORAL at 15:46

## 2025-07-11 RX ADMIN — HYDROCODONE BITARTRATE AND ACETAMINOPHEN 1 TABLET: 5; 325 TABLET ORAL at 15:46

## 2025-07-11 RX ADMIN — MICONAZOLE NITRATE: 20 POWDER TOPICAL at 08:38

## 2025-07-11 RX ADMIN — IPRATROPIUM BROMIDE AND ALBUTEROL SULFATE 1 DOSE: .5; 2.5 SOLUTION RESPIRATORY (INHALATION) at 17:28

## 2025-07-11 RX ADMIN — BUDESONIDE 500 MCG: 0.5 SUSPENSION RESPIRATORY (INHALATION) at 04:58

## 2025-07-11 RX ADMIN — INSULIN LISPRO 1 UNITS: 100 INJECTION, SOLUTION INTRAVENOUS; SUBCUTANEOUS at 12:01

## 2025-07-11 RX ADMIN — INSULIN GLARGINE 15 UNITS: 100 INJECTION, SOLUTION SUBCUTANEOUS at 21:58

## 2025-07-11 RX ADMIN — HYDRALAZINE HYDROCHLORIDE 10 MG: 20 INJECTION INTRAMUSCULAR; INTRAVENOUS at 09:03

## 2025-07-11 RX ADMIN — MEROPENEM 1000 MG: 1 INJECTION INTRAVENOUS at 18:21

## 2025-07-11 RX ADMIN — IPRATROPIUM BROMIDE AND ALBUTEROL SULFATE 1 DOSE: .5; 2.5 SOLUTION RESPIRATORY (INHALATION) at 13:49

## 2025-07-11 RX ADMIN — INSULIN LISPRO 1 UNITS: 100 INJECTION, SOLUTION INTRAVENOUS; SUBCUTANEOUS at 18:12

## 2025-07-11 RX ADMIN — IPRATROPIUM BROMIDE AND ALBUTEROL SULFATE 1 DOSE: .5; 2.5 SOLUTION RESPIRATORY (INHALATION) at 04:58

## 2025-07-11 RX ADMIN — PANTOPRAZOLE SODIUM 40 MG: 40 TABLET, DELAYED RELEASE ORAL at 06:34

## 2025-07-11 RX ADMIN — ENOXAPARIN SODIUM 40 MG: 100 INJECTION SUBCUTANEOUS at 08:36

## 2025-07-11 RX ADMIN — FUROSEMIDE 20 MG: 10 INJECTION, SOLUTION INTRAMUSCULAR; INTRAVENOUS at 18:15

## 2025-07-11 ASSESSMENT — PAIN SCALES - GENERAL
PAINLEVEL_OUTOF10: 0
PAINLEVEL_OUTOF10: 0

## 2025-07-11 NOTE — CARE COORDINATION
7/11/2025 ICU, Lasix, Solumedrol,3lnc.  Pt was admitted from [facility]- Family requested [facility] and ICU doctor agrees and will follow there. However, MOM adamant (calling daily) about LT[facility]-Requested [facility] to start the PRECERT if pt has criteria. Weak criteria- Lasix and Solumedrol, complex wound care. No antibiotics.  PRECERT needed, Needs signed CARLA. Palliative care for goals of care- pt declining to eat, bipap, AMS. Ambulance form in soft chart. Electronically signed by Wendy Kingston RN-BC on 7/11/2025 at 8:22 AM

## 2025-07-12 LAB
ANION GAP SERPL CALCULATED.3IONS-SCNC: 10 MMOL/L (ref 7–16)
BASOPHILS # BLD: 0 K/UL (ref 0–0.2)
BASOPHILS NFR BLD: 0 % (ref 0–2)
BUN SERPL-MCNC: 30 MG/DL (ref 6–20)
CALCIUM SERPL-MCNC: 8.9 MG/DL (ref 8.6–10)
CHLORIDE SERPL-SCNC: 101 MMOL/L (ref 98–107)
CO2 SERPL-SCNC: 35 MMOL/L (ref 22–29)
CREAT SERPL-MCNC: 0.5 MG/DL (ref 0.5–1)
EOSINOPHIL # BLD: 0 K/UL (ref 0.05–0.5)
EOSINOPHILS RELATIVE PERCENT: 0 % (ref 0–6)
ERYTHROCYTE [DISTWIDTH] IN BLOOD BY AUTOMATED COUNT: 16.5 % (ref 11.5–15)
GFR, ESTIMATED: >90 ML/MIN/1.73M2
GLUCOSE BLD-MCNC: 101 MG/DL (ref 74–99)
GLUCOSE BLD-MCNC: 149 MG/DL (ref 74–99)
GLUCOSE SERPL-MCNC: 110 MG/DL (ref 74–99)
HCT VFR BLD AUTO: 35.9 % (ref 34–48)
HGB BLD-MCNC: 11 G/DL (ref 11.5–15.5)
LYMPHOCYTES NFR BLD: 0.51 K/UL (ref 1.5–4)
LYMPHOCYTES RELATIVE PERCENT: 3 % (ref 20–42)
MAGNESIUM SERPL-MCNC: 2 MG/DL (ref 1.6–2.6)
MCH RBC QN AUTO: 28.4 PG (ref 26–35)
MCHC RBC AUTO-ENTMCNC: 30.6 G/DL (ref 32–34.5)
MCV RBC AUTO: 92.5 FL (ref 80–99.9)
MICROORGANISM SPEC CULT: NO GROWTH
MONOCYTES NFR BLD: 0.34 K/UL (ref 0.1–0.95)
MONOCYTES NFR BLD: 2 % (ref 2–12)
NEUTROPHILS NFR BLD: 96 % (ref 43–80)
NEUTS SEG NFR BLD: 19.06 K/UL (ref 1.8–7.3)
NUCLEATED RED BLOOD CELLS: 2 PER 100 WBC
PHOSPHATE SERPL-MCNC: 2.3 MG/DL (ref 2.5–4.5)
PLATELET # BLD AUTO: 206 K/UL (ref 130–450)
PMV BLD AUTO: 10.7 FL (ref 7–12)
POTASSIUM SERPL-SCNC: 2.8 MMOL/L (ref 3.5–5.1)
RBC # BLD AUTO: 3.88 M/UL (ref 3.5–5.5)
RBC # BLD: ABNORMAL 10*6/UL
RBC # BLD: ABNORMAL 10*6/UL
SERVICE CMNT-IMP: NORMAL
SODIUM SERPL-SCNC: 145 MMOL/L (ref 136–145)
SPECIMEN DESCRIPTION: NORMAL
WBC OTHER # BLD: 19.9 K/UL (ref 4.5–11.5)

## 2025-07-12 PROCEDURE — 51798 US URINE CAPACITY MEASURE: CPT

## 2025-07-12 PROCEDURE — 2580000003 HC RX 258

## 2025-07-12 PROCEDURE — 6360000002 HC RX W HCPCS: Performed by: INTERNAL MEDICINE

## 2025-07-12 PROCEDURE — 2000000000 HC ICU R&B

## 2025-07-12 PROCEDURE — 6370000000 HC RX 637 (ALT 250 FOR IP): Performed by: INTERNAL MEDICINE

## 2025-07-12 PROCEDURE — 2580000003 HC RX 258: Performed by: SPECIALIST

## 2025-07-12 PROCEDURE — 83735 ASSAY OF MAGNESIUM: CPT

## 2025-07-12 PROCEDURE — 84100 ASSAY OF PHOSPHORUS: CPT

## 2025-07-12 PROCEDURE — 6360000002 HC RX W HCPCS: Performed by: SPECIALIST

## 2025-07-12 PROCEDURE — 85025 COMPLETE CBC W/AUTO DIFF WBC: CPT

## 2025-07-12 PROCEDURE — 80048 BASIC METABOLIC PNL TOTAL CA: CPT

## 2025-07-12 PROCEDURE — 6360000002 HC RX W HCPCS: Performed by: STUDENT IN AN ORGANIZED HEALTH CARE EDUCATION/TRAINING PROGRAM

## 2025-07-12 PROCEDURE — 6370000000 HC RX 637 (ALT 250 FOR IP): Performed by: STUDENT IN AN ORGANIZED HEALTH CARE EDUCATION/TRAINING PROGRAM

## 2025-07-12 PROCEDURE — 2500000003 HC RX 250 WO HCPCS: Performed by: STUDENT IN AN ORGANIZED HEALTH CARE EDUCATION/TRAINING PROGRAM

## 2025-07-12 PROCEDURE — 6360000002 HC RX W HCPCS

## 2025-07-12 PROCEDURE — 2580000003 HC RX 258: Performed by: HOSPITALIST

## 2025-07-12 PROCEDURE — 6360000002 HC RX W HCPCS: Performed by: HOSPITALIST

## 2025-07-12 PROCEDURE — 6370000000 HC RX 637 (ALT 250 FOR IP)

## 2025-07-12 PROCEDURE — 36415 COLL VENOUS BLD VENIPUNCTURE: CPT

## 2025-07-12 PROCEDURE — 2500000003 HC RX 250 WO HCPCS

## 2025-07-12 PROCEDURE — 82962 GLUCOSE BLOOD TEST: CPT

## 2025-07-12 PROCEDURE — 94640 AIRWAY INHALATION TREATMENT: CPT

## 2025-07-12 PROCEDURE — 94660 CPAP INITIATION&MGMT: CPT

## 2025-07-12 PROCEDURE — 51701 INSERT BLADDER CATHETER: CPT

## 2025-07-12 PROCEDURE — 2700000000 HC OXYGEN THERAPY PER DAY

## 2025-07-12 RX ORDER — METHYLPHENIDATE HYDROCHLORIDE 5 MG/1
5 TABLET ORAL 2 TIMES DAILY WITH MEALS
Refills: 0 | Status: DISCONTINUED | OUTPATIENT
Start: 2025-07-12 | End: 2025-07-15 | Stop reason: HOSPADM

## 2025-07-12 RX ORDER — ACETAMINOPHEN 650 MG/1
650 SUPPOSITORY RECTAL EVERY 6 HOURS PRN
Status: DISCONTINUED | OUTPATIENT
Start: 2025-07-12 | End: 2025-07-15 | Stop reason: HOSPADM

## 2025-07-12 RX ORDER — POTASSIUM CHLORIDE 29.8 MG/ML
40 INJECTION INTRAVENOUS 2 TIMES DAILY
Status: DISCONTINUED | OUTPATIENT
Start: 2025-07-12 | End: 2025-07-12 | Stop reason: CLARIF

## 2025-07-12 RX ADMIN — Medication 10 ML: at 08:56

## 2025-07-12 RX ADMIN — ACETAZOLAMIDE SODIUM 250 MG: 500 INJECTION, POWDER, LYOPHILIZED, FOR SOLUTION INTRAVENOUS at 21:57

## 2025-07-12 RX ADMIN — MICONAZOLE NITRATE: 20 POWDER TOPICAL at 08:56

## 2025-07-12 RX ADMIN — Medication 10 ML: at 21:53

## 2025-07-12 RX ADMIN — METHYLPHENIDATE HYDROCHLORIDE 5 MG: 5 TABLET ORAL at 14:50

## 2025-07-12 RX ADMIN — BUDESONIDE 500 MCG: 0.5 SUSPENSION RESPIRATORY (INHALATION) at 06:32

## 2025-07-12 RX ADMIN — IPRATROPIUM BROMIDE AND ALBUTEROL SULFATE 1 DOSE: .5; 2.5 SOLUTION RESPIRATORY (INHALATION) at 10:00

## 2025-07-12 RX ADMIN — METHYLPHENIDATE HYDROCHLORIDE 5 MG: 5 TABLET ORAL at 17:37

## 2025-07-12 RX ADMIN — IPRATROPIUM BROMIDE AND ALBUTEROL SULFATE 1 DOSE: .5; 2.5 SOLUTION RESPIRATORY (INHALATION) at 15:02

## 2025-07-12 RX ADMIN — ACETAMINOPHEN 650 MG: 650 SUPPOSITORY RECTAL at 00:45

## 2025-07-12 RX ADMIN — IPRATROPIUM BROMIDE AND ALBUTEROL SULFATE 1 DOSE: .5; 2.5 SOLUTION RESPIRATORY (INHALATION) at 06:32

## 2025-07-12 RX ADMIN — MICONAZOLE NITRATE: 20 POWDER TOPICAL at 21:54

## 2025-07-12 RX ADMIN — FUROSEMIDE 20 MG: 10 INJECTION, SOLUTION INTRAMUSCULAR; INTRAVENOUS at 08:55

## 2025-07-12 RX ADMIN — ACETAZOLAMIDE SODIUM 250 MG: 500 INJECTION, POWDER, LYOPHILIZED, FOR SOLUTION INTRAVENOUS at 14:46

## 2025-07-12 RX ADMIN — POTASSIUM CHLORIDE 40 MEQ: 2 INJECTION, SOLUTION, CONCENTRATE INTRAVENOUS at 14:45

## 2025-07-12 RX ADMIN — MEROPENEM 1000 MG: 1 INJECTION INTRAVENOUS at 09:35

## 2025-07-12 RX ADMIN — INSULIN GLARGINE 15 UNITS: 100 INJECTION, SOLUTION SUBCUTANEOUS at 22:02

## 2025-07-12 RX ADMIN — ENOXAPARIN SODIUM 40 MG: 100 INJECTION SUBCUTANEOUS at 08:55

## 2025-07-12 RX ADMIN — BUDESONIDE 500 MCG: 0.5 SUSPENSION RESPIRATORY (INHALATION) at 19:26

## 2025-07-12 RX ADMIN — MEROPENEM 1000 MG: 1 INJECTION INTRAVENOUS at 02:29

## 2025-07-12 RX ADMIN — ACETAZOLAMIDE SODIUM 250 MG: 500 INJECTION, POWDER, LYOPHILIZED, FOR SOLUTION INTRAVENOUS at 00:24

## 2025-07-12 RX ADMIN — FUROSEMIDE 20 MG: 10 INJECTION, SOLUTION INTRAMUSCULAR; INTRAVENOUS at 17:37

## 2025-07-12 RX ADMIN — POTASSIUM PHOSPHATE, MONOBASIC POTASSIUM PHOSPHATE, DIBASIC 30 MMOL: 224; 236 INJECTION, SOLUTION, CONCENTRATE INTRAVENOUS at 07:37

## 2025-07-12 RX ADMIN — MEROPENEM 1000 MG: 1 INJECTION INTRAVENOUS at 17:40

## 2025-07-12 RX ADMIN — POTASSIUM CHLORIDE 40 MEQ: 2 INJECTION, SOLUTION, CONCENTRATE INTRAVENOUS at 21:53

## 2025-07-12 RX ADMIN — IPRATROPIUM BROMIDE AND ALBUTEROL SULFATE 1 DOSE: .5; 2.5 SOLUTION RESPIRATORY (INHALATION) at 19:26

## 2025-07-12 ASSESSMENT — PAIN SCALES - GENERAL: PAINLEVEL_OUTOF10: 0

## 2025-07-13 ENCOUNTER — APPOINTMENT (OUTPATIENT)
Dept: GENERAL RADIOLOGY | Age: 55
DRG: 720 | End: 2025-07-13
Payer: COMMERCIAL

## 2025-07-13 LAB
ANION GAP SERPL CALCULATED.3IONS-SCNC: 10 MMOL/L (ref 7–16)
BASOPHILS # BLD: 0 K/UL (ref 0–0.2)
BASOPHILS NFR BLD: 0 % (ref 0–2)
BUN SERPL-MCNC: 19 MG/DL (ref 6–20)
CALCIUM SERPL-MCNC: 8.5 MG/DL (ref 8.6–10)
CHLORIDE SERPL-SCNC: 107 MMOL/L (ref 98–107)
CO2 SERPL-SCNC: 29 MMOL/L (ref 22–29)
CREAT SERPL-MCNC: 0.4 MG/DL (ref 0.5–1)
EOSINOPHIL # BLD: 0 K/UL (ref 0.05–0.5)
EOSINOPHILS RELATIVE PERCENT: 0 % (ref 0–6)
ERYTHROCYTE [DISTWIDTH] IN BLOOD BY AUTOMATED COUNT: 16.7 % (ref 11.5–15)
GFR, ESTIMATED: >90 ML/MIN/1.73M2
GLUCOSE BLD-MCNC: 143 MG/DL (ref 74–99)
GLUCOSE BLD-MCNC: 144 MG/DL (ref 74–99)
GLUCOSE BLD-MCNC: 154 MG/DL (ref 74–99)
GLUCOSE BLD-MCNC: 175 MG/DL (ref 74–99)
GLUCOSE SERPL-MCNC: 150 MG/DL (ref 74–99)
HCT VFR BLD AUTO: 37.6 % (ref 34–48)
HGB BLD-MCNC: 11.4 G/DL (ref 11.5–15.5)
LYMPHOCYTES NFR BLD: 0.92 K/UL (ref 1.5–4)
LYMPHOCYTES RELATIVE PERCENT: 4 % (ref 20–42)
MAGNESIUM SERPL-MCNC: 1.7 MG/DL (ref 1.6–2.6)
MCH RBC QN AUTO: 28.4 PG (ref 26–35)
MCHC RBC AUTO-ENTMCNC: 30.3 G/DL (ref 32–34.5)
MCV RBC AUTO: 93.8 FL (ref 80–99.9)
MONOCYTES NFR BLD: 0.92 K/UL (ref 0.1–0.95)
MONOCYTES NFR BLD: 4 % (ref 2–12)
NEUTROPHILS NFR BLD: 92 % (ref 43–80)
NEUTS SEG NFR BLD: 21.07 K/UL (ref 1.8–7.3)
NUCLEATED RED BLOOD CELLS: 2 PER 100 WBC
PHOSPHATE SERPL-MCNC: 2.1 MG/DL (ref 2.5–4.5)
PLATELET # BLD AUTO: 160 K/UL (ref 130–450)
PMV BLD AUTO: 10.7 FL (ref 7–12)
POTASSIUM SERPL-SCNC: 3.8 MMOL/L (ref 3.5–5.1)
RBC # BLD AUTO: 4.01 M/UL (ref 3.5–5.5)
RBC # BLD: ABNORMAL 10*6/UL
SODIUM SERPL-SCNC: 145 MMOL/L (ref 136–145)
WBC OTHER # BLD: 22.9 K/UL (ref 4.5–11.5)

## 2025-07-13 PROCEDURE — 2500000003 HC RX 250 WO HCPCS

## 2025-07-13 PROCEDURE — 82962 GLUCOSE BLOOD TEST: CPT

## 2025-07-13 PROCEDURE — 6360000002 HC RX W HCPCS: Performed by: HOSPITALIST

## 2025-07-13 PROCEDURE — 36415 COLL VENOUS BLD VENIPUNCTURE: CPT

## 2025-07-13 PROCEDURE — 2580000003 HC RX 258

## 2025-07-13 PROCEDURE — 6360000002 HC RX W HCPCS: Performed by: INTERNAL MEDICINE

## 2025-07-13 PROCEDURE — 2700000000 HC OXYGEN THERAPY PER DAY

## 2025-07-13 PROCEDURE — 6370000000 HC RX 637 (ALT 250 FOR IP): Performed by: INTERNAL MEDICINE

## 2025-07-13 PROCEDURE — 2000000000 HC ICU R&B

## 2025-07-13 PROCEDURE — 2580000003 HC RX 258: Performed by: HOSPITALIST

## 2025-07-13 PROCEDURE — 6370000000 HC RX 637 (ALT 250 FOR IP): Performed by: STUDENT IN AN ORGANIZED HEALTH CARE EDUCATION/TRAINING PROGRAM

## 2025-07-13 PROCEDURE — 94640 AIRWAY INHALATION TREATMENT: CPT

## 2025-07-13 PROCEDURE — 6360000002 HC RX W HCPCS: Performed by: SPECIALIST

## 2025-07-13 PROCEDURE — 80048 BASIC METABOLIC PNL TOTAL CA: CPT

## 2025-07-13 PROCEDURE — 6360000002 HC RX W HCPCS: Performed by: STUDENT IN AN ORGANIZED HEALTH CARE EDUCATION/TRAINING PROGRAM

## 2025-07-13 PROCEDURE — 84100 ASSAY OF PHOSPHORUS: CPT

## 2025-07-13 PROCEDURE — 85025 COMPLETE CBC W/AUTO DIFF WBC: CPT

## 2025-07-13 PROCEDURE — 83735 ASSAY OF MAGNESIUM: CPT

## 2025-07-13 PROCEDURE — 6360000002 HC RX W HCPCS

## 2025-07-13 PROCEDURE — 94660 CPAP INITIATION&MGMT: CPT

## 2025-07-13 PROCEDURE — 2580000003 HC RX 258: Performed by: SPECIALIST

## 2025-07-13 PROCEDURE — 2500000003 HC RX 250 WO HCPCS: Performed by: STUDENT IN AN ORGANIZED HEALTH CARE EDUCATION/TRAINING PROGRAM

## 2025-07-13 PROCEDURE — 71045 X-RAY EXAM CHEST 1 VIEW: CPT

## 2025-07-13 RX ORDER — DEXMEDETOMIDINE HYDROCHLORIDE 4 UG/ML
.1-1.5 INJECTION, SOLUTION INTRAVENOUS CONTINUOUS
Status: DISCONTINUED | OUTPATIENT
Start: 2025-07-13 | End: 2025-07-15 | Stop reason: HOSPADM

## 2025-07-13 RX ORDER — MAGNESIUM SULFATE IN WATER 40 MG/ML
2000 INJECTION, SOLUTION INTRAVENOUS ONCE
Status: COMPLETED | OUTPATIENT
Start: 2025-07-13 | End: 2025-07-13

## 2025-07-13 RX ADMIN — ATENOLOL 25 MG: 25 TABLET ORAL at 16:25

## 2025-07-13 RX ADMIN — ACETAZOLAMIDE SODIUM 250 MG: 500 INJECTION, POWDER, LYOPHILIZED, FOR SOLUTION INTRAVENOUS at 12:53

## 2025-07-13 RX ADMIN — MICONAZOLE NITRATE: 20 POWDER TOPICAL at 20:18

## 2025-07-13 RX ADMIN — POTASSIUM CHLORIDE 40 MEQ: 2 INJECTION, SOLUTION, CONCENTRATE INTRAVENOUS at 20:20

## 2025-07-13 RX ADMIN — BUDESONIDE 500 MCG: 0.5 SUSPENSION RESPIRATORY (INHALATION) at 04:35

## 2025-07-13 RX ADMIN — BUDESONIDE 500 MCG: 0.5 SUSPENSION RESPIRATORY (INHALATION) at 18:02

## 2025-07-13 RX ADMIN — MEROPENEM 1000 MG: 1 INJECTION INTRAVENOUS at 02:18

## 2025-07-13 RX ADMIN — MEROPENEM 1000 MG: 1 INJECTION INTRAVENOUS at 18:06

## 2025-07-13 RX ADMIN — HYDROCODONE BITARTRATE AND ACETAMINOPHEN 1 TABLET: 5; 325 TABLET ORAL at 16:26

## 2025-07-13 RX ADMIN — POTASSIUM PHOSPHATE, MONOBASIC POTASSIUM PHOSPHATE, DIBASIC 20 MMOL: 224; 236 INJECTION, SOLUTION, CONCENTRATE INTRAVENOUS at 08:57

## 2025-07-13 RX ADMIN — IPRATROPIUM BROMIDE AND ALBUTEROL SULFATE 1 DOSE: .5; 2.5 SOLUTION RESPIRATORY (INHALATION) at 13:02

## 2025-07-13 RX ADMIN — Medication 0.2 MCG/KG/HR: at 03:36

## 2025-07-13 RX ADMIN — FUROSEMIDE 20 MG: 10 INJECTION, SOLUTION INTRAMUSCULAR; INTRAVENOUS at 18:02

## 2025-07-13 RX ADMIN — ENOXAPARIN SODIUM 40 MG: 100 INJECTION SUBCUTANEOUS at 08:48

## 2025-07-13 RX ADMIN — FUROSEMIDE 20 MG: 10 INJECTION, SOLUTION INTRAMUSCULAR; INTRAVENOUS at 08:43

## 2025-07-13 RX ADMIN — Medication 10 ML: at 20:18

## 2025-07-13 RX ADMIN — ATENOLOL 25 MG: 25 TABLET ORAL at 08:39

## 2025-07-13 RX ADMIN — INSULIN GLARGINE 15 UNITS: 100 INJECTION, SOLUTION SUBCUTANEOUS at 20:17

## 2025-07-13 RX ADMIN — MICONAZOLE NITRATE: 20 POWDER TOPICAL at 08:47

## 2025-07-13 RX ADMIN — IPRATROPIUM BROMIDE AND ALBUTEROL SULFATE 1 DOSE: .5; 2.5 SOLUTION RESPIRATORY (INHALATION) at 04:35

## 2025-07-13 RX ADMIN — MEROPENEM 1000 MG: 1 INJECTION INTRAVENOUS at 08:51

## 2025-07-13 RX ADMIN — METHYLPHENIDATE HYDROCHLORIDE 5 MG: 5 TABLET ORAL at 16:26

## 2025-07-13 RX ADMIN — METHYLPHENIDATE HYDROCHLORIDE 5 MG: 5 TABLET ORAL at 08:39

## 2025-07-13 RX ADMIN — ACETAZOLAMIDE SODIUM 250 MG: 500 INJECTION, POWDER, LYOPHILIZED, FOR SOLUTION INTRAVENOUS at 23:37

## 2025-07-13 RX ADMIN — BISACODYL 5 MG: 5 TABLET, COATED ORAL at 20:19

## 2025-07-13 RX ADMIN — Medication 10 ML: at 08:48

## 2025-07-13 RX ADMIN — IPRATROPIUM BROMIDE AND ALBUTEROL SULFATE 1 DOSE: .5; 2.5 SOLUTION RESPIRATORY (INHALATION) at 18:02

## 2025-07-13 RX ADMIN — POTASSIUM CHLORIDE 40 MEQ: 2 INJECTION, SOLUTION, CONCENTRATE INTRAVENOUS at 08:50

## 2025-07-13 RX ADMIN — BISACODYL 5 MG: 5 TABLET, COATED ORAL at 08:39

## 2025-07-13 RX ADMIN — DILTIAZEM HYDROCHLORIDE 240 MG: 240 CAPSULE, COATED, EXTENDED RELEASE ORAL at 08:39

## 2025-07-13 RX ADMIN — MAGNESIUM SULFATE HEPTAHYDRATE 2000 MG: 40 INJECTION, SOLUTION INTRAVENOUS at 08:52

## 2025-07-13 ASSESSMENT — PAIN SCALES - GENERAL
PAINLEVEL_OUTOF10: 0
PAINLEVEL_OUTOF10: 0
PAINLEVEL_OUTOF10: 8
PAINLEVEL_OUTOF10: 5
PAINLEVEL_OUTOF10: 0

## 2025-07-13 ASSESSMENT — PAIN DESCRIPTION - LOCATION: LOCATION: BACK

## 2025-07-14 LAB
ANION GAP SERPL CALCULATED.3IONS-SCNC: 11 MMOL/L (ref 7–16)
B PARAP IS1001 DNA NPH QL NAA+NON-PROBE: NOT DETECTED
B PERT DNA SPEC QL NAA+PROBE: NOT DETECTED
BASOPHILS # BLD: 0.17 K/UL (ref 0–0.2)
BASOPHILS NFR BLD: 1 % (ref 0–2)
BUN SERPL-MCNC: 17 MG/DL (ref 6–20)
C PNEUM DNA NPH QL NAA+NON-PROBE: NOT DETECTED
CALCIUM SERPL-MCNC: 8.6 MG/DL (ref 8.6–10)
CHLORIDE SERPL-SCNC: 109 MMOL/L (ref 98–107)
CO2 SERPL-SCNC: 26 MMOL/L (ref 22–29)
CREAT SERPL-MCNC: 0.4 MG/DL (ref 0.5–1)
EOSINOPHIL # BLD: 0.17 K/UL (ref 0.05–0.5)
EOSINOPHILS RELATIVE PERCENT: 1 % (ref 0–6)
ERYTHROCYTE [DISTWIDTH] IN BLOOD BY AUTOMATED COUNT: 17.1 % (ref 11.5–15)
FLUAV RNA NPH QL NAA+NON-PROBE: NOT DETECTED
FLUBV RNA NPH QL NAA+NON-PROBE: NOT DETECTED
GFR, ESTIMATED: >90 ML/MIN/1.73M2
GLUCOSE BLD-MCNC: 140 MG/DL (ref 74–99)
GLUCOSE BLD-MCNC: 144 MG/DL (ref 74–99)
GLUCOSE BLD-MCNC: 146 MG/DL (ref 74–99)
GLUCOSE SERPL-MCNC: 138 MG/DL (ref 74–99)
HADV DNA NPH QL NAA+NON-PROBE: NOT DETECTED
HCOV 229E RNA NPH QL NAA+NON-PROBE: NOT DETECTED
HCOV HKU1 RNA NPH QL NAA+NON-PROBE: NOT DETECTED
HCOV NL63 RNA NPH QL NAA+NON-PROBE: NOT DETECTED
HCOV OC43 RNA NPH QL NAA+NON-PROBE: NOT DETECTED
HCT VFR BLD AUTO: 37.6 % (ref 34–48)
HGB BLD-MCNC: 11.3 G/DL (ref 11.5–15.5)
HMPV RNA NPH QL NAA+NON-PROBE: NOT DETECTED
HPIV1 RNA NPH QL NAA+NON-PROBE: NOT DETECTED
HPIV2 RNA NPH QL NAA+NON-PROBE: NOT DETECTED
HPIV3 RNA NPH QL NAA+NON-PROBE: NOT DETECTED
HPIV4 RNA NPH QL NAA+NON-PROBE: NOT DETECTED
LYMPHOCYTES NFR BLD: 0.83 K/UL (ref 1.5–4)
LYMPHOCYTES RELATIVE PERCENT: 4 % (ref 20–42)
M PNEUMO DNA NPH QL NAA+NON-PROBE: NOT DETECTED
MAGNESIUM SERPL-MCNC: 2.2 MG/DL (ref 1.6–2.6)
MCH RBC QN AUTO: 28.7 PG (ref 26–35)
MCHC RBC AUTO-ENTMCNC: 30.1 G/DL (ref 32–34.5)
MCV RBC AUTO: 95.4 FL (ref 80–99.9)
METAMYELOCYTES ABSOLUTE COUNT: 0.17 K/UL (ref 0–0.12)
METAMYELOCYTES: 1 % (ref 0–1)
MONOCYTES NFR BLD: 0.66 K/UL (ref 0.1–0.95)
MONOCYTES NFR BLD: 4 % (ref 2–12)
MYELOCYTES ABSOLUTE COUNT: 0.17 K/UL
MYELOCYTES: 1 %
NEUTROPHILS NFR BLD: 89 % (ref 43–80)
NEUTS SEG NFR BLD: 16.85 K/UL (ref 1.8–7.3)
NUCLEATED RED BLOOD CELLS: 1 PER 100 WBC
PHOSPHATE SERPL-MCNC: 2.3 MG/DL (ref 2.5–4.5)
PLATELET # BLD AUTO: 212 K/UL (ref 130–450)
PMV BLD AUTO: 10.7 FL (ref 7–12)
POTASSIUM SERPL-SCNC: 4.7 MMOL/L (ref 3.5–5.1)
RBC # BLD AUTO: 3.94 M/UL (ref 3.5–5.5)
RBC # BLD: ABNORMAL 10*6/UL
RSV RNA NPH QL NAA+NON-PROBE: NOT DETECTED
RV+EV RNA NPH QL NAA+NON-PROBE: NOT DETECTED
SARS-COV-2 RNA NPH QL NAA+NON-PROBE: NOT DETECTED
SODIUM SERPL-SCNC: 145 MMOL/L (ref 136–145)
SPECIMEN DESCRIPTION: NORMAL
WBC OTHER # BLD: 19 K/UL (ref 4.5–11.5)

## 2025-07-14 PROCEDURE — 94660 CPAP INITIATION&MGMT: CPT

## 2025-07-14 PROCEDURE — 6370000000 HC RX 637 (ALT 250 FOR IP)

## 2025-07-14 PROCEDURE — 2500000003 HC RX 250 WO HCPCS

## 2025-07-14 PROCEDURE — 85025 COMPLETE CBC W/AUTO DIFF WBC: CPT

## 2025-07-14 PROCEDURE — 94640 AIRWAY INHALATION TREATMENT: CPT

## 2025-07-14 PROCEDURE — 6370000000 HC RX 637 (ALT 250 FOR IP): Performed by: STUDENT IN AN ORGANIZED HEALTH CARE EDUCATION/TRAINING PROGRAM

## 2025-07-14 PROCEDURE — 2500000003 HC RX 250 WO HCPCS: Performed by: STUDENT IN AN ORGANIZED HEALTH CARE EDUCATION/TRAINING PROGRAM

## 2025-07-14 PROCEDURE — 6360000002 HC RX W HCPCS: Performed by: STUDENT IN AN ORGANIZED HEALTH CARE EDUCATION/TRAINING PROGRAM

## 2025-07-14 PROCEDURE — 0202U NFCT DS 22 TRGT SARS-COV-2: CPT

## 2025-07-14 PROCEDURE — 6370000000 HC RX 637 (ALT 250 FOR IP): Performed by: INTERNAL MEDICINE

## 2025-07-14 PROCEDURE — 2700000000 HC OXYGEN THERAPY PER DAY

## 2025-07-14 PROCEDURE — 83735 ASSAY OF MAGNESIUM: CPT

## 2025-07-14 PROCEDURE — 6360000002 HC RX W HCPCS: Performed by: INTERNAL MEDICINE

## 2025-07-14 PROCEDURE — 84100 ASSAY OF PHOSPHORUS: CPT

## 2025-07-14 PROCEDURE — 2580000003 HC RX 258: Performed by: SPECIALIST

## 2025-07-14 PROCEDURE — 2580000003 HC RX 258

## 2025-07-14 PROCEDURE — 82962 GLUCOSE BLOOD TEST: CPT

## 2025-07-14 PROCEDURE — 6360000002 HC RX W HCPCS: Performed by: SPECIALIST

## 2025-07-14 PROCEDURE — 80048 BASIC METABOLIC PNL TOTAL CA: CPT

## 2025-07-14 PROCEDURE — 2000000000 HC ICU R&B

## 2025-07-14 RX ORDER — BUPRENORPHINE HYDROCHLORIDE AND NALOXONE HYDROCHLORIDE DIHYDRATE 8; 2 MG/1; MG/1
1 TABLET SUBLINGUAL 2 TIMES DAILY
Status: DISCONTINUED | OUTPATIENT
Start: 2025-07-14 | End: 2025-07-14

## 2025-07-14 RX ORDER — BUPRENORPHINE HYDROCHLORIDE AND NALOXONE HYDROCHLORIDE DIHYDRATE 2; .5 MG/1; MG/1
1 TABLET SUBLINGUAL DAILY
Status: DISCONTINUED | OUTPATIENT
Start: 2025-07-14 | End: 2025-07-15 | Stop reason: HOSPADM

## 2025-07-14 RX ORDER — HYDROXYZINE PAMOATE 25 MG/1
50 CAPSULE ORAL ONCE
Status: COMPLETED | OUTPATIENT
Start: 2025-07-14 | End: 2025-07-14

## 2025-07-14 RX ADMIN — Medication 10 ML: at 08:41

## 2025-07-14 RX ADMIN — BUDESONIDE 500 MCG: 0.5 SUSPENSION RESPIRATORY (INHALATION) at 04:19

## 2025-07-14 RX ADMIN — ENOXAPARIN SODIUM 40 MG: 100 INJECTION SUBCUTANEOUS at 08:40

## 2025-07-14 RX ADMIN — IPRATROPIUM BROMIDE AND ALBUTEROL SULFATE 1 DOSE: .5; 2.5 SOLUTION RESPIRATORY (INHALATION) at 14:49

## 2025-07-14 RX ADMIN — POTASSIUM CHLORIDE 20 MEQ: 1500 TABLET, EXTENDED RELEASE ORAL at 12:25

## 2025-07-14 RX ADMIN — POTASSIUM PHOSPHATE, MONOBASIC AND POTASSIUM PHOSPHATE, DIBASIC 20 MMOL: 224; 236 INJECTION, SOLUTION, CONCENTRATE INTRAVENOUS at 06:08

## 2025-07-14 RX ADMIN — METHYLPHENIDATE HYDROCHLORIDE 5 MG: 5 TABLET ORAL at 16:47

## 2025-07-14 RX ADMIN — MEROPENEM 1000 MG: 1 INJECTION INTRAVENOUS at 08:40

## 2025-07-14 RX ADMIN — MEROPENEM 1000 MG: 1 INJECTION INTRAVENOUS at 02:41

## 2025-07-14 RX ADMIN — ATENOLOL 25 MG: 25 TABLET ORAL at 16:43

## 2025-07-14 RX ADMIN — BUPRENORPHINE HYDROCHLORIDE AND NALOXONE HYDROCHLORIDE DIHYDRATE 1 TABLET: 2; .5 TABLET SUBLINGUAL at 12:25

## 2025-07-14 RX ADMIN — IPRATROPIUM BROMIDE AND ALBUTEROL SULFATE 1 DOSE: .5; 2.5 SOLUTION RESPIRATORY (INHALATION) at 09:48

## 2025-07-14 RX ADMIN — MEROPENEM 1000 MG: 1 INJECTION INTRAVENOUS at 16:46

## 2025-07-14 RX ADMIN — METHYLPHENIDATE HYDROCHLORIDE 5 MG: 5 TABLET ORAL at 08:40

## 2025-07-14 RX ADMIN — HYDROXYZINE PAMOATE 50 MG: 25 CAPSULE ORAL at 22:37

## 2025-07-14 RX ADMIN — PANTOPRAZOLE SODIUM 40 MG: 40 TABLET, DELAYED RELEASE ORAL at 06:09

## 2025-07-14 RX ADMIN — FUROSEMIDE 20 MG: 10 INJECTION, SOLUTION INTRAMUSCULAR; INTRAVENOUS at 16:47

## 2025-07-14 RX ADMIN — BISACODYL 5 MG: 5 TABLET, COATED ORAL at 08:40

## 2025-07-14 RX ADMIN — IPRATROPIUM BROMIDE AND ALBUTEROL SULFATE 1 DOSE: .5; 2.5 SOLUTION RESPIRATORY (INHALATION) at 18:27

## 2025-07-14 RX ADMIN — HYDROCODONE BITARTRATE AND ACETAMINOPHEN 1 TABLET: 5; 325 TABLET ORAL at 13:55

## 2025-07-14 RX ADMIN — HYDROCODONE BITARTRATE AND ACETAMINOPHEN 1 TABLET: 5; 325 TABLET ORAL at 22:37

## 2025-07-14 RX ADMIN — FUROSEMIDE 20 MG: 10 INJECTION, SOLUTION INTRAMUSCULAR; INTRAVENOUS at 08:40

## 2025-07-14 RX ADMIN — BISACODYL 5 MG: 5 TABLET, COATED ORAL at 22:37

## 2025-07-14 RX ADMIN — IPRATROPIUM BROMIDE AND ALBUTEROL SULFATE 1 DOSE: .5; 2.5 SOLUTION RESPIRATORY (INHALATION) at 04:20

## 2025-07-14 RX ADMIN — MICONAZOLE NITRATE: 20 POWDER TOPICAL at 08:41

## 2025-07-14 RX ADMIN — ATENOLOL 25 MG: 25 TABLET ORAL at 06:08

## 2025-07-14 RX ADMIN — MICONAZOLE NITRATE: 20 POWDER TOPICAL at 21:00

## 2025-07-14 RX ADMIN — BUDESONIDE 500 MCG: 0.5 SUSPENSION RESPIRATORY (INHALATION) at 18:28

## 2025-07-14 RX ADMIN — DILTIAZEM HYDROCHLORIDE 240 MG: 240 CAPSULE, COATED, EXTENDED RELEASE ORAL at 08:45

## 2025-07-14 RX ADMIN — Medication 10 ML: at 21:00

## 2025-07-14 ASSESSMENT — PAIN DESCRIPTION - PAIN TYPE: TYPE: CHRONIC PAIN

## 2025-07-14 ASSESSMENT — PAIN DESCRIPTION - LOCATION
LOCATION: BACK
LOCATION: BACK

## 2025-07-14 ASSESSMENT — PAIN SCALES - GENERAL
PAINLEVEL_OUTOF10: 6
PAINLEVEL_OUTOF10: 2
PAINLEVEL_OUTOF10: 0
PAINLEVEL_OUTOF10: 4

## 2025-07-14 ASSESSMENT — PAIN DESCRIPTION - DESCRIPTORS
DESCRIPTORS: ACHING
DESCRIPTORS: ACHING;DULL;NAGGING

## 2025-07-14 ASSESSMENT — PAIN DESCRIPTION - ORIENTATION: ORIENTATION: MID;LOWER

## 2025-07-14 NOTE — CARE COORDINATION
7/14/2025 ICU, Lasix, Merrem, Electrolyte replacement, 6 liters high flow oxygen.   PTA from [facility]- Family requested [facility] (accepted- will need precert- with a HENS completed prior to obtaining auth) Started LTACH precert on Thursday 7/10/2025- Pending approval for [facility] in [place]. However, MOM adamant about LTACH so we need a Denial before any SNF auth can be tried.   PRECERT needed, Needs signed CARLA. Palliative care for goals of care- pt declining to eat, declines nightly bipap, AMS. Ambulance form in soft chart. Electronically signed by Wendy Kingston RN-BC on 7/14/2025 at 10:50 AM

## 2025-07-14 NOTE — CONSULTS
CONSULTATION NOTE :ID     Patient - Li Rojas,  Age - 55 y.o.    - 1970      Room Number - 0521/0521-01   MRN -  [id number]   [facility] # - [id number]  Date of Admission -  2025 12:42 PM  Patient's PCP: Melvin Cardoso MD     Requesting Physician: Ruy Lubin DO    HISTORY OF PRESENT ILLNESS   This is a 55 y.o. female who was admitted on 2025   to the hospital with a chief complaints of   Chief Complaint   Patient presents with    Shortness of Breath     Patient 84% on 6 L NC at triage, HX of asthma, recent DX of pneumonia. Patient does not normally wear O2.     ID was consulted on 25 for antibiotic management   The history is obtained from extensive review of available past medical records   Knonw to ID d/c on  from [facility]  Leukocytosis( chronic ) -13.3 K   Possible Aspiration pneumonia  Compression frx  T7, T10,  T11,  and T12  Chronic venous insufficiency   Erythema due to venous insufficiency   oral agumentin 875 mg po q 12 hrs - 5 more days.       Pt presents from SNF with respiratory distress inc sob majo   Afebrile vs stable 6->15->9L  Cr0.5 alt49 ast53  wbc12.4->10.1  Rvp neg   Urine Legionella S pneumonia neg     US ABDOMEN LIMITED Specify organ? LIVER, SPLEEN, GALLBLADDER  Result Date: 2025  Hepatic steatosis.  Otherwise unremarkable exam.     CTA PULMONARY W CONTRAST  Result Date: 2025  1.  Motion artifacts precludes proper evaluation the pulmonary artery circulation.  For proper evaluation the pulmonary artery circulation consider repeat examination or correlation with ventilation perfusion lung scan. 2.  Ground-glass density in the right upper lobe compatible with pneumonia. 3.  Areas of subsegmental atelectasis in the left upper lobe/left lower lobe/right lower lobe. 4.  No aneurysm formation or dissection of the thoracic aorta. 5.  Rapid development of severe osteopenia with multiple compression fracture deformity in mid lower 
     Nutrition Note    Consult for ONS. Continue diabetic ONS BID. Will add wound healing ONS BID to promote healing. Follow per policy.      Current Diet Order:  ADULT ORAL NUTRITION SUPPLEMENT; Breakfast, Dinner; Diabetic Oral Supplement  ADULT DIET; Dysphagia - Pureed; 4 carb choices (60 gm/meal); LIKES YOGURT.  ADULT ORAL NUTRITION SUPPLEMENT; Lunch, Dinner; Wound Healing Oral Supplement    Please see RD note from 7/7 for full assessment.    Electronically signed by CORTEZ KONG MPH, RD, LD on 7/10/25 at 1:42 PM EDT    Contact: [phone]    
    [facility]    PULMONARY/CRITICAL CARE CONSULTATION NOTE    Patient: Li Rojas  MRN: [id number]  : 1970    Encounter Date: 7/3/2025  Encounter Time: 3:08 PM     Date of Admission: .2025 12:42 PM    Consulting Physician:  Primary Care Physician:      Melvin Cardoso MD     [phone]    PROBLEM LIST:  Patient Active Problem List   Diagnosis    SVT (supraventricular tachycardia)    MS (multiple sclerosis) (HCC)    Sacroiliitis    Personal history of supraventricular tachycardia    Elevated LFTs    Anxiety    Rheumatoid arthritis (HCC)    Osteoarthritis of left hip    Bilateral lower leg cellulitis    Acute bilateral thoracic back pain    Edema    Asthma    Hypertension    Systemic lupus erythematosus, unspecified (HCC)    Cellulitis    Type 2 diabetes mellitus without complication, without long-term current use of insulin (HCC)    Positive blood cultures    Opioid use disorder, moderate, in sustained remission (HCC)    MARCO (acute kidney injury)    Palliative care encounter    Goals of care, counseling/discussion    Hospital-acquired pneumonia    Acute on chronic respiratory failure with hypoxia (HCC)    Compression fracture of body of thoracic vertebra (HCC)    Compression fracture of lumbar vertebra (HCC)    Acute delirium    Visual hallucinations     Reason for Consultation: Respiratory Failure     HPI:   Li Rojas is a 55 y.o. female with past medical history noted for above that presented to hospital for shortness of breath on 2025.    Pulmonary has been consulted for worsening respiratory failure and accelerated oxygen demand on 7/3/2025.  Patient up to 8L O2 NC at this time.    History of noted to include MS, Asthma, suspected lupus.    Patient was recently discharged from [facility] for acute respiratory failure and was treated with Augmentin for potential CAP vs aspiration PNA vs drug-induced pneumonitis.     Patient at that time as D/C on 6L O2 NC and 
  Palliative Care Department  [phone]  Palliative Care Initial Consult  Provider LOCO Ornelas CNP     Li Rojas  [id number]  Hospital Day: 13  Date of Initial Consult: 7/11/2025  Referring Provider: Flavia Sharp APRN - CNP  Palliative Medicine was consulted for assistance with: santhosh    HPI:   Li Rojas is a 55 y.o. with a medical history of  Lupus, MS, non-compliance, SVT, chronic suboxone use, who was admitted on 6/29/2025 from nursing facility with a CHIEF COMPLAINT of dyspnea, hypoxia.   Pt was recently discharged from [facility] for acute respiratory failure and was tx'ed with Augmentin for potential CAP vs aspiration PNA vs drug-induced pneumonitis. Pt was discharged with 6L NC and was found to be hypoxic at SNF on 6L NC.  ID following.  Palliative medicine consulted for further assistance.    ASSESSMENT/PLAN:     Pertinent Hospital Diagnoses     Acute on chronic hypoxic respiratory failure   Multi-Factorial Dyspnea   Bibasilar Atelectasis  Subcarinal Lymphadenopathy  H/O Multiple Sclerosis  Concern for Autoimmune Disease - RODRIGO positive 1:60 4/18/2011 was ngative 5/6/2023 upon recheck (likely false positive or reactive)  H/O Aspiration Pneumonitis  Left Pleural Effusion  B/L Multi-Lobar Infiltrative Process/Bronchiolitis   Opioid Dependence - on suboxone  Transaminitis with Elevated Alk Phosphatase    Compression fracture of body of thoracic vertebra  Compression fracture of lumbar vertebra  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  Capacity is time limited and situation/question specific  During encounter Karol was surrogate medical decision-maker  Outcome of goals of care meeting:   Continue current medical 
Comprehensive Nutrition Assessment    Type and Reason for Visit:  Initial, Positive nutrition screen, Consult, Wound    Nutrition Recommendations/Plan:   Continue current diet per SLP recs  Continue wound healing ONS BID  Start HP ONS BID  Monitor and replace lytes PRN  Will continue to monitor while inpatient     Malnutrition Assessment:  Malnutrition Status:  At risk for malnutrition (07/01/25 1324)    Context:  Chronic Illness     Findings of the 6 clinical characteristics of malnutrition:  Energy Intake:  Mild decrease in energy intake  Weight Loss:  No weight loss     Body Fat Loss:  Unable to assess (nursing w/ pt at time of visit)     Muscle Mass Loss:  Unable to assess    Fluid Accumulation:  Unable to assess (d/t multifactorial)     Strength:  Not Performed    Nutrition Assessment:    Pt adm w/ HCAP; acute hypoxic resp failure; hallucinations. Hx DM, lupus, MS, multiple adm to [facility]. SLP eval 6/30- rec soft and bite sized diet. Note wounds- wound healing ONS added BID. Will add HP ONS BID to promote protein/energy intake. Continue current diet per SLP. Will continue to monitor.    Nutrition Related Findings:    A&O x2, edentulous, abd soft/rounded/obese/distended, +BS, +2/3 edema, I/O WDL, K+ 3, gluc 150, A1c 8.7 (6/17/25) lasix, steroid Wound Type: Pressure Injury, Unstageable, Partial Thickness, Full Thickness (Irritant contact dermatitis on buttocks)       Current Nutrition Intake & Therapies:    Average Meal Intake: Unable to assess  Average Supplements Intake: Unable to assess  ADULT ORAL NUTRITION SUPPLEMENT; Lunch, Dinner; Wound Healing Oral Supplement  ADULT DIET; Dysphagia - Soft and Bite Sized; 4 carb choices (60 gm/meal)  ADULT ORAL NUTRITION SUPPLEMENT; Breakfast, Dinner; Low Calorie/High Protein Oral Supplement    Anthropometric Measures:  Height: 172 cm (5' 7.72\")  Ideal Body Weight (IBW): 139 lbs (63 kg)    Admission Body Weight: 87.7 kg (193 lb 6.4 oz) (6/30 unspecified 
body height at T10.  Likewise superior endplate fractures at T11, T12, and L1.  Previous kyphoplasty at L3.  No significant retro-/anterolisthesis.  No osseous central canal stenosis visualized, no posterior retropulsion.  Study effectiveness limited due to motion artifact.    IMPRESSION:  Subacute versus chronic vertebral body compression fractures at multiple levels in thoracic and lumbar vertebrae without posterior retropulsion    PLAN:  Radiographic as well as physical exam findings were discussed with the patient  Patient has multiple spinal compression fractures along with chronic pain in her spine.  However, patient is currently admitted to the ICU due to acute on chronic respiratory failure, on top of a number of other significant medical comorbidities.   No acute orthopedic intervention at this time  Okay to weight-bear bilateral lower extremities, although patient is currently nonambulatory and has been so for some time  Patient has seen Dr. Portillo who is a spine surgeon, who performed an L3 kyphoplasty on her.  Recommend follow-up with Dr. Portillo after discharge  Multi modal pain control per admitting service  Orthopedic surgery will follow the patient peripherally throughout the remainder of her stay.  Once medically appropriate, okay to discharge from orthopedic standpoint.  Patient will follow-up with Dr. Portillo after discharge.  Discuss with attending  Electronically signed by Jameel Hernandez DO on 7/14/2025 at 11:08 AM  Note: This report was completed using computerCalifornia Interactive [employer] voiced recognition software.  Every effort has been made to ensure accuracy; however, inadvertent computerized transcription errors may be present.

## 2025-07-15 ENCOUNTER — APPOINTMENT (OUTPATIENT)
Dept: GENERAL RADIOLOGY | Age: 55
DRG: 720 | End: 2025-07-15
Payer: COMMERCIAL

## 2025-07-15 VITALS
HEIGHT: 68 IN | HEART RATE: 82 BPM | TEMPERATURE: 97.1 F | SYSTOLIC BLOOD PRESSURE: 120 MMHG | WEIGHT: 189.4 LBS | BODY MASS INDEX: 28.7 KG/M2 | OXYGEN SATURATION: 95 % | RESPIRATION RATE: 18 BRPM | DIASTOLIC BLOOD PRESSURE: 77 MMHG

## 2025-07-15 LAB
ANION GAP SERPL CALCULATED.3IONS-SCNC: 13 MMOL/L (ref 7–16)
BASOPHILS # BLD: 0 K/UL (ref 0–0.2)
BASOPHILS NFR BLD: 0 % (ref 0–2)
BUN SERPL-MCNC: 19 MG/DL (ref 6–20)
CALCIUM SERPL-MCNC: 9.2 MG/DL (ref 8.6–10)
CHLORIDE SERPL-SCNC: 102 MMOL/L (ref 98–107)
CO2 SERPL-SCNC: 23 MMOL/L (ref 22–29)
CORTIS SERPL-MCNC: 16.8 UG/DL (ref 2.7–18.4)
CORTISOL COLLECTION INFO: NORMAL
CREAT SERPL-MCNC: 0.4 MG/DL (ref 0.5–1)
EOSINOPHIL # BLD: 0.61 K/UL (ref 0.05–0.5)
EOSINOPHILS RELATIVE PERCENT: 3 % (ref 0–6)
ERYTHROCYTE [DISTWIDTH] IN BLOOD BY AUTOMATED COUNT: 17.3 % (ref 11.5–15)
GFR, ESTIMATED: >90 ML/MIN/1.73M2
GLUCOSE BLD-MCNC: 122 MG/DL (ref 74–99)
GLUCOSE BLD-MCNC: 137 MG/DL (ref 74–99)
GLUCOSE SERPL-MCNC: 144 MG/DL (ref 74–99)
HCT VFR BLD AUTO: 37.3 % (ref 34–48)
HGB BLD-MCNC: 11.6 G/DL (ref 11.5–15.5)
LYMPHOCYTES NFR BLD: 1.82 K/UL (ref 1.5–4)
LYMPHOCYTES RELATIVE PERCENT: 9 % (ref 20–42)
MAGNESIUM SERPL-MCNC: 1.9 MG/DL (ref 1.6–2.6)
MCH RBC QN AUTO: 28.7 PG (ref 26–35)
MCHC RBC AUTO-ENTMCNC: 31.1 G/DL (ref 32–34.5)
MCV RBC AUTO: 92.3 FL (ref 80–99.9)
METAMYELOCYTES ABSOLUTE COUNT: 0.2 K/UL (ref 0–0.12)
METAMYELOCYTES: 1 % (ref 0–1)
MONOCYTES NFR BLD: 1.21 K/UL (ref 0.1–0.95)
MONOCYTES NFR BLD: 6 % (ref 2–12)
NEUTROPHILS NFR BLD: 81 % (ref 43–80)
NEUTS SEG NFR BLD: 16.36 K/UL (ref 1.8–7.3)
NUCLEATED RED BLOOD CELLS: 1 PER 100 WBC
PHOSPHATE SERPL-MCNC: 2.8 MG/DL (ref 2.5–4.5)
PLATELET # BLD AUTO: 241 K/UL (ref 130–450)
PMV BLD AUTO: 10.4 FL (ref 7–12)
POTASSIUM SERPL-SCNC: 4.3 MMOL/L (ref 3.5–5.1)
RBC # BLD AUTO: 4.04 M/UL (ref 3.5–5.5)
SODIUM SERPL-SCNC: 138 MMOL/L (ref 136–145)
WBC OTHER # BLD: 20.2 K/UL (ref 4.5–11.5)

## 2025-07-15 PROCEDURE — 6360000002 HC RX W HCPCS: Performed by: INTERNAL MEDICINE

## 2025-07-15 PROCEDURE — 6370000000 HC RX 637 (ALT 250 FOR IP): Performed by: INTERNAL MEDICINE

## 2025-07-15 PROCEDURE — 6370000000 HC RX 637 (ALT 250 FOR IP)

## 2025-07-15 PROCEDURE — 2500000003 HC RX 250 WO HCPCS: Performed by: STUDENT IN AN ORGANIZED HEALTH CARE EDUCATION/TRAINING PROGRAM

## 2025-07-15 PROCEDURE — 2580000003 HC RX 258: Performed by: SPECIALIST

## 2025-07-15 PROCEDURE — 6360000002 HC RX W HCPCS

## 2025-07-15 PROCEDURE — 94660 CPAP INITIATION&MGMT: CPT

## 2025-07-15 PROCEDURE — 83735 ASSAY OF MAGNESIUM: CPT

## 2025-07-15 PROCEDURE — 6360000002 HC RX W HCPCS: Performed by: STUDENT IN AN ORGANIZED HEALTH CARE EDUCATION/TRAINING PROGRAM

## 2025-07-15 PROCEDURE — 84100 ASSAY OF PHOSPHORUS: CPT

## 2025-07-15 PROCEDURE — 92526 ORAL FUNCTION THERAPY: CPT

## 2025-07-15 PROCEDURE — 82962 GLUCOSE BLOOD TEST: CPT

## 2025-07-15 PROCEDURE — 80048 BASIC METABOLIC PNL TOTAL CA: CPT

## 2025-07-15 PROCEDURE — 82533 TOTAL CORTISOL: CPT

## 2025-07-15 PROCEDURE — 6360000002 HC RX W HCPCS: Performed by: SPECIALIST

## 2025-07-15 PROCEDURE — 36415 COLL VENOUS BLD VENIPUNCTURE: CPT

## 2025-07-15 PROCEDURE — 85025 COMPLETE CBC W/AUTO DIFF WBC: CPT

## 2025-07-15 PROCEDURE — 6370000000 HC RX 637 (ALT 250 FOR IP): Performed by: STUDENT IN AN ORGANIZED HEALTH CARE EDUCATION/TRAINING PROGRAM

## 2025-07-15 PROCEDURE — 71045 X-RAY EXAM CHEST 1 VIEW: CPT

## 2025-07-15 PROCEDURE — 94640 AIRWAY INHALATION TREATMENT: CPT

## 2025-07-15 PROCEDURE — 2700000000 HC OXYGEN THERAPY PER DAY

## 2025-07-15 RX ORDER — PREDNISONE 5 MG/1
7.5 TABLET ORAL DAILY
Status: DISCONTINUED | OUTPATIENT
Start: 2025-07-15 | End: 2025-07-15 | Stop reason: HOSPADM

## 2025-07-15 RX ORDER — FUROSEMIDE 10 MG/ML
20 INJECTION INTRAMUSCULAR; INTRAVENOUS 2 TIMES DAILY
Status: ON HOLD | DISCHARGE
Start: 2025-07-15

## 2025-07-15 RX ORDER — POTASSIUM CHLORIDE 1500 MG/1
20 TABLET, EXTENDED RELEASE ORAL
Status: ON HOLD | DISCHARGE
Start: 2025-07-15

## 2025-07-15 RX ORDER — ALBUTEROL SULFATE 0.83 MG/ML
2.5 SOLUTION RESPIRATORY (INHALATION) EVERY 6 HOURS PRN
Status: ON HOLD | DISCHARGE
Start: 2025-07-15

## 2025-07-15 RX ORDER — POLYETHYLENE GLYCOL 3350 17 G/17G
17 POWDER, FOR SOLUTION ORAL DAILY PRN
Status: ON HOLD | DISCHARGE
Start: 2025-07-15 | End: 2025-08-14

## 2025-07-15 RX ORDER — BUDESONIDE 0.5 MG/2ML
0.5 INHALANT ORAL
Status: ON HOLD | DISCHARGE
Start: 2025-07-15

## 2025-07-15 RX ORDER — PREDNISONE 2.5 MG/1
7.5 TABLET ORAL DAILY
Status: ON HOLD | DISCHARGE
Start: 2025-07-15

## 2025-07-15 RX ORDER — HYDROCODONE BITARTRATE AND ACETAMINOPHEN 5; 325 MG/1; MG/1
1 TABLET ORAL EVERY 6 HOURS PRN
Qty: 12 TABLET | Refills: 0 | Status: ON HOLD
Start: 2025-07-15 | End: 2025-07-18

## 2025-07-15 RX ORDER — METHYLPHENIDATE HYDROCHLORIDE 5 MG/1
5 TABLET ORAL 2 TIMES DAILY WITH MEALS
Qty: 10 TABLET | Refills: 0 | Status: ON HOLD
Start: 2025-07-15 | End: 2025-07-25

## 2025-07-15 RX ORDER — MAGNESIUM SULFATE 1 G/100ML
1000 INJECTION INTRAVENOUS ONCE
Status: COMPLETED | OUTPATIENT
Start: 2025-07-15 | End: 2025-07-15

## 2025-07-15 RX ORDER — ENOXAPARIN SODIUM 100 MG/ML
40 INJECTION SUBCUTANEOUS DAILY
Status: ON HOLD | DISCHARGE
Start: 2025-07-16

## 2025-07-15 RX ORDER — MEROPENEM 1 G/1
1000 INJECTION, POWDER, FOR SOLUTION INTRAVENOUS EVERY 8 HOURS
Qty: 6 EACH | Status: ON HOLD | DISCHARGE
Start: 2025-07-15 | End: 2025-07-17

## 2025-07-15 RX ORDER — LIDOCAINE 4 G/G
1 PATCH TOPICAL DAILY
Status: ON HOLD | DISCHARGE
Start: 2025-07-16

## 2025-07-15 RX ORDER — IPRATROPIUM BROMIDE AND ALBUTEROL SULFATE 2.5; .5 MG/3ML; MG/3ML
3 SOLUTION RESPIRATORY (INHALATION)
Status: ON HOLD | DISCHARGE
Start: 2025-07-15

## 2025-07-15 RX ADMIN — MICONAZOLE NITRATE: 20 POWDER TOPICAL at 08:46

## 2025-07-15 RX ADMIN — METHYLPHENIDATE HYDROCHLORIDE 5 MG: 5 TABLET ORAL at 16:00

## 2025-07-15 RX ADMIN — Medication 10 ML: at 08:45

## 2025-07-15 RX ADMIN — PANTOPRAZOLE SODIUM 40 MG: 40 TABLET, DELAYED RELEASE ORAL at 07:26

## 2025-07-15 RX ADMIN — MEROPENEM 1000 MG: 1 INJECTION INTRAVENOUS at 02:16

## 2025-07-15 RX ADMIN — BUDESONIDE 500 MCG: 0.5 SUSPENSION RESPIRATORY (INHALATION) at 16:44

## 2025-07-15 RX ADMIN — FUROSEMIDE 20 MG: 10 INJECTION, SOLUTION INTRAMUSCULAR; INTRAVENOUS at 08:45

## 2025-07-15 RX ADMIN — INSULIN LISPRO 7 UNITS: 100 INJECTION, SOLUTION INTRAVENOUS; SUBCUTANEOUS at 11:19

## 2025-07-15 RX ADMIN — MEROPENEM 1000 MG: 1 INJECTION INTRAVENOUS at 16:01

## 2025-07-15 RX ADMIN — HYDROCODONE BITARTRATE AND ACETAMINOPHEN 1 TABLET: 5; 325 TABLET ORAL at 18:01

## 2025-07-15 RX ADMIN — BUDESONIDE 500 MCG: 0.5 SUSPENSION RESPIRATORY (INHALATION) at 04:19

## 2025-07-15 RX ADMIN — INSULIN LISPRO 7 UNITS: 100 INJECTION, SOLUTION INTRAVENOUS; SUBCUTANEOUS at 16:03

## 2025-07-15 RX ADMIN — FUROSEMIDE 20 MG: 10 INJECTION, SOLUTION INTRAMUSCULAR; INTRAVENOUS at 18:01

## 2025-07-15 RX ADMIN — METHYLPHENIDATE HYDROCHLORIDE 5 MG: 5 TABLET ORAL at 08:44

## 2025-07-15 RX ADMIN — BUPRENORPHINE HYDROCHLORIDE AND NALOXONE HYDROCHLORIDE DIHYDRATE 1 TABLET: 2; .5 TABLET SUBLINGUAL at 08:44

## 2025-07-15 RX ADMIN — ATENOLOL 25 MG: 25 TABLET ORAL at 07:26

## 2025-07-15 RX ADMIN — BISACODYL 5 MG: 5 TABLET, COATED ORAL at 08:45

## 2025-07-15 RX ADMIN — PREDNISONE 7.5 MG: 5 TABLET ORAL at 10:32

## 2025-07-15 RX ADMIN — DILTIAZEM HYDROCHLORIDE 240 MG: 240 CAPSULE, COATED, EXTENDED RELEASE ORAL at 08:44

## 2025-07-15 RX ADMIN — MAGNESIUM SULFATE HEPTAHYDRATE 1000 MG: 1 INJECTION, SOLUTION INTRAVENOUS at 07:29

## 2025-07-15 RX ADMIN — IPRATROPIUM BROMIDE AND ALBUTEROL SULFATE 1 DOSE: .5; 2.5 SOLUTION RESPIRATORY (INHALATION) at 08:35

## 2025-07-15 RX ADMIN — POTASSIUM CHLORIDE 20 MEQ: 1500 TABLET, EXTENDED RELEASE ORAL at 11:19

## 2025-07-15 RX ADMIN — ENOXAPARIN SODIUM 40 MG: 100 INJECTION SUBCUTANEOUS at 08:45

## 2025-07-15 RX ADMIN — HYDROCODONE BITARTRATE AND ACETAMINOPHEN 1 TABLET: 5; 325 TABLET ORAL at 10:39

## 2025-07-15 RX ADMIN — IPRATROPIUM BROMIDE AND ALBUTEROL SULFATE 1 DOSE: .5; 2.5 SOLUTION RESPIRATORY (INHALATION) at 04:19

## 2025-07-15 RX ADMIN — IPRATROPIUM BROMIDE AND ALBUTEROL SULFATE 1 DOSE: .5; 2.5 SOLUTION RESPIRATORY (INHALATION) at 13:10

## 2025-07-15 RX ADMIN — MEROPENEM 1000 MG: 1 INJECTION INTRAVENOUS at 08:48

## 2025-07-15 RX ADMIN — IPRATROPIUM BROMIDE AND ALBUTEROL SULFATE 1 DOSE: .5; 2.5 SOLUTION RESPIRATORY (INHALATION) at 16:44

## 2025-07-15 ASSESSMENT — PAIN SCALES - GENERAL
PAINLEVEL_OUTOF10: 8
PAINLEVEL_OUTOF10: 0
PAINLEVEL_OUTOF10: 4

## 2025-07-15 ASSESSMENT — PAIN DESCRIPTION - DESCRIPTORS
DESCRIPTORS: ACHING;SORE;DISCOMFORT
DESCRIPTORS: ACHING

## 2025-07-15 ASSESSMENT — PAIN - FUNCTIONAL ASSESSMENT: PAIN_FUNCTIONAL_ASSESSMENT: PREVENTS OR INTERFERES SOME ACTIVE ACTIVITIES AND ADLS

## 2025-07-15 ASSESSMENT — PAIN DESCRIPTION - LOCATION
LOCATION: BACK
LOCATION: BACK;LEG

## 2025-07-15 ASSESSMENT — PAIN DESCRIPTION - ORIENTATION: ORIENTATION: LOWER

## 2025-07-15 NOTE — DISCHARGE SUMMARY
x 5.1 x 4.2 cm.  Normal cortical thickness without atrophy. Normal echogenicity of the right kidney. No renal calculi. No hydronephrosis. Spleen measuring 16.3 cm in length, enlarged.     Hepatic steatosis.  Otherwise unremarkable exam.     CTA PULMONARY W CONTRAST  Result Date: 6/29/2025  EXAMINATION: CTA OF THE CHEST 6/29/2025 5:49 pm TECHNIQUE: CTA of the chest was performed after the administration of intravenous contrast.  Multiplanar reformatted images are provided for review.  MIP images are provided for review. Automated exposure control, iterative reconstruction, and/or weight based adjustment of the mA/kV was utilized to reduce the radiation dose to as low as reasonably achievable. COMPARISON: None. HISTORY: ORDERING SYSTEM PROVIDED HISTORY: elevated dimer, r/o PE TECHNOLOGIST PROVIDED HISTORY: Reason for exam:->elevated dimer, r/o PE Additional Contrast?->1 FINDINGS: There are motion artifacts causes misregistration of the images and loss of detail.  These make difficult interpretation of the pulmonary artery circulation the present study particular for the segmental and subsegmental arteries for both lungs.  For proper evaluation the pulmonary artery circulation consider repeat examination. There is no aneurysm formation or dissection the thoracic aorta. Cardiac area is a normal size.  There is no pericardial effusion.  No mediastinal masses or adenopathy are seen. Presence of patchy ground-glass density infiltrates throughout the right upper lobe discretely in the left upper lobe with areas of subsegmental atelectasis anterior medial mediastinal aspect of the left upper lobe anterior segment.  Also subsegmental atelectasis with consolidations seen in posteromedial basal region of the right lower lobe and more discrete in the posteromedial basal region of the left lower.  These are new development since the study of January 2024. The area of atelectasis that was seen on the previous examination in the

## 2025-07-15 NOTE — CARE COORDINATION
DISCHARGE to [facility]- approved. PAS with [facility] [phone] pt is going to [phone] here at [facility]. Noon  time, [facility] can't accept so changed  time for 5pm- PAS has a two hour window, spoke to Rozina. Electronically signed by Wendy Kingston RN-BC on 7/15/2025 at 10:42 AM
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
## 2025-07-15 NOTE — PLAN OF CARE
Chest x-ray image reviewed.  She does multifocal infiltrates concerning for worsening pneumonia versus edema.  Exam not suggestive heart failure, she has not been on continuous IV fluids, and she had a normal echocardiogram May of this year making heart failure less likely.  Will check proBNP to further rule out, though if significantly elevated will give a dose of IV Lasix reassess.  Discussed with ID we will add MRSA coverage.       NOTE: Portions of this report were transcribed using voice recognition software. Every effort was made to ensure accuracy; however, inadvertent computerized transcription errors may be present.    Electronically signed by Ruy Lubin DO on 7/2/2025 at 5:56 PM    
  Problem: Chronic Conditions and Co-morbidities  Goal: Patient's chronic conditions and co-morbidity symptoms are monitored and maintained or improved  6/30/2025 2205 by Joan Copeland RN  Outcome: Progressing  6/30/2025 1752 by Ashok Rendon RN  Outcome: Progressing     Problem: Discharge Planning  Goal: Discharge to home or other facility with appropriate resources  6/30/2025 2205 by Joan Copeland RN  Outcome: Progressing  6/30/2025 1752 by Ashok Rendon RN  Outcome: Progressing     Problem: Safety - Adult  Goal: Free from fall injury  6/30/2025 2205 by Joan Copeland RN  Outcome: Progressing  6/30/2025 1752 by Ashok Rendon RN  Outcome: Progressing     Problem: ABCDS Injury Assessment  Goal: Absence of physical injury  6/30/2025 2205 by Joan Copeland RN  Outcome: Progressing  6/30/2025 1752 by Ashok Rendon RN  Outcome: Progressing     Problem: Skin/Tissue Integrity  Goal: Skin integrity remains intact  Description: 1.  Monitor for areas of redness and/or skin breakdown  2.  Assess vascular access sites hourly  3.  Every 4-6 hours minimum:  Change oxygen saturation probe site  4.  Every 4-6 hours:  If on nasal continuous positive airway pressure, respiratory therapy assess nares and determine need for appliance change or resting period  6/30/2025 2205 by Joan Copeland RN  Outcome: Progressing  6/30/2025 1752 by Ashok Rendon RN  Outcome: Progressing     Problem: Pain  Goal: Verbalizes/displays adequate comfort level or baseline comfort level  Outcome: Progressing     
  Problem: Chronic Conditions and Co-morbidities  Goal: Patient's chronic conditions and co-morbidity symptoms are monitored and maintained or improved  7/10/2025 0048 by Adrienne Guadalupe RN  Outcome: Progressing  Flowsheets (Taken 7/10/2025 0000)  Care Plan - Patient's Chronic Conditions and Co-Morbidity Symptoms are Monitored and Maintained or Improved: Monitor and assess patient's chronic conditions and comorbid symptoms for stability, deterioration, or improvement  7/9/2025 1550 by Renetta Ricks RN  Outcome: Progressing     Problem: Discharge Planning  Goal: Discharge to home or other facility with appropriate resources  7/10/2025 0048 by Adrienne Guadalupe RN  Outcome: Progressing  Flowsheets (Taken 7/10/2025 0000)  Discharge to home or other facility with appropriate resources: Identify barriers to discharge with patient and caregiver  7/9/2025 1550 by Renetta Ricks RN  Outcome: Progressing     Problem: Safety - Adult  Goal: Free from fall injury  7/10/2025 0048 by Adrienne Guadalupe RN  Outcome: Progressing  7/9/2025 1550 by Renetta Ricks RN  Outcome: Progressing     Problem: ABCDS Injury Assessment  Goal: Absence of physical injury  7/10/2025 0048 by Adrienne Guadalupe RN  Outcome: Progressing  7/9/2025 1550 by Renetta Ricks RN  Outcome: Progressing     Problem: Skin/Tissue Integrity  Goal: Skin integrity remains intact  Description: 1.  Monitor for areas of redness and/or skin breakdown  2.  Assess vascular access sites hourly  3.  Every 4-6 hours minimum:  Change oxygen saturation probe site  4.  Every 4-6 hours:  If on nasal continuous positive airway pressure, respiratory therapy assess nares and determine need for appliance change or resting period  7/10/2025 0048 by Adrienne Guadalupe RN  Outcome: Progressing  Flowsheets (Taken 7/10/2025 0000)  Skin Integrity Remains Intact:   Turn and reposition as indicated   Assess need for specialty bed   Positioning devices  7/9/2025 1550 
  Problem: Chronic Conditions and Co-morbidities  Goal: Patient's chronic conditions and co-morbidity symptoms are monitored and maintained or improved  7/10/2025 1049 by Paige Lam RN  Outcome: Progressing  7/10/2025 1049 by Paige Lam RN  Outcome: Progressing  7/10/2025 0048 by Adrienne Guadalupe RN  Outcome: Progressing  Flowsheets (Taken 7/10/2025 0000)  Care Plan - Patient's Chronic Conditions and Co-Morbidity Symptoms are Monitored and Maintained or Improved: Monitor and assess patient's chronic conditions and comorbid symptoms for stability, deterioration, or improvement     Problem: Discharge Planning  Goal: Discharge to home or other facility with appropriate resources  7/10/2025 1049 by Paige Lam RN  Outcome: Progressing  7/10/2025 1049 by Paige Lam RN  Outcome: Progressing  7/10/2025 0048 by Adrienne Guadalupe RN  Outcome: Progressing  Flowsheets (Taken 7/10/2025 0000)  Discharge to home or other facility with appropriate resources: Identify barriers to discharge with patient and caregiver     Problem: Safety - Adult  Goal: Free from fall injury  7/10/2025 1049 by Paige Lam RN  Outcome: Progressing  Flowsheets (Taken 7/10/2025 1047)  Free From Fall Injury: Instruct family/caregiver on patient safety  7/10/2025 1049 by Paige Lam RN  Outcome: Progressing  Flowsheets (Taken 7/10/2025 1047)  Free From Fall Injury: Instruct family/caregiver on patient safety  7/10/2025 0048 by Adrienne Guadalupe RN  Outcome: Progressing     Problem: ABCDS Injury Assessment  Goal: Absence of physical injury  7/10/2025 1049 by Paige Lam RN  Outcome: Progressing  Flowsheets (Taken 7/10/2025 1047)  Absence of Physical Injury: Implement safety measures based on patient assessment  7/10/2025 1049 by Paige Lam RN  Outcome: Progressing  Flowsheets (Taken 7/10/2025 1047)  Absence of Physical Injury: Implement safety measures based on patient assessment  7/10/2025 0048 by Adrienne Guadalupe 
  Problem: Chronic Conditions and Co-morbidities  Goal: Patient's chronic conditions and co-morbidity symptoms are monitored and maintained or improved  7/11/2025 1056 by Paige Lam, RN  Outcome: Progressing  7/11/2025 0552 by Rosalba Dewey, RN  Outcome: Progressing     Problem: Discharge Planning  Goal: Discharge to home or other facility with appropriate resources  Outcome: Progressing     Problem: Safety - Adult  Goal: Free from fall injury  Outcome: Progressing  Flowsheets (Taken 7/11/2025 1054)  Free From Fall Injury: Instruct family/caregiver on patient safety     Problem: ABCDS Injury Assessment  Goal: Absence of physical injury  Outcome: Progressing  Flowsheets (Taken 7/11/2025 1054)  Absence of Physical Injury: Implement safety measures based on patient assessment     Problem: Skin/Tissue Integrity  Goal: Skin integrity remains intact  Description: 1.  Monitor for areas of redness and/or skin breakdown  2.  Assess vascular access sites hourly  3.  Every 4-6 hours minimum:  Change oxygen saturation probe site  4.  Every 4-6 hours:  If on nasal continuous positive airway pressure, respiratory therapy assess nares and determine need for appliance change or resting period  Outcome: Progressing  Flowsheets  Taken 7/11/2025 1054  Skin Integrity Remains Intact: Monitor for areas of redness and/or skin breakdown  Taken 7/11/2025 0800  Skin Integrity Remains Intact: Monitor for areas of redness and/or skin breakdown     Problem: Pain  Goal: Verbalizes/displays adequate comfort level or baseline comfort level  Outcome: Progressing  Flowsheets (Taken 7/11/2025 0800)  Verbalizes/displays adequate comfort level or baseline comfort level: Assess pain using appropriate pain scale     Problem: Nutrition Deficit:  Goal: Optimize nutritional status  Outcome: Progressing     Problem: Confusion  Goal: Confusion, delirium, dementia, or psychosis is improved or at baseline  Description: INTERVENTIONS:  1. Assess for 
  Problem: Chronic Conditions and Co-morbidities  Goal: Patient's chronic conditions and co-morbidity symptoms are monitored and maintained or improved  7/2/2025 1147 by Danelle Cason RN  Outcome: Progressing     Problem: Discharge Planning  Goal: Discharge to home or other facility with appropriate resources  7/2/2025 1147 by Danelle Cason RN  Outcome: Progressing     Problem: Safety - Adult  Goal: Free from fall injury  7/2/2025 1147 by Danelle Cason RN  Outcome: Progressing     Problem: ABCDS Injury Assessment  Goal: Absence of physical injury  7/2/2025 1147 by Danelle Cason RN  Outcome: Progressing     Problem: Skin/Tissue Integrity  Goal: Skin integrity remains intact  Description: 1.  Monitor for areas of redness and/or skin breakdown  2.  Assess vascular access sites hourly  3.  Every 4-6 hours minimum:  Change oxygen saturation probe site  4.  Every 4-6 hours:  If on nasal continuous positive airway pressure, respiratory therapy assess nares and determine need for appliance change or resting period  7/2/2025 1147 by Danelle Cason RN  Outcome: Progressing     Problem: Pain  Goal: Verbalizes/displays adequate comfort level or baseline comfort level  7/2/2025 1147 by Danelle Cason RN  Outcome: Progressing     
  Problem: Chronic Conditions and Co-morbidities  Goal: Patient's chronic conditions and co-morbidity symptoms are monitored and maintained or improved  7/2/2025 2334 by Diana Jordan RN  Outcome: Progressing  7/2/2025 1147 by Danelle Cason RN  Outcome: Progressing     Problem: Discharge Planning  Goal: Discharge to home or other facility with appropriate resources  7/2/2025 2334 by Diana Jordan RN  Outcome: Progressing  7/2/2025 1147 by Danelle aCson RN  Outcome: Progressing     Problem: Safety - Adult  Goal: Free from fall injury  7/2/2025 2334 by Diana Jordan RN  Outcome: Progressing  7/2/2025 1147 by Danelle Cason RN  Outcome: Progressing  Flowsheets (Taken 7/2/2025 0800)  Free From Fall Injury: Instruct family/caregiver on patient safety     Problem: ABCDS Injury Assessment  Goal: Absence of physical injury  7/2/2025 2334 by Diana Jordan RN  Outcome: Progressing  7/2/2025 1147 by Danelle Cason RN  Outcome: Progressing  Flowsheets (Taken 7/2/2025 0800)  Absence of Physical Injury: Implement safety measures based on patient assessment     Problem: Skin/Tissue Integrity  Goal: Skin integrity remains intact  Description: 1.  Monitor for areas of redness and/or skin breakdown  2.  Assess vascular access sites hourly  3.  Every 4-6 hours minimum:  Change oxygen saturation probe site  4.  Every 4-6 hours:  If on nasal continuous positive airway pressure, respiratory therapy assess nares and determine need for appliance change or resting period  7/2/2025 2334 by Diana Jordan RN  Outcome: Progressing  7/2/2025 1147 by Danelle Cason RN  Outcome: Progressing  Flowsheets (Taken 7/2/2025 0800)  Skin Integrity Remains Intact: Monitor for areas of redness and/or skin breakdown     Problem: Pain  Goal: Verbalizes/displays adequate comfort level or baseline comfort level  7/2/2025 2334 by Diana Jordan RN  Outcome: Progressing  7/2/2025 1147 by Danelle Cason RN  Outcome: 
  Problem: Chronic Conditions and Co-morbidities  Goal: Patient's chronic conditions and co-morbidity symptoms are monitored and maintained or improved  7/5/2025 1837 by Rika Turner RN  Outcome: Progressing  7/5/2025 0829 by Rika Turner RN  Outcome: Progressing     Problem: Discharge Planning  Goal: Discharge to home or other facility with appropriate resources  7/5/2025 1837 by Rika Turner RN  Outcome: Progressing  7/5/2025 0829 by Rika Turner RN  Outcome: Progressing     Problem: Safety - Adult  Goal: Free from fall injury  7/5/2025 1837 by Rika Turner RN  Outcome: Progressing  7/5/2025 0829 by Rika Turner RN  Outcome: Progressing     Problem: ABCDS Injury Assessment  Goal: Absence of physical injury  7/5/2025 1837 by Rika Turner RN  Outcome: Progressing  7/5/2025 0829 by Rika Turner RN  Outcome: Progressing     
  Problem: Chronic Conditions and Co-morbidities  Goal: Patient's chronic conditions and co-morbidity symptoms are monitored and maintained or improved  7/5/2025 2246 by Claudine Lopez RN  Outcome: Progressing  7/5/2025 1837 by Rika Turner RN  Outcome: Progressing     Problem: Discharge Planning  Goal: Discharge to home or other facility with appropriate resources  7/5/2025 2246 by Claudine Lopez RN  Outcome: Progressing  7/5/2025 1837 by Rika Turner RN  Outcome: Progressing     Problem: Safety - Adult  Goal: Free from fall injury  7/5/2025 2246 by Claudine Lopez RN  Outcome: Progressing  7/5/2025 1837 by Rika Turner RN  Outcome: Progressing     Problem: ABCDS Injury Assessment  Goal: Absence of physical injury  7/5/2025 2246 by Claudine Lopez RN  Outcome: Progressing  7/5/2025 1837 by Rika Turner RN  Outcome: Progressing     Problem: Skin/Tissue Integrity  Goal: Skin integrity remains intact  Description: 1.  Monitor for areas of redness and/or skin breakdown  2.  Assess vascular access sites hourly  3.  Every 4-6 hours minimum:  Change oxygen saturation probe site  4.  Every 4-6 hours:  If on nasal continuous positive airway pressure, respiratory therapy assess nares and determine need for appliance change or resting period  7/5/2025 2246 by Claudine Lopez RN  Outcome: Progressing  7/5/2025 1837 by Rika Turner RN  Outcome: Progressing     Problem: Pain  Goal: Verbalizes/displays adequate comfort level or baseline comfort level  7/5/2025 2246 by Claudine Lopez RN  Outcome: Progressing  7/5/2025 1837 by Rika Turner RN  Outcome: Progressing     Problem: Nutrition Deficit:  Goal: Optimize nutritional status  7/5/2025 2246 by Claudine Lopez RN  Outcome: Progressing  7/5/2025 1837 by Rika Turner RN  Outcome: Progressing     
  Problem: Chronic Conditions and Co-morbidities  Goal: Patient's chronic conditions and co-morbidity symptoms are monitored and maintained or improved  7/8/2025 0016 by Joan Copeland RN  Outcome: Progressing  7/7/2025 1720 by Portia Cisneros RN  Outcome: Progressing  Flowsheets (Taken 7/7/2025 0830)  Care Plan - Patient's Chronic Conditions and Co-Morbidity Symptoms are Monitored and Maintained or Improved: Monitor and assess patient's chronic conditions and comorbid symptoms for stability, deterioration, or improvement     Problem: Discharge Planning  Goal: Discharge to home or other facility with appropriate resources  7/8/2025 0016 by Joan Copeland RN  Outcome: Progressing  7/7/2025 1720 by Portia Cisneros RN  Outcome: Progressing  Flowsheets (Taken 7/7/2025 0830)  Discharge to home or other facility with appropriate resources: Identify barriers to discharge with patient and caregiver     Problem: Safety - Adult  Goal: Free from fall injury  7/8/2025 0016 by Joan Copeland RN  Outcome: Progressing  7/7/2025 1720 by Portia Cisneros RN  Outcome: Progressing  Flowsheets  Taken 7/7/2025 1324  Free From Fall Injury: Instruct family/caregiver on patient safety  Taken 7/7/2025 0830  Free From Fall Injury: Instruct family/caregiver on patient safety     Problem: ABCDS Injury Assessment  Goal: Absence of physical injury  7/8/2025 0016 by Joan Copeland RN  Outcome: Progressing  7/7/2025 1720 by Portia Cisneros RN  Outcome: Progressing  Flowsheets  Taken 7/7/2025 1324  Absence of Physical Injury: Implement safety measures based on patient assessment  Taken 7/7/2025 0830  Absence of Physical Injury: Implement safety measures based on patient assessment     Problem: Skin/Tissue Integrity  Goal: Skin integrity remains intact  Description: 1.  Monitor for areas of redness and/or skin breakdown  2.  Assess vascular access sites hourly  3.  Every 4-6 hours minimum:  Change oxygen saturation probe site  4.  Every 4-6 
  Problem: Chronic Conditions and Co-morbidities  Goal: Patient's chronic conditions and co-morbidity symptoms are monitored and maintained or improved  7/8/2025 1027 by Ana Hansen RN  Outcome: Progressing  7/8/2025 0501 by Maritza Ledezma RN  Outcome: Progressing  7/8/2025 0016 by Joan Copeland RN  Outcome: Progressing     Problem: Discharge Planning  Goal: Discharge to home or other facility with appropriate resources  7/8/2025 1027 by Ana Hansen RN  Outcome: Progressing  7/8/2025 0501 by Maritza Ledezma RN  Outcome: Progressing  7/8/2025 0016 by Joan Copeland RN  Outcome: Progressing     Problem: Safety - Adult  Goal: Free from fall injury  7/8/2025 1027 by Ana Hansen RN  Outcome: Progressing  7/8/2025 0501 by Maritza Ledezma RN  Outcome: Progressing  7/8/2025 0016 by Joan Copeland RN  Outcome: Progressing     Problem: ABCDS Injury Assessment  Goal: Absence of physical injury  7/8/2025 1027 by Ana Hansen RN  Outcome: Progressing  7/8/2025 0501 by Maritza Ledezma RN  Outcome: Progressing  7/8/2025 0016 by Joan Copeland RN  Outcome: Progressing  Flowsheets (Taken 7/7/2025 2000)  Absence of Physical Injury: Implement safety measures based on patient assessment     Problem: Skin/Tissue Integrity  Goal: Skin integrity remains intact  Description: 1.  Monitor for areas of redness and/or skin breakdown  2.  Assess vascular access sites hourly  3.  Every 4-6 hours minimum:  Change oxygen saturation probe site  4.  Every 4-6 hours:  If on nasal continuous positive airway pressure, respiratory therapy assess nares and determine need for appliance change or resting period  7/8/2025 1027 by Ana Hansen RN  Outcome: Progressing  7/8/2025 0501 by Marizta Ledezma RN  Outcome: Progressing  7/8/2025 0016 by Joan Copeland RN  Outcome: Progressing  Flowsheets (Taken 7/7/2025 2000)  Skin Integrity Remains Intact: Monitor for areas of redness and/or skin 
  Problem: Chronic Conditions and Co-morbidities  Goal: Patient's chronic conditions and co-morbidity symptoms are monitored and maintained or improved  7/9/2025 1550 by Renetta Ricks RN  Outcome: Progressing  7/9/2025 0344 by Hazel Nevarez RN  Outcome: Progressing  Flowsheets (Taken 7/8/2025 2000)  Care Plan - Patient's Chronic Conditions and Co-Morbidity Symptoms are Monitored and Maintained or Improved: Monitor and assess patient's chronic conditions and comorbid symptoms for stability, deterioration, or improvement     Problem: Discharge Planning  Goal: Discharge to home or other facility with appropriate resources  7/9/2025 1550 by Renetta Ricks RN  Outcome: Progressing  7/9/2025 0344 by Hazel Nevarez RN  Outcome: Progressing  Flowsheets (Taken 7/8/2025 2000)  Discharge to home or other facility with appropriate resources: Identify barriers to discharge with patient and caregiver     Problem: Safety - Adult  Goal: Free from fall injury  7/9/2025 1550 by Renetta Ricks RN  Outcome: Progressing  7/9/2025 0344 by Hazel Nevarez RN  Outcome: Progressing     Problem: ABCDS Injury Assessment  Goal: Absence of physical injury  7/9/2025 1550 by Renetta Ricks RN  Outcome: Progressing  7/9/2025 0344 by Hazel Nevarez RN  Outcome: Progressing     Problem: Skin/Tissue Integrity  Goal: Skin integrity remains intact  Description: 1.  Monitor for areas of redness and/or skin breakdown  2.  Assess vascular access sites hourly  3.  Every 4-6 hours minimum:  Change oxygen saturation probe site  4.  Every 4-6 hours:  If on nasal continuous positive airway pressure, respiratory therapy assess nares and determine need for appliance change or resting period  7/9/2025 1550 by Renetta Ricks RN  Outcome: Progressing  7/9/2025 0344 by Hazel Nevarez RN  Outcome: Progressing  Flowsheets (Taken 7/8/2025 2000)  Skin Integrity Remains Intact: Monitor for areas of redness and/or skin breakdown     Problem: 
  Problem: Chronic Conditions and Co-morbidities  Goal: Patient's chronic conditions and co-morbidity symptoms are monitored and maintained or improved  Outcome: Not Progressing  Flowsheets (Taken 7/12/2025 0907)  Care Plan - Patient's Chronic Conditions and Co-Morbidity Symptoms are Monitored and Maintained or Improved: Collaborate with multidisciplinary team to address chronic and comorbid conditions and prevent exacerbation or deterioration     Problem: Safety - Adult  Goal: Free from fall injury  7/12/2025 0907 by Angel Silva, RN  Outcome: Progressing  Flowsheets (Taken 7/12/2025 0907)  Free From Fall Injury:   Instruct family/caregiver on patient safety   Based on caregiver fall risk screen, instruct family/caregiver to ask for assistance with transferring infant if caregiver noted to have fall risk factors  7/12/2025 0427 by Ammon Luna, RN  Outcome: Progressing     Problem: Chronic Conditions and Co-morbidities  Goal: Patient's chronic conditions and co-morbidity symptoms are monitored and maintained or improved  Outcome: Not Progressing  Flowsheets (Taken 7/12/2025 0907)  Care Plan - Patient's Chronic Conditions and Co-Morbidity Symptoms are Monitored and Maintained or Improved: Collaborate with multidisciplinary team to address chronic and comorbid conditions and prevent exacerbation or deterioration     
  Problem: Chronic Conditions and Co-morbidities  Goal: Patient's chronic conditions and co-morbidity symptoms are monitored and maintained or improved  Outcome: Not Progressing  Flowsheets (Taken 7/6/2025 2111)  Care Plan - Patient's Chronic Conditions and Co-Morbidity Symptoms are Monitored and Maintained or Improved: Monitor and assess patient's chronic conditions and comorbid symptoms for stability, deterioration, or improvement     Problem: Discharge Planning  Goal: Discharge to home or other facility with appropriate resources  Outcome: Not Progressing  Flowsheets (Taken 7/6/2025 2111)  Discharge to home or other facility with appropriate resources: Identify barriers to discharge with patient and caregiver     Problem: Skin/Tissue Integrity  Goal: Skin integrity remains intact  Description: 1.  Monitor for areas of redness and/or skin breakdown  2.  Assess vascular access sites hourly  3.  Every 4-6 hours minimum:  Change oxygen saturation probe site  4.  Every 4-6 hours:  If on nasal continuous positive airway pressure, respiratory therapy assess nares and determine need for appliance change or resting period  Outcome: Not Progressing  Flowsheets (Taken 7/6/2025 2111)  Skin Integrity Remains Intact: Monitor for areas of redness and/or skin breakdown     Problem: Nutrition Deficit:  Goal: Optimize nutritional status  Outcome: Not Progressing     Problem: Confusion  Goal: Confusion, delirium, dementia, or psychosis is improved or at baseline  Description: INTERVENTIONS:  1. Assess for possible contributors to thought disturbance, including medications, impaired vision or hearing, underlying metabolic abnormalities, dehydration, psychiatric diagnoses, and notify attending LIP  2. [place] high risk fall precautions, as indicated  3. Provide frequent short contacts to provide reality reorientation, refocusing and direction  4. Decrease environmental stimuli, including noise as appropriate  5. Monitor and 
  Problem: Chronic Conditions and Co-morbidities  Goal: Patient's chronic conditions and co-morbidity symptoms are monitored and maintained or improved  Outcome: Progressing     
  Problem: Chronic Conditions and Co-morbidities  Goal: Patient's chronic conditions and co-morbidity symptoms are monitored and maintained or improved  Outcome: Progressing     Problem: Discharge Planning  Goal: Discharge to home or other facility with appropriate resources  Outcome: Progressing     Problem: Safety - Adult  Goal: Free from fall injury  7/13/2025 0928 by Eva Rendon RN  Outcome: Progressing  7/13/2025 0447 by Ammon Luna RN  Outcome: Progressing     Problem: ABCDS Injury Assessment  Goal: Absence of physical injury  7/13/2025 0928 by Eva Rendon RN  Outcome: Progressing  7/13/2025 0447 by Ammon Luna RN  Outcome: Progressing     Problem: Skin/Tissue Integrity  Goal: Skin integrity remains intact  Description: 1.  Monitor for areas of redness and/or skin breakdown  2.  Assess vascular access sites hourly  3.  Every 4-6 hours minimum:  Change oxygen saturation probe site  4.  Every 4-6 hours:  If on nasal continuous positive airway pressure, respiratory therapy assess nares and determine need for appliance change or resting period  Outcome: Progressing  Flowsheets (Taken 7/12/2025 2000 by Ammon Luna RN)  Skin Integrity Remains Intact: Monitor for areas of redness and/or skin breakdown     Problem: Pain  Goal: Verbalizes/displays adequate comfort level or baseline comfort level  7/13/2025 0928 by Eva Rendon RN  Outcome: Progressing  7/13/2025 0447 by Ammon Luna RN  Outcome: Progressing     Problem: Nutrition Deficit:  Goal: Optimize nutritional status  Outcome: Progressing     Problem: Confusion  Goal: Confusion, delirium, dementia, or psychosis is improved or at baseline  Description: INTERVENTIONS:  1. Assess for possible contributors to thought disturbance, including medications, impaired vision or hearing, underlying metabolic abnormalities, dehydration, psychiatric diagnoses, and notify attending LIP  2. [place] high risk fall precautions, as 
  Problem: Chronic Conditions and Co-morbidities  Goal: Patient's chronic conditions and co-morbidity symptoms are monitored and maintained or improved  Outcome: Progressing     Problem: Discharge Planning  Goal: Discharge to home or other facility with appropriate resources  Outcome: Progressing     Problem: Safety - Adult  Goal: Free from fall injury  Outcome: Progressing     Problem: ABCDS Injury Assessment  Goal: Absence of physical injury  Outcome: Progressing     Problem: Skin/Tissue Integrity  Goal: Skin integrity remains intact  Description: 1.  Monitor for areas of redness and/or skin breakdown  2.  Assess vascular access sites hourly  3.  Every 4-6 hours minimum:  Change oxygen saturation probe site  4.  Every 4-6 hours:  If on nasal continuous positive airway pressure, respiratory therapy assess nares and determine need for appliance change or resting period  Outcome: Progressing     
  Problem: Chronic Conditions and Co-morbidities  Goal: Patient's chronic conditions and co-morbidity symptoms are monitored and maintained or improved  Outcome: Progressing     Problem: Discharge Planning  Goal: Discharge to home or other facility with appropriate resources  Outcome: Progressing     Problem: Safety - Adult  Goal: Free from fall injury  Outcome: Progressing     Problem: ABCDS Injury Assessment  Goal: Absence of physical injury  Outcome: Progressing     Problem: Skin/Tissue Integrity  Goal: Skin integrity remains intact  Description: 1.  Monitor for areas of redness and/or skin breakdown  2.  Assess vascular access sites hourly  3.  Every 4-6 hours minimum:  Change oxygen saturation probe site  4.  Every 4-6 hours:  If on nasal continuous positive airway pressure, respiratory therapy assess nares and determine need for appliance change or resting period  Outcome: Progressing     Problem: Pain  Goal: Verbalizes/displays adequate comfort level or baseline comfort level  Outcome: Progressing     Problem: Nutrition Deficit:  Goal: Optimize nutritional status  Outcome: Progressing     
  Problem: Chronic Conditions and Co-morbidities  Goal: Patient's chronic conditions and co-morbidity symptoms are monitored and maintained or improved  Outcome: Progressing     Problem: Discharge Planning  Goal: Discharge to home or other facility with appropriate resources  Outcome: Progressing     Problem: Safety - Adult  Goal: Free from fall injury  Outcome: Progressing  Flowsheets (Taken 7/1/2025 0800 by Danelle Fox, RN)  Free From Fall Injury: Instruct family/caregiver on patient safety     Problem: ABCDS Injury Assessment  Goal: Absence of physical injury  Outcome: Progressing  Flowsheets (Taken 7/1/2025 0800 by Danelle Fox, RN)  Absence of Physical Injury: Implement safety measures based on patient assessment     Problem: Skin/Tissue Integrity  Goal: Skin integrity remains intact  Description: 1.  Monitor for areas of redness and/or skin breakdown  2.  Assess vascular access sites hourly  3.  Every 4-6 hours minimum:  Change oxygen saturation probe site  4.  Every 4-6 hours:  If on nasal continuous positive airway pressure, respiratory therapy assess nares and determine need for appliance change or resting period  Outcome: Progressing  Flowsheets (Taken 7/1/2025 0800 by Danelle Fox, RN)  Skin Integrity Remains Intact:   Monitor for areas of redness and/or skin breakdown   Assess vascular access sites hourly     Problem: Pain  Goal: Verbalizes/displays adequate comfort level or baseline comfort level  Outcome: Progressing     Problem: Nutrition Deficit:  Goal: Optimize nutritional status  Outcome: Progressing     
  Problem: Chronic Conditions and Co-morbidities  Goal: Patient's chronic conditions and co-morbidity symptoms are monitored and maintained or improved  Outcome: Progressing     Problem: Safety - Adult  Goal: Free from fall injury  Outcome: Progressing     
  Problem: Discharge Planning  Goal: Discharge to home or other facility with appropriate resources  7/6/2025 1225 by Rika Turner RN  Outcome: Progressing  7/5/2025 2246 by Claudine Lopez, RN  Outcome: Progressing     Problem: Chronic Conditions and Co-morbidities  Goal: Patient's chronic conditions and co-morbidity symptoms are monitored and maintained or improved  7/6/2025 1225 by Rika Turner, RN  Outcome: Progressing  7/5/2025 2246 by Claudine Lopez, RN  Outcome: Progressing     Problem: Safety - Adult  Goal: Free from fall injury  7/6/2025 1225 by Rika Turner, RN  Outcome: Progressing  7/5/2025 2246 by Claudine Lopez, RN  Outcome: Progressing     
  Problem: Safety - Adult  Goal: Free from fall injury  Outcome: Progressing     Problem: ABCDS Injury Assessment  Goal: Absence of physical injury  Outcome: Progressing     Problem: Nutrition Deficit:  Goal: Optimize nutritional status  Outcome: Progressing     Problem: Skin/Tissue Integrity - Adult  Goal: Skin integrity remains intact  Description: 1.  Monitor for areas of redness and/or skin breakdown  2.  Assess vascular access sites hourly  3.  Every 4-6 hours minimum:  Change oxygen saturation probe site  4.  Every 4-6 hours:  If on nasal continuous positive airway pressure, respiratory therapy assess nares and determine need for appliance change or resting period  Outcome: Progressing  Flowsheets (Taken 7/13/2025 2000)  Skin Integrity Remains Intact: Monitor for areas of redness and/or skin breakdown     
  Problem: Safety - Adult  Goal: Free from fall injury  Outcome: Progressing     Problem: ABCDS Injury Assessment  Goal: Absence of physical injury  Outcome: Progressing     Problem: Pain  Goal: Verbalizes/displays adequate comfort level or baseline comfort level  Outcome: Progressing     Problem: Confusion  Goal: Confusion, delirium, dementia, or psychosis is improved or at baseline  Description: INTERVENTIONS:  1. Assess for possible contributors to thought disturbance, including medications, impaired vision or hearing, underlying metabolic abnormalities, dehydration, psychiatric diagnoses, and notify attending LIP  2. [place] high risk fall precautions, as indicated  3. Provide frequent short contacts to provide reality reorientation, refocusing and direction  4. Decrease environmental stimuli, including noise as appropriate  5. Monitor and intervene to maintain adequate nutrition, hydration, elimination, sleep and activity  6. If unable to ensure safety without constant attention obtain sitter and review sitter guidelines with assigned personnel  7. Initiate Psychosocial CNS and Spiritual Care consult, as indicated  Outcome: Progressing     Problem: Respiratory - Adult  Goal: Achieves optimal ventilation and oxygenation  Outcome: Progressing     
  Problem: Safety - Adult  Goal: Free from fall injury  Outcome: Progressing     Problem: Skin/Tissue Integrity  Goal: Skin integrity remains intact  Description: 1.  Monitor for areas of redness and/or skin breakdown  2.  Assess vascular access sites hourly  3.  Every 4-6 hours minimum:  Change oxygen saturation probe site  4.  Every 4-6 hours:  If on nasal continuous positive airway pressure, respiratory therapy assess nares and determine need for appliance change or resting period  Outcome: Progressing     Problem: Nutrition Deficit:  Goal: Optimize nutritional status  Outcome: Progressing     Problem: Confusion  Goal: Confusion, delirium, dementia, or psychosis is improved or at baseline  Description: INTERVENTIONS:  1. Assess for possible contributors to thought disturbance, including medications, impaired vision or hearing, underlying metabolic abnormalities, dehydration, psychiatric diagnoses, and notify attending LIP  2. [place] high risk fall precautions, as indicated  3. Provide frequent short contacts to provide reality reorientation, refocusing and direction  4. Decrease environmental stimuli, including noise as appropriate  5. Monitor and intervene to maintain adequate nutrition, hydration, elimination, sleep and activity  6. If unable to ensure safety without constant attention obtain sitter and review sitter guidelines with assigned personnel  7. Initiate Psychosocial CNS and Spiritual Care consult, as indicated  Outcome: Progressing     Problem: Respiratory - Adult  Goal: Achieves optimal ventilation and oxygenation  Outcome: Progressing          
  Problem: Skin/Tissue Integrity  Goal: Skin integrity remains intact  Description: 1.  Monitor for areas of redness and/or skin breakdown  2.  Assess vascular access sites hourly  3.  Every 4-6 hours minimum:  Change oxygen saturation probe site  4.  Every 4-6 hours:  If on nasal continuous positive airway pressure, respiratory therapy assess nares and determine need for appliance change or resting period  Outcome: Progressing  Flowsheets (Taken 7/8/2025 2000)  Skin Integrity Remains Intact: Monitor for areas of redness and/or skin breakdown     Problem: Pain  Goal: Verbalizes/displays adequate comfort level or baseline comfort level  Outcome: Progressing     Problem: Nutrition Deficit:  Goal: Optimize nutritional status  Outcome: Progressing     Problem: Confusion  Goal: Confusion, delirium, dementia, or psychosis is improved or at baseline  Description: INTERVENTIONS:  1. Assess for possible contributors to thought disturbance, including medications, impaired vision or hearing, underlying metabolic abnormalities, dehydration, psychiatric diagnoses, and notify attending LIP  2. [place] high risk fall precautions, as indicated  3. Provide frequent short contacts to provide reality reorientation, refocusing and direction  4. Decrease environmental stimuli, including noise as appropriate  5. Monitor and intervene to maintain adequate nutrition, hydration, elimination, sleep and activity  6. If unable to ensure safety without constant attention obtain sitter and review sitter guidelines with assigned personnel  7. Initiate Psychosocial CNS and Spiritual Care consult, as indicated  Outcome: Progressing  Flowsheets (Taken 7/8/2025 2000)  Effect of thought disturbance (confusion, delirium, dementia, or psychosis) are managed with adequate functional status: Assess for contributors to thought disturbance, including medications, impaired vision or hearing, underlying metabolic abnormalities, dehydration, psychiatric 
and intervene to maintain adequate nutrition, hydration, elimination, sleep and activity  6. If unable to ensure safety without constant attention obtain sitter and review sitter guidelines with assigned personnel  7. Initiate Psychosocial CNS and Spiritual Care consult, as indicated  7/8/2025 0501 by Maritza Ledezma, RN  Outcome: Progressing     
related to functional status, cognitive ability or social support system  Taken 7/4/2025 0815 by Tejas Yousif RN  Discharge to home or other facility with appropriate resources: Identify barriers to discharge with patient and caregiver  7/4/2025 2149 by Ally Ayers RN  Outcome: Progressing  Flowsheets  Taken 7/4/2025 2135 by Ally Ayers RN  Discharge to home or other facility with appropriate resources:   Identify barriers to discharge with patient and caregiver   Arrange for needed discharge resources and transportation as appropriate   Identify discharge learning needs (meds, wound care, etc)   Refer to discharge planning if patient needs post-hospital services based on physician order or complex needs related to functional status, cognitive ability or social support system  Taken 7/4/2025 0815 by Tejas Yousif RN  Discharge to home or other facility with appropriate resources: Identify barriers to discharge with patient and caregiver     Problem: Safety - Adult  Goal: Free from fall injury  7/4/2025 2149 by Ally Ayers RN  Outcome: Progressing  Flowsheets (Taken 7/4/2025 1112 by Tejas Yousif RN)  Free From Fall Injury: Instruct family/caregiver on patient safety  7/4/2025 2149 by Ally Ayers RN  Outcome: Progressing  Flowsheets (Taken 7/4/2025 1112 by Tejas Yousif RN)  Free From Fall Injury: Instruct family/caregiver on patient safety     Problem: ABCDS Injury Assessment  Goal: Absence of physical injury  7/4/2025 2149 by Ally Ayers RN  Outcome: Progressing  Flowsheets (Taken 7/4/2025 1112 by Tejas Yousif RN)  Absence of Physical Injury: Implement safety measures based on patient assessment  7/4/2025 2149 by Ally Ayers RN  Outcome: Progressing  Flowsheets (Taken 7/4/2025 1112 by Tejas Yousif RN)  Absence of Physical Injury: Implement safety measures based on patient assessment     Problem: Skin/Tissue Integrity  Goal: Skin integrity remains intact  Description: 1.  Monitor for areas 
improvement  7/7/2025 0404 by Sherrie Soliz RN  Outcome: Not Progressing  Flowsheets (Taken 7/6/2025 2111)  Care Plan - Patient's Chronic Conditions and Co-Morbidity Symptoms are Monitored and Maintained or Improved: Monitor and assess patient's chronic conditions and comorbid symptoms for stability, deterioration, or improvement     Problem: Discharge Planning  Goal: Discharge to home or other facility with appropriate resources  7/7/2025 1720 by Portia Cisneros RN  Outcome: Progressing  Flowsheets (Taken 7/7/2025 0830)  Discharge to home or other facility with appropriate resources: Identify barriers to discharge with patient and caregiver  7/7/2025 0404 by Sherrie Soliz RN  Outcome: Not Progressing  Flowsheets (Taken 7/6/2025 2111)  Discharge to home or other facility with appropriate resources: Identify barriers to discharge with patient and caregiver     Problem: Skin/Tissue Integrity  Goal: Skin integrity remains intact  Description: 1.  Monitor for areas of redness and/or skin breakdown  2.  Assess vascular access sites hourly  3.  Every 4-6 hours minimum:  Change oxygen saturation probe site  4.  Every 4-6 hours:  If on nasal continuous positive airway pressure, respiratory therapy assess nares and determine need for appliance change or resting period  7/7/2025 1720 by Portia Cisneros RN  Outcome: Progressing  Flowsheets  Taken 7/7/2025 1324  Skin Integrity Remains Intact: Monitor for areas of redness and/or skin breakdown  Taken 7/7/2025 0830  Skin Integrity Remains Intact: Monitor for areas of redness and/or skin breakdown  7/7/2025 0404 by Sherrie Soliz RN  Outcome: Not Progressing  Flowsheets (Taken 7/6/2025 2111)  Skin Integrity Remains Intact: Monitor for areas of redness and/or skin breakdown     Problem: Nutrition Deficit:  Goal: Optimize nutritional status  7/7/2025 1720 by Portia Cisneros RN  Outcome: Progressing  Flowsheets (Taken 7/7/2025 1426 by Nicol King RD)  Nutrient intake

## 2025-07-15 NOTE — CARE COORDINATION
7/15/2025 DISCHARGE to [facility]- approved. PAS with [facility] [phone] pt is going to [phone] here at [facility]. Noon  time. Oxygen 6lnc and cardiac monitor. Electronically signed by Wendy Kingston RN on 7/15/2025 at 10:04 AM

## 2025-07-16 LAB
MICROORGANISM SPEC CULT: NORMAL
MICROORGANISM SPEC CULT: NORMAL
SERVICE CMNT-IMP: NORMAL
SERVICE CMNT-IMP: NORMAL
SPECIMEN DESCRIPTION: NORMAL
SPECIMEN DESCRIPTION: NORMAL

## 2025-07-18 NOTE — PROGRESS NOTES
[facility] Hospitalist   ICU Progress Note    Admitting Date and Time: 6/29/2025 12:42 PM  Admit Dx: Hospital-acquired pneumonia [J18.9, Y95]  HCAP (healthcare-associated pneumonia) [J18.9]  Acute on chronic respiratory failure with hypoxia (HCC) [J96.21]    Subjective/interval history:    7/5: Pt feels better today, though still intermittent short breath.    7/6: Patient sleeping but awakens easily to voice.  States she feels well and denies any complaints.  She is down to 11 L high flow nasal cannula oxygen, but is intermittently confused.  She was taking off oxygen earlier with oxygen saturation dropping to 70s, thus she now has a sitter at bedside.    7/7: Patient desaturated to 80s overnight when she was repositioned.  This morning while getting cleaned up, patient desaturated to 80s again when moved.  Was seen the patient by the bedside, patient was very tachypneic and was desaturating.  Patient was started on 15 L of nonrebreather to help with the saturation.  Patient saturated came back up to 95%.  Nurse was advised to wean off of oxygen as tolerated.  Patient continues to be alert and oriented but intermittently confused.  She seems to be in distress with breathing.  She declined wearing BiPAP overnight    7/8: Patient was seen by the bedside in the morning.  Patient looks much better today.  Continues to be extremely tired and lethargic.  Denies any new complaints but still confused.    7/9: Patient was seen by the bedside, patient looks very confused today, does not know where she is and what her name is.  Her respiratory status has improved she is on 7 L of nasal cannula saturating above 90%.    7/10: Patient continues to be altered.  Responding with only 1 word sentences.  Although oxygen requirement is coming down and tachypnea is improved.  Patient overall looks worse from cognitive standpoint.  Patient refused to wear BiPAP overnight.    7/11: Patient was wearing BiPAP when I saw 
       [facility] Hospitalist   Progress Note    Admitting Date and Time: 6/29/2025 12:42 PM  Admit Dx: Hospital-acquired pneumonia [J18.9, Y95]  HCAP (healthcare-associated pneumonia) [J18.9]  Acute on chronic respiratory failure with hypoxia (HCC) [J96.21]    Subjective/interval history:    7/5: Pt feels better today, though still intermittent short breath.    7/6: Patient sleeping but awakens easily to voice.  States she feels well and denies any complaints.  She is down to 11 L high flow nasal cannula oxygen, but is intermittently confused.  She was taking off oxygen earlier with oxygen saturation dropping to 70s, thus she now has a sitter at bedside.    ROS: denies fever, chills, cp, sob, n/v, HA unless stated above.     azithromycin  500 mg Oral Once    insulin glargine  20 Units SubCUTAneous Nightly    micafungin (MYCAMINE) 100 mg in sodium chloride 0.9 % 100 mL IVPB  100 mg IntraVENous Daily    linezolid  600 mg Oral 2 times per day    buprenorphine-naloxone  2 tablet SubLINGual BID    methylPREDNISolone  40 mg IntraVENous Q12H    potassium chloride  10 mEq IntraVENous Once    insulin lispro  5 Units SubCUTAneous TID WC    atenolol  25 mg Oral BID AC    bisacodyl  5 mg Oral BID    dilTIAZem  240 mg Oral Daily    [Held by provider] furosemide  40 mg Oral Lunch    miconazole   Topical BID    pantoprazole  40 mg Oral QAM AC    potassium chloride  20 mEq Oral Lunch    [Held by provider] predniSONE  15 mg Oral Daily    insulin lispro  0-4 Units SubCUTAneous 4x Daily AC & HS    budesonide  0.5 mg Nebulization BID RT    ipratropium 0.5 mg-albuterol 2.5 mg  1 Dose Inhalation 4x Daily RT    sodium chloride flush  5-40 mL IntraVENous 2 times per day    enoxaparin  40 mg SubCUTAneous Daily     HYDROcodone 5 mg - acetaminophen, 0.5 tablet, Q6H PRN  tiZANidine, 4 mg, Q6H PRN  glucose, 4 tablet, PRN  dextrose bolus, 125 mL, PRN   Or  dextrose bolus, 250 mL, PRN  glucagon (rDNA), 1 mg, PRN  dextrose, , 
       [facility] Hospitalist   ICU Progress Note    Admitting Date and Time: 6/29/2025 12:42 PM  Admit Dx: Hospital-acquired pneumonia [J18.9, Y95]  HCAP (healthcare-associated pneumonia) [J18.9]  Acute on chronic respiratory failure with hypoxia (HCC) [J96.21]    Subjective/interval history:    7/5: Pt feels better today, though still intermittent short breath.    7/6: Patient sleeping but awakens easily to voice.  States she feels well and denies any complaints.  She is down to 11 L high flow nasal cannula oxygen, but is intermittently confused.  She was taking off oxygen earlier with oxygen saturation dropping to 70s, thus she now has a sitter at bedside.    7/7: Patient desaturated to 80s overnight when she was repositioned.  This morning while getting cleaned up, patient desaturated to 80s again when moved.  Was seen the patient by the bedside, patient was very tachypneic and was desaturating.  Patient was started on 15 L of nonrebreather to help with the saturation.  Patient saturated came back up to 95%.  Nurse was advised to wean off of oxygen as tolerated.  Patient continues to be alert and oriented but intermittently confused.  She seems to be in distress with breathing.  She declined wearing BiPAP overnight    7/8: Patient was seen by the bedside in the morning.  Patient looks much better today.  Continues to be extremely tired and lethargic.  Denies any new complaints but still confused.    7/9: Patient was seen by the bedside, patient looks very confused today, does not know where she is and what her name is.  Her respiratory status has improved she is on 7 L of nasal cannula saturating above 90%.    7/10: Patient continues to be altered.  Responding with only 1 word sentences.  Although oxygen requirement is coming down and tachypnea is improved.  Patient overall looks worse from cognitive standpoint.  Patient refused to wear BiPAP overnight.    7/11: Patient was wearing BiPAP when I saw 
       [facility] Hospitalist   Progress Note    Admitting Date and Time: 6/29/2025 12:42 PM  Admit Dx: Hospital-acquired pneumonia [J18.9, Y95]  HCAP (healthcare-associated pneumonia) [J18.9]  Acute on chronic respiratory failure with hypoxia (HCC) [J96.21]    Subjective:    Patient was seen by the bedside in the morning.  Patient was intermittently confused seeing animals and children in the room.  ABG: At time showed normal CO2.  Patient continued to be on 8 L of nasal cannula.  Patient complained that she would like to go back to the main hospital as she is uncomfortable here.  Per RN: No significant events overnight.    ROS: denies fever, chills, cp, sob, n/v, HA unless stated above.     atenolol  25 mg Oral BID AC    bisacodyl  5 mg Oral BID    dilTIAZem  240 mg Oral Daily    furosemide  40 mg Oral Lunch    insulin glargine  12 Units SubCUTAneous Nightly    LORazepam  0.5 mg Oral BID    miconazole   Topical BID    pantoprazole  40 mg Oral QAM AC    potassium chloride  20 mEq Oral Lunch    predniSONE  15 mg Oral Daily    insulin lispro  0-4 Units SubCUTAneous 4x Daily AC & HS    buprenorphine-naloxone  1 tablet SubLINGual BID    cefepime  2,000 mg IntraVENous Q8H    vancomycin  1,500 mg IntraVENous Q12H    sodium chloride flush  5-40 mL IntraVENous 2 times per day    enoxaparin  40 mg SubCUTAneous Daily     tiZANidine, 4 mg, Q6H PRN  traMADol, 50 mg, Q8H PRN  glucose, 4 tablet, PRN  dextrose bolus, 125 mL, PRN   Or  dextrose bolus, 250 mL, PRN  glucagon (rDNA), 1 mg, PRN  dextrose, , Continuous PRN  albuterol, 2.5 mg, Q6H PRN  sodium chloride flush, 5-40 mL, PRN  sodium chloride, , PRN  potassium chloride, 40 mEq, PRN   Or  potassium alternative oral replacement, 40 mEq, PRN   Or  potassium chloride, 10 mEq, PRN  magnesium sulfate, 2,000 mg, PRN  ondansetron, 4 mg, Q8H PRN   Or  ondansetron, 4 mg, Q6H PRN  polyethylene glycol, 17 g, Daily PRN  melatonin, 3 mg, Nightly PRN         Objective:    BP 
       [facility] Hospitalist   Progress Note    Admitting Date and Time: 6/29/2025 12:42 PM  Admit Dx: Hospital-acquired pneumonia [J18.9, Y95]  HCAP (healthcare-associated pneumonia) [J18.9]  Acute on chronic respiratory failure with hypoxia (HCC) [J96.21]    Subjective:    Patient was seen by the bedside in the morning.  Patient did not remember seeing children and animals in the room yesterday.  Patient recently had PT done and was tachypneic.  No other complaints.  Per RN: No significant events overnight.    ROS: denies fever, chills, cp, sob, n/v, HA unless stated above.     potassium chloride  40 mEq Oral TID    potassium chloride  10 mEq IntraVENous Once    atenolol  25 mg Oral BID AC    bisacodyl  5 mg Oral BID    dilTIAZem  240 mg Oral Daily    furosemide  40 mg Oral Lunch    insulin glargine  12 Units SubCUTAneous Nightly    [Held by provider] LORazepam  0.5 mg Oral BID    miconazole   Topical BID    pantoprazole  40 mg Oral QAM AC    potassium chloride  20 mEq Oral Lunch    predniSONE  15 mg Oral Daily    insulin lispro  0-4 Units SubCUTAneous 4x Daily AC & HS    buprenorphine-naloxone  1 tablet SubLINGual BID    budesonide  0.5 mg Nebulization BID RT    ipratropium 0.5 mg-albuterol 2.5 mg  1 Dose Inhalation 4x Daily RT    cefepime  2,000 mg IntraVENous Q8H    vancomycin  1,500 mg IntraVENous Q12H    sodium chloride flush  5-40 mL IntraVENous 2 times per day    enoxaparin  40 mg SubCUTAneous Daily     tiZANidine, 4 mg, Q6H PRN  traMADol, 50 mg, Q8H PRN  glucose, 4 tablet, PRN  dextrose bolus, 125 mL, PRN   Or  dextrose bolus, 250 mL, PRN  glucagon (rDNA), 1 mg, PRN  dextrose, , Continuous PRN  albuterol, 2.5 mg, Q6H PRN  sodium chloride flush, 5-40 mL, PRN  sodium chloride, , PRN  magnesium sulfate, 2,000 mg, PRN  ondansetron, 4 mg, Q8H PRN   Or  ondansetron, 4 mg, Q6H PRN  polyethylene glycol, 17 g, Daily PRN  melatonin, 3 mg, Nightly PRN         Objective:    /70   Pulse 81   Temp 
       [facility] Hospitalist   Progress Note    Admitting Date and Time: 6/29/2025 12:42 PM  Admit Dx: Hospital-acquired pneumonia [J18.9, Y95]  HCAP (healthcare-associated pneumonia) [J18.9]  Acute on chronic respiratory failure with hypoxia (HCC) [J96.21]    Subjective/interval history:    Patient was seen by the bedside this morning.  Patient looked a lot better as compared to yesterday.  She continues to be tachypneic.  She continues to complain about feeling bad.  The patient's mother was in the room as well.  Patient saturation was around 86% on 11 L of nasal cannula.  After increasing it to 15 L saturation bumped up to 89.    ROS: denies fever, chills, cp, sob, n/v, HA unless stated above.     insulin glargine  20 Units SubCUTAneous Nightly    linezolid  600 mg Oral 2 times per day    buprenorphine-naloxone  2 tablet SubLINGual BID    methylPREDNISolone  40 mg IntraVENous Q12H    azithromycin  500 mg IntraVENous Q24H    potassium chloride  10 mEq IntraVENous Once    insulin lispro  5 Units SubCUTAneous TID WC    atenolol  25 mg Oral BID AC    bisacodyl  5 mg Oral BID    dilTIAZem  240 mg Oral Daily    furosemide  40 mg Oral Lunch    miconazole   Topical BID    pantoprazole  40 mg Oral QAM AC    potassium chloride  20 mEq Oral Lunch    [Held by provider] predniSONE  15 mg Oral Daily    insulin lispro  0-4 Units SubCUTAneous 4x Daily AC & HS    budesonide  0.5 mg Nebulization BID RT    ipratropium 0.5 mg-albuterol 2.5 mg  1 Dose Inhalation 4x Daily RT    cefepime  2,000 mg IntraVENous Q8H    sodium chloride flush  5-40 mL IntraVENous 2 times per day    enoxaparin  40 mg SubCUTAneous Daily     HYDROcodone 5 mg - acetaminophen, 0.5 tablet, Q6H PRN  tiZANidine, 4 mg, Q6H PRN  glucose, 4 tablet, PRN  dextrose bolus, 125 mL, PRN   Or  dextrose bolus, 250 mL, PRN  glucagon (rDNA), 1 mg, PRN  dextrose, , Continuous PRN  albuterol, 2.5 mg, Q6H PRN  sodium chloride flush, 5-40 mL, PRN  sodium chloride, , 
       [facility] Hospitalist   Progress Note    Admitting Date and Time: 6/29/2025 12:42 PM  Admit Dx: Hospital-acquired pneumonia [J18.9, Y95]  HCAP (healthcare-associated pneumonia) [J18.9]  Acute on chronic respiratory failure with hypoxia (HCC) [J96.21]    Subjective/interval history:    7/5: Pt feels better today, though still intermittent short breath.    7/6: Patient sleeping but awakens easily to voice.  States she feels well and denies any complaints.  She is down to 11 L high flow nasal cannula oxygen, but is intermittently confused.  She was taking off oxygen earlier with oxygen saturation dropping to 70s, thus she now has a sitter at bedside.    7/7: Patient desaturated to 80s overnight when she was repositioned.  This morning while getting cleaned up, patient desaturated to 80s again when moved.  Was seen the patient by the bedside, patient was very tachypneic and was desaturating.  Patient was started on 15 L of nonrebreather to help with the saturation.  Patient saturated came back up to 95%.  Nurse was advised to wean off of oxygen as tolerated.  Patient continues to be alert and oriented but intermittently confused.  She seems to be in distress with breathing.  She declined wearing BiPAP overnight    7/8: Patient was seen by the bedside in the morning.  Patient looks much better today.  Continues to be extremely tired and lethargic.  Denies any new complaints but still confused.    7/9: Patient was seen by the bedside, patient looks very confused today, does not know where she is and what her name is.  Her respiratory status has improved she is on 7 L of nasal cannula saturating above 90%.    ROS: Unable to get ROS:     buprenorphine-naloxone  1 tablet SubLINGual BID    linezolid  600 mg IntraVENous Q12H    lidocaine  1 patch TransDERmal Daily    insulin lispro  7 Units SubCUTAneous TID WC    furosemide  20 mg IntraVENous BID    insulin glargine  20 Units SubCUTAneous Nightly    
       [facility] Hospitalist   Progress Note    Admitting Date and Time: 6/29/2025 12:42 PM  Admit Dx: Hospital-acquired pneumonia [J18.9, Y95]  HCAP (healthcare-associated pneumonia) [J18.9]  Acute on chronic respiratory failure with hypoxia (HCC) [J96.21]    Subjective/interval history:    Pt feels like she is having a hard time breathing due to back pain.  Oxygen saturations are within acceptable range on 9 L high flow nasal cannula.    ROS: denies fever, chills, cp, sob, n/v, HA unless stated above.     potassium chloride  40 mEq Oral TID    potassium chloride  10 mEq IntraVENous Once    LORazepam  0.25 mg Oral BID    insulin glargine  15 Units SubCUTAneous Nightly    insulin lispro  5 Units SubCUTAneous TID WC    atenolol  25 mg Oral BID AC    bisacodyl  5 mg Oral BID    dilTIAZem  240 mg Oral Daily    furosemide  40 mg Oral Lunch    miconazole   Topical BID    pantoprazole  40 mg Oral QAM AC    potassium chloride  20 mEq Oral Lunch    predniSONE  15 mg Oral Daily    insulin lispro  0-4 Units SubCUTAneous 4x Daily AC & HS    buprenorphine-naloxone  1 tablet SubLINGual BID    budesonide  0.5 mg Nebulization BID RT    ipratropium 0.5 mg-albuterol 2.5 mg  1 Dose Inhalation 4x Daily RT    cefepime  2,000 mg IntraVENous Q8H    sodium chloride flush  5-40 mL IntraVENous 2 times per day    enoxaparin  40 mg SubCUTAneous Daily     tiZANidine, 4 mg, Q6H PRN  traMADol, 50 mg, Q8H PRN  glucose, 4 tablet, PRN  dextrose bolus, 125 mL, PRN   Or  dextrose bolus, 250 mL, PRN  glucagon (rDNA), 1 mg, PRN  dextrose, , Continuous PRN  albuterol, 2.5 mg, Q6H PRN  sodium chloride flush, 5-40 mL, PRN  sodium chloride, , PRN  magnesium sulfate, 2,000 mg, PRN  ondansetron, 4 mg, Q8H PRN   Or  ondansetron, 4 mg, Q6H PRN  polyethylene glycol, 17 g, Daily PRN  melatonin, 3 mg, Nightly PRN         Objective:    BP (!) 151/97   Pulse 93   Temp 98.1 °F (36.7 °C) (Oral)   Resp 21   Ht 1.72 m (5' 7.72\")   Wt 88 kg (194 lb 0.1 
       [facility]    Admitting Date and Time: 6/29/2025 12:42 PM  Admit Dx: Hospital-acquired pneumonia [J18.9, Y95]  HCAP (healthcare-associated pneumonia) [J18.9]  Acute on chronic respiratory failure with hypoxia (HCC) [J96.21]    Subjective/interval history:    7/5: Pt feels better today, though still intermittent short breath.    7/6: Patient sleeping but awakens easily to voice.  States she feels well and denies any complaints.  She is down to 11 L high flow nasal cannula oxygen, but is intermittently confused.  She was taking off oxygen earlier with oxygen saturation dropping to 70s, thus she now has a sitter at bedside.    7/7: Patient desaturated to 80s overnight when she was repositioned.  This morning while getting cleaned up, patient desaturated to 80s again when moved.  Was seen the patient by the bedside, patient was very tachypneic and was desaturating.  Patient was started on 15 L of nonrebreather to help with the saturation.  Patient saturated came back up to 95%.  Nurse was advised to wean off of oxygen as tolerated.  Patient continues to be alert and oriented but intermittently confused.  She seems to be in distress with breathing.  She declined wearing BiPAP overnight    7/8: Patient was seen by the bedside in the morning.  Patient looks much better today.  Continues to be extremely tired and lethargic.  Denies any new complaints but still confused.    7/9: Patient was seen by the bedside, patient looks very confused today, does not know where she is and what her name is.  Her respiratory status has improved she is on 7 L of nasal cannula saturating above 90%.    7/10: Patient continues to be altered.  Responding with only 1 word sentences.  Although oxygen requirement is coming down and tachypnea is improved.  Patient overall looks worse from cognitive standpoint.  Patient refused to wear BiPAP overnight.    7/11: Patient was wearing BiPAP when I saw 
       [facility] Hospitalist   Progress Note    Admitting Date and Time: 6/29/2025 12:42 PM  Admit Dx: Hospital-acquired pneumonia [J18.9, Y95]  HCAP (healthcare-associated pneumonia) [J18.9]  Acute on chronic respiratory failure with hypoxia (HCC) [J96.21]    Subjective/interval history:    Patient was seen by the bedside in the morning.  Patient continues to complain about shortness of breath.  This morning patient was complaining about chest pain.  EKG was obtained which was negative for any significant ischemic changes.  Patient is saturating above 90% on 11 L of high flow nasal cannula.  ROS: denies fever, chills, cp, sob, n/v, HA unless stated above.     linezolid  600 mg Oral 2 times per day    LORazepam  0.25 mg Oral Once    buprenorphine-naloxone  2 tablet SubLINGual BID    methylPREDNISolone  40 mg IntraVENous Q12H    azithromycin  500 mg IntraVENous Q24H    potassium chloride  10 mEq IntraVENous Once    insulin glargine  15 Units SubCUTAneous Nightly    insulin lispro  5 Units SubCUTAneous TID WC    atenolol  25 mg Oral BID AC    bisacodyl  5 mg Oral BID    dilTIAZem  240 mg Oral Daily    furosemide  40 mg Oral Lunch    miconazole   Topical BID    pantoprazole  40 mg Oral QAM AC    potassium chloride  20 mEq Oral Lunch    [Held by provider] predniSONE  15 mg Oral Daily    insulin lispro  0-4 Units SubCUTAneous 4x Daily AC & HS    budesonide  0.5 mg Nebulization BID RT    ipratropium 0.5 mg-albuterol 2.5 mg  1 Dose Inhalation 4x Daily RT    cefepime  2,000 mg IntraVENous Q8H    sodium chloride flush  5-40 mL IntraVENous 2 times per day    enoxaparin  40 mg SubCUTAneous Daily     HYDROcodone 5 mg - acetaminophen, 0.5 tablet, Q6H PRN  tiZANidine, 4 mg, Q6H PRN  glucose, 4 tablet, PRN  dextrose bolus, 125 mL, PRN   Or  dextrose bolus, 250 mL, PRN  glucagon (rDNA), 1 mg, PRN  dextrose, , Continuous PRN  albuterol, 2.5 mg, Q6H PRN  sodium chloride flush, 5-40 mL, PRN  sodium chloride, , 
       [facility] Hospitalist   Progress Note    Admitting Date and Time: 6/29/2025 12:42 PM  Admit Dx: Hospital-acquired pneumonia [J18.9, Y95]  HCAP (healthcare-associated pneumonia) [J18.9]  Acute on chronic respiratory failure with hypoxia (HCC) [J96.21]    Subjective/interval history:    Pt feels better today, though still intermittent short breath.    ROS: denies fever, chills, cp, sob, n/v, HA unless stated above.     insulin glargine  20 Units SubCUTAneous Nightly    micafungin (MYCAMINE) 100 mg in sodium chloride 0.9 % 100 mL IVPB  100 mg IntraVENous Daily    linezolid  600 mg Oral 2 times per day    buprenorphine-naloxone  2 tablet SubLINGual BID    methylPREDNISolone  40 mg IntraVENous Q12H    azithromycin  500 mg IntraVENous Q24H    potassium chloride  10 mEq IntraVENous Once    insulin lispro  5 Units SubCUTAneous TID WC    atenolol  25 mg Oral BID AC    bisacodyl  5 mg Oral BID    dilTIAZem  240 mg Oral Daily    [Held by provider] furosemide  40 mg Oral Lunch    miconazole   Topical BID    pantoprazole  40 mg Oral QAM AC    potassium chloride  20 mEq Oral Lunch    [Held by provider] predniSONE  15 mg Oral Daily    insulin lispro  0-4 Units SubCUTAneous 4x Daily AC & HS    budesonide  0.5 mg Nebulization BID RT    ipratropium 0.5 mg-albuterol 2.5 mg  1 Dose Inhalation 4x Daily RT    cefepime  2,000 mg IntraVENous Q8H    sodium chloride flush  5-40 mL IntraVENous 2 times per day    enoxaparin  40 mg SubCUTAneous Daily     HYDROcodone 5 mg - acetaminophen, 0.5 tablet, Q6H PRN  tiZANidine, 4 mg, Q6H PRN  glucose, 4 tablet, PRN  dextrose bolus, 125 mL, PRN   Or  dextrose bolus, 250 mL, PRN  glucagon (rDNA), 1 mg, PRN  dextrose, , Continuous PRN  albuterol, 2.5 mg, Q6H PRN  sodium chloride flush, 5-40 mL, PRN  sodium chloride, , PRN  magnesium sulfate, 2,000 mg, PRN  ondansetron, 4 mg, Q8H PRN   Or  ondansetron, 4 mg, Q6H PRN  polyethylene glycol, 17 g, Daily PRN  melatonin, 3 mg, Nightly PRN     
    Progress Note  Date:7/10/2025       Room:[place] [address]  Patient Name:Li Rojas     YOB: 1970     Age:55 y.o.        Subjective    Subjective   7/10 spoke with nurse temp this pm 4L [id number]  [id number]   7/9 in bed on o2 nad poor historian   7/8 transferred to ICU sedated bipap  7/7 afebrile  [id number] resp distress nurse present   7/6 in [place] has sitter not eating   7/5 more awake and alert brething easier   7/4 in bed sob mother present pt's somnolent   7/3 in bed seems sob c/o back pain   7/1/2025 mom present updated poc has pain     Review of Systems   responds     Meds:  acetaminophen (TYLENOL) tablet 650 mg, Q6H PRN  buprenorphine-naloxone (SUBOXONE) 2-0.5 MG SL tablet 1 tablet, BID  HYDROcodone-acetaminophen (NORCO) 5-325 MG per tablet 1 tablet, Q6H PRN  hydrALAZINE (APRESOLINE) injection 10 mg, Q4H PRN  lidocaine 4 % external patch 1 patch, Daily  insulin lispro (HUMALOG,ADMELOG) injection vial 7 Units, TID WC  furosemide (LASIX) injection 20 mg, BID  insulin glargine (LANTUS) injection vial 20 Units, Nightly  methylPREDNISolone sodium succ (SOLU-MEDROL) 40 mg in sterile water 1 mL injection, Q12H  potassium chloride 10 mEq/100 mL IVPB (Peripheral Line), Once  atenolol (TENORMIN) tablet 25 mg, BID AC  bisacodyl (DULCOLAX) EC tablet 5 mg, BID  dilTIAZem (CARDIZEM CD) extended release capsule 240 mg, Daily  miconazole (MICOTIN) 2 % powder, BID  pantoprazole (PROTONIX) tablet 40 mg, QAM AC  potassium chloride (KLOR-CON M) extended release tablet 20 mEq, Lunch  glucose chewable tablet 16 g, PRN  dextrose bolus 10% 125 mL, PRN   Or  dextrose bolus 10% 250 mL, PRN  glucagon injection 1 mg, PRN  dextrose 10 % infusion, Continuous PRN  insulin lispro (HUMALOG,ADMELOG) injection vial 0-4 Units, 4x Daily AC & HS  albuterol (PROVENTIL) (2.5 MG/3ML) 0.083% nebulizer solution 2.5 mg, Q6H PRN  budesonide (PULMICORT) nebulizer suspension 500 mcg, BID RT  ipratropium 0.5 mg-albuterol 2.5 mg (DUONEB) nebulizer 
    Progress Note  Date:7/11/2025       Room:[place] [address]  Patient Name:Li Rojas     YOB: 1970     Age:55 y.o.        Subjective    Subjective   7/11 has fevers bipap  7/10 spoke with nurse temp this pm 4L [id number]   7/9 in bed on o2 nad poor historian   7/8 transferred to ICU sedated bipap  7/7 afebrile  [id number] resp distress nurse present   7/6 in [place] has sitter not eating   7/5 more awake and alert brething easier   7/4 in bed sob mother present pt's somnolent   7/3 in bed seems sob c/o back pain   7/1/2025 mom present updated poc has pain     Review of Systems   lethargic     Meds:  acetaZOLAMIDE (DIAMOX) 250 mg in sodium chloride 0.9 % 100 mL IVPB, Q12H  buprenorphine-naloxone (SUBOXONE) 2-0.5 MG SL tablet 1 tablet, Daily  insulin glargine (LANTUS) injection vial 15 Units, Nightly  acetaminophen (TYLENOL) tablet 650 mg, Q6H PRN  HYDROcodone-acetaminophen (NORCO) 5-325 MG per tablet 1 tablet, Q6H PRN  hydrALAZINE (APRESOLINE) injection 10 mg, Q4H PRN  lidocaine 4 % external patch 1 patch, Daily  insulin lispro (HUMALOG,ADMELOG) injection vial 7 Units, TID WC  furosemide (LASIX) injection 20 mg, BID  potassium chloride 10 mEq/100 mL IVPB (Peripheral Line), Once  atenolol (TENORMIN) tablet 25 mg, BID AC  bisacodyl (DULCOLAX) EC tablet 5 mg, BID  dilTIAZem (CARDIZEM CD) extended release capsule 240 mg, Daily  miconazole (MICOTIN) 2 % powder, BID  pantoprazole (PROTONIX) tablet 40 mg, QAM AC  potassium chloride (KLOR-CON M) extended release tablet 20 mEq, Lunch  glucose chewable tablet 16 g, PRN  dextrose bolus 10% 125 mL, PRN   Or  dextrose bolus 10% 250 mL, PRN  glucagon injection 1 mg, PRN  dextrose 10 % infusion, Continuous PRN  insulin lispro (HUMALOG,ADMELOG) injection vial 0-4 Units, 4x Daily AC & HS  albuterol (PROVENTIL) (2.5 MG/3ML) 0.083% nebulizer solution 2.5 mg, Q6H PRN  budesonide (PULMICORT) nebulizer suspension 500 mcg, BID RT  ipratropium 0.5 mg-albuterol 2.5 mg (DUONEB) 
    Progress Note  Date:7/12/2025       Room:[place] [address]  Patient Name:Li Rojas     YOB: 1970     Age:55 y.o.        Subjective    Subjective   7/12 [id number] currently febrile bipap 50%   7/11 has fevers bipap  7/10 spoke with nurse temp this pm 4L [id number]  [id number]   7/9 in bed on o2 nad poor historian   7/8 transferred to ICU sedated bipap  7/7 afebrile  wbc25 resp distress nurse present   7/6 in yeimi has sitter not eating   7/5 more awake and alert brething easier   7/4 in bed sob mother present pt's somnolent   7/3 in bed seems sob c/o back pain   7/1/2025 mom present updated poc has pain     Review of Systems   lethargic     Meds:  acetaminophen (TYLENOL) suppository 650 mg, Q6H PRN  potassium phosphate 30 mmol in sodium chloride 0.9 % 500 mL IVPB, Once  methylphenidate (RITALIN) tablet 5 mg, BID WC  potassium chloride 40 mEq in sodium chloride 0.9 % 500 mL IVPB, BID  acetaZOLAMIDE (DIAMOX) 250 mg in sodium chloride 0.9 % 100 mL IVPB, Q12H  meropenem (MERREM) 1,000 mg in sodium chloride 0.9 % 100 mL IVPB (addEASE), Q8H  insulin glargine (LANTUS) injection vial 15 Units, Nightly  acetaminophen (TYLENOL) tablet 650 mg, Q6H PRN  HYDROcodone-acetaminophen (NORCO) 5-325 MG per tablet 1 tablet, Q6H PRN  hydrALAZINE (APRESOLINE) injection 10 mg, Q4H PRN  lidocaine 4 % external patch 1 patch, Daily  insulin lispro (HUMALOG,ADMELOG) injection vial 7 Units, TID WC  furosemide (LASIX) injection 20 mg, BID  potassium chloride 10 mEq/100 mL IVPB (Peripheral Line), Once  atenolol (TENORMIN) tablet 25 mg, BID AC  bisacodyl (DULCOLAX) EC tablet 5 mg, BID  dilTIAZem (CARDIZEM CD) extended release capsule 240 mg, Daily  miconazole (MICOTIN) 2 % powder, BID  pantoprazole (PROTONIX) tablet 40 mg, QAM AC  potassium chloride (KLOR-CON M) extended release tablet 20 mEq, Lunch  glucose chewable tablet 16 g, PRN  dextrose bolus 10% 125 mL, PRN   Or  dextrose bolus 10% 250 mL, PRN  glucagon injection 1 mg, 
    Progress Note  Date:7/13/2025       Room:[place] [address]  Patient Name:Li Rojas     YOB: 1970     Age:55 y.o.        Subjective    Subjective   7/13 low grade temp tachy bp stable bipap  7/12 [id number] currently febrile bipap 50%   7/11 has fevers bipap  7/10 spoke with nurse temp this pm 4L [id number]   7/9 in bed on o2 nad poor historian   7/8 transferred to ICU sedated bipap  7/7 afebrile  wbc25 resp distress nurse present   7/6 in yeimi has sitter not eating   7/5 more awake and alert brething easier   7/4 in bed sob mother present pt's somnolent   7/3 in bed seems sob c/o back pain   7/1/2025 mom present updated poc has pain     Review of Systems   lethargic     Meds:  dexmedeTOMIDine (PRECEDEX) 400 mcg in sodium chloride 0.9 % 100 mL infusion, Continuous  potassium phosphate 20 mmol in sodium chloride 0.9 % 500 mL IVPB, Once  magnesium sulfate 2000 mg in 50 mL IVPB premix, Once  acetaminophen (TYLENOL) suppository 650 mg, Q6H PRN  methylphenidate (RITALIN) tablet 5 mg, BID WC  potassium chloride 40 mEq in sodium chloride 0.9 % 500 mL IVPB, BID  acetaZOLAMIDE (DIAMOX) 250 mg in sodium chloride 0.9 % 100 mL IVPB, Q12H  meropenem (MERREM) 1,000 mg in sodium chloride 0.9 % 100 mL IVPB (addEASE), Q8H  insulin glargine (LANTUS) injection vial 15 Units, Nightly  acetaminophen (TYLENOL) tablet 650 mg, Q6H PRN  HYDROcodone-acetaminophen (NORCO) 5-325 MG per tablet 1 tablet, Q6H PRN  hydrALAZINE (APRESOLINE) injection 10 mg, Q4H PRN  lidocaine 4 % external patch 1 patch, Daily  insulin lispro (HUMALOG,ADMELOG) injection vial 7 Units, TID WC  furosemide (LASIX) injection 20 mg, BID  potassium chloride 10 mEq/100 mL IVPB (Peripheral Line), Once  atenolol (TENORMIN) tablet 25 mg, BID AC  bisacodyl (DULCOLAX) EC tablet 5 mg, BID  dilTIAZem (CARDIZEM CD) extended release capsule 240 mg, Daily  miconazole (MICOTIN) 2 % powder, BID  pantoprazole (PROTONIX) tablet 40 mg, QAM AC  potassium chloride 
    Progress Note  Date:7/15/2025       Room:[address] [place] [address]  Patient Name:Li Rojas     YOB: 1970     Age:55 y.o.        Subjective    Subjective   7/15 out of icu afebrile 6l plans for d/ 7/14 in bed on o2 via nc has no c/o temp 100.2 wbc19 cr0.4  7/13 low grade temp tachy bp stable bipap  7/12 [id number] currently febrile bipap 50%   7/11 has fevers bipap  7/10 spoke with nurse temp this pm 4L [id number]  [id number]   7/9 in bed on o2 nad poor historian   7/8 transferred to ICU sedated bipap  7/7 afebrile  wbc25 resp distress nurse present   7/6 in yeimi has sitter not eating   7/5 more awake and alert brething easier   7/4 in bed sob mother present pt's somnolent   7/3 in bed seems sob c/o back pain   7/1/2025 mom present updated poc has pain     Review of Systems   more awake no appetite     Meds:  predniSONE (DELTASONE) tablet 7.5 mg, Daily  buprenorphine-naloxone (SUBOXONE) 2-0.5 MG SL tablet 1 tablet, Daily  dexmedeTOMIDine (PRECEDEX) 400 mcg in sodium chloride 0.9 % 100 mL infusion, Continuous  acetaminophen (TYLENOL) suppository 650 mg, Q6H PRN  methylphenidate (RITALIN) tablet 5 mg, BID WC  meropenem (MERREM) 1,000 mg in sodium chloride 0.9 % 100 mL IVPB (addEASE), Q8H  insulin glargine (LANTUS) injection vial 15 Units, Nightly  acetaminophen (TYLENOL) tablet 650 mg, Q6H PRN  HYDROcodone-acetaminophen (NORCO) 5-325 MG per tablet 1 tablet, Q6H PRN  hydrALAZINE (APRESOLINE) injection 10 mg, Q4H PRN  lidocaine 4 % external patch 1 patch, Daily  insulin lispro (HUMALOG,ADMELOG) injection vial 7 Units, TID WC  furosemide (LASIX) injection 20 mg, BID  potassium chloride 10 mEq/100 mL IVPB (Peripheral Line), Once  atenolol (TENORMIN) tablet 25 mg, BID AC  bisacodyl (DULCOLAX) EC tablet 5 mg, BID  dilTIAZem (CARDIZEM CD) extended release capsule 240 mg, Daily  miconazole (MICOTIN) 2 % powder, BID  pantoprazole (PROTONIX) tablet 40 mg, QAM AC  potassium chloride (KLOR-CON M) extended release tablet 20 
    Progress Note  Date:7/3/2025       Room:[facility]/0521-  Patient Name:Li Rojas     YOB: 1970     Age:55 y.o.        Subjective    Subjective   7/3 in bed seems sob c/o back pain   7/1/2025 mom present updated poc has pain   Review of Systems  has back pain on 02     Meds:  HYDROcodone-acetaminophen (NORCO) 5-325 MG per tablet 0.5 tablet, Q6H PRN  methylPREDNISolone sodium succ (SOLU-MEDROL) 40 mg in sterile water 1 mL injection, Q12H  azithromycin (ZITHROMAX) 500 mg in sodium chloride 0.9 % 250 mL IVPB ([id number]), Q24H  potassium chloride 10 mEq/100 mL IVPB (Peripheral Line), Once  LORazepam (ATIVAN) tablet 0.25 mg, BID  insulin glargine (LANTUS) injection vial 15 Units, Nightly  insulin lispro (HUMALOG,ADMELOG) injection vial 5 Units, TID WC  atenolol (TENORMIN) tablet 25 mg, BID AC  bisacodyl (DULCOLAX) EC tablet 5 mg, BID  dilTIAZem (CARDIZEM CD) extended release capsule 240 mg, Daily  furosemide (LASIX) tablet 40 mg, Lunch  miconazole (MICOTIN) 2 % powder, BID  pantoprazole (PROTONIX) tablet 40 mg, QAM AC  potassium chloride (KLOR-CON M) extended release tablet 20 mEq, Lunch  [Held by provider] predniSONE (DELTASONE) tablet 15 mg, Daily  tiZANidine (ZANAFLEX) tablet 4 mg, Q6H PRN  glucose chewable tablet 16 g, PRN  dextrose bolus 10% 125 mL, PRN   Or  dextrose bolus 10% 250 mL, PRN  glucagon injection 1 mg, PRN  dextrose 10 % infusion, Continuous PRN  insulin lispro (HUMALOG,ADMELOG) injection vial 0-4 Units, 4x Daily AC & HS  albuterol (PROVENTIL) (2.5 MG/3ML) 0.083% nebulizer solution 2.5 mg, Q6H PRN  buprenorphine-naloxone (SUBOXONE) 8-2 MG SL tablet 1 tablet, BID  budesonide (PULMICORT) nebulizer suspension 500 mcg, BID RT  ipratropium 0.5 mg-albuterol 2.5 mg (DUONEB) nebulizer solution 1 Dose, 4x Daily RT  ceFEPIme (MAXIPIME) 2,000 mg in sodium chloride 0.9 % 100 mL IVPB (addEASE), Q8H  sodium chloride flush 0.9 % injection 5-40 mL, 2 times per day  sodium chloride flush 0.9 % 
    Progress Note  Date:7/4/2025       Room:[facility]-  Patient Name:Li Rojas     YOB: 1970     Age:55 y.o.        Subjective    Subjective   7/4 in bed sob mother present pt's somnolent   7/3 in bed seems sob c/o back pain   7/1/2025 mom present updated poc has pain   Review of Systems  has back pain on 02     Meds:  insulin glargine (LANTUS) injection vial 20 Units, Nightly  linezolid (ZYVOX) tablet 600 mg, 2 times per day  buprenorphine-naloxone (SUBOXONE) 2-0.5 MG SL tablet 2 tablet, BID  HYDROcodone-acetaminophen (NORCO) 5-325 MG per tablet 0.5 tablet, Q6H PRN  methylPREDNISolone sodium succ (SOLU-MEDROL) 40 mg in sterile water 1 mL injection, Q12H  azithromycin (ZITHROMAX) 500 mg in sodium chloride 0.9 % 250 mL IVPB ([id number]), Q24H  potassium chloride 10 mEq/100 mL IVPB (Peripheral Line), Once  insulin lispro (HUMALOG,ADMELOG) injection vial 5 Units, TID WC  atenolol (TENORMIN) tablet 25 mg, BID AC  bisacodyl (DULCOLAX) EC tablet 5 mg, BID  dilTIAZem (CARDIZEM CD) extended release capsule 240 mg, Daily  furosemide (LASIX) tablet 40 mg, Lunch  miconazole (MICOTIN) 2 % powder, BID  pantoprazole (PROTONIX) tablet 40 mg, QAM AC  potassium chloride (KLOR-CON M) extended release tablet 20 mEq, Lunch  [Held by provider] predniSONE (DELTASONE) tablet 15 mg, Daily  tiZANidine (ZANAFLEX) tablet 4 mg, Q6H PRN  glucose chewable tablet 16 g, PRN  dextrose bolus 10% 125 mL, PRN   Or  dextrose bolus 10% 250 mL, PRN  glucagon injection 1 mg, PRN  dextrose 10 % infusion, Continuous PRN  insulin lispro (HUMALOG,ADMELOG) injection vial 0-4 Units, 4x Daily AC & HS  albuterol (PROVENTIL) (2.5 MG/3ML) 0.083% nebulizer solution 2.5 mg, Q6H PRN  budesonide (PULMICORT) nebulizer suspension 500 mcg, BID RT  ipratropium 0.5 mg-albuterol 2.5 mg (DUONEB) nebulizer solution 1 Dose, 4x Daily RT  ceFEPIme (MAXIPIME) 2,000 mg in sodium chloride 0.9 % 100 mL IVPB (addEASE), Q8H  sodium chloride flush 0.9 % injection 
    Progress Note  Date:7/5/2025       Room:[address] [place] [address]  Patient Name:Li Rojas     YOB: 1970     Age:55 y.o.        Subjective    Subjective   7/5 more awake and alert brething easier   7/4 in bed sob mother present pt's somnolent   7/3 in bed seems sob c/o back pain   7/1/2025 mom present updated poc has pain     Review of Systems   no f/c/n/v/d     Meds:  insulin glargine (LANTUS) injection vial 20 Units, Nightly  micafungin (MYCAMINE) 100 mg in sodium chloride 0.9 % 100 mL IVPB, Daily  linezolid (ZYVOX) tablet 600 mg, 2 times per day  buprenorphine-naloxone (SUBOXONE) 2-0.5 MG SL tablet 2 tablet, BID  HYDROcodone-acetaminophen (NORCO) 5-325 MG per tablet 0.5 tablet, Q6H PRN  methylPREDNISolone sodium succ (SOLU-MEDROL) 40 mg in sterile water 1 mL injection, Q12H  azithromycin (ZITHROMAX) 500 mg in sodium chloride 0.9 % 250 mL IVPB ([id number]), Q24H  potassium chloride 10 mEq/100 mL IVPB (Peripheral Line), Once  insulin lispro (HUMALOG,ADMELOG) injection vial 5 Units, TID WC  atenolol (TENORMIN) tablet 25 mg, BID AC  bisacodyl (DULCOLAX) EC tablet 5 mg, BID  dilTIAZem (CARDIZEM CD) extended release capsule 240 mg, Daily  [Held by provider] furosemide (LASIX) tablet 40 mg, Lunch  miconazole (MICOTIN) 2 % powder, BID  pantoprazole (PROTONIX) tablet 40 mg, QAM AC  potassium chloride (KLOR-CON M) extended release tablet 20 mEq, Lunch  [Held by provider] predniSONE (DELTASONE) tablet 15 mg, Daily  tiZANidine (ZANAFLEX) tablet 4 mg, Q6H PRN  glucose chewable tablet 16 g, PRN  dextrose bolus 10% 125 mL, PRN   Or  dextrose bolus 10% 250 mL, PRN  glucagon injection 1 mg, PRN  dextrose 10 % infusion, Continuous PRN  insulin lispro (HUMALOG,ADMELOG) injection vial 0-4 Units, 4x Daily AC & HS  albuterol (PROVENTIL) (2.5 MG/3ML) 0.083% nebulizer solution 2.5 mg, Q6H PRN  budesonide (PULMICORT) nebulizer suspension 500 mcg, BID RT  ipratropium 0.5 mg-albuterol 2.5 mg (DUONEB) nebulizer solution 1 Dose, 4x 
    Progress Note  Date:7/6/2025       Room:[address] [place] [address]  Patient Name:Li Rojas     YOB: 1970     Age:55 y.o.        Subjective    Subjective   7/6 in [place] has sitter not eating   7/5 more awake and alert brething easier   7/4 in bed sob mother present pt's somnolent   7/3 in bed seems sob c/o back pain   7/1/2025 mom present updated poc has pain     Review of Systems   no f/c/n/v/d  dec appetite     Meds:  azithromycin (ZITHROMAX) tablet 500 mg, Once  insulin glargine (LANTUS) injection vial 20 Units, Nightly  micafungin (MYCAMINE) 100 mg in sodium chloride 0.9 % 100 mL IVPB, Daily  linezolid (ZYVOX) tablet 600 mg, 2 times per day  buprenorphine-naloxone (SUBOXONE) 2-0.5 MG SL tablet 2 tablet, BID  HYDROcodone-acetaminophen (NORCO) 5-325 MG per tablet 0.5 tablet, Q6H PRN  methylPREDNISolone sodium succ (SOLU-MEDROL) 40 mg in sterile water 1 mL injection, Q12H  potassium chloride 10 mEq/100 mL IVPB (Peripheral Line), Once  insulin lispro (HUMALOG,ADMELOG) injection vial 5 Units, TID WC  atenolol (TENORMIN) tablet 25 mg, BID AC  bisacodyl (DULCOLAX) EC tablet 5 mg, BID  dilTIAZem (CARDIZEM CD) extended release capsule 240 mg, Daily  [Held by provider] furosemide (LASIX) tablet 40 mg, Lunch  miconazole (MICOTIN) 2 % powder, BID  pantoprazole (PROTONIX) tablet 40 mg, QAM AC  potassium chloride (KLOR-CON M) extended release tablet 20 mEq, Lunch  [Held by provider] predniSONE (DELTASONE) tablet 15 mg, Daily  tiZANidine (ZANAFLEX) tablet 4 mg, Q6H PRN  glucose chewable tablet 16 g, PRN  dextrose bolus 10% 125 mL, PRN   Or  dextrose bolus 10% 250 mL, PRN  glucagon injection 1 mg, PRN  dextrose 10 % infusion, Continuous PRN  insulin lispro (HUMALOG,ADMELOG) injection vial 0-4 Units, 4x Daily AC & HS  albuterol (PROVENTIL) (2.5 MG/3ML) 0.083% nebulizer solution 2.5 mg, Q6H PRN  budesonide (PULMICORT) nebulizer suspension 500 mcg, BID RT  ipratropium 0.5 mg-albuterol 2.5 mg (DUONEB) nebulizer solution 1 
    Progress Note  Date:7/7/2025       Room:[address] [place] [address]-  Patient Name:Li Rojas     YOB: 1970     Age:55 y.o.        Subjective    Subjective   7/7 afebrile  wbc25 resp distress nurse present   7/6 in yeimi has sitter not eating   7/5 more awake and alert brething easier   7/4 in bed sob mother present pt's somnolent   7/3 in bed seems sob c/o back pain   7/1/2025 mom present updated poc has pain     Review of Systems    sob    Meds:  hydrALAZINE (APRESOLINE) injection 10 mg, Q4H PRN  insulin lispro (HUMALOG,ADMELOG) injection vial 7 Units, TID WC  furosemide (LASIX) injection 20 mg, BID  micafungin (MYCAMINE) 100 mg in sodium chloride 0.9 % 100 mL IVPB (addEASE), Daily  LORazepam (ATIVAN) tablet 0.25 mg, Daily PRN  insulin glargine (LANTUS) injection vial 20 Units, Nightly  linezolid (ZYVOX) tablet 600 mg, 2 times per day  buprenorphine-naloxone (SUBOXONE) 2-0.5 MG SL tablet 2 tablet, BID  HYDROcodone-acetaminophen (NORCO) 5-325 MG per tablet 0.5 tablet, Q6H PRN  methylPREDNISolone sodium succ (SOLU-MEDROL) 40 mg in sterile water 1 mL injection, Q12H  potassium chloride 10 mEq/100 mL IVPB (Peripheral Line), Once  atenolol (TENORMIN) tablet 25 mg, BID AC  bisacodyl (DULCOLAX) EC tablet 5 mg, BID  dilTIAZem (CARDIZEM CD) extended release capsule 240 mg, Daily  miconazole (MICOTIN) 2 % powder, BID  pantoprazole (PROTONIX) tablet 40 mg, QAM AC  potassium chloride (KLOR-CON M) extended release tablet 20 mEq, Lunch  [Held by provider] predniSONE (DELTASONE) tablet 15 mg, Daily  tiZANidine (ZANAFLEX) tablet 4 mg, Q6H PRN  glucose chewable tablet 16 g, PRN  dextrose bolus 10% 125 mL, PRN   Or  dextrose bolus 10% 250 mL, PRN  glucagon injection 1 mg, PRN  dextrose 10 % infusion, Continuous PRN  insulin lispro (HUMALOG,ADMELOG) injection vial 0-4 Units, 4x Daily AC & HS  albuterol (PROVENTIL) (2.5 MG/3ML) 0.083% nebulizer solution 2.5 mg, Q6H PRN  budesonide (PULMICORT) nebulizer suspension 500 mcg, BID 
    Progress Note  Date:7/8/2025       Room:[place] [address]  Patient Name:Li Rojas     YOB: 1970     Age:55 y.o.        Subjective    Subjective   7/8 transferred to ICU sedated bipap  7/7 afebrile  wbc25 resp distress nurse present   7/6 in ye[place] has sitter not eating   7/5 more awake and alert brething easier   7/4 in bed sob mother present pt's somnolent   7/3 in bed seems sob c/o back pain   7/1/2025 mom present updated poc has pain     Review of Systems    sedated     Meds:  buprenorphine-naloxone (SUBOXONE) 2-0.5 MG SL tablet 1 tablet, BID  hydrALAZINE (APRESOLINE) injection 10 mg, Q4H PRN  lidocaine 4 % external patch 1 patch, Daily  dexmedeTOMIDine (PRECEDEX) 400 mcg in sodium chloride 0.9 % 100 mL infusion, Continuous  insulin lispro (HUMALOG,ADMELOG) injection vial 7 Units, TID WC  furosemide (LASIX) injection 20 mg, BID  micafungin (MYCAMINE) 100 mg in sodium chloride 0.9 % 100 mL IVPB (addEASE), Daily  insulin glargine (LANTUS) injection vial 20 Units, Nightly  linezolid (ZYVOX) tablet 600 mg, 2 times per day  methylPREDNISolone sodium succ (SOLU-MEDROL) 40 mg in sterile water 1 mL injection, Q12H  potassium chloride 10 mEq/100 mL IVPB (Peripheral Line), Once  atenolol (TENORMIN) tablet 25 mg, BID AC  bisacodyl (DULCOLAX) EC tablet 5 mg, BID  dilTIAZem (CARDIZEM CD) extended release capsule 240 mg, Daily  miconazole (MICOTIN) 2 % powder, BID  pantoprazole (PROTONIX) tablet 40 mg, QAM AC  potassium chloride (KLOR-CON M) extended release tablet 20 mEq, Lunch  glucose chewable tablet 16 g, PRN  dextrose bolus 10% 125 mL, PRN   Or  dextrose bolus 10% 250 mL, PRN  glucagon injection 1 mg, PRN  dextrose 10 % infusion, Continuous PRN  insulin lispro (HUMALOG,ADMELOG) injection vial 0-4 Units, 4x Daily AC & HS  albuterol (PROVENTIL) (2.5 MG/3ML) 0.083% nebulizer solution 2.5 mg, Q6H PRN  budesonide (PULMICORT) nebulizer suspension 500 mcg, BID RT  ipratropium 0.5 mg-albuterol 2.5 mg (DUONEB) 
    Progress Note  Date:7/9/2025       Room:[place] [address]  Patient Name:Li Rojas     YOB: 1970     Age:55 y.o.        Subjective    Subjective   7/9 in bed on o2 nad poor historian   7/8 transferred to ICU sedated bipap  7/7 afebrile  wbc25 resp distress nurse present   7/6 in ye[place] has sitter not eating   7/5 more awake and alert brething easier   7/4 in bed sob mother present pt's somnolent   7/3 in bed seems sob c/o back pain   7/1/2025 mom present updated poc has pain     Review of Systems   responds     Meds:  acetaminophen (TYLENOL) tablet 650 mg, Q6H PRN  buprenorphine-naloxone (SUBOXONE) 2-0.5 MG SL tablet 1 tablet, BID  linezolid (ZYVOX) IVPB 600 mg, Q12H  HYDROcodone-acetaminophen (NORCO) 5-325 MG per tablet 1 tablet, Q6H PRN  hydrALAZINE (APRESOLINE) injection 10 mg, Q4H PRN  lidocaine 4 % external patch 1 patch, Daily  insulin lispro (HUMALOG,ADMELOG) injection vial 7 Units, TID WC  furosemide (LASIX) injection 20 mg, BID  insulin glargine (LANTUS) injection vial 20 Units, Nightly  methylPREDNISolone sodium succ (SOLU-MEDROL) 40 mg in sterile water 1 mL injection, Q12H  potassium chloride 10 mEq/100 mL IVPB (Peripheral Line), Once  atenolol (TENORMIN) tablet 25 mg, BID AC  bisacodyl (DULCOLAX) EC tablet 5 mg, BID  dilTIAZem (CARDIZEM CD) extended release capsule 240 mg, Daily  miconazole (MICOTIN) 2 % powder, BID  pantoprazole (PROTONIX) tablet 40 mg, QAM AC  potassium chloride (KLOR-CON M) extended release tablet 20 mEq, Lunch  glucose chewable tablet 16 g, PRN  dextrose bolus 10% 125 mL, PRN   Or  dextrose bolus 10% 250 mL, PRN  glucagon injection 1 mg, PRN  dextrose 10 % infusion, Continuous PRN  insulin lispro (HUMALOG,ADMELOG) injection vial 0-4 Units, 4x Daily AC & HS  albuterol (PROVENTIL) (2.5 MG/3ML) 0.083% nebulizer solution 2.5 mg, Q6H PRN  budesonide (PULMICORT) nebulizer suspension 500 mcg, BID RT  ipratropium 0.5 mg-albuterol 2.5 mg (DUONEB) nebulizer solution 1 Dose, 4x 
  Physician Progress Note      PATIENT:               JYOTI LUONG  [facility] #:                  [id number]  :                       1970  ADMIT DATE:       2025 12:42 PM  DISCH DATE:        7/15/2025 9:45 PM  RESPONDING  PROVIDER #:        Ruy Lubin DO          QUERY TEXT:    Hospital Acquired Pneumonia is documented in the medical record per Discharge   Summary. Please specify the type of pneumonia and the causative organism:    The clinical indicators include:  - Hospital-acquired pneumonia; COPD with acute exacerbation; Acute on chronic   respiratory failure with hypoxia (Dr. Lubin, Discharge Summary 7/15/25)  --Patient was recently discharged from [facility] for acute respiratory failure and   was treated with Augmentin for potential CAP vs aspiration PNA vs drug-induced   pneumonitis. Impression: B/L Multi-Lobar Infiltrative Process/Bronchiolitis.   Bibasilar Atelectasis (Dr. Lynch, Pulmonary consult 7/3/25)  -Hospital acquired Pneumonia.  Urine Legionella S pneumonia neg; MRSA neg,   mycoplasm neg, Blood culture negative, Urine culture neg (Dr. Berger,   Infectious Disease, 25)  -Bibasliar atelectasis + chronic aspiration PNA/ pneumonitis + ? HCAP ( [facility]) (Dr. Whitlock, critical care 25)  -CT Pulmonary 25: Ground-glass density in the right upper lobe compatible   with pneumonia.  Areas of subsegmental atelectasis in the left upper   lobe/left lower lobe/right lower lobe.  --Vital sign flowsheet 25: Temperature 101.4 axillary, heart rates 68-96;   Respiratory rate 17-31  -Vital sign flowsheet 7/10/25: Temperature 100.6 axillary; heart rate ;   Respiratory rate 16-44  -LABS: WBC: 12.4 (25) 15.1 (25); 17.3 (25); 25.0 (25);   Procalcitonin: 0.18 (7/3/25); 0.14 (25); CRP 88.0 (25)  -MAR: Vancomycin 2000mg IV X 1 (25); Maxipime 2000mg IV X 1 (25);   Maxipime 2000mg IV q8 hour (-25); Vancocin 1500mg IV q12   (-25); 
  SPEECH/LANGUAGE PATHOLOGY  CLINICAL ASSESSMENT OF SWALLOWING FUNCTION   and PLAN OF CARE      PATIENT NAME:  Li Rojas  (female)     MRN:  [id number]    :  1970  (55 y.o.)  STATUS:  Inpatient: Room 521/0521-    TODAY'S DATE:  2025  ORDER DATE, DESCRIPTION AND REFERRING PROVIDER: SLP swallowing-dysphagia evaluation and treatment  Start:  25,   End:  25,   ONE TIME,   Standing Count:  1 Occurrences,   R       Paradise Mcclelland MD  REASON FOR REFERRAL: Difficulty swallowing   EVALUATING THERAPIST: EVAN Ta                 RESULTS:    DYSPHAGIA DIAGNOSIS:   Clinical indicators of mild-moderate oral phase dysphagia       DIET RECOMMENDATIONS:  Soft and bite size consistency solids (IDDSI level 6) with  thin liquids (IDDSI level 0)     FEEDING RECOMMENDATIONS:     Assistance level:  Full assistance is needed during all oral intake  Encourage self-feeding as function allows      Compensatory strategies recommended: Fully alert for all PO, Thorough oral care to prevent colonization of oral bacteria, Upright in bed/ chair as tolerated  Slow rate of intake   SINGLE cup sips  SINGLE straw sips   SMALL bites  Liquid wash to help clear oral cavity of thicker consistency items      Discussed recommendations with:  Patient  and patient nurse in person    SPEECH THERAPY  PLAN OF CARE   The dysphagia POC is established based on physician order, dysphagia diagnosis and results of clinical assessment     Skilled SLP intervention for dysphagia management on acute care up to 5 x per week until goals met, pt plateaus in function and/or discharged from hospital    Conditions Requiring Skilled Therapeutic Intervention for dysphagia:    Patient is performing below functional baseline d/t  current acute condition, respiratory compromise, multiple medications, and/or increased dependency upon caregivers.  impaired mastication   History of dysphagia/altered consistency      Specific 
  Speech Language Pathology  NAME:  Li Rojas  :  1970  DATE: 2025  ROOM:  21/0521-01    Pt unavailable at 1354 for Dysphagia therapy    REASON:  Other: Pt with increasing respiratory difficulties, now on NRB mask, not appropriate for ST @ this time, discussed with Portia CRUZ RN.    Will re-attempt as appropriate.       Thank You    Stephanie Montgomery MA, CCC-SLP  Speech-Language Pathologist  #[id number]        
4 Eyes Skin Assessment     NAME:  Li Rojas  YOB: 1970  MEDICAL RECORD NUMBER:  [id number]    The patient is being assessed for  Admission    I agree that at least one RN has performed a thorough Head to Toe Skin Assessment on the patient. ALL assessment sites listed below have been assessed.      Areas assessed by both nurses:    Head, Face, Ears, Shoulders, Back, Chest, Arms, Elbows, Hands, Sacrum. Buttock, Coccyx, Ischium, Legs. Feet and Heels, and Under Medical Devices         Does the Patient have a Wound? Yes wound(s) were present on assessment. LDA wound assessment was Initiated and completed by RN   BLE, red/moist abd folds with open areas, inner thighs open, BL buttocks red with open areas, scattered bruising  Jad Prevention initiated by RN: Yes  Wound Care Orders initiated by RN: Yes    Pressure Injury (Stage 1,2,3,4, Unstageable, DTI, NWPT, and Complex wounds) if present, place Wound referral order by RN under : Yes    New Ostomies, if present place, Ostomy referral order under : No     Nurse 1 eSignature: Electronically signed by Joan Copeland RN on 6/30/25 at 2:35 AM EDT    **SHARE this note so that the co-signing nurse can place an eSignature**    Nurse 2 eSignature: Electronically signed by Ally Ayers RN on 6/30/25 at 3:26 AM EDT   
4 Eyes Skin Assessment     NAME:  Li Rojas  YOB: 1970  MEDICAL RECORD NUMBER:  [id number]    The patient is being assessed for  Transfer to New Unit    I agree that at least one RN has performed a thorough Head to Toe Skin Assessment on the patient. ALL assessment sites listed below have been assessed.      Areas assessed by both nurses:    Head, Face, Ears, Shoulders, Back, Chest, Arms, Elbows, Hands, Sacrum. Buttock, Coccyx, Ischium, Legs. Feet and Heels, Under Medical Devices , and Other 9 open areas on coccyx/buttocks/gluteal fold; Ecchymosis R hip/thigh; skin tears to BUE; ecchymosis generalized; wound bilateral elbows; redness/skin tears to abdominal fold; scab L great toe; necrotic area to tip of L second toe; dry/scaly BLE; open area to R calf;         Does the Patient have a Wound? Yes wound(s) were present on assessment. LDA wound assessment was Initiated and completed by RN       Jad Prevention initiated by RN: Yes  Wound Care Orders initiated by RN: Yes    Pressure Injury (Stage 1,2,3,4, Unstageable, DTI, NWPT, and Complex wounds) if present, place Wound referral order by RN under : No    New Ostomies, if present place, Ostomy referral order under : No     Nurse 1 eSignature: Electronically signed by Maritza Ledezma RN on 7/8/25 at 2:03 AM EDT    **SHARE this note so that the co-signing nurse can place an eSignature**    Nurse 2 eSignature: Electronically signed by Ammon Luna RN on 7/8/25 at 12:56 AM EDT    
ABG results are back and Dr. Shaw was made aware. Plan is to have respiratory place the pt. On the Bipap if she agreeable for awhile. Pt. Is currently on 15L HF NC at 91%. Call was made to respiratory to place pt. On Bipap per Dr. Shaw  
Assessment and Plan  Patient is a 55 y.o. female with the following medical Problems:   Acute on chronic hypoxic respiratory failure requiring supplemental oxygen.  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  Supplemental oxygen to keep sat 88 to 94%.  Continue with BiPAP while sleeping, napping, and as needed.  CTA Chest 6/29/2025 was personally reviewed and independently interpreted and showed scattered groundglass opacities suggestive of pneumonia with no pulmonary embolism  Scheduled DuoNeb, Pulmicort.  Solu-medrol 40 mg IV BID  Lasix 20 mg IV twice daily.  DVT prophylaxis.  Chest x-ray tomorrow a.m.      History of Present Illness:   Li Rojas is a 55 y.o. female with past medical history noted for above that presented to hospital for shortness of breath on 6/29/2025.     Pulmonary has been consulted for worsening respiratory failure and accelerated oxygen demand on 7/3/2025.  Patient up to 8L O2 NC at this time.     History of noted to include MS, Asthma, suspected lupus.     Patient was recently discharged from [facility] for acute respiratory failure and was treated with Augmentin for potential CAP vs aspiration PNA vs drug-induced pneumonitis.    
Assessment and Plan  Patient is a 55 y.o. female with the following medical Problems:   Acute on chronic hypoxic respiratory failure requiring supplemental oxygen.  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  Supplemental oxygen to keep sat 88 to 94%.  Continue with BiPAP while sleeping, napping, and as needed.  CTA Chest 6/29/2025 was personally reviewed and independently interpreted and showed scattered groundglass opacities suggestive of pneumonia with no pulmonary embolism  Scheduled Roland, Pulmicort.  Solu-medrol 40 mg IV BID  Lasix 20 mg IV twice daily.  ABGs  Ammonia level.  DVT prophylaxis.  Chest x-ray today      History of Present Illness:   Li Rojas is a 55 y.o. female with past medical history noted for above that presented to hospital for shortness of breath on 6/29/2025.     Pulmonary has been consulted for worsening respiratory failure and accelerated oxygen demand on 7/3/2025.  Patient up to 8L O2 NC at this time.     History of noted to include MS, Asthma, suspected lupus.     Patient was recently discharged from [facility] [place] [facility] for acute respiratory failure and was treated with Augmentin for potential CAP vs aspiration PNA vs drug-induced 
Assessment and Plan  Patient is a 55 y.o. female with the following medical Problems:   Acute on chronic hypoxic respiratory failure requiring supplemental oxygen.  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  Continue with BiPAP while sleeping, napping, and as needed.  CTA Chest 6/29/2025 was personally reviewed and independently interpreted and showed scattered groundglass opacities suggestive of pneumonia with no pulmonary embolism  Scheduled DuoNeb, Pulmicort.  Solu-medrol 40 mg IV BID  DVT prophylaxis.      History of Present Illness:   Li Rojas is a 55 y.o. female with past medical history noted for above that presented to hospital for shortness of breath on 6/29/2025.     Pulmonary has been consulted for worsening respiratory failure and accelerated oxygen demand on 7/3/2025.  Patient up to 8L O2 NC at this time.     History of noted to include MS, Asthma, suspected lupus.     Patient was recently discharged from [facility] for acute respiratory failure and was treated with Augmentin for potential CAP vs aspiration PNA vs drug-induced pneumonitis.      Patient at that time as D/C on 6L O2 NC and found to be hypoxic on 6L O2 NC>     Pt 
Assessment and Plan  Patient is a 55 y.o. female with the following medical Problems:   Acute on chronic hypoxic respiratory failure requiring supplemental oxygen.  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  She completed treatment with cefepime, azithromycin, micafungin, and Zyvox  Suboxone was reduced to 1 tablet daily.  Supplemental oxygen to keep sat 88 to 94%.  Continue with BiPAP while sleeping, napping, and as needed.  CTA Chest 6/29/2025 was personally reviewed and independently interpreted and showed scattered groundglass opacities suggestive of pneumonia with no pulmonary embolism  Chest x-ray July 9, 2025 showed worsening bilateral infiltrate.  Scheduled DuoNeb, Pulmicort.  Discontinue Solu-Medrol  Palliative care consult  Lasix 20 mg IV twice daily.  DVT prophylaxis.    History of Present Illness:   Li Rojas is a 55 y.o. female with past medical history noted for above that presented to hospital for shortness of breath on 6/29/2025.     Pulmonary has been consulted for worsening respiratory failure and accelerated oxygen demand on 7/3/2025.  Patient up to 8L O2 NC at this time.     History of noted to include MS, Asthma, suspected lupus.     Patient was recently discharged from [facility] [place] for acute respiratory 
Assessment and Plan  Patient is a 55 y.o. female with the following medical Problems:   Acute on chronic hypoxic respiratory failure requiring supplemental oxygen.  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  She completed treatment with cefepime, azithromycin, micafungin, and Zyvox  Suboxone was reduced to 1 tablet daily.  Supplemental oxygen to keep sat 88 to 94%.  Continue with BiPAP while sleeping, napping, and as needed.  CTA Chest 6/29/2025 was personally reviewed and independently interpreted and showed scattered groundglass opacities suggestive of pneumonia with no pulmonary embolism  Chest x-ray July 9, 2025 showed worsening bilateral infiltrate.  Scheduled DuoNeb, Pulmicort.  Solu-medrol 200 mg IV BID  Lasix 20 mg IV twice daily.  DVT prophylaxis.    History of Present Illness:   Li Rojas is a 55 y.o. female with past medical history noted for above that presented to hospital for shortness of breath on 6/29/2025.     Pulmonary has been consulted for worsening respiratory failure and accelerated oxygen demand on 7/3/2025.  Patient up to 8L O2 NC at this time.     History of noted to include MS, Asthma, suspected lupus.     Patient was recently discharged from [facility] for acute respiratory failure and was 
Assessment and Plan  Patient is a 55 y.o. female with the following medical Problems:   Acute on chronic hypoxic respiratory failure requiring supplemental oxygen.  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  She completed treatment with cefepime, azithromycin, micafungin, and Zyvox.  Patient is currently on meropenem.  DC Suboxone.  Ritalin to help with awakening.  Supplemental oxygen to keep sat 88 to 94%.  Continue with BiPAP while sleeping, napping, and as needed.  CTA Chest 6/29/2025 was personally reviewed and independently interpreted and showed scattered groundglass opacities suggestive of pneumonia with no pulmonary embolism  Chest x-ray July 9, 2025 showed worsening bilateral infiltrate.  Scheduled DuoNeb, Pulmicort.  KCl 40 mg IV twice daily.  We will consider nasogastric tube since patient is refusing to take orally.  Discontinue Solu-Medrol  Palliative care consult  Lasix 20 mg IV twice daily.  DVT prophylaxis.    History of Present Illness:   Li Rojas is a 55 y.o. female with past medical history noted for above that presented to hospital for shortness of breath on 6/29/2025.     Pulmonary has been consulted for worsening respiratory failure and accelerated oxygen demand on 7/3/2025.  Patient up to 8L 
Assessment and Plan  Patient is a 55 y.o. female with the following medical Problems:   Acute on chronic hypoxic respiratory failure requiring supplemental oxygen.  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  She completed treatment with cefepime, azithromycin, micafungin, and Zyvox.  Patient is currently on meropenem.  Patient is currently off Suboxone and has been maintained on Ritalin since July 12, 2025.  Supplemental oxygen to keep sat 88 to 94%.  Continue with BiPAP while sleeping, napping, and as needed.  CTA Chest 6/29/2025 was personally reviewed and independently interpreted and showed scattered groundglass opacities suggestive of pneumonia with no pulmonary embolism  Chest x-ray July 9, 2025 showed worsening bilateral infiltrate.  Scheduled DuoNeb, Pulmicort.  KCl 40 mg IV twice daily.  Patient may need evaluation for PEG tube placement before discharge.  Palliative care consult  Lasix 20 mg IV twice daily with Diamox.  DVT prophylaxis.  PT, OT, and out of bed as tolerated    History of Present Illness:   Li Rojas is a 55 y.o. female with past medical history noted for above that presented to hospital for shortness of breath on 6/29/2025.     Pulmonary has been consulted for worsening respiratory failure 
Assessment and Plan  Patient is a 55 y.o. female with the following medical Problems:   Acute on chronic hypoxic respiratory failure requiring supplemental oxygen.  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  She completed treatment with cefepime, azithromycin, micafungin, and currently completing treatment with Zyvox.   Suboxone was reduced by half on July 8, 2025  Supplemental oxygen to keep sat 88 to 94%.  Continue with BiPAP while sleeping, napping, and as needed.  CTA Chest 6/29/2025 was personally reviewed and independently interpreted and showed scattered groundglass opacities suggestive of pneumonia with no pulmonary embolism  Chest x-ray July 9, 2025 showed worsening bilateral infiltrate.  Scheduled DuoNeb, Pulmicort.  Solu-medrol 40 mg IV BID  Lasix 20 mg IV twice daily.  DVT prophylaxis.    History of Present Illness:   Li Rojas is a 55 y.o. female with past medical history noted for above that presented to hospital for shortness of breath on 6/29/2025.     Pulmonary has been consulted for worsening respiratory failure and accelerated oxygen demand on 7/3/2025.  Patient up to 8L O2 NC at this time.     History of noted to include MS, Asthma, suspected lupus.     Patient was recently discharged from [facility] [place] 
Attempted to call mother Karol to inform her of patient transfer to ICU room 8. Phone went straight to voicemail and unable to leave a message.  
Attempted to wean pt. Off the non-rebreather mask due to the pt. Increased agitation of the mask. Pt. O2 level is 94% on non-rebreather. ABG orders were placed. Awaiting results.  
Attempted tx with pt, however pt is not appropriate for occupational therapy at this time, as pt is being transferred to ICU 2* to breathing status.  Staff present in room with pt. Will attempt tx with pt at later time/date. PARESH Seymour/[facility] #[id number]      
Called for PRN. Pt has increased WOB. Pt still ref to wear bipap. Pt states she will only wear NRB or HHN for txs.   
Comprehensive Nutrition Assessment    Type and Reason for Visit:  Reassess    Nutrition Recommendations/Plan:   Continue current diet and ONS  Consult RD for TF O/M if plans for EN support  Pt at increased nutritional risk 2/2 poor PO intake  Monitor and replace lytes PRN  Will continue to monitor while inpatient     Malnutrition Assessment:  Malnutrition Status:  At risk for malnutrition (07/01/25 1324)    Context:  Chronic Illness     Findings of the 6 clinical characteristics of malnutrition:  Energy Intake:  Mild decrease in energy intake  Weight Loss:  No weight loss     Body Fat Loss:  Unable to assess (nursing w/ pt at time of visit)     Muscle Mass Loss:  Unable to assess    Fluid Accumulation:  Unable to assess (d/t multifactorial)     Strength:  Not Performed    Nutrition Assessment:    Respiratory status worsened- was transferred to ICU. Intermittently on BiPAP. SLP eval 7/9- rec pureed diet. Pt consistently consuming <25% of meals since adm. Per critical care physician, possible NG, possible PEG placement prior to discharge d/t poor PO intake. Pt at increased nutritional risk 2/2 poor PO intake. Recommend alternate means of nutrition. Consult RD for TF O/M if plans for NG/PEG placement. Will continue to monitor.    Nutrition Related Findings:    A&O x3, oriented/disoriented @ times, missing teeth, abd soft/rounded/obese/nontender, +BS, generalized trace edema, -3.9 L, phos 2.3, lasix, decreased appetite Wound Type: Pressure Injury, Unstageable, Partial Thickness, Full Thickness (Irritant contact dermatitis on buttocks)       Current Nutrition Intake & Therapies:    Average Meal Intake: 0%, 1-25%  Average Supplements Intake: Unable to assess  ADULT ORAL NUTRITION SUPPLEMENT; Breakfast, Dinner; Diabetic Oral Supplement  ADULT DIET; Dysphagia - Pureed; 4 carb choices (60 gm/meal); LIKES YOGURT.  ADULT ORAL NUTRITION SUPPLEMENT; Lunch, Dinner; Wound Healing Oral Supplement    Anthropometric 
Comprehensive Nutrition Assessment    Type and Reason for Visit:  Reassess    Nutrition Recommendations/Plan:   Recommend diet per SLP recs (Soft & Bite Sized diet per SLP note 6/30)  Continue current ONS  Will continue to monitor while inpatient     Malnutrition Assessment:  Malnutrition Status:  At risk for malnutrition (07/01/25 1324)    Context:  Chronic Illness     Findings of the 6 clinical characteristics of malnutrition:  Energy Intake:  Mild decrease in energy intake  Weight Loss:  No weight loss     Body Fat Loss:  Unable to assess (nursing w/ pt at time of visit)     Muscle Mass Loss:  Unable to assess    Fluid Accumulation:  Unable to assess (d/t multifactorial)     Strength:  Not Performed    Nutrition Assessment:    CXR showed bilateral pulmonary opacities. Pt currently consuming <25% of meals. Continue current ONS to promote protein/energy intake and healing. Recommend diet per SLP recs (soft and bite sized diet per SLP note 6/30). Will continue to monitor.    Nutrition Related Findings:    A&O x2, missing teeth, abd soft/nontender/distended, +BS, +1/2 edema, -1.1 L, POC gluc 224, lasix, steroid, decreased appetite Wound Type: Pressure Injury, Unstageable, Partial Thickness, Full Thickness (Irritant contact dermatitis on buttocks)       Current Nutrition Intake & Therapies:    Average Meal Intake: 0%, 1-25%  Average Supplements Intake: Unable to assess  ADULT ORAL NUTRITION SUPPLEMENT; Breakfast, Dinner; Low Calorie/High Protein Oral Supplement  ADULT ORAL NUTRITION SUPPLEMENT; Lunch, Dinner; Wound Healing Oral Supplement  ADULT DIET; Regular; 4 carb choices (60 gm/meal)    Anthropometric Measures:  Height: 172 cm (5' 7.72\")  Ideal Body Weight (IBW): 139 lbs (63 kg)    Admission Body Weight: 87.7 kg (193 lb 6.4 oz) (6/30 unspecified method)  Current Body Weight: 90.1 kg (198 lb 10.2 oz) (7/5), 139.6 % IBW. Weight Source: Bed scale  Current BMI (kg/m2): 30.5  Usual Body Weight: 87.8 kg (193 lb 10 
Date:2025  Patient Name: Li Rojas  MRN: [id number]  : 1970  ROOM #: IC08/IC08-01    Occupational Therapy order received, chart reviewed and evaluation attempted this date. Patient is unavailable for OT evaluation due to not appropriate per nursing and on BiPAP.     Will attempt OT evaluation at a later time. Thank you.   Adrián Murphy OTR/L #[id number]      
Date:2025  Patient Name: Li Rojas  MRN: [id number]  : 1970  ROOM #: IC08/IC08-01    Occupational Therapy order received, chart reviewed and evaluation attempted this date. Patient is unavailable for OT evaluation due to respiratory distress and not appropriate per nursing.     Will attempt OT evaluation at a later time. Thank you.   Adrián Murphy OTR/L #[id number]      
Date:7/15/2025  Patient Name: Li Rojas  MRN: [id number]  : 1970  ROOM #: 0313/0313-01    Occupational Therapy order received, chart reviewed and evaluation attempted this date. Patient declining this date, reports short of breath in supine.    Will attempt OT evaluation at a later time. Thank you.   Chesly Trujillo, OTR/L    
Date:7/3/2025  Patient Name: Li Rojas  MRN: [id number]  : 1970  ROOM #: 0521/0521-01    Occupational Therapy treatment attempted this date. Per CARTER Warren, pt not appropriate at this time, pts O2 needing to be increased.    Will attempt OT tx at another time. Thank you.   Chelsy Trujillo, OTR/L    
Dr. Lubin updated with transfer order to [facility]. No additional orders.    Barb Nevarez RN  7/14/2025    
ICU called to let us know that the pt. Transfer to the unit was on hold due to critical pt.'s in the ICU currently.   
Multiple attempts were made by this nurse to place the patient on BiPAP. Patient ripped the BiPAP off multiple times and refused to wear it over night. Patient was educated on the importance of wearing the BiPAP and it's benefits to the patient's breathing. Patient continues to refuse BiPAP, currently wearing 8L HFNC saturating at 96%.     Electronically signed by Adrienne Guadalupe RN on 7/10/2025 at 1:19 AM   
Nurse to Nurse called to ICU. Pt being transfer to ICU bed 8  
Nurse to nurse called to Diana MACHADO at [facility].  
Nurse to nurse report was called to ICU. All questions asked and addressed.   
Occupational Therapy  Occupational Therapy     Date:2025  Patient Name: Li Rojas  MRN: [id number]  : 1970  Room: [address] [place] [address]     Completed chart review & attempted to see pt with pt refusing. Will attempt to see pt at another time.    Deepti HUSSEIN [id number]        
PULMONARY  CRITICAL CARE   SERVICE DAILY PROGRESS  NOTE     7/14/2025   Hospital  LOS: 15 days      Admit date- 6/29/2025       Initially admitted for -   Shortness of Breath (Patient 84% on 6 L NC at triage, HX of asthma, recent DX of pneumonia. Patient does not normally wear O2.)       Subjective:        Comfortable at rest   No new complains   Alert and oriented   HD stable   No overnight events     Review of Systems        General- No headaches , dizzinesss, syncope   Pulmonary - No chest pain , hemoptysis   Cardiovascular- No chest pain,   GI- No abd pain ,No difficulty swallowing, diarrhea , no vomiting   Musculoskeletal- No extremity weakness, no edema , no cyanosis   Genitourinary- No burning urination, no dysuria, no incontinence  Neuro- NO syncope , AMS  Or weakness , No tingling , no numbness.   Skin- No rashes , no cyanosis.   Psychiatric/Behavioral: Negative for confusion, hallucinations and sleep disturbance. The patient is not nervous/anxious.                      Allergies:  Allergies   Allergen Reactions    Lyrica [Pregabalin] Anaphylaxis    Other Shortness Of Breath    Sulfa Antibiotics Anaphylaxis     Hallucinations and rash    Darvocet [Propoxyphene N-Acetaminophen]     Ultram [Tramadol Hcl]     Metoprolol Rash     Home Meds:  Prior to Admission medications    Medication Sig Start Date End Date Taking? Authorizing Provider   traMADol (ULTRAM) 50 MG tablet Take 1 tablet by mouth every 8 hours as needed for Pain. Max Daily Amount: 150 mg   Yes ProviderHany MD   bisacodyl (DULCOLAX) 5 MG EC tablet Take 1 tablet by mouth in the morning and at bedtime 5/29/25  Yes Good Shaw MD   insulin glargine (LANTUS) 100 UNIT/ML injection vial Inject 15 Units into the skin nightly 5/29/25  Yes Good Shaw MD   insulin lispro (HUMALOG,ADMELOG) 100 UNIT/ML SOLN injection vial Inject 0-16 Units into the skin 4 times daily (before meals and nightly) **High Dose Corrective 
PULMONARY  CRITICAL CARE   SERVICE DAILY PROGRESS  NOTE     7/15/2025   Hospital  LOS: 16 days      Admit date- 6/29/2025       Initially admitted for -   Shortness of Breath (Patient 84% on 6 L NC at triage, HX of asthma, recent DX of pneumonia. Patient does not normally wear O2.)       Subjective:        Remains the same   HD stable   No overnight events   Down to 6 lpm NC --> oxygenating well   Alert and oriented     Review of Systems        General- No headaches , dizzinesss, syncope   Pulmonary - No chest pain , hemoptysis   Cardiovascular- No chest pain,   GI- No abd pain ,No difficulty swallowing, diarrhea , no vomiting   Musculoskeletal- No extremity weakness, no edema , no cyanosis   Genitourinary- No burning urination, no dysuria, no incontinence  Neuro- NO syncope , AMS  Or weakness , No tingling , no numbness.   Skin- No rashes , no cyanosis.   Psychiatric/Behavioral: Negative for confusion, hallucinations and sleep disturbance. The patient is not nervous/anxious.                      Allergies:  Allergies   Allergen Reactions    Lyrica [Pregabalin] Anaphylaxis    Other Shortness Of Breath    Sulfa Antibiotics Anaphylaxis     Hallucinations and rash    Darvocet [Propoxyphene N-Acetaminophen]     Ultram [Tramadol Hcl]     Metoprolol Rash     Home Meds:  Prior to Admission medications    Medication Sig Start Date End Date Taking? Authorizing Provider   HYDROcodone-acetaminophen (NORCO) 5-325 MG per tablet Take 1 tablet by mouth every 6 hours as needed for Pain for up to 3 days. Max Daily Amount: 4 tablets 7/15/25 7/18/25 Yes Ruy Lubin,    albuterol (PROVENTIL) (2.5 MG/3ML) 0.083% nebulizer solution Take 3 mLs by nebulization every 6 hours as needed for Wheezing or Shortness of Breath 7/15/25  Yes Ruy Lubin DO   budesonide (PULMICORT) 0.5 MG/2ML nebulizer suspension Take 2 mLs by nebulization in the morning and 2 mLs in the evening. 7/15/25  Yes Ruy Lubin DO   ipratropium 0.5 
Patient complaining of her IV antibiotics \"Burning\"   
Patient is more alert this afternoon. Will answer occasional question with one word answer and will follow commands. Placed back on oxygen at 6 LPM HFNC She did eat all of her pears and drank all of her Dominic supplement. Will continue to encourage and offer liquids.   
Patient is refusing this nurse to perform a CHG bath and change dressings on wounds. Educated the importance of hygiene and wound care to prevent infection  
Patient is refusing to be turned. Educated on the importance of turning every 2 hours to prevent skin breakdown. Patient states \"I don't want to turn.\"     
Patient running temps this afternoon. 101.6 despite tylenol being given. Family at bedside. Mother is concerned about pt's abdomen. Had history of some kind of cyst being removed at [facility] and surgeon had plans for further surgeries for additional cysts. LOCO Payton notified of temps and family concerns. Also notified that Dr. Swartz made aware of temps and orders received. No further orders at this time.  
Patient very drowsy this morning. Does open eyes to verbal stimuli but does not maintain eye contact. Will follow occasional simple command. O2 sats 83% on O2 4lpm NC. Oxygen increased and eventually ended up on 10LPM HFNC and O2 sats still only 89% Placed patient on bipap 12/6 50% O2 sats did improve to 92% Will update Dr. Rosales in rounds this morning.  
Perfect Serve message to Dr. Shmuel Cronin - message went to Jameel Hernandez DO - new inpatient consult from Dr. Whitlock, known patient outpatient for spinal compression fractures.    Barb Nevarez RN  7/14/2025    
Pharmacy Consultation Note  (Antibiotic Dosing and Monitoring)    Initial consult date: 06/29/2025  Consulting physician/provider: CARLOS ENRIQUE Mcclelland  Drug: Vancomycin  Indication: Pneumonia (Nosocomial)     Age/  Gender Height Weight IBW  Allergy Information   55 y.o./female         Ideal body weight: 63.2 kg (139 lb 7 oz)  Adjusted ideal body weight: 77.6 kg (171 lb 2.3 oz)   Lyrica [pregabalin], Other, Sulfa antibiotics, Darvocet [propoxyphene n-acetaminophen], Ultram [tramadol hcl], and Metoprolol      Renal Function:  Recent Labs     06/29/25  1305   BUN 10   CREATININE 0.5     No intake or output data in the 24 hours ending 06/29/25 2206    Vancomycin Monitoring:  Trough:  No results for input(s): \"VANCOTROUGH\" in the last 72 hours.  Random:  No results for input(s): \"VANCORANDOM\" in the last 72 hours.    Vancomycin Administration Times:  Recent vancomycin administrations                     vancomycin (VANCOCIN) 2000 mg in 0.9% sodium chloride 500 mL IVPB (mg) 2,000 mg New Bag 06/29/25 2001                    Assessment:  Patient is a 55 y.o. female who has been initiated on vancomycin  Estimated Creatinine Clearance: 156 mL/min (based on SCr of 0.5 mg/dL).  To dose vancomycin, pharmacy will be utilizing [employer] calculation software for goal AUC/ASHLEY 400-600 mg/L-hr (predicted AUC/ASHLEY = 530, Tr =11.4 mcg/mL)    Plan:  Will continue vancomycin 1500 mg IV every 12 hours  Will check vancomycin levels when appropriate  Will continue to monitor renal function   Pharmacy to follow      [unfilled]    DAVID: [id number]  SEY: [phone]  SJW: [phone]   
Pharmacy Consultation Note  (Antibiotic Dosing and Monitoring)    Initial consult date: 6/30/2025  Consulting physician/provider: Dr Mcclelland  Drug: Vancomycin  Indication: Pneumonia    Age/  Gender Height Weight IBW  Allergy Information   55 y.o./female 172 cm (5' 7.72\") 87.7 kg (193 lb 6.4 oz)     Ideal body weight: 63.2 kg (139 lb 7 oz)  Adjusted ideal body weight: 73 kg (160 lb 13.8 oz)   Lyrica [pregabalin], Other, Sulfa antibiotics, Darvocet [propoxyphene n-acetaminophen], Ultram [tramadol hcl], and Metoprolol      Renal Function:  Recent Labs     06/29/25  1305 06/30/25  0741   BUN 10 15   CREATININE 0.5 0.6       Intake/Output Summary (Last 24 hours) at 6/30/2025 1853  Last data filed at 6/30/2025 0008  Gross per 24 hour   Intake 5 ml   Output --   Net 5 ml       Vancomycin Monitoring:  Trough:  No results for input(s): \"VANCOTROUGH\" in the last 72 hours.  Random:  No results for input(s): \"VANCORANDOM\" in the last 72 hours.      Vancomycin Administration Times:  Recent vancomycin administrations                     vancomycin (VANCOCIN) 1,500 mg in sodium chloride 0.9 % 250 mL IVPB (mg) 1,500 mg New Bag 06/30/25 1015    vancomycin (VANCOCIN) 2000 mg in 0.9% sodium chloride 500 mL IVPB (mg) 2,000 mg New Bag 06/29/25 2001               Assessment:  Patient is a 55 y.o. female who has been initiated on vancomycin  Estimated Creatinine Clearance: 122 mL/min (based on SCr of 0.6 mg/dL).  To dose vancomycin, pharmacy will target an AUC of 400-600    Plan:  Vancomycin 1500 mg IV every 12 hours  Trough tomorrow @ 0700; Hold dose if > 20 mcg/mL  Will continue to monitor renal function   Pharmacy to follow      Jacklyn OrtegaD, [facility] 6/30/2025 6:53 PM   [phone]    W: [phone]  
Pharmacy Consultation Note  (Antibiotic Dosing and Monitoring)    Initial consult date: 6/30/2025  Consulting physician/provider: Dr Mcclelland  Drug: Vancomycin  Indication: Pneumonia    Age/  Gender Height Weight IBW  Allergy Information   55 y.o./female 172 cm (5' 7.72\") 87.7 kg (193 lb 6.4 oz)     Ideal body weight: 63.2 kg (139 lb 7 oz)  Adjusted ideal body weight: 73.1 kg (161 lb 4.2 oz)   Lyrica [pregabalin], Other, Sulfa antibiotics, Darvocet [propoxyphene n-acetaminophen], Ultram [tramadol hcl], and Metoprolol      Renal Function:  Recent Labs     06/29/25  1305 06/30/25  0741 07/01/25  0822   BUN 10 15 17   CREATININE 0.5 0.6 0.6       Intake/Output Summary (Last 24 hours) at 7/1/2025 1253  Last data filed at 6/30/2025 2301  Gross per 24 hour   Intake 1040.31 ml   Output 600 ml   Net 440.31 ml       Vancomycin Monitoring:  Trough:    Recent Labs     07/01/25  0822 07/01/25  1101   VANCOTROUGH 32.2* 55.6*     Random:  No results for input(s): \"VANCORANDOM\" in the last 72 hours.      Vancomycin Administration Times:  Recent vancomycin administrations                     vancomycin (VANCOCIN) 1,500 mg in sodium chloride 0.9 % 250 mL IVPB (mg) 1,500 mg New Bag 07/01/25 0817     1,500 mg New Bag 06/30/25 2233     1,500 mg New Bag  1015    vancomycin (VANCOCIN) 2000 mg in 0.9% sodium chloride 500 mL IVPB (mg) 2,000 mg New Bag 06/29/25 2001               Assessment:  Patient is a 55 y.o. female who has been initiated on vancomycin  Estimated Creatinine Clearance: 122 mL/min (based on SCr of 0.6 mg/dL).  To dose vancomycin, pharmacy will target an AUC of 400-600  7/1: Trough collected 5 minutes into infusion. A repeat trough was ordered by RN approximately an hour after infusion completed - represents a peak level    Plan:  Vancomycin 1500 mg IV every 12 hours  Re-attempt trough @ 1900; Hold dose if > 20 mcg/mL  Will continue to monitor renal function   Pharmacy to follow      Lencho Luu, PharmD, [facility] 7/1/2025 12:53 
Pharmacy Consultation Note  (Antibiotic Dosing and Monitoring)    Vancomycin has been discontinued; pharmacy will sign-off.  Please reconsult if needed.    Thank you,    Stormy Aponte, PharmD.  7/1/2025 2:40 PM    JOB: [id number]    
Pharmacy Note    This patient was ordered Suboxone SL film. Per the Pharmacy & Therapeutics Committee, this medication is non-formulary and not carried by pharmacy and was replaced with Suboxone SL tablet at the same frequency. The SL film can be reordered at discharge.    Ana Paula Mireles,  PharmD.  6/30/2025 6:57 AM    SJW: [phone]    
Physical Therapy    Room #:   0313/0313-01    Date: 7/15/2025       Patient Name: Li Rojas  : 1970      MRN: [id number]     Patient unavailable for physical therapy treatment due to refusal. States that she can't breath her o2 at 95% and HR 77. Also states that its \"a bad day\"  Will attempt PT treatment at a later time or date. Thank you.      Mckenzie Hodgson, PTA    
Physical Therapy    Room #:   IC08/IC08-01    Date: 2025       Patient Name: Li Rojas  : 1970      MRN: [id number]     Patient unavailable for physical therapy treatment due to refusal. Will attempt PT treatment tomorrow. Thank you.      Geoff Freed, SPT  '  
Physical Therapy  Physical Therapy    Room #:   0521/0521-01    Date: 2025       Patient Name: Li Rojas  : 1970      MRN: [id number]     Patient unavailable for physical therapy treatment due to nursing is having difficulty with keeping the BiPap on her as she wants to take it off. Will attempt PT treatment tomorrow. Thank you.      Korey Cherry  [facility]  LIC # [id number]        
Physical Therapy  Physical Therapy Initial Evaluation/Plan of Care    Room #:  0521/0521-01  Patient Name: Li Rojas  YOB: 1970  MRN: [id number]    Date of Service: 6/30/2025     Tentative placement recommendation: Subacute Rehab  Equipment recommendation: To be determined      Evaluating Physical Therapist: Toby Olmstead PT, DPT #[id number]      Specific Provider Orders/Date/Referring Provider :     06/29/25 2215    PT evaluation and treat  Start:  06/29/25 2215,   End:  06/29/25 2215,   ONE TIME,   Standing Count:  1 Occurrences,   R       Paradise Mcclelland MD Acknowledge New      Admitting Diagnosis:   Hospital-acquired pneumonia [J18.9, Y95]  HCAP (healthcare-associated pneumonia) [J18.9]  Acute on chronic respiratory failure with hypoxia (HCC) [J96.21]      Surgery: none  Visit Diagnoses         Codes      Acute on chronic respiratory failure with hypoxia (HCC)    -  Primary J96.21      Hospital-acquired pneumonia     J18.9, Y95            Patient Active Problem List   Diagnosis    SVT (supraventricular tachycardia)    MS (multiple sclerosis) (HCC)    Sacroiliitis    Personal history of supraventricular tachycardia    Elevated LFTs    Anxiety    Rheumatoid arthritis (HCC)    Osteoarthritis of left hip    Bilateral lower leg cellulitis    Acute bilateral thoracic back pain    Edema    Asthma    Hypertension    Systemic lupus erythematosus, unspecified (HCC)    Cellulitis    Type 2 diabetes mellitus without complication, without long-term current use of insulin (HCC)    Positive blood cultures    Opioid use disorder, moderate, in sustained remission (HCC)    MARCO (acute kidney injury)    Palliative care encounter    Goals of care, counseling/discussion    HCAP (healthcare-associated pneumonia)    Acute hypoxic respiratory failure (HCC)    Compression fracture of body of thoracic vertebra (HCC)    Compression fracture of lumbar vertebra (HCC)        ASSESSMENT of Current Deficits Patient exhibits 
Physical Therapy  Physical Therapy Treatment Note/Plan of Care    Room #:  0521/0521-01  Patient Name: Li Rojas  YOB: 1970  MRN: [id number]    Date of Service: 7/1/2025     Tentative placement recommendation: Subacute Rehab  Equipment recommendation: To be determined      Evaluating Physical Therapist: Toby Olmstead PT, DPT #[id number]      Specific Provider Orders/Date/Referring Provider :     06/29/25 2215    PT evaluation and treat  Start:  06/29/25 2215,   End:  06/29/25 2215,   ONE TIME,   Standing Count:  1 Occurrences,   R       Paradise Mcclelland MD Acknowledge New      Admitting Diagnosis:   Hospital-acquired pneumonia [J18.9, Y95]  HCAP (healthcare-associated pneumonia) [J18.9]  Acute on chronic respiratory failure with hypoxia (HCC) [J96.21]      Surgery: none        Patient Active Problem List   Diagnosis    SVT (supraventricular tachycardia)    MS (multiple sclerosis) (HCC)    Sacroiliitis    Personal history of supraventricular tachycardia    Elevated LFTs    Anxiety    Rheumatoid arthritis (HCC)    Osteoarthritis of left hip    Bilateral lower leg cellulitis    Acute bilateral thoracic back pain    Edema    Asthma    Hypertension    Systemic lupus erythematosus, unspecified (HCC)    Cellulitis    Type 2 diabetes mellitus without complication, without long-term current use of insulin (HCC)    Positive blood cultures    Opioid use disorder, moderate, in sustained remission (HCC)    MARCO (acute kidney injury)    Palliative care encounter    Goals of care, counseling/discussion    Hospital-acquired pneumonia    Acute on chronic respiratory failure with hypoxia (HCC)    Compression fracture of body of thoracic vertebra (HCC)    Compression fracture of lumbar vertebra (HCC)    Acute delirium    Visual hallucinations        ASSESSMENT of Current Deficits Patient exhibits decreased strength, balance, and endurance impairing functional mobility, transfers, gait , gait distance, and tolerance to 
Physical Therapy  Physical Therapy Treatment Note/Plan of Care    Room #:  0521/0521-01  Patient Name: Li Rojas  YOB: 1970  MRN: [id number]    Date of Service: 7/5/2025     Tentative placement recommendation: Subacute Rehab  Equipment recommendation: To be determined      Evaluating Physical Therapist: Toby Olmstead PT, DPT #[id number]      Specific Provider Orders/Date/Referring Provider :     06/29/25 2215    PT evaluation and treat  Start:  06/29/25 2215,   End:  06/29/25 2215,   ONE TIME,   Standing Count:  1 Occurrences,   R       Paradise Mcclelland MD Acknowledge New      Admitting Diagnosis:   Hospital-acquired pneumonia [J18.9, Y95]  HCAP (healthcare-associated pneumonia) [J18.9]  Acute on chronic respiratory failure with hypoxia (HCC) [J96.21]      Surgery: none        Patient Active Problem List   Diagnosis    SVT (supraventricular tachycardia)    MS (multiple sclerosis) (HCC)    Sacroiliitis    Personal history of supraventricular tachycardia    Elevated LFTs    Anxiety    Rheumatoid arthritis (HCC)    Osteoarthritis of left hip    Bilateral lower leg cellulitis    Acute bilateral thoracic back pain    Edema    Asthma    Hypertension    Systemic lupus erythematosus, unspecified (HCC)    Cellulitis    Type 2 diabetes mellitus without complication, without long-term current use of insulin (HCC)    Positive blood cultures    Opioid use disorder, moderate, in sustained remission (HCC)    MARCO (acute kidney injury)    Palliative care encounter    Goals of care, counseling/discussion    Hospital-acquired pneumonia    Acute on chronic respiratory failure with hypoxia (HCC)    Compression fracture of body of thoracic vertebra (HCC)    Compression fracture of lumbar vertebra (HCC)    Acute delirium    Visual hallucinations    COPD with acute exacerbation (HCC)        ASSESSMENT of Current Deficits Patient exhibits decreased strength, balance, and endurance impairing functional mobility, transfers, 
Physical Therapy  Physical Therapy Treatment Note/Plan of Care    Room #:  IC08/IC08-01  Patient Name: Li Rojas  YOB: 1970  MRN: [id number]    Date of Service: 7/10/2025     Tentative placement recommendation: Subacute Rehab  Equipment recommendation: To be determined      Evaluating Physical Therapist: Toby Olmstead PT, DPT #[id number]      Specific Provider Orders/Date/Referring Provider :     06/29/25 2215    PT evaluation and treat  Start:  06/29/25 2215,   End:  06/29/25 2215,   ONE TIME,   Standing Count:  1 Occurrences,   R       Paradise Mcclelland MD Acknowledge New      Admitting Diagnosis:   Hospital-acquired pneumonia [J18.9, Y95]  HCAP (healthcare-associated pneumonia) [J18.9]  Acute on chronic respiratory failure with hypoxia (HCC) [J96.21]      Surgery: none        Patient Active Problem List   Diagnosis    SVT (supraventricular tachycardia)    MS (multiple sclerosis) (HCC)    Sacroiliitis    Personal history of supraventricular tachycardia    Elevated LFTs    Anxiety    Rheumatoid arthritis (HCC)    Osteoarthritis of left hip    Bilateral lower leg cellulitis    Acute bilateral thoracic back pain    Edema    Asthma    Hypertension    Systemic lupus erythematosus, unspecified (HCC)    Cellulitis    Type 2 diabetes mellitus without complication, without long-term current use of insulin (HCC)    Positive blood cultures    Opioid use disorder, moderate, in sustained remission (HCC)    MARCO (acute kidney injury)    Palliative care encounter    Goals of care, counseling/discussion    Hospital-acquired pneumonia    Acute on chronic respiratory failure with hypoxia (HCC)    Compression fracture of body of thoracic vertebra (HCC)    Compression fracture of lumbar vertebra (HCC)    Acute delirium    Visual hallucinations    COPD with acute exacerbation (HCC)        ASSESSMENT of Current Deficits Patient exhibits decreased strength, balance, and endurance impairing functional mobility, transfers, 
Pt refusing turns and bathing states,\"I have a broken back and I can't turn\" pt educated on not turning q 2 w skin wounds already and breakdown pt stated ,\" I know I can't turn stop making me try to turn over\"   
Pt's blood sugar is 373. Mar order is give 4 units and call physician.  Called Dr. Lubin. He will adjust sliding scale   
Pt. Was bathed and when turning her work of breathing worsened and her O2 was 90% on 15L-HFNC. Dr. Castro entered the room and assessed the pt. Order was to place pt. On a nonrebreather. Pt. Was placed on a non-rebreather and O2 level improved to 97%. Pt. Is agitated on the non-rebreather and continues to try to removed the mask. Pt. Has a sitter at the bedside who is reinforcing the pt. To keep the mask on and I am checking the pt. Frequently to make the sure to non-rebreather stays on and the O2 levels are stable. Plan to try to wean pt. Off the non-rebreather and replace with the HF NC.   
Report  called to the 3rd floor. Pt to be transported on a hospital bed to [facility] [facility]. The patient and her mother were updated with [facility] an [facility]  All belongings were packed up and accounted for by the patient.   
SLP Therapy  SPEECH LANGUAGE PATHOLOGY  DAILY PROGRESS NOTE        PATIENT NAME:  Li Rojas      :  1970          TODAY'S DATE:  7/15/2025 ROOM:  [place] [address]    Pt seen in f/u for dysphagia management, resting in bed on 8L HFNC at 93% FiO2. Pt at rest has her chin in a tucked position, but she is able to bring her chin up with prompting and to take a bite/drink, but immediately puts her chin back down. Pt is somewhat lethargic but responds to verbal stim. She is currently on a puree diet with thin liquids. Despite max prompting, pt declined all puree foods. She did take sips of thin liquids via straw without incident x3. Nursing made aware of poor PO intake, who was aware of this as her current baseline. She is not able to self feed at this time.    Rec cont puree/thin with feeding assistance.    Betty Connolly, SLP  [id number]    CPT code(s) 61486  dysphagia tx  Total minutes :  15 minutes   
SPEECH LANGUAGE PATHOLOGY  DAILY PROGRESS NOTE        PATIENT NAME:  Li Rojas      :  1970          TODAY'S DATE:  2025 ROOM:  [address] [place] [address]    Patient was seen for dysphagia mgmt. Upon entry patient was alert with family at bedside. The patients tray was delivered, and patient was positioned sitting in bed with head raised greater than 75 degrees. Patient consumed soft solids, puree and thin liquids. Patient oral phase displayed timely mastication, good AP propulsion, and good oral containment. Patient pharyngeal phase displayed no overt s/s aspiration. Continue on current diet (soft and bite sized/ thin).  Continue POC.       CPT code(s) 98465  dysphagia tx  Total minutes :  15 minutes     Holli Gonzalez  Speech Pathology  Student Clinician     Justine Bauer M.S., CCC-SLP  Speech-Language Pathologist  SP. [id number]      
SPEECH LANGUAGE PATHOLOGY  DAILY PROGRESS NOTE        PATIENT NAME:  Li Rojas      :  1970          TODAY'S DATE:  2025 ROOM:  [facility]    Current diet: ADULT DIET; Regular    Pt seen for dysphagia tx this date.  Total A for feeding.  Completed diagnostic swallows of soft solids and thin liquids.  Mildly increased mastication time noted, pt edentulous at present.  Pt reports she usually always eats with her dentures, however they are not here at the hospital with her.  No pocketing noted.  Liquid wash utilized to provide oral clearance.  No coughing, choking, or change in vocal quality noted.  Continue to recommend Soft and Bite Sized Diet and Thin Liquids.  Continue ST per POC.      CPT code(s) 28668  dysphagia tx  Total minutes :  12 minutes      Stephanie Montgomery MA, CCC-SLP  Speech-Language Pathologist  #[id number]   
SPEECH LANGUAGE PATHOLOGY  DAILY PROGRESS NOTE        PATIENT NAME:  Li Rojas      :  1970          TODAY'S DATE:  2025 ROOM:  [place] [address]    Current diet: ADULT ORAL NUTRITION SUPPLEMENT; Breakfast, Dinner; Low Calorie/High Protein Oral Supplement  ADULT ORAL NUTRITION SUPPLEMENT; Lunch, Dinner; Wound Healing Oral Supplement  ADULT DIET; Regular; 4 carb choices (60 gm/meal)    Pt seen for dysphagia tx and ongoing assessment.  Pt now in ICU d/t increased respiratory difficulties.  Lethargic, able to remain awake for a few minutes, then falls back to sleep.  Total A for feeding.  Pt completed diagnostic swallows of thin liquids via cup and straw, with weak cough present with all swallows despite small sip size provided.  Pt unsafe for further PO trials @ this time, not able to maintain alertness.  D/t lethargy and inability to maintain alertness, pt is not appropriate for an MBSS @ this time.  Recommend NPO status and may benefit from MBSS when pt is more alert.  Discussed with CARTER Jackson.  Continue ST for swallowing.      CPT code(s) 01531  dysphagia tx  Total minutes :  14 minutes      Stephanie Montgomery MA, CCC-SLP  Speech-Language Pathologist  #[id number]   
SPEECH/LANGUAGE PATHOLOGY  VIDEOFLUOROSCOPIC STUDY OF SWALLOWING (MBS)   and PLAN OF CARE    PATIENT NAME:  Li Rojas  (female)     MRN:  [id number]    :  1970  (55 y.o.)  STATUS:  Inpatient: Room IC08/IC08-01    TODAY'S DATE:  2025  ORDER DATE, DESCRIPTION AND REFERRING PROVIDER:  SLP video swallow  Start:  25 1330,   End:  25 1330,   ONE TIME,   Standing Count:  1 Occurrences,   R     Good Shaw MD:  FL MODIFIED BARIUM SWALLOW W VIDEO   REASON FOR REFERRAL: concern for aspiration. Coughing on thin liquids   EVALUATING THERAPIST: EVAN Ta      RESULTS:      DYSPHAGIA DIAGNOSIS:  Clinical indicators of moderate oropharyngeal phase dysphagia     DIET RECOMMENDATIONS:  Pureed consistency solids (IDDSI level 4) with  thin liquids (IDDSI level 0) and Administer medication whole, ONE AT A TIME, in pudding/applesauce    FEEDING RECOMMENDATIONS:    Assistance level:  Full assistance is needed during all oral intake     Compensatory strategies recommended: Fully alert for all PO, Thorough oral care to prevent colonization of oral bacteria, Upright in bed/ chair as tolerated  Slow rate of intake   SINGLE cup sips  SINGLE straw sips   SMALL bites  Liquid wash after thicker items to assist with clearing pharyngeal residue   Liquid wash to help clear oral cavity of thicker consistency items     Discussed recommendations with:  Patient  and patient nurse in person    Laryngeal Penetration and Aspiration:  Penetration WITHOUT aspiration was observed in today's study with  thin liquid, mildly thick liquid (nectar)    SPEECH THERAPY  PLAN OF CARE   The dysphagia POC is established based on physician order and dysphagia diagnosis    Skilled SLP intervention for dysphagia management on acute care up to 5 x per week until goals met, pt plateaus in function and/or discharged from hospital      Conditions Requiring Skilled Therapeutic Intervention for dysphagia:    Patient is performing 
Spiritual Health History and Assessment/Progress Note  [facility]    Loneliness/Social Isolation,  ,  ,      Name: Li Rojas MRN: [id number]    Age: 55 y.o.     Sex: female   Language: English   Temple: [employer]   Hospital-acquired pneumonia     Date: 7/12/2025                           Spiritual Assessment began in [facility] 2 ICU        Referral/Consult From: (P) Rounding   Encounter Overview/Reason: Loneliness/Social Isolation  Service Provided For: (P)  (Patient was unawakeable,  attempted to leave VM with parent.)          Assessment and Plan of Care:     Patient Interventions include: Other:  prayed for patient and and  unsuccessfully attempted to reach mother by phone.  Family/Friends Interventions include: No family/friends present    Patient Plan of Care: Spiritual Care available upon further referral  Family/Friends Plan of Care: No spiritual needs identified for follow-up and Spiritual Care available upon further referral    Electronically signed by Chaplain Neptali on 7/12/2025 at 11:33 AM    
Spiritual Health History and Assessment/Progress Note  [facility]    Loneliness/Social Isolation,  ,  ,      Name: Li Rojas MRN: [id number]    Age: 55 y.o.     Sex: female   Language: English   Restorationist: [employer]   Hospital-acquired pneumonia     Date: 7/11/2025                           Spiritual Assessment continued in [facility] 2 ICU        Referral/Consult From: (P) Other (comment)   Encounter Overview/Reason: Loneliness/Social Isolation  Service Provided For: (P) Patient    Meaghan, Belief, Meaning:   Patient identifies as spiritual  Family/Friends No family/friends present      Importance and Influence:  Patient has spiritual/personal beliefs that influence decisions regarding their health  Family/Friends No family/friends present    Community:  Patient feels well-supported. Support system includes: Parent/s  Family/Friends No family/friends present    Assessment and Plan of Care:     Patient Interventions include: Other: Patient was connected to supportive medical equipment.  Family/Friends Interventions include: No family/friends present    Patient Plan of Care: No spiritual needs identified for follow-up  Family/Friends Plan of Care: No family/friends present    Electronically signed by Chaplain Brie on 7/11/2025 at 12:30 PM   
Spiritual Health History and Assessment/Progress Note  [facility]    Loneliness/Social Isolation,  ,  ,      Name: Li Rojas MRN: [id number]    Age: 55 y.o.     Sex: female   Language: English   Church: [employer]   HCAP (healthcare-associated pneumonia)     Date: 6/30/2025                           Spiritual Assessment began in [facility] PIC/ICU        Referral/Consult From: Multi-disciplinary team   Encounter Overview/Reason: Loneliness/Social Isolation  Service Provided For: Patient and family together    Meaghan, Belief, Meaning:   Patient identifies as spiritual and has beliefs or practices that help with coping during difficult times  Family/Friends identify as spiritual and have beliefs or practices that help with coping during difficult times      Importance and Influence:  Patient has spiritual/personal beliefs that influence decisions regarding their health  Family/Friends have spiritual/personal beliefs that influence decisions regarding the patient's health    Community:  Patient feels well-supported. Support system includes: Parent/s  Family/Friends feel well-supported. Support system includes: Extended family    Assessment and Plan of Care:     Patient Interventions include: Facilitated expression of thoughts and feelings, Explored spiritual coping/struggle/distress, and Affirmed coping skills/support systems  Family/Friends Interventions include: Facilitated expression of thoughts and feelings, Explored spiritual coping/struggle/distress, and Affirmed coping skills/support systems    Patient Plan of Care: Spiritual Care available upon further referral  Family/Friends Plan of Care: Spiritual Care available upon further referral    Electronically signed by OWEN Valenzuela on 6/30/2025 at 2:36 PM   
Spiritual Health History and Assessment/Progress Note  [facility]    (P) Loneliness/Social Isolation, Palliative Care,  ,  ,      Name: Li Rojas MRN: [id number]    Age: 55 y.o.     Sex: female   Language: English   Protestant: [employer]   Hospital-acquired pneumonia     Date: 7/15/2025                           Spiritual Assessment continued in [facility] 2 ICU        Referral/Consult From: (P) Palliative Care   Encounter Overview/Reason: (P) Loneliness/Social Isolation, Palliative Care  Service Provided For: (P) Patient    Meaghan, Belief, Meaning:   Patient identifies as spiritual and has beliefs or practices that help with coping during difficult times  Family/Friends No family/friends present      Importance and Influence:  Patient has spiritual/personal beliefs that influence decisions regarding their health  Family/Friends No family/friends present    Community:  Patient feels well-supported. Support system includes: Parent/s  Family/Friends No family/friends present    Assessment and Plan of Care:     Patient Interventions include: Facilitated expression of thoughts and feelings, Explored spiritual coping/struggle/distress, and Affirmed coping skills/support systems  Family/Friends Interventions include: No family/friends present    Patient Plan of Care: Spiritual Care available upon further referral  Family/Friends Plan of Care: No family/friends present    Electronically signed by OWEN Valenzuela on 7/15/2025 at 10:40 AM   
  Increased anxiety throughout  Supine to sit: Minimal Assist   Sit to supine: Minimal Assist   Rolling:Minimal Assist     Functional Transfers Minimal Assist from of 2 from edge of bed  Transfer training with verbal cues for hand placement throughout session to improve safety.   Minimal Assist    Functional Mobility Not assessed  d/t pt overall debility, decreased activity tolerance, balance deficits, safety and fall risk.    Minimal Assist    Balance Sitting:     Static: fair minus    Dynamic: poor plus  Standing: fair  minus with wheeled walker  Sitting:     Static: good     Dynamic: good   Standing: good  with wheeled walker   Activity Tolerance poor plus  fair    Visual/  Perceptual Glasses: Yes                Hand Dominance: Right     AROM (PROM) Strength Additional Info:  Goal:   RUE  WFL 3+/5 good  and wfl FMC/dexterity noted during ADL tasks   Improve overall RUE strength  for participation in functional tasks   LUE WFL 3+/5 good  and wfl FMC/dexterity noted during ADL tasks   Improve overall LUE strength  for participation in functional tasks      Vitals:  HR at rest: 87  SpO2 at rest: 96% 6L   HR with activity: 108 SpO2 with activity: 88-94%   HR at end of session: 83 SpO2 at end of session 91%     Hearing: WFL   Sensation:  No c/o numbness or tingling  Tone: WFL   Edema: bilareral lower extremities    Comments: Upon arrival the patient was supine.  At end of session, patient was supine with call light and phone within reach, all lines and tubes intact.  Overall patient demonstrated decreased independence and safety during completion of ADL/functional transfer/mobility tasks.  Pt would benefit from continued skilled OT to increase safety and independence with completion of ADL/IADL tasks for functional independence and quality of life.    Treatment: OT treatment provided this date includes:    Sat edge of bed to increase dynamic sitting balance and activity tolerance, verbal cues for upright 
  Small left pleural effusion.         XR CHEST PORTABLE    (Results Pending)       Assessment and Plan:  Principal Problem:    Hospital-acquired pneumonia  Active Problems:    Elevated LFTs    Acute on chronic respiratory failure with hypoxia (HCC)    Compression fracture of body of thoracic vertebra (HCC)    Compression fracture of lumbar vertebra (HCC)    Acute delirium    Visual hallucinations    COPD with acute exacerbation (HCC)  Resolved Problems:    * No resolved hospital problems. *    Multifocal healthcare associated pneumonia  -Respiratory PCR negative x 2.  Blood cultures 2 sets from 6/29 negative in 2 sets from 7/5 in process and negative thus far  - ID following.  Current antibacterial regimen linezolid day 5, micafungin day 4.  S/p azithromycin for 6 days 7/6 and cefepime for 7 days 7/5.  (Indeterminate - beta D glucan)    Acute on chronic respiratory failure with hypoxia  - Continue scheduled DuoNeb and inhaled corticosteroid  - Solu-Medrol 40 mg IV every 12 hours  - Supplemental oxygen to maintain oxygen saturation between 88 and 94%  - Soft C-spine collar for kyphosis  - Patient declines BiPAP for work of breathing  - Pulmonology following    Multiple vertebral compression fractures and chronic back pain  - Continue Suboxone but at reduced dose  - C-spine collar if patient able to tolerate, she has declined thus far    Anxiety with chronic benzodiazepine use  - Continue reduced dose lorazepam    Metabolic encephalopathy  - Multiple ABGs without evidence of hypercapnia  - Reduce Suboxone and lorazepam which seems to have helped overall, though still waxing and waning    Primary hypertension  - Continue atenolol and diltiazem    Type 2 diabetes mellitus  - Continue basal, prandial, corrective scale insulin    Multiple skin wounds  - Continue wound care per wound care RN recommendations  - She is on appropriate antimicrobial coverage    DVT prophylaxis: Enoxaparin  CODE STATUS: Full    Disposition: At 
cholecystectomy.  The biliary tree is not dilated.  The adrenals do not appear to be enlarged. There are generalized osteopenia visualized bones structures.  This is more than expected for the age group.  Multiple collapse thoracic vertebral bodies identified. This is new development since the study of January 2024.  There compression fractures deformity more likely on subacute chronic basis of T7 T10 T12 L1 with the collapse of the mid anterior aspect of L2.  Fractures are seen in these vertebral bodies.  There also loss of height of endplates of T8-T9, and also of T6, T5, T4, T3. Findings requires correlation with clinical data due rapid development of osteopenia and innumerable compression fracture deformity of the thoracic spine.  Some of the fractures have mild protrusion of bone fragments towards the anterior spinal canal causing mild stenosis of the thoracic spinal canal/upper lumbar spine.     1.  Motion artifacts precludes proper evaluation the pulmonary artery circulation.  For proper evaluation the pulmonary artery circulation consider repeat examination or correlation with ventilation perfusion lung scan. 2.  Ground-glass density in the right upper lobe compatible with pneumonia. 3.  Areas of subsegmental atelectasis in the left upper lobe/left lower lobe/right lower lobe. 4.  No aneurysm formation or dissection of the thoracic aorta. 5.  Rapid development of severe osteopenia with multiple compression fracture deformity in mid lower thoracic spine as new development since January 2024. Please correlate clinically.  Rapid development of a osteopenia of bone structures of the thoracic spine with rapid development of multi compression fracture deformities since the study January 2024 can indicated myeloproliferative conditions including multiple myeloma or severe failure to thrive condition.  Please correlate clinically.     Assessment//Plan           Hospital Problems           Last Modified POA    * 
group.  Multiple collapse thoracic vertebral bodies identified. This is new development since the study of January 2024.  There compression fractures deformity more likely on subacute chronic basis of T7 T10 T12 L1 with the collapse of the mid anterior aspect of L2.  Fractures are seen in these vertebral bodies.  There also loss of height of endplates of T8-T9, and also of T6, T5, T4, T3. Findings requires correlation with clinical data due rapid development of osteopenia and innumerable compression fracture deformity of the thoracic spine.  Some of the fractures have mild protrusion of bone fragments towards the anterior spinal canal causing mild stenosis of the thoracic spinal canal/upper lumbar spine.     1.  Motion artifacts precludes proper evaluation the pulmonary artery circulation.  For proper evaluation the pulmonary artery circulation consider repeat examination or correlation with ventilation perfusion lung scan. 2.  Ground-glass density in the right upper lobe compatible with pneumonia. 3.  Areas of subsegmental atelectasis in the left upper lobe/left lower lobe/right lower lobe. 4.  No aneurysm formation or dissection of the thoracic aorta. 5.  Rapid development of severe osteopenia with multiple compression fracture deformity in mid lower thoracic spine as new development since January 2024. Please correlate clinically.  Rapid development of a osteopenia of bone structures of the thoracic spine with rapid development of multi compression fracture deformities since the study January 2024 can indicated myeloproliferative conditions including multiple myeloma or severe failure to thrive condition.  Please correlate clinically.     Assessment//Plan           Hospital Problems           Last Modified POA    * (Principal) Hospital-acquired pneumonia 6/30/2025 Yes    Elevated LFTs 6/29/2025 Unknown    Acute on chronic respiratory failure with hypoxia (HCC) 6/30/2025 Yes    Compression fracture of body of 
being checked  MRSA NEG  Urine Legionella/S pneumonia neg   Mycoplasma neg  Blood cx neg    Urine cx neg      Day 4 meropenem (MERREM) 1,000 mg in sodium chloride 0.9 % 100 mL IVPB (addEASE), Q8H        Patient was seen and examined in a face to face encounter.   Labs, cultures, and radiographs reviewed.       Electronically signed by Cristina Swartz MD on 7/14/2025 at 3:52 PM    
hypertension  - Continue atenolol and diltiazem    Type 2 diabetes mellitus  - Continue basal, prandial, corrective scale insulin    Multiple skin wounds  - Continue wound care per wound care RN recommendations  - She is on appropriate antimicrobial coverage    DVT prophylaxis: Enoxaparin  CODE STATUS: Full    Disposition: At least a few more days as she is still requiring 11 L high flow nasal cannula oxygen.  She may benefit from LTAC if she qualifies for this    NOTE: This report was transcribed using voice recognition software. Every effort was made to ensure accuracy; however, inadvertent computerized transcription errors may be present.     Electronically signed by Royce Castro MD on 7/8/2025 at 3:54 PM      
nasal cannula oxygen.  She may benefit from LTAC if she qualifies for this    NOTE: This report was transcribed using voice recognition software. Every effort was made to ensure accuracy; however, inadvertent computerized transcription errors may be present.     Electronically signed by Royce Castro MD on 7/10/2025 at 2:50 PM      
Deangelo ALVAREZ Holly J, MD    Review of Systems:   General: denies weight gain, denies loss of appetite, fever, chills, night sweats.  HEENT: denies headaches, dizziness, head trauma, visual changes, eye pain, tinnitus, nosebleeds, hoarseness or throat pain    Respiratory: denies chest pain, +ve dyspnea, cough but no hemoptysis  Cardiovascular: denies orthopnea, paroxysmal nocturnal dyspnea, leg swelling, and previous heart attack.    Gastrointestinal: denies pain, nausea vomiting, diarrhea, constipation, melena or bleeding.  Genitourinary: denies hematuria, frequency, urgency or dysuria  Neurology: denies syncope, seizures, paralysis, paraesthesia   Endocrine: denies polyuria, polydipsia, skin or hair changes, and heat or cold intolerance  Musculoskeletal: denies joint pain, swelling, arthritis or myalgia  Hematologic: denies bleeding, adenopathy and easy bruising  Skin: denies rashes and skin discoloration  Psychiatry: denies depression    Physical Exam:   Vital Signs:  BP (!) 124/93   Pulse 84   Temp 98.2 °F (36.8 °C)   Resp 20   Ht 1.72 m (5' 7.72\")   Wt 90.1 kg (198 lb 10.2 oz)   LMP 05/04/2020   SpO2 91%   BMI 30.46 kg/m²     Input/Output:  No intake/output data recorded.    Oxygen requirements: NC    Ventilator Information:       General appearance:  not in pain or distress, in no respiratory distress    HEENT: Atraumatic/normocephalic, EOMI, YAIR, pharynx clear, moist mucosa, redness of the uvula appreciated,   Neck: Supple, no jugular venous distension, lymphadenopathy, thyromegaly or carotid bruits  Chest: Decreased breath sounds, no wheezing, no crackles and no tenderness over ribs   Cardiovascular: Normal S1 , S2, regular rate and rhythm, no murmur, rub or gallop  Abdomen: Normal sounds present, soft, lax with no tenderness, no hepatosplenomegaly, and no masses  Extremities: No edema. Pulses are equally present.   Skin: intact, no rashes   Neurologic: Alert and oriented x 3, agitated and 
present.     Electronically signed by Good Shaw MD on 7/13/2025 at 12:41 PM        
Date    TSH 1.09 06/23/2025     US GALLBLADDER RUQ   Final Result   1. Diffuse fatty infiltration of the liver.   2. Status post cholecystectomy.         XR CHEST PORTABLE   Final Result   1. Bilateral fairly symmetric airspace disease new/increased in severity from   06/29/2025. Airspace disease may reflect multifocal pneumonia and/or   pulmonary edema.  Correlate clinically.   2. Moderate size left pleural effusion.   3. Stable cardiomegaly.         US ABDOMEN LIMITED Specify organ? LIVER, SPLEEN, GALLBLADDER   Final Result   Hepatic steatosis.  Otherwise unremarkable exam.         CTA PULMONARY W CONTRAST   Final Result   1.  Motion artifacts precludes proper evaluation the pulmonary artery   circulation.  For proper evaluation the pulmonary artery circulation consider   repeat examination or correlation with ventilation perfusion lung scan.      2.  Ground-glass density in the right upper lobe compatible with pneumonia.      3.  Areas of subsegmental atelectasis in the left upper lobe/left lower   lobe/right lower lobe.      4.  No aneurysm formation or dissection of the thoracic aorta.      5.  Rapid development of severe osteopenia with multiple compression fracture   deformity in mid lower thoracic spine as new development since January 2024.   Please correlate clinically.  Rapid development of a osteopenia of bone   structures of the thoracic spine with rapid development of multi compression   fracture deformities since the study January 2024 can indicated   myeloproliferative conditions including multiple myeloma or severe failure to   thrive condition.  Please correlate clinically.         XR CHEST PORTABLE   Final Result   Small left pleural effusion.           - NM Bone Scan 6/23/2025:  1.  Activity seen in the thoracic spine and upper lumbar spine relates with  multiple compression fracture deformities more in late subacute/chronic basis.     2.  Activity of the radionuclide in the medial aspect of mid

## 2025-08-03 PROBLEM — J96.90 RESPIRATORY FAILURE (HCC): Status: ACTIVE | Noted: 2025-01-01

## 2025-08-03 PROBLEM — I21.4 NSTEMI (NON-ST ELEVATED MYOCARDIAL INFARCTION) (HCC): Status: ACTIVE | Noted: 2025-01-01

## 2025-08-04 PROBLEM — K85.90 ACUTE PANCREATITIS: Status: ACTIVE | Noted: 2025-01-01

## 2025-08-05 PROBLEM — J96.02 ACUTE RESPIRATORY FAILURE WITH HYPOXIA AND HYPERCAPNIA (HCC): Status: ACTIVE | Noted: 2025-08-03

## 2025-08-05 PROBLEM — T80.211A CLABSI (CENTRAL LINE-ASSOCIATED BLOODSTREAM INFECTION): Status: ACTIVE | Noted: 2025-01-01

## 2025-08-05 PROBLEM — J96.01 ACUTE RESPIRATORY FAILURE WITH HYPOXIA AND HYPERCAPNIA (HCC): Status: ACTIVE | Noted: 2025-08-03

## 2025-08-08 LAB
MICROORGANISM SPEC CULT: NORMAL
MICROORGANISM SPEC CULT: NORMAL
SERVICE CMNT-IMP: NORMAL
SERVICE CMNT-IMP: NORMAL
SPECIMEN DESCRIPTION: NORMAL
SPECIMEN DESCRIPTION: NORMAL

## (undated) DEVICE — BANDAGE ADH W1XL3IN NAT FAB WVN FLX DURABLE N ADH PD SEAL

## (undated) DEVICE — 6 ML SYRINGE LUER-LOCK TIP: Brand: MONOJECT

## (undated) DEVICE — NON-DEHP CATHETER EXTENSION SET, MALE LUER LOCK ADAPTER

## (undated) DEVICE — ENCORE® LATEX TEXTURED SIZE 6.5, STERILE LATEX POWDER-FREE SURGICAL GLOVE: Brand: ENCORE

## (undated) DEVICE — 3M™ RED DOT™ MONITORING ELECTRODE WITH FOAM TAPE AND STICKY GEL 2560, 50/BAG, 20/CASE, 72/PLT: Brand: RED DOT™

## (undated) DEVICE — NEEDLE SPNL 22GA L5IN BLK HUB S STL W/ QNCKE PNT W/OUT

## (undated) DEVICE — NEEDLE HYPO 25GA L1.5IN BLU POLYPR HUB S STL REG BVL STR

## (undated) DEVICE — 3 ML SYRINGE LUER-LOCK TIP: Brand: MONOJECT

## (undated) DEVICE — TOWEL OR BLUEE 16X26IN ST 8 PACK ORB08 16X26ORTWL

## (undated) DEVICE — GAUZE,SPONGE,4"X4",12PLY,STERILE,LF,2'S: Brand: MEDLINE

## (undated) DEVICE — Z DISCONTINUED APPLICATOR SURG PREP 0.35OZ 2% CHG 70% ISO ALC W/ HI LT

## (undated) DEVICE — NEEDLE HYPO 18GA L1.5IN PNK POLYPR HUB S STL THN WALL FILL

## (undated) DEVICE — MARKER,SKIN,WI/RULER AND LABELS: Brand: MEDLINE